# Patient Record
Sex: FEMALE | Race: WHITE
[De-identification: names, ages, dates, MRNs, and addresses within clinical notes are randomized per-mention and may not be internally consistent; named-entity substitution may affect disease eponyms.]

---

## 2017-01-06 ENCOUNTER — RX RENEWAL (OUTPATIENT)
Age: 62
End: 2017-01-06

## 2017-01-12 ENCOUNTER — APPOINTMENT (OUTPATIENT)
Dept: HEMATOLOGY ONCOLOGY | Facility: CLINIC | Age: 62
End: 2017-01-12

## 2017-01-12 VITALS
SYSTOLIC BLOOD PRESSURE: 154 MMHG | WEIGHT: 166 LBS | BODY MASS INDEX: 24.59 KG/M2 | TEMPERATURE: 97.9 F | HEART RATE: 96 BPM | DIASTOLIC BLOOD PRESSURE: 80 MMHG | HEIGHT: 69 IN

## 2017-01-12 DIAGNOSIS — R11.2 NAUSEA WITH VOMITING, UNSPECIFIED: ICD-10-CM

## 2017-01-12 RX ORDER — NALOXONE HYDROCHLORIDE 1 MG/ML
2 INJECTION PARENTERAL
Qty: 4 | Refills: 0 | Status: ACTIVE | COMMUNITY
Start: 2017-01-05

## 2017-01-12 RX ORDER — MORPHINE SULFATE 200 MG/1
200 TABLET, FILM COATED, EXTENDED RELEASE ORAL
Qty: 60 | Refills: 0 | Status: ACTIVE | COMMUNITY
Start: 2016-08-16

## 2017-02-03 ENCOUNTER — RX RENEWAL (OUTPATIENT)
Age: 62
End: 2017-02-03

## 2017-03-02 ENCOUNTER — RX RENEWAL (OUTPATIENT)
Age: 62
End: 2017-03-02

## 2017-04-17 ENCOUNTER — APPOINTMENT (OUTPATIENT)
Dept: HEMATOLOGY ONCOLOGY | Facility: CLINIC | Age: 62
End: 2017-04-17

## 2017-06-01 ENCOUNTER — INPATIENT (INPATIENT)
Facility: HOSPITAL | Age: 62
LOS: 5 days | Discharge: HOME | End: 2017-06-07
Attending: STUDENT IN AN ORGANIZED HEALTH CARE EDUCATION/TRAINING PROGRAM | Admitting: INTERNAL MEDICINE
Payer: MEDICARE

## 2017-06-01 VITALS
DIASTOLIC BLOOD PRESSURE: 65 MMHG | HEART RATE: 111 BPM | HEIGHT: 65 IN | WEIGHT: 164.91 LBS | RESPIRATION RATE: 16 BRPM | OXYGEN SATURATION: 99 % | TEMPERATURE: 101 F | SYSTOLIC BLOOD PRESSURE: 110 MMHG

## 2017-06-01 DIAGNOSIS — R10.9 UNSPECIFIED ABDOMINAL PAIN: ICD-10-CM

## 2017-06-01 DIAGNOSIS — E80.20 UNSPECIFIED PORPHYRIA: ICD-10-CM

## 2017-06-01 LAB
ADD ON TEST-SPECIMEN IN LAB: SIGNIFICANT CHANGE UP
ALBUMIN SERPL ELPH-MCNC: 4.2 G/DL — SIGNIFICANT CHANGE UP (ref 3.3–5)
ALP SERPL-CCNC: 80 U/L — SIGNIFICANT CHANGE UP (ref 40–120)
ALT FLD-CCNC: 35 U/L — SIGNIFICANT CHANGE UP (ref 12–78)
ANION GAP SERPL CALC-SCNC: 8 MMOL/L — SIGNIFICANT CHANGE UP (ref 5–17)
APTT BLD: 33.3 SEC — SIGNIFICANT CHANGE UP (ref 27.5–37.4)
AST SERPL-CCNC: 22 U/L — SIGNIFICANT CHANGE UP (ref 15–37)
BASOPHILS # BLD AUTO: 0.1 K/UL — SIGNIFICANT CHANGE UP (ref 0–0.2)
BASOPHILS NFR BLD AUTO: 0.7 % — SIGNIFICANT CHANGE UP (ref 0–2)
BILIRUB SERPL-MCNC: 0.5 MG/DL — SIGNIFICANT CHANGE UP (ref 0.2–1.2)
BUN SERPL-MCNC: 10 MG/DL — SIGNIFICANT CHANGE UP (ref 7–23)
CALCIUM SERPL-MCNC: 9.8 MG/DL — SIGNIFICANT CHANGE UP (ref 8.5–10.1)
CHLORIDE SERPL-SCNC: 104 MMOL/L — SIGNIFICANT CHANGE UP (ref 96–108)
CO2 SERPL-SCNC: 26 MMOL/L — SIGNIFICANT CHANGE UP (ref 22–31)
CREAT SERPL-MCNC: 0.89 MG/DL — SIGNIFICANT CHANGE UP (ref 0.5–1.3)
EOSINOPHIL # BLD AUTO: 0 K/UL — SIGNIFICANT CHANGE UP (ref 0–0.5)
EOSINOPHIL NFR BLD AUTO: 0.1 % — SIGNIFICANT CHANGE UP (ref 0–6)
GLUCOSE SERPL-MCNC: 139 MG/DL — HIGH (ref 70–99)
HCT VFR BLD CALC: 43.8 % — SIGNIFICANT CHANGE UP (ref 34.5–45)
HGB BLD-MCNC: 15.2 G/DL — SIGNIFICANT CHANGE UP (ref 11.5–15.5)
INR BLD: 1.02 RATIO — SIGNIFICANT CHANGE UP (ref 0.88–1.16)
LACTATE SERPL-SCNC: 2.1 MMOL/L — HIGH (ref 0.7–2)
LYMPHOCYTES # BLD AUTO: 1.8 K/UL — SIGNIFICANT CHANGE UP (ref 1–3.3)
LYMPHOCYTES # BLD AUTO: 15.1 % — SIGNIFICANT CHANGE UP (ref 13–44)
MCHC RBC-ENTMCNC: 28.7 PG — SIGNIFICANT CHANGE UP (ref 27–34)
MCHC RBC-ENTMCNC: 34.6 GM/DL — SIGNIFICANT CHANGE UP (ref 32–36)
MCV RBC AUTO: 82.9 FL — SIGNIFICANT CHANGE UP (ref 80–100)
MONOCYTES # BLD AUTO: 0.4 K/UL — SIGNIFICANT CHANGE UP (ref 0–0.9)
MONOCYTES NFR BLD AUTO: 3.1 % — SIGNIFICANT CHANGE UP (ref 2–14)
NEUTROPHILS # BLD AUTO: 9.7 K/UL — HIGH (ref 1.8–7.4)
NEUTROPHILS NFR BLD AUTO: 81 % — HIGH (ref 43–77)
PLATELET # BLD AUTO: 265 K/UL — SIGNIFICANT CHANGE UP (ref 150–400)
POTASSIUM SERPL-MCNC: 4.2 MMOL/L — SIGNIFICANT CHANGE UP (ref 3.5–5.3)
POTASSIUM SERPL-SCNC: 4.2 MMOL/L — SIGNIFICANT CHANGE UP (ref 3.5–5.3)
PROT SERPL-MCNC: 8.5 GM/DL — HIGH (ref 6–8.3)
PROTHROM AB SERPL-ACNC: 11 SEC — SIGNIFICANT CHANGE UP (ref 9.8–12.7)
RBC # BLD: 5.29 M/UL — HIGH (ref 3.8–5.2)
RBC # FLD: 11.3 % — SIGNIFICANT CHANGE UP (ref 10.3–14.5)
SODIUM SERPL-SCNC: 138 MMOL/L — SIGNIFICANT CHANGE UP (ref 135–145)
WBC # BLD: 12 K/UL — HIGH (ref 3.8–10.5)
WBC # FLD AUTO: 12 K/UL — HIGH (ref 3.8–10.5)

## 2017-06-01 PROCEDURE — 71010: CPT | Mod: 26

## 2017-06-01 PROCEDURE — 74177 CT ABD & PELVIS W/CONTRAST: CPT | Mod: 26

## 2017-06-01 PROCEDURE — 93010 ELECTROCARDIOGRAM REPORT: CPT

## 2017-06-01 PROCEDURE — 99285 EMERGENCY DEPT VISIT HI MDM: CPT

## 2017-06-01 RX ORDER — SODIUM CHLORIDE 9 MG/ML
1000 INJECTION INTRAMUSCULAR; INTRAVENOUS; SUBCUTANEOUS ONCE
Qty: 0 | Refills: 0 | Status: COMPLETED | OUTPATIENT
Start: 2017-06-01 | End: 2017-06-01

## 2017-06-01 RX ORDER — SODIUM CHLORIDE 9 MG/ML
300 INJECTION INTRAMUSCULAR; INTRAVENOUS; SUBCUTANEOUS ONCE
Qty: 0 | Refills: 0 | Status: COMPLETED | OUTPATIENT
Start: 2017-06-01 | End: 2017-06-01

## 2017-06-01 RX ORDER — ONDANSETRON 8 MG/1
0 TABLET, FILM COATED ORAL
Qty: 0 | Refills: 0 | COMMUNITY

## 2017-06-01 RX ORDER — HYDROMORPHONE HYDROCHLORIDE 2 MG/ML
2 INJECTION INTRAMUSCULAR; INTRAVENOUS; SUBCUTANEOUS ONCE
Qty: 0 | Refills: 0 | Status: DISCONTINUED | OUTPATIENT
Start: 2017-06-01 | End: 2017-06-01

## 2017-06-01 RX ORDER — MORPHINE SULFATE 50 MG/1
1 CAPSULE, EXTENDED RELEASE ORAL
Qty: 0 | Refills: 0 | COMMUNITY

## 2017-06-01 RX ORDER — ALBUTEROL 90 UG/1
0 AEROSOL, METERED ORAL
Qty: 0 | Refills: 0 | COMMUNITY

## 2017-06-01 RX ORDER — CEFTRIAXONE 500 MG/1
1 INJECTION, POWDER, FOR SOLUTION INTRAMUSCULAR; INTRAVENOUS ONCE
Qty: 0 | Refills: 0 | Status: COMPLETED | OUTPATIENT
Start: 2017-06-01 | End: 2017-06-01

## 2017-06-01 RX ORDER — FUROSEMIDE 40 MG
1 TABLET ORAL
Qty: 0 | Refills: 0 | COMMUNITY

## 2017-06-01 RX ORDER — HYDROMORPHONE HYDROCHLORIDE 2 MG/ML
0 INJECTION INTRAMUSCULAR; INTRAVENOUS; SUBCUTANEOUS
Qty: 0 | Refills: 0 | COMMUNITY

## 2017-06-01 RX ADMIN — HYDROMORPHONE HYDROCHLORIDE 2 MILLIGRAM(S): 2 INJECTION INTRAMUSCULAR; INTRAVENOUS; SUBCUTANEOUS at 16:05

## 2017-06-01 RX ADMIN — SODIUM CHLORIDE 3000 MILLILITER(S): 9 INJECTION INTRAMUSCULAR; INTRAVENOUS; SUBCUTANEOUS at 12:20

## 2017-06-01 RX ADMIN — HYDROMORPHONE HYDROCHLORIDE 2 MILLIGRAM(S): 2 INJECTION INTRAMUSCULAR; INTRAVENOUS; SUBCUTANEOUS at 20:54

## 2017-06-01 RX ADMIN — HYDROMORPHONE HYDROCHLORIDE 2 MILLIGRAM(S): 2 INJECTION INTRAMUSCULAR; INTRAVENOUS; SUBCUTANEOUS at 14:05

## 2017-06-01 RX ADMIN — HYDROMORPHONE HYDROCHLORIDE 2 MILLIGRAM(S): 2 INJECTION INTRAMUSCULAR; INTRAVENOUS; SUBCUTANEOUS at 15:35

## 2017-06-01 RX ADMIN — SODIUM CHLORIDE 3000 MILLILITER(S): 9 INJECTION INTRAMUSCULAR; INTRAVENOUS; SUBCUTANEOUS at 12:30

## 2017-06-01 RX ADMIN — CEFTRIAXONE 100 GRAM(S): 500 INJECTION, POWDER, FOR SOLUTION INTRAMUSCULAR; INTRAVENOUS at 13:10

## 2017-06-01 RX ADMIN — HYDROMORPHONE HYDROCHLORIDE 2 MILLIGRAM(S): 2 INJECTION INTRAMUSCULAR; INTRAVENOUS; SUBCUTANEOUS at 14:35

## 2017-06-01 RX ADMIN — SODIUM CHLORIDE 1800 MILLILITER(S): 9 INJECTION INTRAMUSCULAR; INTRAVENOUS; SUBCUTANEOUS at 13:15

## 2017-06-01 RX ADMIN — HYDROMORPHONE HYDROCHLORIDE 2 MILLIGRAM(S): 2 INJECTION INTRAMUSCULAR; INTRAVENOUS; SUBCUTANEOUS at 17:24

## 2017-06-01 RX ADMIN — HYDROMORPHONE HYDROCHLORIDE 2 MILLIGRAM(S): 2 INJECTION INTRAMUSCULAR; INTRAVENOUS; SUBCUTANEOUS at 17:54

## 2017-06-01 NOTE — H&P ADULT - ATTENDING COMMENTS
Patient seen and examined after initial evaluation by family medicine resident above. Case discussed and reviewed in detail. Please note my plan below.     60 yo F with PMH of porphyria cutanea tarda (diagnosed in 1991), h/o skin blisters s/p phlebotomy, chronic adrenal insufficiency, reflex sympathetic dystrophy of the arm, hearing defect B/L, depression, h/o R rib fractures, arthritis, renal insufficiency, osteopenia, p/w abdominal pain x 2 days. +Nausea / vomiting. Patient has a h/o numerous hospitalizations for nausea / vomiting / severe pain, which has been previously managed with narcotics.       *Abdominal pain / nausea / vomiting - possible porphyria attack  -Heme consult  -Hydration w/ D5 /  cc/hour, as glucose can inhibit heme synthesis  -High carb diet  -Porphryin levels  -Pain control  -Stool softners PRN  -Lactate 2.2 -> repeat lactate level  -NPO for now  -Zofran PRN    *DVT ppx  -SCDs

## 2017-06-01 NOTE — ED PROVIDER NOTE - NS ED MD SCRIBE ATTENDING SCRIBE SECTIONS
PROGRESS NOTE/PHYSICAL EXAM/HISTORY OF PRESENT ILLNESS/RESULTS/REVIEW OF SYSTEMS/DISPOSITION/PAST MEDICAL/SURGICAL/SOCIAL HISTORY

## 2017-06-01 NOTE — H&P ADULT - RS GEN PE MLT RESP DETAILS PC
breath sounds equal/no rhonchi/good air movement/normal/respirations non-labored/no wheezes/clear to auscultation bilaterally/no intercostal retractions/no rales/no chest wall tenderness

## 2017-06-01 NOTE — ED ADULT NURSE REASSESSMENT NOTE - SYMPTOMS
abdominal pain
abdominal pain/nausea
pain:/nausea/generalized abdominal pain

## 2017-06-01 NOTE — H&P ADULT - PMH
Chronic abdominal pain    Porphyria Adrenal insufficiency, primary    Chronic abdominal pain    Knee effusion    Osteoporosis    Porphyria

## 2017-06-01 NOTE — H&P ADULT - PROBLEM SELECTOR PLAN 2
continue IVF DS5NS @100ml/hr  Hematology consult   Morphine   Zofran 4mg IV q6hr continue IVF DS5NS @125ml/hr  Hematology consult- Dr Higuera  Dilaudid IV 1 mg q6h for severe pain   Morphine 2mg q6h for moderate pain  Zofran 4mg IV q6hr  Urine porphyrin  Serum porphyrin

## 2017-06-01 NOTE — ED PROVIDER NOTE - CONSTITUTIONAL, MLM
normal... Well appearing, well nourished, awake, alert, oriented to person, place, time/situation. In acute distress secondary to abd pain.

## 2017-06-01 NOTE — ED ADULT NURSE NOTE - ED STAT RN HANDOFF DETAILS
from NATTY RAGSDALE patient awaiting admitting orders. no complaints at this time. safety maintained. will continue to monitor.

## 2017-06-01 NOTE — H&P ADULT - NSHPPHYSICALEXAM_GEN_ALL_CORE
ICU Vital Signs Last 24 Hrs  T(C): 37.2, Max: 38.5 (06-01 @ 12:18)  T(F): 99, Max: 101.3 (06-01 @ 12:18)  HR: 79 (72 - 111)  BP: 142/70 (110/65 - 189/81)    SpO2: 100% (97% - 100%)

## 2017-06-01 NOTE — H&P ADULT - HISTORY OF PRESENT ILLNESS
61y F with a PMHx chronic intermittent porphyria  presents with sudden onset abdominal pain - 2 days  According to the patient she was in her normal state of health when she had a sudden onset stabbing  left lower abdominal radiating diffusely  to the abdomen. This accompanied by fever and nausea/ vomiting but no chills - has had several episodes of vomiting  the last couple of days . Exacerbated by lying down flat and alleviated by assuming a  fetal position. Denies diarrhea or relation to meals.  Denies eating anything unusual or any sick contacts or history of travel.   Has a history of similar episodes of abdominal pain since the age of 44y and has been admitted several times under the care of Dr Higuera. She has over the years tried to figure out what precipitates her attacks but has been unsuccessful. She denies alcohol intake or excessive exposure to sunlight. Denies seizures or peripheral neuropathy. 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 This accompanied by fever and nausea/ vomiting but no chills - has had several episodes of vomiting  the last couple of days . Exacerbated by lying down flat and alleviated by assuming a  fetal position. Denies diarrhea or relation to meals.  Denies eating anything unusual or any sick contacts or history of travel.   Has a history of similar episodes of abdominal pain since the age of 44y and has been admitted several times under the care of Dr Higuera. She has over the years tried to figure out what precipitates her attacks but has been unsuccessful. She denies alcohol intake or excessive exposure to sunlight. Denies seizures or peripheral neuropathy. 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 This accompanied by fever and nausea/ vomiting but no chills - has had several episodes of vomiting  the last couple of days . Exacerbated by lying down flat and alleviated by assuming a  fetal position. Denies diarrhea or relation to meals.  Denies eating anything unusual or any sick contacts or history of travel.   Has a history of similar episodes of abdominal pain since the age of 44y and has been admitted several times under the care of Dr Higuera. She has over the years tried to figure out what precipitates her attacks but has been unsuccessful. She denies alcohol intake or excessive exposure to sunlight. Denies seizures or peripheral neuropathy.   She used to take lasix for swelling of knees but no longer does.

## 2017-06-01 NOTE — ED ADULT NURSE REASSESSMENT NOTE - NS ED NURSE REASSESS COMMENT FT1
Pt VS meet sepsis criteria, blood cx #1 drawn at 1217, IVF NSB initiated at 1220, pt made as comfortable as possible; reports last pain med taken was last night (has port in place, and receives dilaudid 4mg IVP PRN); unable to take anti-nausea med 2/2 N/V. Awaiting orders.

## 2017-06-01 NOTE — ED ADULT NURSE REASSESSMENT NOTE - ANCILLARY STATUS
lab results pending
radiology results pending/lab results pending
lab results pending/radiology results pending
lab results pending/radiology results pending

## 2017-06-01 NOTE — ED PROVIDER NOTE - OBJECTIVE STATEMENT
62 y/o female with a h/o chronic abd pain, presenting to ED for vomiting since last night associated with abd pain. Pt states current pain feels similar to previous exacerbations of chronic abd pain. No fevers but pt reports occasionally being very "hot and cold". Pt follows up with pain management. Dr. Boxer (PCP), Dr. Higuera is pt's "disease doctor".

## 2017-06-01 NOTE — H&P ADULT - PROBLEM SELECTOR PLAN 1
admit to med surg   CT scan abdomen: No acute intra-abdominal or pelvic pathology.   keep NPO   WBC: 12; Lactate: 2.1  continue IVF : at 100 ml /hr  start Protonix 40 mg IV admit to med surg   CT scan abdomen: No acute intra-abdominal or pelvic pathology.   keep NPO except medications  WBC: 12; Lactate: 2.1  start Protonix 40 mg IV admit to [facility] surg   CT scan abdomen: No acute intra-abdominal or pelvic pathology.   keep NPO except medications  WBC: 12; Lactate: 2.1- Recheck lactate in AM  Dilaudid IV 1 mg q6h for severe pain  Morphine 2mg q6h for moderate pain  Zofran IV 4mg for nausea q6 h

## 2017-06-01 NOTE — H&P ADULT - GASTROINTESTINAL DETAILS
no masses palpable/no distention/bowel sounds normal/no rebound tenderness/no guarding/no organomegaly/soft/normal

## 2017-06-02 DIAGNOSIS — Z90.89 ACQUIRED ABSENCE OF OTHER ORGANS: Chronic | ICD-10-CM

## 2017-06-02 DIAGNOSIS — E83.39 OTHER DISORDERS OF PHOSPHORUS METABOLISM: ICD-10-CM

## 2017-06-02 DIAGNOSIS — Z90.49 ACQUIRED ABSENCE OF OTHER SPECIFIED PARTS OF DIGESTIVE TRACT: Chronic | ICD-10-CM

## 2017-06-02 DIAGNOSIS — Z95.828 PRESENCE OF OTHER VASCULAR IMPLANTS AND GRAFTS: ICD-10-CM

## 2017-06-02 DIAGNOSIS — Z98.1 ARTHRODESIS STATUS: Chronic | ICD-10-CM

## 2017-06-02 DIAGNOSIS — E80.21 ACUTE INTERMITTENT (HEPATIC) PORPHYRIA: ICD-10-CM

## 2017-06-02 DIAGNOSIS — F41.9 ANXIETY DISORDER, UNSPECIFIED: ICD-10-CM

## 2017-06-02 LAB
ALBUMIN SERPL ELPH-MCNC: 3.5 G/DL — SIGNIFICANT CHANGE UP (ref 3.3–5)
ALP SERPL-CCNC: 62 U/L — SIGNIFICANT CHANGE UP (ref 40–120)
ALT FLD-CCNC: 25 U/L — SIGNIFICANT CHANGE UP (ref 12–78)
ANION GAP SERPL CALC-SCNC: 7 MMOL/L — SIGNIFICANT CHANGE UP (ref 5–17)
AST SERPL-CCNC: 17 U/L — SIGNIFICANT CHANGE UP (ref 15–37)
BASOPHILS # BLD AUTO: 0.1 K/UL — SIGNIFICANT CHANGE UP (ref 0–0.2)
BASOPHILS NFR BLD AUTO: 0.7 % — SIGNIFICANT CHANGE UP (ref 0–2)
BILIRUB SERPL-MCNC: 0.5 MG/DL — SIGNIFICANT CHANGE UP (ref 0.2–1.2)
BUN SERPL-MCNC: 8 MG/DL — SIGNIFICANT CHANGE UP (ref 7–23)
CALCIUM SERPL-MCNC: 8.4 MG/DL — LOW (ref 8.5–10.1)
CHLORIDE SERPL-SCNC: 111 MMOL/L — HIGH (ref 96–108)
CO2 SERPL-SCNC: 25 MMOL/L — SIGNIFICANT CHANGE UP (ref 22–31)
CREAT SERPL-MCNC: 0.68 MG/DL — SIGNIFICANT CHANGE UP (ref 0.5–1.3)
EOSINOPHIL # BLD AUTO: 0 K/UL — SIGNIFICANT CHANGE UP (ref 0–0.5)
EOSINOPHIL NFR BLD AUTO: 0.1 % — SIGNIFICANT CHANGE UP (ref 0–6)
GLUCOSE SERPL-MCNC: 124 MG/DL — HIGH (ref 70–99)
HCT VFR BLD CALC: 38.4 % — SIGNIFICANT CHANGE UP (ref 34.5–45)
HGB BLD-MCNC: 13.1 G/DL — SIGNIFICANT CHANGE UP (ref 11.5–15.5)
LACTATE SERPL-SCNC: 0.9 MMOL/L — SIGNIFICANT CHANGE UP (ref 0.7–2)
LACTATE SERPL-SCNC: 2.2 MMOL/L — HIGH (ref 0.7–2)
LYMPHOCYTES # BLD AUTO: 2.6 K/UL — SIGNIFICANT CHANGE UP (ref 1–3.3)
LYMPHOCYTES # BLD AUTO: 20.7 % — SIGNIFICANT CHANGE UP (ref 13–44)
MAGNESIUM SERPL-MCNC: 2.3 MG/DL — SIGNIFICANT CHANGE UP (ref 1.6–2.6)
MCHC RBC-ENTMCNC: 28.1 PG — SIGNIFICANT CHANGE UP (ref 27–34)
MCHC RBC-ENTMCNC: 34.1 GM/DL — SIGNIFICANT CHANGE UP (ref 32–36)
MCV RBC AUTO: 82.3 FL — SIGNIFICANT CHANGE UP (ref 80–100)
MONOCYTES # BLD AUTO: 0.9 K/UL — SIGNIFICANT CHANGE UP (ref 0–0.9)
MONOCYTES NFR BLD AUTO: 7.5 % — SIGNIFICANT CHANGE UP (ref 2–14)
NEUTROPHILS # BLD AUTO: 8.9 K/UL — HIGH (ref 1.8–7.4)
NEUTROPHILS NFR BLD AUTO: 71 % — SIGNIFICANT CHANGE UP (ref 43–77)
PHOSPHATE SERPL-MCNC: 2.4 MG/DL — LOW (ref 2.5–4.5)
PLATELET # BLD AUTO: 235 K/UL — SIGNIFICANT CHANGE UP (ref 150–400)
POTASSIUM SERPL-MCNC: 3.4 MMOL/L — LOW (ref 3.5–5.3)
POTASSIUM SERPL-SCNC: 3.4 MMOL/L — LOW (ref 3.5–5.3)
PROT SERPL-MCNC: 7.2 GM/DL — SIGNIFICANT CHANGE UP (ref 6–8.3)
RBC # BLD: 4.67 M/UL — SIGNIFICANT CHANGE UP (ref 3.8–5.2)
RBC # FLD: 11.3 % — SIGNIFICANT CHANGE UP (ref 10.3–14.5)
SODIUM SERPL-SCNC: 143 MMOL/L — SIGNIFICANT CHANGE UP (ref 135–145)
WBC # BLD: 12.5 K/UL — HIGH (ref 3.8–10.5)
WBC # FLD AUTO: 12.5 K/UL — HIGH (ref 3.8–10.5)

## 2017-06-02 RX ORDER — SODIUM CHLORIDE 9 MG/ML
1000 INJECTION, SOLUTION INTRAVENOUS
Qty: 0 | Refills: 0 | Status: DISCONTINUED | OUTPATIENT
Start: 2017-06-02 | End: 2017-06-02

## 2017-06-02 RX ORDER — POTASSIUM PHOSPHATE, MONOBASIC POTASSIUM PHOSPHATE, DIBASIC 236; 224 MG/ML; MG/ML
15 INJECTION, SOLUTION INTRAVENOUS ONCE
Qty: 0 | Refills: 0 | Status: COMPLETED | OUTPATIENT
Start: 2017-06-02 | End: 2017-06-02

## 2017-06-02 RX ORDER — HYDROMORPHONE HYDROCHLORIDE 2 MG/ML
4 INJECTION INTRAMUSCULAR; INTRAVENOUS; SUBCUTANEOUS EVERY 4 HOURS
Qty: 0 | Refills: 0 | Status: DISCONTINUED | OUTPATIENT
Start: 2017-06-02 | End: 2017-06-02

## 2017-06-02 RX ORDER — HYDROMORPHONE HYDROCHLORIDE 2 MG/ML
2 INJECTION INTRAMUSCULAR; INTRAVENOUS; SUBCUTANEOUS
Qty: 0 | Refills: 0 | Status: DISCONTINUED | OUTPATIENT
Start: 2017-06-02 | End: 2017-06-02

## 2017-06-02 RX ORDER — POLYETHYLENE GLYCOL 3350 17 G/17G
17 POWDER, FOR SOLUTION ORAL DAILY
Qty: 0 | Refills: 0 | Status: DISCONTINUED | OUTPATIENT
Start: 2017-06-02 | End: 2017-06-07

## 2017-06-02 RX ORDER — DOCUSATE SODIUM 100 MG
100 CAPSULE ORAL THREE TIMES A DAY
Qty: 0 | Refills: 0 | Status: DISCONTINUED | OUTPATIENT
Start: 2017-06-02 | End: 2017-06-02

## 2017-06-02 RX ORDER — HYDROMORPHONE HYDROCHLORIDE 2 MG/ML
30 INJECTION INTRAMUSCULAR; INTRAVENOUS; SUBCUTANEOUS
Qty: 0 | Refills: 0 | Status: DISCONTINUED | OUTPATIENT
Start: 2017-06-02 | End: 2017-06-06

## 2017-06-02 RX ORDER — HYDROMORPHONE HYDROCHLORIDE 2 MG/ML
1 INJECTION INTRAMUSCULAR; INTRAVENOUS; SUBCUTANEOUS ONCE
Qty: 0 | Refills: 0 | Status: DISCONTINUED | OUTPATIENT
Start: 2017-06-02 | End: 2017-06-02

## 2017-06-02 RX ORDER — HYDROMORPHONE HYDROCHLORIDE 2 MG/ML
0.5 INJECTION INTRAMUSCULAR; INTRAVENOUS; SUBCUTANEOUS
Qty: 0 | Refills: 0 | Status: DISCONTINUED | OUTPATIENT
Start: 2017-06-03 | End: 2017-06-06

## 2017-06-02 RX ORDER — NALOXONE HYDROCHLORIDE 4 MG/.1ML
0.1 SPRAY NASAL
Qty: 0 | Refills: 0 | Status: DISCONTINUED | OUTPATIENT
Start: 2017-06-03 | End: 2017-06-06

## 2017-06-02 RX ORDER — MORPHINE SULFATE 50 MG/1
2 CAPSULE, EXTENDED RELEASE ORAL EVERY 6 HOURS
Qty: 0 | Refills: 0 | Status: DISCONTINUED | OUTPATIENT
Start: 2017-06-02 | End: 2017-06-02

## 2017-06-02 RX ORDER — ONDANSETRON 8 MG/1
4 TABLET, FILM COATED ORAL EVERY 6 HOURS
Qty: 0 | Refills: 0 | Status: DISCONTINUED | OUTPATIENT
Start: 2017-06-02 | End: 2017-06-07

## 2017-06-02 RX ORDER — SENNA PLUS 8.6 MG/1
2 TABLET ORAL AT BEDTIME
Qty: 0 | Refills: 0 | Status: DISCONTINUED | OUTPATIENT
Start: 2017-06-02 | End: 2017-06-07

## 2017-06-02 RX ORDER — ONDANSETRON 8 MG/1
4 TABLET, FILM COATED ORAL EVERY 6 HOURS
Qty: 0 | Refills: 0 | Status: DISCONTINUED | OUTPATIENT
Start: 2017-06-03 | End: 2017-06-06

## 2017-06-02 RX ORDER — PANTOPRAZOLE SODIUM 20 MG/1
40 TABLET, DELAYED RELEASE ORAL DAILY
Qty: 0 | Refills: 0 | Status: DISCONTINUED | OUTPATIENT
Start: 2017-06-02 | End: 2017-06-07

## 2017-06-02 RX ORDER — HYDROMORPHONE HYDROCHLORIDE 2 MG/ML
4 INJECTION INTRAMUSCULAR; INTRAVENOUS; SUBCUTANEOUS
Qty: 0 | Refills: 0 | Status: DISCONTINUED | OUTPATIENT
Start: 2017-06-02 | End: 2017-06-02

## 2017-06-02 RX ORDER — SODIUM CHLORIDE 9 MG/ML
1000 INJECTION, SOLUTION INTRAVENOUS
Qty: 0 | Refills: 0 | Status: DISCONTINUED | OUTPATIENT
Start: 2017-06-02 | End: 2017-06-03

## 2017-06-02 RX ORDER — PANTOPRAZOLE SODIUM 20 MG/1
40 TABLET, DELAYED RELEASE ORAL
Qty: 0 | Refills: 0 | Status: DISCONTINUED | OUTPATIENT
Start: 2017-06-02 | End: 2017-06-02

## 2017-06-02 RX ORDER — DOCUSATE SODIUM 100 MG
100 CAPSULE ORAL THREE TIMES A DAY
Qty: 0 | Refills: 0 | Status: DISCONTINUED | OUTPATIENT
Start: 2017-06-02 | End: 2017-06-07

## 2017-06-02 RX ORDER — HYDROMORPHONE HYDROCHLORIDE 2 MG/ML
1 INJECTION INTRAMUSCULAR; INTRAVENOUS; SUBCUTANEOUS EVERY 6 HOURS
Qty: 0 | Refills: 0 | Status: DISCONTINUED | OUTPATIENT
Start: 2017-06-02 | End: 2017-06-02

## 2017-06-02 RX ORDER — ACETAMINOPHEN 500 MG
650 TABLET ORAL EVERY 6 HOURS
Qty: 0 | Refills: 0 | Status: DISCONTINUED | OUTPATIENT
Start: 2017-06-02 | End: 2017-06-07

## 2017-06-02 RX ADMIN — HYDROMORPHONE HYDROCHLORIDE 1 MILLIGRAM(S): 2 INJECTION INTRAMUSCULAR; INTRAVENOUS; SUBCUTANEOUS at 03:47

## 2017-06-02 RX ADMIN — HYDROMORPHONE HYDROCHLORIDE 2 MILLIGRAM(S): 2 INJECTION INTRAMUSCULAR; INTRAVENOUS; SUBCUTANEOUS at 09:24

## 2017-06-02 RX ADMIN — ONDANSETRON 4 MILLIGRAM(S): 8 TABLET, FILM COATED ORAL at 09:29

## 2017-06-02 RX ADMIN — ONDANSETRON 4 MILLIGRAM(S): 8 TABLET, FILM COATED ORAL at 21:40

## 2017-06-02 RX ADMIN — SODIUM CHLORIDE 100 MILLILITER(S): 9 INJECTION, SOLUTION INTRAVENOUS at 02:19

## 2017-06-02 RX ADMIN — Medication 1 MILLIGRAM(S): at 21:28

## 2017-06-02 RX ADMIN — HYDROMORPHONE HYDROCHLORIDE 30 MILLILITER(S): 2 INJECTION INTRAMUSCULAR; INTRAVENOUS; SUBCUTANEOUS at 18:23

## 2017-06-02 RX ADMIN — Medication 650 MILLIGRAM(S): at 16:21

## 2017-06-02 RX ADMIN — HYDROMORPHONE HYDROCHLORIDE 2 MILLIGRAM(S): 2 INJECTION INTRAMUSCULAR; INTRAVENOUS; SUBCUTANEOUS at 09:45

## 2017-06-02 RX ADMIN — HYDROMORPHONE HYDROCHLORIDE 1 MILLIGRAM(S): 2 INJECTION INTRAMUSCULAR; INTRAVENOUS; SUBCUTANEOUS at 02:50

## 2017-06-02 RX ADMIN — POTASSIUM PHOSPHATE, MONOBASIC POTASSIUM PHOSPHATE, DIBASIC 62.5 MILLIMOLE(S): 236; 224 INJECTION, SOLUTION INTRAVENOUS at 11:22

## 2017-06-02 RX ADMIN — HYDROMORPHONE HYDROCHLORIDE 2 MILLIGRAM(S): 2 INJECTION INTRAMUSCULAR; INTRAVENOUS; SUBCUTANEOUS at 15:47

## 2017-06-02 RX ADMIN — HYDROMORPHONE HYDROCHLORIDE 4 MILLIGRAM(S): 2 INJECTION INTRAMUSCULAR; INTRAVENOUS; SUBCUTANEOUS at 11:35

## 2017-06-02 RX ADMIN — Medication 650 MILLIGRAM(S): at 15:51

## 2017-06-02 RX ADMIN — HYDROMORPHONE HYDROCHLORIDE 1 MILLIGRAM(S): 2 INJECTION INTRAMUSCULAR; INTRAVENOUS; SUBCUTANEOUS at 04:02

## 2017-06-02 RX ADMIN — PANTOPRAZOLE SODIUM 40 MILLIGRAM(S): 20 TABLET, DELAYED RELEASE ORAL at 21:28

## 2017-06-02 RX ADMIN — HYDROMORPHONE HYDROCHLORIDE 2 MILLIGRAM(S): 2 INJECTION INTRAMUSCULAR; INTRAVENOUS; SUBCUTANEOUS at 13:20

## 2017-06-02 RX ADMIN — ONDANSETRON 4 MILLIGRAM(S): 8 TABLET, FILM COATED ORAL at 01:42

## 2017-06-02 RX ADMIN — HYDROMORPHONE HYDROCHLORIDE 4 MILLIGRAM(S): 2 INJECTION INTRAMUSCULAR; INTRAVENOUS; SUBCUTANEOUS at 11:21

## 2017-06-02 RX ADMIN — HYDROMORPHONE HYDROCHLORIDE 1 MILLIGRAM(S): 2 INJECTION INTRAMUSCULAR; INTRAVENOUS; SUBCUTANEOUS at 02:34

## 2017-06-02 RX ADMIN — SODIUM CHLORIDE 125 MILLILITER(S): 9 INJECTION, SOLUTION INTRAVENOUS at 18:20

## 2017-06-02 NOTE — ED ADULT NURSE REASSESSMENT NOTE - COMFORT CARE
repositioned/side rails up
warm blanket provided/wait time explained
plan of care explained/warm blanket provided
plan of care explained/wait time explained
plan of care explained/warm blanket provided

## 2017-06-02 NOTE — DIETITIAN INITIAL EVALUATION ADULT. - OTHER INFO
consult for N/V > 3 days.  Pt has porphyria, MD recommend high carb diet. consult for n/v > 3 days.  Pt in pain, nauseated, NPO.  Doctor recommends high carb diet for porphyria, pt on high carbs at home. NKFA, no issues chew/swallow.

## 2017-06-02 NOTE — PATIENT PROFILE ADULT. - ABILITY TO HEAR (WITH HEARING AID OR HEARING APPLIANCE IF NORMALLY USED):
Mildly to Moderately Impaired: difficulty hearing in some environments or speaker may need to increase volume or speak distinctly/b/l hearing aids

## 2017-06-02 NOTE — PROGRESS NOTE ADULT - SUBJECTIVE AND OBJECTIVE BOX
60 y/o woman with porphyria admitted with uncontrolled pain/ acute porphyria attack.  Dr Seay is her hematologist,  She is on daily IVF at home and multiple pain meds (  under [facility] pain  management).    Vital Signs Last 24 Hrs  T(C): 36.4, Max: 37.2 (06-02 @ 01:31)  T(F): 97.5, Max: 99 (06-02 @ 01:31)  HR: 66 (65 - 79)  BP: 167/77 (142/70 - 167/77)  BP(mean): --  RR: 18 (16 - 19)  SpO2: 98% (97% - 100%)    Awake, alert c/o generalized pain. Chest clear. Heart regular. [facility] right upper chest.                          13.1   12.5  )-----------( 235      ( 02 Jun 2017 07:05 )             38.4   06-02    143  |  111<H>  |  8   ----------------------------<  124<H>  3.4<L>   |  25  |  0.68    Ca    8.4<L>      02 Jun 2017 07:05  Phos  2.4     06-02  Mg     2.3     06-02    TPro  7.2  /  Alb  3.5  /  TBili  0.5  /  DBili  x   /  AST  17  /  ALT  25  /  AlkPhos  62  06-02

## 2017-06-02 NOTE — PROGRESS NOTE ADULT - ASSESSMENT
60 y/o woman with chronic porphyria admitted with acute exacerbation of pain.  IVF with dextrose.  Aggressive pain control. On chronic  opioids at home.  She sis well with PCA with dilaudid during her prior admissions.   Dr Seay will follow up tomorrow.

## 2017-06-02 NOTE — PROGRESS NOTE ADULT - PROBLEM SELECTOR PLAN 1
admit to [facility] surg   CT scan abdomen: No acute intra-abdominal or pelvic pathology.   keep NPO except medications  WBC: 12; Lactate: 2.1- Recheck lactate in AM  Dilaudid IV 1 mg q6h for severe pain  Morphine 2mg q6h for moderate pain  Zofran IV 4mg for nausea q6 h Likely due to acute intermittent porphyria   CT scan abdomen: No acute intra-abdominal or pelvic pathology.   keep NPO except medications  WBC: 12; Lactate: 2.1- Recheck lactate WNL after Hydration  Pain not controlled even with change of pain meds, Anesthesia for PCA pump called  C/w Zofran PRN  PPI  Aggressive Hydration D5NS  Monitor electrolytes   D/w DR Seay

## 2017-06-02 NOTE — PROGRESS NOTE ADULT - PROBLEM SELECTOR PLAN 2
continue IVF DS5NS @125ml/hr  Hematology consult- Dr Higuera  Dilaudid IV 1 mg q6h for severe pain   Morphine 2mg q6h for moderate pain  Zofran 4mg IV q6hr  Urine porphyrin  Serum porphyrin Lorazepam 1mg q 8hrs

## 2017-06-02 NOTE — PROGRESS NOTE ADULT - ASSESSMENT
61y f with history of acute intermittent porphyria presents with abdominal pain . 61y f with history of acute intermittent porphyria presents with  acute abdominal pain .

## 2017-06-02 NOTE — PROGRESS NOTE ADULT - SUBJECTIVE AND OBJECTIVE BOX
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 This accompanied by fever and nausea/ vomiting but no chills - has had several episodes of vomiting  the last couple of days . Exacerbated by lying down flat and alleviated by assuming a  fetal position. Denies diarrhea or relation to meals.  Denies eating anything unusual or any sick contacts or history of travel.   Has a history of similar episodes of abdominal pain since the age of 44y and has been admitted several times under the care of Dr Higuera. She has over the years tried to figure out what precipitates her attacks but has been unsuccessful. She denies alcohol intake or excessive exposure to sunlight. Denies seizures or peripheral neuropathy.   She used to take lasix for swelling of knees but no longer does. (2017 23:10)    INTERVAL HPI/OVERNIGHT EVENTS:    Vital Signs Last 24 Hrs  T(C): 36.6, Max: 38.5 ( @ 12:18)  T(F): 97.8, Max: 101.3 ( @ 12:18)  HR: 65 (65 - 111)  BP: 154/59 (110/65 - 189/81)  BP(mean): --  RR: 18 (16 - 23)  SpO2: 100% (97% - 100%)  I&O's Detail    I & Os for current day (as of 2017 12:07)  =============================================  IN:    dextrose 5% + sodium chloride 0.9%.: 306 ml    Total IN: 306 ml  ---------------------------------------------  OUT:    Total OUT: 0 ml  ---------------------------------------------  Total NET: 306 ml                                13.1   12.5  )-----------( 235      ( 2017 07:05 )             38.4     2017 07:05    143    |  111    |  8      ----------------------------<  124    3.4     |  25     |  0.68     Ca    8.4        2017 07:05  Phos  2.4       2017 07:05  Mg     2.3       2017 07:05    TPro  7.2    /  Alb  3.5    /  TBili  0.5    /  DBili  x      /  AST  17     /  ALT  25     /  AlkPhos  62     2017 07:05    PT/INR - ( 2017 12:19 )   PT: 11.0 sec;   INR: 1.02 ratio         PTT - ( 2017 12:19 )  PTT:33.3 sec  CAPILLARY BLOOD GLUCOSE  124 (2017 12:18)    LIVER FUNCTIONS - ( 2017 07:05 )  Alb: 3.5 g/dL / Pro: 7.2 gm/dL / ALK PHOS: 62 U/L / ALT: 25 U/L / AST: 17 U/L / GGT: x           Urinalysis Basic - ( 2017 14:54 )    Color: Yellow / Appearance: Clear / S.005 / pH: x  Gluc: x / Ketone: Trace  / Bili: Negative / Urobili: Negative mg/dL   Blood: x / Protein: Negative mg/dL / Nitrite: Negative   Leuk Esterase: Trace / RBC: 0-2 /HPF / WBC 3-5   Sq Epi: x / Non Sq Epi: Few / Bacteria: Few        MEDICATIONS  (STANDING):  dextrose 5% + sodium chloride 0.9%. 1000milliLiter(s) IV Continuous <Continuous>  LORazepam     Tablet 1milliGRAM(s) Oral at bedtime  pantoprazole    Tablet 40milliGRAM(s) Oral before breakfast  polyethylene glycol 3350 17Gram(s) Oral daily    MEDICATIONS  (PRN):  acetaminophen   Tablet. 650milliGRAM(s) Oral every 6 hours PRN Moderate Pain (4 - 6)  ondansetron Injectable 4milliGRAM(s) IV Push every 6 hours PRN Nausea  docusate sodium 100milliGRAM(s) Oral three times a day PRN Constipation  senna 2Tablet(s) Oral at bedtime PRN Constipation  HYDROmorphone  Injectable 2milliGRAM(s) IV Push every 2 hours PRN Moderate Pain (4 - 6)  HYDROmorphone  Injectable 4milliGRAM(s) IV Push every 2 hours PRN Severe Pain (7 - 10)      RADIOLOGY & ADDITIONAL TESTS: CC: Abdominal pain - 2 days (2017 23:10)    HPI:  61y F with a PMHx acute  intermittent porphyria( diagnosed ; last exacerbation   ) ,  presents with sudden onset abdominal pain - 2 days  According to the patient she was in her normal state of health when she had a sudden onset stabbing  left lower abdominal radiating diffusely  to the abdomen. This accompanied by fever and nausea/ vomiting but no chills - has had several episodes of vomiting  the last couple of days . Exacerbated by lying down flat and alleviated by assuming a  fetal position. Denies diarrhea or relation to meals.  Denies eating anything unusual or any sick contacts or history of travel.   Has a history of similar episodes of abdominal pain since the age of 44y and has been admitted several times under the care of Dr Higuera. She has over the years tried to figure out what precipitates her attacks but has been unsuccessful. She denies alcohol intake or excessive exposure to sunlight. Denies seizures or peripheral neuropathy.   She used to take lasix for swelling of knees but no longer does. (2017 23:10)    INTERVAL HPI/ OVERNIGHT EVENTS: Pt was seen and examined this am, confirmed history above,  C/o severe abd pain, not better  with PO or IV dilated feels nauseous. Pt  states that was admitted before and was on PCA.  Family at bedside POC discussed     Vital Signs Last 24 Hrs  T(C): 36.4, Max: 37.2 (- @ 01:31)  T(F): 97.5, Max: 99 (06- @ 01:31)  HR: 66 (65 - 79)  BP: 167/77 (142/70 - 167/77)  BP(mean): --  RR: 18 (16 - 19)  SpO2: 98% (97% - 100%)    I&O's Detail    I & Os for current day (as of 2017 12:07)  =============================================  IN:    dextrose 5% + sodium chloride 0.9%.: 306 ml    Total IN: 306 ml  ---------------------------------------------  OUT:    Total OUT: 0 ml  ---------------------------------------------  Total NET: 306 ml                                13.1   12.5  )-----------( 235      ( 2017 07:05 )             38.4     2017 07:05    143    |  111    |  8      ----------------------------<  124    3.4     |  25     |  0.68     Ca    8.4        2017 07:05  Phos  2.4       2017 07:05  Mg     2.3       2017 07:05    TPro  7.2    /  Alb  3.5    /  TBili  0.5    /  DBili  x      /  AST  17     /  ALT  25     /  AlkPhos  62     2017 07:05    PT/INR - ( 2017 12:19 )   PT: 11.0 sec;   INR: 1.02 ratio         PTT - ( 2017 12:19 )  PTT:33.3 sec  CAPILLARY BLOOD GLUCOSE  124 (2017 12:18)    LIVER FUNCTIONS - ( 2017 07:05 )  Alb: 3.5 g/dL / Pro: 7.2 gm/dL / ALK PHOS: 62 U/L / ALT: 25 U/L / AST: 17 U/L / GGT: x           Urinalysis Basic - ( 2017 14:54 )    Color: Yellow / Appearance: Clear / S.005 / pH: x  Gluc: x / Ketone: Trace  / Bili: Negative / Urobili: Negative mg/dL   Blood: x / Protein: Negative mg/dL / Nitrite: Negative   Leuk Esterase: Trace / RBC: 0-2 /HPF / WBC 3-5   Sq Epi: x / Non Sq Epi: Few / Bacteria: Few        MEDICATIONS  (STANDING):  dextrose 5% + sodium chloride 0.9%. 1000milliLiter(s) IV Continuous <Continuous>  LORazepam     Tablet 1milliGRAM(s) Oral at bedtime  pantoprazole    Tablet 40milliGRAM(s) Oral before breakfast  polyethylene glycol 3350 17Gram(s) Oral daily    MEDICATIONS  (PRN):  acetaminophen   Tablet. 650milliGRAM(s) Oral every 6 hours PRN Moderate Pain (4 - 6)  ondansetron Injectable 4milliGRAM(s) IV Push every 6 hours PRN Nausea  docusate sodium 100milliGRAM(s) Oral three times a day PRN Constipation  senna 2Tablet(s) Oral at bedtime PRN Constipation  HYDROmorphone  Injectable 2milliGRAM(s) IV Push every 2 hours PRN Moderate Pain (4 - 6)  HYDROmorphone  Injectable 4milliGRAM(s) IV Push every 2 hours PRN Severe Pain (7 - 10)      RADIOLOGY & ADDITIONAL TESTS:  EXAM:  CT ABDOMEN AND PELVIS IC                            PROCEDURE DATE:  2017        INTERPRETATION:  CT ABDOMEN AND PELVIS IC    HISTORY:  Generalized abdominal pain and nausea    Technique: CT of the abdomen and pelvis is performed with oral and   intravenous contrast. Axial images are supplemented with coronal and   sagittal reformations. This study was performed using automatic exposure   control (radiation dose reduction software) to obtain a diagnostic image   quality scan with patient dose as low as reasonably achievable.    Contrast: 90 cc Omnipaque 350    Comparison: CT abdomen and pelvis 2016    Findings:  LIVER: Diffuse steatosis.  SPLEEN: Normal.  PANCREAS: Diffuse atrophy.  GALLBLADDER/BILIARY TREE: Stable post cholecystectomy dilatation of the   extrahepatic bile duct.  ADRENALS: Normal.  KIDNEYS: No calcification, hydronephrosis, or soft tissue attenuating   renal mass.  LYMPHADENOPATHY/RETROPERITONEUM: No adenopathy.  VASCULATURE: Normal caliber aorta.  BOWEL: No bowel-related abnormality. Specifically, no evidence of acute   diverticulitis. Normal appendix and ileocecal region. Moderate fecal   retention in the sigmoid colon and rectum.  PELVIC VISCERA: Calcified fibroids.  PELVIC LYMPH NODES: No pelvic adenopathy.  PERITONEUM/ABDOMINAL WALL: No free air or ascites.  SKELETAL: No acute bony abnormality. Stable T12 compression deformity.  LUNG BASES: Clear.    IMPRESSION:     No acute intra-abdominal or pelvic pathology. Moderate fecal retention in  the colon and rectum.

## 2017-06-03 LAB
ANION GAP SERPL CALC-SCNC: 6 MMOL/L — SIGNIFICANT CHANGE UP (ref 5–17)
BUN SERPL-MCNC: 8 MG/DL — SIGNIFICANT CHANGE UP (ref 7–23)
CALCIUM SERPL-MCNC: 8.2 MG/DL — LOW (ref 8.5–10.1)
CHLORIDE SERPL-SCNC: 113 MMOL/L — HIGH (ref 96–108)
CO2 SERPL-SCNC: 25 MMOL/L — SIGNIFICANT CHANGE UP (ref 22–31)
CREAT SERPL-MCNC: 0.62 MG/DL — SIGNIFICANT CHANGE UP (ref 0.5–1.3)
GLUCOSE SERPL-MCNC: 125 MG/DL — HIGH (ref 70–99)
HCT VFR BLD CALC: 37.3 % — SIGNIFICANT CHANGE UP (ref 34.5–45)
HGB BLD-MCNC: 12.8 G/DL — SIGNIFICANT CHANGE UP (ref 11.5–15.5)
MAGNESIUM SERPL-MCNC: 2.3 MG/DL — SIGNIFICANT CHANGE UP (ref 1.6–2.6)
MCHC RBC-ENTMCNC: 29.2 PG — SIGNIFICANT CHANGE UP (ref 27–34)
MCHC RBC-ENTMCNC: 34.4 GM/DL — SIGNIFICANT CHANGE UP (ref 32–36)
MCV RBC AUTO: 84.8 FL — SIGNIFICANT CHANGE UP (ref 80–100)
PHOSPHATE SERPL-MCNC: 1.4 MG/DL — LOW (ref 2.5–4.5)
PLATELET # BLD AUTO: 216 K/UL — SIGNIFICANT CHANGE UP (ref 150–400)
POTASSIUM SERPL-MCNC: 3.6 MMOL/L — SIGNIFICANT CHANGE UP (ref 3.5–5.3)
POTASSIUM SERPL-SCNC: 3.6 MMOL/L — SIGNIFICANT CHANGE UP (ref 3.5–5.3)
RBC # BLD: 4.4 M/UL — SIGNIFICANT CHANGE UP (ref 3.8–5.2)
RBC # FLD: 11.7 % — SIGNIFICANT CHANGE UP (ref 10.3–14.5)
SODIUM SERPL-SCNC: 144 MMOL/L — SIGNIFICANT CHANGE UP (ref 135–145)
WBC # BLD: 10.6 K/UL — HIGH (ref 3.8–10.5)
WBC # FLD AUTO: 10.6 K/UL — HIGH (ref 3.8–10.5)

## 2017-06-03 RX ORDER — SODIUM CHLORIDE 9 MG/ML
1000 INJECTION, SOLUTION INTRAVENOUS
Qty: 0 | Refills: 0 | Status: DISCONTINUED | OUTPATIENT
Start: 2017-06-03 | End: 2017-06-06

## 2017-06-03 RX ORDER — HYDROMORPHONE HYDROCHLORIDE 2 MG/ML
0.5 INJECTION INTRAMUSCULAR; INTRAVENOUS; SUBCUTANEOUS
Qty: 0 | Refills: 0 | Status: DISCONTINUED | OUTPATIENT
Start: 2017-06-03 | End: 2017-06-06

## 2017-06-03 RX ORDER — SODIUM,POTASSIUM PHOSPHATES 278-250MG
1 POWDER IN PACKET (EA) ORAL
Qty: 0 | Refills: 0 | Status: COMPLETED | OUTPATIENT
Start: 2017-06-03 | End: 2017-06-03

## 2017-06-03 RX ORDER — POTASSIUM PHOSPHATE, MONOBASIC POTASSIUM PHOSPHATE, DIBASIC 236; 224 MG/ML; MG/ML
15 INJECTION, SOLUTION INTRAVENOUS EVERY 6 HOURS
Qty: 0 | Refills: 0 | Status: COMPLETED | OUTPATIENT
Start: 2017-06-03 | End: 2017-06-03

## 2017-06-03 RX ADMIN — POTASSIUM PHOSPHATE, MONOBASIC POTASSIUM PHOSPHATE, DIBASIC 62.5 MILLIMOLE(S): 236; 224 INJECTION, SOLUTION INTRAVENOUS at 17:50

## 2017-06-03 RX ADMIN — SODIUM CHLORIDE 125 MILLILITER(S): 9 INJECTION, SOLUTION INTRAVENOUS at 01:54

## 2017-06-03 RX ADMIN — ONDANSETRON 4 MILLIGRAM(S): 8 TABLET, FILM COATED ORAL at 12:24

## 2017-06-03 RX ADMIN — SODIUM CHLORIDE 125 MILLILITER(S): 9 INJECTION, SOLUTION INTRAVENOUS at 21:32

## 2017-06-03 RX ADMIN — SODIUM CHLORIDE 125 MILLILITER(S): 9 INJECTION, SOLUTION INTRAVENOUS at 13:53

## 2017-06-03 RX ADMIN — Medication 1 MILLIGRAM(S): at 22:31

## 2017-06-03 RX ADMIN — SODIUM CHLORIDE 125 MILLILITER(S): 9 INJECTION, SOLUTION INTRAVENOUS at 10:12

## 2017-06-03 RX ADMIN — PANTOPRAZOLE SODIUM 40 MILLIGRAM(S): 20 TABLET, DELAYED RELEASE ORAL at 11:39

## 2017-06-03 RX ADMIN — Medication 1 MILLIGRAM(S): at 10:39

## 2017-06-03 RX ADMIN — HYDROMORPHONE HYDROCHLORIDE 30 MILLILITER(S): 2 INJECTION INTRAMUSCULAR; INTRAVENOUS; SUBCUTANEOUS at 14:53

## 2017-06-03 RX ADMIN — POTASSIUM PHOSPHATE, MONOBASIC POTASSIUM PHOSPHATE, DIBASIC 62.5 MILLIMOLE(S): 236; 224 INJECTION, SOLUTION INTRAVENOUS at 11:39

## 2017-06-03 RX ADMIN — ONDANSETRON 4 MILLIGRAM(S): 8 TABLET, FILM COATED ORAL at 06:04

## 2017-06-03 NOTE — PROGRESS NOTE ADULT - PROBLEM SELECTOR PLAN 1
Likely due to acute intermittent porphyria   CT scan abdomen: No acute intra-abdominal or pelvic pathology.   keep NPO except medications  WBC: 12; Lactate: 2.1- Recheck lactate WNL after Hydration  Pain not controlled even with change of pain meds, Anesthesia for PCA pump called  C/w Zofran PRN  PPI  Aggressive Hydration D5NS  Monitor electrolytes   D/w DR Seay Likely due to acute intermittent porphyria   CT scan abdomen: No acute intra-abdominal or pelvic pathology.    NPO except medications, may  advance diet if Pt feels  nausea better   WBC: 12; Lactate: 2.1- Recheck lactate WNL after Hydration  C/w PCA pump for pain control, anesthesia f/u for dose adjustment if needed   C/w Zofran PRN  PPI  Will add Ativan for restlessness/anxiety and nausea   Aggressive Hydration D5NS  Monitor electrolytes and replace

## 2017-06-03 NOTE — PROGRESS NOTE ADULT - SUBJECTIVE AND OBJECTIVE BOX
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 This accompanied by fever and nausea/ vomiting but no chills - has had several episodes of vomiting  the last couple of days . Exacerbated by lying down flat and alleviated by assuming a  fetal position. Denies diarrhea or relation to meals.  Denies eating anything unusual or any sick contacts or history of travel.   Has a history of similar episodes of abdominal pain since the age of 44y and has been admitted several times under the care of Dr Higuera. She has over the years tried to figure out what precipitates her attacks but has been unsuccessful. She denies alcohol intake or excessive exposure to sunlight. Denies seizures or peripheral neuropathy.   She used to take lasix for swelling of knees but no longer does. (2017 23:10)    INTERVAL HPI/ OVERNIGHT EVENTS: Pt was seen and examined this am, confirmed history above,  C/o severe abd pain, not better  with PO or IV dilated feels nauseous. Pt  states that was admitted before and was on PCA.  Family at bedside POC discussed     Vital Signs Last 24 Hrs  T(C): 36.4, Max: 37.2 (- @ 01:31)  T(F): 97.5, Max: 99 (06- @ 01:31)  HR: 66 (65 - 79)  BP: 167/77 (142/70 - 167/77)  BP(mean): --  RR: 18 (16 - 19)  SpO2: 98% (97% - 100%)    I&O's Detail    I & Os for current day (as of 2017 12:07)  =============================================  IN:    dextrose 5% + sodium chloride 0.9%.: 306 ml    Total IN: 306 ml  ---------------------------------------------  OUT:    Total OUT: 0 ml  ---------------------------------------------  Total NET: 306 ml                                13.1   12.5  )-----------( 235      ( 2017 07:05 )             38.4     2017 07:05    143    |  111    |  8      ----------------------------<  124    3.4     |  25     |  0.68     Ca    8.4        2017 07:05  Phos  2.4       2017 07:05  Mg     2.3       2017 07:05    TPro  7.2    /  Alb  3.5    /  TBili  0.5    /  DBili  x      /  AST  17     /  ALT  25     /  AlkPhos  62     2017 07:05    PT/INR - ( 2017 12:19 )   PT: 11.0 sec;   INR: 1.02 ratio         PTT - ( 2017 12:19 )  PTT:33.3 sec  CAPILLARY BLOOD GLUCOSE  124 (2017 12:18)    LIVER FUNCTIONS - ( 2017 07:05 )  Alb: 3.5 g/dL / Pro: 7.2 gm/dL / ALK PHOS: 62 U/L / ALT: 25 U/L / AST: 17 U/L / GGT: x           Urinalysis Basic - ( 2017 14:54 )    Color: Yellow / Appearance: Clear / S.005 / pH: x  Gluc: x / Ketone: Trace  / Bili: Negative / Urobili: Negative mg/dL   Blood: x / Protein: Negative mg/dL / Nitrite: Negative   Leuk Esterase: Trace / RBC: 0-2 /HPF / WBC 3-5   Sq Epi: x / Non Sq Epi: Few / Bacteria: Few        MEDICATIONS  (STANDING):  dextrose 5% + sodium chloride 0.9%. 1000milliLiter(s) IV Continuous <Continuous>  LORazepam     Tablet 1milliGRAM(s) Oral at bedtime  pantoprazole    Tablet 40milliGRAM(s) Oral before breakfast  polyethylene glycol 3350 17Gram(s) Oral daily    MEDICATIONS  (PRN):  acetaminophen   Tablet. 650milliGRAM(s) Oral every 6 hours PRN Moderate Pain (4 - 6)  ondansetron Injectable 4milliGRAM(s) IV Push every 6 hours PRN Nausea  docusate sodium 100milliGRAM(s) Oral three times a day PRN Constipation  senna 2Tablet(s) Oral at bedtime PRN Constipation  HYDROmorphone  Injectable 2milliGRAM(s) IV Push every 2 hours PRN Moderate Pain (4 - 6)  HYDROmorphone  Injectable 4milliGRAM(s) IV Push every 2 hours PRN Severe Pain (7 - 10)      RADIOLOGY & ADDITIONAL TESTS:  EXAM:  CT ABDOMEN AND PELVIS IC                            PROCEDURE DATE:  2017        INTERPRETATION:  CT ABDOMEN AND PELVIS IC    HISTORY:  Generalized abdominal pain and nausea    Technique: CT of the abdomen and pelvis is performed with oral and   intravenous contrast. Axial images are supplemented with coronal and   sagittal reformations. This study was performed using automatic exposure   control (radiation dose reduction software) to obtain a diagnostic image   quality scan with patient dose as low as reasonably achievable.    Contrast: 90 cc Omnipaque 350    Comparison: CT abdomen and pelvis 2016    Findings:  LIVER: Diffuse steatosis.  SPLEEN: Normal.  PANCREAS: Diffuse atrophy.  GALLBLADDER/BILIARY TREE: Stable post cholecystectomy dilatation of the   extrahepatic bile duct.  ADRENALS: Normal.  KIDNEYS: No calcification, hydronephrosis, or soft tissue attenuating   renal mass.  LYMPHADENOPATHY/RETROPERITONEUM: No adenopathy.  VASCULATURE: Normal caliber aorta.  BOWEL: No bowel-related abnormality. Specifically, no evidence of acute   diverticulitis. Normal appendix and ileocecal region. Moderate fecal   retention in the sigmoid colon and rectum.  PELVIC VISCERA: Calcified fibroids.  PELVIC LYMPH NODES: No pelvic adenopathy.  PERITONEUM/ABDOMINAL WALL: No free air or ascites.  SKELETAL: No acute bony abnormality. Stable T12 compression deformity.  LUNG BASES: Clear.    IMPRESSION:     No acute intra-abdominal or pelvic pathology. Moderate fecal retention in  the colon and rectum. 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 This accompanied by fever and nausea/ vomiting but no chills - has had several episodes of vomiting  the last couple of days . Exacerbated by lying down flat and alleviated by assuming a  fetal position. Denies diarrhea or relation to meals.  Denies eating anything unusual or any sick contacts or history of travel.   Has a history of similar episodes of abdominal pain since the age of 44y and has been admitted several times under the care of Dr Higuera. She has over the years tried to figure out what precipitates her attacks but has been unsuccessful. She denies alcohol intake or excessive exposure to sunlight. Denies seizures or peripheral neuropathy.   She used to take lasix for swelling of knees but no longer does. (2017 23:10)    INTERVAL HPI/ OVERNIGHT EVENTS: Pt was seen and examined, on PCA, but still in significant pain, states 7/10, + Nausea, no appetite. Very restless. No fevers.     Vital Signs Last 24 Hrs  T(C): 36.7, Max: 37.2 (- @ 20:26)  T(F): 98.1, Max: 99 ( @ 20:26)  HR: 69 (65 - 87)  BP: 147/81 (138/69 - 167/77)  BP(mean): --  RR: 18 (18 - 18)  SpO2: 100% (98% - 100%)                            12.8   10.6  )-----------( 216      ( 2017 05:25 )             37.3     06-03    144  |  113<H>  |  8   ----------------------------<  125<H>  3.6   |  25  |  0.62    Ca    8.2<L>      2017 05:25  Phos  1.4     06-03  Mg     2.3     06-03    TPro  7.2  /  Alb  3.5  /  TBili  0.5  /  DBili  x   /  AST  17  /  ALT  25  /  AlkPhos  62  06-02    LIVER FUNCTIONS - ( 2017 07:05 )  Alb: 3.5 g/dL / Pro: 7.2 gm/dL / ALK PHOS: 62 U/L / ALT: 25 U/L / AST: 17 U/L / GGT: x         PT/INR - ( 2017 12:19 )   PT: 11.0 sec;   INR: 1.02 ratio      PTT - ( 2017 12:19 )  PTT:33.3 sec    Urinalysis Basic - ( 2017 14:54 )    Color: Yellow / Appearance: Clear / S.005 / pH: x  Gluc: x / Ketone: Trace  / Bili: Negative / Urobili: Negative mg/dL   Blood: x / Protein: Negative mg/dL / Nitrite: Negative   Leuk Esterase: Trace / RBC: 0-2 /HPF / WBC 3-5   Sq Epi: x / Non Sq Epi: Few / Bacteria: Few          MEDICATIONS  (STANDING):  dextrose 5% + sodium chloride 0.9%. 1000milliLiter(s) IV Continuous <Continuous>  polyethylene glycol 3350 17Gram(s) Oral daily  HYDROmorphone PCA (1 mG/mL) 30milliLiter(s) PCA Continuous PCA Continuous  pantoprazole  Injectable 40milliGRAM(s) IV Push daily  potassium phosphate IVPB 15milliMole(s) IV Intermittent every 6 hours  potassium acid phosphate/sodium acid phosphate tablet (K-PHOS No. 2) 1Tablet(s) Oral two times a day with meals    MEDICATIONS  (PRN):  acetaminophen   Tablet. 650milliGRAM(s) Oral every 6 hours PRN Moderate Pain (4 - 6)  ondansetron Injectable 4milliGRAM(s) IV Push every 6 hours PRN Nausea  docusate sodium 100milliGRAM(s) Oral three times a day PRN Constipation  senna 2Tablet(s) Oral at bedtime PRN Constipation  LORazepam   Injectable 1milliGRAM(s) IntraMuscular every 8 hours PRN Anxiety/restlessness      RADIOLOGY & ADDITIONAL TESTS:  EXAM:  CT ABDOMEN AND PELVIS IC                            PROCEDURE DATE:  2017        INTERPRETATION:  CT ABDOMEN AND PELVIS IC    HISTORY:  Generalized abdominal pain and nausea    Technique: CT of the abdomen and pelvis is performed with oral and   intravenous contrast. Axial images are supplemented with coronal and   sagittal reformations. This study was performed using automatic exposure   control (radiation dose reduction software) to obtain a diagnostic image   quality scan with patient dose as low as reasonably achievable.    Contrast: 90 cc Omnipaque 350    Comparison: CT abdomen and pelvis 2016    Findings:  LIVER: Diffuse steatosis.  SPLEEN: Normal.  PANCREAS: Diffuse atrophy.  GALLBLADDER/BILIARY TREE: Stable post cholecystectomy dilatation of the   extrahepatic bile duct.  ADRENALS: Normal.  KIDNEYS: No calcification, hydronephrosis, or soft tissue attenuating   renal mass.  LYMPHADENOPATHY/RETROPERITONEUM: No adenopathy.  VASCULATURE: Normal caliber aorta.  BOWEL: No bowel-related abnormality. Specifically, no evidence of acute   diverticulitis. Normal appendix and ileocecal region. Moderate fecal   retention in the sigmoid colon and rectum.  PELVIC VISCERA: Calcified fibroids.  PELVIC LYMPH NODES: No pelvic adenopathy.  PERITONEUM/ABDOMINAL WALL: No free air or ascites.  SKELETAL: No acute bony abnormality. Stable T12 compression deformity.  LUNG BASES: Clear.    IMPRESSION:     No acute intra-abdominal or pelvic pathology. Moderate fecal retention in  the colon and rectum.

## 2017-06-04 DIAGNOSIS — E87.6 HYPOKALEMIA: ICD-10-CM

## 2017-06-04 LAB
ANION GAP SERPL CALC-SCNC: 6 MMOL/L — SIGNIFICANT CHANGE UP (ref 5–17)
BUN SERPL-MCNC: 4 MG/DL — LOW (ref 7–23)
CALCIUM SERPL-MCNC: 7.9 MG/DL — LOW (ref 8.5–10.1)
CHLORIDE SERPL-SCNC: 112 MMOL/L — HIGH (ref 96–108)
CO2 SERPL-SCNC: 25 MMOL/L — SIGNIFICANT CHANGE UP (ref 22–31)
CREAT SERPL-MCNC: 0.58 MG/DL — SIGNIFICANT CHANGE UP (ref 0.5–1.3)
GLUCOSE SERPL-MCNC: 118 MG/DL — HIGH (ref 70–99)
HCT VFR BLD CALC: 36 % — SIGNIFICANT CHANGE UP (ref 34.5–45)
HGB BLD-MCNC: 12.4 G/DL — SIGNIFICANT CHANGE UP (ref 11.5–15.5)
MAGNESIUM SERPL-MCNC: 2.1 MG/DL — SIGNIFICANT CHANGE UP (ref 1.6–2.6)
MCHC RBC-ENTMCNC: 28.6 PG — SIGNIFICANT CHANGE UP (ref 27–34)
MCHC RBC-ENTMCNC: 34.4 GM/DL — SIGNIFICANT CHANGE UP (ref 32–36)
MCV RBC AUTO: 83.1 FL — SIGNIFICANT CHANGE UP (ref 80–100)
PHOSPHATE SERPL-MCNC: 2.2 MG/DL — LOW (ref 2.5–4.5)
PLATELET # BLD AUTO: 196 K/UL — SIGNIFICANT CHANGE UP (ref 150–400)
POTASSIUM SERPL-MCNC: 3.3 MMOL/L — LOW (ref 3.5–5.3)
POTASSIUM SERPL-SCNC: 3.3 MMOL/L — LOW (ref 3.5–5.3)
RBC # BLD: 4.34 M/UL — SIGNIFICANT CHANGE UP (ref 3.8–5.2)
RBC # FLD: 11 % — SIGNIFICANT CHANGE UP (ref 10.3–14.5)
SODIUM SERPL-SCNC: 143 MMOL/L — SIGNIFICANT CHANGE UP (ref 135–145)
WBC # BLD: 9.5 K/UL — SIGNIFICANT CHANGE UP (ref 3.8–10.5)
WBC # FLD AUTO: 9.5 K/UL — SIGNIFICANT CHANGE UP (ref 3.8–10.5)

## 2017-06-04 PROCEDURE — 93010 ELECTROCARDIOGRAM REPORT: CPT

## 2017-06-04 RX ORDER — POTASSIUM PHOSPHATE, MONOBASIC POTASSIUM PHOSPHATE, DIBASIC 236; 224 MG/ML; MG/ML
15 INJECTION, SOLUTION INTRAVENOUS EVERY 6 HOURS
Qty: 0 | Refills: 0 | Status: DISCONTINUED | OUTPATIENT
Start: 2017-06-04 | End: 2017-06-04

## 2017-06-04 RX ORDER — POTASSIUM PHOSPHATE, MONOBASIC POTASSIUM PHOSPHATE, DIBASIC 236; 224 MG/ML; MG/ML
15 INJECTION, SOLUTION INTRAVENOUS EVERY 6 HOURS
Qty: 0 | Refills: 0 | Status: COMPLETED | OUTPATIENT
Start: 2017-06-04 | End: 2017-06-04

## 2017-06-04 RX ORDER — PANTOPRAZOLE SODIUM 20 MG/1
40 TABLET, DELAYED RELEASE ORAL ONCE
Qty: 0 | Refills: 0 | Status: COMPLETED | OUTPATIENT
Start: 2017-06-04 | End: 2017-06-04

## 2017-06-04 RX ADMIN — SODIUM CHLORIDE 125 MILLILITER(S): 9 INJECTION, SOLUTION INTRAVENOUS at 23:19

## 2017-06-04 RX ADMIN — Medication 1 MILLIGRAM(S): at 08:42

## 2017-06-04 RX ADMIN — POTASSIUM PHOSPHATE, MONOBASIC POTASSIUM PHOSPHATE, DIBASIC 62.5 MILLIMOLE(S): 236; 224 INJECTION, SOLUTION INTRAVENOUS at 11:39

## 2017-06-04 RX ADMIN — ONDANSETRON 4 MILLIGRAM(S): 8 TABLET, FILM COATED ORAL at 00:01

## 2017-06-04 RX ADMIN — PANTOPRAZOLE SODIUM 40 MILLIGRAM(S): 20 TABLET, DELAYED RELEASE ORAL at 11:37

## 2017-06-04 RX ADMIN — HYDROMORPHONE HYDROCHLORIDE 30 MILLILITER(S): 2 INJECTION INTRAMUSCULAR; INTRAVENOUS; SUBCUTANEOUS at 08:43

## 2017-06-04 RX ADMIN — Medication 1 MILLIGRAM(S): at 21:55

## 2017-06-04 RX ADMIN — ONDANSETRON 4 MILLIGRAM(S): 8 TABLET, FILM COATED ORAL at 06:46

## 2017-06-04 RX ADMIN — SENNA PLUS 2 TABLET(S): 8.6 TABLET ORAL at 21:56

## 2017-06-04 RX ADMIN — SODIUM CHLORIDE 125 MILLILITER(S): 9 INJECTION, SOLUTION INTRAVENOUS at 14:04

## 2017-06-04 RX ADMIN — SODIUM CHLORIDE 125 MILLILITER(S): 9 INJECTION, SOLUTION INTRAVENOUS at 05:31

## 2017-06-04 RX ADMIN — PANTOPRAZOLE SODIUM 40 MILLIGRAM(S): 20 TABLET, DELAYED RELEASE ORAL at 01:43

## 2017-06-04 RX ADMIN — Medication 100 MILLIGRAM(S): at 05:33

## 2017-06-04 RX ADMIN — POTASSIUM PHOSPHATE, MONOBASIC POTASSIUM PHOSPHATE, DIBASIC 62.5 MILLIMOLE(S): 236; 224 INJECTION, SOLUTION INTRAVENOUS at 17:31

## 2017-06-04 NOTE — PROGRESS NOTE ADULT - PROBLEM SELECTOR PLAN 1
Likely due to acute intermittent porphyria   CT scan abdomen: No acute intra-abdominal or pelvic pathology.    NPO except medications, may  advance diet if Pt feels  nausea better   WBC: 12; Lactate: 2.1- Recheck lactate WNL after Hydration  C/w PCA pump for pain control, anesthesia f/u for dose adjustment if needed   C/w Zofran PRN  PPI  Will add Ativan for restlessness/anxiety and nausea   Aggressive Hydration D5NS  Monitor electrolytes and replace Likely due to acute intermittent porphyria   CT scan abdomen: No acute intra-abdominal or pelvic pathology.    NPO except medications, may  advance diet if Pt feels  nausea better   WBC: 12; Lactate: 2.1- Recheck lactate WNL after Hydration  C/w PCA pump for pain control, anesthesia f/u for dose adjustment if needed   C/w Zofran PRN  PPI   Ativan for restlessness/anxiety and nausea   Aggressive Hydration D5NS  Monitor electrolytes and replace  Trial clear liquid diet

## 2017-06-04 NOTE — PROGRESS NOTE ADULT - RS GEN PE MLT RESP DETAILS PC
breath sounds equal/clear to auscultation bilaterally
breath sounds equal/clear to auscultation bilaterally
clear to auscultation bilaterally/breath sounds equal

## 2017-06-04 NOTE — PROGRESS NOTE ADULT - SUBJECTIVE AND OBJECTIVE BOX
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 This accompanied by fever and nausea/ vomiting but no chills - has had several episodes of vomiting  the last couple of days . Exacerbated by lying down flat and alleviated by assuming a  fetal position. Denies diarrhea or relation to meals.  Denies eating anything unusual or any sick contacts or history of travel.   Has a history of similar episodes of abdominal pain since the age of 44y and has been admitted several times under the care of Dr Higuera. She has over the years tried to figure out what precipitates her attacks but has been unsuccessful. She denies alcohol intake or excessive exposure to sunlight. Denies seizures or peripheral neuropathy.   She used to take lasix for swelling of knees but no longer does. (2017 23:10)    INTERVAL HPI/ OVERNIGHT EVENTS: Pt was seen and examined, on PCA, but still in significant pain, states 7/10, + Nausea, no appetite. Very restless. No fevers.     Vital Signs Last 24 Hrs  T(C): 36.7, Max: 37.2 (- @ 20:26)  T(F): 98.1, Max: 99 ( @ 20:26)  HR: 69 (65 - 87)  BP: 147/81 (138/69 - 167/77)  BP(mean): --  RR: 18 (18 - 18)  SpO2: 100% (98% - 100%)                            12.8   10.6  )-----------( 216      ( 2017 05:25 )             37.3     06-03    144  |  113<H>  |  8   ----------------------------<  125<H>  3.6   |  25  |  0.62    Ca    8.2<L>      2017 05:25  Phos  1.4     06-03  Mg     2.3     06-03    TPro  7.2  /  Alb  3.5  /  TBili  0.5  /  DBili  x   /  AST  17  /  ALT  25  /  AlkPhos  62  06-02    LIVER FUNCTIONS - ( 2017 07:05 )  Alb: 3.5 g/dL / Pro: 7.2 gm/dL / ALK PHOS: 62 U/L / ALT: 25 U/L / AST: 17 U/L / GGT: x         PT/INR - ( 2017 12:19 )   PT: 11.0 sec;   INR: 1.02 ratio      PTT - ( 2017 12:19 )  PTT:33.3 sec    Urinalysis Basic - ( 2017 14:54 )    Color: Yellow / Appearance: Clear / S.005 / pH: x  Gluc: x / Ketone: Trace  / Bili: Negative / Urobili: Negative mg/dL   Blood: x / Protein: Negative mg/dL / Nitrite: Negative   Leuk Esterase: Trace / RBC: 0-2 /HPF / WBC 3-5   Sq Epi: x / Non Sq Epi: Few / Bacteria: Few          MEDICATIONS  (STANDING):  dextrose 5% + sodium chloride 0.9%. 1000milliLiter(s) IV Continuous <Continuous>  polyethylene glycol 3350 17Gram(s) Oral daily  HYDROmorphone PCA (1 mG/mL) 30milliLiter(s) PCA Continuous PCA Continuous  pantoprazole  Injectable 40milliGRAM(s) IV Push daily  potassium phosphate IVPB 15milliMole(s) IV Intermittent every 6 hours  potassium acid phosphate/sodium acid phosphate tablet (K-PHOS No. 2) 1Tablet(s) Oral two times a day with meals    MEDICATIONS  (PRN):  acetaminophen   Tablet. 650milliGRAM(s) Oral every 6 hours PRN Moderate Pain (4 - 6)  ondansetron Injectable 4milliGRAM(s) IV Push every 6 hours PRN Nausea  docusate sodium 100milliGRAM(s) Oral three times a day PRN Constipation  senna 2Tablet(s) Oral at bedtime PRN Constipation  LORazepam   Injectable 1milliGRAM(s) IntraMuscular every 8 hours PRN Anxiety/restlessness      RADIOLOGY & ADDITIONAL TESTS:  EXAM:  CT ABDOMEN AND PELVIS IC                            PROCEDURE DATE:  2017        INTERPRETATION:  CT ABDOMEN AND PELVIS IC    HISTORY:  Generalized abdominal pain and nausea    Technique: CT of the abdomen and pelvis is performed with oral and   intravenous contrast. Axial images are supplemented with coronal and   sagittal reformations. This study was performed using automatic exposure   control (radiation dose reduction software) to obtain a diagnostic image   quality scan with patient dose as low as reasonably achievable.    Contrast: 90 cc Omnipaque 350    Comparison: CT abdomen and pelvis 2016    Findings:  LIVER: Diffuse steatosis.  SPLEEN: Normal.  PANCREAS: Diffuse atrophy.  GALLBLADDER/BILIARY TREE: Stable post cholecystectomy dilatation of the   extrahepatic bile duct.  ADRENALS: Normal.  KIDNEYS: No calcification, hydronephrosis, or soft tissue attenuating   renal mass.  LYMPHADENOPATHY/RETROPERITONEUM: No adenopathy.  VASCULATURE: Normal caliber aorta.  BOWEL: No bowel-related abnormality. Specifically, no evidence of acute   diverticulitis. Normal appendix and ileocecal region. Moderate fecal   retention in the sigmoid colon and rectum.  PELVIC VISCERA: Calcified fibroids.  PELVIC LYMPH NODES: No pelvic adenopathy.  PERITONEUM/ABDOMINAL WALL: No free air or ascites.  SKELETAL: No acute bony abnormality. Stable T12 compression deformity.  LUNG BASES: Clear.    IMPRESSION:     No acute intra-abdominal or pelvic pathology. Moderate fecal retention in  the colon and rectum. 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 This accompanied by fever and nausea/ vomiting but no chills - has had several episodes of vomiting  the last couple of days . Exacerbated by lying down flat and alleviated by assuming a  fetal position. Denies diarrhea or relation to meals.  Denies eating anything unusual or any sick contacts or history of travel.   Has a history of similar episodes of abdominal pain since the age of 44y and has been admitted several times under the care of Dr Higuera. She has over the years tried to figure out what precipitates her attacks but has been unsuccessful. She denies alcohol intake or excessive exposure to sunlight. Denies seizures or peripheral neuropathy.   She used to take lasix for swelling of knees but no longer does. (2017 23:10)    INTERVAL HPI/ OVERNIGHT EVENTS: Pt was seen and examined,  states feels better today, less nausea, still had episode of vomiting early am. + Flatus, BM yesterday, Urinates. Offered trial of clears       Vital Signs Last 24 Hrs  T(C): 37.1, Max: 37.1 (- @ 14:05)  T(F): 98.7, Max: 98.7 (- @ 14:05)  HR: 63 (62 - 75)  BP: 143/61 (136/56 - 163/81)  BP(mean): --  RR: 16 (16 - 18)  SpO2: 100% (99% - 100%)                                     12.4   9.5   )-----------( 196      ( 2017 05:21 )             36.0     06-04    143  |  112<H>  |  4<L>  ----------------------------<  118<H>  3.3<L>   |  25  |  0.58    Ca    7.9<L>      2017 05:21  Phos  2.2     06-04  Mg     2.1     06-04    Urinalysis Basic - ( 2017 14:54 )    Color: Yellow / Appearance: Clear / S.005 / pH: x  Gluc: x / Ketone: Trace  / Bili: Negative / Urobili: Negative mg/dL   Blood: x / Protein: Negative mg/dL / Nitrite: Negative   Leuk Esterase: Trace / RBC: 0-2 /HPF / WBC 3-5   Sq Epi: x / Non Sq Epi: Few / Bacteria: Few    MEDICATIONS  (STANDING):  polyethylene glycol 3350 17Gram(s) Oral daily  HYDROmorphone PCA (1 mG/mL) 30milliLiter(s) PCA Continuous PCA Continuous  pantoprazole  Injectable 40milliGRAM(s) IV Push daily  dextrose 10% + sodium chloride 0.9%. 1000milliLiter(s) IV Continuous <Continuous>  potassium phosphate IVPB 15milliMole(s) IV Intermittent every 6 hours    MEDICATIONS  (PRN):  acetaminophen   Tablet. 650milliGRAM(s) Oral every 6 hours PRN Moderate Pain (4 - 6)  ondansetron Injectable 4milliGRAM(s) IV Push every 6 hours PRN Nausea  docusate sodium 100milliGRAM(s) Oral three times a day PRN Constipation  senna 2Tablet(s) Oral at bedtime PRN Constipation  HYDROmorphone PCA (1 mG/mL) Rescue Clinician Bolus 0.5milliGRAM(s) IV Push every 15 minutes PRN for Pain Scale GREATER THAN 6  naloxone Injectable 0.1milliGRAM(s) IV Push every 3 minutes PRN For ANY of the following changes in patient status:  A. RR LESS THAN 10 breaths per minute, B. Oxygen saturation LESS THAN 90%, C. Sedation score of 6  ondansetron Injectable 4milliGRAM(s) IV Push every 6 hours PRN Nausea  LORazepam   Injectable 1milliGRAM(s) IV Push every 8 hours PRN Anxiety/restlessness  HYDROmorphone  Injectable 0.5milliGRAM(s) IV Push every 1 hour PRN Severe Pain (7 - 10)      RADIOLOGY & ADDITIONAL TESTS:  EXAM:  CT ABDOMEN AND PELVIS IC                            PROCEDURE DATE:  2017        INTERPRETATION:  CT ABDOMEN AND PELVIS IC    HISTORY:  Generalized abdominal pain and nausea    Technique: CT of the abdomen and pelvis is performed with oral and   intravenous contrast. Axial images are supplemented with coronal and   sagittal reformations. This study was performed using automatic exposure   control (radiation dose reduction software) to obtain a diagnostic image   quality scan with patient dose as low as reasonably achievable.    Contrast: 90 cc Omnipaque 350    Comparison: CT abdomen and pelvis 2016    Findings:  LIVER: Diffuse steatosis.  SPLEEN: Normal.  PANCREAS: Diffuse atrophy.  GALLBLADDER/BILIARY TREE: Stable post cholecystectomy dilatation of the   extrahepatic bile duct.  ADRENALS: Normal.  KIDNEYS: No calcification, hydronephrosis, or soft tissue attenuating   renal mass.  LYMPHADENOPATHY/RETROPERITONEUM: No adenopathy.  VASCULATURE: Normal caliber aorta.  BOWEL: No bowel-related abnormality. Specifically, no evidence of acute   diverticulitis. Normal appendix and ileocecal region. Moderate fecal   retention in the sigmoid colon and rectum.  PELVIC VISCERA: Calcified fibroids.  PELVIC LYMPH NODES: No pelvic adenopathy.  PERITONEUM/ABDOMINAL WALL: No free air or ascites.  SKELETAL: No acute bony abnormality. Stable T12 compression deformity.  LUNG BASES: Clear.    IMPRESSION:     No acute intra-abdominal or pelvic pathology. Moderate fecal retention in  the colon and rectum.

## 2017-06-05 LAB
ANION GAP SERPL CALC-SCNC: 6 MMOL/L — SIGNIFICANT CHANGE UP (ref 5–17)
BUN SERPL-MCNC: 4 MG/DL — LOW (ref 7–23)
CALCIUM SERPL-MCNC: 8.2 MG/DL — LOW (ref 8.5–10.1)
CHLORIDE SERPL-SCNC: 110 MMOL/L — HIGH (ref 96–108)
CLINICAL BIOCHEMIST REVIEW: SIGNIFICANT CHANGE UP
CO2 SERPL-SCNC: 26 MMOL/L — SIGNIFICANT CHANGE UP (ref 22–31)
CREAT SERPL-MCNC: 0.53 MG/DL — SIGNIFICANT CHANGE UP (ref 0.5–1.3)
GLUCOSE SERPL-MCNC: 117 MG/DL — HIGH (ref 70–99)
HCT VFR BLD CALC: 37.9 % — SIGNIFICANT CHANGE UP (ref 34.5–45)
HGB BLD-MCNC: 12.8 G/DL — SIGNIFICANT CHANGE UP (ref 11.5–15.5)
MAGNESIUM SERPL-MCNC: 2.1 MG/DL — SIGNIFICANT CHANGE UP (ref 1.6–2.6)
MCHC RBC-ENTMCNC: 28.5 PG — SIGNIFICANT CHANGE UP (ref 27–34)
MCHC RBC-ENTMCNC: 33.9 GM/DL — SIGNIFICANT CHANGE UP (ref 32–36)
MCV RBC AUTO: 84.1 FL — SIGNIFICANT CHANGE UP (ref 80–100)
PHOSPHATE SERPL-MCNC: 3.3 MG/DL — SIGNIFICANT CHANGE UP (ref 2.5–4.5)
PLATELET # BLD AUTO: 204 K/UL — SIGNIFICANT CHANGE UP (ref 150–400)
PORPHYRINS SERPL-MCNC: <1 MCG/DL — SIGNIFICANT CHANGE UP
POTASSIUM SERPL-MCNC: 3.6 MMOL/L — SIGNIFICANT CHANGE UP (ref 3.5–5.3)
POTASSIUM SERPL-SCNC: 3.6 MMOL/L — SIGNIFICANT CHANGE UP (ref 3.5–5.3)
PTP REVIEWED BY: SIGNIFICANT CHANGE UP
RBC # BLD: 4.5 M/UL — SIGNIFICANT CHANGE UP (ref 3.8–5.2)
RBC # FLD: 11.4 % — SIGNIFICANT CHANGE UP (ref 10.3–14.5)
SODIUM SERPL-SCNC: 142 MMOL/L — SIGNIFICANT CHANGE UP (ref 135–145)
WBC # BLD: 8.2 K/UL — SIGNIFICANT CHANGE UP (ref 3.8–10.5)
WBC # FLD AUTO: 8.2 K/UL — SIGNIFICANT CHANGE UP (ref 3.8–10.5)

## 2017-06-05 PROCEDURE — 99233 SBSQ HOSP IP/OBS HIGH 50: CPT

## 2017-06-05 RX ADMIN — POLYETHYLENE GLYCOL 3350 17 GRAM(S): 17 POWDER, FOR SOLUTION ORAL at 13:01

## 2017-06-05 RX ADMIN — SODIUM CHLORIDE 125 MILLILITER(S): 9 INJECTION, SOLUTION INTRAVENOUS at 06:42

## 2017-06-05 RX ADMIN — HYDROMORPHONE HYDROCHLORIDE 30 MILLILITER(S): 2 INJECTION INTRAMUSCULAR; INTRAVENOUS; SUBCUTANEOUS at 07:59

## 2017-06-05 RX ADMIN — SODIUM CHLORIDE 125 MILLILITER(S): 9 INJECTION, SOLUTION INTRAVENOUS at 22:52

## 2017-06-05 RX ADMIN — Medication 1 MILLIGRAM(S): at 22:57

## 2017-06-05 RX ADMIN — SODIUM CHLORIDE 125 MILLILITER(S): 9 INJECTION, SOLUTION INTRAVENOUS at 14:50

## 2017-06-05 RX ADMIN — ONDANSETRON 4 MILLIGRAM(S): 8 TABLET, FILM COATED ORAL at 04:34

## 2017-06-05 RX ADMIN — PANTOPRAZOLE SODIUM 40 MILLIGRAM(S): 20 TABLET, DELAYED RELEASE ORAL at 12:56

## 2017-06-05 NOTE — PROGRESS NOTE ADULT - PROBLEM SELECTOR PLAN 1
Likely due to acute intermittent porphyria   CT scan abdomen: No acute intra-abdominal or pelvic pathology.   Tolerates clears, advance as tolerated   WBC: 12; Lactate: 2.1- Recheck lactate WNL after Hydration  C/w PCA pump for pain control, anesthesia f/u for dose adjustment if needed   C/w Zofran PRN  PPI   Ativan for restlessness/anxiety and nausea   Aggressive Hydration D5NS  Monitor electrolytes and replace Likely due to acute intermittent porphyria   CT scan abdomen: No acute intra-abdominal or pelvic pathology.   Tolerates some regular diet but min amount   Pain is better   D/c PCA pump, will start home regiment with dilaudid SQ  C/w Zofran PRN  PPI   Ativan for restlessness/anxiety and nausea   C/w IV Hydration D5NS  Monitor electrolytes and replace  D/w Anesthesia team

## 2017-06-05 NOTE — PROGRESS NOTE ADULT - SUBJECTIVE AND OBJECTIVE BOX
patient seen earlier today  Still with abdominal pain but clearly improved  Was tolerating clear liquid    Oropharynx dry  Neck supple  Lungs clear  Heart S1-S2  Abdomen soft, diffusely tender, bowel sounds present, no rebound or guarding    Impression porphyria with visceral crisis  Responding well to significant/pain medication PCA/IV fluids with glucose  Continue present care  Advanced diet

## 2017-06-05 NOTE — PROGRESS NOTE ADULT - RESPIRATORY
detailed exam
detailed exam
Breath Sounds equal & clear to percussion & auscultation, no accessory muscle use
detailed exam

## 2017-06-05 NOTE — PROGRESS NOTE ADULT - SUBJECTIVE AND OBJECTIVE BOX
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 This accompanied by fever and nausea/ vomiting but no chills - has had several episodes of vomiting  the last couple of days . Exacerbated by lying down flat and alleviated by assuming a  fetal position. Denies diarrhea or relation to meals.  Denies eating anything unusual or any sick contacts or history of travel.   Has a history of similar episodes of abdominal pain since the age of 44y and has been admitted several times under the care of Dr Higuera. She has over the years tried to figure out what precipitates her attacks but has been unsuccessful. She denies alcohol intake or excessive exposure to sunlight. Denies seizures or peripheral neuropathy.   She used to take lasix for swelling of knees but no longer does. (2017 23:10)    INTERVAL HPI/ OVERNIGHT EVENTS: Pt was seen and examined,  improving slowly, tolerates clears with some nausea, no vomiting       Vital Signs Last 24 Hrs  T(C): 36.6, Max: 37.1 ( @ 04:17)  T(F): 97.8, Max: 98.8 ( @ 04:17)  HR: 59 (59 - 72)  BP: 158/69 (152/71 - 158/69)  BP(mean): --  RR: 18 (17 - 18)  SpO2: 99% (98% - 100%)                                            12.8   8.2   )-----------( 204      ( 2017 05:23 )             37.9     06-05    142  |  110<H>  |  4<L>  ----------------------------<  117<H>  3.6   |  26  |  0.53    Ca    8.2<L>      2017 05:23  Phos  3.3     06-05  Mg     2.1     06-05    Urinalysis Basic - ( 2017 14:54 )    Color: Yellow / Appearance: Clear / S.005 / pH: x  Gluc: x / Ketone: Trace  / Bili: Negative / Urobili: Negative mg/dL   Blood: x / Protein: Negative mg/dL / Nitrite: Negative   Leuk Esterase: Trace / RBC: 0-2 /HPF / WBC 3-5   Sq Epi: x / Non Sq Epi: Few / Bacteria: Few    MEDICATIONS  (STANDING):  polyethylene glycol 3350 17Gram(s) Oral daily  HYDROmorphone PCA (1 mG/mL) 30milliLiter(s) PCA Continuous PCA Continuous  pantoprazole  Injectable 40milliGRAM(s) IV Push daily  dextrose 10% + sodium chloride 0.9%. 1000milliLiter(s) IV Continuous <Continuous>  potassium phosphate IVPB 15milliMole(s) IV Intermittent every 6 hours    MEDICATIONS  (PRN):  acetaminophen   Tablet. 650milliGRAM(s) Oral every 6 hours PRN Moderate Pain (4 - 6)  ondansetron Injectable 4milliGRAM(s) IV Push every 6 hours PRN Nausea  docusate sodium 100milliGRAM(s) Oral three times a day PRN Constipation  senna 2Tablet(s) Oral at bedtime PRN Constipation  HYDROmorphone PCA (1 mG/mL) Rescue Clinician Bolus 0.5milliGRAM(s) IV Push every 15 minutes PRN for Pain Scale GREATER THAN 6  naloxone Injectable 0.1milliGRAM(s) IV Push every 3 minutes PRN For ANY of the following changes in patient status:  A. RR LESS THAN 10 breaths per minute, B. Oxygen saturation LESS THAN 90%, C. Sedation score of 6  ondansetron Injectable 4milliGRAM(s) IV Push every 6 hours PRN Nausea  LORazepam   Injectable 1milliGRAM(s) IV Push every 8 hours PRN Anxiety/restlessness  HYDROmorphone  Injectable 0.5milliGRAM(s) IV Push every 1 hour PRN Severe Pain (7 - 10)      RADIOLOGY & ADDITIONAL TESTS:  EXAM:  CT ABDOMEN AND PELVIS IC                            PROCEDURE DATE:  2017        INTERPRETATION:  CT ABDOMEN AND PELVIS IC    HISTORY:  Generalized abdominal pain and nausea    Technique: CT of the abdomen and pelvis is performed with oral and   intravenous contrast. Axial images are supplemented with coronal and   sagittal reformations. This study was performed using automatic exposure   control (radiation dose reduction software) to obtain a diagnostic image   quality scan with patient dose as low as reasonably achievable.    Contrast: 90 cc Omnipaque 350    Comparison: CT abdomen and pelvis 2016    Findings:  LIVER: Diffuse steatosis.  SPLEEN: Normal.  PANCREAS: Diffuse atrophy.  GALLBLADDER/BILIARY TREE: Stable post cholecystectomy dilatation of the   extrahepatic bile duct.  ADRENALS: Normal.  KIDNEYS: No calcification, hydronephrosis, or soft tissue attenuating   renal mass.  LYMPHADENOPATHY/RETROPERITONEUM: No adenopathy.  VASCULATURE: Normal caliber aorta.  BOWEL: No bowel-related abnormality. Specifically, no evidence of acute   diverticulitis. Normal appendix and ileocecal region. Moderate fecal   retention in the sigmoid colon and rectum.  PELVIC VISCERA: Calcified fibroids.  PELVIC LYMPH NODES: No pelvic adenopathy.  PERITONEUM/ABDOMINAL WALL: No free air or ascites.  SKELETAL: No acute bony abnormality. Stable T12 compression deformity.  LUNG BASES: Clear.    IMPRESSION:     No acute intra-abdominal or pelvic pathology. Moderate fecal retention in  the colon and rectum. 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 This accompanied by fever and nausea/ vomiting but no chills - has had several episodes of vomiting  the last couple of days . Exacerbated by lying down flat and alleviated by assuming a  fetal position. Denies diarrhea or relation to meals.  Denies eating anything unusual or any sick contacts or history of travel.   Has a history of similar episodes of abdominal pain since the age of 44y and has been admitted several times under the care of Dr Higuera. She has over the years tried to figure out what precipitates her attacks but has been unsuccessful. She denies alcohol intake or excessive exposure to sunlight. Denies seizures or peripheral neuropathy.   She used to take lasix for swelling of knees but no longer does. (2017 23:10)    INTERVAL HPI/ OVERNIGHT EVENTS:  feels better with pain, had BM, but with cramps. Oral intake still min. Will d/c pump and try oral pain meds       Vital Signs Last 24 Hrs  T(C): 36.6, Max: 36.6 ( @ 20:49)  T(F): 97.8, Max: 97.8 ( @ 20:49)  HR: 69 (59 - 75)  BP: 149/64 (149/64 - 164/87)  BP(mean): --  RR: 16 (16 - 18)  SpO2: 99% (71% - 99%)                                   12.3   7.7   )-----------( 195      ( 2017 05:33 )             35.0     06-06    145  |  112<H>  |  3<L>  ----------------------------<  121<H>  3.5   |  27  |  0.56    Ca    8.2<L>      2017 05:33  Phos  3.0     06-06  Mg     2.2     06-06        Urinalysis Basic - ( 2017 14:54 )    Color: Yellow / Appearance: Clear / S.005 / pH: x  Gluc: x / Ketone: Trace  / Bili: Negative / Urobili: Negative mg/dL   Blood: x / Protein: Negative mg/dL / Nitrite: Negative   Leuk Esterase: Trace / RBC: 0-2 /HPF / WBC 3-5   Sq Epi: x / Non Sq Epi: Few / Bacteria: Few    MEDICATIONS  (STANDING):  polyethylene glycol 3350 17Gram(s) Oral daily  pantoprazole  Injectable 40milliGRAM(s) IV Push daily  dextrose 10% + sodium chloride 0.9%. 1000milliLiter(s) IV Continuous <Continuous>  morphine ER Tablet 200milliGRAM(s) Oral every 12 hours    MEDICATIONS  (PRN):  acetaminophen   Tablet. 650milliGRAM(s) Oral every 6 hours PRN Moderate Pain (4 - 6)  ondansetron Injectable 4milliGRAM(s) IV Push every 6 hours PRN Nausea  docusate sodium 100milliGRAM(s) Oral three times a day PRN Constipation  senna 2Tablet(s) Oral at bedtime PRN Constipation  LORazepam   Injectable 1milliGRAM(s) IV Push every 8 hours PRN Anxiety/restlessness  HYDROmorphone  Injectable 2milliGRAM(s) SubCutaneous every 3 hours PRN Severe Pain (7 - 10)        RADIOLOGY & ADDITIONAL TESTS:  EXAM:  CT ABDOMEN AND PELVIS IC                            PROCEDURE DATE:  2017        INTERPRETATION:  CT ABDOMEN AND PELVIS IC    HISTORY:  Generalized abdominal pain and nausea    Technique: CT of the abdomen and pelvis is performed with oral and   intravenous contrast. Axial images are supplemented with coronal and   sagittal reformations. This study was performed using automatic exposure   control (radiation dose reduction software) to obtain a diagnostic image   quality scan with patient dose as low as reasonably achievable.    Contrast: 90 cc Omnipaque 350    Comparison: CT abdomen and pelvis 2016    Findings:  LIVER: Diffuse steatosis.  SPLEEN: Normal.  PANCREAS: Diffuse atrophy.  GALLBLADDER/BILIARY TREE: Stable post cholecystectomy dilatation of the   extrahepatic bile duct.  ADRENALS: Normal.  KIDNEYS: No calcification, hydronephrosis, or soft tissue attenuating   renal mass.  LYMPHADENOPATHY/RETROPERITONEUM: No adenopathy.  VASCULATURE: Normal caliber aorta.  BOWEL: No bowel-related abnormality. Specifically, no evidence of acute   diverticulitis. Normal appendix and ileocecal region. Moderate fecal   retention in the sigmoid colon and rectum.  PELVIC VISCERA: Calcified fibroids.  PELVIC LYMPH NODES: No pelvic adenopathy.  PERITONEUM/ABDOMINAL WALL: No free air or ascites.  SKELETAL: No acute bony abnormality. Stable T12 compression deformity.  LUNG BASES: Clear.    IMPRESSION:     No acute intra-abdominal or pelvic pathology. Moderate fecal retention in  the colon and rectum.

## 2017-06-06 LAB
ANION GAP SERPL CALC-SCNC: 6 MMOL/L — SIGNIFICANT CHANGE UP (ref 5–17)
BUN SERPL-MCNC: 3 MG/DL — LOW (ref 7–23)
CALCIUM SERPL-MCNC: 8.2 MG/DL — LOW (ref 8.5–10.1)
CHLORIDE SERPL-SCNC: 112 MMOL/L — HIGH (ref 96–108)
CO2 SERPL-SCNC: 27 MMOL/L — SIGNIFICANT CHANGE UP (ref 22–31)
CREAT SERPL-MCNC: 0.56 MG/DL — SIGNIFICANT CHANGE UP (ref 0.5–1.3)
CULTURE RESULTS: SIGNIFICANT CHANGE UP
CULTURE RESULTS: SIGNIFICANT CHANGE UP
GLUCOSE SERPL-MCNC: 121 MG/DL — HIGH (ref 70–99)
HCT VFR BLD CALC: 35 % — SIGNIFICANT CHANGE UP (ref 34.5–45)
HGB BLD-MCNC: 12.3 G/DL — SIGNIFICANT CHANGE UP (ref 11.5–15.5)
MAGNESIUM SERPL-MCNC: 2.2 MG/DL — SIGNIFICANT CHANGE UP (ref 1.6–2.6)
MCHC RBC-ENTMCNC: 28.9 PG — SIGNIFICANT CHANGE UP (ref 27–34)
MCHC RBC-ENTMCNC: 35.1 GM/DL — SIGNIFICANT CHANGE UP (ref 32–36)
MCV RBC AUTO: 82.5 FL — SIGNIFICANT CHANGE UP (ref 80–100)
PHOSPHATE SERPL-MCNC: 3 MG/DL — SIGNIFICANT CHANGE UP (ref 2.5–4.5)
PLATELET # BLD AUTO: 195 K/UL — SIGNIFICANT CHANGE UP (ref 150–400)
POTASSIUM SERPL-MCNC: 3.5 MMOL/L — SIGNIFICANT CHANGE UP (ref 3.5–5.3)
POTASSIUM SERPL-SCNC: 3.5 MMOL/L — SIGNIFICANT CHANGE UP (ref 3.5–5.3)
RBC # BLD: 4.24 M/UL — SIGNIFICANT CHANGE UP (ref 3.8–5.2)
RBC # FLD: 11.1 % — SIGNIFICANT CHANGE UP (ref 10.3–14.5)
SODIUM SERPL-SCNC: 145 MMOL/L — SIGNIFICANT CHANGE UP (ref 135–145)
SPECIMEN SOURCE: SIGNIFICANT CHANGE UP
SPECIMEN SOURCE: SIGNIFICANT CHANGE UP
WBC # BLD: 7.7 K/UL — SIGNIFICANT CHANGE UP (ref 3.8–10.5)
WBC # FLD AUTO: 7.7 K/UL — SIGNIFICANT CHANGE UP (ref 3.8–10.5)

## 2017-06-06 RX ORDER — SODIUM CHLORIDE 9 MG/ML
1000 INJECTION, SOLUTION INTRAVENOUS
Qty: 0 | Refills: 0 | Status: DISCONTINUED | OUTPATIENT
Start: 2017-06-06 | End: 2017-06-07

## 2017-06-06 RX ORDER — MORPHINE SULFATE 50 MG/1
200 CAPSULE, EXTENDED RELEASE ORAL EVERY 12 HOURS
Qty: 0 | Refills: 0 | Status: DISCONTINUED | OUTPATIENT
Start: 2017-06-06 | End: 2017-06-07

## 2017-06-06 RX ORDER — HYDROMORPHONE HYDROCHLORIDE 2 MG/ML
2 INJECTION INTRAMUSCULAR; INTRAVENOUS; SUBCUTANEOUS
Qty: 0 | Refills: 0 | Status: DISCONTINUED | OUTPATIENT
Start: 2017-06-06 | End: 2017-06-07

## 2017-06-06 RX ADMIN — SODIUM CHLORIDE 125 MILLILITER(S): 9 INJECTION, SOLUTION INTRAVENOUS at 08:28

## 2017-06-06 RX ADMIN — MORPHINE SULFATE 200 MILLIGRAM(S): 50 CAPSULE, EXTENDED RELEASE ORAL at 14:09

## 2017-06-06 RX ADMIN — MORPHINE SULFATE 200 MILLIGRAM(S): 50 CAPSULE, EXTENDED RELEASE ORAL at 14:41

## 2017-06-06 RX ADMIN — SODIUM CHLORIDE 75 MILLILITER(S): 9 INJECTION, SOLUTION INTRAVENOUS at 19:04

## 2017-06-06 RX ADMIN — PANTOPRAZOLE SODIUM 40 MILLIGRAM(S): 20 TABLET, DELAYED RELEASE ORAL at 10:53

## 2017-06-06 NOTE — PROGRESS NOTE ADULT - PROBLEM SELECTOR PLAN 1
Likely due to acute intermittent porphyria   CT scan abdomen: No acute intra-abdominal or pelvic pathology.   Tolerates some regular diet but min amount   Pain is better   D/c PCA pump, will start home regiment with dilaudid SQ  C/w Zofran PRN  PPI   Ativan for restlessness/anxiety and nausea   C/w IV Hydration D5NS  Monitor electrolytes and replace  D/w Anesthesia team

## 2017-06-06 NOTE — PROGRESS NOTE ADULT - SUBJECTIVE AND OBJECTIVE BOX
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 This accompanied by fever and nausea/ vomiting but no chills - has had several episodes of vomiting  the last couple of days . Exacerbated by lying down flat and alleviated by assuming a  fetal position. Denies diarrhea or relation to meals.  Denies eating anything unusual or any sick contacts or history of travel.   Has a history of similar episodes of abdominal pain since the age of 44y and has been admitted several times under the care of Dr Higuera. She has over the years tried to figure out what precipitates her attacks but has been unsuccessful. She denies alcohol intake or excessive exposure to sunlight. Denies seizures or peripheral neuropathy.   She used to take lasix for swelling of knees but no longer does. (2017 23:10)    INTERVAL HPI/ OVERNIGHT EVENTS:  feels better with pain, had BM, but with cramps. Oral intake still min. Will d/c pump and try oral pain meds       Vital Signs Last 24 Hrs  T(C): 36.6, Max: 36.6 ( @ 20:49)  T(F): 97.8, Max: 97.8 ( @ 20:49)  HR: 69 (59 - 75)  BP: 149/64 (149/64 - 164/87)  BP(mean): --  RR: 16 (16 - 18)  SpO2: 99% (71% - 99%)                                   12.3   7.7   )-----------( 195      ( 2017 05:33 )             35.0     06-06    145  |  112<H>  |  3<L>  ----------------------------<  121<H>  3.5   |  27  |  0.56    Ca    8.2<L>      2017 05:33  Phos  3.0     06-06  Mg     2.2     06-06        Urinalysis Basic - ( 2017 14:54 )    Color: Yellow / Appearance: Clear / S.005 / pH: x  Gluc: x / Ketone: Trace  / Bili: Negative / Urobili: Negative mg/dL   Blood: x / Protein: Negative mg/dL / Nitrite: Negative   Leuk Esterase: Trace / RBC: 0-2 /HPF / WBC 3-5   Sq Epi: x / Non Sq Epi: Few / Bacteria: Few    MEDICATIONS  (STANDING):  polyethylene glycol 3350 17Gram(s) Oral daily  pantoprazole  Injectable 40milliGRAM(s) IV Push daily  dextrose 10% + sodium chloride 0.9%. 1000milliLiter(s) IV Continuous <Continuous>  morphine ER Tablet 200milliGRAM(s) Oral every 12 hours    MEDICATIONS  (PRN):  acetaminophen   Tablet. 650milliGRAM(s) Oral every 6 hours PRN Moderate Pain (4 - 6)  ondansetron Injectable 4milliGRAM(s) IV Push every 6 hours PRN Nausea  docusate sodium 100milliGRAM(s) Oral three times a day PRN Constipation  senna 2Tablet(s) Oral at bedtime PRN Constipation  LORazepam   Injectable 1milliGRAM(s) IV Push every 8 hours PRN Anxiety/restlessness  HYDROmorphone  Injectable 2milliGRAM(s) SubCutaneous every 3 hours PRN Severe Pain (7 - 10)        RADIOLOGY & ADDITIONAL TESTS:  EXAM:  CT ABDOMEN AND PELVIS IC                            PROCEDURE DATE:  2017        INTERPRETATION:  CT ABDOMEN AND PELVIS IC    HISTORY:  Generalized abdominal pain and nausea    Technique: CT of the abdomen and pelvis is performed with oral and   intravenous contrast. Axial images are supplemented with coronal and   sagittal reformations. This study was performed using automatic exposure   control (radiation dose reduction software) to obtain a diagnostic image   quality scan with patient dose as low as reasonably achievable.    Contrast: 90 cc Omnipaque 350    Comparison: CT abdomen and pelvis 2016    Findings:  LIVER: Diffuse steatosis.  SPLEEN: Normal.  PANCREAS: Diffuse atrophy.  GALLBLADDER/BILIARY TREE: Stable post cholecystectomy dilatation of the   extrahepatic bile duct.  ADRENALS: Normal.  KIDNEYS: No calcification, hydronephrosis, or soft tissue attenuating   renal mass.  LYMPHADENOPATHY/RETROPERITONEUM: No adenopathy.  VASCULATURE: Normal caliber aorta.  BOWEL: No bowel-related abnormality. Specifically, no evidence of acute   diverticulitis. Normal appendix and ileocecal region. Moderate fecal   retention in the sigmoid colon and rectum.  PELVIC VISCERA: Calcified fibroids.  PELVIC LYMPH NODES: No pelvic adenopathy.  PERITONEUM/ABDOMINAL WALL: No free air or ascites.  SKELETAL: No acute bony abnormality. Stable T12 compression deformity.  LUNG BASES: Clear.    IMPRESSION:     No acute intra-abdominal or pelvic pathology. Moderate fecal retention in  the colon and rectum.

## 2017-06-07 VITALS
OXYGEN SATURATION: 100 % | TEMPERATURE: 98 F | RESPIRATION RATE: 18 BRPM | HEART RATE: 84 BPM | SYSTOLIC BLOOD PRESSURE: 124 MMHG | DIASTOLIC BLOOD PRESSURE: 76 MMHG

## 2017-06-07 LAB
ANION GAP SERPL CALC-SCNC: 5 MMOL/L — SIGNIFICANT CHANGE UP (ref 5–17)
BUN SERPL-MCNC: 5 MG/DL — LOW (ref 7–23)
CALCIUM SERPL-MCNC: 8.6 MG/DL — SIGNIFICANT CHANGE UP (ref 8.5–10.1)
CHLORIDE SERPL-SCNC: 111 MMOL/L — HIGH (ref 96–108)
CO2 SERPL-SCNC: 28 MMOL/L — SIGNIFICANT CHANGE UP (ref 22–31)
CREAT SERPL-MCNC: 0.6 MG/DL — SIGNIFICANT CHANGE UP (ref 0.5–1.3)
GLUCOSE SERPL-MCNC: 99 MG/DL — SIGNIFICANT CHANGE UP (ref 70–99)
MAGNESIUM SERPL-MCNC: 2.3 MG/DL — SIGNIFICANT CHANGE UP (ref 1.6–2.6)
PHOSPHATE SERPL-MCNC: 3.2 MG/DL — SIGNIFICANT CHANGE UP (ref 2.5–4.5)
POTASSIUM SERPL-MCNC: 3.9 MMOL/L — SIGNIFICANT CHANGE UP (ref 3.5–5.3)
POTASSIUM SERPL-SCNC: 3.9 MMOL/L — SIGNIFICANT CHANGE UP (ref 3.5–5.3)
SODIUM SERPL-SCNC: 144 MMOL/L — SIGNIFICANT CHANGE UP (ref 135–145)

## 2017-06-07 RX ADMIN — MORPHINE SULFATE 200 MILLIGRAM(S): 50 CAPSULE, EXTENDED RELEASE ORAL at 01:46

## 2017-06-07 RX ADMIN — MORPHINE SULFATE 200 MILLIGRAM(S): 50 CAPSULE, EXTENDED RELEASE ORAL at 13:09

## 2017-06-07 RX ADMIN — MORPHINE SULFATE 200 MILLIGRAM(S): 50 CAPSULE, EXTENDED RELEASE ORAL at 02:16

## 2017-06-07 RX ADMIN — PANTOPRAZOLE SODIUM 40 MILLIGRAM(S): 20 TABLET, DELAYED RELEASE ORAL at 13:09

## 2017-06-07 RX ADMIN — ONDANSETRON 4 MILLIGRAM(S): 8 TABLET, FILM COATED ORAL at 05:42

## 2017-06-07 RX ADMIN — SODIUM CHLORIDE 75 MILLILITER(S): 9 INJECTION, SOLUTION INTRAVENOUS at 05:47

## 2017-06-07 NOTE — DISCHARGE NOTE ADULT - MEDICATION SUMMARY - MEDICATIONS TO TAKE
I will START or STAY ON the medications listed below when I get home from the hospital:    HYDROmorphone 2 mg/mL injectable solution  -- 4 milligram(s) subcutaneous every 2 hours, As Needed  -- 2-4 hours prn  -- Indication: For Pain     morphine 12 hour extended release  -- 200 milligram(s) by mouth 2 times a day, As Needed  -- Indication: For PAin     morphine 12 hour extended release  -- 30 milligram(s) by mouth 2 times a day, As Needed  -- in addition to 200 mg if needed  -- Indication: For Pain     magnesium carbonate-calcium carbonate 300 mg-250 mg oral tablet  -- 1 tab(s) by mouth 2 times a day  -- Indication: For Supplement     Ativan 1 mg oral tablet  -- 1 milligram(s) by mouth every 4 hours, As Needed  -- Indication: For Anxiety    Zofran 8 mg oral tablet  -- 8 milligram(s) by mouth every 8 hours, As Needed  -- Indication: For NAusea    albuterol  --  inhaled every 4 hours, As Needed  -- Indication: For Wheezing    Lasix 20 mg oral tablet  -- 2 tab(s) by mouth once a day, As Needed  -- Indication: For Leg edema    NexIUM 40 mg oral delayed release capsule  -- 1 cap(s) by mouth once a day  -- Indication: For GERD    multivitamin  --     -- Indication: For Supplement     cholecalciferol 1000 intl units oral tablet  -- 1 tab(s) by mouth once a day  -- Indication: For Supplement

## 2017-06-07 NOTE — DISCHARGE NOTE ADULT - OTHER SIGNIFICANT FINDINGS
12.3   7.7   )-----------( 195      ( 06 Jun 2017 05:33 )             35.0     06-07    144  |  111<H>  |  5<L>  ----------------------------<  99  3.9   |  28  |  0.60    Ca    8.6      07 Jun 2017 04:57  Phos  3.2     06-07  Mg     2.3     06-07

## 2017-06-07 NOTE — DISCHARGE NOTE ADULT - HOSPITAL COURSE
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 This accompanied by fever and nausea/ vomiting but no chills - has had several episodes of vomiting  the last couple of days . Exacerbated by lying down flat and alleviated by assuming a  fetal position. Denies diarrhea or relation to meals.  Denies eating anything unusual or any sick contacts or history of travel.   Has a history of similar episodes of abdominal pain since the age of 44y and has been admitted several times under the care of Dr Higuera. She has over the years tried to figure out what precipitates her attacks but has been unsuccessful. She denies alcohol intake or excessive exposure to sunlight. Denies seizures or peripheral neuropathy.   She used to take lasix for swelling of knees but no longer does. (01 Jun 2017 23:10)    PHYSICAL EXAM:  General: Well developed;  in no acute distress  Eyes: PERRLA, EOMI  Head: Normocephalic; atraumatic  ENMT: No nasal discharge  Neck: Supple  Respiratory: Good air entry  No wheezes, rales or rhonchi  Cardiovascular: Regular rate and rhythm. S1 and S2 Normal; No murmurs  Gastrointestinal: Soft non-tender, mildly sensitive to palpation; Normal bowel sounds  Genitourinary: No costovertebral angle tenderness  Extremities: Normal range of motion, No  edema  Vascular: Peripheral pulses palpable 2+ bilaterally  Neurological: Alert and oriented x4, non focal   Musculoskeletal: Normal gait, tone  Psychiatric: Cooperative and appropriate  Problem/Plan - 1:  ·  Problem: Abdominal pain.   Likely due to acute intermittent porphyria   CT scan abdomen: No acute intra-abdominal or pelvic pathology.   Tolerates some regular diet , PO intake improved   Pain is better   Off PCA pump, on home regiment, continue   C/w Zofran PRN  PPI  electrolytes WNL  D/w DR Seay will f/u outPt     Problem/Plan - 2:  ·  Problem: Anxiety.  Plan: C/w Ativan PRN.     Problem/Plan - 3:  ·  Port catheter in place.   on right shoulder.     Problem/Plan - 4:   Hypophosphatemia.  replaced.     Problem/Plan - 5:  ·   Hypokalemia.  Plan: Replaced.     Problem/Plan - 5:  anxiety, c/w Ativan PO PRN       Dispo: stable for d/c home today will f/u with PCP and Hem/onc

## 2017-06-07 NOTE — DISCHARGE NOTE ADULT - CARE PROVIDER_API CALL
Suman Seay), Medical Oncology  [address] [place] [address]  Phone: [phone]  Fax: [phone]    Boxer, Jonathan A (MD), Internal Medicine  [address] [place] [address]  Phone: [phone]  Fax: [phone]

## 2017-06-07 NOTE — DISCHARGE NOTE ADULT - CARE PROVIDERS DIRECT ADDRESSES
[email or web address]

## 2017-06-07 NOTE — DISCHARGE NOTE ADULT - PATIENT PORTAL LINK FT
“You can access the Follow[facility] Patient Portal, offered by [facility], by registering with the following website: [email or web address]”

## 2017-06-10 DIAGNOSIS — Z79.891 LONG TERM (CURRENT) USE OF OPIATE ANALGESIC: ICD-10-CM

## 2017-06-10 DIAGNOSIS — R10.9 UNSPECIFIED ABDOMINAL PAIN: ICD-10-CM

## 2017-06-10 DIAGNOSIS — E27.40 UNSPECIFIED ADRENOCORTICAL INSUFFICIENCY: ICD-10-CM

## 2017-06-10 DIAGNOSIS — E87.6 HYPOKALEMIA: ICD-10-CM

## 2017-06-10 DIAGNOSIS — M81.0 AGE-RELATED OSTEOPOROSIS WITHOUT CURRENT PATHOLOGICAL FRACTURE: ICD-10-CM

## 2017-06-10 DIAGNOSIS — F41.9 ANXIETY DISORDER, UNSPECIFIED: ICD-10-CM

## 2017-06-10 DIAGNOSIS — Z95.828 PRESENCE OF OTHER VASCULAR IMPLANTS AND GRAFTS: ICD-10-CM

## 2017-06-10 DIAGNOSIS — E80.21 ACUTE INTERMITTENT (HEPATIC) PORPHYRIA: ICD-10-CM

## 2017-06-10 DIAGNOSIS — Z98.1 ARTHRODESIS STATUS: ICD-10-CM

## 2017-06-10 DIAGNOSIS — Z90.49 ACQUIRED ABSENCE OF OTHER SPECIFIED PARTS OF DIGESTIVE TRACT: ICD-10-CM

## 2017-06-10 DIAGNOSIS — E83.39 OTHER DISORDERS OF PHOSPHORUS METABOLISM: ICD-10-CM

## 2017-06-10 LAB
COPRO1 24H UR-MRATE: SIGNIFICANT CHANGE UP MCG/L (ref 0.4–4.8)
HEPTA-CP 24H UR-MRATE: SIGNIFICANT CHANGE UP MCG/L
HEPTA-CP SERPL-MCNC: SIGNIFICANT CHANGE UP MCG/L
HEXA-CP 24H UR-MRATE: SIGNIFICANT CHANGE UP MCG/L
PENTA-CP 24H UR-MRATE: 0.7 MCG/L — SIGNIFICANT CHANGE UP
PORPHYRIN FRACT PNL SERPL-MCNC: 0.7 MCG/L — LOW (ref 1–5.6)
PORPHYRINS PLASMA INTERPRETATION: SIGNIFICANT CHANGE UP
UROPOR 24H UR-MRATE: SIGNIFICANT CHANGE UP MCG/L

## 2017-06-15 ENCOUNTER — APPOINTMENT (OUTPATIENT)
Dept: HEMATOLOGY ONCOLOGY | Facility: CLINIC | Age: 62
End: 2017-06-15

## 2017-06-15 ENCOUNTER — LABORATORY RESULT (OUTPATIENT)
Age: 62
End: 2017-06-15

## 2017-06-15 VITALS
SYSTOLIC BLOOD PRESSURE: 128 MMHG | WEIGHT: 167 LBS | HEART RATE: 96 BPM | BODY MASS INDEX: 24.73 KG/M2 | DIASTOLIC BLOOD PRESSURE: 83 MMHG | TEMPERATURE: 98.4 F | HEIGHT: 69 IN

## 2017-06-15 DIAGNOSIS — Z92.89 PERSONAL HISTORY OF OTHER MEDICAL TREATMENT: ICD-10-CM

## 2017-06-15 DIAGNOSIS — G89.29 OTHER CHRONIC PAIN: ICD-10-CM

## 2017-06-15 DIAGNOSIS — E80.21 ACUTE INTERMITTENT (HEPATIC) PORPHYRIA: ICD-10-CM

## 2017-06-15 LAB
HCT VFR BLD CALC: 38.4 %
HGB BLD-MCNC: 12.6 G/DL
MCHC RBC-ENTMCNC: 28.6 PG
MCHC RBC-ENTMCNC: 32.8 GM/DL
MCV RBC AUTO: 87.2 FL
PLATELET # BLD AUTO: 197 K/UL
RBC # BLD: 4.4 M/UL
RBC # FLD: 12.1 %
WBC # FLD AUTO: 7.5 K/UL

## 2017-06-15 RX ORDER — ALBUTEROL SULFATE 90 UG/1
108 (90 BASE) AEROSOL, METERED RESPIRATORY (INHALATION)
Qty: 8 | Refills: 0 | Status: ACTIVE | COMMUNITY
Start: 2017-01-17

## 2017-06-15 RX ORDER — MORPHINE SULFATE 30 MG/1
30 CAPSULE, EXTENDED RELEASE ORAL
Qty: 60 | Refills: 0 | Status: DISCONTINUED | COMMUNITY
Start: 2016-09-13 | End: 2017-06-15

## 2017-06-15 RX ORDER — LOSARTAN POTASSIUM 50 MG/1
50 TABLET, FILM COATED ORAL
Qty: 30 | Refills: 0 | Status: DISCONTINUED | COMMUNITY
Start: 2017-01-06 | End: 2017-06-15

## 2017-06-15 RX ORDER — MORPHINE SULFATE 30 MG/1
30 TABLET, FILM COATED, EXTENDED RELEASE ORAL
Qty: 60 | Refills: 0 | Status: ACTIVE | COMMUNITY
Start: 2017-05-31

## 2017-06-16 LAB
ALBUMIN SERPL ELPH-MCNC: 3.8 G/DL
ALP BLD-CCNC: 67 U/L
ALT SERPL-CCNC: 49 U/L
ANION GAP SERPL CALC-SCNC: 17 MMOL/L
AST SERPL-CCNC: 29 U/L
BILIRUB SERPL-MCNC: 0.2 MG/DL
BUN SERPL-MCNC: 8 MG/DL
CALCIUM SERPL-MCNC: 9.3 MG/DL
CHLORIDE SERPL-SCNC: 100 MMOL/L
CO2 SERPL-SCNC: 26 MMOL/L
CREAT SERPL-MCNC: 0.66 MG/DL
GLUCOSE SERPL-MCNC: 124 MG/DL
POTASSIUM SERPL-SCNC: 4.3 MMOL/L
PROT SERPL-MCNC: 6.6 G/DL
SODIUM SERPL-SCNC: 143 MMOL/L

## 2017-06-25 ENCOUNTER — RX RENEWAL (OUTPATIENT)
Age: 62
End: 2017-06-25

## 2017-08-10 ENCOUNTER — INPATIENT (INPATIENT)
Facility: HOSPITAL | Age: 62
LOS: 8 days | Discharge: HOME | End: 2017-08-19
Attending: INTERNAL MEDICINE | Admitting: INTERNAL MEDICINE
Payer: MEDICARE

## 2017-08-10 VITALS
DIASTOLIC BLOOD PRESSURE: 71 MMHG | OXYGEN SATURATION: 93 % | HEART RATE: 97 BPM | TEMPERATURE: 98 F | WEIGHT: 154.98 LBS | SYSTOLIC BLOOD PRESSURE: 123 MMHG | RESPIRATION RATE: 17 BRPM | HEIGHT: 69 IN

## 2017-08-10 DIAGNOSIS — Z98.1 ARTHRODESIS STATUS: Chronic | ICD-10-CM

## 2017-08-10 DIAGNOSIS — Z90.49 ACQUIRED ABSENCE OF OTHER SPECIFIED PARTS OF DIGESTIVE TRACT: Chronic | ICD-10-CM

## 2017-08-10 DIAGNOSIS — Z90.89 ACQUIRED ABSENCE OF OTHER ORGANS: Chronic | ICD-10-CM

## 2017-08-10 LAB
ALBUMIN SERPL ELPH-MCNC: 3.1 G/DL — LOW (ref 3.3–5)
ALP SERPL-CCNC: 101 U/L — SIGNIFICANT CHANGE UP (ref 40–120)
ALT FLD-CCNC: 55 U/L — SIGNIFICANT CHANGE UP (ref 12–78)
ANION GAP SERPL CALC-SCNC: 8 MMOL/L — SIGNIFICANT CHANGE UP (ref 5–17)
APPEARANCE UR: CLEAR — SIGNIFICANT CHANGE UP
AST SERPL-CCNC: 31 U/L — SIGNIFICANT CHANGE UP (ref 15–37)
BASOPHILS # BLD AUTO: 0.1 K/UL — SIGNIFICANT CHANGE UP (ref 0–0.2)
BASOPHILS NFR BLD AUTO: 0.9 % — SIGNIFICANT CHANGE UP (ref 0–2)
BILIRUB SERPL-MCNC: 0.8 MG/DL — SIGNIFICANT CHANGE UP (ref 0.2–1.2)
BILIRUB UR-MCNC: NEGATIVE — SIGNIFICANT CHANGE UP
BUN SERPL-MCNC: 7 MG/DL — SIGNIFICANT CHANGE UP (ref 7–23)
CALCIUM SERPL-MCNC: 9 MG/DL — SIGNIFICANT CHANGE UP (ref 8.5–10.1)
CHLORIDE SERPL-SCNC: 99 MMOL/L — SIGNIFICANT CHANGE UP (ref 96–108)
CO2 SERPL-SCNC: 28 MMOL/L — SIGNIFICANT CHANGE UP (ref 22–31)
COLOR SPEC: YELLOW — SIGNIFICANT CHANGE UP
CREAT SERPL-MCNC: 0.66 MG/DL — SIGNIFICANT CHANGE UP (ref 0.5–1.3)
DIFF PNL FLD: NEGATIVE — SIGNIFICANT CHANGE UP
EOSINOPHIL # BLD AUTO: 0 K/UL — SIGNIFICANT CHANGE UP (ref 0–0.5)
EOSINOPHIL NFR BLD AUTO: 0.1 % — SIGNIFICANT CHANGE UP (ref 0–6)
GLUCOSE SERPL-MCNC: 119 MG/DL — HIGH (ref 70–99)
GLUCOSE UR QL: NEGATIVE MG/DL — SIGNIFICANT CHANGE UP
HCT VFR BLD CALC: 34.4 % — LOW (ref 34.5–45)
HGB BLD-MCNC: 11.6 G/DL — SIGNIFICANT CHANGE UP (ref 11.5–15.5)
KETONES UR-MCNC: (no result)
LACTATE SERPL-SCNC: 1.1 MMOL/L — SIGNIFICANT CHANGE UP (ref 0.7–2)
LEUKOCYTE ESTERASE UR-ACNC: NEGATIVE — SIGNIFICANT CHANGE UP
LIDOCAIN IGE QN: 84 U/L — SIGNIFICANT CHANGE UP (ref 73–393)
LYMPHOCYTES # BLD AUTO: 1.5 K/UL — SIGNIFICANT CHANGE UP (ref 1–3.3)
LYMPHOCYTES # BLD AUTO: 12.6 % — LOW (ref 13–44)
MCHC RBC-ENTMCNC: 27.7 PG — SIGNIFICANT CHANGE UP (ref 27–34)
MCHC RBC-ENTMCNC: 33.8 GM/DL — SIGNIFICANT CHANGE UP (ref 32–36)
MCV RBC AUTO: 82 FL — SIGNIFICANT CHANGE UP (ref 80–100)
MONOCYTES # BLD AUTO: 1.1 K/UL — HIGH (ref 0–0.9)
MONOCYTES NFR BLD AUTO: 9.4 % — SIGNIFICANT CHANGE UP (ref 2–14)
NEUTROPHILS # BLD AUTO: 9.3 K/UL — HIGH (ref 1.8–7.4)
NEUTROPHILS NFR BLD AUTO: 77 % — SIGNIFICANT CHANGE UP (ref 43–77)
NITRITE UR-MCNC: NEGATIVE — SIGNIFICANT CHANGE UP
PH UR: 7 — SIGNIFICANT CHANGE UP (ref 5–8)
PLATELET # BLD AUTO: 149 K/UL — LOW (ref 150–400)
POTASSIUM SERPL-MCNC: 3.6 MMOL/L — SIGNIFICANT CHANGE UP (ref 3.5–5.3)
POTASSIUM SERPL-SCNC: 3.6 MMOL/L — SIGNIFICANT CHANGE UP (ref 3.5–5.3)
PROT SERPL-MCNC: 6.8 GM/DL — SIGNIFICANT CHANGE UP (ref 6–8.3)
PROT UR-MCNC: NEGATIVE MG/DL — SIGNIFICANT CHANGE UP
RBC # BLD: 4.2 M/UL — SIGNIFICANT CHANGE UP (ref 3.8–5.2)
RBC # FLD: 11.6 % — SIGNIFICANT CHANGE UP (ref 10.3–14.5)
SODIUM SERPL-SCNC: 135 MMOL/L — SIGNIFICANT CHANGE UP (ref 135–145)
SP GR SPEC: 1 — LOW (ref 1.01–1.02)
UROBILINOGEN FLD QL: NEGATIVE MG/DL — SIGNIFICANT CHANGE UP
WBC # BLD: 12.1 K/UL — HIGH (ref 3.8–10.5)
WBC # FLD AUTO: 12.1 K/UL — HIGH (ref 3.8–10.5)

## 2017-08-10 PROCEDURE — 99285 EMERGENCY DEPT VISIT HI MDM: CPT

## 2017-08-10 PROCEDURE — 74177 CT ABD & PELVIS W/CONTRAST: CPT | Mod: 26

## 2017-08-10 PROCEDURE — 71010: CPT | Mod: 26

## 2017-08-10 RX ORDER — VANCOMYCIN HCL 1 G
1000 VIAL (EA) INTRAVENOUS ONCE
Qty: 0 | Refills: 0 | Status: COMPLETED | OUTPATIENT
Start: 2017-08-10 | End: 2017-08-10

## 2017-08-10 RX ORDER — MORPHINE SULFATE 50 MG/1
4 CAPSULE, EXTENDED RELEASE ORAL ONCE
Qty: 0 | Refills: 0 | Status: DISCONTINUED | OUTPATIENT
Start: 2017-08-10 | End: 2017-08-10

## 2017-08-10 RX ORDER — MINERAL OIL
133 OIL (ML) MISCELLANEOUS DAILY
Qty: 0 | Refills: 0 | Status: COMPLETED | OUTPATIENT
Start: 2017-08-10 | End: 2017-08-10

## 2017-08-10 RX ORDER — SODIUM CHLORIDE 9 MG/ML
1000 INJECTION INTRAMUSCULAR; INTRAVENOUS; SUBCUTANEOUS ONCE
Qty: 0 | Refills: 0 | Status: COMPLETED | OUTPATIENT
Start: 2017-08-10 | End: 2017-08-10

## 2017-08-10 RX ORDER — ONDANSETRON 8 MG/1
4 TABLET, FILM COATED ORAL ONCE
Qty: 0 | Refills: 0 | Status: COMPLETED | OUTPATIENT
Start: 2017-08-10 | End: 2017-08-10

## 2017-08-10 RX ORDER — ACETAMINOPHEN 500 MG
1000 TABLET ORAL ONCE
Qty: 0 | Refills: 0 | Status: COMPLETED | OUTPATIENT
Start: 2017-08-10 | End: 2017-08-10

## 2017-08-10 RX ORDER — HYDROMORPHONE HYDROCHLORIDE 2 MG/ML
1 INJECTION INTRAMUSCULAR; INTRAVENOUS; SUBCUTANEOUS EVERY 4 HOURS
Qty: 0 | Refills: 0 | Status: DISCONTINUED | OUTPATIENT
Start: 2017-08-10 | End: 2017-08-11

## 2017-08-10 RX ORDER — SENNA PLUS 8.6 MG/1
2 TABLET ORAL AT BEDTIME
Qty: 0 | Refills: 0 | Status: DISCONTINUED | OUTPATIENT
Start: 2017-08-10 | End: 2017-08-15

## 2017-08-10 RX ORDER — KETOROLAC TROMETHAMINE 30 MG/ML
15 SYRINGE (ML) INJECTION ONCE
Qty: 0 | Refills: 0 | Status: DISCONTINUED | OUTPATIENT
Start: 2017-08-10 | End: 2017-08-10

## 2017-08-10 RX ORDER — ACETAMINOPHEN 500 MG
650 TABLET ORAL EVERY 6 HOURS
Qty: 0 | Refills: 0 | Status: DISCONTINUED | OUTPATIENT
Start: 2017-08-10 | End: 2017-08-15

## 2017-08-10 RX ORDER — ONDANSETRON 8 MG/1
4 TABLET, FILM COATED ORAL EVERY 6 HOURS
Qty: 0 | Refills: 0 | Status: DISCONTINUED | OUTPATIENT
Start: 2017-08-10 | End: 2017-08-15

## 2017-08-10 RX ORDER — PANTOPRAZOLE SODIUM 20 MG/1
40 TABLET, DELAYED RELEASE ORAL
Qty: 0 | Refills: 0 | Status: DISCONTINUED | OUTPATIENT
Start: 2017-08-10 | End: 2017-08-15

## 2017-08-10 RX ORDER — LACTOBACILLUS ACIDOPHILUS 100MM CELL
2 CAPSULE ORAL
Qty: 0 | Refills: 0 | Status: DISCONTINUED | OUTPATIENT
Start: 2017-08-10 | End: 2017-08-15

## 2017-08-10 RX ORDER — POLYETHYLENE GLYCOL 3350 17 G/17G
17 POWDER, FOR SOLUTION ORAL DAILY
Qty: 0 | Refills: 0 | Status: DISCONTINUED | OUTPATIENT
Start: 2017-08-10 | End: 2017-08-12

## 2017-08-10 RX ORDER — DOCUSATE SODIUM 100 MG
200 CAPSULE ORAL DAILY
Qty: 0 | Refills: 0 | Status: DISCONTINUED | OUTPATIENT
Start: 2017-08-10 | End: 2017-08-12

## 2017-08-10 RX ORDER — CHOLECALCIFEROL (VITAMIN D3) 125 MCG
1000 CAPSULE ORAL DAILY
Qty: 0 | Refills: 0 | Status: DISCONTINUED | OUTPATIENT
Start: 2017-08-10 | End: 2017-08-15

## 2017-08-10 RX ORDER — HYDROMORPHONE HYDROCHLORIDE 2 MG/ML
2 INJECTION INTRAMUSCULAR; INTRAVENOUS; SUBCUTANEOUS EVERY 4 HOURS
Qty: 0 | Refills: 0 | Status: DISCONTINUED | OUTPATIENT
Start: 2017-08-10 | End: 2017-08-11

## 2017-08-10 RX ORDER — PIPERACILLIN AND TAZOBACTAM 4; .5 G/20ML; G/20ML
3.38 INJECTION, POWDER, LYOPHILIZED, FOR SOLUTION INTRAVENOUS EVERY 8 HOURS
Qty: 0 | Refills: 0 | Status: DISCONTINUED | OUTPATIENT
Start: 2017-08-10 | End: 2017-08-11

## 2017-08-10 RX ORDER — MORPHINE SULFATE 50 MG/1
200 CAPSULE, EXTENDED RELEASE ORAL EVERY 12 HOURS
Qty: 0 | Refills: 0 | Status: DISCONTINUED | OUTPATIENT
Start: 2017-08-10 | End: 2017-08-15

## 2017-08-10 RX ORDER — SODIUM CHLORIDE 9 MG/ML
3 INJECTION INTRAMUSCULAR; INTRAVENOUS; SUBCUTANEOUS ONCE
Qty: 0 | Refills: 0 | Status: COMPLETED | OUTPATIENT
Start: 2017-08-10 | End: 2017-08-10

## 2017-08-10 RX ORDER — MORPHINE SULFATE 50 MG/1
30 CAPSULE, EXTENDED RELEASE ORAL
Qty: 0 | Refills: 0 | Status: DISCONTINUED | OUTPATIENT
Start: 2017-08-10 | End: 2017-08-10

## 2017-08-10 RX ORDER — MORPHINE SULFATE 50 MG/1
30 CAPSULE, EXTENDED RELEASE ORAL EVERY 12 HOURS
Qty: 0 | Refills: 0 | Status: DISCONTINUED | OUTPATIENT
Start: 2017-08-10 | End: 2017-08-15

## 2017-08-10 RX ORDER — SODIUM CHLORIDE 9 MG/ML
1000 INJECTION, SOLUTION INTRAVENOUS
Qty: 0 | Refills: 0 | Status: DISCONTINUED | OUTPATIENT
Start: 2017-08-10 | End: 2017-08-13

## 2017-08-10 RX ORDER — HYDROMORPHONE HYDROCHLORIDE 2 MG/ML
1 INJECTION INTRAMUSCULAR; INTRAVENOUS; SUBCUTANEOUS ONCE
Qty: 0 | Refills: 0 | Status: DISCONTINUED | OUTPATIENT
Start: 2017-08-10 | End: 2017-08-10

## 2017-08-10 RX ORDER — PIPERACILLIN AND TAZOBACTAM 4; .5 G/20ML; G/20ML
3.38 INJECTION, POWDER, LYOPHILIZED, FOR SOLUTION INTRAVENOUS ONCE
Qty: 0 | Refills: 0 | Status: COMPLETED | OUTPATIENT
Start: 2017-08-10 | End: 2017-08-10

## 2017-08-10 RX ORDER — GLYCERIN ADULT
1 SUPPOSITORY, RECTAL RECTAL DAILY
Qty: 0 | Refills: 0 | Status: DISCONTINUED | OUTPATIENT
Start: 2017-08-10 | End: 2017-08-12

## 2017-08-10 RX ORDER — ENOXAPARIN SODIUM 100 MG/ML
40 INJECTION SUBCUTANEOUS DAILY
Qty: 0 | Refills: 0 | Status: DISCONTINUED | OUTPATIENT
Start: 2017-08-10 | End: 2017-08-11

## 2017-08-10 RX ADMIN — MORPHINE SULFATE 30 MILLIGRAM(S): 50 CAPSULE, EXTENDED RELEASE ORAL at 21:39

## 2017-08-10 RX ADMIN — SODIUM CHLORIDE 1000 MILLILITER(S): 9 INJECTION INTRAMUSCULAR; INTRAVENOUS; SUBCUTANEOUS at 13:21

## 2017-08-10 RX ADMIN — Medication 133 MILLILITER(S): at 21:57

## 2017-08-10 RX ADMIN — Medication 1 SUPPOSITORY(S): at 20:31

## 2017-08-10 RX ADMIN — Medication 15 MILLIGRAM(S): at 19:05

## 2017-08-10 RX ADMIN — ONDANSETRON 4 MILLIGRAM(S): 8 TABLET, FILM COATED ORAL at 13:25

## 2017-08-10 RX ADMIN — HYDROMORPHONE HYDROCHLORIDE 1 MILLIGRAM(S): 2 INJECTION INTRAMUSCULAR; INTRAVENOUS; SUBCUTANEOUS at 19:00

## 2017-08-10 RX ADMIN — PIPERACILLIN AND TAZOBACTAM 200 GRAM(S): 4; .5 INJECTION, POWDER, LYOPHILIZED, FOR SOLUTION INTRAVENOUS at 16:10

## 2017-08-10 RX ADMIN — SODIUM CHLORIDE 3 MILLILITER(S): 9 INJECTION INTRAMUSCULAR; INTRAVENOUS; SUBCUTANEOUS at 13:21

## 2017-08-10 RX ADMIN — MORPHINE SULFATE 4 MILLIGRAM(S): 50 CAPSULE, EXTENDED RELEASE ORAL at 13:25

## 2017-08-10 RX ADMIN — HYDROMORPHONE HYDROCHLORIDE 2 MILLIGRAM(S): 2 INJECTION INTRAMUSCULAR; INTRAVENOUS; SUBCUTANEOUS at 22:29

## 2017-08-10 RX ADMIN — MORPHINE SULFATE 200 MILLIGRAM(S): 50 CAPSULE, EXTENDED RELEASE ORAL at 23:00

## 2017-08-10 RX ADMIN — HYDROMORPHONE HYDROCHLORIDE 2 MILLIGRAM(S): 2 INJECTION INTRAMUSCULAR; INTRAVENOUS; SUBCUTANEOUS at 22:04

## 2017-08-10 RX ADMIN — ENOXAPARIN SODIUM 40 MILLIGRAM(S): 100 INJECTION SUBCUTANEOUS at 21:38

## 2017-08-10 RX ADMIN — Medication 200 MILLIGRAM(S): at 21:38

## 2017-08-10 RX ADMIN — POLYETHYLENE GLYCOL 3350 17 GRAM(S): 17 POWDER, FOR SOLUTION ORAL at 21:51

## 2017-08-10 RX ADMIN — Medication 250 MILLIGRAM(S): at 17:04

## 2017-08-10 RX ADMIN — MORPHINE SULFATE 200 MILLIGRAM(S): 50 CAPSULE, EXTENDED RELEASE ORAL at 21:38

## 2017-08-10 RX ADMIN — MORPHINE SULFATE 30 MILLIGRAM(S): 50 CAPSULE, EXTENDED RELEASE ORAL at 23:00

## 2017-08-10 NOTE — ED PROVIDER NOTE - PMH
Adrenal insufficiency, primary    Chronic abdominal pain    Knee effusion    Osteoporosis    Porphyria

## 2017-08-10 NOTE — H&P ADULT - HISTORY OF PRESENT ILLNESS
62F with PMHx AIP, chronic pain, anxiety with recent acute flare of AIP who now p/w severe RLQ pain radiating to her right flank, starting ~ 3 days PTA. Nothing mitigates nor exacerbates. a/w intermittent fever.   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   Baseline anorexia & constipation- last BM ~ 1 week ago.     When initially examined, Pt in severe distress d/t localized RLQ pain. CT(A/P) reviewed with Rads- no evidence of appendicitis but profound stool retention. Pain significantly reduced with IV Dilaudid/Toradol.

## 2017-08-10 NOTE — ED ADULT NURSE REASSESSMENT NOTE - NS ED NURSE REASSESS COMMENT FT1
assumed care of pt from duane ORTIZ RN, straight cath performed,urine sent to lab.  VS as charted, tolerating po contrast, c/o ruq pain and intermittent fevers x3 days, worse with movement.  Awaiting 1500 Ct scan, family @ bedside

## 2017-08-10 NOTE — ED PROVIDER NOTE - PROGRESS NOTE DETAILS
Yassine Hilario MD PGY4: Labs, imaging reviewed. Received broad spectrum abx. Source of fever remains unidentified. Case d/w Dr. Graf, accepted admission.

## 2017-08-10 NOTE — H&P ADULT - NSHPLABSRESULTS_GEN_ALL_CORE
T(C): 36.5 (08-10-17 @ 20:21), Max: 38.6 (08-10-17 @ 12:40)  HR: 95 (08-10-17 @ 14:45) (95 - 97)  BP: 126/73 (08-10-17 @ 20:21) (123/71 - 134/72)  RR: 14 (08-10-17 @ 20:21) (14 - 19)  SpO2: 96% (08-10-17 @ 20:21) (93% - 96%)  Wt(kg): --                              11.6   12.1  )-----------( 149      ( 10 Aug 2017 12:44 )             34.4     10 Aug 2017 12:44    135    |  99     |  7      ----------------------------<  119    3.6     |  28     |  0.66     Ca    9.0        10 Aug 2017 12:44    TPro  6.8    /  Alb  3.1    /  TBili  0.8    /  DBili  x      /  AST  31     /  ALT  55     /  AlkPhos  101    10 Aug 2017 12:44      CAPILLARY BLOOD GLUCOSE        LIVER FUNCTIONS - ( 10 Aug 2017 12:44 )  Alb: 3.1 g/dL / Pro: 6.8 gm/dL / ALK PHOS: 101 U/L / ALT: 55 U/L / AST: 31 U/L / GGT: x           Urinalysis Basic - ( 10 Aug 2017 12:44 )    Color: Yellow / Appearance: Clear / S.005 / pH: x  Gluc: x / Ketone: Moderate  / Bili: Negative / Urobili: Negative mg/dL   Blood: x / Protein: Negative mg/dL / Nitrite: Negative   Leuk Esterase: Negative / RBC: x / WBC x   Sq Epi: x / Non Sq Epi: x / Bacteria: x

## 2017-08-10 NOTE — H&P ADULT - ASSESSMENT
62F with aforementioned PMHx, now a/w    * Severe acute RLQ pain, unclear etiology, suspect fecal retention as primary cause, appendix visualized & wnl, lactate wnl- doubt bowel ischemia; ? ureteral calculus obscured by IV contrast load; atypical AIP indeed possible  - clears  - IVF with D5NS  - urine PBG  - KUB in AM once contrast cleared   - PRN analgesics/antiemetics  - aggressive catharsis from above/below  - Hem/GI evals    * Chronic pain, anxiety- status quo; i-STOP c/w Pt report  - c/w MS Contin 230mg Q12  - PRN Dilaudid   - Ativan Q4 PRN    * GERD, stable  - c/w PPI    * DVT PPx- Lovenox    * full code

## 2017-08-10 NOTE — ED ADULT NURSE NOTE - PSYCHOSOCIAL WDL
Lethargic but answering questions appropriately, normal mood and affect, no apparent risk to self or others.

## 2017-08-10 NOTE — ED PROVIDER NOTE - ATTENDING CONTRIBUTION TO CARE
62F with pmh intermittent porphyria presents with lower abdominal pain radiating to back, subjective fever, decreased PO intake. Without CP or SOB. Without urinary symptoms. On arrival VS WNL. NAD, NCAT, MMM, Trachea midline, Normal conjunctiva, CTAB, Non-tachycardic, Normal perfusion, Abdomen Soft, diffuse abdominal ttp,  No rebound/guarding, No LE edema, No deformity of extremities, No rashes. No focal motor or sensory deficits     62F with intermittent porphyria presents with severe abdominal pain. Has had multiple admissions in past for pain control 2/2 porphyria. DDx includes sbo, pancreatitis, diverticulitis, pain 2/2 prophyria. Labs including lipase, CTAP, analgesia as needed, and re-assess. - Raymundo Joya MD

## 2017-08-10 NOTE — ED PROVIDER NOTE - OBJECTIVE STATEMENT
61 yo woman w/ h/o intermittent porphyria p/w abd pain. Describes pain as lower, rad to back, severe, constant x2 days, w/o aggravating/alleviating factors. States she has never had this pain before and it is different than pain associated w/ prophyria. Has felt nauseous during this time w/ decreased appetite. Has also felt feverish. Denies vomiting, diarrhea, urinary symptoms.

## 2017-08-11 LAB
-  CANDIDA ALBICANS: SIGNIFICANT CHANGE UP
-  CANDIDA GLABRATA: SIGNIFICANT CHANGE UP
-  CANDIDA KRUSEI: SIGNIFICANT CHANGE UP
-  CANDIDA PARAPSILOSIS: SIGNIFICANT CHANGE UP
-  CANDIDA TROPICALIS: SIGNIFICANT CHANGE UP
-  COAGULASE NEGATIVE STAPHYLOCOCCUS: SIGNIFICANT CHANGE UP
-  K. PNEUMONIAE GROUP: SIGNIFICANT CHANGE UP
-  KPC RESISTANCE GENE: SIGNIFICANT CHANGE UP
-  STREPTOCOCCUS SP. (NOT GRP A, B OR S PNEUMONIAE): SIGNIFICANT CHANGE UP
A BAUMANNII DNA SPEC QL NAA+PROBE: SIGNIFICANT CHANGE UP
ANION GAP SERPL CALC-SCNC: 7 MMOL/L — SIGNIFICANT CHANGE UP (ref 5–17)
BASOPHILS # BLD AUTO: 0.1 K/UL — SIGNIFICANT CHANGE UP (ref 0–0.2)
BASOPHILS NFR BLD AUTO: 0.6 % — SIGNIFICANT CHANGE UP (ref 0–2)
BUN SERPL-MCNC: 5 MG/DL — LOW (ref 7–23)
CALCIUM SERPL-MCNC: 8.1 MG/DL — LOW (ref 8.5–10.1)
CHLORIDE SERPL-SCNC: 102 MMOL/L — SIGNIFICANT CHANGE UP (ref 96–108)
CO2 SERPL-SCNC: 27 MMOL/L — SIGNIFICANT CHANGE UP (ref 22–31)
CREAT SERPL-MCNC: 0.52 MG/DL — SIGNIFICANT CHANGE UP (ref 0.5–1.3)
CULTURE RESULTS: NO GROWTH — SIGNIFICANT CHANGE UP
E CLOAC COMP DNA BLD POS QL NAA+PROBE: SIGNIFICANT CHANGE UP
E COLI DNA BLD POS QL NAA+NON-PROBE: SIGNIFICANT CHANGE UP
ENTEROCOC DNA BLD POS QL NAA+NON-PROBE: SIGNIFICANT CHANGE UP
ENTEROCOC DNA BLD POS QL NAA+NON-PROBE: SIGNIFICANT CHANGE UP
EOSINOPHIL # BLD AUTO: 0.1 K/UL — SIGNIFICANT CHANGE UP (ref 0–0.5)
EOSINOPHIL NFR BLD AUTO: 0.8 % — SIGNIFICANT CHANGE UP (ref 0–6)
GLUCOSE SERPL-MCNC: 128 MG/DL — HIGH (ref 70–99)
GP B STREP DNA BLD POS QL NAA+NON-PROBE: SIGNIFICANT CHANGE UP
GRAM STN FLD: SIGNIFICANT CHANGE UP
HAEM INFLU DNA BLD POS QL NAA+NON-PROBE: SIGNIFICANT CHANGE UP
HBA1C BLD-MCNC: 5.9 % — HIGH (ref 4–5.6)
HCT VFR BLD CALC: 32.1 % — LOW (ref 34.5–45)
HGB BLD-MCNC: 11.1 G/DL — LOW (ref 11.5–15.5)
K OXYTOCA DNA BLD POS QL NAA+NON-PROBE: SIGNIFICANT CHANGE UP
L MONOCYTOG DNA BLD POS QL NAA+NON-PROBE: SIGNIFICANT CHANGE UP
LYMPHOCYTES # BLD AUTO: 1.4 K/UL — SIGNIFICANT CHANGE UP (ref 1–3.3)
LYMPHOCYTES # BLD AUTO: 14.4 % — SIGNIFICANT CHANGE UP (ref 13–44)
MAGNESIUM SERPL-MCNC: 2.1 MG/DL — SIGNIFICANT CHANGE UP (ref 1.6–2.6)
MCHC RBC-ENTMCNC: 28.4 PG — SIGNIFICANT CHANGE UP (ref 27–34)
MCHC RBC-ENTMCNC: 34.4 GM/DL — SIGNIFICANT CHANGE UP (ref 32–36)
MCV RBC AUTO: 82.4 FL — SIGNIFICANT CHANGE UP (ref 80–100)
METHOD TYPE: SIGNIFICANT CHANGE UP
MONOCYTES # BLD AUTO: 0.8 K/UL — SIGNIFICANT CHANGE UP (ref 0–0.9)
MONOCYTES NFR BLD AUTO: 8.4 % — SIGNIFICANT CHANGE UP (ref 2–14)
MRSA SPEC QL CULT: SIGNIFICANT CHANGE UP
MSSA DNA SPEC QL NAA+PROBE: SIGNIFICANT CHANGE UP
N MEN ISLT CULT: SIGNIFICANT CHANGE UP
NEUTROPHILS # BLD AUTO: 7.6 K/UL — HIGH (ref 1.8–7.4)
NEUTROPHILS NFR BLD AUTO: 75.8 % — SIGNIFICANT CHANGE UP (ref 43–77)
P AERUGINOSA DNA BLD POS NAA+NON-PROBE: SIGNIFICANT CHANGE UP
PHOSPHATE SERPL-MCNC: 2.4 MG/DL — LOW (ref 2.5–4.5)
PLATELET # BLD AUTO: 128 K/UL — LOW (ref 150–400)
POTASSIUM SERPL-MCNC: 3.6 MMOL/L — SIGNIFICANT CHANGE UP (ref 3.5–5.3)
POTASSIUM SERPL-SCNC: 3.6 MMOL/L — SIGNIFICANT CHANGE UP (ref 3.5–5.3)
PROTEUS SP DNA BLD POS QL NAA+NON-PROBE: SIGNIFICANT CHANGE UP
RBC # BLD: 3.9 M/UL — SIGNIFICANT CHANGE UP (ref 3.8–5.2)
RBC # FLD: 11.3 % — SIGNIFICANT CHANGE UP (ref 10.3–14.5)
S MARCESCENS DNA BLD POS NAA+NON-PROBE: SIGNIFICANT CHANGE UP
S PNEUM DNA BLD POS QL NAA+NON-PROBE: SIGNIFICANT CHANGE UP
S PYO DNA BLD POS QL NAA+NON-PROBE: SIGNIFICANT CHANGE UP
SODIUM SERPL-SCNC: 136 MMOL/L — SIGNIFICANT CHANGE UP (ref 135–145)
SPECIMEN SOURCE: SIGNIFICANT CHANGE UP
WBC # BLD: 10 K/UL — SIGNIFICANT CHANGE UP (ref 3.8–10.5)
WBC # FLD AUTO: 10 K/UL — SIGNIFICANT CHANGE UP (ref 3.8–10.5)

## 2017-08-11 PROCEDURE — 93306 TTE W/DOPPLER COMPLETE: CPT | Mod: 26

## 2017-08-11 PROCEDURE — 74000: CPT | Mod: 26

## 2017-08-11 PROCEDURE — 99233 SBSQ HOSP IP/OBS HIGH 50: CPT

## 2017-08-11 RX ORDER — SODIUM,POTASSIUM PHOSPHATES 278-250MG
1 POWDER IN PACKET (EA) ORAL
Qty: 0 | Refills: 0 | Status: COMPLETED | OUTPATIENT
Start: 2017-08-11 | End: 2017-08-12

## 2017-08-11 RX ORDER — MULTIVIT WITH MIN/MFOLATE/K2 340-15/3 G
256 POWDER (GRAM) ORAL ONCE
Qty: 0 | Refills: 0 | Status: COMPLETED | OUTPATIENT
Start: 2017-08-11 | End: 2017-08-11

## 2017-08-11 RX ORDER — HYDROMORPHONE HYDROCHLORIDE 2 MG/ML
30 INJECTION INTRAMUSCULAR; INTRAVENOUS; SUBCUTANEOUS
Qty: 0 | Refills: 0 | Status: DISCONTINUED | OUTPATIENT
Start: 2017-08-11 | End: 2017-08-15

## 2017-08-11 RX ORDER — CEFTRIAXONE 500 MG/1
1 INJECTION, POWDER, FOR SOLUTION INTRAMUSCULAR; INTRAVENOUS EVERY 24 HOURS
Qty: 0 | Refills: 0 | Status: DISCONTINUED | OUTPATIENT
Start: 2017-08-12 | End: 2017-08-14

## 2017-08-11 RX ORDER — CEFTRIAXONE 500 MG/1
INJECTION, POWDER, FOR SOLUTION INTRAMUSCULAR; INTRAVENOUS
Qty: 0 | Refills: 0 | Status: DISCONTINUED | OUTPATIENT
Start: 2017-08-11 | End: 2017-08-14

## 2017-08-11 RX ORDER — VANCOMYCIN HCL 1 G
1000 VIAL (EA) INTRAVENOUS ONCE
Qty: 0 | Refills: 0 | Status: COMPLETED | OUTPATIENT
Start: 2017-08-11 | End: 2017-08-11

## 2017-08-11 RX ORDER — CEFTRIAXONE 500 MG/1
1 INJECTION, POWDER, FOR SOLUTION INTRAMUSCULAR; INTRAVENOUS ONCE
Qty: 0 | Refills: 0 | Status: COMPLETED | OUTPATIENT
Start: 2017-08-11 | End: 2017-08-11

## 2017-08-11 RX ORDER — VANCOMYCIN HCL 1 G
VIAL (EA) INTRAVENOUS
Qty: 0 | Refills: 0 | Status: DISCONTINUED | OUTPATIENT
Start: 2017-08-11 | End: 2017-08-14

## 2017-08-11 RX ORDER — NALOXONE HYDROCHLORIDE 4 MG/.1ML
0.1 SPRAY NASAL
Qty: 0 | Refills: 0 | Status: DISCONTINUED | OUTPATIENT
Start: 2017-08-11 | End: 2017-08-15

## 2017-08-11 RX ORDER — VANCOMYCIN HCL 1 G
1000 VIAL (EA) INTRAVENOUS EVERY 12 HOURS
Qty: 0 | Refills: 0 | Status: DISCONTINUED | OUTPATIENT
Start: 2017-08-11 | End: 2017-08-14

## 2017-08-11 RX ADMIN — PANTOPRAZOLE SODIUM 40 MILLIGRAM(S): 20 TABLET, DELAYED RELEASE ORAL at 08:24

## 2017-08-11 RX ADMIN — MORPHINE SULFATE 30 MILLIGRAM(S): 50 CAPSULE, EXTENDED RELEASE ORAL at 06:18

## 2017-08-11 RX ADMIN — Medication 250 MILLIGRAM(S): at 18:45

## 2017-08-11 RX ADMIN — HYDROMORPHONE HYDROCHLORIDE 2 MILLIGRAM(S): 2 INJECTION INTRAMUSCULAR; INTRAVENOUS; SUBCUTANEOUS at 04:15

## 2017-08-11 RX ADMIN — ENOXAPARIN SODIUM 40 MILLIGRAM(S): 100 INJECTION SUBCUTANEOUS at 11:39

## 2017-08-11 RX ADMIN — Medication 1 TABLET(S): at 11:39

## 2017-08-11 RX ADMIN — SODIUM CHLORIDE 100 MILLILITER(S): 9 INJECTION, SOLUTION INTRAVENOUS at 17:52

## 2017-08-11 RX ADMIN — SODIUM CHLORIDE 100 MILLILITER(S): 9 INJECTION, SOLUTION INTRAVENOUS at 07:37

## 2017-08-11 RX ADMIN — PIPERACILLIN AND TAZOBACTAM 25 GRAM(S): 4; .5 INJECTION, POWDER, LYOPHILIZED, FOR SOLUTION INTRAVENOUS at 00:06

## 2017-08-11 RX ADMIN — Medication 1 SUPPOSITORY(S): at 17:51

## 2017-08-11 RX ADMIN — Medication 2 TABLET(S): at 16:51

## 2017-08-11 RX ADMIN — MORPHINE SULFATE 200 MILLIGRAM(S): 50 CAPSULE, EXTENDED RELEASE ORAL at 06:37

## 2017-08-11 RX ADMIN — Medication 200 MILLIGRAM(S): at 11:38

## 2017-08-11 RX ADMIN — Medication 1 MILLIGRAM(S): at 04:52

## 2017-08-11 RX ADMIN — Medication 1 TABLET(S): at 16:51

## 2017-08-11 RX ADMIN — HYDROMORPHONE HYDROCHLORIDE 30 MILLILITER(S): 2 INJECTION INTRAMUSCULAR; INTRAVENOUS; SUBCUTANEOUS at 10:40

## 2017-08-11 RX ADMIN — MORPHINE SULFATE 30 MILLIGRAM(S): 50 CAPSULE, EXTENDED RELEASE ORAL at 17:10

## 2017-08-11 RX ADMIN — POLYETHYLENE GLYCOL 3350 17 GRAM(S): 17 POWDER, FOR SOLUTION ORAL at 14:02

## 2017-08-11 RX ADMIN — HYDROMORPHONE HYDROCHLORIDE 1 MILLIGRAM(S): 2 INJECTION INTRAMUSCULAR; INTRAVENOUS; SUBCUTANEOUS at 01:21

## 2017-08-11 RX ADMIN — MORPHINE SULFATE 30 MILLIGRAM(S): 50 CAPSULE, EXTENDED RELEASE ORAL at 17:53

## 2017-08-11 RX ADMIN — Medication 1 MILLIGRAM(S): at 01:35

## 2017-08-11 RX ADMIN — Medication 1000 UNIT(S): at 11:39

## 2017-08-11 RX ADMIN — HYDROMORPHONE HYDROCHLORIDE 1 MILLIGRAM(S): 2 INJECTION INTRAMUSCULAR; INTRAVENOUS; SUBCUTANEOUS at 07:36

## 2017-08-11 RX ADMIN — MORPHINE SULFATE 200 MILLIGRAM(S): 50 CAPSULE, EXTENDED RELEASE ORAL at 06:18

## 2017-08-11 RX ADMIN — HYDROMORPHONE HYDROCHLORIDE 2 MILLIGRAM(S): 2 INJECTION INTRAMUSCULAR; INTRAVENOUS; SUBCUTANEOUS at 04:30

## 2017-08-11 RX ADMIN — MORPHINE SULFATE 30 MILLIGRAM(S): 50 CAPSULE, EXTENDED RELEASE ORAL at 06:37

## 2017-08-11 RX ADMIN — HYDROMORPHONE HYDROCHLORIDE 1 MILLIGRAM(S): 2 INJECTION INTRAMUSCULAR; INTRAVENOUS; SUBCUTANEOUS at 01:37

## 2017-08-11 RX ADMIN — Medication 250 MILLIGRAM(S): at 10:35

## 2017-08-11 RX ADMIN — CEFTRIAXONE 100 GRAM(S): 500 INJECTION, POWDER, FOR SOLUTION INTRAMUSCULAR; INTRAVENOUS at 09:56

## 2017-08-11 RX ADMIN — MORPHINE SULFATE 200 MILLIGRAM(S): 50 CAPSULE, EXTENDED RELEASE ORAL at 17:10

## 2017-08-11 RX ADMIN — Medication 1 MILLIGRAM(S): at 08:24

## 2017-08-11 RX ADMIN — SODIUM CHLORIDE 100 MILLILITER(S): 9 INJECTION, SOLUTION INTRAVENOUS at 00:49

## 2017-08-11 RX ADMIN — Medication 2 TABLET(S): at 08:25

## 2017-08-11 RX ADMIN — PIPERACILLIN AND TAZOBACTAM 25 GRAM(S): 4; .5 INJECTION, POWDER, LYOPHILIZED, FOR SOLUTION INTRAVENOUS at 06:17

## 2017-08-11 RX ADMIN — MORPHINE SULFATE 200 MILLIGRAM(S): 50 CAPSULE, EXTENDED RELEASE ORAL at 17:53

## 2017-08-11 RX ADMIN — Medication 256 MILLILITER(S): at 11:39

## 2017-08-11 NOTE — CONSULT NOTE ADULT - ASSESSMENT
61 yo woman admitted with abdominal pain and fever  Etiology of pain is not clear   No acute abnormalities on CT  Last colonoscopy 2004.     Recommend bowel regimen: mag citrate then daily miralax  Diet as tolerated  Abx per ID for possible port infection

## 2017-08-11 NOTE — CONSULT NOTE ADULT - ASSESSMENT
Fever and possible mediport infection. No evidence of infection over site itself. Patient has difficult IV access due to multiple prior procedures.Would continue abx and repeat cultures. Preference is to salvage port, however if this is not possible, needs port removed. Will follow. Thank you.

## 2017-08-11 NOTE — CONSULT NOTE ADULT - SUBJECTIVE AND OBJECTIVE BOX
8o-year-old female with a history of acute intermittent porphyria diagnosed 1991. Patient has a long history of intermittent severe abdominal pain related to porphyria. He is treated with aggressive hydration with glucose, occasionally D. 10, chronic narcotics to control pain. She has a port in his IV fluids with glucose every night for many years.  earlier this week patient had fevers and sweats, and yesterday presented with new abdominal pain in the right lower quadrant. Patient states this pain is different from her typical diffuse posterior attacks.  CT scan of the abdomen and pelvis did not reveal any acute pathology however there was a suggestion of significant stool in the sigmoid colon.  Discussion with the hospitalist this morning states blood cultures are positive.  Review of systems earlier this week fevers slight chill no Arreguin increased sweating. Patient has an IPS-like history in that she does not have a bowel movement every day although sometimes there is loose feces.  Past medical history social history as per previous charts  Physical examination well-developed fatigued looking 8-year-old female with hearing deficit  Oropharynx dry neck supple lungs decreased breath sounds heart S1-S2 abdomen mild tenderness right midabdomen, lower quadrant  extremities without pitting edema,  lymph nodes none palpable,  Skin pale moist    impression 62-year-old female with AIP on chronic narcotics to control pain and IV hydration nightly via a port for many years  No new onset abdominal pain, CAT scan which suggests obstipation, fever sweats and questionable positive blood culture.    Plan. Continue aggressive IV hydration w  glucose and pain control using narcotics.  Agree with plan for fleets enema, magnesium citrate and MiraLax as patient is probably obstipated from her chronic narcotic use and decreased fluids.  GI followup to see if this relieves her abdominal pain.  Infectious disease consultation in reference to positive blood culture. Suggest obtaining culture from the port as well. Management of this as per infectious disease.  Patient will then need to continue daily IV hydration q.h.s. and venous access will always be important.If port  must be removed I would  suggest placement of a PICC line until a new port can be placed.If  Port can be salvaged this would be the ideal situation.  I reviewed with the hospitalist Dr. Hudson.

## 2017-08-11 NOTE — PROGRESS NOTE ADULT - SUBJECTIVE AND OBJECTIVE BOX
62F with PMHx AIP, chronic pain, anxiety with recent acute flare of AIP who now p/w severe RLQ pain radiating to her right flank, starting ~ 3 days PTA. Nothing mitigates nor exacerbates. a/w intermittent fever.   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   Baseline anorexia & constipation- last BM ~ 1 week ago.     When initially examined, Pt in severe distress d/t localized RLQ pain. CT(A/P) reviewed with Rads- no evidence of appendicitis but profound stool retention. Pain significantly reduced with IV Dilaudid/Toradol.      : No O/N events. Significantly more comfortable on PCA. Still no BM. Afebrile. Notified of STAU in  blood Cx's    Physical Exam:  · Constitutional	detailed exam	  · Constitutional Details	well-developed; distress due to pain	  · Neck	No bruits; no thyromegaly or nodules	  · Back	No deformity or limitation of movement	  · Respiratory	Breath Sounds equal & clear to percussion & auscultation, no accessory muscle use	  · Cardiovascular	Regular rate & rhythm, normal S1, S2; no murmurs, gallops or rubs; no S3, S4	  · Gastrointestinal	detailed exam	  · GI Normal	soft	  · GI Abnormal	tender  bowel sounds hypoactive  guarding	  · Tenderness	RLQ	  · Genitourinary	Normal genitalia; no lesions; no discharge	  · Extremities	No cyanosis, clubbing or edema	  · Vascular	Equal and normal pulses (carotid, femoral, dorsalis pedis)	  · Neurological	Alert & oriented; no sensory, motor or coordination deficits, normal reflexes	  · Lymph Nodes	No lymphadedenopathy	  · Musculoskeletal	No joint pain, swelling or deformity; no limitation of movement	      Labs and Results:    T(C): 37.2 (17 @ 14:00), Max: 37.2 (17 @ 04:30)  HR: 100 (17 @ 14:00) (93 - 105)  BP: 140/65 (17 @ 14:00) (125/56 - 145/66)  RR: 18 (17 @ 14:00) (14 - 18)  SpO2: 94% (17 @ 14:00) (94% - 96%)  Wt(kg): --                          11.1   10.0  )-----------( 128      ( 11 Aug 2017 05:40 )             32.1     11 Aug 2017 05:40    136    |  102    |  5      ----------------------------<  128    3.6     |  27     |  0.52     Ca    8.1        11 Aug 2017 05:40  Phos  2.4       11 Aug 2017 05:40  Mg     2.1       11 Aug 2017 05:40    TPro  6.8    /  Alb  3.1    /  TBili  0.8    /  DBili  x      /  AST  31     /  ALT  55     /  AlkPhos  101    10 Aug 2017 12:44      CAPILLARY BLOOD GLUCOSE        LIVER FUNCTIONS - ( 10 Aug 2017 12:44 )  Alb: 3.1 g/dL / Pro: 6.8 gm/dL / ALK PHOS: 101 U/L / ALT: 55 U/L / AST: 31 U/L / GGT: x           Urinalysis Basic - ( 10 Aug 2017 12:44 )    Color: Yellow / Appearance: Clear / S.005 / pH: x  Gluc: x / Ketone: Moderate  / Bili: Negative / Urobili: Negative mg/dL   Blood: x / Protein: Negative mg/dL / Nitrite: Negative   Leuk Esterase: Negative / RBC: x / WBC x   Sq Epi: x / Non Sq Epi: x / Bacteria: x      Hemoglobin A1C, Whole Blood: 5.9 % ( @ 05:40)      Assessment and Plan:   Assessment:  · Assessment		  62F with aforementioned PMHx, now a/w    * Severe acute RLQ pain, unclear etiology, suspect fecal retention as primary cause, appendix visualized & wnl, lactate wnl- doubt bowel ischemia; KUB unrevealing but obscured by remaining contrast  - advance diet  - c/w IVF  - urine PBG  - PRN analgesics/antiemetics  - c/w aggressive catharsis from above/below  - Hem/GI evals appreciated    * Sepsis/bacteremia, STAU, 2/2 Mediport  - c/w current abx   - will need port removal, short-term midline/PICC, then new port as Pt is dependent on it for nocturnal IVF    * Chronic pain, anxiety- status quo; i-STOP c/w Pt report  - c/w MS Contin 230mg Q12  - Dilaudid PCA  - Ativan Q4 PRN    * GERD, stable  - c/w PPI    * DVT PPx- Lovenox    * full code

## 2017-08-11 NOTE — CONSULT NOTE ADULT - ASSESSMENT
63 y/o Female with h/o intermittent porphyria, chronic pain, anxiety disorder, OP was admitted on 8/10 for severe RLQ pain radiating to her right flank, starting ~ 3 days PTA. Nothing mitigates nor exacerbates. She also reports intermittent fever. In ER, she was found with fever to 101.4F; she received zosyn IV. Reported with bacteremia.    1. Febrile syndrome. Sepsis with GPC in clusters. Probable mediport infection.  -obtain BC x 2  -start vancomycin 1 gm IV q12h  -reason for abx use and side effects reviewed with patient; monitor BMP and vancomycin trough levels   -mediport may need to be removed  -monitor temps  -f/u CBC  -supportive care  2. Other issues:   -care per medicine 61 y/o Female with h/o intermittent porphyria, chronic pain, anxiety disorder, OP was admitted on 8/10 for severe RLQ pain radiating to her right flank, starting ~ 3 days PTA. Nothing mitigates nor exacerbates. She also reports intermittent fever. In ER, she was found with fever to 101.4F; she received zosyn IV. Reported with bacteremia.    1. Febrile syndrome. Sepsis with STAU. Probable mediport infection. ?endocarditis.  -doubt colitis  -obtain BC x 2  -start vancomycin 1 gm IV q12h  -reason for abx use and side effects reviewed with patient; monitor BMP and vancomycin trough levels   -mediport will likely need to be removed  -obtain 2D-Echo  -monitor temps  -f/u CBC  -supportive care  2. Other issues:   -care per medicine 61 y/o Female with h/o intermittent porphyria, chronic pain, anxiety disorder, OP was admitted on 8/10 for severe RLQ pain radiating to her right flank, starting ~ 3 days PTA. Nothing mitigates nor exacerbates. She also reports intermittent fever. In ER, she was found with fever to 101.4F; she received zosyn IV. Reported with bacteremia.    1. Febrile syndrome. Sepsis with STAU. Probable mediport infection. ?endocarditis.  -doubt colitis  -obtain BC x 2  -start vancomycin 1 gm IV q12h and ceftriaxone 1 gm IV qd  -reason for abx use and side effects reviewed with patient; monitor BMP and vancomycin trough levels   -mediport will likely need to be removed  -obtain 2D-Echo  -monitor temps  -f/u CBC  -supportive care  2. Other issues:   -care per medicine

## 2017-08-11 NOTE — CONSULT NOTE ADULT - SUBJECTIVE AND OBJECTIVE BOX
HPI:  63 yo woman admitted with RLQ pain and fever. Has history of AIP with chronic abdominal pain, but pt reports symptoms are different from typical pain. No association with food, was tolerating diet at home. Chronic constipation.  Has almost daily bowel movements, but small volume. +blood cultures with concern for port infection. CT is unremarkable for etiology of pain except for stool throughout the colon.    PAST MEDICAL & SURGICAL HISTORY:  Chronic abdominal pain  Osteoporosis  Adrenal insufficiency, primary  Knee effusion  Porphyria  S/P tonsillectomy  S/P cholecystectomy  H/O spinal fusion      MEDICATIONS  (STANDING):  glycerin Suppository - Adult 1 Suppository(s) Rectal daily  polyethylene glycol 3350 17 Gram(s) Oral daily  pantoprazole    Tablet 40 milliGRAM(s) Oral before breakfast  cholecalciferol 1000 Unit(s) Oral daily  multivitamin 1 Tablet(s) Oral daily  morphine ER Tablet 200 milliGRAM(s) Oral every 12 hours  morphine ER Tablet 30 milliGRAM(s) Oral every 12 hours  dextrose 5% + sodium chloride 0.9%. 1000 milliLiter(s) (100 mL/Hr) IV Continuous <Continuous>  docusate sodium 200 milliGRAM(s) Oral daily  lactobacillus acidophilus 2 Tablet(s) Oral two times a day with meals  enoxaparin Injectable 40 milliGRAM(s) SubCutaneous daily  vancomycin  IVPB 1000 milliGRAM(s) IV Intermittent once  vancomycin  IVPB 1000 milliGRAM(s) IV Intermittent every 12 hours  cefTRIAXone   IVPB 1 Gram(s) IV Intermittent once  vancomycin  IVPB   IV Intermittent   potassium acid phosphate/sodium acid phosphate tablet (K-PHOS No. 2) 1 Tablet(s) Oral two times a day with meals  HYDROmorphone PCA (1 mG/mL) 30 milliLiter(s) PCA Continuous PCA Continuous  cefTRIAXone   IVPB   IV Intermittent     MEDICATIONS  (PRN):  LORazepam     Tablet 1 milliGRAM(s) Oral every 4 hours PRN Anxiety  acetaminophen   Tablet 650 milliGRAM(s) Oral every 6 hours PRN For Temp greater than 38 C (100.4 F)  acetaminophen   Tablet. 650 milliGRAM(s) Oral every 6 hours PRN Mild Pain (1 - 3)  ondansetron Injectable 4 milliGRAM(s) IV Push every 6 hours PRN Nausea  senna 2 Tablet(s) Oral at bedtime PRN Constipation  naloxone Injectable 0.1 milliGRAM(s) IV Push every 3 minutes PRN For ANY of the following changes in patient status:  A. RR LESS THAN 10 breaths per minute, B. Oxygen saturation LESS THAN 90%, C. Sedation score of 6      Allergies    chlorhexidine containing compounds (Unknown)    Intolerances        SOCIAL HISTORY:  No smoking, social alcohol use, no recreational drug use    FAMILY HISTORY:  Family history of porphyria (Grandparent)      REVIEW OF SYSTEMS    General: fever as above    HEENT: no icterus    Respiratory and Thorax: no SOB  	  Cardiovascular: no CP    Gastrointestinal: as above    : no dysuria    Musculoskeletal: no myalgias    Skin: no jaundice    Neuro: no headaches or dizziness    Vital Signs Last 24 Hrs  T(C): 37.2 (11 Aug 2017 04:30), Max: 38.6 (10 Aug 2017 12:40)  T(F): 99 (11 Aug 2017 04:30), Max: 101.4 (10 Aug 2017 12:40)  HR: 105 (11 Aug 2017 04:30) (93 - 105)  BP: 145/66 (11 Aug 2017 04:30) (123/71 - 145/66)  BP(mean): --  RR: 16 (11 Aug 2017 04:30) (14 - 19)  SpO2: 95% (11 Aug 2017 04:30) (93% - 96%)    PHYSICAL EXAM:    Constitutional: NAD    Head: NCAT    HEENT: anicteric    Neck: no abnormal lymphadenopathy    Respiratory: CTA BL    Cardiovascular:  RRR    Gastrointestinal: soft ND +BS exquisite tenderness with superficial palpation    Extremities: no LE edema    Neuro: no focal deficits    Skin: no jaundice      LABS:                        11.1   10.0  )-----------( 128      ( 11 Aug 2017 05:40 )             32.1     08-11    136  |  102  |  5<L>  ----------------------------<  128<H>  3.6   |  27  |  0.52    Ca    8.1<L>      11 Aug 2017 05:40  Phos  2.4     08-11  Mg     2.1     08-11    TPro  6.8  /  Alb  3.1<L>  /  TBili  0.8  /  DBili  x   /  AST  31  /  ALT  55  /  AlkPhos  101  08-10      LIVER FUNCTIONS - ( 10 Aug 2017 12:44 )  Alb: 3.1 g/dL / Pro: 6.8 gm/dL / ALK PHOS: 101 U/L / ALT: 55 U/L / AST: 31 U/L / GGT: x             RADIOLOGY & ADDITIONAL STUDIES:

## 2017-08-11 NOTE — CONSULT NOTE ADULT - SUBJECTIVE AND OBJECTIVE BOX
Patient is a 62y old  Female who presents with a chief complaint of abdominal pain (10 Aug 2017 23:24)    HPI:  61 y/o Female with h/o intermittent porphyria, chronic pain, anxiety disorder, OP was admitted on 8/10 for severe RLQ pain radiating to her right flank, starting ~ 3 days PTA. Nothing mitigates nor exacerbates. She also reports intermittent fever. In ER, she was found with fever to 101.4F; she received zosyn IV. Reported with bacteremia    Past Medical History:  Adrenal insufficiency, primary    Chronic abdominal pain    Knee effusion    Osteoporosis    Porphyria.    Past Surgical History:  spinal fusion    cholecystectomy    tonsillectomy  Mediport placement    Meds: per reconciliation sheet, noted below  MEDICATIONS  (STANDING):  glycerin Suppository - Adult 1 Suppository(s) Rectal daily  polyethylene glycol 3350 17 Gram(s) Oral daily  pantoprazole    Tablet 40 milliGRAM(s) Oral before breakfast  cholecalciferol 1000 Unit(s) Oral daily  multivitamin 1 Tablet(s) Oral daily  morphine ER Tablet 200 milliGRAM(s) Oral every 12 hours  morphine ER Tablet 30 milliGRAM(s) Oral every 12 hours  dextrose 5% + sodium chloride 0.9%. 1000 milliLiter(s) (100 mL/Hr) IV Continuous <Continuous>  docusate sodium 200 milliGRAM(s) Oral daily  piperacillin/tazobactam IVPB. 3.375 Gram(s) IV Intermittent every 8 hours  lactobacillus acidophilus 2 Tablet(s) Oral two times a day with meals  enoxaparin Injectable 40 milliGRAM(s) SubCutaneous daily    MEDICATIONS  (PRN):  HYDROmorphone  Injectable 1 milliGRAM(s) IV Push every 4 hours PRN Moderate Pain (4 - 6)  LORazepam     Tablet 1 milliGRAM(s) Oral every 4 hours PRN Anxiety  HYDROmorphone  Injectable 2 milliGRAM(s) IV Push every 4 hours PRN Severe Pain (7 - 10)  acetaminophen   Tablet 650 milliGRAM(s) Oral every 6 hours PRN For Temp greater than 38 C (100.4 F)  acetaminophen   Tablet. 650 milliGRAM(s) Oral every 6 hours PRN Mild Pain (1 - 3)  ondansetron Injectable 4 milliGRAM(s) IV Push every 6 hours PRN Nausea  senna 2 Tablet(s) Oral at bedtime PRN Constipation    Allergies    chlorhexidine containing compounds (Unknown)    Intolerances      Social: no smoking, no alcohol, no illegal drugs; no recent travel, no exposure to TB  FAMILY HISTORY:  Family history of porphyria (Grandparent)    ROS: the patient denies HA, no dizziness, no sore throat, no blurry vision, no CP, no palpitations, no SOB, no cough, has abdominal pain, no diarrhea, no N/V, no dysuria, no leg pain, no claudication, no rash, no joint aches, no rectal pain or bleeding, no night sweats    Vital Signs Last 24 Hrs  T(C): 37.2 (11 Aug 2017 04:30), Max: 38.6 (10 Aug 2017 12:40)  T(F): 99 (11 Aug 2017 04:30), Max: 101.4 (10 Aug 2017 12:40)  HR: 105 (11 Aug 2017 04:30) (93 - 105)  BP: 145/66 (11 Aug 2017 04:30) (123/71 - 145/66)  BP(mean): --  RR: 16 (11 Aug 2017 04:30) (14 - 19)  SpO2: 95% (11 Aug 2017 04:30) (93% - 96%)  Daily Height in cm: 175.26 (10 Aug 2017 12:06)    Daily     PE:    Constitutional: frail looking  HEENT: NC/AT, EOMI, PERRLA  Neck: supple  Back: no tenderness  Respiratory: clear; mediport in place  Cardiovascular: S1S2 regular, no murmurs  Abdomen: soft, not tender, not distended, positive BS  Genitourinary: deferred  Rectal: deferred  Musculoskeletal: no muscle tenderness, no joint swelling or tenderness  Extremities: no pedal edema  Neurological: AxOx3, moving all extremities, no focal deficits  Skin: no rashes    Labs:                        11.1   10.0  )-----------( 128      ( 11 Aug 2017 05:40 )             32.1     08-11    136  |  102  |  5<L>  ----------------------------<  128<H>  3.6   |  27  |  0.52    Ca    8.1<L>      11 Aug 2017 05:40  Phos  2.4     -11  Mg     2.1     08-11    TPro  6.8  /  Alb  3.1<L>  /  TBili  0.8  /  DBili  x   /  AST  31  /  ALT  55  /  AlkPhos  101  08-10     LIVER FUNCTIONS - ( 10 Aug 2017 12:44 )  Alb: 3.1 g/dL / Pro: 6.8 gm/dL / ALK PHOS: 101 U/L / ALT: 55 U/L / AST: 31 U/L / GGT: x           Urinalysis Basic - ( 10 Aug 2017 12:44 )    Color: Yellow / Appearance: Clear / S.005 / pH: x  Gluc: x / Ketone: Moderate  / Bili: Negative / Urobili: Negative mg/dL   Blood: x / Protein: Negative mg/dL / Nitrite: Negative   Leuk Esterase: Negative / RBC: x / WBC x   Sq Epi: x / Non Sq Epi: x / Bacteria: x    Culture - Blood (08.10.17 @ 12:57)    -  Candida albicans: Nondet    -  Candida parapsilosis: Nondet    -  Escherichia coli: Nondet    -  Haemophilus influenzae: Nondet    -  Methicillin resistant Staphylococcus aureus (MRSA): Nondet    -  Proteus species: Nondet    -  Serratia marcescens: Nondet    -  Streptococcus pneumoniae: Nondet    -  Vancomycin resistant Enterococcus sp.: Nondet    -  Candida krusei: Nondet    -  Klebsiella pneumoniae: Nondet    -  Multidrug (KPC pos) resistant organism: Nondet    -  Staphylococcus aureus: Detec Any isolate of Staphylococcus aureus from a blood culture is NOT considered a contaminant.    -  Streptococcus pyogenes (Group A): Nondet    -  Streptococcus sp. (Not Grp A, B or S pneumoniae): Nondet    Gram Stain:   Growth in aerobic and anaerobic bottles: Gram Positive Cocci in Clusters    -  Candida glabrata: Nondet    -  Candida tropicalis: Nondet    -  Klebsiella oxytoca: Nondet    -  Listeria monocytogenes: Nondet    -  Acinetobacter baumanii: Nondet    -  Coagulase negative Staphylococcus: Nondet    -  Enterobacter cloacae complex: Nondet    -  Enterococcus species: Nondet    -  Neisseria meningitidis: Nondet    -  Pseudomonas aeruginosa: Nondet    -  Streptococcus agalactiae (Group B): Nondet    Specimen Source: .Blood None    Organism: Blood Culture PCR    Culture Results:   Growth in aerobic and anaerobic bottles: Gram Positive Cocci in Clusters  ***Blood Panel PCR results on this specimen are available  approximately 3 hours after the Gram stain result.***  Gram stain, PCR, and/or culture results may not always  correspond due to difference in methodologies.    Organism Identification: Blood Culture PCR    Method Type: PCR          Radiology:    < from: CT Abdomen and Pelvis w/ Oral Cont and w/ IV Cont (08.10.17 @ 15:09) >  No acute abdominal pathology.   Distended urinary bladder causing mild fullness of the renal collecting   systems.    < end of copied text >      Advanced directives addressed: full resuscitation Patient is a 62y old  Female who presents with a chief complaint of abdominal pain (10 Aug 2017 23:24)    HPI:  61 y/o Female with h/o intermittent porphyria, chronic pain, anxiety disorder, OP was admitted on 8/10 for severe RLQ pain radiating to her right flank, starting ~ 3 days PTA. Nothing mitigates nor exacerbates. She also reports intermittent fever. In ER, she was found with fever to 101.4F; she received zosyn IV. Reported with bacteremia.    Past Medical History:  Adrenal insufficiency, primary    Chronic abdominal pain    Knee effusion    Osteoporosis    Porphyria.    Past Surgical History:  spinal fusion    cholecystectomy    tonsillectomy  Mediport placement x 2-3 years ago    Meds: per reconciliation sheet, noted below  MEDICATIONS  (STANDING):  glycerin Suppository - Adult 1 Suppository(s) Rectal daily  polyethylene glycol 3350 17 Gram(s) Oral daily  pantoprazole    Tablet 40 milliGRAM(s) Oral before breakfast  cholecalciferol 1000 Unit(s) Oral daily  multivitamin 1 Tablet(s) Oral daily  morphine ER Tablet 200 milliGRAM(s) Oral every 12 hours  morphine ER Tablet 30 milliGRAM(s) Oral every 12 hours  dextrose 5% + sodium chloride 0.9%. 1000 milliLiter(s) (100 mL/Hr) IV Continuous <Continuous>  docusate sodium 200 milliGRAM(s) Oral daily  piperacillin/tazobactam IVPB. 3.375 Gram(s) IV Intermittent every 8 hours  lactobacillus acidophilus 2 Tablet(s) Oral two times a day with meals  enoxaparin Injectable 40 milliGRAM(s) SubCutaneous daily    MEDICATIONS  (PRN):  HYDROmorphone  Injectable 1 milliGRAM(s) IV Push every 4 hours PRN Moderate Pain (4 - 6)  LORazepam     Tablet 1 milliGRAM(s) Oral every 4 hours PRN Anxiety  HYDROmorphone  Injectable 2 milliGRAM(s) IV Push every 4 hours PRN Severe Pain (7 - 10)  acetaminophen   Tablet 650 milliGRAM(s) Oral every 6 hours PRN For Temp greater than 38 C (100.4 F)  acetaminophen   Tablet. 650 milliGRAM(s) Oral every 6 hours PRN Mild Pain (1 - 3)  ondansetron Injectable 4 milliGRAM(s) IV Push every 6 hours PRN Nausea  senna 2 Tablet(s) Oral at bedtime PRN Constipation    Allergies    chlorhexidine containing compounds (Unknown)    Intolerances      Social: no smoking, no alcohol, no illegal drugs; no recent travel, no exposure to TB  FAMILY HISTORY:  Family history of porphyria (Grandparent)    ROS: the patient denies HA, no dizziness, no sore throat, no blurry vision, no CP, no palpitations, no SOB, no cough, has abdominal pain, no diarrhea, no N/V, no dysuria, no leg pain, no claudication, no rash, no joint aches, no rectal pain or bleeding, no night sweats    Vital Signs Last 24 Hrs  T(C): 37.2 (11 Aug 2017 04:30), Max: 38.6 (10 Aug 2017 12:40)  T(F): 99 (11 Aug 2017 04:30), Max: 101.4 (10 Aug 2017 12:40)  HR: 105 (11 Aug 2017 04:30) (93 - 105)  BP: 145/66 (11 Aug 2017 04:30) (123/71 - 145/66)  BP(mean): --  RR: 16 (11 Aug 2017 04:30) (14 - 19)  SpO2: 95% (11 Aug 2017 04:30) (93% - 96%)  Daily Height in cm: 175.26 (10 Aug 2017 12:06)    Daily     PE:    Constitutional: frail looking  HEENT: NC/AT, EOMI, PERRLA  Neck: supple  Back: no tenderness  Respiratory: clear; mediport in place  Cardiovascular: S1S2 regular, no murmurs  Abdomen: soft, not tender, not distended, positive BS  Genitourinary: deferred  Rectal: deferred  Musculoskeletal: no muscle tenderness, no joint swelling or tenderness  Extremities: no pedal edema  Neurological: AxOx3, moving all extremities, no focal deficits  Skin: no rashes    Labs:                        11.1   10.0  )-----------( 128      ( 11 Aug 2017 05:40 )             32.1     08-11    136  |  102  |  5<L>  ----------------------------<  128<H>  3.6   |  27  |  0.52    Ca    8.1<L>      11 Aug 2017 05:40  Phos  2.4     11  Mg     2.1     08-11    TPro  6.8  /  Alb  3.1<L>  /  TBili  0.8  /  DBili  x   /  AST  31  /  ALT  55  /  AlkPhos  101  08-10     LIVER FUNCTIONS - ( 10 Aug 2017 12:44 )  Alb: 3.1 g/dL / Pro: 6.8 gm/dL / ALK PHOS: 101 U/L / ALT: 55 U/L / AST: 31 U/L / GGT: x           Urinalysis Basic - ( 10 Aug 2017 12:44 )    Color: Yellow / Appearance: Clear / S.005 / pH: x  Gluc: x / Ketone: Moderate  / Bili: Negative / Urobili: Negative mg/dL   Blood: x / Protein: Negative mg/dL / Nitrite: Negative   Leuk Esterase: Negative / RBC: x / WBC x   Sq Epi: x / Non Sq Epi: x / Bacteria: x    Culture - Blood (08.10.17 @ 12:57)    -  Candida albicans: Nondet    -  Candida parapsilosis: Nondet    -  Escherichia coli: Nondet    -  Haemophilus influenzae: Nondet    -  Methicillin resistant Staphylococcus aureus (MRSA): Nondet    -  Proteus species: Nondet    -  Serratia marcescens: Nondet    -  Streptococcus pneumoniae: Nondet    -  Vancomycin resistant Enterococcus sp.: Nondet    -  Candida krusei: Nondet    -  Klebsiella pneumoniae: Nondet    -  Multidrug (KPC pos) resistant organism: Nondet    -  Staphylococcus aureus: Detec Any isolate of Staphylococcus aureus from a blood culture is NOT considered a contaminant.    -  Streptococcus pyogenes (Group A): Nondet    -  Streptococcus sp. (Not Grp A, B or S pneumoniae): Nondet    Gram Stain:   Growth in aerobic and anaerobic bottles: Gram Positive Cocci in Clusters    -  Candida glabrata: Nondet    -  Candida tropicalis: Nondet    -  Klebsiella oxytoca: Nondet    -  Listeria monocytogenes: Nondet    -  Acinetobacter baumanii: Nondet    -  Coagulase negative Staphylococcus: Nondet    -  Enterobacter cloacae complex: Nondet    -  Enterococcus species: Nondet    -  Neisseria meningitidis: Nondet    -  Pseudomonas aeruginosa: Nondet    -  Streptococcus agalactiae (Group B): Nondet    Specimen Source: .Blood None    Organism: Blood Culture PCR    Culture Results:   Growth in aerobic and anaerobic bottles: Gram Positive Cocci in Clusters  ***Blood Panel PCR results on this specimen are available  approximately 3 hours after the Gram stain result.***  Gram stain, PCR, and/or culture results may not always  correspond due to difference in methodologies.    Organism Identification: Blood Culture PCR    Method Type: PCR          Radiology:    < from: CT Abdomen and Pelvis w/ Oral Cont and w/ IV Cont (08.10.17 @ 15:09) >  No acute abdominal pathology.   Distended urinary bladder causing mild fullness of the renal collecting   systems.    < end of copied text >      Advanced directives addressed: full resuscitation

## 2017-08-11 NOTE — CONSULT NOTE ADULT - SUBJECTIVE AND OBJECTIVE BOX
8o-year-old female with a history of acute intermittent porphyria diagnosed 1991. Patient has a long history of intermittent severe abdominal pain related to porphyria. He is treated with aggressive hydration with glucose, occasionally D. 10, chronic narcotics to control pain. She has a port in his IV fluids with glucose every night for many years.  earlier this week patient had fevers and sweats, and yesterday presented with new abdominal pain in the right lower quadrant. Patient states this pain is different from her typical diffuse posterior attacks.  CT scan of the abdomen and pelvis did not reveal any acute pathology however there was a suggestion of significant stool in the sigmoid colon.  She has had a set of blood cultures which were positive for gram pos cocci. I have been asked to evaluate her regarding possible removal of port.  PAST MEDICAL & SURGICAL HISTORY:  Chronic abdominal pain  Osteoporosis  Adrenal insufficiency, primary  Knee effusion  Porphyria  S/P tonsillectomy  S/P cholecystectomy  H/O spinal fusion  multiple venous access port insertions      MEDICATIONS  (STANDING):  glycerin Suppository - Adult 1 Suppository(s) Rectal daily  polyethylene glycol 3350 17 Gram(s) Oral daily  pantoprazole    Tablet 40 milliGRAM(s) Oral before breakfast  cholecalciferol 1000 Unit(s) Oral daily  multivitamin 1 Tablet(s) Oral daily  morphine ER Tablet 200 milliGRAM(s) Oral every 12 hours  morphine ER Tablet 30 milliGRAM(s) Oral every 12 hours  dextrose 5% + sodium chloride 0.9%. 1000 milliLiter(s) (100 mL/Hr) IV Continuous <Continuous>  docusate sodium 200 milliGRAM(s) Oral dailylactobacillus acidophilus 2 Tablet(s) Oral two times a day with meals  enoxaparin Injectable 40 milliGRAM(s) SubCutaneous daily  vancomycin  IVPB 1000 milliGRAM(s) IV Intermittent once  vancomycin  IVPB 1000 milliGRAM(s) IV Intermittent every 12 hours  cefTRIAXone   IVPB 1 Gram(s) IV Intermittent once  vancomycin  IVPB   IV Intermittent   potassium acid phosphate/sodium acid phosphate tablet (K-PHOS No. 2) 1 Tablet(s) Oral two times a day with meals  HYDROmorphone PCA (1 mG/mL) 30 milliLiter(s) PCA Continuous PCA Continuous  Allergies    chlorhexidine containing compounds (Unknown)            SOCIAL HISTORY:  No smoking, social alcohol use, no recreational drug use    FAMILY HISTORY:  Family history of porphyria (Grandparent)Vital Signs Last 24 Hrs  T(C): 37.2 (11 Aug 2017 04:30), Max: 38.6 (10 Aug 2017 12:40)  T(F): 99 (11 Aug 2017 04:30), Max: 101.4 (10 Aug 2017 12:40)  HR: 105 (11 Aug 2017 04:30) (93 - 105)  BP: 145/66 (11 Aug 2017 04:30) (123/71 - 145/66)  BP(mean): --  RR: 16 (11 Aug 2017 04:30) (14 - 19)  SpO2: 95% (11 Aug 2017 04:30) (93% - 96%)PHYSICAL EXAM:    Constitutional: NAD    Head: NCAT    HEENT: anicteric    Neck: no abnormal lymphadenopathy  Lungs: clear bilat  Chest wall: no erythema or tenderness over right chest port  Cardiovascular:  RRR    Gastrointestinal: soft ND +BS exquisite tenderness with superficial palpation    Extremities: no LE edema    Neuro: no focal deficits    Skin: no jaundiceLABS:                        11.1   10.0  )-----------( 128      ( 11 Aug 2017 05:40 )             32.1     08-11    136  |  102  |  5<L>  ----------------------------<  128<H>  3.6   |  27  |  0.52    Ca    8.1<L>      11 Aug 2017 05:40  Phos  2.4     08-11  Mg     2.1     08-11    TPro  6.8  /  Alb  3.1<L>  /  TBili  0.8  /  DBili  x   /  AST  31  /  ALT  55  /  AlkPhos  101  08-10      LIVER FUNCTIONS - ( 10 Aug 2017 12:44 )  Alb: 3.1 g/dL / Pro: 6.8 gm/dL / ALK PHOS: 101 U/L / ALT: 55 U/L / AST: 31 U/L / GGT: x         RIAXone   IVPB   IV Intermittent

## 2017-08-11 NOTE — DIETITIAN INITIAL EVALUATION ADULT. - OTHER INFO
Pt consult for N/V > 3days.  Pt has pain in RLQ, possible fecal retention.  Pt at 100% IBW.  Pt on CL diet. Pt consult for N/V > 3days.  Pt has pain in RLQ, possible fecal retention.  Pt at 100% IBW.  Pt on CL diet.  Pt was in intense pain on interview, said she could not talk.  Will return for further information. Pt appears well nourished.

## 2017-08-11 NOTE — DIETITIAN INITIAL EVALUATION ADULT. - ENERGY NEEDS
Ht.   69   "        Wt.  70.3      kg               BMI   22.9               IBW  70     kg               Pt is at   100 %  IBW

## 2017-08-12 LAB
ANION GAP SERPL CALC-SCNC: 8 MMOL/L — SIGNIFICANT CHANGE UP (ref 5–17)
BUN SERPL-MCNC: 2 MG/DL — LOW (ref 7–23)
CALCIUM SERPL-MCNC: 8.4 MG/DL — LOW (ref 8.5–10.1)
CHLORIDE SERPL-SCNC: 107 MMOL/L — SIGNIFICANT CHANGE UP (ref 96–108)
CO2 SERPL-SCNC: 25 MMOL/L — SIGNIFICANT CHANGE UP (ref 22–31)
CREAT SERPL-MCNC: 0.41 MG/DL — LOW (ref 0.5–1.3)
GLUCOSE SERPL-MCNC: 119 MG/DL — HIGH (ref 70–99)
HCT VFR BLD CALC: 31.6 % — LOW (ref 34.5–45)
HGB BLD-MCNC: 10.7 G/DL — LOW (ref 11.5–15.5)
MAGNESIUM SERPL-MCNC: 2.6 MG/DL — SIGNIFICANT CHANGE UP (ref 1.6–2.6)
MCHC RBC-ENTMCNC: 27.8 PG — SIGNIFICANT CHANGE UP (ref 27–34)
MCHC RBC-ENTMCNC: 33.9 GM/DL — SIGNIFICANT CHANGE UP (ref 32–36)
MCV RBC AUTO: 81.9 FL — SIGNIFICANT CHANGE UP (ref 80–100)
PHOSPHATE SERPL-MCNC: 2.9 MG/DL — SIGNIFICANT CHANGE UP (ref 2.5–4.5)
PLATELET # BLD AUTO: 181 K/UL — SIGNIFICANT CHANGE UP (ref 150–400)
POTASSIUM SERPL-MCNC: 3.7 MMOL/L — SIGNIFICANT CHANGE UP (ref 3.5–5.3)
POTASSIUM SERPL-SCNC: 3.7 MMOL/L — SIGNIFICANT CHANGE UP (ref 3.5–5.3)
RBC # BLD: 3.85 M/UL — SIGNIFICANT CHANGE UP (ref 3.8–5.2)
RBC # FLD: 11.9 % — SIGNIFICANT CHANGE UP (ref 10.3–14.5)
SODIUM SERPL-SCNC: 140 MMOL/L — SIGNIFICANT CHANGE UP (ref 135–145)
WBC # BLD: 9.9 K/UL — SIGNIFICANT CHANGE UP (ref 3.8–10.5)
WBC # FLD AUTO: 9.9 K/UL — SIGNIFICANT CHANGE UP (ref 3.8–10.5)

## 2017-08-12 RX ORDER — PSYLLIUM SEED (WITH DEXTROSE)
1 POWDER (GRAM) ORAL DAILY
Qty: 0 | Refills: 0 | Status: DISCONTINUED | OUTPATIENT
Start: 2017-08-12 | End: 2017-08-14

## 2017-08-12 RX ADMIN — CEFTRIAXONE 100 GRAM(S): 500 INJECTION, POWDER, FOR SOLUTION INTRAMUSCULAR; INTRAVENOUS at 10:06

## 2017-08-12 RX ADMIN — MORPHINE SULFATE 200 MILLIGRAM(S): 50 CAPSULE, EXTENDED RELEASE ORAL at 05:55

## 2017-08-12 RX ADMIN — PANTOPRAZOLE SODIUM 40 MILLIGRAM(S): 20 TABLET, DELAYED RELEASE ORAL at 10:07

## 2017-08-12 RX ADMIN — Medication 2 TABLET(S): at 10:06

## 2017-08-12 RX ADMIN — MORPHINE SULFATE 30 MILLIGRAM(S): 50 CAPSULE, EXTENDED RELEASE ORAL at 18:31

## 2017-08-12 RX ADMIN — Medication 250 MILLIGRAM(S): at 17:49

## 2017-08-12 RX ADMIN — SODIUM CHLORIDE 100 MILLILITER(S): 9 INJECTION, SOLUTION INTRAVENOUS at 05:56

## 2017-08-12 RX ADMIN — Medication 1 PACKET(S): at 18:02

## 2017-08-12 RX ADMIN — Medication 250 MILLIGRAM(S): at 05:57

## 2017-08-12 RX ADMIN — Medication 1 TABLET(S): at 13:03

## 2017-08-12 RX ADMIN — MORPHINE SULFATE 200 MILLIGRAM(S): 50 CAPSULE, EXTENDED RELEASE ORAL at 17:51

## 2017-08-12 RX ADMIN — SODIUM CHLORIDE 100 MILLILITER(S): 9 INJECTION, SOLUTION INTRAVENOUS at 17:49

## 2017-08-12 RX ADMIN — Medication 1000 UNIT(S): at 13:03

## 2017-08-12 RX ADMIN — MORPHINE SULFATE 200 MILLIGRAM(S): 50 CAPSULE, EXTENDED RELEASE ORAL at 18:31

## 2017-08-12 RX ADMIN — MORPHINE SULFATE 30 MILLIGRAM(S): 50 CAPSULE, EXTENDED RELEASE ORAL at 17:50

## 2017-08-12 RX ADMIN — MORPHINE SULFATE 30 MILLIGRAM(S): 50 CAPSULE, EXTENDED RELEASE ORAL at 05:53

## 2017-08-12 RX ADMIN — Medication 1 TABLET(S): at 10:08

## 2017-08-12 RX ADMIN — Medication 2 TABLET(S): at 17:50

## 2017-08-12 NOTE — PROGRESS NOTE ADULT - ASSESSMENT
61 y/o Female with h/o intermittent porphyria, chronic pain, anxiety disorder, OP was admitted on 8/10 for severe RLQ pain radiating to her right flank, starting ~ 3 days PTA. Nothing mitigates nor exacerbates. She also reports intermittent fever. In ER, she was found with fever to 101.4F; she received zosyn IV. Reported with bacteremia.    1. Febrile syndrome. Sepsis with STAU. Probable mediport infection. ?endocarditis.  -doubt colitis  -f/u BC x 2  -on vancomycin 1 gm IV q12h and ceftriaxone 1 gm IV qd # 2  -tolerating abx well so far; no side effects noted  -Dr. Batista and Dr Durbin consultations appreciated: clearing this infection without removing the mediport will be difficult, but the patient has no other IV access and is a hard stick; will repeat BC x 2 today  -continue IV abx coverage  -f/u 2D-Echo  -monitor temps  -f/u CBC  -supportive care  2. Other issues:   -care per medicine

## 2017-08-12 NOTE — PROGRESS NOTE ADULT - SUBJECTIVE AND OBJECTIVE BOX
Patient is a 62y old  Female who presents with a chief complaint of abdominal pain (10 Aug 2017 23:24)    HPI:  61 y/o Female with h/o intermittent porphyria, chronic pain, anxiety disorder, OP was admitted on 8/10 for severe RLQ pain radiating to her right flank, starting ~ 3 days PTA. Nothing mitigates nor exacerbates. She also reports intermittent fever. In ER, she was found with fever to 101.4F; she received zosyn IV. Reported with bacteremia.    Feels better  Abdominal pain is improved  Weak looking    MEDICATIONS  (STANDING):  glycerin Suppository - Adult 1 Suppository(s) Rectal daily  polyethylene glycol 3350 17 Gram(s) Oral daily  pantoprazole    Tablet 40 milliGRAM(s) Oral before breakfast  cholecalciferol 1000 Unit(s) Oral daily  multivitamin 1 Tablet(s) Oral daily  morphine ER Tablet 200 milliGRAM(s) Oral every 12 hours  morphine ER Tablet 30 milliGRAM(s) Oral every 12 hours  dextrose 5% + sodium chloride 0.9%. 1000 milliLiter(s) (100 mL/Hr) IV Continuous <Continuous>  docusate sodium 200 milliGRAM(s) Oral daily  lactobacillus acidophilus 2 Tablet(s) Oral two times a day with meals  vancomycin  IVPB 1000 milliGRAM(s) IV Intermittent every 12 hours  cefTRIAXone   IVPB 1 Gram(s) IV Intermittent every 24 hours  vancomycin  IVPB   IV Intermittent   potassium acid phosphate/sodium acid phosphate tablet (K-PHOS No. 2) 1 Tablet(s) Oral two times a day with meals  HYDROmorphone PCA (1 mG/mL) 30 milliLiter(s) PCA Continuous PCA Continuous  cefTRIAXone   IVPB   IV Intermittent     MEDICATIONS  (PRN):  LORazepam     Tablet 1 milliGRAM(s) Oral every 4 hours PRN Anxiety  acetaminophen   Tablet 650 milliGRAM(s) Oral every 6 hours PRN For Temp greater than 38 C (100.4 F)  acetaminophen   Tablet. 650 milliGRAM(s) Oral every 6 hours PRN Mild Pain (1 - 3)  ondansetron Injectable 4 milliGRAM(s) IV Push every 6 hours PRN Nausea  senna 2 Tablet(s) Oral at bedtime PRN Constipation  naloxone Injectable 0.1 milliGRAM(s) IV Push every 3 minutes PRN For ANY of the following changes in patient status:  A. RR LESS THAN 10 breaths per minute, B. Oxygen saturation LESS THAN 90%, C. Sedation score of 6      Vital Signs Last 24 Hrs  T(C): 36.8 (12 Aug 2017 05:10), Max: 37.2 (11 Aug 2017 14:00)  T(F): 98.3 (12 Aug 2017 05:10), Max: 99 (11 Aug 2017 14:00)  HR: 91 (12 Aug 2017 05:10) (91 - 102)  BP: 146/69 (12 Aug 2017 05:10) (133/70 - 146/69)  BP(mean): --  RR: 16 (12 Aug 2017 05:10) (16 - 18)  SpO2: 94% (12 Aug 2017 05:10) (94% - 98%)    Physical Exam:        Constitutional: frail looking  HEENT: NC/AT, EOMI, PERRLA  Neck: supple  Back: no tenderness  Respiratory: clear; mediport in place  Cardiovascular: S1S2 regular, no murmurs  Abdomen: soft, not tender, not distended, positive BS  Genitourinary: deferred  Rectal: deferred  Musculoskeletal: no muscle tenderness, no joint swelling or tenderness  Extremities: no pedal edema  Neurological: AxOx3, moving all extremities, no focal deficits  Skin: no rashes    Labs:                        11.1   10.0  )-----------( 128      ( 11 Aug 2017 05:40 )             32.1     08-11    136  |  102  |  5<L>  ----------------------------<  128<H>  3.6   |  27  |  0.52    Ca    8.1<L>      11 Aug 2017 05:40  Phos  2.4     -11  Mg     2.1     08-11    TPro  6.8  /  Alb  3.1<L>  /  TBili  0.8  /  DBili  x   /  AST  31  /  ALT  55  /  AlkPhos  101  08-10     LIVER FUNCTIONS - ( 10 Aug 2017 12:44 )  Alb: 3.1 g/dL / Pro: 6.8 gm/dL / ALK PHOS: 101 U/L / ALT: 55 U/L / AST: 31 U/L / GGT: x           Urinalysis Basic - ( 10 Aug 2017 12:44 )    Color: Yellow / Appearance: Clear / S.005 / pH: x  Gluc: x / Ketone: Moderate  / Bili: Negative / Urobili: Negative mg/dL   Blood: x / Protein: Negative mg/dL / Nitrite: Negative   Leuk Esterase: Negative / RBC: x / WBC x   Sq Epi: x / Non Sq Epi: x / Bacteria: x    Culture - Blood (08.10.17 @ 12:57)    -  Candida albicans: Nondet    -  Candida parapsilosis: Nondet    -  Escherichia coli: Nondet    -  Haemophilus influenzae: Nondet    -  Methicillin resistant Staphylococcus aureus (MRSA): Nondet    -  Proteus species: Nondet    -  Serratia marcescens: Nondet    -  Streptococcus pneumoniae: Nondet    -  Vancomycin resistant Enterococcus sp.: Nondet    -  Candida krusei: Nondet    -  Klebsiella pneumoniae: Nondet    -  Multidrug (KPC pos) resistant organism: Nondet    -  Staphylococcus aureus: Detec Any isolate of Staphylococcus aureus from a blood culture is NOT considered a contaminant.    -  Streptococcus pyogenes (Group A): Nondet    -  Streptococcus sp. (Not Grp A, B or S pneumoniae): Nondet    Gram Stain:   Growth in aerobic and anaerobic bottles: Gram Positive Cocci in Clusters    -  Candida glabrata: Nondet    -  Candida tropicalis: Nondet    -  Klebsiella oxytoca: Nondet    -  Listeria monocytogenes: Nondet    -  Acinetobacter baumanii: Nondet    -  Coagulase negative Staphylococcus: Nondet    -  Enterobacter cloacae complex: Nondet    -  Enterococcus species: Nondet    -  Neisseria meningitidis: Nondet    -  Pseudomonas aeruginosa: Nondet    -  Streptococcus agalactiae (Group B): Nondet    Specimen Source: .Blood None    Organism: Blood Culture PCR    Culture Results:   Growth in aerobic and anaerobic bottles: Gram Positive Cocci in Clusters  ***Blood Panel PCR results on this specimen are available  approximately 3 hours after the Gram stain result.***  Gram stain, PCR, and/or culture results may not always  correspond due to difference in methodologies.    Organism Identification: Blood Culture PCR    Method Type: PCR          Radiology:    < from: CT Abdomen and Pelvis w/ Oral Cont and w/ IV Cont (08.10.17 @ 15:09) >  No acute abdominal pathology.   Distended urinary bladder causing mild fullness of the renal collecting   systems.    < end of copied text >      Advanced directives addressed: full resuscitation

## 2017-08-12 NOTE — PROGRESS NOTE ADULT - ASSESSMENT
63 yo woman admitted with abdominal pain and fever  No acute abnormalities on CT  Last colonoscopy 2004.     Improved pain after bowel movement  Daily miralax  Diet as tolerated  Abx per ID for possible port infection  Should have outpt colonoscopy for surveillance upon discharge  Will sign off, please recall with any ongoing questions or concerns.

## 2017-08-12 NOTE — PROGRESS NOTE ADULT - SUBJECTIVE AND OBJECTIVE BOX
HPI:  61 yo woman admitted with RLQ pain and fever. Has history of AIP with chronic abdominal pain, but pt reports symptoms are different from typical pain. No association with food, was tolerating diet at home. Chronic constipation.  Has almost daily bowel movements, but small volume. +blood cultures with concern for port infection. CT is unremarkable for etiology of pain except for stool throughout the colon.    Patient had multiple bowel movements after bowel regimen. Abdominal pain is back to baseline.    MEDICATIONS  (STANDING):  glycerin Suppository - Adult 1 Suppository(s) Rectal daily  polyethylene glycol 3350 17 Gram(s) Oral daily  pantoprazole    Tablet 40 milliGRAM(s) Oral before breakfast  cholecalciferol 1000 Unit(s) Oral daily  multivitamin 1 Tablet(s) Oral daily  morphine ER Tablet 200 milliGRAM(s) Oral every 12 hours  morphine ER Tablet 30 milliGRAM(s) Oral every 12 hours  dextrose 5% + sodium chloride 0.9%. 1000 milliLiter(s) (100 mL/Hr) IV Continuous <Continuous>  docusate sodium 200 milliGRAM(s) Oral daily  lactobacillus acidophilus 2 Tablet(s) Oral two times a day with meals  vancomycin  IVPB 1000 milliGRAM(s) IV Intermittent every 12 hours  cefTRIAXone   IVPB 1 Gram(s) IV Intermittent every 24 hours  vancomycin  IVPB   IV Intermittent   HYDROmorphone PCA (1 mG/mL) 30 milliLiter(s) PCA Continuous PCA Continuous  cefTRIAXone   IVPB   IV Intermittent     MEDICATIONS  (PRN):  LORazepam     Tablet 1 milliGRAM(s) Oral every 4 hours PRN Anxiety  acetaminophen   Tablet 650 milliGRAM(s) Oral every 6 hours PRN For Temp greater than 38 C (100.4 F)  acetaminophen   Tablet. 650 milliGRAM(s) Oral every 6 hours PRN Mild Pain (1 - 3)  ondansetron Injectable 4 milliGRAM(s) IV Push every 6 hours PRN Nausea  senna 2 Tablet(s) Oral at bedtime PRN Constipation  naloxone Injectable 0.1 milliGRAM(s) IV Push every 3 minutes PRN For ANY of the following changes in patient status:  A. RR LESS THAN 10 breaths per minute, B. Oxygen saturation LESS THAN 90%, C. Sedation score of 6      Allergies    chlorhexidine containing compounds (Unknown)    Intolerances        REVIEW OF SYSTEMS    General: no fever    HEENT: no icterus    Respiratory and Thorax: no SOB  	  Cardiovascular: no CP    Gastrointestinal: as above    Skin: no jaundice      Vital Signs Last 24 Hrs  T(C): 36.8 (12 Aug 2017 05:10), Max: 37.2 (11 Aug 2017 14:00)  T(F): 98.3 (12 Aug 2017 05:10), Max: 99 (11 Aug 2017 14:00)  HR: 91 (12 Aug 2017 05:10) (91 - 102)  BP: 146/69 (12 Aug 2017 05:10) (133/70 - 146/69)  BP(mean): --  RR: 16 (12 Aug 2017 05:10) (16 - 18)  SpO2: 94% (12 Aug 2017 05:10) (94% - 98%)    PHYSICAL EXAM:    Constitutional: NAD    HEENT: anicteric    Respiratory: CTA BL    Cardiovascular:  RRR    Gastrointestinal: soft ND +BS generalized tenderness    Extremities: no LE edema    Neuro: no focal deficits    Skin: no jaundice      LABS:                        10.7   9.9   )-----------( 181      ( 12 Aug 2017 06:06 )             31.6     08-12    140  |  107  |  2<L>  ----------------------------<  119<H>  3.7   |  25  |  0.41<L>    Ca    8.4<L>      12 Aug 2017 06:06  Phos  2.9     08-12  Mg     2.6     08-12    TPro  6.8  /  Alb  3.1<L>  /  TBili  0.8  /  DBili  x   /  AST  31  /  ALT  55  /  AlkPhos  101  08-10      LIVER FUNCTIONS - ( 10 Aug 2017 12:44 )  Alb: 3.1 g/dL / Pro: 6.8 gm/dL / ALK PHOS: 101 U/L / ALT: 55 U/L / AST: 31 U/L / GGT: x             RADIOLOGY & ADDITIONAL STUDIES:

## 2017-08-13 LAB
-  AMPICILLIN/SULBACTAM: SIGNIFICANT CHANGE UP
-  CEFAZOLIN: SIGNIFICANT CHANGE UP
-  CIPROFLOXACIN: SIGNIFICANT CHANGE UP
-  CLINDAMYCIN: SIGNIFICANT CHANGE UP
-  ERYTHROMYCIN: SIGNIFICANT CHANGE UP
-  GENTAMICIN: SIGNIFICANT CHANGE UP
-  LEVOFLOXACIN: SIGNIFICANT CHANGE UP
-  MOXIFLOXACIN(AEROBIC): SIGNIFICANT CHANGE UP
-  OXACILLIN: SIGNIFICANT CHANGE UP
-  PENICILLIN: SIGNIFICANT CHANGE UP
-  RIFAMPIN: SIGNIFICANT CHANGE UP
-  TETRACYCLINE: SIGNIFICANT CHANGE UP
-  TRIMETHOPRIM/SULFAMETHOXAZOLE: SIGNIFICANT CHANGE UP
-  VANCOMYCIN: SIGNIFICANT CHANGE UP
ANION GAP SERPL CALC-SCNC: 7 MMOL/L — SIGNIFICANT CHANGE UP (ref 5–17)
BUN SERPL-MCNC: 2 MG/DL — LOW (ref 7–23)
CALCIUM SERPL-MCNC: 8.6 MG/DL — SIGNIFICANT CHANGE UP (ref 8.5–10.1)
CHLORIDE SERPL-SCNC: 109 MMOL/L — HIGH (ref 96–108)
CO2 SERPL-SCNC: 26 MMOL/L — SIGNIFICANT CHANGE UP (ref 22–31)
CREAT SERPL-MCNC: 0.38 MG/DL — LOW (ref 0.5–1.3)
CULTURE RESULTS: SIGNIFICANT CHANGE UP
CULTURE RESULTS: SIGNIFICANT CHANGE UP
GLUCOSE SERPL-MCNC: 146 MG/DL — HIGH (ref 70–99)
GRAM STN FLD: SIGNIFICANT CHANGE UP
GRAM STN FLD: SIGNIFICANT CHANGE UP
HCT VFR BLD CALC: 30.4 % — LOW (ref 34.5–45)
HGB BLD-MCNC: 10.8 G/DL — LOW (ref 11.5–15.5)
MAGNESIUM SERPL-MCNC: 2.4 MG/DL — SIGNIFICANT CHANGE UP (ref 1.6–2.6)
MCHC RBC-ENTMCNC: 29 PG — SIGNIFICANT CHANGE UP (ref 27–34)
MCHC RBC-ENTMCNC: 35.5 GM/DL — SIGNIFICANT CHANGE UP (ref 32–36)
MCV RBC AUTO: 81.6 FL — SIGNIFICANT CHANGE UP (ref 80–100)
METHOD TYPE: SIGNIFICANT CHANGE UP
ORGANISM # SPEC MICROSCOPIC CNT: SIGNIFICANT CHANGE UP
PHOSPHATE SERPL-MCNC: 3 MG/DL — SIGNIFICANT CHANGE UP (ref 2.5–4.5)
PLATELET # BLD AUTO: 204 K/UL — SIGNIFICANT CHANGE UP (ref 150–400)
POTASSIUM SERPL-MCNC: 3.3 MMOL/L — LOW (ref 3.5–5.3)
POTASSIUM SERPL-SCNC: 3.3 MMOL/L — LOW (ref 3.5–5.3)
RBC # BLD: 3.73 M/UL — LOW (ref 3.8–5.2)
RBC # FLD: 11.8 % — SIGNIFICANT CHANGE UP (ref 10.3–14.5)
SODIUM SERPL-SCNC: 142 MMOL/L — SIGNIFICANT CHANGE UP (ref 135–145)
SPECIMEN SOURCE: SIGNIFICANT CHANGE UP
VANCOMYCIN TROUGH SERPL-MCNC: 17.4 UG/ML — SIGNIFICANT CHANGE UP (ref 10–20)
WBC # BLD: 6.4 K/UL — SIGNIFICANT CHANGE UP (ref 3.8–10.5)
WBC # FLD AUTO: 6.4 K/UL — SIGNIFICANT CHANGE UP (ref 3.8–10.5)

## 2017-08-13 RX ORDER — POTASSIUM CHLORIDE 20 MEQ
40 PACKET (EA) ORAL ONCE
Qty: 0 | Refills: 0 | Status: COMPLETED | OUTPATIENT
Start: 2017-08-13 | End: 2017-08-13

## 2017-08-13 RX ADMIN — MORPHINE SULFATE 30 MILLIGRAM(S): 50 CAPSULE, EXTENDED RELEASE ORAL at 19:00

## 2017-08-13 RX ADMIN — Medication 1000 UNIT(S): at 13:14

## 2017-08-13 RX ADMIN — SODIUM CHLORIDE 100 MILLILITER(S): 9 INJECTION, SOLUTION INTRAVENOUS at 03:51

## 2017-08-13 RX ADMIN — Medication 250 MILLIGRAM(S): at 05:07

## 2017-08-13 RX ADMIN — PANTOPRAZOLE SODIUM 40 MILLIGRAM(S): 20 TABLET, DELAYED RELEASE ORAL at 07:49

## 2017-08-13 RX ADMIN — CEFTRIAXONE 100 GRAM(S): 500 INJECTION, POWDER, FOR SOLUTION INTRAMUSCULAR; INTRAVENOUS at 11:19

## 2017-08-13 RX ADMIN — MORPHINE SULFATE 200 MILLIGRAM(S): 50 CAPSULE, EXTENDED RELEASE ORAL at 17:37

## 2017-08-13 RX ADMIN — MORPHINE SULFATE 200 MILLIGRAM(S): 50 CAPSULE, EXTENDED RELEASE ORAL at 19:00

## 2017-08-13 RX ADMIN — Medication 40 MILLIEQUIVALENT(S): at 13:14

## 2017-08-13 RX ADMIN — Medication 2 TABLET(S): at 07:49

## 2017-08-13 RX ADMIN — MORPHINE SULFATE 30 MILLIGRAM(S): 50 CAPSULE, EXTENDED RELEASE ORAL at 05:06

## 2017-08-13 RX ADMIN — MORPHINE SULFATE 30 MILLIGRAM(S): 50 CAPSULE, EXTENDED RELEASE ORAL at 17:37

## 2017-08-13 RX ADMIN — Medication 250 MILLIGRAM(S): at 17:37

## 2017-08-13 RX ADMIN — MORPHINE SULFATE 200 MILLIGRAM(S): 50 CAPSULE, EXTENDED RELEASE ORAL at 05:06

## 2017-08-13 RX ADMIN — Medication 2 TABLET(S): at 17:37

## 2017-08-13 NOTE — PROGRESS NOTE ADULT - SUBJECTIVE AND OBJECTIVE BOX
Patient is a 62y old  Female who presents with a chief complaint of abdominal pain (10 Aug 2017 23:24)    HPI:  63 y/o Female with h/o intermittent porphyria, chronic pain, anxiety disorder, OP was admitted on 8/10 for severe RLQ pain radiating to her right flank, starting ~ 3 days PTA. Nothing mitigates nor exacerbates. She also reports intermittent fever. In ER, she was found with fever to 101.4F; she received zosyn IV. Reported with bacteremia.    Feels better  No fever or chills  Abdominal pain is improved  Weak looking    MEDICATIONS  (STANDING):  pantoprazole    Tablet 40 milliGRAM(s) Oral before breakfast  cholecalciferol 1000 Unit(s) Oral daily  multivitamin 1 Tablet(s) Oral daily  morphine ER Tablet 200 milliGRAM(s) Oral every 12 hours  morphine ER Tablet 30 milliGRAM(s) Oral every 12 hours  dextrose 5% + sodium chloride 0.9%. 1000 milliLiter(s) (100 mL/Hr) IV Continuous <Continuous>  lactobacillus acidophilus 2 Tablet(s) Oral two times a day with meals  vancomycin  IVPB 1000 milliGRAM(s) IV Intermittent every 12 hours  cefTRIAXone   IVPB 1 Gram(s) IV Intermittent every 24 hours  vancomycin  IVPB   IV Intermittent   HYDROmorphone PCA (1 mG/mL) 30 milliLiter(s) PCA Continuous PCA Continuous  cefTRIAXone   IVPB   IV Intermittent   psyllium Powder 1 Packet(s) Oral daily    MEDICATIONS  (PRN):  LORazepam     Tablet 1 milliGRAM(s) Oral every 4 hours PRN Anxiety  acetaminophen   Tablet 650 milliGRAM(s) Oral every 6 hours PRN For Temp greater than 38 C (100.4 F)  acetaminophen   Tablet. 650 milliGRAM(s) Oral every 6 hours PRN Mild Pain (1 - 3)  ondansetron Injectable 4 milliGRAM(s) IV Push every 6 hours PRN Nausea  senna 2 Tablet(s) Oral at bedtime PRN Constipation  naloxone Injectable 0.1 milliGRAM(s) IV Push every 3 minutes PRN For ANY of the following changes in patient status:  A. RR LESS THAN 10 breaths per minute, B. Oxygen saturation LESS THAN 90%, C. Sedation score of 6      Vital Signs Last 24 Hrs  T(C): 36.4 (13 Aug 2017 03:48), Max: 36.5 (12 Aug 2017 13:53)  T(F): 97.6 (13 Aug 2017 03:48), Max: 97.7 (12 Aug 2017 13:53)  HR: 90 (13 Aug 2017 03:48) (83 - 95)  BP: 147/77 (13 Aug 2017 03:48) (135/61 - 151/78)  BP(mean): --  RR: 17 (13 Aug 2017 03:48) (17 - 18)  SpO2: 95% (13 Aug 2017 03:48) (95% - 100%)    Physical Exam:      Constitutional: frail looking  HEENT: NC/AT, EOMI, PERRLA  Neck: supple  Back: no tenderness  Respiratory: clear; mediport in place  Cardiovascular: S1S2 regular, no murmurs  Abdomen: soft, not tender, not distended, positive BS  Genitourinary: deferred  Rectal: deferred  Musculoskeletal: no muscle tenderness, no joint swelling or tenderness  Extremities: no pedal edema  Neurological: AxOx3, moving all extremities, no focal deficits  Skin: no rashes    Labs:                        10.8   6.4   )-----------( 204      ( 13 Aug 2017 05:27 )             30.4     13    142  |  109<H>  |  2<L>  ----------------------------<  146<H>  3.3<L>   |  26  |  0.38<L>    Ca    8.6      13 Aug 2017 05:27  Phos  3.0     08-13  Mg     2.4     13         Vancomycin Level, Trough: 17.4 ug/mL ( @ 05:27)                            11.1   10.0  )-----------( 128      ( 11 Aug 2017 05:40 )             32.1     08-11    136  |  102  |  5<L>  ----------------------------<  128<H>  3.6   |  27  |  0.52    Ca    8.1<L>      11 Aug 2017 05:40  Phos  2.4     08-11  Mg     2.1     08-11    TPro  6.8  /  Alb  3.1<L>  /  TBili  0.8  /  DBili  x   /  AST  31  /  ALT  55  /  AlkPhos  101  08-10     LIVER FUNCTIONS - ( 10 Aug 2017 12:44 )  Alb: 3.1 g/dL / Pro: 6.8 gm/dL / ALK PHOS: 101 U/L / ALT: 55 U/L / AST: 31 U/L / GGT: x           Urinalysis Basic - ( 10 Aug 2017 12:44 )    Color: Yellow / Appearance: Clear / S.005 / pH: x  Gluc: x / Ketone: Moderate  / Bili: Negative / Urobili: Negative mg/dL   Blood: x / Protein: Negative mg/dL / Nitrite: Negative   Leuk Esterase: Negative / RBC: x / WBC x   Sq Epi: x / Non Sq Epi: x / Bacteria: x    Culture - Blood (08.10.17 @ 12:57)    -  Candida albicans: Nondet    -  Candida parapsilosis: Nondet    -  Escherichia coli: Nondet    -  Haemophilus influenzae: Nondet    -  Methicillin resistant Staphylococcus aureus (MRSA): Nondet    -  Proteus species: Nondet    -  Serratia marcescens: Nondet    -  Streptococcus pneumoniae: Nondet    -  Vancomycin resistant Enterococcus sp.: Nondet    -  Candida krusei: Nondet    -  Klebsiella pneumoniae: Nondet    -  Multidrug (KPC pos) resistant organism: Nondet    -  Staphylococcus aureus: Detec Any isolate of Staphylococcus aureus from a blood culture is NOT considered a contaminant.    -  Streptococcus pyogenes (Group A): Nondet    -  Streptococcus sp. (Not Grp A, B or S pneumoniae): Nondet    Gram Stain:   Growth in aerobic and anaerobic bottles: Gram Positive Cocci in Clusters    -  Candida glabrata: Nondet    -  Candida tropicalis: Nondet    -  Klebsiella oxytoca: Nondet    -  Listeria monocytogenes: Nondet    -  Acinetobacter baumanii: Nondet    -  Coagulase negative Staphylococcus: Nondet    -  Enterobacter cloacae complex: Nondet    -  Enterococcus species: Nondet    -  Neisseria meningitidis: Nondet    -  Pseudomonas aeruginosa: Nondet    -  Streptococcus agalactiae (Group B): Nondet    Specimen Source: .Blood None    Organism: Blood Culture PCR    Culture Results:   Growth in aerobic and anaerobic bottles: Gram Positive Cocci in Clusters  ***Blood Panel PCR results on this specimen are available  approximately 3 hours after the Gram stain result.***  Gram stain, PCR, and/or culture results may not always  correspond due to difference in methodologies.    Organism Identification: Blood Culture PCR    Method Type: PCR          Radiology:    < from: CT Abdomen and Pelvis w/ Oral Cont and w/ IV Cont (08.10.17 @ 15:09) >  No acute abdominal pathology.   Distended urinary bladder causing mild fullness of the renal collecting   systems.    < end of copied text >      Advanced directives addressed: full resuscitation

## 2017-08-13 NOTE — PROGRESS NOTE ADULT - SUBJECTIVE AND OBJECTIVE BOX
62F with PMHx AIP, chronic pain, anxiety with recent acute flare of AIP who now p/w severe RLQ pain radiating to her right flank, starting ~ 3 days PTA. Nothing mitigates nor exacerbates. a/w intermittent fever.   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   Baseline anorexia & constipation- last BM ~ 1 week ago.     When initially examined, Pt in severe distress d/t localized RLQ pain. CT(A/P) reviewed with Rads- no evidence of appendicitis but profound stool retention. Pain significantly reduced with IV Dilaudid/Toradol.      8/11: No O/N events. Significantly more comfortable on PCA. Still no BM. Afebrile. Notified of STAU in 2/2 blood Cx's  8/12: No O/N events. Upset d/t fecal incontinence, now refusing Miralax. Abdominal pain significantly improved after multiple BM's  8/13: No O/N events. No new complaints. Finally getting some rest. Pain controlled.     Physical Exam:  · Constitutional	detailed exam	  · Constitutional Details	well-developed; distress due to pain	  · Neck	No bruits; no thyromegaly or nodules	  · Back	No deformity or limitation of movement	  · Respiratory	Breath Sounds equal & clear to percussion & auscultation, no accessory muscle use	  · Cardiovascular	Regular rate & rhythm, normal S1, S2; no murmurs, gallops or rubs; no S3, S4	  · Gastrointestinal	detailed exam	  · GI Normal	soft	  · GI Abnormal	tender  bowel sounds hypoactive  guarding	  · Tenderness	RLQ	  · Genitourinary	Normal genitalia; no lesions; no discharge	  · Extremities	No cyanosis, clubbing or edema	  · Vascular	Equal and normal pulses (carotid, femoral, dorsalis pedis)	  · Neurological	Alert & oriented; no sensory, motor or coordination deficits, normal reflexes	  · Lymph Nodes	No lymphadedenopathy	  · Musculoskeletal	No joint pain, swelling or deformity; no limitation of movement	      Labs and Results:  T(C): 36.4 (08-13-17 @ 03:48), Max: 36.5 (08-12-17 @ 13:53)  HR: 90 (08-13-17 @ 03:48) (83 - 95)  BP: 147/77 (08-13-17 @ 03:48) (135/61 - 151/78)  RR: 17 (08-13-17 @ 03:48) (17 - 18)  SpO2: 95% (08-13-17 @ 03:48) (95% - 100%)  Wt(kg): --                              10.8   6.4   )-----------( 204      ( 13 Aug 2017 05:27 )             30.4     13 Aug 2017 05:27    142    |  109    |  2      ----------------------------<  146    3.3     |  26     |  0.38     Ca    8.6        13 Aug 2017 05:27  Phos  3.0       13 Aug 2017 05:27  Mg     2.4       13 Aug 2017 05:27      Assessment and Plan:   Assessment:  · Assessment		  62F with aforementioned PMHx, now a/w    * Severe acute RLQ pain, unclear etiology, suspect fecal retention as primary cause, appendix visualized & wnl, lactate wnl- doubt bowel ischemia; KUB unrevealing but obscured by remaining contrast  - PRN analgesics/antiemetics  - Pt agrees to senna/psyllium daily  - Hem/GI evals appreciated    * Sepsis/bacteremia, STAU, 2/2 Mediport  - c/w current abx   - surveillance blood Cx's pending from 8/12  - Pt is dependent on it for nocturnal IVF, and despite understanding difficulty of clearing bacteremia would like attempt prior to removal     * Chronic pain, anxiety- status quo; i-STOP c/w Pt report  - c/w MS Contin 230mg Q12  - Dilaudid PCA  - Ativan Q4 PRN    * GERD, stable  - c/w PPI    * hypokalemia  - replete PRN    * DVT PPx- Lovenox    * full code

## 2017-08-13 NOTE — PROGRESS NOTE ADULT - ASSESSMENT
61 y/o Female with h/o intermittent porphyria, chronic pain, anxiety disorder, OP was admitted on 8/10 for severe RLQ pain radiating to her right flank, starting ~ 3 days PTA. Nothing mitigates nor exacerbates. She also reports intermittent fever. In ER, she was found with fever to 101.4F; she received zosyn IV. Reported with bacteremia.    1. Febrile syndrome improving. Sepsis with STAU. Probable mediport infection. ?endocarditis.  -f/u BC x 2  -on vancomycin 1 gm IV q12h and ceftriaxone 1 gm IV qd # 3  -tolerating abx well so far; no side effects noted  -clearing this infection without removing the mediport will be difficult, but the patient has no other IV access and is a hard stick  -f/u repeat BC x 2   -continue IV abx coverage  -monitor temps  -f/u CBC  -supportive care  2. Other issues:   -care per medicine

## 2017-08-14 LAB
ANION GAP SERPL CALC-SCNC: 8 MMOL/L — SIGNIFICANT CHANGE UP (ref 5–17)
BUN SERPL-MCNC: 2 MG/DL — LOW (ref 7–23)
CALCIUM SERPL-MCNC: 8.9 MG/DL — SIGNIFICANT CHANGE UP (ref 8.5–10.1)
CHLORIDE SERPL-SCNC: 108 MMOL/L — SIGNIFICANT CHANGE UP (ref 96–108)
CO2 SERPL-SCNC: 24 MMOL/L — SIGNIFICANT CHANGE UP (ref 22–31)
CREAT SERPL-MCNC: 0.4 MG/DL — LOW (ref 0.5–1.3)
CULTURE RESULTS: SIGNIFICANT CHANGE UP
GLUCOSE SERPL-MCNC: 110 MG/DL — HIGH (ref 70–99)
HCT VFR BLD CALC: 32.5 % — LOW (ref 34.5–45)
HGB BLD-MCNC: 11.1 G/DL — LOW (ref 11.5–15.5)
MAGNESIUM SERPL-MCNC: 2.5 MG/DL — SIGNIFICANT CHANGE UP (ref 1.6–2.6)
MCHC RBC-ENTMCNC: 28.1 PG — SIGNIFICANT CHANGE UP (ref 27–34)
MCHC RBC-ENTMCNC: 34.2 GM/DL — SIGNIFICANT CHANGE UP (ref 32–36)
MCV RBC AUTO: 82.2 FL — SIGNIFICANT CHANGE UP (ref 80–100)
PHOSPHATE SERPL-MCNC: 3.8 MG/DL — SIGNIFICANT CHANGE UP (ref 2.5–4.5)
PLATELET # BLD AUTO: 284 K/UL — SIGNIFICANT CHANGE UP (ref 150–400)
POTASSIUM SERPL-MCNC: 3.9 MMOL/L — SIGNIFICANT CHANGE UP (ref 3.5–5.3)
POTASSIUM SERPL-SCNC: 3.9 MMOL/L — SIGNIFICANT CHANGE UP (ref 3.5–5.3)
RBC # BLD: 3.96 M/UL — SIGNIFICANT CHANGE UP (ref 3.8–5.2)
RBC # FLD: 12.2 % — SIGNIFICANT CHANGE UP (ref 10.3–14.5)
SODIUM SERPL-SCNC: 140 MMOL/L — SIGNIFICANT CHANGE UP (ref 135–145)
SPECIMEN SOURCE: SIGNIFICANT CHANGE UP
WBC # BLD: 7.3 K/UL — SIGNIFICANT CHANGE UP (ref 3.8–10.5)
WBC # FLD AUTO: 7.3 K/UL — SIGNIFICANT CHANGE UP (ref 3.8–10.5)

## 2017-08-14 RX ORDER — PSYLLIUM SEED (WITH DEXTROSE)
1 POWDER (GRAM) ORAL EVERY OTHER DAY
Qty: 0 | Refills: 0 | Status: DISCONTINUED | OUTPATIENT
Start: 2017-08-14 | End: 2017-08-15

## 2017-08-14 RX ORDER — SODIUM CHLORIDE 9 MG/ML
1000 INJECTION INTRAMUSCULAR; INTRAVENOUS; SUBCUTANEOUS
Qty: 0 | Refills: 0 | Status: DISCONTINUED | OUTPATIENT
Start: 2017-08-14 | End: 2017-08-15

## 2017-08-14 RX ORDER — CEFAZOLIN SODIUM 1 G
2000 VIAL (EA) INJECTION ONCE
Qty: 0 | Refills: 0 | Status: COMPLETED | OUTPATIENT
Start: 2017-08-14 | End: 2017-08-14

## 2017-08-14 RX ORDER — ENOXAPARIN SODIUM 100 MG/ML
40 INJECTION SUBCUTANEOUS DAILY
Qty: 0 | Refills: 0 | Status: DISCONTINUED | OUTPATIENT
Start: 2017-08-14 | End: 2017-08-15

## 2017-08-14 RX ORDER — CEFAZOLIN SODIUM 1 G
2000 VIAL (EA) INJECTION EVERY 8 HOURS
Qty: 0 | Refills: 0 | Status: DISCONTINUED | OUTPATIENT
Start: 2017-08-14 | End: 2017-08-15

## 2017-08-14 RX ORDER — CEFAZOLIN SODIUM 1 G
VIAL (EA) INJECTION
Qty: 0 | Refills: 0 | Status: DISCONTINUED | OUTPATIENT
Start: 2017-08-14 | End: 2017-08-15

## 2017-08-14 RX ADMIN — CEFTRIAXONE 100 GRAM(S): 500 INJECTION, POWDER, FOR SOLUTION INTRAMUSCULAR; INTRAVENOUS at 11:25

## 2017-08-14 RX ADMIN — MORPHINE SULFATE 30 MILLIGRAM(S): 50 CAPSULE, EXTENDED RELEASE ORAL at 05:40

## 2017-08-14 RX ADMIN — Medication 250 MILLIGRAM(S): at 05:11

## 2017-08-14 RX ADMIN — Medication 1000 UNIT(S): at 11:26

## 2017-08-14 RX ADMIN — MORPHINE SULFATE 30 MILLIGRAM(S): 50 CAPSULE, EXTENDED RELEASE ORAL at 05:11

## 2017-08-14 RX ADMIN — MORPHINE SULFATE 200 MILLIGRAM(S): 50 CAPSULE, EXTENDED RELEASE ORAL at 18:07

## 2017-08-14 RX ADMIN — Medication 1 TABLET(S): at 11:25

## 2017-08-14 RX ADMIN — Medication 100 MILLIGRAM(S): at 22:10

## 2017-08-14 RX ADMIN — Medication 100 MILLIGRAM(S): at 15:32

## 2017-08-14 RX ADMIN — MORPHINE SULFATE 200 MILLIGRAM(S): 50 CAPSULE, EXTENDED RELEASE ORAL at 05:10

## 2017-08-14 RX ADMIN — Medication 1 PACKET(S): at 18:19

## 2017-08-14 RX ADMIN — Medication 2 TABLET(S): at 18:06

## 2017-08-14 RX ADMIN — HYDROMORPHONE HYDROCHLORIDE 30 MILLILITER(S): 2 INJECTION INTRAMUSCULAR; INTRAVENOUS; SUBCUTANEOUS at 04:52

## 2017-08-14 RX ADMIN — MORPHINE SULFATE 200 MILLIGRAM(S): 50 CAPSULE, EXTENDED RELEASE ORAL at 05:40

## 2017-08-14 RX ADMIN — ENOXAPARIN SODIUM 40 MILLIGRAM(S): 100 INJECTION SUBCUTANEOUS at 18:07

## 2017-08-14 RX ADMIN — MORPHINE SULFATE 30 MILLIGRAM(S): 50 CAPSULE, EXTENDED RELEASE ORAL at 18:06

## 2017-08-14 RX ADMIN — Medication 2 TABLET(S): at 08:50

## 2017-08-14 RX ADMIN — PANTOPRAZOLE SODIUM 40 MILLIGRAM(S): 20 TABLET, DELAYED RELEASE ORAL at 05:10

## 2017-08-14 NOTE — PROGRESS NOTE ADULT - SUBJECTIVE AND OBJECTIVE BOX
Patient is a 62y old  Female who presents with a chief complaint of abdominal pain (10 Aug 2017 23:24)    HPI:  63 y/o Female with h/o intermittent porphyria, chronic pain, anxiety disorder, OP was admitted on 8/10 for severe RLQ pain radiating to her right flank, starting ~ 3 days PTA. Nothing mitigates nor exacerbates. She also reports intermittent fever. In ER, she was found with fever to 101.4F; she received zosyn IV. Reported with bacteremia.    Weak looking  No fever or chills  Abdominal pain is improved  Weak looking    MEDICATIONS  (STANDING):  pantoprazole    Tablet 40 milliGRAM(s) Oral before breakfast  cholecalciferol 1000 Unit(s) Oral daily  multivitamin 1 Tablet(s) Oral daily  morphine ER Tablet 200 milliGRAM(s) Oral every 12 hours  morphine ER Tablet 30 milliGRAM(s) Oral every 12 hours  lactobacillus acidophilus 2 Tablet(s) Oral two times a day with meals  vancomycin  IVPB 1000 milliGRAM(s) IV Intermittent every 12 hours  cefTRIAXone   IVPB 1 Gram(s) IV Intermittent every 24 hours  vancomycin  IVPB   IV Intermittent   HYDROmorphone PCA (1 mG/mL) 30 milliLiter(s) PCA Continuous PCA Continuous  cefTRIAXone   IVPB   IV Intermittent   psyllium Powder 1 Packet(s) Oral daily    MEDICATIONS  (PRN):  LORazepam     Tablet 1 milliGRAM(s) Oral every 4 hours PRN Anxiety  acetaminophen   Tablet 650 milliGRAM(s) Oral every 6 hours PRN For Temp greater than 38 C (100.4 F)  acetaminophen   Tablet. 650 milliGRAM(s) Oral every 6 hours PRN Mild Pain (1 - 3)  ondansetron Injectable 4 milliGRAM(s) IV Push every 6 hours PRN Nausea  senna 2 Tablet(s) Oral at bedtime PRN Constipation  naloxone Injectable 0.1 milliGRAM(s) IV Push every 3 minutes PRN For ANY of the following changes in patient status:  A. RR LESS THAN 10 breaths per minute, B. Oxygen saturation LESS THAN 90%, C. Sedation score of 6      Vital Signs Last 24 Hrs  T(C): 36.6 (14 Aug 2017 05:14), Max: 37.1 (13 Aug 2017 23:56)  T(F): 97.8 (14 Aug 2017 05:14), Max: 98.7 (13 Aug 2017 23:56)  HR: 92 (14 Aug 2017 05:14) (86 - 92)  BP: 154/76 (14 Aug 2017 05:14) (140/68 - 161/79)  BP(mean): --  RR: 16 (14 Aug 2017 05:14) (16 - 17)  SpO2: 98% (14 Aug 2017 05:14) (97% - 100%)    Physical Exam:        Constitutional: frail looking  HEENT: NC/AT, EOMI, PERRLA  Neck: supple  Back: no tenderness  Respiratory: clear; mediport in place  Cardiovascular: S1S2 regular, no murmurs  Abdomen: soft, not tender, not distended, positive BS  Genitourinary: deferred  Rectal: deferred  Musculoskeletal: no muscle tenderness, no joint swelling or tenderness  Extremities: no pedal edema  Neurological: AxOx3, moving all extremities, no focal deficits  Skin: no rashes    Labs:                        11.1   7.3   )-----------( 284      ( 14 Aug 2017 05:33 )             32.5     08-14    140  |  108  |  2<L>  ----------------------------<  110<H>  3.9   |  24  |  0.40<L>    Ca    8.9      14 Aug 2017 05:33  Phos  3.8     08-14  Mg     2.5     08-14         Vancomycin Level, Trough: 17.4 ug/mL ( @ 05:27)                            10.8   6.4   )-----------( 204      ( 13 Aug 2017 05:27 )             30.4     08-13    142  |  109<H>  |  2<L>  ----------------------------<  146<H>  3.3<L>   |  26  |  0.38<L>    Ca    8.6      13 Aug 2017 05:27  Phos  3.0     08-13  Mg     2.4     08-13         Vancomycin Level, Trough: 17.4 ug/mL ( @ 05:27)                            11.1   10.0  )-----------( 128      ( 11 Aug 2017 05:40 )             32.1     08-11    136  |  102  |  5<L>  ----------------------------<  128<H>  3.6   |  27  |  0.52    Ca    8.1<L>      11 Aug 2017 05:40  Phos  2.4       Mg     2.1     -    TPro  6.8  /  Alb  3.1<L>  /  TBili  0.8  /  DBili  x   /  AST  31  /  ALT  55  /  AlkPhos  101  08-10     LIVER FUNCTIONS - ( 10 Aug 2017 12:44 )  Alb: 3.1 g/dL / Pro: 6.8 gm/dL / ALK PHOS: 101 U/L / ALT: 55 U/L / AST: 31 U/L / GGT: x           Urinalysis Basic - ( 10 Aug 2017 12:44 )    Color: Yellow / Appearance: Clear / S.005 / pH: x  Gluc: x / Ketone: Moderate  / Bili: Negative / Urobili: Negative mg/dL   Blood: x / Protein: Negative mg/dL / Nitrite: Negative   Leuk Esterase: Negative / RBC: x / WBC x   Sq Epi: x / Non Sq Epi: x / Bacteria: x    Culture - Blood (08.10.17 @ 12:57)    -  Candida albicans: Nondet    -  Candida parapsilosis: Nondet    -  Escherichia coli: Nondet    -  Haemophilus influenzae: Nondet    -  Methicillin resistant Staphylococcus aureus (MRSA): Nondet    -  Proteus species: Nondet    -  Serratia marcescens: Nondet    -  Streptococcus pneumoniae: Nondet    -  Vancomycin resistant Enterococcus sp.: Nondet    -  Candida krusei: Nondet    -  Klebsiella pneumoniae: Nondet    -  Multidrug (KPC pos) resistant organism: Nondet    -  Staphylococcus aureus: Detec Any isolate of Staphylococcus aureus from a blood culture is NOT considered a contaminant.    -  Streptococcus pyogenes (Group A): Nondet    -  Streptococcus sp. (Not Grp A, B or S pneumoniae): Nondet    Gram Stain:   Growth in aerobic and anaerobic bottles: Gram Positive Cocci in Clusters    -  Candida glabrata: Nondet    -  Candida tropicalis: Nondet    -  Klebsiella oxytoca: Nondet    -  Listeria monocytogenes: Nondet    -  Acinetobacter baumanii: Nondet    -  Coagulase negative Staphylococcus: Nondet    -  Enterobacter cloacae complex: Nondet    -  Enterococcus species: Nondet    -  Neisseria meningitidis: Nondet    -  Pseudomonas aeruginosa: Nondet    -  Streptococcus agalactiae (Group B): Nondet    Specimen Source: .Blood None    Organism: Blood Culture PCR    Culture Results:   Growth in aerobic and anaerobic bottles: Gram Positive Cocci in Clusters  ***Blood Panel PCR results on this specimen are available  approximately 3 hours after the Gram stain result.***  Gram stain, PCR, and/or culture results may not always  correspond due to difference in methodologies.    Organism Identification: Blood Culture PCR    Method Type: PCR    Culture - Blood (17 @ 10:06)    Gram Stain:   Growth in anaerobic bottle: Gram Positive Cocci in Clusters  Growth in aerobic bottle: Gram Positive Cocci in Clusters    Specimen Source: .Blood None    Culture Results:   Growth in aerobic and anaerobic bottles: Staphylococcus aureus  ***********Note************  This isolate demonstrates inducible  clindamycin resistance.  Clindamycin may still be effective in some patients.  See previous culture [id number]          Radiology:    < from: CT Abdomen and Pelvis w/ Oral Cont and w/ IV Cont (08.10.17 @ 15:09) >  No acute abdominal pathology.   Distended urinary bladder causing mild fullness of the renal collecting   systems.    < end of copied text >    < from: Transthoracic Echocardiogram (17 @ 09:54) >   Mitral Valve   Fibrocalcific changes noted to the mitralvalve leaflets with preserved   excursion. Trace mitral regurgitation noted.     Aortic Valve   The aortic valve leaflets are well seen with normal valve morphology;   preserved leaflet excursion noted. No aortic valve insufficiency noted.     Tricuspid Valve   The tricuspid valve leaflets are well seen and appear thin and pliable   with preserved leaflets excursion. Trace tricuspid regurgitation noted.     Pulmonic Valve   Pulmonic valve not well seen.    < end of copied text >      Advanced directives addressed: full resuscitation

## 2017-08-14 NOTE — PROGRESS NOTE ADULT - SUBJECTIVE AND OBJECTIVE BOX
62F with PMHx AIP, chronic pain, anxiety with recent acute flare of AIP who now p/w severe RLQ pain radiating to her right flank, starting ~ 3 days PTA. Nothing mitigates nor exacerbates. a/w intermittent fever.   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   Baseline anorexia & constipation- last BM ~ 1 week ago.     When initially examined, Pt in severe distress d/t localized RLQ pain. CT(A/P) reviewed with Rads- no evidence of appendicitis but profound stool retention. Pain significantly reduced with IV Dilaudid/Toradol.      8/11: No O/N events. Significantly more comfortable on PCA. Still no BM. Afebrile. Notified of STAU in 2/2 blood Cx's  8/12: No O/N events. Upset d/t fecal incontinence, now refusing Miralax. Abdominal pain significantly improved after multiple BM's  8/13: No O/N events. No new complaints. Finally getting some rest. Pain controlled.   8/14: No O/N events. No new complaints. Frequent BM's, still with mild crampy abdominal pain. Discussed implications of persistent Staph bacteremia @ length.    Physical Exam:  · Constitutional	detailed exam	  · Constitutional Details	well-developed; distress due to pain	  · Neck	No bruits; no thyromegaly or nodules	  · Back	No deformity or limitation of movement	  · Respiratory	Breath Sounds equal & clear to percussion & auscultation, no accessory muscle use	  · Cardiovascular	Regular rate & rhythm, normal S1, S2; no murmurs, gallops or rubs; no S3, S4	  · Gastrointestinal	detailed exam	  · GI Normal	soft	  · GI Abnormal	tender  bowel sounds hypoactive  guarding	  · Tenderness	RLQ	  · Genitourinary	Normal genitalia; no lesions; no discharge	  · Extremities	No cyanosis, clubbing or edema	  · Vascular	Equal and normal pulses (carotid, femoral, dorsalis pedis)	  · Neurological	Alert & oriented; no sensory, motor or coordination deficits, normal reflexes	  · Lymph Nodes	No lymphadedenopathy	  · Musculoskeletal	No joint pain, swelling or deformity; no limitation of movement	      Labs and Results:    T(C): 36.5 (08-14-17 @ 14:28), Max: 37.1 (08-13-17 @ 23:56)  HR: 107 (08-14-17 @ 14:28) (86 - 107)  BP: 154/74 (08-14-17 @ 14:28) (140/68 - 161/79)  RR: 18 (08-14-17 @ 14:28) (16 - 18)  SpO2: 100% (08-14-17 @ 14:28) (97% - 100%)  Wt(kg): --                              11.1   7.3   )-----------( 284      ( 14 Aug 2017 05:33 )             32.5     14 Aug 2017 05:33    140    |  108    |  2      ----------------------------<  110    3.9     |  24     |  0.40     Ca    8.9        14 Aug 2017 05:33  Phos  3.8       14 Aug 2017 05:33  Mg     2.5       14 Aug 2017 05:33        Assessment and Plan:   Assessment:  · Assessment		  62F with aforementioned PMHx, now a/w    * Severe acute RLQ pain, unclear etiology, suspect fecal retention as primary cause, appendix visualized & wnl, lactate wnl- doubt bowel ischemia; KUB unrevealing but obscured by remaining contrast  - PRN analgesics/antiemetics  - c/w senna daily, psyllium QOD  - Hem/GI evals appreciated    * Sepsis 2/2 MSSA bacteremia d/t Mediport with persistence on surveillance blood Cx's  - c/w current abx, less Vanco  - Cardio eval for GENARO  - NPO after midnight for Mediport removal- IVF support while NPO    * Chronic pain, anxiety- status quo; i-STOP c/w Pt report  - c/w MS Contin 230mg Q12  - Dilaudid PCA  - Ativan Q4 PRN    * GERD, stable  - c/w PPI    * hypokalemia  - replete PRN    * DVT PPx- Lovenox    * full code

## 2017-08-14 NOTE — PROVIDER CONTACT NOTE (OTHER) - REASON
persistent Staph aureus bacteremia due to Mediport, concern for IE despite benign TTE, will need GENARO

## 2017-08-14 NOTE — PROGRESS NOTE ADULT - ASSESSMENT
63 y/o Female with h/o intermittent porphyria, chronic pain, anxiety disorder, OP was admitted on 8/10 for severe RLQ pain radiating to her right flank, starting ~ 3 days PTA. Nothing mitigates nor exacerbates. She also reports intermittent fever. In ER, she was found with fever to 101.4F; she received zosyn IV. Reported with bacteremia.    1. Febrile syndrome improving. Sepsis with MSSA. Probable mediport infection. ?endocarditis.  -the patient has persistent bacteremia  -f/u BC x 2  -on vancomycin 1 gm IV q12h and ceftriaxone 1 gm IV qd # 4  -tolerating abx well so far; no side effects noted  -change abx to cefazolin 2 gm IV q8h  -mediport will have to be removed to help clear this infection; spoken with Dr. Amin who is in agreement  -will ask Dr. Durbin to remove mediport  -she will need temporary access  -consider cardiology evaluation   -continue IV abx coverage  -monitor temps  -f/u CBC  -supportive care  2. Other issues:   -care per medicine

## 2017-08-14 NOTE — PROGRESS NOTE ADULT - ASSESSMENT
Plan removal of port on tomorrow. NPO after midnight as patient prefers sedation, anesthesia for procedure.

## 2017-08-15 LAB
ANION GAP SERPL CALC-SCNC: 11 MMOL/L — SIGNIFICANT CHANGE UP (ref 5–17)
BUN SERPL-MCNC: 5 MG/DL — LOW (ref 7–23)
CALCIUM SERPL-MCNC: 9 MG/DL — SIGNIFICANT CHANGE UP (ref 8.5–10.1)
CHLORIDE SERPL-SCNC: 107 MMOL/L — SIGNIFICANT CHANGE UP (ref 96–108)
CO2 SERPL-SCNC: 22 MMOL/L — SIGNIFICANT CHANGE UP (ref 22–31)
CREAT SERPL-MCNC: 0.42 MG/DL — LOW (ref 0.5–1.3)
GLUCOSE SERPL-MCNC: 91 MG/DL — SIGNIFICANT CHANGE UP (ref 70–99)
HCT VFR BLD CALC: 32.9 % — LOW (ref 34.5–45)
HGB BLD-MCNC: 11.3 G/DL — LOW (ref 11.5–15.5)
MAGNESIUM SERPL-MCNC: 2.3 MG/DL — SIGNIFICANT CHANGE UP (ref 1.6–2.6)
MCHC RBC-ENTMCNC: 28.2 PG — SIGNIFICANT CHANGE UP (ref 27–34)
MCHC RBC-ENTMCNC: 34.3 GM/DL — SIGNIFICANT CHANGE UP (ref 32–36)
MCV RBC AUTO: 82.1 FL — SIGNIFICANT CHANGE UP (ref 80–100)
PHOSPHATE SERPL-MCNC: 4.1 MG/DL — SIGNIFICANT CHANGE UP (ref 2.5–4.5)
PLATELET # BLD AUTO: 324 K/UL — SIGNIFICANT CHANGE UP (ref 150–400)
POTASSIUM SERPL-MCNC: 3.5 MMOL/L — SIGNIFICANT CHANGE UP (ref 3.5–5.3)
POTASSIUM SERPL-SCNC: 3.5 MMOL/L — SIGNIFICANT CHANGE UP (ref 3.5–5.3)
RBC # BLD: 4.01 M/UL — SIGNIFICANT CHANGE UP (ref 3.8–5.2)
RBC # FLD: 12.3 % — SIGNIFICANT CHANGE UP (ref 10.3–14.5)
SODIUM SERPL-SCNC: 140 MMOL/L — SIGNIFICANT CHANGE UP (ref 135–145)
WBC # BLD: 8.1 K/UL — SIGNIFICANT CHANGE UP (ref 3.8–10.5)
WBC # FLD AUTO: 8.1 K/UL — SIGNIFICANT CHANGE UP (ref 3.8–10.5)

## 2017-08-15 PROCEDURE — 99233 SBSQ HOSP IP/OBS HIGH 50: CPT

## 2017-08-15 RX ORDER — SENNA PLUS 8.6 MG/1
2 TABLET ORAL AT BEDTIME
Qty: 0 | Refills: 0 | Status: DISCONTINUED | OUTPATIENT
Start: 2017-08-15 | End: 2017-08-19

## 2017-08-15 RX ORDER — LACTOBACILLUS ACIDOPHILUS 100MM CELL
2 CAPSULE ORAL
Qty: 0 | Refills: 0 | Status: DISCONTINUED | OUTPATIENT
Start: 2017-08-15 | End: 2017-08-19

## 2017-08-15 RX ORDER — CEFAZOLIN SODIUM 1 G
2000 VIAL (EA) INJECTION EVERY 8 HOURS
Qty: 0 | Refills: 0 | Status: DISCONTINUED | OUTPATIENT
Start: 2017-08-15 | End: 2017-08-19

## 2017-08-15 RX ORDER — MORPHINE SULFATE 50 MG/1
30 CAPSULE, EXTENDED RELEASE ORAL EVERY 12 HOURS
Qty: 0 | Refills: 0 | Status: DISCONTINUED | OUTPATIENT
Start: 2017-08-15 | End: 2017-08-15

## 2017-08-15 RX ORDER — ACETAMINOPHEN 500 MG
650 TABLET ORAL EVERY 6 HOURS
Qty: 0 | Refills: 0 | Status: DISCONTINUED | OUTPATIENT
Start: 2017-08-15 | End: 2017-08-19

## 2017-08-15 RX ORDER — SODIUM CHLORIDE 9 MG/ML
1000 INJECTION INTRAMUSCULAR; INTRAVENOUS; SUBCUTANEOUS
Qty: 0 | Refills: 0 | Status: DISCONTINUED | OUTPATIENT
Start: 2017-08-15 | End: 2017-08-15

## 2017-08-15 RX ORDER — SODIUM CHLORIDE 9 MG/ML
1000 INJECTION INTRAMUSCULAR; INTRAVENOUS; SUBCUTANEOUS
Qty: 0 | Refills: 0 | Status: DISCONTINUED | OUTPATIENT
Start: 2017-08-15 | End: 2017-08-16

## 2017-08-15 RX ORDER — ONDANSETRON 8 MG/1
4 TABLET, FILM COATED ORAL ONCE
Qty: 0 | Refills: 0 | Status: DISCONTINUED | OUTPATIENT
Start: 2017-08-15 | End: 2017-08-15

## 2017-08-15 RX ORDER — ENOXAPARIN SODIUM 100 MG/ML
40 INJECTION SUBCUTANEOUS EVERY 24 HOURS
Qty: 0 | Refills: 0 | Status: DISCONTINUED | OUTPATIENT
Start: 2017-08-15 | End: 2017-08-19

## 2017-08-15 RX ORDER — ACETAMINOPHEN WITH CODEINE 300MG-30MG
1 TABLET ORAL EVERY 4 HOURS
Qty: 0 | Refills: 0 | Status: DISCONTINUED | OUTPATIENT
Start: 2017-08-15 | End: 2017-08-15

## 2017-08-15 RX ORDER — MORPHINE SULFATE 50 MG/1
30 CAPSULE, EXTENDED RELEASE ORAL EVERY 12 HOURS
Qty: 0 | Refills: 0 | Status: DISCONTINUED | OUTPATIENT
Start: 2017-08-15 | End: 2017-08-19

## 2017-08-15 RX ORDER — MORPHINE SULFATE 50 MG/1
200 CAPSULE, EXTENDED RELEASE ORAL EVERY 12 HOURS
Qty: 0 | Refills: 0 | Status: DISCONTINUED | OUTPATIENT
Start: 2017-08-15 | End: 2017-08-19

## 2017-08-15 RX ORDER — HYDROMORPHONE HYDROCHLORIDE 2 MG/ML
1 INJECTION INTRAMUSCULAR; INTRAVENOUS; SUBCUTANEOUS EVERY 4 HOURS
Qty: 0 | Refills: 0 | Status: DISCONTINUED | OUTPATIENT
Start: 2017-08-15 | End: 2017-08-15

## 2017-08-15 RX ORDER — SODIUM CHLORIDE 9 MG/ML
1000 INJECTION, SOLUTION INTRAVENOUS
Qty: 0 | Refills: 0 | Status: DISCONTINUED | OUTPATIENT
Start: 2017-08-15 | End: 2017-08-15

## 2017-08-15 RX ORDER — FENTANYL CITRATE 50 UG/ML
50 INJECTION INTRAVENOUS
Qty: 0 | Refills: 0 | Status: DISCONTINUED | OUTPATIENT
Start: 2017-08-15 | End: 2017-08-15

## 2017-08-15 RX ORDER — MORPHINE SULFATE 50 MG/1
30 CAPSULE, EXTENDED RELEASE ORAL
Qty: 0 | Refills: 0 | Status: DISCONTINUED | OUTPATIENT
Start: 2017-08-15 | End: 2017-08-15

## 2017-08-15 RX ORDER — PSYLLIUM SEED (WITH DEXTROSE)
1 POWDER (GRAM) ORAL EVERY OTHER DAY
Qty: 0 | Refills: 0 | Status: DISCONTINUED | OUTPATIENT
Start: 2017-08-15 | End: 2017-08-18

## 2017-08-15 RX ORDER — NALOXONE HYDROCHLORIDE 4 MG/.1ML
0.1 SPRAY NASAL
Qty: 0 | Refills: 0 | Status: DISCONTINUED | OUTPATIENT
Start: 2017-08-15 | End: 2017-08-19

## 2017-08-15 RX ORDER — PANTOPRAZOLE SODIUM 20 MG/1
40 TABLET, DELAYED RELEASE ORAL
Qty: 0 | Refills: 0 | Status: DISCONTINUED | OUTPATIENT
Start: 2017-08-15 | End: 2017-08-19

## 2017-08-15 RX ORDER — HYDROMORPHONE HYDROCHLORIDE 2 MG/ML
30 INJECTION INTRAMUSCULAR; INTRAVENOUS; SUBCUTANEOUS
Qty: 0 | Refills: 0 | Status: DISCONTINUED | OUTPATIENT
Start: 2017-08-15 | End: 2017-08-19

## 2017-08-15 RX ORDER — CHOLECALCIFEROL (VITAMIN D3) 125 MCG
1000 CAPSULE ORAL DAILY
Qty: 0 | Refills: 0 | Status: DISCONTINUED | OUTPATIENT
Start: 2017-08-15 | End: 2017-08-19

## 2017-08-15 RX ADMIN — Medication 100 MILLIGRAM(S): at 05:10

## 2017-08-15 RX ADMIN — HYDROMORPHONE HYDROCHLORIDE 30 MILLILITER(S): 2 INJECTION INTRAMUSCULAR; INTRAVENOUS; SUBCUTANEOUS at 15:01

## 2017-08-15 RX ADMIN — Medication 1000 UNIT(S): at 17:39

## 2017-08-15 RX ADMIN — MORPHINE SULFATE 30 MILLIGRAM(S): 50 CAPSULE, EXTENDED RELEASE ORAL at 18:33

## 2017-08-15 RX ADMIN — SODIUM CHLORIDE 63 MILLILITER(S): 9 INJECTION INTRAMUSCULAR; INTRAVENOUS; SUBCUTANEOUS at 15:01

## 2017-08-15 RX ADMIN — Medication 1 TABLET(S): at 17:44

## 2017-08-15 RX ADMIN — MORPHINE SULFATE 200 MILLIGRAM(S): 50 CAPSULE, EXTENDED RELEASE ORAL at 05:10

## 2017-08-15 RX ADMIN — Medication 2 TABLET(S): at 17:41

## 2017-08-15 RX ADMIN — Medication 1 MILLIGRAM(S): at 02:01

## 2017-08-15 RX ADMIN — MORPHINE SULFATE 200 MILLIGRAM(S): 50 CAPSULE, EXTENDED RELEASE ORAL at 17:39

## 2017-08-15 RX ADMIN — ENOXAPARIN SODIUM 40 MILLIGRAM(S): 100 INJECTION SUBCUTANEOUS at 17:39

## 2017-08-15 RX ADMIN — MORPHINE SULFATE 200 MILLIGRAM(S): 50 CAPSULE, EXTENDED RELEASE ORAL at 06:10

## 2017-08-15 RX ADMIN — Medication 100 MILLIGRAM(S): at 21:23

## 2017-08-15 RX ADMIN — MORPHINE SULFATE 30 MILLIGRAM(S): 50 CAPSULE, EXTENDED RELEASE ORAL at 05:09

## 2017-08-15 RX ADMIN — MORPHINE SULFATE 30 MILLIGRAM(S): 50 CAPSULE, EXTENDED RELEASE ORAL at 06:09

## 2017-08-15 NOTE — CONSULT NOTE ADULT - ASSESSMENT
62F with above hx p/w abdominal pain/fever.     1. Severe acute RLQ pain/abdominal pain- unclear etiology, CT A/P negative for acute findings. W/u ongoing as per primary team.     2. Sepsis with 2/2 MSSA bacteremia-  pt currently has no fever or leukocytosis. No significant murmur on exam. 2Decho negative for vegetations. Pt awaiting Mediport extraction today- if blood cx clear after surgery, can hold on GENARO- if blood cx remain positive- will then proceed with GENARO later in week. Cont abx. ID following.     3. Hypokalemia- replete lytes daily.     4. DVT proph.

## 2017-08-15 NOTE — CONSULT NOTE ADULT - SUBJECTIVE AND OBJECTIVE BOX
Cardiology Consultation    HPI: 62F with PMHx AIP, chronic pain, anxiety with recent acute flare of AIP who now p/w severe RLQ pain radiating to her right flank, starting ~ 3 days PTA. Nothing mitigates nor exacerbates. a/w intermittent fever. 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 Baseline anorexia & constipation- last BM ~ 1 week ago. When initially examined, Pt in severe distress d/t localized RLQ pain. CT(A/P) reviewed with Rads- no evidence of appendicitis but profound stool retention. Pain significantly reduced with IV Dilaudid/Toradol.     8/15- Pt now with G+sepsis- awaiting Mediport extraction today. Currently afebrile, no CP/SOB. Lying flat in bed. Not on tele.     PAST MEDICAL & SURGICAL HISTORY:  Chronic abdominal pain  Osteoporosis  Adrenal insufficiency, primary  Knee effusion  Porphyria  S/P tonsillectomy  S/P cholecystectomy  H/O spinal fusion    Allergies  chlorhexidine containing compounds (Unknown)    SOCIAL HISTORY: Denies tobacco, etoh abuse or illicit drug use    FAMILY HISTORY: Family history of porphyria (Grandparent)    MEDICATIONS  (STANDING):  pantoprazole    Tablet 40 milliGRAM(s) Oral before breakfast  cholecalciferol 1000 Unit(s) Oral daily  multivitamin 1 Tablet(s) Oral daily  morphine ER Tablet 200 milliGRAM(s) Oral every 12 hours  morphine ER Tablet 30 milliGRAM(s) Oral every 12 hours  lactobacillus acidophilus 2 Tablet(s) Oral two times a day with meals  HYDROmorphone PCA (1 mG/mL) 30 milliLiter(s) PCA Continuous PCA Continuous  ceFAZolin   IVPB 2000 milliGRAM(s) IV Intermittent every 8 hours  ceFAZolin   IVPB   IV Intermittent   enoxaparin Injectable 40 milliGRAM(s) SubCutaneous daily  sodium chloride 0.9%. 1000 milliLiter(s) (63 mL/Hr) IV Continuous <Continuous>  psyllium Powder 1 Packet(s) Oral every other day    MEDICATIONS  (PRN):  LORazepam     Tablet 1 milliGRAM(s) Oral every 4 hours PRN Anxiety  acetaminophen   Tablet 650 milliGRAM(s) Oral every 6 hours PRN For Temp greater than 38 C (100.4 F)  acetaminophen   Tablet. 650 milliGRAM(s) Oral every 6 hours PRN Mild Pain (1 - 3)  ondansetron Injectable 4 milliGRAM(s) IV Push every 6 hours PRN Nausea  senna 2 Tablet(s) Oral at bedtime PRN Constipation  naloxone Injectable 0.1 milliGRAM(s) IV Push every 3 minutes PRN For ANY of the following changes in patient status:  A. RR LESS THAN 10 breaths per minute, B. Oxygen saturation LESS THAN 90%, C. Sedation score of 6    Vital Signs Last 24 Hrs  T(C): 36.4 (15 Aug 2017 04:32), Max: 36.9 (15 Aug 2017 02:06)  T(F): 97.5 (15 Aug 2017 04:32), Max: 98.4 (15 Aug 2017 02:06)  HR: 104 (15 Aug 2017 04:32) (95 - 107)  BP: 148/85 (15 Aug 2017 04:32) (148/85 - 168/81)  BP(mean): --  RR: 18 (15 Aug 2017 04:32) (18 - 18)  SpO2: 97% (15 Aug 2017 04:32) (97% - 100%)    REVIEW OF SYSTEMS:    CONSTITUTIONAL:  As per HPI.  HEENT:  Eyes:  No diplopia or blurred vision. ENT:  No earache, sore throat or runny nose.  CARDIOVASCULAR:  No pressure, squeezing, strangling, tightness, heaviness or aching about the chest, neck, axilla or epigastrium.  RESPIRATORY:  No cough, shortness of breath, PND or orthopnea.  GASTROINTESTINAL:  No nausea, vomiting or diarrhea.  GENITOURINARY:  No dysuria, frequency or urgency.  MUSCULOSKELETAL:  As per HPI.    PHYSICAL EXAMINATION:    GENERAL APPEARANCE:  Pt. is not currently dyspneic, in no distress. Pt. is alert, oriented, and pleasant.  HEENT:  Pupils are normal and react normally. No icterus. Mucous membranes well colored.  NECK:  Supple. No lymphadenopathy. Jugular venous pressure not elevated. Carotids equal.   HEART:   The cardiac impulse has a normal quality. There are no murmurs, rubs or gallops noted  CHEST:  Chest is clear to auscultation. Normal respiratory effort.  ABDOMEN:  Soft and nontender.   EXTREMITIES:  There is no edema.     I&O's Summary    14 Aug 2017 07:01  -  15 Aug 2017 07:00  --------------------------------------------------------  IN: 360 mL / OUT: 0 mL / NET: 360 mL    LABS:                        11.3   8.1   )-----------( 324      ( 15 Aug 2017 05:05 )             32.9     08-15    140  |  107  |  5<L>  ----------------------------<  91  3.5   |  22  |  0.42<L>    Ca    9.0      15 Aug 2017 05:05  Phos  4.1     08-15  Mg     2.3     08-15    EKG: Normal sinus rhythm  Septal infarct (cited on or before 31-DEC-2012)  T wave abnormality, consider inferior ischemia    TELEMETRY: Not on tele.     CARDIAC TESTS: 2Decho- Left ventricle endocardium was well visualized with preserved systolic   function. Left ventriclesize and structure are within normal   limitations.  Estimated ejection fraction is 66% via bi-plane method.   Fibrocalcific changes noted to the mitral valve leaflets with preserved   excursion. Trace mitral regurgitation noted.   The tricuspid valve leaflets are well seen and appear thin and pliable   with preserved leaflets excursion. Trace tricuspid regurgitation noted.    RADIOLOGY & ADDITIONAL STUDIES: CT A/P- negative    ASSESSMENT & PLAN:

## 2017-08-15 NOTE — PROGRESS NOTE ADULT - SUBJECTIVE AND OBJECTIVE BOX
62F with PMHx AIP, chronic pain, anxiety with recent acute flare of AIP who now p/w severe RLQ pain radiating to her right flank, starting ~ 3 days PTA. Nothing mitigates nor exacerbates. a/w intermittent fever.   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   Baseline anorexia & constipation- last BM ~ 1 week ago.     When initially examined, Pt in severe distress d/t localized RLQ pain. CT(A/P) reviewed with Rads- no evidence of appendicitis but profound stool retention. Pain significantly reduced with IV Dilaudid/Toradol.      8/11: No O/N events. Significantly more comfortable on PCA. Still no BM. Afebrile. Notified of STAU in 2/2 blood Cx's  8/12: No O/N events. Upset d/t fecal incontinence, now refusing Miralax. Abdominal pain significantly improved after multiple BM's  8/13: No O/N events. No new complaints. Finally getting some rest. Pain controlled.   8/14: No O/N events. No new complaints. Frequent BM's, still with mild crampy abdominal pain. Discussed implications of persistent Staph bacteremia @ length.  8/15: No O/N events. No new complaints. Tolerated Mediport removal w/o issue. Remains afebrile. BM's daily, appropriately loose.    Physical Exam:  · Constitutional	detailed exam	  · Constitutional Details	well-developed; distress due to pain	  · Neck	No bruits; no thyromegaly or nodules	  · Back	No deformity or limitation of movement	  · Respiratory	Breath Sounds equal & clear to percussion & auscultation, no accessory muscle use	  · Cardiovascular	Regular rate & rhythm, normal S1, S2; no murmurs, gallops or rubs; no S3, S4	  · Gastrointestinal	detailed exam	  · GI Normal	soft	  · GI Abnormal	tender  bowel sounds hypoactive  guarding	  · Tenderness	RLQ	  · Genitourinary	Normal genitalia; no lesions; no discharge	  · Extremities	No cyanosis, clubbing or edema	  · Vascular	Equal and normal pulses (carotid, femoral, dorsalis pedis)	  · Neurological	Alert & oriented; no sensory, motor or coordination deficits, normal reflexes	  · Lymph Nodes	No lymphadedenopathy	  · Musculoskeletal	No joint pain, swelling or deformity; no limitation of movement	      Labs and Results:    T(C): 36.3 (08-15-17 @ 14:52), Max: 36.9 (08-15-17 @ 02:06)  HR: 90 (08-15-17 @ 14:52) (82 - 104)  BP: 144/79 (08-15-17 @ 14:52) (132/72 - 168/81)  RR: 16 (08-15-17 @ 14:52) (14 - 18)  SpO2: 98% (08-15-17 @ 14:52) (97% - 100%)  Wt(kg): --                              11.3   8.1   )-----------( 324      ( 15 Aug 2017 05:05 )             32.9     15 Aug 2017 05:05    140    |  107    |  5      ----------------------------<  91     3.5     |  22     |  0.42     Ca    9.0        15 Aug 2017 05:05  Phos  4.1       15 Aug 2017 05:05  Mg     2.3       15 Aug 2017 05:05        CAPILLARY BLOOD GLUCOSE      Hemoglobin A1C, Whole Blood: 5.9 % (08-11 @ 05:40    Assessment and Plan:   Assessment:  · Assessment		  62F with aforementioned PMHx, now a/w    * Severe acute RLQ pain, unclear etiology, suspect fecal retention as primary cause, appendix visualized & wnl, lactate wnl- doubt bowel ischemia; KUB unrevealing but obscured by remaining contrast  - PRN analgesics/antiemetics  - c/w senna daily, psyllium QOD  - Hem/GI evals appreciated    * Sepsis 2/2 MSSA bacteremia d/t Mediport with persistence on surveillance blood Cx's  - c/w Ancef Q8  - surveillance blood Cx's 8/16  - Cardio eval for GENARO appreciated- plan to proceed if blood Cx's remain (+)    * Chronic pain, anxiety- status quo; i-STOP c/w Pt report  - c/w MS Contin 230mg Q12  - Dilaudid PCA  - Ativan Q4 PRN    * GERD, stable  - c/w PPI    * hypokalemia  - replete PRN    * DVT PPx- Lovenox    * DNR/DNI

## 2017-08-15 NOTE — PROGRESS NOTE ADULT - SUBJECTIVE AND OBJECTIVE BOX
patient seen, chart reviewed  Overall improved over last several days, abdominal discomfort significantly reduced although still present  Denies fevers right gross vomiting,  Lungs clear  Heart S1-S2  Abdomen soft, right lower quadrant tenderness, no rebound or guarding    , WBC 8.1, Hgb 11.3,, hematocrit 32.9 platelets 324  Impression acute intermittent porphyria with long history of intermittent Porphyria/ intermittent abdominal crises  maintained with daily IV hydration/glucose/chronic narcotics  Now with abdominal pain, different from typical crisis, by history and CT scan suggestive of diffuse obstipation  Blood cultures positive,  Case reviewed with ID Dr. Jens Rico  Patient will need port removed, will need temporary ongoing IV access to be determined by surgery Dr. Durbin  Continue laxatives  Has had numerous discussions with the patient in reference to advanced directives  She is not suicidal,   She is however very unhappy with her routine existence of daily IV hydration, and intermittent hospitalizations 4 significant pain  Should she have an unexpected event which would require resuscitation or intubation she is clear that she would not want this  She has thought about it extensively and discussed this in detail with her sister as well  Despite this she will rescind the DNR/DNI status for her upcoming surgical procedures.

## 2017-08-16 LAB
ANION GAP SERPL CALC-SCNC: 10 MMOL/L — SIGNIFICANT CHANGE UP (ref 5–17)
BASOPHILS # BLD AUTO: 0.1 K/UL — SIGNIFICANT CHANGE UP (ref 0–0.2)
BASOPHILS NFR BLD AUTO: 1.2 % — SIGNIFICANT CHANGE UP (ref 0–2)
BUN SERPL-MCNC: 7 MG/DL — SIGNIFICANT CHANGE UP (ref 7–23)
CALCIUM SERPL-MCNC: 9 MG/DL — SIGNIFICANT CHANGE UP (ref 8.5–10.1)
CHLORIDE SERPL-SCNC: 108 MMOL/L — SIGNIFICANT CHANGE UP (ref 96–108)
CO2 SERPL-SCNC: 23 MMOL/L — SIGNIFICANT CHANGE UP (ref 22–31)
CREAT SERPL-MCNC: 0.46 MG/DL — LOW (ref 0.5–1.3)
EOSINOPHIL # BLD AUTO: 0.2 K/UL — SIGNIFICANT CHANGE UP (ref 0–0.5)
EOSINOPHIL NFR BLD AUTO: 2.8 % — SIGNIFICANT CHANGE UP (ref 0–6)
GLUCOSE SERPL-MCNC: 82 MG/DL — SIGNIFICANT CHANGE UP (ref 70–99)
HCT VFR BLD CALC: 33.6 % — LOW (ref 34.5–45)
HGB BLD-MCNC: 11.2 G/DL — LOW (ref 11.5–15.5)
LYMPHOCYTES # BLD AUTO: 2.4 K/UL — SIGNIFICANT CHANGE UP (ref 1–3.3)
LYMPHOCYTES # BLD AUTO: 31.4 % — SIGNIFICANT CHANGE UP (ref 13–44)
MAGNESIUM SERPL-MCNC: 2.3 MG/DL — SIGNIFICANT CHANGE UP (ref 1.6–2.6)
MCHC RBC-ENTMCNC: 27.4 PG — SIGNIFICANT CHANGE UP (ref 27–34)
MCHC RBC-ENTMCNC: 33.3 GM/DL — SIGNIFICANT CHANGE UP (ref 32–36)
MCV RBC AUTO: 82.2 FL — SIGNIFICANT CHANGE UP (ref 80–100)
MONOCYTES # BLD AUTO: 0.7 K/UL — SIGNIFICANT CHANGE UP (ref 0–0.9)
MONOCYTES NFR BLD AUTO: 8.4 % — SIGNIFICANT CHANGE UP (ref 2–14)
NEUTROPHILS # BLD AUTO: 4.4 K/UL — SIGNIFICANT CHANGE UP (ref 1.8–7.4)
NEUTROPHILS NFR BLD AUTO: 56.2 % — SIGNIFICANT CHANGE UP (ref 43–77)
NIGHT BLUE STAIN TISS: SIGNIFICANT CHANGE UP
PHOSPHATE SERPL-MCNC: 3.5 MG/DL — SIGNIFICANT CHANGE UP (ref 2.5–4.5)
PLATELET # BLD AUTO: 336 K/UL — SIGNIFICANT CHANGE UP (ref 150–400)
POTASSIUM SERPL-MCNC: 3.5 MMOL/L — SIGNIFICANT CHANGE UP (ref 3.5–5.3)
POTASSIUM SERPL-SCNC: 3.5 MMOL/L — SIGNIFICANT CHANGE UP (ref 3.5–5.3)
RBC # BLD: 4.08 M/UL — SIGNIFICANT CHANGE UP (ref 3.8–5.2)
RBC # FLD: 12.2 % — SIGNIFICANT CHANGE UP (ref 10.3–14.5)
SODIUM SERPL-SCNC: 141 MMOL/L — SIGNIFICANT CHANGE UP (ref 135–145)
SPECIMEN SOURCE: SIGNIFICANT CHANGE UP
WBC # BLD: 7.8 K/UL — SIGNIFICANT CHANGE UP (ref 3.8–10.5)
WBC # FLD AUTO: 7.8 K/UL — SIGNIFICANT CHANGE UP (ref 3.8–10.5)

## 2017-08-16 RX ADMIN — Medication 100 MILLIGRAM(S): at 15:16

## 2017-08-16 RX ADMIN — ENOXAPARIN SODIUM 40 MILLIGRAM(S): 100 INJECTION SUBCUTANEOUS at 17:57

## 2017-08-16 RX ADMIN — Medication 1 MILLIGRAM(S): at 01:18

## 2017-08-16 RX ADMIN — MORPHINE SULFATE 30 MILLIGRAM(S): 50 CAPSULE, EXTENDED RELEASE ORAL at 05:20

## 2017-08-16 RX ADMIN — Medication 2 TABLET(S): at 10:51

## 2017-08-16 RX ADMIN — Medication 1000 UNIT(S): at 12:28

## 2017-08-16 RX ADMIN — Medication 1 TABLET(S): at 12:28

## 2017-08-16 RX ADMIN — SODIUM CHLORIDE 63 MILLILITER(S): 9 INJECTION INTRAMUSCULAR; INTRAVENOUS; SUBCUTANEOUS at 05:20

## 2017-08-16 RX ADMIN — Medication 100 MILLIGRAM(S): at 21:27

## 2017-08-16 RX ADMIN — Medication 1 PACKET(S): at 12:28

## 2017-08-16 RX ADMIN — HYDROMORPHONE HYDROCHLORIDE 30 MILLILITER(S): 2 INJECTION INTRAMUSCULAR; INTRAVENOUS; SUBCUTANEOUS at 15:14

## 2017-08-16 RX ADMIN — MORPHINE SULFATE 30 MILLIGRAM(S): 50 CAPSULE, EXTENDED RELEASE ORAL at 17:57

## 2017-08-16 RX ADMIN — MORPHINE SULFATE 200 MILLIGRAM(S): 50 CAPSULE, EXTENDED RELEASE ORAL at 05:20

## 2017-08-16 RX ADMIN — Medication 100 MILLIGRAM(S): at 05:20

## 2017-08-16 RX ADMIN — PANTOPRAZOLE SODIUM 40 MILLIGRAM(S): 20 TABLET, DELAYED RELEASE ORAL at 10:51

## 2017-08-16 RX ADMIN — MORPHINE SULFATE 200 MILLIGRAM(S): 50 CAPSULE, EXTENDED RELEASE ORAL at 17:57

## 2017-08-16 RX ADMIN — Medication 2 TABLET(S): at 17:57

## 2017-08-16 NOTE — PROGRESS NOTE ADULT - SUBJECTIVE AND OBJECTIVE BOX
Patient is a 62y old  Female who presents with a chief complaint of abdominal pain (10 Aug 2017 23:24)    HPI:  61 y/o Female with h/o intermittent porphyria, chronic pain, anxiety disorder, OP was admitted on 8/10 for severe RLQ pain radiating to her right flank, starting ~ 3 days PTA. Nothing mitigates nor exacerbates. She also reports intermittent fever. In ER, she was found with fever to 101.4F; she received zosyn IV. Reported with bacteremia.    Weak looking  No fever or chills  Denies abdominal pain  Weak looking    MEDICATIONS  (STANDING):  enoxaparin Injectable 40 milliGRAM(s) SubCutaneous every 24 hours  ceFAZolin   IVPB 2000 milliGRAM(s) IV Intermittent every 8 hours  lactobacillus acidophilus 2 Tablet(s) Oral two times a day with meals  pantoprazole    Tablet 40 milliGRAM(s) Oral before breakfast  cholecalciferol 1000 Unit(s) Oral daily  multivitamin 1 Tablet(s) Oral daily  morphine ER Tablet 200 milliGRAM(s) Oral every 12 hours  HYDROmorphone PCA (1 mG/mL) 30 milliLiter(s) PCA Continuous PCA Continuous  psyllium Powder 1 Packet(s) Oral every other day  sodium chloride 0.9%. 1000 milliLiter(s) (63 mL/Hr) IV Continuous <Continuous>  morphine ER Tablet 30 milliGRAM(s) Oral every 12 hours    MEDICATIONS  (PRN):  LORazepam     Tablet 1 milliGRAM(s) Oral every 4 hours PRN Anxiety  acetaminophen   Tablet. 650 milliGRAM(s) Oral every 6 hours PRN Mild Pain (1 - 3)  senna 2 Tablet(s) Oral at bedtime PRN Constipation  naloxone Injectable 0.1 milliGRAM(s) IV Push every 3 minutes PRN For ANY of the following changes in patient status:  A. RR LESS THAN 10 breaths per minute, B. Oxygen saturation LESS THAN 90%, C. Sedation score of 6      Vital Signs Last 24 Hrs  T(C): 36.8 (16 Aug 2017 04:10), Max: 36.9 (16 Aug 2017 00:08)  T(F): 98.2 (16 Aug 2017 04:10), Max: 98.4 (16 Aug 2017 00:08)  HR: 101 (16 Aug 2017 04:10) (82 - 101)  BP: 173/86 (16 Aug 2017 04:10) (132/72 - 173/86)  BP(mean): --  RR: 16 (16 Aug 2017 04:10) (14 - 16)  SpO2: 98% (16 Aug 2017 04:10) (98% - 100%)    Physical Exam:        Constitutional: frail looking  HEENT: NC/AT, EOMI, PERRLA  Neck: supple  Back: no tenderness  Respiratory: clear; mediport in place  Cardiovascular: S1S2 regular, no murmurs  Abdomen: soft, not tender, not distended, positive BS  Genitourinary: deferred  Rectal: deferred  Musculoskeletal: no muscle tenderness, no joint swelling or tenderness  Extremities: no pedal edema  Neurological: AxOx3, moving all extremities, no focal deficits  Skin: no rashes    Labs:                        11.1   7.3   )-----------( 284      ( 14 Aug 2017 05:33 )             32.5     08-14    140  |  108  |  2<L>  ----------------------------<  110<H>  3.9   |  24  |  0.40<L>    Ca    8.9      14 Aug 2017 05:33  Phos  3.8     08-14  Mg     2.5     08-14         Vancomycin Level, Trough: 17.4 ug/mL ( @ 05:27)                            10.8   6.4   )-----------( 204      ( 13 Aug 2017 05:27 )             30.4     08-13    142  |  109<H>  |  2<L>  ----------------------------<  146<H>  3.3<L>   |  26  |  0.38<L>    Ca    8.6      13 Aug 2017 05:27  Phos  3.0     08-13  Mg     2.4     08-13         Vancomycin Level, Trough: 17.4 ug/mL ( @ 05:27)                            11.1   10.0  )-----------( 128      ( 11 Aug 2017 05:40 )             32.1     08-11    136  |  102  |  5<L>  ----------------------------<  128<H>  3.6   |  27  |  0.52    Ca    8.1<L>      11 Aug 2017 05:40  Phos  2.4     -  Mg     2.1     08-    TPro  6.8  /  Alb  3.1<L>  /  TBili  0.8  /  DBili  x   /  AST  31  /  ALT  55  /  AlkPhos  101  08-10     LIVER FUNCTIONS - ( 10 Aug 2017 12:44 )  Alb: 3.1 g/dL / Pro: 6.8 gm/dL / ALK PHOS: 101 U/L / ALT: 55 U/L / AST: 31 U/L / GGT: x           Urinalysis Basic - ( 10 Aug 2017 12:44 )    Color: Yellow / Appearance: Clear / S.005 / pH: x  Gluc: x / Ketone: Moderate  / Bili: Negative / Urobili: Negative mg/dL   Blood: x / Protein: Negative mg/dL / Nitrite: Negative   Leuk Esterase: Negative / RBC: x / WBC x   Sq Epi: x / Non Sq Epi: x / Bacteria: x    Culture - Blood (08.10.17 @ 12:57)    -  Candida albicans: Nondet    -  Candida parapsilosis: Nondet    -  Escherichia coli: Nondet    -  Haemophilus influenzae: Nondet    -  Methicillin resistant Staphylococcus aureus (MRSA): Nondet    -  Proteus species: Nondet    -  Serratia marcescens: Nondet    -  Streptococcus pneumoniae: Nondet    -  Vancomycin resistant Enterococcus sp.: Nondet    -  Candida krusei: Nondet    -  Klebsiella pneumoniae: Nondet    -  Multidrug (KPC pos) resistant organism: Nondet    -  Staphylococcus aureus: Detec Any isolate of Staphylococcus aureus from a blood culture is NOT considered a contaminant.    -  Streptococcus pyogenes (Group A): Nondet    -  Streptococcus sp. (Not Grp A, B or S pneumoniae): Nondet    Gram Stain:   Growth in aerobic and anaerobic bottles: Gram Positive Cocci in Clusters    -  Candida glabrata: Nondet    -  Candida tropicalis: Nondet    -  Klebsiella oxytoca: Nondet    -  Listeria monocytogenes: Nondet    -  Acinetobacter baumanii: Nondet    -  Coagulase negative Staphylococcus: Nondet    -  Enterobacter cloacae complex: Nondet    -  Enterococcus species: Nondet    -  Neisseria meningitidis: Nondet    -  Pseudomonas aeruginosa: Nondet    -  Streptococcus agalactiae (Group B): Nondet    Specimen Source: .Blood None    Organism: Blood Culture PCR    Culture Results:   Growth in aerobic and anaerobic bottles: Gram Positive Cocci in Clusters  ***Blood Panel PCR results on this specimen are available  approximately 3 hours after the Gram stain result.***  Gram stain, PCR, and/or culture results may not always  correspond due to difference in methodologies.    Organism Identification: Blood Culture PCR    Method Type: PCR    Culture - Blood (17 @ 10:06)    Gram Stain:   Growth in anaerobic bottle: Gram Positive Cocci in Clusters  Growth in aerobic bottle: Gram Positive Cocci in Clusters    Specimen Source: .Blood None    Culture Results:   Growth in aerobic and anaerobic bottles: Staphylococcus aureus  ***********Note************  This isolate demonstrates inducible  clindamycin resistance.  Clindamycin may still be effective in some patients.  See previous culture [id number]          Radiology:    < from: CT Abdomen and Pelvis w/ Oral Cont and w/ IV Cont (08.10.17 @ 15:09) >  No acute abdominal pathology.   Distended urinary bladder causing mild fullness of the renal collecting   systems.    < end of copied text >    < from: Transthoracic Echocardiogram (17 @ 09:54) >   Mitral Valve   Fibrocalcific changes noted to the mitralvalve leaflets with preserved   excursion. Trace mitral regurgitation noted.     Aortic Valve   The aortic valve leaflets are well seen with normal valve morphology;   preserved leaflet excursion noted. No aortic valve insufficiency noted.     Tricuspid Valve   The tricuspid valve leaflets are well seen and appear thin and pliable   with preserved leaflets excursion. Trace tricuspid regurgitation noted.     Pulmonic Valve   Pulmonic valve not well seen.    < end of copied text >      Advanced directives addressed: full resuscitation

## 2017-08-16 NOTE — PROGRESS NOTE ADULT - ASSESSMENT
61 y/o Female with h/o intermittent porphyria, chronic pain, anxiety disorder, OP was admitted on 8/10 for severe RLQ pain radiating to her right flank, starting ~ 3 days PTA. Nothing mitigates nor exacerbates. She also reports intermittent fever. In ER, she was found with fever to 101.4F; she received zosyn IV. Reported with bacteremia.    1. Febrile syndrome resolving. Sepsis with MSSA. Probable mediport infection s/p removal. ?endocarditis.  -the patient has persistent bacteremia  -mediport was removed  -s/p vancomycin 1 gm IV q12h and ceftriaxone 1 gm IV qd # 4  -tolerating abx well so far; no side effects noted  -on cefazolin 2 gm IV q8h # 3  -tolerating abx well so far; no side effects noted  -repeat BC x 2  -continue IV abx coverage  -monitor temps  -f/u CBC  -supportive care  2. Other issues:   -care per medicine

## 2017-08-16 NOTE — PROGRESS NOTE ADULT - ASSESSMENT
62F with above hx p/w abdominal pain/fever.     1. Acute RLQ pain/abdominal pain- unclear etiology, CT A/P negative for acute findings. W/u ongoing as per primary team.     2. Sepsis with 2/2 MSSA bacteremia-  pt currently has no fever or leukocytosis. No significant murmur on exam. 2Decho negative for vegetations. Pt s/p Mediport extraction- blood cx to be repeated today- if blood cx clear after surgery, can hold on GENARO- if blood cx remain positive- will then proceed with GENARO. Cont abx. ID following.     3. Hypokalemia- replete lytes daily.     4. DVT proph.

## 2017-08-16 NOTE — PROGRESS NOTE ADULT - SUBJECTIVE AND OBJECTIVE BOX
62F with PMHx AIP, chronic pain, anxiety with recent acute flare of AIP who now p/w severe RLQ pain radiating to her right flank, starting ~ 3 days PTA. Nothing mitigates nor exacerbates. a/w intermittent fever.   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   Baseline anorexia & constipation- last BM ~ 1 week ago.     When initially examined, Pt in severe distress d/t localized RLQ pain. CT(A/P) reviewed with Rads- no evidence of appendicitis but profound stool retention. Pain significantly reduced with IV Dilaudid/Toradol.      8/11: No O/N events. Significantly more comfortable on PCA. Still no BM. Afebrile. Notified of STAU in 2/2 blood Cx's  8/12: No O/N events. Upset d/t fecal incontinence, now refusing Miralax. Abdominal pain significantly improved after multiple BM's  8/13: No O/N events. No new complaints. Finally getting some rest. Pain controlled.   8/14: No O/N events. No new complaints. Frequent BM's, still with mild crampy abdominal pain. Discussed implications of persistent Staph bacteremia @ length.  8/15: No O/N events. No new complaints. Tolerated Mediport removal w/o issue. Remains afebrile. BM's daily, appropriately loose.  8/16: No O/N events. No new complaints. Essentially status quo. Remains. afebrile.    Physical Exam:  · Constitutional	detailed exam	  · Constitutional Details	well-developed; distress due to pain	  · Neck	No bruits; no thyromegaly or nodules	  · Back	No deformity or limitation of movement	  · Respiratory	Breath Sounds equal & clear to percussion & auscultation, no accessory muscle use	  · Cardiovascular	Regular rate & rhythm, normal S1, S2; no murmurs, gallops or rubs; no S3, S4	  · Gastrointestinal	detailed exam	  · GI Normal	soft	  · GI Abnormal	tender  bowel sounds hypoactive  guarding	  · Tenderness	RLQ	  · Genitourinary	Normal genitalia; no lesions; no discharge	  · Extremities	No cyanosis, clubbing or edema	  · Vascular	Equal and normal pulses (carotid, femoral, dorsalis pedis)	  · Neurological	Alert & oriented; no sensory, motor or coordination deficits, normal reflexes	  · Lymph Nodes	No lymphadedenopathy	  · Musculoskeletal	No joint pain, swelling or deformity; no limitation of movement	      Labs and Results:  T(C): 36.9 (08-16-17 @ 14:37), Max: 36.9 (08-16-17 @ 00:08)  HR: 96 (08-16-17 @ 14:37) (91 - 101)  BP: 154/77 (08-16-17 @ 14:37) (148/71 - 173/86)  RR: 18 (08-16-17 @ 14:37) (16 - 18)  SpO2: 100% (08-16-17 @ 14:37) (98% - 100%)  Wt(kg): --                              11.2   7.8   )-----------( 336      ( 16 Aug 2017 05:32 )             33.6     16 Aug 2017 05:32    141    |  108    |  7      ----------------------------<  82     3.5     |  23     |  0.46     Ca    9.0        16 Aug 2017 05:32  Phos  3.5       16 Aug 2017 05:32  Mg     2.3       16 Aug 2017 05:32      Hemoglobin A1C, Whole Blood: 5.9 % (08-11 @ 05:40)    Assessment and Plan:   Assessment:  · Assessment		  62F with aforementioned PMHx, now a/w    * Severe acute RLQ pain, unclear etiology, suspect fecal retention as primary cause, appendix visualized & wnl, lactate wnl- doubt bowel ischemia; KUB unrevealing but obscured by remaining contrast  - PRN analgesics/antiemetics  - c/w senna daily, psyllium QOD  - Hem/GI evals appreciated    * Sepsis 2/2 MSSA bacteremia d/t Mediport with persistence on surveillance blood Cx's  - c/w Ancef Q8  - surveillance blood Cx's 8/16 pending result; catheter Cx NGTD  - Cardio eval for GENARO appreciated- plan to proceed if blood Cx's remain (+)    * Chronic pain, anxiety- status quo; i-STOP c/w Pt report  - c/w MS Contin 230mg Q12  - Dilaudid PCA  - Ativan Q4 PRN    * GERD, stable  - c/w PPI    * hypokalemia  - replete PRN    * DVT PPx- Lovenox    * DNR/DNI

## 2017-08-16 NOTE — PROGRESS NOTE ADULT - SUBJECTIVE AND OBJECTIVE BOX
Cardiology Progress Note:    HPI: 62F with PMHx AIP, chronic pain, anxiety with recent acute flare of AIP who now p/w severe RLQ pain radiating to her right flank, starting ~ 3 days PTA. Nothing mitigates nor exacerbates. a/w intermittent fever. 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 Baseline anorexia & constipation- last BM ~ 1 week ago. When initially examined, Pt in severe distress d/t localized RLQ pain. CT(A/P) reviewed with Rads- no evidence of appendicitis but profound stool retention. Pain significantly reduced with IV Dilaudid/Toradol.     8/15- Pt now with G+sepsis- awaiting Mediport extraction today. Currently afebrile, no CP/SOB. Lying flat in bed. Not on tele.     8/16- No CP/SOB. S/p Mediport extraction. No fevers. Lying flat in bed. Not on tele. Case d/w ID and pt at bedside.    PAST MEDICAL & SURGICAL HISTORY:  Chronic abdominal pain  Osteoporosis  Adrenal insufficiency, primary  Knee effusion  Porphyria  S/P tonsillectomy  S/P cholecystectomy  H/O spinal fusion    Allergies  chlorhexidine containing compounds (Unknown)    SOCIAL HISTORY: Denies tobacco, etoh abuse or illicit drug use    FAMILY HISTORY: Family history of porphyria (Grandparent)    MEDICATIONS  (STANDING):  pantoprazole    Tablet 40 milliGRAM(s) Oral before breakfast  cholecalciferol 1000 Unit(s) Oral daily  multivitamin 1 Tablet(s) Oral daily  morphine ER Tablet 200 milliGRAM(s) Oral every 12 hours  morphine ER Tablet 30 milliGRAM(s) Oral every 12 hours  lactobacillus acidophilus 2 Tablet(s) Oral two times a day with meals  HYDROmorphone PCA (1 mG/mL) 30 milliLiter(s) PCA Continuous PCA Continuous  ceFAZolin   IVPB 2000 milliGRAM(s) IV Intermittent every 8 hours  ceFAZolin   IVPB   IV Intermittent   enoxaparin Injectable 40 milliGRAM(s) SubCutaneous daily  sodium chloride 0.9%. 1000 milliLiter(s) (63 mL/Hr) IV Continuous <Continuous>  psyllium Powder 1 Packet(s) Oral every other day    MEDICATIONS  (PRN):  LORazepam     Tablet 1 milliGRAM(s) Oral every 4 hours PRN Anxiety  acetaminophen   Tablet 650 milliGRAM(s) Oral every 6 hours PRN For Temp greater than 38 C (100.4 F)  acetaminophen   Tablet. 650 milliGRAM(s) Oral every 6 hours PRN Mild Pain (1 - 3)  ondansetron Injectable 4 milliGRAM(s) IV Push every 6 hours PRN Nausea  senna 2 Tablet(s) Oral at bedtime PRN Constipation  naloxone Injectable 0.1 milliGRAM(s) IV Push every 3 minutes PRN For ANY of the following changes in patient status:  A. RR LESS THAN 10 breaths per minute, B. Oxygen saturation LESS THAN 90%, C. Sedation score of 6    Vital Signs Last 24 Hrs  T(C): 36.4 (15 Aug 2017 04:32), Max: 36.9 (15 Aug 2017 02:06)  T(F): 97.5 (15 Aug 2017 04:32), Max: 98.4 (15 Aug 2017 02:06)  HR: 104 (15 Aug 2017 04:32) (95 - 107)  BP: 148/85 (15 Aug 2017 04:32) (148/85 - 168/81)  BP(mean): --  RR: 18 (15 Aug 2017 04:32) (18 - 18)  SpO2: 97% (15 Aug 2017 04:32) (97% - 100%)    REVIEW OF SYSTEMS:    CONSTITUTIONAL:  As per HPI.  HEENT:  Eyes:  No diplopia or blurred vision. ENT:  No earache, sore throat or runny nose.  CARDIOVASCULAR:  No pressure, squeezing, strangling, tightness, heaviness or aching about the chest, neck, axilla or epigastrium.  RESPIRATORY:  No cough, shortness of breath, PND or orthopnea.  GASTROINTESTINAL:  No nausea, vomiting or diarrhea.  GENITOURINARY:  No dysuria, frequency or urgency.  MUSCULOSKELETAL:  As per HPI.    PHYSICAL EXAMINATION:    GENERAL APPEARANCE:  Pt. is not currently dyspneic, in no distress. Pt. is alert, oriented, and pleasant.  HEENT:  Pupils are normal and react normally. No icterus. Mucous membranes well colored.  NECK:  Supple. No lymphadenopathy. Jugular venous pressure not elevated. Carotids equal.   HEART:   The cardiac impulse has a normal quality. There are no murmurs, rubs or gallops noted  CHEST:  Chest is clear to auscultation. Normal respiratory effort.  ABDOMEN:  Soft and nontender.   EXTREMITIES:  There is no edema.     I&O's Summary    14 Aug 2017 07:01  -  15 Aug 2017 07:00  --------------------------------------------------------  IN: 360 mL / OUT: 0 mL / NET: 360 mL    LABS:                        11.3   8.1   )-----------( 324      ( 15 Aug 2017 05:05 )             32.9     08-15    140  |  107  |  5<L>  ----------------------------<  91  3.5   |  22  |  0.42<L>    Ca    9.0      15 Aug 2017 05:05  Phos  4.1     08-15  Mg     2.3     08-15    EKG: Normal sinus rhythm  Septal infarct (cited on or before 31-DEC-2012)  T wave abnormality, consider inferior ischemia    TELEMETRY: Not on tele.     CARDIAC TESTS: 2Decho- Left ventricle endocardium was well visualized with preserved systolic   function. Left ventriclesize and structure are within normal   limitations.  Estimated ejection fraction is 66% via bi-plane method.   Fibrocalcific changes noted to the mitral valve leaflets with preserved   excursion. Trace mitral regurgitation noted.   The tricuspid valve leaflets are well seen and appear thin and pliable   with preserved leaflets excursion. Trace tricuspid regurgitation noted.    RADIOLOGY & ADDITIONAL STUDIES: CT A/P- negative    ASSESSMENT & PLAN:

## 2017-08-17 LAB
ANION GAP SERPL CALC-SCNC: 9 MMOL/L — SIGNIFICANT CHANGE UP (ref 5–17)
BUN SERPL-MCNC: 5 MG/DL — LOW (ref 7–23)
CALCIUM SERPL-MCNC: 9.2 MG/DL — SIGNIFICANT CHANGE UP (ref 8.5–10.1)
CHLORIDE SERPL-SCNC: 107 MMOL/L — SIGNIFICANT CHANGE UP (ref 96–108)
CO2 SERPL-SCNC: 25 MMOL/L — SIGNIFICANT CHANGE UP (ref 22–31)
CREAT SERPL-MCNC: 0.58 MG/DL — SIGNIFICANT CHANGE UP (ref 0.5–1.3)
GLUCOSE SERPL-MCNC: 87 MG/DL — SIGNIFICANT CHANGE UP (ref 70–99)
HCT VFR BLD CALC: 34.4 % — LOW (ref 34.5–45)
HGB BLD-MCNC: 11.6 G/DL — SIGNIFICANT CHANGE UP (ref 11.5–15.5)
MAGNESIUM SERPL-MCNC: 2.3 MG/DL — SIGNIFICANT CHANGE UP (ref 1.6–2.6)
MCHC RBC-ENTMCNC: 27.9 PG — SIGNIFICANT CHANGE UP (ref 27–34)
MCHC RBC-ENTMCNC: 33.9 GM/DL — SIGNIFICANT CHANGE UP (ref 32–36)
MCV RBC AUTO: 82.4 FL — SIGNIFICANT CHANGE UP (ref 80–100)
PHOSPHATE SERPL-MCNC: 3.6 MG/DL — SIGNIFICANT CHANGE UP (ref 2.5–4.5)
PLATELET # BLD AUTO: 362 K/UL — SIGNIFICANT CHANGE UP (ref 150–400)
POTASSIUM SERPL-MCNC: 3.8 MMOL/L — SIGNIFICANT CHANGE UP (ref 3.5–5.3)
POTASSIUM SERPL-SCNC: 3.8 MMOL/L — SIGNIFICANT CHANGE UP (ref 3.5–5.3)
RBC # BLD: 4.17 M/UL — SIGNIFICANT CHANGE UP (ref 3.8–5.2)
RBC # FLD: 12.2 % — SIGNIFICANT CHANGE UP (ref 10.3–14.5)
SODIUM SERPL-SCNC: 141 MMOL/L — SIGNIFICANT CHANGE UP (ref 135–145)
WBC # BLD: 8.5 K/UL — SIGNIFICANT CHANGE UP (ref 3.8–10.5)
WBC # FLD AUTO: 8.5 K/UL — SIGNIFICANT CHANGE UP (ref 3.8–10.5)

## 2017-08-17 PROCEDURE — 99233 SBSQ HOSP IP/OBS HIGH 50: CPT

## 2017-08-17 RX ORDER — PSYLLIUM SEED (WITH DEXTROSE)
1 POWDER (GRAM) ORAL ONCE
Qty: 0 | Refills: 0 | Status: COMPLETED | OUTPATIENT
Start: 2017-08-17 | End: 2017-08-17

## 2017-08-17 RX ORDER — SODIUM CHLORIDE 9 MG/ML
1000 INJECTION, SOLUTION INTRAVENOUS
Qty: 0 | Refills: 0 | Status: DISCONTINUED | OUTPATIENT
Start: 2017-08-17 | End: 2017-08-19

## 2017-08-17 RX ADMIN — Medication 1 TABLET(S): at 11:13

## 2017-08-17 RX ADMIN — MORPHINE SULFATE 200 MILLIGRAM(S): 50 CAPSULE, EXTENDED RELEASE ORAL at 18:19

## 2017-08-17 RX ADMIN — SODIUM CHLORIDE 83 MILLILITER(S): 9 INJECTION, SOLUTION INTRAVENOUS at 20:28

## 2017-08-17 RX ADMIN — Medication 1 MILLIGRAM(S): at 04:27

## 2017-08-17 RX ADMIN — PANTOPRAZOLE SODIUM 40 MILLIGRAM(S): 20 TABLET, DELAYED RELEASE ORAL at 08:15

## 2017-08-17 RX ADMIN — Medication 100 MILLIGRAM(S): at 05:16

## 2017-08-17 RX ADMIN — MORPHINE SULFATE 200 MILLIGRAM(S): 50 CAPSULE, EXTENDED RELEASE ORAL at 06:04

## 2017-08-17 RX ADMIN — MORPHINE SULFATE 200 MILLIGRAM(S): 50 CAPSULE, EXTENDED RELEASE ORAL at 05:16

## 2017-08-17 RX ADMIN — Medication 2 TABLET(S): at 17:06

## 2017-08-17 RX ADMIN — MORPHINE SULFATE 30 MILLIGRAM(S): 50 CAPSULE, EXTENDED RELEASE ORAL at 05:17

## 2017-08-17 RX ADMIN — Medication 2 TABLET(S): at 08:15

## 2017-08-17 RX ADMIN — MORPHINE SULFATE 30 MILLIGRAM(S): 50 CAPSULE, EXTENDED RELEASE ORAL at 06:04

## 2017-08-17 RX ADMIN — Medication 1000 UNIT(S): at 11:13

## 2017-08-17 RX ADMIN — Medication 100 MILLIGRAM(S): at 22:15

## 2017-08-17 RX ADMIN — MORPHINE SULFATE 200 MILLIGRAM(S): 50 CAPSULE, EXTENDED RELEASE ORAL at 17:06

## 2017-08-17 RX ADMIN — Medication 100 MILLIGRAM(S): at 13:54

## 2017-08-17 RX ADMIN — MORPHINE SULFATE 30 MILLIGRAM(S): 50 CAPSULE, EXTENDED RELEASE ORAL at 18:20

## 2017-08-17 RX ADMIN — Medication 1 PACKET(S): at 22:15

## 2017-08-17 RX ADMIN — MORPHINE SULFATE 30 MILLIGRAM(S): 50 CAPSULE, EXTENDED RELEASE ORAL at 17:07

## 2017-08-17 RX ADMIN — ENOXAPARIN SODIUM 40 MILLIGRAM(S): 100 INJECTION SUBCUTANEOUS at 17:06

## 2017-08-17 NOTE — PROGRESS NOTE ADULT - ASSESSMENT
63 y/o Female with h/o intermittent porphyria, chronic pain, anxiety disorder, OP was admitted on 8/10 for severe RLQ pain radiating to her right flank, starting ~ 3 days PTA. Nothing mitigates nor exacerbates. She also reports intermittent fever. In ER, she was found with fever to 101.4F; she received zosyn IV. Reported with bacteremia.    1. Febrile syndrome resolvwd. Sepsis with MSSA. Probable mediport infection s/p removal. ?endocarditis.  -the patient had persistent bacteremia  -mediport was removed  -s/p vancomycin 1 gm IV q12h and ceftriaxone 1 gm IV qd # 4  -tolerating abx well so far; no side effects noted  -on cefazolin 2 gm IV q8h # 4  -tolerating abx well so far; no side effects noted  -f/u repeat BC x 2  -continue IV abx coverage  -monitor temps  -f/u CBC  -supportive care  2. Other issues:   -care per medicine

## 2017-08-17 NOTE — PROGRESS NOTE ADULT - SUBJECTIVE AND OBJECTIVE BOX
patient appears doing better since admission  Abdominal pain is resolved  Moving bowels  Afebrile   Abdomen soft nontender bowel sounds present    Impression acute intermittent porphyria, stable, continue routine hydration with fluids and glucose, narcotics for pain control  /bacteremia MRSSA Port removed, on IV antibiotics as per infectious disease  Awaiting repeat blood cultures and okay from ID for placement of another port  May need to consider temporary device such as PICC line until ID feels infection is cleared  Patient aware of all this

## 2017-08-17 NOTE — PROGRESS NOTE ADULT - SUBJECTIVE AND OBJECTIVE BOX
Patient is a 62y old  Female who presents with a chief complaint of abdominal pain (10 Aug 2017 23:24)    HPI:  63 y/o Female with h/o intermittent porphyria, chronic pain, anxiety disorder, OP was admitted on 8/10 for severe RLQ pain radiating to her right flank, starting ~ 3 days PTA. Nothing mitigates nor exacerbates. She also reports intermittent fever. In ER, she was found with fever to 101.4F; she received zosyn IV. Reported with bacteremia.    Weak looking  No fever or chills  Denies abdominal pain  Weak looking  No new copmlaints    MEDICATIONS  (STANDING):  enoxaparin Injectable 40 milliGRAM(s) SubCutaneous every 24 hours  ceFAZolin   IVPB 2000 milliGRAM(s) IV Intermittent every 8 hours  lactobacillus acidophilus 2 Tablet(s) Oral two times a day with meals  pantoprazole    Tablet 40 milliGRAM(s) Oral before breakfast  cholecalciferol 1000 Unit(s) Oral daily  multivitamin 1 Tablet(s) Oral daily  morphine ER Tablet 200 milliGRAM(s) Oral every 12 hours  HYDROmorphone PCA (1 mG/mL) 30 milliLiter(s) PCA Continuous PCA Continuous  psyllium Powder 1 Packet(s) Oral every other day  morphine ER Tablet 30 milliGRAM(s) Oral every 12 hours    MEDICATIONS  (PRN):  LORazepam     Tablet 1 milliGRAM(s) Oral every 4 hours PRN Anxiety  acetaminophen   Tablet. 650 milliGRAM(s) Oral every 6 hours PRN Mild Pain (1 - 3)  senna 2 Tablet(s) Oral at bedtime PRN Constipation  naloxone Injectable 0.1 milliGRAM(s) IV Push every 3 minutes PRN For ANY of the following changes in patient status:  A. RR LESS THAN 10 breaths per minute, B. Oxygen saturation LESS THAN 90%, C. Sedation score of 6      Vital Signs Last 24 Hrs  T(C): 36.8 (17 Aug 2017 04:25), Max: 36.9 (16 Aug 2017 14:37)  T(F): 98.3 (17 Aug 2017 04:25), Max: 98.5 (16 Aug 2017 14:37)  HR: 83 (17 Aug 2017 04:25) (83 - 101)  BP: 181/73 (17 Aug 2017 04:25) (154/65 - 181/73)  BP(mean): --  RR: 18 (17 Aug 2017 04:25) (18 - 18)  SpO2: 99% (17 Aug 2017 04:25) (99% - 100%)    Physical Exam:        Constitutional: frail looking  HEENT: NC/AT, EOMI, PERRLA  Neck: supple  Back: no tenderness  Respiratory: clear; mediport in place  Cardiovascular: S1S2 regular, no murmurs  Abdomen: soft, not tender, not distended, positive BS  Genitourinary: deferred  Rectal: deferred  Musculoskeletal: no muscle tenderness, no joint swelling or tenderness  Extremities: no pedal edema  Neurological: AxOx3, moving all extremities, no focal deficits  Skin: no rashes    Labs:                        11.1   7.3   )-----------( 284      ( 14 Aug 2017 05:33 )             32.5     08-14    140  |  108  |  2<L>  ----------------------------<  110<H>  3.9   |  24  |  0.40<L>    Ca    8.9      14 Aug 2017 05:33  Phos  3.8     08-14  Mg     2.5     08-14         Vancomycin Level, Trough: 17.4 ug/mL ( @ 05:27)                            10.8   6.4   )-----------( 204      ( 13 Aug 2017 05:27 )             30.4     08-13    142  |  109<H>  |  2<L>  ----------------------------<  146<H>  3.3<L>   |  26  |  0.38<L>    Ca    8.6      13 Aug 2017 05:27  Phos  3.0     08-13  Mg     2.4     08-13         Vancomycin Level, Trough: 17.4 ug/mL ( @ 05:27)                            11.1   10.0  )-----------( 128      ( 11 Aug 2017 05:40 )             32.1     08-11    136  |  102  |  5<L>  ----------------------------<  128<H>  3.6   |  27  |  0.52    Ca    8.1<L>      11 Aug 2017 05:40  Phos  2.4       Mg     2.1         TPro  6.8  /  Alb  3.1<L>  /  TBili  0.8  /  DBili  x   /  AST  31  /  ALT  55  /  AlkPhos  101  08-10     LIVER FUNCTIONS - ( 10 Aug 2017 12:44 )  Alb: 3.1 g/dL / Pro: 6.8 gm/dL / ALK PHOS: 101 U/L / ALT: 55 U/L / AST: 31 U/L / GGT: x           Urinalysis Basic - ( 10 Aug 2017 12:44 )    Color: Yellow / Appearance: Clear / S.005 / pH: x  Gluc: x / Ketone: Moderate  / Bili: Negative / Urobili: Negative mg/dL   Blood: x / Protein: Negative mg/dL / Nitrite: Negative   Leuk Esterase: Negative / RBC: x / WBC x   Sq Epi: x / Non Sq Epi: x / Bacteria: x    Culture - Blood (08.10.17 @ 12:57)    -  Candida albicans: Nondet    -  Candida parapsilosis: Nondet    -  Escherichia coli: Nondet    -  Haemophilus influenzae: Nondet    -  Methicillin resistant Staphylococcus aureus (MRSA): Nondet    -  Proteus species: Nondet    -  Serratia marcescens: Nondet    -  Streptococcus pneumoniae: Nondet    -  Vancomycin resistant Enterococcus sp.: Nondet    -  Candida krusei: Nondet    -  Klebsiella pneumoniae: Nondet    -  Multidrug (KPC pos) resistant organism: Nondet    -  Staphylococcus aureus: Detec Any isolate of Staphylococcus aureus from a blood culture is NOT considered a contaminant.    -  Streptococcus pyogenes (Group A): Nondet    -  Streptococcus sp. (Not Grp A, B or S pneumoniae): Nondet    Gram Stain:   Growth in aerobic and anaerobic bottles: Gram Positive Cocci in Clusters    -  Candida glabrata: Nondet    -  Candida tropicalis: Nondet    -  Klebsiella oxytoca: Nondet    -  Listeria monocytogenes: Nondet    -  Acinetobacter baumanii: Nondet    -  Coagulase negative Staphylococcus: Nondet    -  Enterobacter cloacae complex: Nondet    -  Enterococcus species: Nondet    -  Neisseria meningitidis: Nondet    -  Pseudomonas aeruginosa: Nondet    -  Streptococcus agalactiae (Group B): Nondet    Specimen Source: .Blood None    Organism: Blood Culture PCR    Culture Results:   Growth in aerobic and anaerobic bottles: Gram Positive Cocci in Clusters  ***Blood Panel PCR results on this specimen are available  approximately 3 hours after the Gram stain result.***  Gram stain, PCR, and/or culture results may not always  correspond due to difference in methodologies.    Organism Identification: Blood Culture PCR    Method Type: PCR    Culture - Blood (17 @ 10:06)    Gram Stain:   Growth in anaerobic bottle: Gram Positive Cocci in Clusters  Growth in aerobic bottle: Gram Positive Cocci in Clusters    Specimen Source: .Blood None    Culture Results:   Growth in aerobic and anaerobic bottles: Staphylococcus aureus  ***********Note************  This isolate demonstrates inducible  clindamycin resistance.  Clindamycin may still be effective in some patients.  See previous culture [id number]          Radiology:    < from: CT Abdomen and Pelvis w/ Oral Cont and w/ IV Cont (08.10.17 @ 15:09) >  No acute abdominal pathology.   Distended urinary bladder causing mild fullness of the renal collecting   systems.    < end of copied text >    < from: Transthoracic Echocardiogram (17 @ 09:54) >   Mitral Valve   Fibrocalcific changes noted to the mitralvalve leaflets with preserved   excursion. Trace mitral regurgitation noted.     Aortic Valve   The aortic valve leaflets are well seen with normal valve morphology;   preserved leaflet excursion noted. No aortic valve insufficiency noted.     Tricuspid Valve   The tricuspid valve leaflets are well seen and appear thin and pliable   with preserved leaflets excursion. Trace tricuspid regurgitation noted.     Pulmonic Valve   Pulmonic valve not well seen.    < end of copied text >      Advanced directives addressed: full resuscitation

## 2017-08-18 ENCOUNTER — TRANSCRIPTION ENCOUNTER (OUTPATIENT)
Age: 62
End: 2017-08-18

## 2017-08-18 LAB
ANION GAP SERPL CALC-SCNC: 7 MMOL/L — SIGNIFICANT CHANGE UP (ref 5–17)
BUN SERPL-MCNC: 6 MG/DL — LOW (ref 7–23)
CALCIUM SERPL-MCNC: 9.3 MG/DL — SIGNIFICANT CHANGE UP (ref 8.5–10.1)
CHLORIDE SERPL-SCNC: 105 MMOL/L — SIGNIFICANT CHANGE UP (ref 96–108)
CO2 SERPL-SCNC: 27 MMOL/L — SIGNIFICANT CHANGE UP (ref 22–31)
CREAT SERPL-MCNC: 0.69 MG/DL — SIGNIFICANT CHANGE UP (ref 0.5–1.3)
GLUCOSE SERPL-MCNC: 105 MG/DL — HIGH (ref 70–99)
HCT VFR BLD CALC: 35 % — SIGNIFICANT CHANGE UP (ref 34.5–45)
HGB BLD-MCNC: 11.9 G/DL — SIGNIFICANT CHANGE UP (ref 11.5–15.5)
MAGNESIUM SERPL-MCNC: 2.2 MG/DL — SIGNIFICANT CHANGE UP (ref 1.6–2.6)
MCHC RBC-ENTMCNC: 28.4 PG — SIGNIFICANT CHANGE UP (ref 27–34)
MCHC RBC-ENTMCNC: 34.1 GM/DL — SIGNIFICANT CHANGE UP (ref 32–36)
MCV RBC AUTO: 83.1 FL — SIGNIFICANT CHANGE UP (ref 80–100)
PHOSPHATE SERPL-MCNC: 3.6 MG/DL — SIGNIFICANT CHANGE UP (ref 2.5–4.5)
PLATELET # BLD AUTO: 364 K/UL — SIGNIFICANT CHANGE UP (ref 150–400)
POTASSIUM SERPL-MCNC: 3.3 MMOL/L — LOW (ref 3.5–5.3)
POTASSIUM SERPL-SCNC: 3.3 MMOL/L — LOW (ref 3.5–5.3)
RBC # BLD: 4.21 M/UL — SIGNIFICANT CHANGE UP (ref 3.8–5.2)
RBC # FLD: 12 % — SIGNIFICANT CHANGE UP (ref 10.3–14.5)
SODIUM SERPL-SCNC: 139 MMOL/L — SIGNIFICANT CHANGE UP (ref 135–145)
WBC # BLD: 7.1 K/UL — SIGNIFICANT CHANGE UP (ref 3.8–10.5)
WBC # FLD AUTO: 7.1 K/UL — SIGNIFICANT CHANGE UP (ref 3.8–10.5)

## 2017-08-18 RX ORDER — SODIUM CHLORIDE 9 MG/ML
1000 INJECTION, SOLUTION INTRAVENOUS
Qty: 0 | Refills: 0 | Status: DISCONTINUED | OUTPATIENT
Start: 2017-08-18 | End: 2017-08-19

## 2017-08-18 RX ORDER — AMLODIPINE BESYLATE 2.5 MG/1
5 TABLET ORAL DAILY
Qty: 0 | Refills: 0 | Status: DISCONTINUED | OUTPATIENT
Start: 2017-08-18 | End: 2017-08-19

## 2017-08-18 RX ORDER — LACTOBACILLUS ACIDOPHILUS 100MM CELL
2 CAPSULE ORAL
Qty: 0 | Refills: 0 | COMMUNITY
Start: 2017-08-18

## 2017-08-18 RX ORDER — POTASSIUM CHLORIDE 20 MEQ
40 PACKET (EA) ORAL ONCE
Qty: 0 | Refills: 0 | Status: COMPLETED | OUTPATIENT
Start: 2017-08-18 | End: 2017-08-18

## 2017-08-18 RX ORDER — PSYLLIUM SEED (WITH DEXTROSE)
3.4 POWDER (GRAM) ORAL
Qty: 0 | Refills: 0 | COMMUNITY
Start: 2017-08-18

## 2017-08-18 RX ORDER — SENNA PLUS 8.6 MG/1
2 TABLET ORAL
Qty: 0 | Refills: 0 | COMMUNITY
Start: 2017-08-18

## 2017-08-18 RX ORDER — CEFAZOLIN SODIUM 1 G
2 VIAL (EA) INJECTION
Qty: 0 | Refills: 0 | COMMUNITY
Start: 2017-08-18 | End: 2017-09-01

## 2017-08-18 RX ORDER — PSYLLIUM SEED (WITH DEXTROSE)
1 POWDER (GRAM) ORAL DAILY
Qty: 0 | Refills: 0 | Status: DISCONTINUED | OUTPATIENT
Start: 2017-08-18 | End: 2017-08-19

## 2017-08-18 RX ORDER — AMLODIPINE BESYLATE 2.5 MG/1
1 TABLET ORAL
Qty: 30 | Refills: 0
Start: 2017-08-18 | End: 2017-09-17

## 2017-08-18 RX ADMIN — Medication 1 TABLET(S): at 11:52

## 2017-08-18 RX ADMIN — Medication 2 TABLET(S): at 09:02

## 2017-08-18 RX ADMIN — MORPHINE SULFATE 200 MILLIGRAM(S): 50 CAPSULE, EXTENDED RELEASE ORAL at 06:38

## 2017-08-18 RX ADMIN — MORPHINE SULFATE 30 MILLIGRAM(S): 50 CAPSULE, EXTENDED RELEASE ORAL at 06:39

## 2017-08-18 RX ADMIN — Medication 40 MILLIEQUIVALENT(S): at 17:42

## 2017-08-18 RX ADMIN — Medication 100 MILLIGRAM(S): at 05:17

## 2017-08-18 RX ADMIN — MORPHINE SULFATE 30 MILLIGRAM(S): 50 CAPSULE, EXTENDED RELEASE ORAL at 05:17

## 2017-08-18 RX ADMIN — Medication 1 PACKET(S): at 11:52

## 2017-08-18 RX ADMIN — Medication 2 TABLET(S): at 17:47

## 2017-08-18 RX ADMIN — MORPHINE SULFATE 30 MILLIGRAM(S): 50 CAPSULE, EXTENDED RELEASE ORAL at 17:43

## 2017-08-18 RX ADMIN — Medication 1000 UNIT(S): at 11:52

## 2017-08-18 RX ADMIN — MORPHINE SULFATE 200 MILLIGRAM(S): 50 CAPSULE, EXTENDED RELEASE ORAL at 05:17

## 2017-08-18 RX ADMIN — Medication 100 MILLIGRAM(S): at 21:39

## 2017-08-18 RX ADMIN — Medication 100 MILLIGRAM(S): at 13:19

## 2017-08-18 RX ADMIN — AMLODIPINE BESYLATE 5 MILLIGRAM(S): 2.5 TABLET ORAL at 17:42

## 2017-08-18 RX ADMIN — Medication 1 MILLIGRAM(S): at 01:06

## 2017-08-18 RX ADMIN — PANTOPRAZOLE SODIUM 40 MILLIGRAM(S): 20 TABLET, DELAYED RELEASE ORAL at 09:02

## 2017-08-18 RX ADMIN — MORPHINE SULFATE 200 MILLIGRAM(S): 50 CAPSULE, EXTENDED RELEASE ORAL at 17:43

## 2017-08-18 RX ADMIN — SODIUM CHLORIDE 83 MILLILITER(S): 9 INJECTION, SOLUTION INTRAVENOUS at 20:03

## 2017-08-18 RX ADMIN — ENOXAPARIN SODIUM 40 MILLIGRAM(S): 100 INJECTION SUBCUTANEOUS at 17:47

## 2017-08-18 NOTE — PROGRESS NOTE ADULT - ASSESSMENT
63 y/o Female with h/o intermittent porphyria, chronic pain, anxiety disorder, OP was admitted on 8/10 for severe RLQ pain radiating to her right flank, starting ~ 3 days PTA. Nothing mitigates nor exacerbates. She also reports intermittent fever. In ER, she was found with fever to 101.4F; she received zosyn IV. Reported with bacteremia.    1. S/p sepsis with MSSA. Probable mediport infection s/p removal. Doubt endocarditis.  -repeat BC x 2 is negative to date  -mediport was removed  -s/p vancomycin 1 gm IV q12h and ceftriaxone 1 gm IV qd # 4  -tolerating abx well so far; no side effects noted  -on cefazolin 2 gm IV q8h # 5  -tolerating abx well so far; no side effects noted  -f/u repeat BC x 2  -continue IV abx coverage  -plan for midline and home IV abx for 2 more weeks  -monitor temps  -f/u CBC  -supportive care  2. Other issues:   -care per medicine

## 2017-08-18 NOTE — DISCHARGE NOTE ADULT - CARE PROVIDER_API CALL
Suman Seay (MD), Medical Oncology  [address]  [place] [address]  Phone: [phone]  Fax: [phone] Suman Seay), Medical Oncology  [address]  [facility] [place] [address]  Phone: [phone]  Fax: [phone]    Boxer, Jonathan A (MD), Internal Medicine  [address] [place] [address]  Phone: [phone]  Fax: [phone]    Katia Fay (), Gastroenterology; Internal Medicine  [address] [place] [address]  Phone: [phone]  Fax: [phone]

## 2017-08-18 NOTE — DISCHARGE NOTE ADULT - HOSPITAL COURSE
62F with PMHx AIP, chronic pain, anxiety with recent acute flare of AIP who now p/w severe RLQ pain radiating to her right flank, starting ~ 3 days PTA. Nothing mitigates nor exacerbates. a/w intermittent fever.   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   Baseline anorexia & constipation- last BM ~ 1 week ago.     When initially examined, Pt in severe distress d/t localized RLQ pain. CT(A/P) reviewed with Rads- no evidence of appendicitis but profound stool retention. Pain significantly reduced with IV Dilaudid/Toradol.      8/11: No O/N events. Significantly more comfortable on PCA. Still no BM. Afebrile. Notified of STAU in 2/2 blood Cx's  8/12: No O/N events. Upset d/t fecal incontinence, now refusing Miralax. Abdominal pain significantly improved after multiple BM's  8/13: No O/N events. No new complaints. Finally getting some rest. Pain controlled.   8/14: No O/N events. No new complaints. Frequent BM's, still with mild crampy abdominal pain. Discussed implications of persistent Staph bacteremia @ length.  8/15: No O/N events. No new complaints. Tolerated Mediport removal w/o issue. Remains afebrile. BM's daily, appropriately loose.  8/16: No O/N events. No new complaints. Essentially status quo. Remains. afebrile.  8/17: No O/N events. No new complaints. Afebrile. Feels well. Using PCA less often, but still required.   8/18: No O/N events. No new complaints. Discussed D/C plan @ length. Afebrile    Physical Exam:  · Constitutional	detailed exam	  · Constitutional Details	well-developed; distress due to pain	  · Neck	No bruits; no thyromegaly or nodules	  · Back	No deformity or limitation of movement	  · Respiratory	Breath Sounds equal & clear to percussion & auscultation, no accessory muscle use	  · Cardiovascular	Regular rate & rhythm, normal S1, S2; no murmurs, gallops or rubs; no S3, S4	  · Gastrointestinal	detailed exam	  · GI Normal	soft	  · GI Abnormal	tender  bowel sounds hypoactive  guarding	  · Tenderness	RLQ	  · Genitourinary	Normal genitalia; no lesions; no discharge	  · Extremities	No cyanosis, clubbing or edema	  · Vascular	Equal and normal pulses (carotid, femoral, dorsalis pedis)	  · Neurological	Alert & oriented; no sensory, motor or coordination deficits, normal reflexes	  · Lymph Nodes	No lymphadedenopathy	  · Musculoskeletal	No joint pain, swelling or deformity; no limitation of movement	      Labs and Results:  T(C): 36.8 (08-18-17 @ 14:49), Max: 36.8 (08-18-17 @ 14:49)  HR: 98 (08-18-17 @ 14:49) (86 - 98)  BP: 144/88 (08-18-17 @ 14:49) (135/73 - 177/85)  RR: 16 (08-18-17 @ 14:49) (16 - 17)  SpO2: 99% (08-18-17 @ 14:49) (98% - 99%)  Wt(kg): --             11.9   7.1   )-----------( 364      ( 18 Aug 2017 05:45 )             35.0     18 Aug 2017 05:45    139    |  105    |  6      ----------------------------<  105    3.3     |  27     |  0.69     Ca    9.3        18 Aug 2017 05:45  Phos  3.6       18 Aug 2017 05:45  Mg     2.2       18 Aug 2017 05:45    Assessment and Plan:   Assessment:  · Assessment		  62F with aforementioned PMHx, now a/w    * Severe acute RLQ pain, unclear etiology, suspect fecal retention as primary cause, appendix visualized & wnl, lactate wnl- doubt bowel ischemia; KUB unrevealing but obscured by remaining contrast  - PRN analgesics/antiemetics  - c/w senna , psyllium  - Hem/GI evals appreciated    * Sepsis 2/2 MSSA bacteremia d/t Mediport with persistence on surveillance blood Cx's; repeat NGTD  - c/w Ancef Q8 x 2 more weeks once midline in place  - Cardio eval for GENARO appreciated- defer given (-) Cx's    * Chronic pain, anxiety- status quo; i-STOP c/w Pt report  - c/w MS Contin 230mg Q12  - Dilaudid PCA  - Ativan Q4 PRN    * GERD, stable  - c/w PPI    * hypokalemia  - replete PRN    Dispo: D/C once midline in place 62F with PMHx AIP, chronic pain, anxiety with recent acute flare of AIP who now p/w severe RLQ pain radiating to her right flank, starting ~ 3 days PTA. Nothing mitigates nor exacerbates. a/w intermittent fever.   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   Baseline anorexia & constipation- last BM ~ 1 week ago.     When initially examined, Pt in severe distress d/t localized RLQ pain. CT(A/P) reviewed with Rads- no evidence of appendicitis but profound stool retention. Pain significantly reduced with IV Dilaudid/Toradol.      8/11: No O/N events. Significantly more comfortable on PCA. Still no BM. Afebrile. Notified of STAU in 2/2 blood Cx's  8/12: No O/N events. Upset d/t fecal incontinence, now refusing Miralax. Abdominal pain significantly improved after multiple BM's  8/13: No O/N events. No new complaints. Finally getting some rest. Pain controlled.   8/14: No O/N events. No new complaints. Frequent BM's, still with mild crampy abdominal pain. Discussed implications of persistent Staph bacteremia @ length.  8/15: No O/N events. No new complaints. Tolerated Mediport removal w/o issue. Remains afebrile. BM's daily, appropriately loose.  8/16: No O/N events. No new complaints. Essentially status quo. Remains. afebrile.  8/17: No O/N events. No new complaints. Afebrile. Feels well. Using PCA less often, but still required.   8/18: No O/N events. No new complaints. Discussed D/C plan @ length. Afebrile  8/19 feels better, tolerates PO intake, + BM, eager to go home    T(C): 36.6 (08-19-17 @ 12:21), Max: 36.8 (08-18-17 @ 14:49)  T(F): 97.8 (08-19-17 @ 12:21), Max: 98.2 (08-18-17 @ 14:49)  HR: 100 (08-19-17 @ 12:21) (88 - 101)  BP: 146/77 (08-19-17 @ 12:21) (144/88 - 163/75)  RR: 18 (08-19-17 @ 12:21) (16 - 18)  SpO2: 100% (08-19-17 @ 12:21) (98% - 100%)  Wt(kg): --    Physical Exam:  · Constitutional	detailed exam	  · Constitutional Details	well-developed; distress due to pain	  · Neck	No bruits; no thyromegaly or nodules	  · Back	No deformity or limitation of movement	  · Respiratory	Breath Sounds equal & clear to percussion & auscultation, no accessory muscle use	  · Cardiovascular	Regular rate & rhythm, normal S1, S2; no murmurs, gallops or rubs; no S3, S4	  · Gastrointestinal	detailed exam	  · GI Normal	soft	  · GI Abnormal	tender  bowel sounds hypoactive  guarding	  · Tenderness	RLQ	  · Genitourinary	Normal genitalia; no lesions; no discharge	  · Extremities	No cyanosis, clubbing or edema	  · Vascular	Equal and normal pulses (carotid, femoral, dorsalis pedis)	  · Neurological	Alert & oriented; no sensory, motor or coordination deficits, normal reflexes	  · Lymph Nodes	No lymphadedenopathy	  · Musculoskeletal	No joint pain, swelling or deformity; no limitation of movement	                 11.9   7.1   )-----------( 364      ( 18 Aug 2017 05:45 )             35.0     18 Aug 2017 05:45    139    |  105    |  6      ----------------------------<  105    3.3     |  27     |  0.69     Ca    9.3        18 Aug 2017 05:45  Phos  3.6       18 Aug 2017 05:45  Mg     2.2       18 Aug 2017 05:45    Assessment and Plan:   Assessment:  · Assessment		  62F with aforementioned PMHx, now a/w    * Severe acute RLQ pain, unclear etiology, suspect fecal retention as primary cause, appendix visualized & wnl, lactate wnl- doubt bowel ischemia; KUB unrevealing but obscured by remaining contrast  - PRN analgesics/antiemetics  - c/w senna , psyllium  - Hem/GI evals appreciated    * Sepsis 2/2 MSSA bacteremia d/t Mediport with persistence on surveillance blood Cx's; repeat NGTD  - c/w Ancef Q8 x 2 more weeks once midline in place  - Cardio eval for GENARO appreciated- defer given (-) Cx's    * Chronic pain, anxiety- status quo; i-STOP c/w Pt report  - c/w MS Contin 230mg Q12  - Dilaudid PCA  - Ativan Q4 PRN    * GERD, stable  - c/w PPI    * Abdominal pain, constipation  - GI evaluation noted   - o/p colonoscopy    * hypokalemia  - replete PRN    Disposition - medically optimized to be discharged home with close follow up with PCP, oncologist, GI within 1 week  complete antibiotics  return to ED if fever, abdominal pain, nausea, vomiting, chest pain, dyspnea  Discharge plan discussed with patient, RN  Patient advised to follow up with PCP within 3-7 days  time spend 40 min  discharge note faxed to PCP Dr. Boxer

## 2017-08-18 NOTE — DISCHARGE NOTE ADULT - MEDICATION SUMMARY - MEDICATIONS TO TAKE
I will START or STAY ON the medications listed below when I get home from the hospital:    HYDROmorphone 2 mg/mL injectable solution  -- 4 milligram(s) subcutaneous every 2 hours, As Needed  -- 2-4 hours prn  -- Indication: For pain    morphine 12 hour extended release  -- 200 milligram(s) by mouth 2 times a day, As Needed  -- Indication: For pain    morphine 12 hour extended release  -- 30 milligram(s) by mouth 2 times a day, As Needed  -- in addition to 200 mg if needed  -- Indication: For pain    magnesium carbonate-calcium carbonate 300 mg-250 mg oral tablet  -- 1 tab(s) by mouth 2 times a day  -- Indication: For supplement    Ativan 1 mg oral tablet  -- 1 milligram(s) by mouth every 4 hours, As Needed  -- Indication: For Anxiety    Zofran 8 mg oral tablet  -- 8 milligram(s) by mouth every 8 hours, As Needed  -- Indication: For nausea    albuterol  --  inhaled every 4 hours, As Needed  -- Indication: For Asthma    amLODIPine 5 mg oral tablet  -- 1 tab(s) by mouth once a day  -- Indication: For HTN    ceFAZolin  -- 2 gram(s) intravenous every 8 hours for 14 days  -- Indication: For MSSA bacteremia    Lasix 20 mg oral tablet  -- 2 tab(s) by mouth once a day, As Needed  -- Indication: For HTN    psyllium 3.4 g/7 g oral powder for reconstitution  -- 3.4 gram(s) by mouth once a day  -- Indication: For constipation    senna oral tablet  -- 2 tab(s) by mouth once a day (at bedtime), As needed, Constipation  -- Indication: For constipation    lactobacillus acidophilus oral capsule  -- 2 cap(s) by mouth 2 times a day  -- Indication: For gut health    NexIUM 40 mg oral delayed release capsule  -- 1 cap(s) by mouth once a day  -- Indication: For GERD    multivitamin  --     -- Indication: For general health    cholecalciferol 1000 intl units oral tablet  -- 1 tab(s) by mouth once a day  -- Indication: For general health I will START or STAY ON the medications listed below when I get home from the hospital:    HYDROmorphone 2 mg/mL injectable solution  -- 4 milligram(s) subcutaneous every 2 hours, As Needed  -- 2-4 hours prn  -- Indication: For pain    morphine 12 hour extended release  -- 200 milligram(s) by mouth 2 times a day, As Needed  -- Indication: For pain    morphine 12 hour extended release  -- 30 milligram(s) by mouth 2 times a day, As Needed  -- in addition to 200 mg if needed  -- Indication: For pain    magnesium carbonate-calcium carbonate 300 mg-250 mg oral tablet  -- 1 tab(s) by mouth 2 times a day  -- Indication: For supplement    Ativan 1 mg oral tablet  -- 1 milligram(s) by mouth every 4 hours, As Needed  -- Indication: For Anxiety    Zofran 8 mg oral tablet  -- 8 milligram(s) by mouth every 8 hours, As Needed  -- Indication: For nausea    albuterol  --  inhaled every 4 hours, As Needed  -- Indication: For Asthma    amLODIPine 5 mg oral tablet  -- 1 tab(s) by mouth once a day  -- Indication: For HTN    ceFAZolin  -- 2 gram(s) intravenous every 8 hours for 14 days  -- Indication: For MSSA bacteremia    Lasix 20 mg oral tablet  -- 2 tab(s) by mouth once a day, As Needed  -- Indication: For HTN    psyllium 3.4 g/7 g oral powder for reconstitution  -- 3.4 gram(s) by mouth once a day  -- Indication: For constipation    senna oral tablet  -- 2 tab(s) by mouth once a day (at bedtime), As needed, Constipation  -- Indication: For constipation    potassium chloride 10 mEq oral capsule, extended release  -- 1 cap(s) by mouth once a day  -- It is very important that you take or use this exactly as directed.  Do not skip doses or discontinue unless directed by your doctor.  Medication should be taken with plenty of water.  Swallow whole.  Do not crush.  Take with food or milk.    -- Indication: For Potassium    lactobacillus acidophilus oral capsule  -- 2 cap(s) by mouth 2 times a day  -- Indication: For gut health    NexIUM 40 mg oral delayed release capsule  -- 1 cap(s) by mouth once a day  -- Indication: For GERD    multivitamin  --     -- Indication: For general health    cholecalciferol 1000 intl units oral tablet  -- 1 tab(s) by mouth once a day  -- Indication: For general health

## 2017-08-18 NOTE — DISCHARGE NOTE ADULT - ABILITY TO HEAR (WITH HEARING AID OR HEARING APPLIANCE IF NORMALLY USED):
b/l hearing aids/Mildly to Moderately Impaired: difficulty hearing in some environments or speaker may need to increase volume or speak distinctly

## 2017-08-18 NOTE — PROGRESS NOTE ADULT - SUBJECTIVE AND OBJECTIVE BOX
Patient is a 62y old  Female who presents with a chief complaint of abdominal pain (10 Aug 2017 23:24)    HPI:  63 y/o Female with h/o intermittent porphyria, chronic pain, anxiety disorder, OP was admitted on 8/10 for severe RLQ pain radiating to her right flank, starting ~ 3 days PTA. Nothing mitigates nor exacerbates. She also reports intermittent fever. In ER, she was found with fever to 101.4F; she received zosyn IV. Reported with bacteremia.      No fever or chills  Denies pain  Weak looking  No new copmlaints    MEDICATIONS  (STANDING):  enoxaparin Injectable 40 milliGRAM(s) SubCutaneous every 24 hours  ceFAZolin   IVPB 2000 milliGRAM(s) IV Intermittent every 8 hours  lactobacillus acidophilus 2 Tablet(s) Oral two times a day with meals  pantoprazole    Tablet 40 milliGRAM(s) Oral before breakfast  cholecalciferol 1000 Unit(s) Oral daily  multivitamin 1 Tablet(s) Oral daily  morphine ER Tablet 200 milliGRAM(s) Oral every 12 hours  HYDROmorphone PCA (1 mG/mL) 30 milliLiter(s) PCA Continuous PCA Continuous  psyllium Powder 1 Packet(s) Oral every other day  morphine ER Tablet 30 milliGRAM(s) Oral every 12 hours  dextrose 5%. 1000 milliLiter(s) (83 mL/Hr) IV Continuous <Continuous>    MEDICATIONS  (PRN):  LORazepam     Tablet 1 milliGRAM(s) Oral every 4 hours PRN Anxiety  acetaminophen   Tablet. 650 milliGRAM(s) Oral every 6 hours PRN Mild Pain (1 - 3)  senna 2 Tablet(s) Oral at bedtime PRN Constipation  naloxone Injectable 0.1 milliGRAM(s) IV Push every 3 minutes PRN For ANY of the following changes in patient status:  A. RR LESS THAN 10 breaths per minute, B. Oxygen saturation LESS THAN 90%, C. Sedation score of 6      Vital Signs Last 24 Hrs  T(C): 36.4 (18 Aug 2017 00:34), Max: 36.6 (17 Aug 2017 21:10)  T(F): 97.5 (18 Aug 2017 00:34), Max: 97.8 (17 Aug 2017 21:10)  HR: 93 (18 Aug 2017 00:34) (86 - 93)  BP: 177/85 (18 Aug 2017 00:34) (135/73 - 177/85)  BP(mean): --  RR: 17 (18 Aug 2017 00:34) (16 - 17)  SpO2: 99% (18 Aug 2017 00:34) (97% - 99%)    Physical Exam:        Constitutional: frail looking  HEENT: NC/AT, EOMI, PERRLA  Neck: supple  Back: no tenderness  Respiratory: clear; mediport in place  Cardiovascular: S1S2 regular, no murmurs  Abdomen: soft, not tender, not distended, positive BS  Genitourinary: deferred  Rectal: deferred  Musculoskeletal: no muscle tenderness, no joint swelling or tenderness  Extremities: no pedal edema  Neurological: AxOx3, moving all extremities, no focal deficits  Skin: no rashes    Labs:                        11.1   7.3   )-----------( 284      ( 14 Aug 2017 05:33 )             32.5     08-14    140  |  108  |  2<L>  ----------------------------<  110<H>  3.9   |  24  |  0.40<L>    Ca    8.9      14 Aug 2017 05:33  Phos  3.8     08-14  Mg     2.5     08-14         Vancomycin Level, Trough: 17.4 ug/mL ( @ 05:27)                            10.8   6.4   )-----------( 204      ( 13 Aug 2017 05:27 )             30.4     08-13    142  |  109<H>  |  2<L>  ----------------------------<  146<H>  3.3<L>   |  26  |  0.38<L>    Ca    8.6      13 Aug 2017 05:27  Phos  3.0     08-13  Mg     2.4     08-13         Vancomycin Level, Trough: 17.4 ug/mL ( @ 05:27)                            11.1   10.0  )-----------( 128      ( 11 Aug 2017 05:40 )             32.1     08-11    136  |  102  |  5<L>  ----------------------------<  128<H>  3.6   |  27  |  0.52    Ca    8.1<L>      11 Aug 2017 05:40  Phos  2.4       Mg     2.1     -    TPro  6.8  /  Alb  3.1<L>  /  TBili  0.8  /  DBili  x   /  AST  31  /  ALT  55  /  AlkPhos  101  08-10     LIVER FUNCTIONS - ( 10 Aug 2017 12:44 )  Alb: 3.1 g/dL / Pro: 6.8 gm/dL / ALK PHOS: 101 U/L / ALT: 55 U/L / AST: 31 U/L / GGT: x           Urinalysis Basic - ( 10 Aug 2017 12:44 )    Color: Yellow / Appearance: Clear / S.005 / pH: x  Gluc: x / Ketone: Moderate  / Bili: Negative / Urobili: Negative mg/dL   Blood: x / Protein: Negative mg/dL / Nitrite: Negative   Leuk Esterase: Negative / RBC: x / WBC x   Sq Epi: x / Non Sq Epi: x / Bacteria: x    Culture - Blood (08.10.17 @ 12:57)    -  Candida albicans: Nondet    -  Candida parapsilosis: Nondet    -  Escherichia coli: Nondet    -  Haemophilus influenzae: Nondet    -  Methicillin resistant Staphylococcus aureus (MRSA): Nondet    -  Proteus species: Nondet    -  Serratia marcescens: Nondet    -  Streptococcus pneumoniae: Nondet    -  Vancomycin resistant Enterococcus sp.: Nondet    -  Candida krusei: Nondet    -  Klebsiella pneumoniae: Nondet    -  Multidrug (KPC pos) resistant organism: Nondet    -  Staphylococcus aureus: Detec Any isolate of Staphylococcus aureus from a blood culture is NOT considered a contaminant.    -  Streptococcus pyogenes (Group A): Nondet    -  Streptococcus sp. (Not Grp A, B or S pneumoniae): Nondet    Gram Stain:   Growth in aerobic and anaerobic bottles: Gram Positive Cocci in Clusters    -  Candida glabrata: Nondet    -  Candida tropicalis: Nondet    -  Klebsiella oxytoca: Nondet    -  Listeria monocytogenes: Nondet    -  Acinetobacter baumanii: Nondet    -  Coagulase negative Staphylococcus: Nondet    -  Enterobacter cloacae complex: Nondet    -  Enterococcus species: Nondet    -  Neisseria meningitidis: Nondet    -  Pseudomonas aeruginosa: Nondet    -  Streptococcus agalactiae (Group B): Nondet    Specimen Source: .Blood None    Organism: Blood Culture PCR    Culture Results:   Growth in aerobic and anaerobic bottles: Gram Positive Cocci in Clusters  ***Blood Panel PCR results on this specimen are available  approximately 3 hours after the Gram stain result.***  Gram stain, PCR, and/or culture results may not always  correspond due to difference in methodologies.    Organism Identification: Blood Culture PCR    Method Type: PCR    Culture - Blood (17 @ 10:06)    Gram Stain:   Growth in anaerobic bottle: Gram Positive Cocci in Clusters  Growth in aerobic bottle: Gram Positive Cocci in Clusters    Specimen Source: .Blood None    Culture Results:   Growth in aerobic and anaerobic bottles: Staphylococcus aureus  ***********Note************  This isolate demonstrates inducible  clindamycin resistance.  Clindamycin may still be effective in some patients.  See previous culture [id number]    Culture - Blood (17 @ 11:14)    Specimen Source: .Blood None    Culture Results:   No growth to date.          Radiology:    < from: CT Abdomen and Pelvis w/ Oral Cont and w/ IV Cont (08.10.17 @ 15:09) >  No acute abdominal pathology.   Distended urinary bladder causing mild fullness of the renal collecting   systems.    < end of copied text >    < from: Transthoracic Echocardiogram (17 @ 09:54) >   Mitral Valve   Fibrocalcific changes noted to the mitralvalve leaflets with preserved   excursion. Trace mitral regurgitation noted.     Aortic Valve   The aortic valve leaflets are well seen with normal valve morphology;   preserved leaflet excursion noted. No aortic valve insufficiency noted.     Tricuspid Valve   The tricuspid valve leaflets are well seen and appear thin and pliable   with preserved leaflets excursion. Trace tricuspid regurgitation noted.     Pulmonic Valve   Pulmonic valve not well seen.    < end of copied text >      Advanced directives addressed: full resuscitation

## 2017-08-18 NOTE — PROGRESS NOTE ADULT - ASSESSMENT
62F with above hx p/w abdominal pain/fever.     1. Acute RLQ pain/abdominal pain- unclear etiology, CT A/P negative for acute findings. W/u ongoing as per primary team.     2. Sepsis with 2/2 MSSA bacteremia-  pt currently has no fever or leukocytosis. No significant murmur on exam. 2Decho negative for vegetations. Pt s/p Mediport extraction- blood cx have been repeated and have been negative for 48hrs. With no significant evidence of IE and no growth on repeat bld cx, will hold on GENARO. Abx and further mgmt as per ID.     3. Hypokalemia- replete today and follow lytes daily.     4. DVT proph.

## 2017-08-18 NOTE — PROVIDER CONTACT NOTE (OTHER) - DATE AND TIME:
18-Aug-2017 17:08
10-Aug-2017 20:52
10-Aug-2017 20:54
11-Aug-2017 06:56
11-Aug-2017 12:01
14-Aug-2017 14:18
14-Aug-2017 15:17

## 2017-08-18 NOTE — DISCHARGE NOTE ADULT - CARE PLAN
Principal Discharge DX:	Bacteremia associated with intravascular line  Goal:	treat completely  Instructions for follow-up, activity and diet:	- Ancef will be continued every 8 hours via midline for 14 more days  - consider using probiotic twice daily for at least one month  - do your best to keep the midline clean and covered when showering- don't submerge in water  Secondary Diagnosis:	Porphyria  Goal:	prevent recurrence  Instructions for follow-up, activity and diet:	- avoid provocative conditions to minimize flare risk  - you are able to infuse IV fluids through the midline just as you had done with the Mediport  Secondary Diagnosis:	Hypertension  Goal:	optimize  Instructions for follow-up, activity and diet:	- take Norvasc daily  - monitor blood pressure daily- I suspect you will not need Norvasc in the long-term, as most of the blood pressure elevation stems from acute illness and pain Principal Discharge DX:	Bacteremia associated with intravascular line  Goal:	treat completely  Instructions for follow-up, activity and diet:	- Ancef will be continued every 8 hours via midline for 14 more days  - consider using probiotic twice daily for at least one month  - do your best to keep the midline clean and covered when showering- don't submerge in water  Secondary Diagnosis:	Porphyria  Goal:	prevent recurrence  Instructions for follow-up, activity and diet:	- avoid provocative conditions to minimize flare risk  - you are able to infuse IV fluids through the midline just as you had done with the Mediport  Secondary Diagnosis:	Hypertension  Goal:	optimize  Instructions for follow-up, activity and diet:	- take Norvasc daily  - monitor blood pressure daily- I suspect you will not need Norvasc in the long-term, as most of the blood pressure elevation stems from acute illness and pain  Secondary Diagnosis:	Hypokalemia  Instructions for follow-up, activity and diet:	take potassium supplements while taking Lasix  recheck potassium level in 1 week  Secondary Diagnosis:	Chronic abdominal pain  Instructions for follow-up, activity and diet:	outpatient screening colonoscopy advised as per GI Dr. Fay

## 2017-08-18 NOTE — DISCHARGE NOTE ADULT - PLAN OF CARE
treat completely - Ancef will be continued every 8 hours via midline for 14 more days  - consider using probiotic twice daily for at least one month  - do your best to keep the midline clean and covered when showering- don't submerge in water prevent recurrence - avoid provocative conditions to minimize flare risk  - you are able to infuse IV fluids through the midline just as you had done with the Mediport optimize - take Norvasc daily  - monitor blood pressure daily- I suspect you will not need Norvasc in the long-term, as most of the blood pressure elevation stems from acute illness and pain take potassium supplements while taking Lasix  recheck potassium level in 1 week outpatient screening colonoscopy advised as per GI Dr. Fay

## 2017-08-18 NOTE — DISCHARGE NOTE ADULT - PATIENT PORTAL LINK FT
“You can access the [facility] Patient Portal, offered by [facility], by registering with the following website: [email or web address]”

## 2017-08-18 NOTE — DISCHARGE NOTE ADULT - OTHER SIGNIFICANT FINDINGS
Complete Blood Count in AM (08.18.17 @ 05:45)    WBC Count: 7.1 K/uL    RBC Count: 4.21 M/uL    Hemoglobin: 11.9 g/dL    Hematocrit: 35.0 %    Mean Cell Volume: 83.1 fl    Mean Cell Hemoglobin: 28.4 pg    Mean Cell Hemoglobin Conc: 34.1 gm/dL    Red Cell Distrib Width: 12.0 %    Platelet Count - Automated: 364 K/uL    Basic Metabolic Panel in AM (08.18.17 @ 05:45)    Sodium, Serum: 139 mmol/L    Potassium, Serum: 3.3 mmol/L    Chloride, Serum: 105 mmol/L    Carbon Dioxide, Serum: 27 mmol/L    Anion Gap, Serum: 7 mmol/L    Blood Urea Nitrogen, Serum: 6 mg/dL    Creatinine, Serum: 0.69 mg/dL    Glucose, Serum: 105 mg/dL    Calcium, Total Serum: 9.3 mg/dL   Magnesium, Serum in AM (08.18.17 @ 05:45)    Magnesium, Serum: 2.2 mg/dL    < from: CT Abdomen and Pelvis w/ Oral Cont and w/ IV Cont (08.10.17 @ 15:09) >  LOWER CHEST: Within normal limits.    LIVER: Within normal limits.  SPLEEN: Within normal limits.  PANCREAS: Within normal limits.  GALLBLADDER: Cholecystectomy.  BILE DUCTS: Normal caliber.  ADRENALS: Within normal limits.  KIDNEYS/URETERS: Subcentimeter bilateral hypodense renal lesions which   are too small to characterize although unchanged. Mild fullness of both   renal collecting systems and ureters, most likely due to the patient's   distended bladder.    RETROPERITONEUM: No lymphadenopathy.    VESSELS:  Within normal limits.    BOWEL: No bowel obstruction. Appendix is normal. Large amount of stool   throughout the colon.  PERITONEUM: No ascites or pneumoperitoneum.    REPRODUCTIVE ORGANS: Calcified fibroid. Unremarkable adnexa..  BLADDER: Distended urinary bladder.    ABDOMINAL WALL: Withinnormal limits.  BONES: Stable T12 compression deformity.    IMPRESSION:     No acute abdominal pathology.   Distended urinary bladder causing mild fullness of the renal collecting   systems.      < end of copied text >  < from: Xray Kidney Ureter Bladder (08.11.17 @ 11:27) >  1.  No radiographic evidence of bowel obstruction.  2.  The presence of renal or ureteral calculi is difficult to assess due   to contrast within the colon.    < end of copied text >

## 2017-08-18 NOTE — PROGRESS NOTE ADULT - SUBJECTIVE AND OBJECTIVE BOX
Cardiology Progress Note:    HPI: 62F with PMHx AIP, chronic pain, anxiety with recent acute flare of AIP who now p/w severe RLQ pain radiating to her right flank, starting ~ 3 days PTA. Nothing mitigates nor exacerbates. a/w intermittent fever. 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 Baseline anorexia & constipation- last BM ~ 1 week ago. When initially examined, Pt in severe distress d/t localized RLQ pain. CT(A/P) reviewed with Rads- no evidence of appendicitis but profound stool retention. Pain significantly reduced with IV Dilaudid/Toradol.     8/15- Pt now with G+sepsis- awaiting Mediport extraction today. Currently afebrile, no CP/SOB. Lying flat in bed. Not on tele.     8/16- No CP/SOB. S/p Mediport extraction. No fevers. Lying flat in bed. Not on tele. Case d/w ID and pt at bedside.    8/18- No events last pm. Feels well. No fevers. No CP/SOB. Not on tele.     PAST MEDICAL & SURGICAL HISTORY:  Chronic abdominal pain  Osteoporosis  Adrenal insufficiency, primary  Knee effusion  Porphyria  S/P tonsillectomy  S/P cholecystectomy  H/O spinal fusion    Allergies  chlorhexidine containing compounds (Unknown)    SOCIAL HISTORY: Denies tobacco, etoh abuse or illicit drug use    FAMILY HISTORY: Family history of porphyria (Grandparent)    MEDICATIONS  (STANDING):  pantoprazole    Tablet 40 milliGRAM(s) Oral before breakfast  cholecalciferol 1000 Unit(s) Oral daily  multivitamin 1 Tablet(s) Oral daily  morphine ER Tablet 200 milliGRAM(s) Oral every 12 hours  morphine ER Tablet 30 milliGRAM(s) Oral every 12 hours  lactobacillus acidophilus 2 Tablet(s) Oral two times a day with meals  HYDROmorphone PCA (1 mG/mL) 30 milliLiter(s) PCA Continuous PCA Continuous  ceFAZolin   IVPB 2000 milliGRAM(s) IV Intermittent every 8 hours  ceFAZolin   IVPB   IV Intermittent   enoxaparin Injectable 40 milliGRAM(s) SubCutaneous daily  sodium chloride 0.9%. 1000 milliLiter(s) (63 mL/Hr) IV Continuous <Continuous>  psyllium Powder 1 Packet(s) Oral every other day    MEDICATIONS  (PRN):  LORazepam     Tablet 1 milliGRAM(s) Oral every 4 hours PRN Anxiety  acetaminophen   Tablet 650 milliGRAM(s) Oral every 6 hours PRN For Temp greater than 38 C (100.4 F)  acetaminophen   Tablet. 650 milliGRAM(s) Oral every 6 hours PRN Mild Pain (1 - 3)  ondansetron Injectable 4 milliGRAM(s) IV Push every 6 hours PRN Nausea  senna 2 Tablet(s) Oral at bedtime PRN Constipation  naloxone Injectable 0.1 milliGRAM(s) IV Push every 3 minutes PRN For ANY of the following changes in patient status:  A. RR LESS THAN 10 breaths per minute, B. Oxygen saturation LESS THAN 90%, C. Sedation score of 6    Vital Signs Last 24 Hrs  T(C): 36.4 (15 Aug 2017 04:32), Max: 36.9 (15 Aug 2017 02:06)  T(F): 97.5 (15 Aug 2017 04:32), Max: 98.4 (15 Aug 2017 02:06)  HR: 104 (15 Aug 2017 04:32) (95 - 107)  BP: 148/85 (15 Aug 2017 04:32) (148/85 - 168/81)  BP(mean): --  RR: 18 (15 Aug 2017 04:32) (18 - 18)  SpO2: 97% (15 Aug 2017 04:32) (97% - 100%)    REVIEW OF SYSTEMS:    CONSTITUTIONAL:  As per HPI.  HEENT:  Eyes:  No diplopia or blurred vision. ENT:  No earache, sore throat or runny nose.  CARDIOVASCULAR:  No pressure, squeezing, strangling, tightness, heaviness or aching about the chest, neck, axilla or epigastrium.  RESPIRATORY:  No cough, shortness of breath, PND or orthopnea.  GASTROINTESTINAL:  No nausea, vomiting or diarrhea.  GENITOURINARY:  No dysuria, frequency or urgency.  MUSCULOSKELETAL:  As per HPI.    PHYSICAL EXAMINATION:    GENERAL APPEARANCE:  Pt. is not currently dyspneic, in no distress. Pt. is alert, oriented, and pleasant.  HEENT:  Pupils are normal and react normally. No icterus. Mucous membranes well colored.  NECK:  Supple. No lymphadenopathy. Jugular venous pressure not elevated. Carotids equal.   HEART:   The cardiac impulse has a normal quality. There are no murmurs, rubs or gallops noted  CHEST:  Chest is clear to auscultation. Normal respiratory effort.  ABDOMEN:  Soft and nontender.   EXTREMITIES:  There is no edema.     I&O's Summary    14 Aug 2017 07:01  -  15 Aug 2017 07:00  --------------------------------------------------------  IN: 360 mL / OUT: 0 mL / NET: 360 mL    LABS:                        11.3   8.1   )-----------( 324      ( 15 Aug 2017 05:05 )             32.9     08-15    140  |  107  |  5<L>  ----------------------------<  91  3.5   |  22  |  0.42<L>    Ca    9.0      15 Aug 2017 05:05  Phos  4.1     08-15  Mg     2.3     08-15    EKG: Normal sinus rhythm  Septal infarct (cited on or before 31-DEC-2012)  T wave abnormality, consider inferior ischemia    TELEMETRY: Not on tele.     CARDIAC TESTS: 2Decho- Left ventricle endocardium was well visualized with preserved systolic   function. Left ventriclesize and structure are within normal   limitations.  Estimated ejection fraction is 66% via bi-plane method.   Fibrocalcific changes noted to the mitral valve leaflets with preserved   excursion. Trace mitral regurgitation noted.   The tricuspid valve leaflets are well seen and appear thin and pliable   with preserved leaflets excursion. Trace tricuspid regurgitation noted.    RADIOLOGY & ADDITIONAL STUDIES: CT A/P- negative    ASSESSMENT & PLAN:

## 2017-08-18 NOTE — DISCHARGE NOTE ADULT - CARE PROVIDERS DIRECT ADDRESSES
,[email or web address]

## 2017-08-19 VITALS
OXYGEN SATURATION: 100 % | TEMPERATURE: 98 F | RESPIRATION RATE: 18 BRPM | DIASTOLIC BLOOD PRESSURE: 77 MMHG | HEART RATE: 100 BPM | SYSTOLIC BLOOD PRESSURE: 146 MMHG

## 2017-08-19 RX ORDER — POTASSIUM CHLORIDE 20 MEQ
40 PACKET (EA) ORAL ONCE
Qty: 0 | Refills: 0 | Status: COMPLETED | OUTPATIENT
Start: 2017-08-19 | End: 2017-08-19

## 2017-08-19 RX ORDER — POTASSIUM CHLORIDE 20 MEQ
1 PACKET (EA) ORAL
Qty: 30 | Refills: 0 | OUTPATIENT
Start: 2017-08-19 | End: 2017-09-18

## 2017-08-19 RX ADMIN — MORPHINE SULFATE 200 MILLIGRAM(S): 50 CAPSULE, EXTENDED RELEASE ORAL at 05:33

## 2017-08-19 RX ADMIN — Medication 100 MILLIGRAM(S): at 13:13

## 2017-08-19 RX ADMIN — Medication 1000 UNIT(S): at 13:13

## 2017-08-19 RX ADMIN — HYDROMORPHONE HYDROCHLORIDE 30 MILLILITER(S): 2 INJECTION INTRAMUSCULAR; INTRAVENOUS; SUBCUTANEOUS at 07:36

## 2017-08-19 RX ADMIN — AMLODIPINE BESYLATE 5 MILLIGRAM(S): 2.5 TABLET ORAL at 05:32

## 2017-08-19 RX ADMIN — MORPHINE SULFATE 200 MILLIGRAM(S): 50 CAPSULE, EXTENDED RELEASE ORAL at 06:02

## 2017-08-19 RX ADMIN — Medication 2 TABLET(S): at 07:52

## 2017-08-19 RX ADMIN — Medication 1 TABLET(S): at 13:13

## 2017-08-19 RX ADMIN — Medication 40 MILLIEQUIVALENT(S): at 16:11

## 2017-08-19 RX ADMIN — PANTOPRAZOLE SODIUM 40 MILLIGRAM(S): 20 TABLET, DELAYED RELEASE ORAL at 07:52

## 2017-08-19 RX ADMIN — MORPHINE SULFATE 30 MILLIGRAM(S): 50 CAPSULE, EXTENDED RELEASE ORAL at 06:02

## 2017-08-19 RX ADMIN — Medication 100 MILLIGRAM(S): at 05:33

## 2017-08-19 RX ADMIN — Medication 1 PACKET(S): at 13:13

## 2017-08-19 RX ADMIN — MORPHINE SULFATE 30 MILLIGRAM(S): 50 CAPSULE, EXTENDED RELEASE ORAL at 05:32

## 2017-08-19 RX ADMIN — Medication 1 MILLIGRAM(S): at 00:04

## 2017-08-19 NOTE — PROGRESS NOTE ADULT - SUBJECTIVE AND OBJECTIVE BOX
62F with PMHx AIP, chronic pain, anxiety with recent acute flare of AIP who now p/w severe RLQ pain radiating to her right flank, starting ~ 3 days PTA. Nothing mitigates nor exacerbates. a/w intermittent fever.   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   Baseline anorexia & constipation- last BM ~ 1 week ago.     When initially examined, Pt in severe distress d/t localized RLQ pain. CT(A/P) reviewed with Rads- no evidence of appendicitis but profound stool retention. Pain significantly reduced with IV Dilaudid/Toradol.      8/11: No O/N events. Significantly more comfortable on PCA. Still no BM. Afebrile. Notified of STAU in 2/2 blood Cx's  8/12: No O/N events. Upset d/t fecal incontinence, now refusing Miralax. Abdominal pain significantly improved after multiple BM's  8/13: No O/N events. No new complaints. Finally getting some rest. Pain controlled.   8/14: No O/N events. No new complaints. Frequent BM's, still with mild crampy abdominal pain. Discussed implications of persistent Staph bacteremia @ length.  8/15: No O/N events. No new complaints. Tolerated Mediport removal w/o issue. Remains afebrile. BM's daily, appropriately loose.  8/16: No O/N events. No new complaints. Essentially status quo. Remains. afebrile.  8/17: No O/N events. No new complaints. Afebrile. Feels well. Using PCA less often, but still required.   8/18 No O/N events. No new complaints. Discussed D/C plan @ length. Afebrile  8/19  fells better, afebrile, s/p new picc line, + BM, eager to go home    T(C): 36.6 (08-19-17 @ 12:21), Max: 36.8 (08-18-17 @ 14:49)  T(F): 97.8 (08-19-17 @ 12:21), Max: 98.2 (08-18-17 @ 14:49)  HR: 100 (08-19-17 @ 12:21) (88 - 101)  BP: 146/77 (08-19-17 @ 12:21) (144/88 - 163/75)  RR: 18 (08-19-17 @ 12:21) (16 - 18)  SpO2: 100% (08-19-17 @ 12:21) (98% - 100%)  Wt(kg): --    Physical Exam:  · Constitutional	detailed exam	  · Constitutional Details	well-developed; distress due to pain	  · Neck	No bruits; no thyromegaly or nodules	  · Back	No deformity or limitation of movement	  · Respiratory	Breath Sounds equal & clear to percussion & auscultation, no accessory muscle use	  · Cardiovascular	Regular rate & rhythm, normal S1, S2; no murmurs, gallops or rubs; no S3, S4	  · Gastrointestinal	detailed exam	  · GI Normal	soft	  · GI Abnormal	tender  bowel sounds hypoactive  guarding	  · Tenderness	RLQ	  · Genitourinary	Normal genitalia; no lesions; no discharge	  · Extremities	No cyanosis, clubbing or edema	  · Vascular	Equal and normal pulses (carotid, femoral, dorsalis pedis)	  · Neurological	Alert & oriented; no sensory, motor or coordination deficits, normal reflexes	  · Lymph Nodes	No lymphadedenopathy	  · Musculoskeletal	No joint pain, swelling or deformity; no limitation of movement	      Labs and Results:  08-18    139  |  105  |  6<L>  ----------------------------<  105<H>  3.3<L>   |  27  |  0.69    Ca    9.3      18 Aug 2017 05:45  Phos  3.6     08-18  Mg     2.2     08-18                            11.9   7.1   )-----------( 364      ( 18 Aug 2017 05:45 )             35.0                            11.6   8.5   )-----------( 362      ( 17 Aug 2017 06:43 )             34.4     17 Aug 2017 06:43    141    |  107    |  5      ----------------------------<  87     3.8     |  25     |  0.58     Ca    9.2        17 Aug 2017 06:43  Phos  3.6       17 Aug 2017 06:43  Mg     2.3       17 Aug 2017 06:43      Assessment and Plan:   Assessment:  · Assessment		  62F with aforementioned PMHx, now a/w    * Severe acute RLQ pain, unclear etiology, suspect fecal retention as primary cause, appendix visualized & wnl, lactate wnl- doubt bowel ischemia; KUB unrevealing but obscured by remaining contrast  - PRN analgesics/antiemetics  - c/w senna , psyllium  - Hem/GI evals appreciated    * Sepsis 2/2 MSSA bacteremia d/t Mediport with persistence on surveillance blood Cx's  - c/w Ancef Q8 x 2 more weeks  - surveillance blood Cx's 8/16 NGTD, catheter Cx NGTD  - Cardio eval for GENARO appreciated- plan to proceed if blood Cx's remain (+)    * Chronic pain, anxiety- status quo; i-STOP c/w Pt report  - c/w MS Contin 230mg Q12  - Dilaudid PCA  - Ativan Q4 PRN    * GERD, stable  - c/w PPI    * hypokalemia, replaced   - advised K rich diet  - replete PRN    * DVT PPx- Lovenox    * DNR/DNI    Dispo - stable for discharge home after PICC placement and IV dose of Abx at 2 pm  see updated discharge note from 8/18

## 2017-08-19 NOTE — ADVANCED PRACTICE NURSE CONSULT - ASSESSMENT
Rec'd. order to place midline catheter for long-term IV abx. Reviewed chart, labwork, explained all risks and benefits and obtained verbal consent for Midline. Pt. A&Ox3 and verbalized understanding. Inserted Bard Powerglide Pro Midline catheter to left cephalic via ultrasound guidance 20g, 10 cm. length, brisk blood return, flushes well w/20 ml. NS. Endcap placed. Pt. tolerated well. No CXR needed for Midline. Report given to District Nurse. Safety maintained. Lot # [id number]

## 2017-08-21 LAB
CULTURE RESULTS: SIGNIFICANT CHANGE UP
CULTURE RESULTS: SIGNIFICANT CHANGE UP
SPECIMEN SOURCE: SIGNIFICANT CHANGE UP
SPECIMEN SOURCE: SIGNIFICANT CHANGE UP

## 2017-08-22 ENCOUNTER — INPATIENT (INPATIENT)
Facility: HOSPITAL | Age: 62
LOS: 0 days | Discharge: HOME HEALTH | End: 2017-08-23
Attending: FAMILY MEDICINE | Admitting: FAMILY MEDICINE
Payer: MEDICARE

## 2017-08-22 VITALS — WEIGHT: 138.01 LBS | HEIGHT: 68 IN

## 2017-08-22 DIAGNOSIS — Z90.89 ACQUIRED ABSENCE OF OTHER ORGANS: Chronic | ICD-10-CM

## 2017-08-22 DIAGNOSIS — Z90.49 ACQUIRED ABSENCE OF OTHER SPECIFIED PARTS OF DIGESTIVE TRACT: Chronic | ICD-10-CM

## 2017-08-22 DIAGNOSIS — Z98.1 ARTHRODESIS STATUS: Chronic | ICD-10-CM

## 2017-08-22 LAB
-  AMPICILLIN/SULBACTAM: SIGNIFICANT CHANGE UP
-  CEFAZOLIN: SIGNIFICANT CHANGE UP
-  CIPROFLOXACIN: SIGNIFICANT CHANGE UP
-  CLINDAMYCIN: SIGNIFICANT CHANGE UP
-  ERYTHROMYCIN: SIGNIFICANT CHANGE UP
-  GENTAMICIN: SIGNIFICANT CHANGE UP
-  LEVOFLOXACIN: SIGNIFICANT CHANGE UP
-  MOXIFLOXACIN(AEROBIC): SIGNIFICANT CHANGE UP
-  OXACILLIN: SIGNIFICANT CHANGE UP
-  PENICILLIN: SIGNIFICANT CHANGE UP
-  RIFAMPIN: SIGNIFICANT CHANGE UP
-  TETRACYCLINE: SIGNIFICANT CHANGE UP
-  TRIMETHOPRIM/SULFAMETHOXAZOLE: SIGNIFICANT CHANGE UP
-  VANCOMYCIN: SIGNIFICANT CHANGE UP
ALBUMIN SERPL ELPH-MCNC: 3.5 G/DL — SIGNIFICANT CHANGE UP (ref 3.3–5)
ALP SERPL-CCNC: 102 U/L — SIGNIFICANT CHANGE UP (ref 40–120)
ALT FLD-CCNC: 17 U/L — SIGNIFICANT CHANGE UP (ref 12–78)
ANION GAP SERPL CALC-SCNC: 6 MMOL/L — SIGNIFICANT CHANGE UP (ref 5–17)
AST SERPL-CCNC: 18 U/L — SIGNIFICANT CHANGE UP (ref 15–37)
BASOPHILS # BLD AUTO: 0.1 K/UL — SIGNIFICANT CHANGE UP (ref 0–0.2)
BASOPHILS NFR BLD AUTO: 1.2 % — SIGNIFICANT CHANGE UP (ref 0–2)
BILIRUB SERPL-MCNC: 0.3 MG/DL — SIGNIFICANT CHANGE UP (ref 0.2–1.2)
BUN SERPL-MCNC: 11 MG/DL — SIGNIFICANT CHANGE UP (ref 7–23)
CALCIUM SERPL-MCNC: 9.4 MG/DL — SIGNIFICANT CHANGE UP (ref 8.5–10.1)
CHLORIDE SERPL-SCNC: 105 MMOL/L — SIGNIFICANT CHANGE UP (ref 96–108)
CO2 SERPL-SCNC: 28 MMOL/L — SIGNIFICANT CHANGE UP (ref 22–31)
CREAT SERPL-MCNC: 0.64 MG/DL — SIGNIFICANT CHANGE UP (ref 0.5–1.3)
CULTURE RESULTS: SIGNIFICANT CHANGE UP
EOSINOPHIL # BLD AUTO: 0.1 K/UL — SIGNIFICANT CHANGE UP (ref 0–0.5)
EOSINOPHIL NFR BLD AUTO: 1.6 % — SIGNIFICANT CHANGE UP (ref 0–6)
GLUCOSE SERPL-MCNC: 82 MG/DL — SIGNIFICANT CHANGE UP (ref 70–99)
HCT VFR BLD CALC: 34.2 % — LOW (ref 34.5–45)
HGB BLD-MCNC: 11.1 G/DL — LOW (ref 11.5–15.5)
INR BLD: 1.1 RATIO — SIGNIFICANT CHANGE UP (ref 0.88–1.16)
LYMPHOCYTES # BLD AUTO: 2.4 K/UL — SIGNIFICANT CHANGE UP (ref 1–3.3)
LYMPHOCYTES # BLD AUTO: 27.6 % — SIGNIFICANT CHANGE UP (ref 13–44)
MCHC RBC-ENTMCNC: 27.3 PG — SIGNIFICANT CHANGE UP (ref 27–34)
MCHC RBC-ENTMCNC: 32.4 GM/DL — SIGNIFICANT CHANGE UP (ref 32–36)
MCV RBC AUTO: 84.3 FL — SIGNIFICANT CHANGE UP (ref 80–100)
METHOD TYPE: SIGNIFICANT CHANGE UP
MONOCYTES # BLD AUTO: 0.7 K/UL — SIGNIFICANT CHANGE UP (ref 0–0.9)
MONOCYTES NFR BLD AUTO: 8.2 % — SIGNIFICANT CHANGE UP (ref 2–14)
NEUTROPHILS # BLD AUTO: 5.3 K/UL — SIGNIFICANT CHANGE UP (ref 1.8–7.4)
NEUTROPHILS NFR BLD AUTO: 61.4 % — SIGNIFICANT CHANGE UP (ref 43–77)
ORGANISM # SPEC MICROSCOPIC CNT: SIGNIFICANT CHANGE UP
ORGANISM # SPEC MICROSCOPIC CNT: SIGNIFICANT CHANGE UP
PLATELET # BLD AUTO: 313 K/UL — SIGNIFICANT CHANGE UP (ref 150–400)
POTASSIUM SERPL-MCNC: 3.5 MMOL/L — SIGNIFICANT CHANGE UP (ref 3.5–5.3)
POTASSIUM SERPL-SCNC: 3.5 MMOL/L — SIGNIFICANT CHANGE UP (ref 3.5–5.3)
PROT SERPL-MCNC: 7.3 GM/DL — SIGNIFICANT CHANGE UP (ref 6–8.3)
PROTHROM AB SERPL-ACNC: 11.9 SEC — SIGNIFICANT CHANGE UP (ref 9.8–12.7)
RBC # BLD: 4.06 M/UL — SIGNIFICANT CHANGE UP (ref 3.8–5.2)
RBC # FLD: 13.3 % — SIGNIFICANT CHANGE UP (ref 10.3–14.5)
SODIUM SERPL-SCNC: 139 MMOL/L — SIGNIFICANT CHANGE UP (ref 135–145)
SPECIMEN SOURCE: SIGNIFICANT CHANGE UP
WBC # BLD: 8.6 K/UL — SIGNIFICANT CHANGE UP (ref 3.8–10.5)
WBC # FLD AUTO: 8.6 K/UL — SIGNIFICANT CHANGE UP (ref 3.8–10.5)

## 2017-08-22 PROCEDURE — 93010 ELECTROCARDIOGRAM REPORT: CPT

## 2017-08-22 PROCEDURE — 99285 EMERGENCY DEPT VISIT HI MDM: CPT

## 2017-08-22 RX ORDER — CEFAZOLIN SODIUM 1 G
2000 VIAL (EA) INJECTION ONCE
Qty: 0 | Refills: 0 | Status: COMPLETED | OUTPATIENT
Start: 2017-08-22 | End: 2017-08-22

## 2017-08-22 RX ORDER — SODIUM CHLORIDE 9 MG/ML
1000 INJECTION INTRAMUSCULAR; INTRAVENOUS; SUBCUTANEOUS ONCE
Qty: 0 | Refills: 0 | Status: COMPLETED | OUTPATIENT
Start: 2017-08-22 | End: 2017-08-22

## 2017-08-22 RX ADMIN — Medication 100 MILLIGRAM(S): at 19:40

## 2017-08-22 RX ADMIN — SODIUM CHLORIDE 1000 MILLILITER(S): 9 INJECTION INTRAMUSCULAR; INTRAVENOUS; SUBCUTANEOUS at 19:31

## 2017-08-22 NOTE — ED STATDOCS - CARE PLAN
Principal Discharge DX:	Bacteremia associated with intravascular line  Instructions for follow-up, activity and diet:	IV access for abx

## 2017-08-22 NOTE — ED STATDOCS - PROGRESS NOTE DETAILS
61 y/o F with a PMHx of adrenal insufficiency, knee effusion, osteoporosis, hearing impaired w/ recent admission for baceremia receiving ancef q 8 via midline p/w bleeding s/p midline falling out at night. pt had midline in left arm, states she  woke up with midline out and blood on bed. denies fever, chills, chest pain, dizziness. cbc, cmp, ancef, ivf, admission AJM: Approached by hospitalist as pt is waiting for a bed but only needs mid line placed. IV team at bedside placing midline. SW confirmed nursing care for home anx. no need for admission anymore. pt has been given mornign abx dose. stable for dc

## 2017-08-22 NOTE — ED STATDOCS - OBJECTIVE STATEMENT
63 y/o F with a PMHx of adrenal insufficiency, knee effusion, osteoporosis, hearing impaired presents to the ED requesting PICC line replacement to her LUE as it came out and caused heavy hemorrhaging. Pt was recently here for bacteremia and received PICC line. Pt currently calm, alert, awake, able to provide somewhat adequate hx and denies any other acute c/o at this time. Pt states that she has a port to her R chest wall.

## 2017-08-22 NOTE — ED STATDOCS - ATTENDING CONTRIBUTION TO CARE
I performed the initial face to face bedside interview with this patient regarding history of present illness, review of symptoms and past medical, social and family history.  I completed an independent physical examination.  I was the initial provider who evaluated this patient.  The history, review of symptoms and examination was documented by the resident in my presence and I attest to the accuracy of the documentation.  I have signed out the follow up of any pending tests (i.e. labs, radiological studies) to the resident.  I have discussed the patient’s plan of care and disposition with the resident.

## 2017-08-22 NOTE — ED ADULT NURSE NOTE - OBJECTIVE STATEMENT
Patient comes to ED for left arm  midline being removed. pt states she had it placed yesterday and while she was sleeping she accidentally must of removed it. pt had her port also removed due to an infection

## 2017-08-23 VITALS
TEMPERATURE: 98 F | OXYGEN SATURATION: 99 % | RESPIRATION RATE: 16 BRPM | DIASTOLIC BLOOD PRESSURE: 78 MMHG | SYSTOLIC BLOOD PRESSURE: 136 MMHG | HEART RATE: 82 BPM

## 2017-08-23 DIAGNOSIS — E80.20 UNSPECIFIED PORPHYRIA: ICD-10-CM

## 2017-08-23 DIAGNOSIS — T80.212A LOCAL INFECTION DUE TO CENTRAL VENOUS CATHETER, INITIAL ENCOUNTER: ICD-10-CM

## 2017-08-23 DIAGNOSIS — Z79.899 OTHER LONG TERM (CURRENT) DRUG THERAPY: ICD-10-CM

## 2017-08-23 DIAGNOSIS — Z66 DO NOT RESUSCITATE: ICD-10-CM

## 2017-08-23 DIAGNOSIS — R10.31 RIGHT LOWER QUADRANT PAIN: ICD-10-CM

## 2017-08-23 DIAGNOSIS — F41.9 ANXIETY DISORDER, UNSPECIFIED: ICD-10-CM

## 2017-08-23 DIAGNOSIS — K59.00 CONSTIPATION, UNSPECIFIED: ICD-10-CM
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
## 2017-08-23 DIAGNOSIS — G89.29 OTHER CHRONIC PAIN: ICD-10-CM

## 2017-08-23 DIAGNOSIS — E87.6 HYPOKALEMIA: ICD-10-CM

## 2017-08-23 DIAGNOSIS — A41.01 SEPSIS DUE TO METHICILLIN SUSCEPTIBLE STAPHYLOCOCCUS AUREUS: ICD-10-CM

## 2017-08-23 RX ORDER — CEFAZOLIN SODIUM 1 G
2000 VIAL (EA) INJECTION ONCE
Qty: 0 | Refills: 0 | Status: COMPLETED | OUTPATIENT
Start: 2017-08-23 | End: 2017-08-23

## 2017-08-23 RX ADMIN — Medication 100 MILLIGRAM(S): at 10:55

## 2017-08-23 NOTE — ADVANCED PRACTICE NURSE CONSULT - ASSESSMENT
Called to ED to replace midline catheter that patient states she accidentally pulled out while sleeping. Reviewed chart, labwork, explained all risks and benefits and obtained verbal consent for midline. Inserted Bard Powerglide midline 20g. 10 cm. length to left cephalic via ultrasound guidance. Brisk blood return, flushes well w/20 ml. NS. Endcap placed. Pt. tolerated well. Report given to District Nurse, Lot #[id number].

## 2017-08-23 NOTE — ED ADULT NURSE REASSESSMENT NOTE - NS ED NURSE REASSESS COMMENT FT1
Care transferred from Alice RN to myself, bedside report given. Pt transferred from [facility] to main ER. Pt resting comfortable in bed

## 2017-08-23 NOTE — ED ADULT NURSE REASSESSMENT NOTE - NS ED NURSE REASSESS COMMENT FT1
Pt sleeping in bed, normal breathing pattern with no difficulty. Pt close to nursing station. Admin orders pending, no active bleeding at old PICC line site

## 2017-08-23 NOTE — ED ADULT NURSE REASSESSMENT NOTE - NS ED NURSE REASSESS COMMENT FT1
Pt sleeping at this time.  Awaiting hospitalist to see pt for admission orders.  Will continue to monitor.

## 2017-08-23 NOTE — CONSULT NOTE ADULT - SUBJECTIVE AND OBJECTIVE BOX
PCP - Johnathan Boxer  Heme/Onc - Link  ID - Jacky  Cardio - [facility] Surgery - Toño Durbin    CHIEF COMPLAINT:   left mid-line came out    HISTORY OF THE PRESENT ILLNESS:  62 F with hx of chroinc intermittent porphyria, anxiety, chronic abdominal pain was recently admitted to  from 8/10-8/19 with acute abdominal pain.  She was found to have acute MSSA sepsis due to an infected right port-a-cath.  The catheter was removed, blood cultures came back negative and a left-midline was placed and she was discharged home on IV ABXs with Ancef per the direction of ID.  She was discharged home on 8/19.  She presented to the ED yesterday after she woke up in the morning and found that the left mid-line was missing and there was blood all over the sheets.  She has otherwise been feeling well and denies any shortness of breath, fevers, chills, shakes, nausea, vomiting.  She has abdominal pain which is chronic.  She tells me that she does have enough IV Ancef that was delivered to her at home and also gets VNS services.  She has remained in the ED overnight and initial plan was for medical admission to have the mid-line replaced.  I have seen and examined the patient in the ED and spoke with the IV team.  They will plan to replace the line in the ED and we will plan to discharge the patient home today.  Case has also been discussed with the ED charge nurse and ED MD.    PAST MEDICAL HISTORY:  Adrenal Insufficiency   Chronic abdominal pain    Chronic Intermittent Porphryia  Anxiety.    PAST SURGICAL HISTORY:  s/p spinal fusion    s/p cholecystectomy    s/p tonsillectomy.    FAMILY HISTORY:   non-contributory to the patient's current presentation    SOCIAL HISTORY:  no smoking, no alcohol, no drugs    REVIEW OF SYSTEMS:   All 10 systems reviewed in detailed and found to be negative with the exception of what has already been described above    VITALS SIGNS:  T(F): 97.6 (08-23-17 @ 07:34), Max: 98.8 (08-23-17 @ 04:26)  HR: 85 (08-23-17 @ 07:34) (78 - 112)  BP: 145/73 (08-23-17 @ 07:34) (124/55 - 147/82)  RR: 16 (08-23-17 @ 07:34) (16 - 17)  SpO2: 97% (08-23-17 @ 07:34) (95% - 98%)  Wt(kg): --      PHYSICAL EXAM:    HEENT:  pupils equal and reactive, EOMI, no oropharyngeal lesions, erythema, exudates, oral thrush    NECK:   supple, no carotid bruits, no palpable lymph nodes, no thyromegaly    CV:  +S1, +S2, regular, no murmurs or rubs    RESP:   lungs clear to auscultation bilaterally, no wheezing, rales, rhonchi, good air entry bilaterally    BREAST:  not examined    GI:  abdomen soft, non-tender, non-distended, normal BS, no bruits, no abdominal masses, no palpable masses    RECTAL:  not examined    :  not examined    MSK:   normal muscle tone, no atrophy, no rigidity, no contractions    EXT:   no clubbing, no cyanosis, no edema, no calf pain, swelling or erythema    VASCULAR:  pulses equal and symmetric in the upper and lower extremities    NEURO:  AAOX3, no focal neurological deficits, follows all commands, able to move extremities spontaneously    SKIN:  no ulcers, lesions or rashes, there is a wound over the right upper chest with steristrips    LABS:                            11.1   8.6   )-----------( 313      ( 22 Aug 2017 19:26 )             34.2     08-22    139  |  105  |  11  ----------------------------<  82  3.5   |  28  |  0.64    Ca    9.4      22 Aug 2017 19:26    TPro  7.3  /  Alb  3.5  /  TBili  0.3  /  DBili  x   /  AST  18  /  ALT  17  /  AlkPhos  102  08-22        LIVER FUNCTIONS - ( 22 Aug 2017 19:26 )  Alb: 3.5 g/dL / Pro: 7.3 gm/dL / ALK PHOS: 102 U/L / ALT: 17 U/L / AST: 18 U/L / GGT: x           PT/INR - ( 22 Aug 2017 19:26 )   PT: 11.9 sec;   INR: 1.10 ratio                                           EKG:     RADIOLOGY STUDIES:      ASSESSMENT:        PLAN:        d/w patient, ED RN and ED physician    Time Spent: PCP - Johnathan Boxer  Heme/Onc - Link  ID - Jacky  Cardio - [facility] Surgery - Toño Durbin    CHIEF COMPLAINT:   left mid-line came out    HISTORY OF THE PRESENT ILLNESS:  62 F with hx of chroinc intermittent porphyria, anxiety, chronic abdominal pain was recently admitted to  from 8/10-8/19 with acute abdominal pain.  She was found to have acute MSSA sepsis due to an infected right port-a-cath.  The catheter was removed, blood cultures came back negative and a left-midline was placed and she was discharged home on IV ABXs with Ancef per the direction of ID.  She was discharged home on 8/19.  She presented to the ED yesterday after she woke up in the morning and found that the left mid-line was missing and there was blood all over the sheets.  She has otherwise been feeling well and denies any shortness of breath, fevers, chills, shakes, nausea, vomiting.  She has abdominal pain which is chronic.  She tells me that she does have enough IV Ancef that was delivered to her at home and also gets VNS services.  She has remained in the ED overnight and initial plan was for medical admission to have the mid-line replaced.  I have seen and examined the patient in the ED and spoke with the IV team.  They will plan to replace the line in the ED and we will plan to discharge the patient home today.  Case has also been discussed with the ED charge nurse and ED MD.    PAST MEDICAL HISTORY:  Adrenal Insufficiency   Chronic abdominal pain    Chronic Intermittent Porphryia  Anxiety    PAST SURGICAL HISTORY:  s/p spinal fusion    s/p cholecystectomy    s/p tonsillectomy.    FAMILY HISTORY:   non-contributory to the patient's current presentation    SOCIAL HISTORY:  no smoking, no alcohol, no drugs    REVIEW OF SYSTEMS:   All 10 systems reviewed in detailed and found to be negative with the exception of what has already been described above    VITALS SIGNS:  T(F): 97.6 (08-23-17 @ 07:34), Max: 98.8 (08-23-17 @ 04:26)  HR: 85 (08-23-17 @ 07:34) (78 - 112)  BP: 145/73 (08-23-17 @ 07:34) (124/55 - 147/82)  RR: 16 (08-23-17 @ 07:34) (16 - 17)  SpO2: 97% (08-23-17 @ 07:34) (95% - 98%)  Wt(kg): --      PHYSICAL EXAM:    HEENT:  pupils equal and reactive, EOMI, no oropharyngeal lesions, erythema, exudates, oral thrush    NECK:   supple, no carotid bruits, no palpable lymph nodes, no thyromegaly    CV:  +S1, +S2, regular, no murmurs or rubs    RESP:   lungs clear to auscultation bilaterally, no wheezing, rales, rhonchi, good air entry bilaterally    BREAST:  not examined    GI:  abdomen soft, non-tender, non-distended, normal BS, no bruits, no abdominal masses, no palpable masses    RECTAL:  not examined    :  not examined    MSK:   normal muscle tone, no atrophy, no rigidity, no contractions    EXT:   no clubbing, no cyanosis, no edema, no calf pain, swelling or erythema    VASCULAR:  pulses equal and symmetric in the upper and lower extremities    NEURO:  AAOX3, no focal neurological deficits, follows all commands, able to move extremities spontaneously    SKIN:  no ulcers, lesions or rashes, there is a wound over the right upper chest with steristrips, no drainage or dehiscience      LABS:                            11.1   8.6   )-----------( 313      ( 22 Aug 2017 19:26 )             34.2     08-22    139  |  105  |  11  ----------------------------<  82  3.5   |  28  |  0.64    Ca    9.4      22 Aug 2017 19:26    TPro  7.3  /  Alb  3.5  /  TBili  0.3  /  DBili  x   /  AST  18  /  ALT  17  /  AlkPhos  102  08-22    LIVER FUNCTIONS - ( 22 Aug 2017 19:26 )  Alb: 3.5 g/dL / Pro: 7.3 gm/dL / ALK PHOS: 102 U/L / ALT: 17 U/L / AST: 18 U/L / GGT: x           PT/INR - ( 22 Aug 2017 19:26 )   PT: 11.9 sec;   INR: 1.10 ratio        IMPRESSION:    RECENT MSSA SEPSIS ON IV ANCEF AT HOME VIA LEFT UE MID-LINE, NOW THE MID-LINE HAS COME OUT AND REQUIRES REPLACEMENT.  PATIENT IS ASYMPTOMATIC AND LABS ARE ALL NORMAL      RECOMMENDATIONS:  -  IV team for placement of mid-line (I spoke to them already)  -  medically stable for d/c home after placement of mid-line  -  s/p dose of Ancef in the ED last night and will get another dose today prior to discharge  -  continue same outpatient medications and complete course of IV ABXs as initially ordered at the time of last admission  -  SW to assist with home VNS and ensure that patient does have the home IV ABXs    d/w patient, IV team, and ED MD

## 2017-08-29 DIAGNOSIS — T82.524A DISPLACEMENT OF INFUSION CATHETER, INITIAL ENCOUNTER: ICD-10-CM

## 2017-08-29 DIAGNOSIS — Z46.89 ENCOUNTER FOR FITTING AND ADJUSTMENT OF OTHER SPECIFIED DEVICES: ICD-10-CM
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
## 2017-08-29 DIAGNOSIS — G89.29 OTHER CHRONIC PAIN: ICD-10-CM

## 2017-08-29 DIAGNOSIS — M81.0 AGE-RELATED OSTEOPOROSIS WITHOUT CURRENT PATHOLOGICAL FRACTURE: ICD-10-CM

## 2017-08-29 DIAGNOSIS — E27.40 UNSPECIFIED ADRENOCORTICAL INSUFFICIENCY: ICD-10-CM

## 2017-08-29 DIAGNOSIS — H91.90 UNSPECIFIED HEARING LOSS, UNSPECIFIED EAR: ICD-10-CM

## 2017-08-29 DIAGNOSIS — Z90.49 ACQUIRED ABSENCE OF OTHER SPECIFIED PARTS OF DIGESTIVE TRACT: ICD-10-CM

## 2017-08-31 DIAGNOSIS — A41.01 SEPSIS DUE TO METHICILLIN SUSCEPTIBLE STAPHYLOCOCCUS AUREUS: ICD-10-CM

## 2017-09-13 LAB
CULTURE RESULTS: SIGNIFICANT CHANGE UP
SPECIMEN SOURCE: SIGNIFICANT CHANGE UP

## 2017-09-19 ENCOUNTER — OUTPATIENT (OUTPATIENT)
Dept: OUTPATIENT SERVICES | Facility: HOSPITAL | Age: 62
LOS: 1 days | Discharge: HOME | End: 2017-09-19
Payer: MEDICARE

## 2017-09-19 VITALS
OXYGEN SATURATION: 96 % | RESPIRATION RATE: 16 BRPM | DIASTOLIC BLOOD PRESSURE: 71 MMHG | TEMPERATURE: 98 F | WEIGHT: 148.37 LBS | SYSTOLIC BLOOD PRESSURE: 125 MMHG | HEART RATE: 83 BPM

## 2017-09-19 VITALS
TEMPERATURE: 98 F | DIASTOLIC BLOOD PRESSURE: 76 MMHG | SYSTOLIC BLOOD PRESSURE: 141 MMHG | OXYGEN SATURATION: 98 % | RESPIRATION RATE: 16 BRPM | HEART RATE: 72 BPM

## 2017-09-19 DIAGNOSIS — Z90.49 ACQUIRED ABSENCE OF OTHER SPECIFIED PARTS OF DIGESTIVE TRACT: Chronic | ICD-10-CM

## 2017-09-19 DIAGNOSIS — Z98.1 ARTHRODESIS STATUS: Chronic | ICD-10-CM

## 2017-09-19 DIAGNOSIS — Z90.89 ACQUIRED ABSENCE OF OTHER ORGANS: Chronic | ICD-10-CM

## 2017-09-19 PROCEDURE — 76937 US GUIDE VASCULAR ACCESS: CPT | Mod: 26

## 2017-09-19 PROCEDURE — 36561 INSERT TUNNELED CV CATH: CPT | Mod: RT

## 2017-09-19 PROCEDURE — 77001 FLUOROGUIDE FOR VEIN DEVICE: CPT | Mod: 26

## 2017-09-19 RX ORDER — FUROSEMIDE 40 MG
2 TABLET ORAL
Qty: 0 | Refills: 0 | COMMUNITY

## 2017-09-19 RX ORDER — MUPIROCIN 20 MG/G
1 OINTMENT TOPICAL
Qty: 0 | Refills: 0 | COMMUNITY
Start: 2017-09-19 | End: 2017-09-23

## 2017-09-19 NOTE — ASU DISCHARGE PLAN (ADULT/PEDIATRIC). - NURSING INSTRUCTIONS
Begin with liquids and light food ( tea, toast, Jello, soups). Advance to what you normally eat. Liquids should taken in adequate amounts today.     CALL the DOCTOR:    -Fever greater than  101F  - Signs  of infection such as : increase pain,swelling,redness,or a bad  smell coming from the wound.  -Excessive amount of bleeding.  - Any pain that appears to be getting worse.  - Vomiting  -  If you have  not urinated 8 hours after surgery or have any difficulty urinating.     A responsible adult should be with you for the rest of the day and night for your safety and to help you if you needed.    Review attached FACT SHEET if For any problems or concerns,contact your doctor. [facility] patients should call the [facility]. If you cannot reach the doctor or clinic, call [facility] Emergency Department at [phone] or go to your local Emergency Department.  A responsible adult should be with you for the rest of the day and night for your safety and to help you if you needed. Resume your medications as listed on the attached Medication Record. applicable.

## 2017-09-19 NOTE — ASU PATIENT PROFILE, ADULT - PMH
Adrenal insufficiency, primary    Chronic abdominal pain    Knee effusion    Osteoporosis    Porphyria    Staph infection  8/2017

## 2017-09-19 NOTE — ASU PATIENT PROFILE, ADULT - ABILITY TO HEAR (WITH HEARING AID OR HEARING APPLIANCE IF NORMALLY USED):
Mildly to Moderately Impaired: difficulty hearing in some environments or speaker may need to increase volume or speak distinctly/pt retained hearing aides

## 2017-09-19 NOTE — BRIEF OPERATIVE NOTE - PROCEDURE
<<-----Click on this checkbox to enter Procedure Insertion of infusion port into chest  09/19/2017    Active  DAXELROD

## 2017-09-19 NOTE — BRIEF OPERATIVE NOTE - OPERATION/FINDINGS
sono and fluoro. rij acces. 6fr single lumen. tip in svc. good blood return. pt keshia proc well. no complication

## 2017-09-22 ENCOUNTER — APPOINTMENT (OUTPATIENT)
Dept: HEMATOLOGY ONCOLOGY | Facility: CLINIC | Age: 62
End: 2017-09-22

## 2017-09-30 DIAGNOSIS — E80.20 UNSPECIFIED PORPHYRIA: ICD-10-CM

## 2017-09-30 DIAGNOSIS — E27.40 UNSPECIFIED ADRENOCORTICAL INSUFFICIENCY: ICD-10-CM

## 2017-10-04 LAB
CULTURE RESULTS: SIGNIFICANT CHANGE UP
SPECIMEN SOURCE: SIGNIFICANT CHANGE UP

## 2017-10-25 ENCOUNTER — INPATIENT (INPATIENT)
Facility: HOSPITAL | Age: 62
LOS: 4 days | Discharge: HOME HEALTH | End: 2017-10-30
Attending: FAMILY MEDICINE | Admitting: INTERNAL MEDICINE
Payer: MEDICARE

## 2017-10-25 VITALS
HEIGHT: 66 IN | SYSTOLIC BLOOD PRESSURE: 183 MMHG | HEART RATE: 96 BPM | DIASTOLIC BLOOD PRESSURE: 94 MMHG | TEMPERATURE: 99 F | OXYGEN SATURATION: 100 % | WEIGHT: 160.06 LBS | RESPIRATION RATE: 20 BRPM

## 2017-10-25 DIAGNOSIS — Z98.1 ARTHRODESIS STATUS: Chronic | ICD-10-CM

## 2017-10-25 DIAGNOSIS — Z90.49 ACQUIRED ABSENCE OF OTHER SPECIFIED PARTS OF DIGESTIVE TRACT: Chronic | ICD-10-CM

## 2017-10-25 DIAGNOSIS — Z90.89 ACQUIRED ABSENCE OF OTHER ORGANS: Chronic | ICD-10-CM

## 2017-10-25 LAB
ALBUMIN SERPL ELPH-MCNC: 3.6 G/DL — SIGNIFICANT CHANGE UP (ref 3.3–5)
ALP SERPL-CCNC: 79 U/L — SIGNIFICANT CHANGE UP (ref 40–120)
ALT FLD-CCNC: 21 U/L — SIGNIFICANT CHANGE UP (ref 12–78)
ANION GAP SERPL CALC-SCNC: 11 MMOL/L — SIGNIFICANT CHANGE UP (ref 5–17)
AST SERPL-CCNC: 13 U/L — LOW (ref 15–37)
BASOPHILS # BLD AUTO: 0.1 K/UL — SIGNIFICANT CHANGE UP (ref 0–0.2)
BASOPHILS NFR BLD AUTO: 0.6 % — SIGNIFICANT CHANGE UP (ref 0–2)
BILIRUB SERPL-MCNC: 0.3 MG/DL — SIGNIFICANT CHANGE UP (ref 0.2–1.2)
BUN SERPL-MCNC: 12 MG/DL — SIGNIFICANT CHANGE UP (ref 7–23)
CALCIUM SERPL-MCNC: 9.2 MG/DL — SIGNIFICANT CHANGE UP (ref 8.5–10.1)
CHLORIDE SERPL-SCNC: 104 MMOL/L — SIGNIFICANT CHANGE UP (ref 96–108)
CO2 SERPL-SCNC: 23 MMOL/L — SIGNIFICANT CHANGE UP (ref 22–31)
CREAT SERPL-MCNC: 0.84 MG/DL — SIGNIFICANT CHANGE UP (ref 0.5–1.3)
EOSINOPHIL # BLD AUTO: 0 K/UL — SIGNIFICANT CHANGE UP (ref 0–0.5)
EOSINOPHIL NFR BLD AUTO: 0.1 % — SIGNIFICANT CHANGE UP (ref 0–6)
GLUCOSE SERPL-MCNC: 129 MG/DL — HIGH (ref 70–99)
HCT VFR BLD CALC: 38.7 % — SIGNIFICANT CHANGE UP (ref 34.5–45)
HGB BLD-MCNC: 13.3 G/DL — SIGNIFICANT CHANGE UP (ref 11.5–15.5)
INR BLD: 1.04 RATIO — SIGNIFICANT CHANGE UP (ref 0.88–1.16)
LACTATE SERPL-SCNC: 1.1 MMOL/L — SIGNIFICANT CHANGE UP (ref 0.7–2)
LACTATE SERPL-SCNC: 3.6 MMOL/L — HIGH (ref 0.7–2)
LIDOCAIN IGE QN: 76 U/L — SIGNIFICANT CHANGE UP (ref 73–393)
LYMPHOCYTES # BLD AUTO: 1.7 K/UL — SIGNIFICANT CHANGE UP (ref 1–3.3)
LYMPHOCYTES # BLD AUTO: 11 % — LOW (ref 13–44)
MCHC RBC-ENTMCNC: 28.3 PG — SIGNIFICANT CHANGE UP (ref 27–34)
MCHC RBC-ENTMCNC: 34.3 GM/DL — SIGNIFICANT CHANGE UP (ref 32–36)
MCV RBC AUTO: 82.5 FL — SIGNIFICANT CHANGE UP (ref 80–100)
MONOCYTES # BLD AUTO: 0.6 K/UL — SIGNIFICANT CHANGE UP (ref 0–0.9)
MONOCYTES NFR BLD AUTO: 3.7 % — SIGNIFICANT CHANGE UP (ref 2–14)
NEUTROPHILS # BLD AUTO: 13.1 K/UL — HIGH (ref 1.8–7.4)
NEUTROPHILS NFR BLD AUTO: 84.5 % — HIGH (ref 43–77)
PLATELET # BLD AUTO: 224 K/UL — SIGNIFICANT CHANGE UP (ref 150–400)
POTASSIUM SERPL-MCNC: 4 MMOL/L — SIGNIFICANT CHANGE UP (ref 3.5–5.3)
POTASSIUM SERPL-SCNC: 4 MMOL/L — SIGNIFICANT CHANGE UP (ref 3.5–5.3)
PROT SERPL-MCNC: 8.1 GM/DL — SIGNIFICANT CHANGE UP (ref 6–8.3)
PROTHROM AB SERPL-ACNC: 11.2 SEC — SIGNIFICANT CHANGE UP (ref 9.8–12.7)
RBC # BLD: 4.69 M/UL — SIGNIFICANT CHANGE UP (ref 3.8–5.2)
RBC # FLD: 12 % — SIGNIFICANT CHANGE UP (ref 10.3–14.5)
SODIUM SERPL-SCNC: 138 MMOL/L — SIGNIFICANT CHANGE UP (ref 135–145)
WBC # BLD: 15.5 K/UL — HIGH (ref 3.8–10.5)
WBC # FLD AUTO: 15.5 K/UL — HIGH (ref 3.8–10.5)

## 2017-10-25 PROCEDURE — 99285 EMERGENCY DEPT VISIT HI MDM: CPT

## 2017-10-25 PROCEDURE — 74177 CT ABD & PELVIS W/CONTRAST: CPT | Mod: 26

## 2017-10-25 RX ORDER — VITAMIN/MINERAL SUPPLEMENT WITH MAGNESIUM CARBONATE, CALCIUM CARBONATE AND FOLIC ACID 115; 80; 1 MG/1; MG/1; MG/1
1 TABLET, FILM COATED ORAL
Qty: 0 | Refills: 0 | COMMUNITY

## 2017-10-25 RX ORDER — MORPHINE SULFATE 50 MG/1
200 CAPSULE, EXTENDED RELEASE ORAL
Qty: 0 | Refills: 0 | COMMUNITY

## 2017-10-25 RX ORDER — METRONIDAZOLE 500 MG
500 TABLET ORAL ONCE
Qty: 0 | Refills: 0 | Status: COMPLETED | OUTPATIENT
Start: 2017-10-25 | End: 2017-10-25

## 2017-10-25 RX ORDER — SODIUM CHLORIDE 9 MG/ML
500 INJECTION, SOLUTION INTRAVENOUS
Qty: 0 | Refills: 0 | Status: COMPLETED | OUTPATIENT
Start: 2017-10-25 | End: 2017-10-25

## 2017-10-25 RX ORDER — CHOLECALCIFEROL (VITAMIN D3) 125 MCG
1 CAPSULE ORAL
Qty: 0 | Refills: 0 | COMMUNITY

## 2017-10-25 RX ORDER — ACETAMINOPHEN 500 MG
650 TABLET ORAL ONCE
Qty: 0 | Refills: 0 | Status: COMPLETED | OUTPATIENT
Start: 2017-10-25 | End: 2017-10-25

## 2017-10-25 RX ORDER — CIPROFLOXACIN LACTATE 400MG/40ML
400 VIAL (ML) INTRAVENOUS ONCE
Qty: 0 | Refills: 0 | Status: COMPLETED | OUTPATIENT
Start: 2017-10-25 | End: 2017-10-25

## 2017-10-25 RX ORDER — ENOXAPARIN SODIUM 100 MG/ML
40 INJECTION SUBCUTANEOUS EVERY 24 HOURS
Qty: 0 | Refills: 0 | Status: DISCONTINUED | OUTPATIENT
Start: 2017-10-25 | End: 2017-10-30

## 2017-10-25 RX ORDER — SODIUM CHLORIDE 9 MG/ML
1000 INJECTION, SOLUTION INTRAVENOUS
Qty: 0 | Refills: 0 | Status: DISCONTINUED | OUTPATIENT
Start: 2017-10-25 | End: 2017-10-26

## 2017-10-25 RX ORDER — SODIUM CHLORIDE 9 MG/ML
1500 INJECTION INTRAMUSCULAR; INTRAVENOUS; SUBCUTANEOUS ONCE
Qty: 0 | Refills: 0 | Status: COMPLETED | OUTPATIENT
Start: 2017-10-25 | End: 2017-10-25

## 2017-10-25 RX ORDER — SODIUM CHLORIDE 9 MG/ML
1000 INJECTION, SOLUTION INTRAVENOUS
Qty: 0 | Refills: 0 | Status: DISCONTINUED | OUTPATIENT
Start: 2017-10-25 | End: 2017-10-25

## 2017-10-25 RX ORDER — MORPHINE SULFATE 50 MG/1
30 CAPSULE, EXTENDED RELEASE ORAL
Qty: 0 | Refills: 0 | COMMUNITY

## 2017-10-25 RX ORDER — HYDROMORPHONE HYDROCHLORIDE 2 MG/ML
1 INJECTION INTRAMUSCULAR; INTRAVENOUS; SUBCUTANEOUS ONCE
Qty: 0 | Refills: 0 | Status: DISCONTINUED | OUTPATIENT
Start: 2017-10-25 | End: 2017-10-25

## 2017-10-25 RX ORDER — METRONIDAZOLE 500 MG
500 TABLET ORAL EVERY 8 HOURS
Qty: 0 | Refills: 0 | Status: DISCONTINUED | OUTPATIENT
Start: 2017-10-25 | End: 2017-10-30

## 2017-10-25 RX ORDER — ONDANSETRON 8 MG/1
4 TABLET, FILM COATED ORAL ONCE
Qty: 0 | Refills: 0 | Status: COMPLETED | OUTPATIENT
Start: 2017-10-25 | End: 2017-10-25

## 2017-10-25 RX ORDER — HYDROMORPHONE HYDROCHLORIDE 2 MG/ML
2 INJECTION INTRAMUSCULAR; INTRAVENOUS; SUBCUTANEOUS
Qty: 0 | Refills: 0 | Status: DISCONTINUED | OUTPATIENT
Start: 2017-10-25 | End: 2017-10-26

## 2017-10-25 RX ORDER — CIPROFLOXACIN LACTATE 400MG/40ML
400 VIAL (ML) INTRAVENOUS EVERY 12 HOURS
Qty: 0 | Refills: 0 | Status: COMPLETED | OUTPATIENT
Start: 2017-10-26 | End: 2017-10-26

## 2017-10-25 RX ORDER — ALBUTEROL 90 UG/1
0 AEROSOL, METERED ORAL
Qty: 0 | Refills: 0 | COMMUNITY

## 2017-10-25 RX ORDER — AMLODIPINE BESYLATE 2.5 MG/1
5 TABLET ORAL DAILY
Qty: 0 | Refills: 0 | Status: DISCONTINUED | OUTPATIENT
Start: 2017-10-25 | End: 2017-10-30

## 2017-10-25 RX ADMIN — SODIUM CHLORIDE 750 MILLILITER(S): 9 INJECTION INTRAMUSCULAR; INTRAVENOUS; SUBCUTANEOUS at 15:20

## 2017-10-25 RX ADMIN — Medication 200 MILLIGRAM(S): at 21:28

## 2017-10-25 RX ADMIN — Medication 100 MILLIGRAM(S): at 15:44

## 2017-10-25 RX ADMIN — ONDANSETRON 4 MILLIGRAM(S): 8 TABLET, FILM COATED ORAL at 22:23

## 2017-10-25 RX ADMIN — HYDROMORPHONE HYDROCHLORIDE 1 MILLIGRAM(S): 2 INJECTION INTRAMUSCULAR; INTRAVENOUS; SUBCUTANEOUS at 22:23

## 2017-10-25 NOTE — H&P ADULT - NSHPPHYSICALEXAM_GEN_ALL_CORE
GENERAL APPEARANCE: Very uncomfortable writhing in pain  VITAL SIGNS: Temperature max 100.6 heart rate 96 /94 respirations 2000% on room air  SKIN: Inspection of the skin reveals no rashes, ulcerations or petechiae.  HEENT: Dry mucous membranes  NECK: Supple and symmetric.   CHEST: Normal AP diameter and normal contour without any kyphoscoliosis.  LUNGS: Auscultation of the lungs revealed normal breath sounds without any other adventitious sounds or rubs.  CARDIOVASCULAR: There was a regular rate and rhythm without any murmurs, gallops, rubs. The carotid pulses were normal and 2+ bilaterally without bruits. Peripheral pulses were 2+ and symmetric.  ABDOMEN: Diffusely tender to palpation decreased bowel sounds  LYMPH NODES: No lymphadenopathy was appreciated in the neck, axillae or groin.  MUSCULOSKELETAL: There was no tenderness or effusions noted. Muscle strength and tone were normal.  EXTREMITIES: No cyanosis, clubbing or edema.  NEUROLOGIC: Alert and oriented x 3. Normal affect. Gait was normal. Normal deep tendon reflexes with no pathological reflexes. Sensation to touch was normal.

## 2017-10-25 NOTE — ED PROVIDER NOTE - MEDICAL DECISION MAKING DETAILS
labs, CT scan Pt with colisi of sigmoid colon and rectum, no abscess, no perforation.  Pain not controlled in ED.  Admit for IV abx, pain control.

## 2017-10-25 NOTE — H&P ADULT - HISTORY OF PRESENT ILLNESS
This is a 62-year-old female with a past medical history significant for chronic intermittent porphyria anxiety recently discharged from [facility] in August of this year after gram-positive bacteremia secondary to an infected Mediport in which the organism was MSSA and the patient [facility] and discharged on August 19 presents today after 2 day history of worsening abdominal pain. Patient has a history of intermittent periphery and has had multiple hospitalizations for chronic abdominal pain. Patient has been unable to tolerate p.o. In the emergency department she received 1500 cc of IV fluids on admission the patient's lactate was 3.6 after fluid resuscitation improved to 1.0.  During this encounter the patient was in acute distress complaining of severe abdominal pain as well as nausea.    CT imaging results were consistent with mild wall thickening involving the sigmoid and colon and rectum possibly reflective of mild colitis. Moderate stool retention several indeterminate bilateral renal lesions measuring 1 cm with no suspicious renal mass is found This is a 62-year-old female with a past medical history significant for chronic intermittent porphyria anxiety recently discharged from [facility] in August of this year after gram-positive bacteremia secondary to an infected Mediport in which the organism was MSSA and the patient [facility] and discharged on August 19 presents today after 2 day history of worsening abdominal pain. Patient has a history of intermittent porphryia and has had multiple hospitalizations for chronic abdominal pain. Patient has been unable to tolerate p.o. In the emergency department she received 1500 cc of IV fluids on admission the patient's lactate was 3.6 after fluid resuscitation improved to 1.0.  During this encounter the patient was in acute distress complaining of severe abdominal pain as well as nausea.    Of note patient had Mediport replaced in september and has been maintained on home IV D5 solution.   CT imaging results were consistent with mild wall thickening involving the sigmoid and colon and rectum possibly reflective of mild colitis. Moderate stool retention several indeterminate bilateral renal lesions measuring 1 cm with no suspicious renal mass found

## 2017-10-25 NOTE — H&P ADULT - NSHPREVIEWOFSYSTEMS_GEN_ALL_CORE
REVIEW OF SYSTEMS:    CONSTITUTIONAL: No weakness, fevers or chills  EYES/ENT: No visual changes;  No vertigo or throat pain   NECK: No pain or stiffness  RESPIRATORY: No cough, wheezing, hemoptysis; No shortness of breath  CARDIOVASCULAR: No chest pain or palpitations  GASTROINTESTINAL: refer to HPI  GENITOURINARY: No dysuria, frequency or hematuria  NEUROLOGICAL: No numbness or weakness  SKIN: No itching, burning, rashes, or lesions   All other review of systems is negative unless indicated above.

## 2017-10-25 NOTE — ED ADULT NURSE NOTE - OBJECTIVE STATEMENT
PT C/O LLQ PAIN STARTED YESTERDAY WITH NAUSEA .PT HAS RIGHT SIDE INFUSION PORT FOR HYDRATION SITE LOOKS INTACT NO REDNESS NOTICED./ PT IS NOT ABLE TO DRINK DIDN'T TAKE HER MEDS

## 2017-10-25 NOTE — H&P ADULT - NSHPSOCIALHISTORY_GEN_ALL_CORE
Past medical history: Chronic intermittent porphyria, anxiety, MSSA infected Mediport  Adrenal insufficiency  Past surgical history: Spinal fusion, cholecystectomy, tonsillectomy  Family history not significant for any medical conditions  Social history no history of tobacco or alcohol use  Nystop was accessed    During this encounter reference #[id number]  Home analgesic regimen patient is on morphine sulfate extended release 200 mg tablets every 12 hours received her last prescription on September 27 by Dr. Roman for as well as Ativan 1 mg tablets 210 tabs for 30 day supply by the same providers and hydromorphone 2 mg by received 900 mL's  Morphine sulfate extended release 30 mg 60 tabs

## 2017-10-25 NOTE — ED PROVIDER NOTE - OBJECTIVE STATEMENT
61 y/o female with PMHx of porphyria presents to the ED abd pain x few days. Pain is mostly left sided. +N/V. +mild diarrhea. Sx feel similar to past episodes of acute porphyria. Denies fever, chills or any other sx.

## 2017-10-25 NOTE — H&P ADULT - NSHPLABSRESULTS_GEN_ALL_CORE
.  LABS:                         13.3   15.5  )-----------( 224      ( 25 Oct 2017 14:51 )             38.7     10-25    138  |  104  |  12  ----------------------------<  129<H>  4.0   |  23  |  0.84    Ca    9.2      25 Oct 2017 14:51    TPro  8.1  /  Alb  3.6  /  TBili  0.3  /  DBili  x   /  AST  13<L>  /  ALT  21  /  AlkPhos  79  10-25    PT/INR - ( 25 Oct 2017 14:51 )   PT: 11.2 sec;   INR: 1.04 ratio               Lactate, Blood: 1.1 mmol/L (10-25 @ 20:46)  Lactate, Blood: 3.6 mmol/L (10-25 @ 14:51)      RADIOLOGY, EKG & ADDITIONAL TESTS: Reviewed.

## 2017-10-25 NOTE — H&P ADULT - ASSESSMENT
Impression: This is a 62-year-old female admitted for severe abdominal pain and inability to tolerate p.o. secondary to acute on chronic intermittent porphyria exacerbation  Hematology: Acute on chronic intermittent porphyria exacerbation  Will initiate carbohydrate load have given one-time bolus of 500 cc of D5 half NS ×1 dose  We will change IV fluids to D5 half NS at 125  Most recent 2D echo performed in August 2017 revealed an estimated ejection fraction of 66%    GI: Abdominal pain likely more associated with acute intermittent porphyria exacerbation opposed to colitis as radiographic evidence is only suggestive of mild inflammation and pain is out of proportion to abdominal pain  Given the patient's history of elevated lactate we will continue to monitor to ensure the patient started having episodes of intermittent bowel ischemia  We will repeat a lactate in the morning  Continue with Nexium  Intractable nausea  Will start patient on Zofran 8 mg ×1 for nausea  Will require EKG monitoring in the morning given patient's high doses of narcotics in combination with antiemetics has been known to cause QT prolongation and will require monitoring  When patient is able to tolerate p.o. we will start patient on Compazine  Patient received Flagyl and Ciprofloxacin; will plan to continue for add'l 24 hours yet my suspicion is that the radiographic findings are not the source of her present abdominal symptoms.     Anxiety:  Patient is on Ativan 1 mg every 4 hours    Pain:  I will continue patient on current home meds when able to tolerate p.o. MS Contin 200 mg every 12 hours as well as 30 mg every 12 hours  Patient was also dispensed 900 mL's of 2 mg/mL vials of hydromorphone for 30 day supply  Continue with bowel regimen    DVT prophylaxis: Lovenox Impression: This is a 62-year-old female admitted for severe abdominal pain and inability to tolerate p.o. secondary to acute on chronic intermittent porphyria exacerbation  Hematology: Acute on chronic intermittent porphyria exacerbation  Will initiate carbohydrate load have given one-time bolus of 500 cc of D5 half NS ×1 dose  We will change IV fluids to D5 half NS at 125  Most recent 2D echo performed in August 2017 revealed an estimated ejection fraction of 66%  Heme consult- Dr. Maza    GI: Abdominal pain likely more associated with acute intermittent porphyria exacerbation opposed to colitis as radiographic evidence is only suggestive of mild inflammation and pain is out of proportion to abdominal pain  Given the patient's history of elevated lactate we will continue to monitor to ensure the patient started having episodes of intermittent bowel ischemia  We will repeat a lactate in the morning  Continue with Nexium  Intractable nausea  Will start patient on Zofran 8 mg ×1 for nausea  Will require EKG monitoring in the morning given patient's high doses of narcotics in combination with antiemetics has been known to cause QT prolongation and will require monitoring  When patient is able to tolerate p.o. we will start patient on Compazine  Patient received Flagyl and Ciprofloxacin; will plan to continue for add'l 24 hours yet my suspicion is that the radiographic findings are not the source of her present abdominal symptoms.     Anxiety:  Patient is on Ativan 1 mg every 4 hours    Pain:  I will continue patient on current home meds when able to tolerate p.o. MS Contin 200 mg every 12 hours as well as 30 mg every 12 hours  Patient was also dispensed 900 mL's of 2 mg/mL vials of hydromorphone for 30 day supply  Continue with bowel regimen    DVT prophylaxis: Lovenox

## 2017-10-26 DIAGNOSIS — R10.9 UNSPECIFIED ABDOMINAL PAIN: ICD-10-CM

## 2017-10-26 DIAGNOSIS — E80.20 UNSPECIFIED PORPHYRIA: ICD-10-CM

## 2017-10-26 DIAGNOSIS — F41.9 ANXIETY DISORDER, UNSPECIFIED: ICD-10-CM

## 2017-10-26 DIAGNOSIS — G89.29 OTHER CHRONIC PAIN: ICD-10-CM

## 2017-10-26 DIAGNOSIS — K52.9 NONINFECTIVE GASTROENTERITIS AND COLITIS, UNSPECIFIED: ICD-10-CM

## 2017-10-26 LAB
ANION GAP SERPL CALC-SCNC: 10 MMOL/L — SIGNIFICANT CHANGE UP (ref 5–17)
ANION GAP SERPL CALC-SCNC: 10 MMOL/L — SIGNIFICANT CHANGE UP (ref 5–17)
BASOPHILS # BLD AUTO: 0.1 K/UL — SIGNIFICANT CHANGE UP (ref 0–0.2)
BASOPHILS NFR BLD AUTO: 0.4 % — SIGNIFICANT CHANGE UP (ref 0–2)
BUN SERPL-MCNC: 6 MG/DL — LOW (ref 7–23)
BUN SERPL-MCNC: 6 MG/DL — LOW (ref 7–23)
CALCIUM SERPL-MCNC: 8.6 MG/DL — SIGNIFICANT CHANGE UP (ref 8.5–10.1)
CALCIUM SERPL-MCNC: 9.3 MG/DL — SIGNIFICANT CHANGE UP (ref 8.5–10.1)
CHLORIDE SERPL-SCNC: 104 MMOL/L — SIGNIFICANT CHANGE UP (ref 96–108)
CHLORIDE SERPL-SCNC: 105 MMOL/L — SIGNIFICANT CHANGE UP (ref 96–108)
CO2 SERPL-SCNC: 24 MMOL/L — SIGNIFICANT CHANGE UP (ref 22–31)
CO2 SERPL-SCNC: 25 MMOL/L — SIGNIFICANT CHANGE UP (ref 22–31)
CREAT SERPL-MCNC: 0.63 MG/DL — SIGNIFICANT CHANGE UP (ref 0.5–1.3)
CREAT SERPL-MCNC: 0.72 MG/DL — SIGNIFICANT CHANGE UP (ref 0.5–1.3)
EOSINOPHIL # BLD AUTO: 0 K/UL — SIGNIFICANT CHANGE UP (ref 0–0.5)
EOSINOPHIL NFR BLD AUTO: 0 % — SIGNIFICANT CHANGE UP (ref 0–6)
GLUCOSE SERPL-MCNC: 135 MG/DL — HIGH (ref 70–99)
GLUCOSE SERPL-MCNC: 144 MG/DL — HIGH (ref 70–99)
HCT VFR BLD CALC: 40.1 % — SIGNIFICANT CHANGE UP (ref 34.5–45)
HGB BLD-MCNC: 13.8 G/DL — SIGNIFICANT CHANGE UP (ref 11.5–15.5)
LACTATE SERPL-SCNC: 1.8 MMOL/L — SIGNIFICANT CHANGE UP (ref 0.7–2)
LYMPHOCYTES # BLD AUTO: 1.9 K/UL — SIGNIFICANT CHANGE UP (ref 1–3.3)
LYMPHOCYTES # BLD AUTO: 13.1 % — SIGNIFICANT CHANGE UP (ref 13–44)
MCHC RBC-ENTMCNC: 28.1 PG — SIGNIFICANT CHANGE UP (ref 27–34)
MCHC RBC-ENTMCNC: 34.3 GM/DL — SIGNIFICANT CHANGE UP (ref 32–36)
MCV RBC AUTO: 81.9 FL — SIGNIFICANT CHANGE UP (ref 80–100)
MONOCYTES # BLD AUTO: 0.8 K/UL — SIGNIFICANT CHANGE UP (ref 0–0.9)
MONOCYTES NFR BLD AUTO: 5.8 % — SIGNIFICANT CHANGE UP (ref 2–14)
NEUTROPHILS # BLD AUTO: 11.7 K/UL — HIGH (ref 1.8–7.4)
NEUTROPHILS NFR BLD AUTO: 80.6 % — HIGH (ref 43–77)
PLATELET # BLD AUTO: 238 K/UL — SIGNIFICANT CHANGE UP (ref 150–400)
POTASSIUM SERPL-MCNC: 3.2 MMOL/L — LOW (ref 3.5–5.3)
POTASSIUM SERPL-MCNC: 3.3 MMOL/L — LOW (ref 3.5–5.3)
POTASSIUM SERPL-SCNC: 3.2 MMOL/L — LOW (ref 3.5–5.3)
POTASSIUM SERPL-SCNC: 3.3 MMOL/L — LOW (ref 3.5–5.3)
RBC # BLD: 4.89 M/UL — SIGNIFICANT CHANGE UP (ref 3.8–5.2)
RBC # FLD: 12 % — SIGNIFICANT CHANGE UP (ref 10.3–14.5)
SODIUM SERPL-SCNC: 139 MMOL/L — SIGNIFICANT CHANGE UP (ref 135–145)
SODIUM SERPL-SCNC: 139 MMOL/L — SIGNIFICANT CHANGE UP (ref 135–145)
WBC # BLD: 14.5 K/UL — HIGH (ref 3.8–10.5)
WBC # FLD AUTO: 14.5 K/UL — HIGH (ref 3.8–10.5)

## 2017-10-26 PROCEDURE — 93010 ELECTROCARDIOGRAM REPORT: CPT

## 2017-10-26 RX ORDER — HYDROMORPHONE HYDROCHLORIDE 2 MG/ML
4 INJECTION INTRAMUSCULAR; INTRAVENOUS; SUBCUTANEOUS
Qty: 0 | Refills: 0 | Status: DISCONTINUED | OUTPATIENT
Start: 2017-10-26 | End: 2017-10-26

## 2017-10-26 RX ORDER — NALOXONE HYDROCHLORIDE 4 MG/.1ML
0.1 SPRAY NASAL
Qty: 0 | Refills: 0 | Status: DISCONTINUED | OUTPATIENT
Start: 2017-10-26 | End: 2017-10-30

## 2017-10-26 RX ORDER — ONDANSETRON 8 MG/1
4 TABLET, FILM COATED ORAL EVERY 6 HOURS
Qty: 0 | Refills: 0 | Status: DISCONTINUED | OUTPATIENT
Start: 2017-10-26 | End: 2017-10-30

## 2017-10-26 RX ORDER — ONDANSETRON 8 MG/1
4 TABLET, FILM COATED ORAL ONCE
Qty: 0 | Refills: 0 | Status: DISCONTINUED | OUTPATIENT
Start: 2017-10-26 | End: 2017-10-26

## 2017-10-26 RX ORDER — ONDANSETRON 8 MG/1
4 TABLET, FILM COATED ORAL ONCE
Qty: 0 | Refills: 0 | Status: COMPLETED | OUTPATIENT
Start: 2017-10-26 | End: 2017-10-26

## 2017-10-26 RX ORDER — SODIUM CHLORIDE 9 MG/ML
500 INJECTION, SOLUTION INTRAVENOUS
Qty: 0 | Refills: 0 | Status: DISCONTINUED | OUTPATIENT
Start: 2017-10-26 | End: 2017-10-26

## 2017-10-26 RX ORDER — POTASSIUM CHLORIDE 20 MEQ
10 PACKET (EA) ORAL
Qty: 0 | Refills: 0 | Status: COMPLETED | OUTPATIENT
Start: 2017-10-26 | End: 2017-10-26

## 2017-10-26 RX ORDER — HYDROMORPHONE HYDROCHLORIDE 2 MG/ML
0.5 INJECTION INTRAMUSCULAR; INTRAVENOUS; SUBCUTANEOUS
Qty: 0 | Refills: 0 | Status: DISCONTINUED | OUTPATIENT
Start: 2017-10-26 | End: 2017-10-30

## 2017-10-26 RX ORDER — ACETAMINOPHEN 500 MG
650 TABLET ORAL EVERY 6 HOURS
Qty: 0 | Refills: 0 | Status: DISCONTINUED | OUTPATIENT
Start: 2017-10-26 | End: 2017-10-30

## 2017-10-26 RX ORDER — SODIUM CHLORIDE 9 MG/ML
1000 INJECTION, SOLUTION INTRAVENOUS
Qty: 0 | Refills: 0 | Status: DISCONTINUED | OUTPATIENT
Start: 2017-10-26 | End: 2017-10-26

## 2017-10-26 RX ORDER — HYDROMORPHONE HYDROCHLORIDE 2 MG/ML
30 INJECTION INTRAMUSCULAR; INTRAVENOUS; SUBCUTANEOUS
Qty: 0 | Refills: 0 | Status: DISCONTINUED | OUTPATIENT
Start: 2017-10-26 | End: 2017-10-30

## 2017-10-26 RX ORDER — INFLUENZA VIRUS VACCINE 15; 15; 15; 15 UG/.5ML; UG/.5ML; UG/.5ML; UG/.5ML
0.5 SUSPENSION INTRAMUSCULAR ONCE
Qty: 0 | Refills: 0 | Status: COMPLETED | OUTPATIENT
Start: 2017-10-26 | End: 2017-10-30

## 2017-10-26 RX ORDER — SODIUM CHLORIDE 9 MG/ML
1000 INJECTION, SOLUTION INTRAVENOUS
Qty: 0 | Refills: 0 | Status: DISCONTINUED | OUTPATIENT
Start: 2017-10-26 | End: 2017-10-30

## 2017-10-26 RX ADMIN — SODIUM CHLORIDE 250 MILLILITER(S): 9 INJECTION, SOLUTION INTRAVENOUS at 01:34

## 2017-10-26 RX ADMIN — HYDROMORPHONE HYDROCHLORIDE 30 MILLILITER(S): 2 INJECTION INTRAMUSCULAR; INTRAVENOUS; SUBCUTANEOUS at 21:35

## 2017-10-26 RX ADMIN — ENOXAPARIN SODIUM 40 MILLIGRAM(S): 100 INJECTION SUBCUTANEOUS at 05:04

## 2017-10-26 RX ADMIN — HYDROMORPHONE HYDROCHLORIDE 30 MILLILITER(S): 2 INJECTION INTRAMUSCULAR; INTRAVENOUS; SUBCUTANEOUS at 03:56

## 2017-10-26 RX ADMIN — Medication 100 MILLIGRAM(S): at 06:11

## 2017-10-26 RX ADMIN — Medication 100 MILLIEQUIVALENT(S): at 21:37

## 2017-10-26 RX ADMIN — Medication 200 MILLIGRAM(S): at 05:05

## 2017-10-26 RX ADMIN — Medication 200 MILLIGRAM(S): at 17:27

## 2017-10-26 RX ADMIN — SODIUM CHLORIDE 150 MILLILITER(S): 9 INJECTION, SOLUTION INTRAVENOUS at 03:19

## 2017-10-26 RX ADMIN — AMLODIPINE BESYLATE 5 MILLIGRAM(S): 2.5 TABLET ORAL at 05:05

## 2017-10-26 RX ADMIN — Medication 100 MILLIGRAM(S): at 22:50

## 2017-10-26 RX ADMIN — HYDROMORPHONE HYDROCHLORIDE 4 MILLIGRAM(S): 2 INJECTION INTRAMUSCULAR; INTRAVENOUS; SUBCUTANEOUS at 01:24

## 2017-10-26 RX ADMIN — HYDROMORPHONE HYDROCHLORIDE 4 MILLIGRAM(S): 2 INJECTION INTRAMUSCULAR; INTRAVENOUS; SUBCUTANEOUS at 01:54

## 2017-10-26 RX ADMIN — ONDANSETRON 4 MILLIGRAM(S): 8 TABLET, FILM COATED ORAL at 12:14

## 2017-10-26 RX ADMIN — HYDROMORPHONE HYDROCHLORIDE 4 MILLIGRAM(S): 2 INJECTION INTRAMUSCULAR; INTRAVENOUS; SUBCUTANEOUS at 03:19

## 2017-10-26 RX ADMIN — Medication 100 MILLIEQUIVALENT(S): at 17:27

## 2017-10-26 RX ADMIN — ONDANSETRON 4 MILLIGRAM(S): 8 TABLET, FILM COATED ORAL at 01:59

## 2017-10-26 RX ADMIN — Medication 100 MILLIGRAM(S): at 16:19

## 2017-10-26 RX ADMIN — Medication 100 MILLIEQUIVALENT(S): at 19:17

## 2017-10-26 NOTE — PROGRESS NOTE ADULT - PROBLEM SELECTOR PLAN 1
- Most likely acute intermittent porphyria exacerbation  - - Most likely acute intermittent porphyria exacerbation  - PCA hydromorphone 0.5mg

## 2017-10-26 NOTE — DIETITIAN INITIAL EVALUATION ADULT. - ETIOLOGY
N/V due to intermittent porphyria exacerbation N/V due to intermittent porphyria exacerbation, colitis

## 2017-10-26 NOTE — DIETITIAN INITIAL EVALUATION ADULT. - OTHER INFO
Pt consult for N/V > 3days.  Pt has pain in RLQ.  Pt at 123% IBW. Pt on CL diet.  Pt was in intense pain on interview.   Will return for further information. Pt appears well nourished. Pt Dx of  intermittent porphyria exacerbation. Pt consult for N/V > 3days.  Pt has pain in abdomen, dx of colitis and porphyria exacerbation.  Pt at 123% IBW, wt up since last diet.  Pt on CL diet.  Pt appeared to be sleeping on interview, did not want to answer questions.  Will return for further information. Pt appears well nourished.  Pt reported to be refusing clear diet this morning.

## 2017-10-26 NOTE — PROVIDER CONTACT NOTE (OTHER) - REASON
left message with answering service and call back number left message with answering service and call back number for routine AM consult

## 2017-10-26 NOTE — PROGRESS NOTE ADULT - SUBJECTIVE AND OBJECTIVE BOX
HPI:  This is a 62-year-old female with a past medical history significant for chronic intermittent porphyria anxiety recently discharged from [facility] in August of this year after gram-positive bacteremia secondary to an infected Mediport in which the organism was MSSA and the patient was discharged on parenteral antibiotics Via Ancef with a midline catheter placed and discharged on August 19 presents today after 2 day history of worsening abdominal pain. Patient has a history of intermittent porphryia and has had multiple hospitalizations for chronic abdominal pain. Patient has been unable to tolerate p.o. In the emergency department she received 1500 cc of IV fluids on admission the patient's lactate was 3.6 after fluid resuscitation improved to 1.0.  During this encounter the patient was in acute distress complaining of severe abdominal pain as well as nausea.    Of note patient had Mediport replaced in september and has been maintained on home IV D5 solution.   CT imaging results were consistent with mild wall thickening involving the sigmoid and colon and rectum possibly reflective of mild colitis. Moderate stool retention several indeterminate bilateral renal lesions measuring 1 cm with no suspicious renal mass found (25 Oct 2017 23:06)    SUBJECTIVE:  Pt seen and examined at bedside. Reports diffuse abdominal pain that is non radiating 8/10, was 10/10 before PCA pump use. N/V on Tuesday and Wednesday Diarrhea on Tuesday.  Cannot tolerate PO 2/2 dec appetite and not N/V. Currently denies any fevers/chills, N/V/D/C, CP, SOB, palpitations, numbness/tingling in extremities. Plan for today discussed understanding assessed via teach back method. All questions answered.    REVIEW OF SYSTEMS:  CONSTITUTIONAL: No weakness, fevers or chills  EYES/ENT: No visual changes;  No vertigo or throat pain   NECK: No pain or stiffness  RESPIRATORY: No cough, wheezing, hemoptysis; No shortness of breath  CARDIOVASCULAR: No chest pain or palpitations  GASTROINTESTINAL: +abdominal. No nausea, vomiting, or hematemesis; No diarrhea or constipation. No melena or hematochezia.  GENITOURINARY: No dysuria, frequency or hematuria  SKIN: scattered discoloration  All other review of systems is negative unless indicated above    Vital Signs Last 24 Hrs  T(C): 36.8 (26 Oct 2017 04:42), Max: 38.1 (25 Oct 2017 14:45)  T(F): 98.2 (26 Oct 2017 04:42), Max: 100.6 (25 Oct 2017 14:45)  HR: 79 (26 Oct 2017 04:42) (68 - 96)  BP: 168/76 (26 Oct 2017 04:42) (132/61 - 183/94)  RR: 18 (26 Oct 2017 04:42) (16 - 20)  SpO2: 96% (26 Oct 2017 04:42) (96% - 100%)    PHYSICAL EXAM:  Constitutional: NAD, well-developed, sleeping but easily arousable  HEENT: EOMI, wears hearing aid in R ear, MMM  Neck: Soft and supple  Respiratory: Breath sounds are clear bilaterally, No wheezing, rales or rhonchi  Cardiovascular: S1 and S2, regular rate and rhythm  Gastrointestinal: Bowel Sounds present, soft, extremely tender, nondistended  Extremities: No peripheral edema  Vascular: 2+ peripheral pulses  Neurological: A/O x 3, no focal deficits  Skin: scattered discolorations/macules    MEDICATIONS  (STANDING):  amLODIPine   Tablet 5 milliGRAM(s) Oral daily  ciprofloxacin   IVPB 400 milliGRAM(s) IV Intermittent every 12 hours  dextrose 5% + sodium chloride 0.45%. 1000 milliLiter(s) (150 mL/Hr) IV Continuous <Continuous>  enoxaparin Injectable 40 milliGRAM(s) SubCutaneous every 24 hours  HYDROmorphone PCA (1 mG/mL) 30 milliLiter(s) PCA Continuous PCA Continuous  influenza   Vaccine 0.5 milliLiter(s) IntraMuscular once  LORazepam   Injectable 1 milliGRAM(s) IntraMuscular once  metroNIDAZOLE  IVPB 500 milliGRAM(s) IV Intermittent every 8 hours      LABS: All Labs Reviewed:                        13.8   14.5  )-----------( 238      ( 26 Oct 2017 06:06 )             40.1     10-26    139  |  105  |  6<L>  ----------------------------<  144<H>  3.2<L>   |  24  |  0.72    Ca    8.6      26 Oct 2017 06:06    TPro  8.1  /  Alb  3.6  /  TBili  0.3  /  DBili  x   /  AST  13<L>  /  ALT  21  /  AlkPhos  79  10-25    PT/INR - ( 25 Oct 2017 14:51 )   PT: 11.2 sec;   INR: 1.04 ratio      RADIOLOGY/EKG:  CT Abdomen and Pelvis w/ IV Cont (10.25.17 @ 17:50)  IMPRESSION: Mild wall thickening involving the sigmoid colon and rectum   which may reflect a mild colitis. Moderate stool retention is noted.       Status post cholecystectomy.   several indeterminate BILATERAL renal   lesions measuring 1 cm and less in both kidneys unchanged, too small to   characterize.      DVT PPX: Lovenox

## 2017-10-26 NOTE — CONSULT NOTE ADULT - SUBJECTIVE AND OBJECTIVE BOX
62-year-old female with history  1989 diagnosed with acute intermittent porphyria.  Please numerous notes from previous admissions.  Patient has long history of intermittent severe painful abdominal crises;managed with IV glucose and narcotics; she has had numerous hospitalizations with this over the last 26 years.  She receives IV hydration with glucose every 9 and is chronically on significant doses of narcotics for chronic pain . followed by the pain service in [place]./Dr Molly Mccauley.  Patient now presents with several days of progressive malaise, diarrhea and then increased abdominal  pain and vomiting.  several months ago she had sepsis related to MSSA via her MediPort; it has subsequently been removed and replaced.    physical examination: chronically ill-looking 62-year-old female, significant hearing deficit, looks debilitated, alert and oriented 3  HEENT NC/AI  oropharynx dry,  neck supple,   lungs poor inspiratory effort no rhonchi or wheezes noted   heart S1-S2  Abdomen, soft, diffuse tenderness,,  bowel sounds present,     extremities without pitting edema,   skin pale dry, no rashes or ecchymosis purpura   lymph nodes none palpable    WBC 15.5, Hgb 13.3, HCT 38.7, platelet 224  BUN 12/ creatinine 0.84    LFTs unremarkable    CT scan reportedly with mild wall thickening of the sigmoid colon and rectum possible mild colitis, moderate stool retention    Impression: 25 year plus history of recurrent painful abdominal crises related to intermittent porphyria.; differential diagnosis always can include an acute GI process   however much of this is similar to previous recurrent painful porphyria crisis   Patient has been cultured fully and started on empiric antibiotics.  Continue with vigorous hydration with at least D5 1/2 NS.  continue narcotics, may need to consider  parenteral PCA if pain persists.  Anti-emetics initially, this will be less important as pain is under control and hydration is ongoing.  above reviewed with both patient and house staff.  We will follow along with you as needed.

## 2017-10-27 LAB
ANION GAP SERPL CALC-SCNC: 6 MMOL/L — SIGNIFICANT CHANGE UP (ref 5–17)
APPEARANCE UR: CLEAR — SIGNIFICANT CHANGE UP
BACTERIA # UR AUTO: (no result)
BILIRUB UR-MCNC: NEGATIVE — SIGNIFICANT CHANGE UP
BUN SERPL-MCNC: 8 MG/DL — SIGNIFICANT CHANGE UP (ref 7–23)
CALCIUM SERPL-MCNC: 9.3 MG/DL — SIGNIFICANT CHANGE UP (ref 8.5–10.1)
CHLORIDE SERPL-SCNC: 106 MMOL/L — SIGNIFICANT CHANGE UP (ref 96–108)
CO2 SERPL-SCNC: 27 MMOL/L — SIGNIFICANT CHANGE UP (ref 22–31)
COLOR SPEC: YELLOW — SIGNIFICANT CHANGE UP
COMMENT - URINE: SIGNIFICANT CHANGE UP
CREAT SERPL-MCNC: 0.64 MG/DL — SIGNIFICANT CHANGE UP (ref 0.5–1.3)
DIFF PNL FLD: (no result)
EPI CELLS # UR: NEGATIVE — SIGNIFICANT CHANGE UP
GLUCOSE SERPL-MCNC: 124 MG/DL — HIGH (ref 70–99)
GLUCOSE UR QL: NEGATIVE MG/DL — SIGNIFICANT CHANGE UP
HCT VFR BLD CALC: 41.6 % — SIGNIFICANT CHANGE UP (ref 34.5–45)
HGB BLD-MCNC: 14 G/DL — SIGNIFICANT CHANGE UP (ref 11.5–15.5)
KETONES UR-MCNC: NEGATIVE — SIGNIFICANT CHANGE UP
LEUKOCYTE ESTERASE UR-ACNC: (no result)
MCHC RBC-ENTMCNC: 27.6 PG — SIGNIFICANT CHANGE UP (ref 27–34)
MCHC RBC-ENTMCNC: 33.7 GM/DL — SIGNIFICANT CHANGE UP (ref 32–36)
MCV RBC AUTO: 81.9 FL — SIGNIFICANT CHANGE UP (ref 80–100)
NITRITE UR-MCNC: NEGATIVE — SIGNIFICANT CHANGE UP
PH UR: 7 — SIGNIFICANT CHANGE UP (ref 5–8)
PLATELET # BLD AUTO: 247 K/UL — SIGNIFICANT CHANGE UP (ref 150–400)
POTASSIUM SERPL-MCNC: 3.5 MMOL/L — SIGNIFICANT CHANGE UP (ref 3.5–5.3)
POTASSIUM SERPL-SCNC: 3.5 MMOL/L — SIGNIFICANT CHANGE UP (ref 3.5–5.3)
PROT UR-MCNC: NEGATIVE MG/DL — SIGNIFICANT CHANGE UP
RBC # BLD: 5.08 M/UL — SIGNIFICANT CHANGE UP (ref 3.8–5.2)
RBC # FLD: 11.8 % — SIGNIFICANT CHANGE UP (ref 10.3–14.5)
RBC CASTS # UR COMP ASSIST: SIGNIFICANT CHANGE UP /HPF (ref 0–4)
SODIUM SERPL-SCNC: 139 MMOL/L — SIGNIFICANT CHANGE UP (ref 135–145)
SP GR SPEC: 1.01 — SIGNIFICANT CHANGE UP (ref 1.01–1.02)
UROBILINOGEN FLD QL: NEGATIVE MG/DL — SIGNIFICANT CHANGE UP
WBC # BLD: 11.8 K/UL — HIGH (ref 3.8–10.5)
WBC # FLD AUTO: 11.8 K/UL — HIGH (ref 3.8–10.5)
WBC UR QL: SIGNIFICANT CHANGE UP

## 2017-10-27 RX ORDER — FAMOTIDINE 10 MG/ML
40 INJECTION INTRAVENOUS EVERY 24 HOURS
Qty: 0 | Refills: 0 | Status: DISCONTINUED | OUTPATIENT
Start: 2017-10-27 | End: 2017-10-28

## 2017-10-27 RX ADMIN — Medication 100 MILLIGRAM(S): at 05:13

## 2017-10-27 RX ADMIN — SODIUM CHLORIDE 100 MILLILITER(S): 9 INJECTION, SOLUTION INTRAVENOUS at 04:47

## 2017-10-27 RX ADMIN — ENOXAPARIN SODIUM 40 MILLIGRAM(S): 100 INJECTION SUBCUTANEOUS at 05:14

## 2017-10-27 RX ADMIN — AMLODIPINE BESYLATE 5 MILLIGRAM(S): 2.5 TABLET ORAL at 05:13

## 2017-10-27 RX ADMIN — ONDANSETRON 4 MILLIGRAM(S): 8 TABLET, FILM COATED ORAL at 20:28

## 2017-10-27 RX ADMIN — Medication 0.5 MILLIGRAM(S): at 22:54

## 2017-10-27 RX ADMIN — ONDANSETRON 4 MILLIGRAM(S): 8 TABLET, FILM COATED ORAL at 05:24

## 2017-10-27 RX ADMIN — SODIUM CHLORIDE 100 MILLILITER(S): 9 INJECTION, SOLUTION INTRAVENOUS at 17:27

## 2017-10-27 RX ADMIN — Medication 100 MILLIGRAM(S): at 14:16

## 2017-10-27 RX ADMIN — Medication 100 MILLIGRAM(S): at 21:52

## 2017-10-27 RX ADMIN — HYDROMORPHONE HYDROCHLORIDE 30 MILLILITER(S): 2 INJECTION INTRAMUSCULAR; INTRAVENOUS; SUBCUTANEOUS at 19:03

## 2017-10-27 RX ADMIN — FAMOTIDINE 40 MILLIGRAM(S): 10 INJECTION INTRAVENOUS at 17:27

## 2017-10-27 NOTE — PROGRESS NOTE ADULT - SUBJECTIVE AND OBJECTIVE BOX
HPI:  This is a 62-year-old female with a past medical history significant for chronic intermittent porphyria anxiety recently discharged from [facility] in August of this year after gram-positive bacteremia secondary to an infected Mediport in which the organism was MSSA and the patient was discharged on parenteral antibiotics Via Ancef with a midline catheter placed and discharged on August 19 presents today after 2 day history of worsening abdominal pain. Patient has a history of intermittent porphryia and has had multiple hospitalizations for chronic abdominal pain. Patient has been unable to tolerate p.o. In the emergency department she received 1500 cc of IV fluids on admission the patient's lactate was 3.6 after fluid resuscitation improved to 1.0.  During this encounter the patient was in acute distress complaining of severe abdominal pain as well as nausea.    Of note patient had Mediport replaced in september and has been maintained on home IV D5 solution.   CT imaging results were consistent with mild wall thickening involving the sigmoid and colon and rectum possibly reflective of mild colitis. Moderate stool retention several indeterminate bilateral renal lesions measuring 1 cm with no suspicious renal mass found (25 Oct 2017 23:06)    SUBJECTIVE: Pt seen and examined at bedside. Reports diffuse abdominal pain that is non radiating 5/10 today,  compared 8/10 yesterday. Says pain is well controlled with PCA. +Nausea no vomiting.  She's not having anything PO 2/2 dec appetite and not N/V. Currently denies any fevers/chills, N/V/D/C, CP, SOB, palpitations, numbness/tingling in extremities. Plan for today discussed understanding assessed via teach back method. All questions answered.      REVIEW OF SYSTEMS:  CONSTITUTIONAL: No weakness, fevers or chills  EYES/ENT: No visual changes;  No vertigo or throat pain   NECK: No pain or stiffness  RESPIRATORY: No cough, wheezing, hemoptysis; No shortness of breath  CARDIOVASCULAR: No chest pain or palpitations  GASTROINTESTINAL: +abdominal. +nausea, no vomiting, or hematemesis; No diarrhea or constipation. No melena or hematochezia.  GENITOURINARY: No dysuria, frequency or hematuria  SKIN: scattered discoloration  All other review of systems is negative unless indicated above      Vital Signs Last 24 Hrs  T(C): 36.5 (27 Oct 2017 04:52), Max: 36.8 (26 Oct 2017 20:44)  T(F): 97.7 (27 Oct 2017 04:52), Max: 98.2 (26 Oct 2017 20:44)  HR: 71 (27 Oct 2017 04:52) (71 - 81)  BP: 171/76 (27 Oct 2017 04:52) (129/86 - 171/76)  BP(mean): --  RR: 18 (27 Oct 2017 04:52) (18 - 18)  SpO2: 100% (27 Oct 2017 04:52) (100% - 100%)    I&O's Summary    26 Oct 2017 07:01  -  27 Oct 2017 07:00  --------------------------------------------------------  IN: 1300 mL / OUT: 300 mL / NET: 1000 mL      PHYSICAL EXAM:  Constitutional: NAD, well-developed, sleeping but easily arousable  HEENT: EOMI, wears hearing aid in R ear, MMM  Neck: Soft and supple  Respiratory: Breath sounds are clear bilaterally, No wheezing, rales or rhonchi  Cardiovascular: S1 and S2, regular rate and rhythm  Gastrointestinal: Bowel Sounds present, soft, extremely tender, nondistended  Extremities: No peripheral edema  Vascular: 2+ peripheral pulses  Neurological: A/O x 3, no focal deficits  Skin: scattered discolorations/macules      MEDICATIONS:  MEDICATIONS  (STANDING):  amLODIPine   Tablet 5 milliGRAM(s) Oral daily  dextrose 5% + sodium chloride 0.3%. 1000 milliLiter(s) (100 mL/Hr) IV Continuous <Continuous>  enoxaparin Injectable 40 milliGRAM(s) SubCutaneous every 24 hours  HYDROmorphone PCA (1 mG/mL) 30 milliLiter(s) PCA Continuous PCA Continuous  influenza   Vaccine 0.5 milliLiter(s) IntraMuscular once  LORazepam   Injectable 1 milliGRAM(s) IntraMuscular once  metroNIDAZOLE  IVPB 500 milliGRAM(s) IV Intermittent every 8 hours      LABS: All Labs Reviewed:                        14.0   11.8  )-----------( 247      ( 27 Oct 2017 05:36 )             41.6     10-27    139  |  106  |  8   ----------------------------<  124<H>  3.5   |  27  |  0.64    Ca    9.3      27 Oct 2017 05:36    TPro  8.1  /  Alb  3.6  /  TBili  0.3  /  DBili  x   /  AST  13<L>  /  ALT  21  /  AlkPhos  79  10-25    PT/INR - ( 25 Oct 2017 14:51 )   PT: 11.2 sec;   INR: 1.04 ratio       Blood Culture: 10-25 @ 15:43  Organism --  Gram Stain Blood -- Gram Stain --  Specimen Source .Blood None  Culture-Blood --    10-25 @ 14:51  Organism --  Gram Stain Blood -- Gram Stain --  Specimen Source .Blood Blood-Peripheral  Culture-Blood --    RADIOLOGY/EKG:  CT Abdomen and Pelvis w/ IV Cont (10.25.17 @ 17:50)  IMPRESSION: Mild wall thickening involving the sigmoid colon and rectum   which may reflect a mild colitis. Moderate stool retention is noted.       Status post cholecystectomy.   several indeterminate BILATERAL renal   lesions measuring 1 cm and less in both kidneys unchanged, too small to   characterize.      DVT PPX: Lovenox

## 2017-10-27 NOTE — CHART NOTE - NSCHARTNOTEFT_GEN_A_CORE
Source: Patient [ ]    Family [ ]     other [ ]    Diet :       Patient reports [ ] nausea  [ ] vomiting [ ] diarrhea [ ] constipation  [ ]chewing problems [ ] swallowing issues  [ ] other:      PO intake:  < 50% [ ] 50-75% [ ]   % [ ]  other :     Source for PO intake [ ] Patient [ ] family [ ] chart [ ] staff [ ] other     Enteral /Parenteral Nutrition:       Current Weight: Weight (kg): 72.6 (10-25 @ 14:31)  % Weight Change    Pertinent Medications: MEDICATIONS  (STANDING):  amLODIPine   Tablet 5 milliGRAM(s) Oral daily  dextrose 5% + sodium chloride 0.3%. 1000 milliLiter(s) (100 mL/Hr) IV Continuous <Continuous>  enoxaparin Injectable 40 milliGRAM(s) SubCutaneous every 24 hours  famotidine Injectable 40 milliGRAM(s) IV Push every 24 hours  HYDROmorphone PCA (1 mG/mL) 30 milliLiter(s) PCA Continuous PCA Continuous  influenza   Vaccine 0.5 milliLiter(s) IntraMuscular once  LORazepam   Injectable 1 milliGRAM(s) IntraMuscular once  metroNIDAZOLE  IVPB 500 milliGRAM(s) IV Intermittent every 8 hours    MEDICATIONS  (PRN):  acetaminophen   Tablet 650 milliGRAM(s) Oral every 6 hours PRN For Temp greater than 38 C (100.4 F)  HYDROmorphone PCA (1 mG/mL) Rescue Clinician Bolus 0.5 milliGRAM(s) IV Push every 15 minutes PRN for Pain Scale GREATER THAN 6  naloxone Injectable 0.1 milliGRAM(s) IV Push every 3 minutes PRN For ANY of the following changes in patient status:  A. RR LESS THAN 10 breaths per minute, B. Oxygen saturation LESS THAN 90%, C. Sedation score of 6  ondansetron Injectable 4 milliGRAM(s) IV Push every 6 hours PRN Nausea    Pertinent Labs:  10-27 Na139 mmol/L Glu 124 mg/dL<H> K+ 3.5 mmol/L Cr  0.64 mg/dL BUN 8 mg/dL Phos n/a   Alb n/a   PAB n/a         Skin:     Estimated Needs:   [ ] no change since previous assessment  [ ] recalculated:       Previous Nutrition Diagnosis:     [ ] Inadequate Energy Intake [ ]Inadequate Oral Intake [ ] Excessive Energy Intake     [ ] Underweight [ ] Increased Nutrient Needs [ ] Overweight/Obesity     [ ] Altered GI Function [ ] Unintended Weight Loss [ ] Food & Nutrition Related Knowledge Deficit [ ] Malnutrition          Nutrition Diagnosis is [ x] ongoing  [ ] resolved [ ] not applicable          New Nutrition Diagnosis: [x] not applicable    [ ] Inadequate Protein Energy Intake [ ]Inadequate Oral Intake [ ] Excessive Energy Intake     [ ] Underweight [ ] Increased Nutrient Needs [ ] Overweight/Obesity     [ ] Altered GI Function [ ] Unintended Weight Loss [ ] Food & Nutrition Related Knowledge Deficit[ ] Limited Adherence to nutrition related recommendations [ ] Malnutrition  [ ] other: Free text       Related to:      As evidenced by:      Interventions:     Recommend    [ ] Change Diet To:    [ ] Nutrition Supplement    [ ] Nutrition Support    [ ] Other:        Monitoring and Evaluation:     [x ] PO intake [x ] Tolerance to diet prescription x ] weights [ ] follow up per protocol    [ ] other:    Pt tolerating clears, is concerned about advancing in diet.  Pt reassured that if she tries a little bit of food and she doesn't like or tolerate it,  she can order something else.  Abdominal pain has decreased, pt seems a bit agitated and uncomfortable.  Will continue to monitor.

## 2017-10-27 NOTE — PROGRESS NOTE ADULT - PROBLEM SELECTOR PLAN 1
- Most likely acute intermittent porphyria exacerbation  - PCA hydromorphone 0.4mg  - Zofran for nausea

## 2017-10-28 LAB
ADD ON TEST-SPECIMEN IN LAB: SIGNIFICANT CHANGE UP
ANION GAP SERPL CALC-SCNC: 11 MMOL/L — SIGNIFICANT CHANGE UP (ref 5–17)
BUN SERPL-MCNC: 9 MG/DL — SIGNIFICANT CHANGE UP (ref 7–23)
CALCIUM SERPL-MCNC: 9.3 MG/DL — SIGNIFICANT CHANGE UP (ref 8.5–10.1)
CHLORIDE SERPL-SCNC: 102 MMOL/L — SIGNIFICANT CHANGE UP (ref 96–108)
CO2 SERPL-SCNC: 23 MMOL/L — SIGNIFICANT CHANGE UP (ref 22–31)
CREAT SERPL-MCNC: 0.66 MG/DL — SIGNIFICANT CHANGE UP (ref 0.5–1.3)
GLUCOSE SERPL-MCNC: 142 MG/DL — HIGH (ref 70–99)
HCT VFR BLD CALC: 43.9 % — SIGNIFICANT CHANGE UP (ref 34.5–45)
HGB BLD-MCNC: 14.9 G/DL — SIGNIFICANT CHANGE UP (ref 11.5–15.5)
MAGNESIUM SERPL-MCNC: 2.1 MG/DL — SIGNIFICANT CHANGE UP (ref 1.6–2.6)
MCHC RBC-ENTMCNC: 27.5 PG — SIGNIFICANT CHANGE UP (ref 27–34)
MCHC RBC-ENTMCNC: 33.9 GM/DL — SIGNIFICANT CHANGE UP (ref 32–36)
MCV RBC AUTO: 81.2 FL — SIGNIFICANT CHANGE UP (ref 80–100)
PLATELET # BLD AUTO: 288 K/UL — SIGNIFICANT CHANGE UP (ref 150–400)
POTASSIUM SERPL-MCNC: 3.1 MMOL/L — LOW (ref 3.5–5.3)
POTASSIUM SERPL-SCNC: 3.1 MMOL/L — LOW (ref 3.5–5.3)
RBC # BLD: 5.4 M/UL — HIGH (ref 3.8–5.2)
RBC # FLD: 11.7 % — SIGNIFICANT CHANGE UP (ref 10.3–14.5)
SODIUM SERPL-SCNC: 136 MMOL/L — SIGNIFICANT CHANGE UP (ref 135–145)
WBC # BLD: 16.1 K/UL — HIGH (ref 3.8–10.5)
WBC # FLD AUTO: 16.1 K/UL — HIGH (ref 3.8–10.5)

## 2017-10-28 RX ORDER — PANTOPRAZOLE SODIUM 20 MG/1
40 TABLET, DELAYED RELEASE ORAL
Qty: 0 | Refills: 0 | Status: DISCONTINUED | OUTPATIENT
Start: 2017-10-28 | End: 2017-10-30

## 2017-10-28 RX ORDER — LACTOBACILLUS ACIDOPHILUS 100MM CELL
1 CAPSULE ORAL DAILY
Qty: 0 | Refills: 0 | Status: DISCONTINUED | OUTPATIENT
Start: 2017-10-28 | End: 2017-10-30

## 2017-10-28 RX ORDER — POTASSIUM CHLORIDE 20 MEQ
10 PACKET (EA) ORAL
Qty: 0 | Refills: 0 | Status: COMPLETED | OUTPATIENT
Start: 2017-10-28 | End: 2017-10-28

## 2017-10-28 RX ADMIN — Medication 100 MILLIGRAM(S): at 18:27

## 2017-10-28 RX ADMIN — Medication 0.5 MILLIGRAM(S): at 19:41

## 2017-10-28 RX ADMIN — SODIUM CHLORIDE 100 MILLILITER(S): 9 INJECTION, SOLUTION INTRAVENOUS at 23:23

## 2017-10-28 RX ADMIN — SODIUM CHLORIDE 100 MILLILITER(S): 9 INJECTION, SOLUTION INTRAVENOUS at 01:29

## 2017-10-28 RX ADMIN — Medication 0.5 MILLIGRAM(S): at 13:38

## 2017-10-28 RX ADMIN — AMLODIPINE BESYLATE 5 MILLIGRAM(S): 2.5 TABLET ORAL at 05:28

## 2017-10-28 RX ADMIN — Medication 100 MILLIEQUIVALENT(S): at 13:10

## 2017-10-28 RX ADMIN — Medication 100 MILLIEQUIVALENT(S): at 13:09

## 2017-10-28 RX ADMIN — ENOXAPARIN SODIUM 40 MILLIGRAM(S): 100 INJECTION SUBCUTANEOUS at 05:16

## 2017-10-28 RX ADMIN — Medication 100 MILLIGRAM(S): at 05:16

## 2017-10-28 RX ADMIN — PANTOPRAZOLE SODIUM 40 MILLIGRAM(S): 20 TABLET, DELAYED RELEASE ORAL at 18:39

## 2017-10-28 RX ADMIN — Medication 1 TABLET(S): at 18:39

## 2017-10-28 RX ADMIN — Medication 30 MILLILITER(S): at 13:02

## 2017-10-28 RX ADMIN — Medication 100 MILLIGRAM(S): at 21:37

## 2017-10-28 RX ADMIN — ONDANSETRON 4 MILLIGRAM(S): 8 TABLET, FILM COATED ORAL at 05:19

## 2017-10-28 NOTE — PROGRESS NOTE ADULT - SUBJECTIVE AND OBJECTIVE BOX
HPI: This is a 62-year-old female with a past medical history significant for chronic intermittent porphyria anxiety recently discharged from [facility] in August of this year after gram-positive bacteremia secondary to an infected Mediport in which the organism was MSSA and the patient was discharged on parenteral antibiotics Via Ancef with a midline catheter placed and discharged on August 19 presents today after 2 day history of worsening abdominal pain. Patient has a history of intermittent porphyria and has had multiple hospitalizations for chronic abdominal pain. Patient has been unable to tolerate p.o. In the emergency department she received 1500 cc of IV fluids on admission the patient's lactate was 3.6 after fluid resuscitation improved to 1.0. During this encounter the patient was in acute distress complaining of severe abdominal pain as well as nausea.    Of note patient had Mediport replaced in september and has been maintained on home IV D5 solution.   CT imaging results were consistent with mild wall thickening involving the sigmoid and colon and rectum possibly reflective of mild colitis. Moderate stool retention several indeterminate bilateral renal lesions measuring 1 cm with no suspicious renal mass found (25 Oct 2017 23:06)    SUBJECTIVE:   Overnight, pt's hospital roommate had visitor's with potent perfumes/colognes which triggered heaving and vomiting in pt, she was given zofran. Pt was immediately moved to another room upon which her symptoms resolved. In her new room, pt had another episode of vomiting. She was given ativan for her nausea/vomiting and also for her anxiety.   Pt seen and examined at bedside. Reports diffuse abdominal pain that is non radiating 4/10 today,  compared 8/10 upon admission.  Says pain is well controlled with PCA. +Nausea and vomiting.  Reports decreased appetite, however, family member will bring her food from home today. Currently denies any fevers/chills, N/V/D/C, CP, SOB, palpitations, numbness/tingling in extremities. Plan for today discussed understanding assessed via teach back method. All questions answered.    REVIEW OF SYSTEMS:  CONSTITUTIONAL: No weakness, fevers or chills  EYES/ENT: No visual changes;  No vertigo or throat pain   NECK: No pain or stiffness  RESPIRATORY: No cough, wheezing, hemoptysis; No shortness of breath  CARDIOVASCULAR: No chest pain or palpitations  GASTROINTESTINAL: +abdominal. +nausea, no vomiting, or hematemesis; No diarrhea or constipation. No melena or hematochezia.  GENITOURINARY: No dysuria, frequency or hematuria  SKIN: scattered discoloration  All other review of systems is negative unless indicated above    Vital Signs Last 24 Hrs  T(C): 37.4 (28 Oct 2017 05:14), Max: 37.4 (28 Oct 2017 05:14)  T(F): 99.3 (28 Oct 2017 05:14), Max: 99.3 (28 Oct 2017 05:14)  HR: 85 (28 Oct 2017 05:14) (77 - 96)  BP: 169/77 (28 Oct 2017 05:14) (150/92 - 169/77)  BP(mean): --  RR: 18 (28 Oct 2017 05:14) (16 - 18)  SpO2: 100% (28 Oct 2017 05:14) (99% - 100%)    I&O's Summary  27 Oct 2017 07:01  -  28 Oct 2017 07:00  --------------------------------------------------------  IN: 1200 mL / OUT: 0 mL / NET: 1200 mL    PHYSICAL EXAM:  Constitutional: NAD, well-developed, sleeping but easily arousable  HEENT: EOMI, wears hearing aid in R ear, MMM  Neck: Soft and supple  Respiratory: Breath sounds are clear bilaterally, No wheezing, rales or rhonchi  Cardiovascular: S1 and S2, regular rate and rhythm  Gastrointestinal: Bowel Sounds present, soft, extremely tender, nondistended  Extremities: No peripheral edema  Vascular: 2+ peripheral pulses  Neurological: A/O x 3, no focal deficits  Skin: scattered discolorations/macules    MEDICATIONS:  MEDICATIONS  (STANDING):  amLODIPine   Tablet 5 milliGRAM(s) Oral daily  dextrose 5% + sodium chloride 0.3%. 1000 milliLiter(s) (100 mL/Hr) IV Continuous <Continuous>  enoxaparin Injectable 40 milliGRAM(s) SubCutaneous every 24 hours  famotidine Injectable 40 milliGRAM(s) IV Push every 24 hours  HYDROmorphone PCA (1 mG/mL) 30 milliLiter(s) PCA Continuous PCA Continuous  influenza   Vaccine 0.5 milliLiter(s) IntraMuscular once  metroNIDAZOLE  IVPB 500 milliGRAM(s) IV Intermittent every 8 hours    LABS: All Labs Reviewed:                      14.9   16.1  )-----------( 288      ( 28 Oct 2017 05:52 )             43.9     10-28  136  |  102  |  9   ----------------------------<  142<H>  3.1<L>   |  23  |  0.66    Ca    9.3      28 Oct 2017 05:52    Blood Culture: 10-26 @ 12:55  Specimen Source .Blood Port Device  Culture-Blood --NGTD    10-25 @ 15:43  Specimen Source .Blood   Culture-Blood -- NGTD    10-25 @ 14:51  Specimen Source .Blood Blood-Peripheral  Culture-Blood NGTD    RADIOLOGY/EKG:  CT Abdomen and Pelvis w/ IV Cont (10.25.17 @ 17:50)  IMPRESSION: Mild wall thickening involving the sigmoid colon and rectum   which may reflect a mild colitis. Moderate stool retention is noted.       Status post cholecystectomy.  Several indeterminate BILATERAL renal   lesions measuring 1 cm and less in both kidneys unchanged, too small to   characterize.      DVT PPX: Lovenox

## 2017-10-28 NOTE — PROGRESS NOTE ADULT - SUBJECTIVE AND OBJECTIVE BOX
had bad day yesterday with exposure to neighbors perfume and subsequent vomiting with subsequent abdominal pain  we'll most changed in today patient is feeling better  lungs clear/decreased inspiratory effort  Heart S1-S2  Abdomen soft  generalized tenderness bowel sounds present    WBC 16, Hgb 14.9 HCT 43.9 platelet 288  all cultures negative    Impression  acute intermittent porphyria; painful crisis  Continue IV fluids/glucose  I've reviewed with patient importance of changing analgesia from parenteral to  oral  In addition to carbohydrate suggest increased protein intake  Encourage out of bed and ambulate.  Above reviewed with  patient, nurses, house staff.

## 2017-10-28 NOTE — CHART NOTE - NSCHARTNOTEFT_GEN_A_CORE
Source: Patient [x]    Family [ ]     other [ ]    Diet :       Patient reports [x] nausea  [ ] vomiting [ ] diarrhea [ ] constipation  [ ]chewing problems [ ] swallowing issues  [ ] other:      PO intake:  < 50% [x] 50-75% [ ]   % [ ]  other :     Source for PO intake [x] Patient [ ] family [ ] chart [ ] staff [ ] other    Current Weight: Weight (kg): 72.6 (10-25 @ 14:31)      Pertinent Medications: MEDICATIONS  (STANDING):  amLODIPine   Tablet 5 milliGRAM(s) Oral daily  dextrose 5% + sodium chloride 0.3%. 1000 milliLiter(s) (100 mL/Hr) IV Continuous <Continuous>  enoxaparin Injectable 40 milliGRAM(s) SubCutaneous every 24 hours  HYDROmorphone PCA (1 mG/mL) 30 milliLiter(s) PCA Continuous PCA Continuous  influenza   Vaccine 0.5 milliLiter(s) IntraMuscular once  lactobacillus acidophilus 1 Tablet(s) Oral daily  metroNIDAZOLE  IVPB 500 milliGRAM(s) IV Intermittent every 8 hours  pantoprazole    Tablet 40 milliGRAM(s) Oral two times a day before meals    MEDICATIONS  (PRN):  acetaminophen   Tablet 650 milliGRAM(s) Oral every 6 hours PRN For Temp greater than 38 C (100.4 F)  HYDROmorphone PCA (1 mG/mL) Rescue Clinician Bolus 0.5 milliGRAM(s) IV Push every 15 minutes PRN for Pain Scale GREATER THAN 6  LORazepam   Injectable 0.5 milliGRAM(s) IV Push every 6 hours PRN Nausea and/or Vomiting  naloxone Injectable 0.1 milliGRAM(s) IV Push every 3 minutes PRN For ANY of the following changes in patient status:  A. RR LESS THAN 10 breaths per minute, B. Oxygen saturation LESS THAN 90%, C. Sedation score of 6  ondansetron Injectable 4 milliGRAM(s) IV Push every 6 hours PRN Nausea    Pertinent Labs:  10-28 Na136 mmol/L Glu 142 mg/dL<H> K+ 3.1 mmol/L<L> Cr  0.66 mg/dL BUN 9 mg/dL Phos n/a   Alb n/a   PAB n/a         Skin:     Estimated Needs:   [x] no change since previous assessment  [ ] recalculated:       Previous Nutrition Diagnosis:     [ ] Inadequate Energy Intake [x]Inadequate Oral Intake [ ] Excessive Energy Intake     [ ] Underweight [ ] Increased Nutrient Needs [ ] Overweight/Obesity     [ ] Altered GI Function [ ] Unintended Weight Loss [ ] Food & Nutrition Related Knowledge Deficit [ ] Malnutrition          Nutrition Diagnosis is [x] ongoing  [ ] resolved [ ] not applicable         [x] Change Diet To: Low Fiber    [x] Nutrition Supplement: Ensure Clear         Monitoring and Evaluation:     [x] PO intake [ ] Tolerance to diet prescription [x] weights [ ] follow up per protocol    [ ] other:    Recommendations:  Pt seen per MD consult for assessment and education. Pt currently on clear liquid diet with poor intake. Pt not tolerating clear liquids very well. Pt reports poor intake is due to artificial taste of foods provided on diet. Pt feels she could tolerate PO intake with more food options. Discussed food options that are bland and more tolerable with persistent Nausea. Discussed advancing diet and trying foods (baked chicken, pretzels, rice or pasta). Pt's sister is bringing in a shake for pt to try. Pt also agreeable to trying ensure clears (pt reports she dose not like normal ensure). Will continue to monitor pt's intake.Pt provided with nausea and vomiting diet education. Pending diet order sent, approve or change diet to low fiber.

## 2017-10-28 NOTE — CHART NOTE - NSCHARTNOTEFT_GEN_A_CORE
patient seen and examined with Dr Estrella    given increase in white count and malar rash that is beginning will send off dsdna ab and antivardiolipin ab     patient outpatient pain management Dr is Dr Sergio Mccauley in [place]  when patient tolerates will attempt to wean off pca     patient also will need dietary consult which was placed       d/w Dr Atwood patient seen and examined with Dr Estrella    given increase in white count and malar rash that is beginning will send off dsdna ab and antivardiolipin ab     patient outpatient pain management Dr is Dr Molly Mccauley in [place]  when patient tolerates will attempt to wean off pca     patient also will need dietary consult which was placed       d/w Dr Atwood patient seen and examined with Dr Estrella    given increase in white count and malar rash that is beginning will send off dsdna ab and antivardiolipin ab     may attempt to wean patient off pca starting 10/29    patient also will need dietary consult which was placed -> spoke to Abel in Dietary    patient outpatient pain management Dr is Dr Molly Mccauley in [place]    d/w Dr Atwood

## 2017-10-29 LAB
ANION GAP SERPL CALC-SCNC: 10 MMOL/L — SIGNIFICANT CHANGE UP (ref 5–17)
BUN SERPL-MCNC: 9 MG/DL — SIGNIFICANT CHANGE UP (ref 7–23)
CALCIUM SERPL-MCNC: 9 MG/DL — SIGNIFICANT CHANGE UP (ref 8.5–10.1)
CHLORIDE SERPL-SCNC: 107 MMOL/L — SIGNIFICANT CHANGE UP (ref 96–108)
CO2 SERPL-SCNC: 23 MMOL/L — SIGNIFICANT CHANGE UP (ref 22–31)
CREAT SERPL-MCNC: 0.58 MG/DL — SIGNIFICANT CHANGE UP (ref 0.5–1.3)
GLUCOSE SERPL-MCNC: 115 MG/DL — HIGH (ref 70–99)
HCT VFR BLD CALC: 39.8 % — SIGNIFICANT CHANGE UP (ref 34.5–45)
HGB BLD-MCNC: 13.5 G/DL — SIGNIFICANT CHANGE UP (ref 11.5–15.5)
MCHC RBC-ENTMCNC: 27.7 PG — SIGNIFICANT CHANGE UP (ref 27–34)
MCHC RBC-ENTMCNC: 33.8 GM/DL — SIGNIFICANT CHANGE UP (ref 32–36)
MCV RBC AUTO: 81.9 FL — SIGNIFICANT CHANGE UP (ref 80–100)
PLATELET # BLD AUTO: 259 K/UL — SIGNIFICANT CHANGE UP (ref 150–400)
POTASSIUM SERPL-MCNC: 3 MMOL/L — LOW (ref 3.5–5.3)
POTASSIUM SERPL-SCNC: 3 MMOL/L — LOW (ref 3.5–5.3)
RBC # BLD: 4.86 M/UL — SIGNIFICANT CHANGE UP (ref 3.8–5.2)
RBC # FLD: 12 % — SIGNIFICANT CHANGE UP (ref 10.3–14.5)
SODIUM SERPL-SCNC: 140 MMOL/L — SIGNIFICANT CHANGE UP (ref 135–145)
WBC # BLD: 13.3 K/UL — HIGH (ref 3.8–10.5)
WBC # FLD AUTO: 13.3 K/UL — HIGH (ref 3.8–10.5)

## 2017-10-29 RX ORDER — POTASSIUM CHLORIDE 20 MEQ
40 PACKET (EA) ORAL EVERY 4 HOURS
Qty: 0 | Refills: 0 | Status: COMPLETED | OUTPATIENT
Start: 2017-10-29 | End: 2017-10-29

## 2017-10-29 RX ORDER — POTASSIUM CHLORIDE 20 MEQ
40 PACKET (EA) ORAL ONCE
Qty: 0 | Refills: 0 | Status: COMPLETED | OUTPATIENT
Start: 2017-10-29 | End: 2017-10-29

## 2017-10-29 RX ADMIN — Medication 1 TABLET(S): at 11:51

## 2017-10-29 RX ADMIN — SODIUM CHLORIDE 100 MILLILITER(S): 9 INJECTION, SOLUTION INTRAVENOUS at 21:44

## 2017-10-29 RX ADMIN — Medication 0.5 MILLIGRAM(S): at 17:08

## 2017-10-29 RX ADMIN — Medication 40 MILLIEQUIVALENT(S): at 11:51

## 2017-10-29 RX ADMIN — Medication 100 MILLIGRAM(S): at 21:44

## 2017-10-29 RX ADMIN — Medication 0.5 MILLIGRAM(S): at 02:43

## 2017-10-29 RX ADMIN — Medication 0.5 MILLIGRAM(S): at 22:40

## 2017-10-29 RX ADMIN — Medication 40 MILLIEQUIVALENT(S): at 18:30

## 2017-10-29 RX ADMIN — AMLODIPINE BESYLATE 5 MILLIGRAM(S): 2.5 TABLET ORAL at 06:33

## 2017-10-29 RX ADMIN — PANTOPRAZOLE SODIUM 40 MILLIGRAM(S): 20 TABLET, DELAYED RELEASE ORAL at 18:30

## 2017-10-29 RX ADMIN — Medication 100 MILLIGRAM(S): at 13:15

## 2017-10-29 RX ADMIN — PANTOPRAZOLE SODIUM 40 MILLIGRAM(S): 20 TABLET, DELAYED RELEASE ORAL at 07:56

## 2017-10-29 RX ADMIN — Medication 100 MILLIGRAM(S): at 06:33

## 2017-10-29 RX ADMIN — Medication 0.5 MILLIGRAM(S): at 08:29

## 2017-10-29 RX ADMIN — ENOXAPARIN SODIUM 40 MILLIGRAM(S): 100 INJECTION SUBCUTANEOUS at 06:32

## 2017-10-29 RX ADMIN — HYDROMORPHONE HYDROCHLORIDE 30 MILLILITER(S): 2 INJECTION INTRAMUSCULAR; INTRAVENOUS; SUBCUTANEOUS at 00:00

## 2017-10-29 NOTE — PROGRESS NOTE ADULT - SUBJECTIVE AND OBJECTIVE BOX
HPI: This is a 62-year-old female with a past medical history significant for chronic intermittent porphyria anxiety recently discharged from [facility] in August of this year after gram-positive bacteremia secondary to an infected Mediport in which the organism was MSSA and the patient was discharged on parenteral antibiotics Via Ancef with a midline catheter placed and discharged on August 19 presents today after 2 day history of worsening abdominal pain. Patient has a history of intermittent porphyria and has had multiple hospitalizations for chronic abdominal pain. Patient has been unable to tolerate p.o. In the emergency department she received 1500 cc of IV fluids on admission the patient's lactate was 3.6 after fluid resuscitation improved to 1.0. During this encounter the patient was in acute distress complaining of severe abdominal pain as well as nausea.    Of note patient had Mediport replaced in september and has been maintained on home IV D5 solution.   CT imaging results were consistent with mild wall thickening involving the sigmoid and colon and rectum possibly reflective of mild colitis. Moderate stool retention several indeterminate bilateral renal lesions measuring 1 cm with no suspicious renal mass found (25 Oct 2017 23:06)    10/29: Pt seen and examined at bedside. Nausea overnight with episodes of vomiting. Pt had a milkshake that increased nausea. Pt requesting crackers this morning. Pain better. Pt interested in coming off IV pain med.     REVIEW OF SYSTEMS:  CONSTITUTIONAL: No weakness, fevers or chills  EYES/ENT: No visual changes;  No vertigo or throat pain   NECK: No pain or stiffness  RESPIRATORY: No cough, wheezing, hemoptysis; No shortness of breath  CARDIOVASCULAR: No chest pain or palpitations  GASTROINTESTINAL: +abdominal. +nausea, no vomiting, or hematemesis; No diarrhea or constipation. No melena or hematochezia.  GENITOURINARY: No dysuria, frequency or hematuria  SKIN: scattered discoloration  All other review of systems is negative unless indicated above    Vital Signs Last 24 Hrs  T(C): 36.8 (29 Oct 2017 04:10), Max: 37.3 (28 Oct 2017 20:19)  T(F): 98.3 (29 Oct 2017 04:10), Max: 99.1 (28 Oct 2017 20:19)  HR: 92 (29 Oct 2017 04:10) (91 - 96)  BP: 149/76 (29 Oct 2017 04:10) (137/84 - 167/88)  BP(mean): --  RR: 17 (29 Oct 2017 04:10) (17 - 18)  SpO2: 100% (29 Oct 2017 04:10) (97% - 100%)    PHYSICAL EXAM:  Constitutional: NAD, well-developed, sleeping but easily arousable  HEENT: EOMI, wears hearing aid in R ear, MMM  Neck: Soft and supple  Respiratory: Breath sounds are clear bilaterally, No wheezing, rales or rhonchi  Cardiovascular: S1 and S2, regular rate and rhythm  Gastrointestinal: Bowel Sounds present, soft, extremely tender, nondistended  Extremities: No peripheral edema  Vascular: 2+ peripheral pulses  Neurological: A/O x 3, no focal deficits  Skin: scattered discolorations/macules    MEDICATIONS  (STANDING):  amLODIPine   Tablet 5 milliGRAM(s) Oral daily  dextrose 5% + sodium chloride 0.3%. 1000 milliLiter(s) (100 mL/Hr) IV Continuous <Continuous>  enoxaparin Injectable 40 milliGRAM(s) SubCutaneous every 24 hours  HYDROmorphone PCA (1 mG/mL) 30 milliLiter(s) PCA Continuous PCA Continuous  influenza   Vaccine 0.5 milliLiter(s) IntraMuscular once  lactobacillus acidophilus 1 Tablet(s) Oral daily  metroNIDAZOLE  IVPB 500 milliGRAM(s) IV Intermittent every 8 hours  pantoprazole    Tablet 40 milliGRAM(s) Oral two times a day before meals  potassium chloride    Tablet ER 40 milliEquivalent(s) Oral every 4 hours    MEDICATIONS  (PRN):  acetaminophen   Tablet 650 milliGRAM(s) Oral every 6 hours PRN For Temp greater than 38 C (100.4 F)  HYDROmorphone PCA (1 mG/mL) Rescue Clinician Bolus 0.5 milliGRAM(s) IV Push every 15 minutes PRN for Pain Scale GREATER THAN 6  naloxone Injectable 0.1 milliGRAM(s) IV Push every 3 minutes PRN For ANY of the following changes in patient status:  A. RR LESS THAN 10 breaths per minute, B. Oxygen saturation LESS THAN 90%, C. Sedation score of 6  ondansetron Injectable 4 milliGRAM(s) IV Push every 6 hours PRN Nausea      CBC Full  -  ( 29 Oct 2017 06:51 )  WBC Count : 13.3 K/uL  Hemoglobin : 13.5 g/dL  Hematocrit : 39.8 %  Platelet Count - Automated : 259 K/uL  Mean Cell Volume : 81.9 fl  Mean Cell Hemoglobin : 27.7 pg  Mean Cell Hemoglobin Concentration : 33.8 gm/dL  Auto Neutrophil # : x  Auto Lymphocyte # : x  Auto Monocyte # : x  Auto Eosinophil # : x  Auto Basophil # : x  Auto Neutrophil % : x  Auto Lymphocyte % : x  Auto Monocyte % : x  Auto Eosinophil % : x  Auto Basophil % : x        10-29    140  |  107  |  9   ----------------------------<  115<H>  3.0<L>   |  23  |  0.58    Ca    9.0      29 Oct 2017 06:51  Mg     2.1     10-28

## 2017-10-29 NOTE — PROGRESS NOTE ADULT - SUBJECTIVE AND OBJECTIVE BOX
patient had brief episode of nausea and vomiting last p.m.; she attributes this to noxious oder from the room across the grady.  Despite this she is feeling better today, was able to eat oatmeal earlier, small amount of a milkshake.  Abdomen: distended; soft; less tender, bowel sounds present.    Impression acute porphyria with abdominal / acute visceral crisis    now  improved /with IV fluids/ glucose / parenteral narcotics  Plan : is stable DC home in a.m.  Patient will continue overnight glucose and fluid hydration as an outpatient.  She is followed closely by pain consultant Dr. Molly Mccauley who she sees at least once a month; she will follow up with her after discharge.    All of above reviewed with patient, nurse, hospitalist and myself all at bedside together.

## 2017-10-29 NOTE — PROGRESS NOTE ADULT - PROBLEM SELECTOR PLAN 1
- Most likely acute intermittent porphyria exacerbation  - currently PCA hydromorphone 0.4mg--> will changed to PO medication  - Zofran (Q-T =450) for nausea - use ativan instead  - advance diet as tolerated

## 2017-10-30 ENCOUNTER — TRANSCRIPTION ENCOUNTER (OUTPATIENT)
Age: 62
End: 2017-10-30

## 2017-10-30 VITALS
TEMPERATURE: 98 F | DIASTOLIC BLOOD PRESSURE: 83 MMHG | SYSTOLIC BLOOD PRESSURE: 126 MMHG | OXYGEN SATURATION: 100 % | RESPIRATION RATE: 18 BRPM | HEART RATE: 93 BPM

## 2017-10-30 DIAGNOSIS — E87.6 HYPOKALEMIA: ICD-10-CM

## 2017-10-30 LAB
ANION GAP SERPL CALC-SCNC: 7 MMOL/L — SIGNIFICANT CHANGE UP (ref 5–17)
BUN SERPL-MCNC: 8 MG/DL — SIGNIFICANT CHANGE UP (ref 7–23)
CALCIUM SERPL-MCNC: 8.5 MG/DL — SIGNIFICANT CHANGE UP (ref 8.5–10.1)
CARDIOLIPIN AB SER-ACNC: NEGATIVE — SIGNIFICANT CHANGE UP
CHLORIDE SERPL-SCNC: 109 MMOL/L — HIGH (ref 96–108)
CO2 SERPL-SCNC: 24 MMOL/L — SIGNIFICANT CHANGE UP (ref 22–31)
CREAT SERPL-MCNC: 0.52 MG/DL — SIGNIFICANT CHANGE UP (ref 0.5–1.3)
CULTURE RESULTS: SIGNIFICANT CHANGE UP
CULTURE RESULTS: SIGNIFICANT CHANGE UP
DSDNA AB SER-ACNC: <12 IU/ML — SIGNIFICANT CHANGE UP
GLUCOSE SERPL-MCNC: 104 MG/DL — HIGH (ref 70–99)
HCT VFR BLD CALC: 35.9 % — SIGNIFICANT CHANGE UP (ref 34.5–45)
HGB BLD-MCNC: 11.9 G/DL — SIGNIFICANT CHANGE UP (ref 11.5–15.5)
MCHC RBC-ENTMCNC: 27.5 PG — SIGNIFICANT CHANGE UP (ref 27–34)
MCHC RBC-ENTMCNC: 33.2 GM/DL — SIGNIFICANT CHANGE UP (ref 32–36)
MCV RBC AUTO: 82.7 FL — SIGNIFICANT CHANGE UP (ref 80–100)
PLATELET # BLD AUTO: 217 K/UL — SIGNIFICANT CHANGE UP (ref 150–400)
POTASSIUM SERPL-MCNC: 3.1 MMOL/L — LOW (ref 3.5–5.3)
POTASSIUM SERPL-SCNC: 3.1 MMOL/L — LOW (ref 3.5–5.3)
RBC # BLD: 4.34 M/UL — SIGNIFICANT CHANGE UP (ref 3.8–5.2)
RBC # FLD: 12.1 % — SIGNIFICANT CHANGE UP (ref 10.3–14.5)
SODIUM SERPL-SCNC: 140 MMOL/L — SIGNIFICANT CHANGE UP (ref 135–145)
SPECIMEN SOURCE: SIGNIFICANT CHANGE UP
SPECIMEN SOURCE: SIGNIFICANT CHANGE UP
WBC # BLD: 10.8 K/UL — HIGH (ref 3.8–10.5)
WBC # FLD AUTO: 10.8 K/UL — HIGH (ref 3.8–10.5)

## 2017-10-30 RX ORDER — MORPHINE SULFATE 50 MG/1
230 CAPSULE, EXTENDED RELEASE ORAL EVERY 12 HOURS
Qty: 0 | Refills: 0 | Status: DISCONTINUED | OUTPATIENT
Start: 2017-10-30 | End: 2017-10-30

## 2017-10-30 RX ORDER — POTASSIUM CHLORIDE 20 MEQ
40 PACKET (EA) ORAL EVERY 4 HOURS
Qty: 0 | Refills: 0 | Status: COMPLETED | OUTPATIENT
Start: 2017-10-30 | End: 2017-10-30

## 2017-10-30 RX ADMIN — Medication 1 TABLET(S): at 11:16

## 2017-10-30 RX ADMIN — Medication 100 MILLIGRAM(S): at 05:33

## 2017-10-30 RX ADMIN — ENOXAPARIN SODIUM 40 MILLIGRAM(S): 100 INJECTION SUBCUTANEOUS at 05:33

## 2017-10-30 RX ADMIN — Medication 40 MILLIEQUIVALENT(S): at 14:44

## 2017-10-30 RX ADMIN — Medication 0.5 MILLIGRAM(S): at 05:33

## 2017-10-30 RX ADMIN — AMLODIPINE BESYLATE 5 MILLIGRAM(S): 2.5 TABLET ORAL at 05:33

## 2017-10-30 RX ADMIN — Medication 40 MILLIEQUIVALENT(S): at 11:16

## 2017-10-30 RX ADMIN — SODIUM CHLORIDE 100 MILLILITER(S): 9 INJECTION, SOLUTION INTRAVENOUS at 10:48

## 2017-10-30 RX ADMIN — HYDROMORPHONE HYDROCHLORIDE 30 MILLILITER(S): 2 INJECTION INTRAMUSCULAR; INTRAVENOUS; SUBCUTANEOUS at 04:20

## 2017-10-30 RX ADMIN — Medication 100 MILLIGRAM(S): at 14:44

## 2017-10-30 RX ADMIN — PANTOPRAZOLE SODIUM 40 MILLIGRAM(S): 20 TABLET, DELAYED RELEASE ORAL at 08:12

## 2017-10-30 RX ADMIN — INFLUENZA VIRUS VACCINE 0.5 MILLILITER(S): 15; 15; 15; 15 SUSPENSION INTRAMUSCULAR at 11:30

## 2017-10-30 NOTE — PROGRESS NOTE ADULT - ASSESSMENT
62-year-old female w/extensive past medical history is admitted for severe abdominal pain and inability to tolerate PO 2/2 acute on chronic intermittent porphyria exacerbation.

## 2017-10-30 NOTE — PROGRESS NOTE ADULT - PROBLEM SELECTOR PLAN 3
- Continue carbohydrate: s/p one-time bolus of 500 cc of D5 half NS ×1 dose  - IV fluids to D5 half NS at 125  - Most recent 2D echo performed in August 2017 revealed an estimated ejection fraction of 66%  - Heme consult- Dr. Maza appreciate recs: continue aggressive hydration, zofran for nausea
- Continue carbohydrate: s/p one-time bolus of 500 cc of D5 half NS ×1 dose  - IV fluids to D5 half NS at 125  - Most recent 2D echo performed in August 2017 revealed an estimated ejection fraction of 66%  - Heme consult- Dr. Maza
- Continue carbohydrate: s/p one-time bolus of 500 cc of D5 half NS ×1 dose  - IV fluids to D5 half NS at 125  - Most recent 2D echo performed in August 2017 revealed an estimated ejection fraction of 66%  - Heme consult- Dr. Maza appreciate recs: continue aggressive hydration, zofran for nausea- will use ativan in the setting of LD=188
- Continue carbohydrate: s/p one-time bolus of 500 cc of D5 half NS ×1 dose  - IV fluids to D5 half NS at 125  - Most recent 2D echo performed in August 2017 revealed an estimated ejection fraction of 66%  - Heme consult- Dr. Maza appreciate recs: continue aggressive hydration, zofran for nausea- will use ativan in the setting of AZ=686
- Continue carbohydrate: s/p one-time bolus of 500 cc of D5 half NS ×1 dose  - IV fluids to D5 half NS at 125  - Most recent 2D echo performed in August 2017 revealed an estimated ejection fraction of 66%  - Heme consult- Dr. Maza appreciate recs: continue aggressive hydration, zofran for nausea- will use ativan in the setting of WH=[id number]

## 2017-10-30 NOTE — PROGRESS NOTE ADULT - PROBLEM SELECTOR PLAN 2
- Seen on imaging as above  - Given elevated lactate in ED, will continue to monitor to ensure the patient is not having episodes of intermittent bowel ischemia  - Flagyl and Ciprofloxacin; continue for additional 24 hours
- Seen on imaging as above  - Given elevated lactate in ED, will continue to monitor to ensure the patient is not having episodes of intermittent bowel ischemia  - continue Flagyl
- Seen on imaging as above  - Given elevated lactate in ED, will continue to monitor to ensure the patient is not having episodes of intermittent bowel ischemia  - continue Flagyl while in pt, will d/c upon discharge
- Seen on imaging as above  - Given elevated lactate in ED, will continue to monitor to ensure the patient is not having episodes of intermittent bowel ischemia  - continue Flagyl   - d/c cipro
- Seen on imaging as above  - Given elevated lactate in ED, will continue to monitor to ensure the patient is not having episodes of intermittent bowel ischemia  - continue Flagyl

## 2017-10-30 NOTE — PROGRESS NOTE ADULT - ATTENDING COMMENTS
Patient seen and examined with Dr. Dakota Wilson and Mague Perez on the Family Medicine Teaching Service.  Agree with history, physical, labs and plan which were reviewed in detail.
Patient seen and examined with Dr. Ruben Ahuja, Dakota Wilson, Mague Perez and Kai Hummel on the Family Medicine Teaching Service.  Agree with history, physical, labs and plan which were reviewed in detail.
Patient seen and examined with Dr. Ruben Ahuja, Dakota Wilson, Mague Perez and Kai Hummel on the Family Medicine Teaching Service.  Agree with history, physical, labs and plan which were reviewed in detail.
Patient seen and examined with Dr. Ruben Ahuja, Mague Perez and Kai Hummel on the Family Medicine Teaching Service.  Agree with history, physical, labs and plan which were reviewed in detail.
Patient seen and examined with Dr. Ruben Ahuja and Kai Hummel on the Family Medicine Teaching Service.  Agree with history, physical, labs and plan which were reviewed in detail.

## 2017-10-30 NOTE — PROGRESS NOTE ADULT - PROBLEM SELECTOR PLAN 1
- Most likely acute intermittent porphyria exacerbation  - PCA hydromorphone 0.4mg change to home analgesics: 4mg hydromorphone SubQ q2hrs PRN and PO morphine 230mg q12hr  - Zofran (Q-T =450) for nausea - use ativan instead  - If pt tolerates home pain meds and pain is well controlled, will d/c today

## 2017-10-30 NOTE — DISCHARGE NOTE ADULT - CARE PROVIDER_API CALL
Suman Seay), Medical Oncology  [address] [place] [address]  Phone: [phone]  Fax: [phone]    Molly Mccauley), Neurology; Pain Medicine  [address]  [place] [address]  Phone: [phone]  Fax: [phone]    Boxer, Jonathan A (MD), Internal Medicine  [address] [place] [address]  Phone: [phone]  Fax: [phone]

## 2017-10-30 NOTE — PROGRESS NOTE ADULT - PROBLEM SELECTOR PLAN 6
- Low K throughout hospital stay requiring repletion; K =3.1 today  - Increase intake of potassium rich foods (barring food allergies) like Bananas, green leafy vegetables like spinach, brussel sprouts, avocado, broccoli, zucchini, or dates.  - Take your medications as prescribed.  - Follow up with your primary care doctor, Dr. Boxer.

## 2017-10-30 NOTE — DISCHARGE NOTE ADULT - PATIENT PORTAL LINK FT
“You can access the Follow[facility] Patient Portal, offered by [facility], by registering with the following website: [email or web address]”

## 2017-10-30 NOTE — DISCHARGE NOTE ADULT - CARE PLAN
Principal Discharge DX:	Colitis  Goal:	Resolution of colitis and prevention of future episodes  Instructions for follow-up, activity and diet:	- If you experience blood/mucous in your stool, loose stools with fever, nausea, vomiting, call your doctor or go the the emergency room immediately.  - Take your medications as prescribed.  - Follow up with your primary care doctor, Dr. Boxer  Secondary Diagnosis:	Porphyria  Goal:	Manage disease and associated chronic pain  Instructions for follow-up, activity and diet:	- If you experience extreme pain, decreased appetite, inability to keep food and medication down due to vomiting or nausea, fevers (Temp >100.4F), call your doctor or come to the emergency room   immediately.  - Take your medications as prescribed.  - Follow up with your primary care doctor, Dr. Boxer, your heme/onc doctor, Dr Seay, your pain management doctor, Dr. Mccauley  Secondary Diagnosis:	Anxiety  Goal:	To better manage triggers  Instructions for follow-up, activity and diet:	- If you experience chest pain tightness, shortness of breath, or sweating call your doctor immediately or go to the emergency.  - Take your medications as prescribed.  - Follow up with your primary care doctor  Secondary Diagnosis:	Chronic pain  Goal:	To make pain tolerable  Instructions for follow-up, activity and diet:	- If your pain is unbearable, and affecting your daily activities as well as quality of life, please contact your doctor immediately or go to the emergency room.  - Take your medications as prescribed.  - Follow up with your doctors  Secondary Diagnosis:	Hypokalemia  Goal:	To maintain normal potassium levels  Instructions for follow-up, activity and diet:	- Oral hydration  - Increase intake of potassium rich foods (barring food allergies) like Bananas, green leafy vegetables like spinach, brussel sprouts, avocado, broccoli, zucchini, or date.  - Take your medications as prescribed.  - Follow up with your primary care doctor, Dr. Boxer Principal Discharge DX:	Porphyria, unspecified porphyria type  Goal:	Continued pain management  Instructions for follow-up, activity and diet:	- If you experience extreme pain, decreased appetite, inability to keep food and medication down due to vomiting or nausea, fevers (Temp >100.4F), call your doctor or come to the emergency room   immediately.  - Take your medications as prescribed.  - Follow up with your primary care doctor, Dr. Boxer, your heme/onc doctor, Dr Seay, your pain management doctor, Dr. Mccauley  Secondary Diagnosis:	Anxiety  Goal:	To better manage triggers  Instructions for follow-up, activity and diet:	- If you experience chest pain tightness, shortness of breath, or sweating call your doctor immediately or go to the emergency.  - Take your medications as prescribed.  - Follow up with your primary care doctor  Secondary Diagnosis:	Chronic pain  Goal:	To make pain tolerable  Instructions for follow-up, activity and diet:	- If your pain is unbearable, and affecting your daily activities as well as quality of life, please contact your doctor immediately or go to the emergency room.  - Take your medications as prescribed.  - Follow up with your doctors  Secondary Diagnosis:	Hypokalemia  Goal:	To maintain normal potassium levels  Instructions for follow-up, activity and diet:	- Oral hydration  - Increase intake of potassium rich foods (barring food allergies) like Bananas, green leafy vegetables like spinach, brussel sprouts, avocado, broccoli, zucchini, or date.  - Take your medications as prescribed.  - Follow up with your primary care doctor, Dr. Boxer  Secondary Diagnosis:	Colitis  Goal:	Resolution of colitis and prevention of future episodes  Instructions for follow-up, activity and diet:	-flagyl course completed.  - follow up with your PCP for continued monitoring.

## 2017-10-30 NOTE — DISCHARGE NOTE ADULT - INSTRUCTIONS
- Oral hydration  - Increase intake of potassium rich foods (barring food allergies) like Bananas, green leafy vegetables like spinach, brussel sprouts, avocado, broccoli, zucchini, or date.

## 2017-10-30 NOTE — DISCHARGE NOTE ADULT - MEDICATION SUMMARY - MEDICATIONS TO TAKE
I will START or STAY ON the medications listed below when I get home from the hospital:    HYDROmorphone 2 mg/mL injectable solution  -- 4 milligram(s) subcutaneous every 2 hours, As Needed  -- 2-4 hours prn  -- Indication: For Pain    morphine 12 hour extended release  -- 230 milligram(s) by mouth 2 times a day  -- Indication: For Pain    Ativan 1 mg oral tablet  -- 2 tab(s) by mouth once a day (at bedtime)  -- Indication: For Anxiety    Ativan 1 mg oral tablet  -- 1 tab(s) by mouth 4 times a day, As Needed anxiety  -- Indication: For Anxiety    Zofran 8 mg oral tablet  -- 8 milligram(s) by mouth every 8 hours, As Needed nausea  -- Indication: For Nausea    ProAir HFA 90 mcg/inh inhalation aerosol  -- 2 puff(s) inhaled 4 times a day, As Needed bronchospasm  -- Indication: For Shortness of breath    amLODIPine 5 mg oral tablet  -- 1 tab(s) by mouth once a day  -- Indication: For Hypertension    calcium (as calcium citrate) 250 mg oral tablet  -- 1 tab(s) by mouth 2 times a day  -- Indication: For Vitamin supplement    magnesium oxide 500 mg oral tablet  -- 1 tab(s) by mouth 2 times a day  -- Indication: For Vitamin supplement    NexIUM 40 mg oral delayed release capsule  -- 1 cap(s) by mouth once a day  -- Indication: For GERD    multivitamin  -- 1 tab(s) by mouth once a day  -- Indication: For Health Maintenance    cholecalciferol 1000 intl units oral tablet  -- 1 tab(s) by mouth 2 times a day  -- Indication: For Vitamin

## 2017-10-30 NOTE — DISCHARGE NOTE ADULT - PLAN OF CARE
Resolution of colitis and prevention of future episodes - If you experience blood/mucous in your stool, loose stools with fever, nausea, vomiting, call your doctor or go the the emergency room immediately.  - Take your medications as prescribed.  - Follow up with your primary care doctor, Dr. Boxer Manage disease and associated chronic pain - If you experience extreme pain, decreased appetite, inability to keep food and medication down due to vomiting or nausea, fevers (Temp >100.4F), call your doctor or come to the emergency room   immediately.  - Take your medications as prescribed.  - Follow up with your primary care doctor, Dr. Boxer, your heme/onc doctor, Dr Seay, your pain management doctor, Dr. Mccauley To better manage triggers - If you experience chest pain tightness, shortness of breath, or sweating call your doctor immediately or go to the emergency.  - Take your medications as prescribed.  - Follow up with your primary care doctor To make pain tolerable - If your pain is unbearable, and affecting your daily activities as well as quality of life, please contact your doctor immediately or go to the emergency room.  - Take your medications as prescribed.  - Follow up with your doctors To maintain normal potassium levels - Oral hydration  - Increase intake of potassium rich foods (barring food allergies) like Bananas, green leafy vegetables like spinach, brussel sprouts, avocado, broccoli, zucchini, or date.  - Take your medications as prescribed.  - Follow up with your primary care doctor, Dr. Boxer Continued pain management -flagyl course completed.  - follow up with your PCP for continued monitoring.

## 2017-10-30 NOTE — CHART NOTE - NSCHARTNOTEFT_GEN_A_CORE
RN approached me in person about leaving needle in pt's port for discharge.  Per RN, pt will need to self administer fluids tonight and pt's home nurse will be in pt's home tomorrow (10/31) to remove current needle and insert a new one. per RN, needle can be left in port maximum of 7days and since pt has been at [id number], leaving needle until tomorrow will keep needle in 7day window.  RN and MD confirm with pt at bedside. Nursing student, Olivia at bedside as well, and pt is ok w/nursing student being present for this discussion. Pt confirms that home RN will be at her home 10/31 and without needle in port upon d/c pt cannot self administer fluids tonight.  Discussed risks of infection and other risks associated with leaving the needle in with the pt. Understanding assessed via teach back method. All questions answered.    Discussed w/ Dr. Ahuja.

## 2017-10-30 NOTE — DISCHARGE NOTE ADULT - HOSPITAL COURSE
HPI:  This is a 62-year-old female with a past medical history significant for chronic intermittent porphyria anxiety recently discharged from [facility] in August of this year after gram-positive bacteremia secondary to an infected Mediport in which the organism was MSSA and the patient was discharged on parenteral antibiotics Via Ancef with a midline catheter placed and discharged on August 19 presents today after 2 day history of worsening abdominal pain. Patient has a history of intermittent porphryia and has had multiple hospitalizations for chronic abdominal pain. Patient has been unable to tolerate p.o. In the emergency department she received 1500 cc of IV fluids on admission the patient's lactate was 3.6 after fluid resuscitation improved to 1.0.  During this encounter the patient was in acute distress complaining of severe abdominal pain as well as nausea.  Of note patient had Mediport replaced in september and has been maintained on home IV D5 solution.   CT imaging results were consistent with mild wall thickening involving the sigmoid and colon and rectum possibly reflective of mild colitis. Moderate stool retention several indeterminate bilateral renal lesions measuring 1 cm with no suspicious renal mass found (25 Oct 2017 23:06)    Pt was started on ciprofloxacin and flagyl; cipro was discontinued on day 2; and she is currently day #6 on flagyl. She was also put on a PCA 30cc vial of hydromorphone that dispensed 0.4mg with each push and had a an 8minute waiting interval. Her abdominal has also improved significantly, upon admission, abdominal palpation elicited 10/10 tenderness but today she tolerated with wincing. For the first 4 days of her hospitalization, she could not tolerate PO and hence was on D5NS; She has since been able tolerate PO w/o N/V.  Pt is excited to go home as she is able to tolerate PO pain medication and food. Her K+ needed repletion during her hospital stay. She's been informed and is aware that her PCP must check her electrolytes on her f/u visit. She's also been counseled to consume K+-rich foods as highlighted above.

## 2017-10-30 NOTE — DISCHARGE NOTE ADULT - CARE PROVIDERS DIRECT ADDRESSES
[email or web address]

## 2017-10-30 NOTE — PROGRESS NOTE ADULT - PROBLEM SELECTOR PLAN 5
- Start home meds of MS Contin 200 mg every 12 hours as well as 30 mg every 12 hours when able to tolerate PO  - of note, Patient was also dispensed 900 mL's of 2 mg/mL vials of hydromorphone for 30 day supply  - Continue with bowel regimen
- Start home meds of: 4mg hydromorphone SubQ q2hrs PRN and PO morphine 230mg q12hrs  - of note, Patient was also dispensed 900 mL's of 2 mg/mL vials of hydromorphone for 30 day supply  - Continue with bowel regimen  - D/c home today if tolerates PO home medication
- Start home meds of MS Contin 200 mg every 12 hours as well as 30 mg every 12 hours when able to tolerate PO  - of note, Patient was also dispensed 900 mL's of 2 mg/mL vials of hydromorphone for 30 day supply  - Continue with bowel regimen

## 2017-10-30 NOTE — PROGRESS NOTE ADULT - SUBJECTIVE AND OBJECTIVE BOX
HPI:  This is a 62-year-old female with a past medical history significant for chronic intermittent porphyria anxiety recently discharged from [facility] in August of this year after gram-positive bacteremia secondary to an infected Mediport in which the organism was MSSA and the patient was discharged on parenteral antibiotics Via Ancef with a midline catheter placed and discharged on August 19 presents today after 2 day history of worsening abdominal pain. Patient has a history of intermittent porphryia and has had multiple hospitalizations for chronic abdominal pain. Patient has been unable to tolerate p.o. In the emergency department she received 1500 cc of IV fluids on admission the patient's lactate was 3.6 after fluid resuscitation improved to 1.0.  During this encounter the patient was in acute distress complaining of severe abdominal pain as well as nausea.  Of note patient had Mediport replaced in september and has been maintained on home IV D5 solution.   CT imaging results were consistent with mild wall thickening involving the sigmoid and colon and rectum possibly reflective of mild colitis. Moderate stool retention several indeterminate bilateral renal lesions measuring 1 cm with no suspicious renal mass found (25 Oct 2017 23:06)    SUBJECTIVE: Pt seen and examined at bedside. Reports diffuse abdominal pain that is non radiating 4/10 today,  compared 8/10 upon admission.  Says pain is well controlled with PCA. +Nausea and vomiting.  Reports decreased appetite, however, family member will bring her food from home today. Currently denies any fevers/chills, N/V/D/C, CP, SOB, palpitations, numbness/tingling in extremities. Plan for today discussed understanding assessed via teach back method. All questions answered.    REVIEW OF SYSTEMS:  CONSTITUTIONAL: No weakness, fevers or chills  EYES/ENT: No visual changes;  No vertigo or throat pain   NECK: No pain or stiffness  RESPIRATORY: No cough, wheezing, hemoptysis; No shortness of breath  CARDIOVASCULAR: No chest pain or palpitations  GASTROINTESTINAL: +abdominal. +nausea, no vomiting, or hematemesis; No diarrhea or constipation. No melena or hematochezia.  GENITOURINARY: No dysuria, frequency or hematuria  SKIN: scattered discoloration  All other review of systems is negative unless indicated above    Vital Signs Last 24 Hrs  T(C): 36.6 (30 Oct 2017 12:53), Max: 37 (29 Oct 2017 20:38)  T(F): 97.9 (30 Oct 2017 12:53), Max: 98.6 (29 Oct 2017 20:38)  HR: 93 (30 Oct 2017 12:53) (86 - 99)  BP: 126/83 (30 Oct 2017 12:53) (126/83 - 145/72)  BP(mean): --  RR: 18 (30 Oct 2017 12:53) (16 - 18)  SpO2: 100% (30 Oct 2017 12:53) (98% - 100%)    I&O's Summary    29 Oct 2017 07:01  -  30 Oct 2017 07:00  --------------------------------------------------------  IN: 999 mL / OUT: 0 mL / NET: 999 mL    30 Oct 2017 07:01  -  30 Oct 2017 14:55  --------------------------------------------------------  IN: 240 mL / OUT: 0 mL / NET: 240 mL    PHYSICAL EXAM:  Constitutional: NAD, well-developed, sleeping but easily arousable  HEENT: EOMI, wears hearing aid in R ear, MMM  Neck: Soft and supple  Respiratory: Breath sounds are clear bilaterally, No wheezing, rales or rhonchi  Cardiovascular: S1 and S2, regular rate and rhythm  Gastrointestinal: Bowel Sounds present, soft, extremely tender, nondistended  Extremities: No peripheral edema  Vascular: 2+ peripheral pulses  Neurological: A/O x 3, no focal deficits  Skin: scattered discolorations/macules      MEDICATIONS  (STANDING):  amLODIPine   Tablet 5 milliGRAM(s) Oral daily  dextrose 5% + sodium chloride 0.3%. 1000 milliLiter(s) (100 mL/Hr) IV Continuous <Continuous>  enoxaparin Injectable 40 milliGRAM(s) SubCutaneous every 24 hours  lactobacillus acidophilus 1 Tablet(s) Oral daily  metroNIDAZOLE  IVPB 500 milliGRAM(s) IV Intermittent every 8 hours  morphine ER Tablet 230 milliGRAM(s) Oral every 12 hours  pantoprazole    Tablet 40 milliGRAM(s) Oral two times a day before meals      LABS: All Labs Reviewed:                        11.9   10.8  )-----------( 217      ( 30 Oct 2017 06:53 )             35.9     10-30    140  |  109<H>  |  8   ----------------------------<  104<H>  3.1<L>   |  24  |  0.52    Ca    8.5      30 Oct 2017 06:53      Blood Culture: 10-26 @ 12:55  Specimen Source .Blood Port Device  Culture-Blood --NGTD    10-25 @ 15:43  Specimen Source .Blood   Culture-Blood -- NGTD    10-25 @ 14:51  Specimen Source .Blood Blood-Peripheral  Culture-Blood NGTD    RADIOLOGY/EKG:  CT Abdomen and Pelvis w/ IV Cont (10.25.17 @ 17:50)  IMPRESSION: Mild wall thickening involving the sigmoid colon and rectum   which may reflect a mild colitis. Moderate stool retention is noted.       Status post cholecystectomy.  Several indeterminate BILATERAL renal   lesions measuring 1 cm and less in both kidneys unchanged, too small to   characterize.      DVT PPX: Lovenox HPI: This is a 62-year-old female with a past medical history significant for chronic intermittent porphyria anxiety recently discharged from [facility] in August of this year after gram-positive bacteremia secondary to an infected Mediport in which the organism was MSSA and the patient was discharged on parenteral antibiotics Via Ancef with a midline catheter placed and discharged on August 19 presents today after 2 day history of worsening abdominal pain. Patient has a history of intermittent porphryia and has had multiple hospitalizations for chronic abdominal pain. Patient has been unable to tolerate p.o. In the emergency department she received 1500 cc of IV fluids on admission the patient's lactate was 3.6 after fluid resuscitation improved to 1.0.  During this encounter the patient was in acute distress complaining of severe abdominal pain as well as nausea.  Of note patient had Mediport replaced in september and has been maintained on home IV D5 solution.   CT imaging results were consistent with mild wall thickening involving the sigmoid and colon and rectum possibly reflective of mild colitis. Moderate stool retention several indeterminate bilateral renal lesions measuring 1 cm with no suspicious renal mass found (25 Oct 2017 23:06)    SUBJECTIVE: Pt seen and examined at bedside. Reports significant improvement of diffuse abdominal pain that is non radiating to be 4/10 in intensity today;  compared 8/10 upon admission.  Says pain is well controlled with PCA and wants to go home if she is able to tolerate PO analgesics. Improvement of Nausea and vomiting because she is able to tolerate PO so far. Currently denies any fevers/chills, N/V/D/C, CP, SOB, palpitations, numbness/tingling in extremities. Plan for today discussed understanding assessed via teach back method. All questions answered.    REVIEW OF SYSTEMS:  CONSTITUTIONAL: No weakness, fevers or chills  EYES/ENT: No visual changes;  No vertigo or throat pain   NECK: No pain or stiffness  RESPIRATORY: No cough, wheezing, hemoptysis; No shortness of breath  CARDIOVASCULAR: No chest pain or palpitations  GASTROINTESTINAL: +abdominal. +nausea, no vomiting, or hematemesis; No diarrhea or constipation. No melena or hematochezia.  GENITOURINARY: No dysuria, frequency or hematuria  SKIN: scattered discoloration  All other review of systems is negative unless indicated above    Vital Signs Last 24 Hrs  T(C): 36.6 (30 Oct 2017 12:53), Max: 37 (29 Oct 2017 20:38)  T(F): 97.9 (30 Oct 2017 12:53), Max: 98.6 (29 Oct 2017 20:38)  HR: 93 (30 Oct 2017 12:53) (86 - 99)  BP: 126/83 (30 Oct 2017 12:53) (126/83 - 145/72)  BP(mean): --  RR: 18 (30 Oct 2017 12:53) (16 - 18)  SpO2: 100% (30 Oct 2017 12:53) (98% - 100%)    I&O's Summary    29 Oct 2017 07:01  -  30 Oct 2017 07:00  --------------------------------------------------------  IN: 999 mL / OUT: 0 mL / NET: 999 mL    30 Oct 2017 07:01  -  30 Oct 2017 14:55  --------------------------------------------------------  IN: 240 mL / OUT: 0 mL / NET: 240 mL    PHYSICAL EXAM:  Constitutional: NAD, well-developed, sleeping but easily arousable  HEENT: EOMI, wears hearing aid in R ear, MMM  Neck: Soft and supple  Respiratory: Breath sounds are clear bilaterally, No wheezing, rales or rhonchi  Cardiovascular: S1 and S2, regular rate and rhythm  Gastrointestinal: Bowel Sounds present, soft, extremely tender, nondistended  Extremities: No peripheral edema  Vascular: 2+ peripheral pulses  Neurological: A/O x 3, no focal deficits  Skin: scattered discolorations/macules    MEDICATIONS  (STANDING):  amLODIPine   Tablet 5 milliGRAM(s) Oral daily  dextrose 5% + sodium chloride 0.3%. 1000 milliLiter(s) (100 mL/Hr) IV Continuous <Continuous>  enoxaparin Injectable 40 milliGRAM(s) SubCutaneous every 24 hours  lactobacillus acidophilus 1 Tablet(s) Oral daily  metroNIDAZOLE  IVPB 500 milliGRAM(s) IV Intermittent every 8 hours  morphine ER Tablet 230 milliGRAM(s) Oral every 12 hours  pantoprazole    Tablet 40 milliGRAM(s) Oral two times a day before meals    LABS: All Labs Reviewed:                        11.9   10.8  )-----------( 217      ( 30 Oct 2017 06:53 )             35.9     10-30    140  |  109<H>  |  8   ----------------------------<  104<H>  3.1<L>   |  24  |  0.52    Ca    8.5      30 Oct 2017 06:53      Blood Culture: 10-26 @ 12:55  Specimen Source .Blood Port Device  Culture-Blood --NGTD    10-25 @ 15:43  Specimen Source .Blood   Culture-Blood -- NGTD    10-25 @ 14:51  Specimen Source .Blood Blood-Peripheral  Culture-Blood NGTD    RADIOLOGY/EKG:  CT Abdomen and Pelvis w/ IV Cont (10.25.17 @ 17:50)  IMPRESSION: Mild wall thickening involving the sigmoid colon and rectum   which may reflect a mild colitis. Moderate stool retention is noted.       Status post cholecystectomy.  Several indeterminate BILATERAL renal   lesions measuring 1 cm and less in both kidneys unchanged, too small to   characterize.      DVT PPX: Lovenox

## 2017-10-30 NOTE — PROGRESS NOTE ADULT - PROBLEM SELECTOR PLAN 4
- Continue Ativan 1 mg every 4 hours

## 2017-10-30 NOTE — PROGRESS NOTE ADULT - PROBLEM SELECTOR PROBLEM 1
Chronic abdominal pain

## 2017-10-31 LAB
CULTURE RESULTS: SIGNIFICANT CHANGE UP
SPECIMEN SOURCE: SIGNIFICANT CHANGE UP

## 2017-11-03 DIAGNOSIS — E80.21 ACUTE INTERMITTENT (HEPATIC) PORPHYRIA: ICD-10-CM

## 2017-11-03 DIAGNOSIS — Z88.8 ALLERGY STATUS TO OTHER DRUGS, MEDICAMENTS AND BIOLOGICAL SUBSTANCES STATUS: ICD-10-CM

## 2017-11-03 DIAGNOSIS — R10.9 UNSPECIFIED ABDOMINAL PAIN: ICD-10-CM

## 2017-11-03 DIAGNOSIS — K52.9 NONINFECTIVE GASTROENTERITIS AND COLITIS, UNSPECIFIED: ICD-10-CM

## 2017-11-03 DIAGNOSIS — Z79.899 OTHER LONG TERM (CURRENT) DRUG THERAPY: ICD-10-CM

## 2017-11-03 DIAGNOSIS — F41.9 ANXIETY DISORDER, UNSPECIFIED: ICD-10-CM

## 2018-03-07 NOTE — PATIENT PROFILE ADULT. - MEDICATIONS BROUGHT TO HOSPITAL, PROFILE
After Visit Summary   3/7/2018    Angela Nj    MRN: [id number]           Patient Information     Date Of Birth          2015        Visit Information        Provider Department      3/7/2018 5:10 PM Emily Carranza PA-C [facility]        Today's Diagnoses     Candidal intertrigo    -  1    Infantile eczema          Care Instructions      Candida Skin Infection (Child)  Candida is type of yeast. It grows naturally on the skin and in the mouth. If it grows out of control, it can cause an infection. Candida can cause infections in the genital area, mouth, and skin folds. Any child can get this infection. It s more common in a child who has a weakened immune system or who has been on antibiotic therapy. It s also more common in a child who is overweight.  Candida causes the skin to become bright red and inflamed. The skin may have small bumps. The border of the infected part of the skin is often raised. The infection causes pain and itching. Sometimes the skin peels and bleeds.  A Candida rash is most often treated with an antifungal cream or ointment. The rash will clear a few days after starting the medicine. Infections that don t go away may need a prescription medicine. In rare cases, a bacterial infection can also occur.  Home care  Your child s healthcare provider will recommend an antifungal cream or ointment for the rash. He or she may also prescribe a medicine for the itch. Follow all instructions for giving these medicines to your child.  General care  For children who wear diapers:    Change your child s diaper as soon as it is soiled. Always change the diaper at least once at night. Put the diaper on loosely.    Gently pat the area clean with a warm, wet, soft cloth. Dried stool can be loosened by squeezing warm water on the area or adding a few drops of mineral oil. If you use soap, it should be gentle and scent-free.    Allow your child to go without  a diaper for periods of time. Exposing the skin to air will help it to heal. Don t use a hair dryer or heat lamp on your child s skin. These can cause skin burns.    Use a breathable cover for cloth diapers instead of rubber pants. Slit the elastic legs or cover of a disposable diaper in a few places. This will allow air to reach your child s skin.    Don t use powders such as talc or cornstarch. Talc is harmful to a child s lungs. Cornstarch can cause the Candida infection to get worse.    Wash your hands well with soap and warm water before and after changing your child s diaper.  For children who don t wear diapers:    Make sure your child wears clean, loose cotton underwear and pants every day.    Make sure your child changes out of a wet bathing suit right away.    Help your child keep his or her genital area clean and dry after using the toilet. Try to prevent your child from scratching the area.    Have your child wash his or her hands well with warm water and soap after using the toilet and before eating.    Wash your hands well with warm water and soap after caring for your child. This helps prevent the spread of infection.  Follow-up care  Follow up with your child s healthcare provider, or as advised. The time it takes the skin to heal varies with the severity of the infection. Candida infections in young children that come back or don t go away may be a sign of another medical problem.  When to seek medical advice  Call your child's healthcare provider right away if any of these occur:    Fever of 100.4 F (38 C) or higher, or as directed by your child's healthcare provider    Redness and swelling that gets worse    Foul-smelling fluid coming from the skin    Pain that gets worse    Rash doesn't get better after treatment  Date Last Reviewed: 2017 2000-2017 [employer]. [address] [place] [address], [place] [address]. All rights reserved. This information is not intended as a substitute for  professional medical care. Always follow your healthcare professional's instructions.        Managing Atopic Dermatitis (Eczema)     After bathing, gently pat your skin dry (don t rub). Apply moisturizer while your skin is still damp.   To manage your symptoms and help reduce the severity and frequency, try these self-care tips:  Caring for your skin    Use a gentle, fragrance-free cleanser (or nonsoap cleanser) for bathing. Rinse well. Pat skin dry.    Take warm, not hot, baths or showers. Try to limit them to no more that 10 to 15 minutes.     Use moisturizer liberally right after you bathe, while your skin is still damp.    Avoid scratching because it will cause more damage to your skin.     Topical, over-the-counter hydrocortisone cream may help control mild symptoms.   Controlling your environment    Avoid extreme heat or cold.    Avoid very humid or very dry air.    If your home or office air is very dry, use a humidifier.    Avoid allergens, such as dust, that may be present in bedding, carpets, plush toys, or rugs.    Know that pet hair and dander can cause flare-ups.  Seeking medical treatment  Another way to keep symptoms under control is to seek medical treatment. Talk with your healthcare provider about the type of treatment that may work best for you. Your provider may prescribe treatments such as the following:    Topical treatments to put on the skin daily    Medicines taken by mouth (oral medicines), such as antihistamines, antibiotics, or corticosteroids    In severe cases shots (injections) may be needed to control the symptoms. You may even need antibiotics if skin infections occur.  Treatments don t work the same way for every person. So if your symptoms continue or get worse, ask your healthcare provider about other treatments.  Making lifestyle choices    Manage the stress in your life.    Wear loose-fitting cotton clothing that does not bind or rub your skin.    Avoid contact with wool or  other scratchy fabrics.    Use fragrance-free products.  Getting good results  Now that you know more about atopic dermatitis, the next step is up to you. Follow your healthcare provider s treatment plan and your self-care routine. This will help bring atopic dermatitis under control. If your symptoms persist, be sure to let your health care provider know.   Date Last Reviewed: 2017 2000-2017 [employer]. [address] [place] [address], [place] [address]. All rights reserved. This information is not intended as a substitute for professional medical care. Always follow your healthcare professional's instructions.                Follow-ups after your visit        Who to contact     If you have questions or need follow up information about today's clinic visit or your schedule please contact [facility] directly at [phone].  Normal or non-critical lab and imaging results will be communicated to you by [employer], letter or phone within 4 business days after the clinic has received the results. If you do not hear from us within 7 days, please contact the clinic through [employer] or phone. If you have a critical or abnormal lab result, we will notify you by phone as soon as possible.  Submit refill requests through [employer] or call your pharmacy and they will forward the refill request to us. Please allow 3 business days for your refill to be completed.          Additional Information About Your Visit        [employer] Digital Information     [employer] lets you send messages to your doctor, view your test results, renew your prescriptions, schedule appointments and more. To sign up, go to [email or web address], contact your [place] clinic or call [phone] during business hours.            Care EveryWhere ID     This is your Care EveryWhere ID. This could be used by other organizations to access your [place] medical records  [id number]        Your Vitals Were     Pulse Temperature Pulse  Oximetry             124 97.4  F (36.3  C) (Axillary) 100%          Blood Pressure from Last 3 Encounters:   No data found for BP    Weight from Last 3 Encounters:   01/29/18 11 lb 14 oz (5.386 kg) (58 %)*   12/29/17 10 lb 11.5 oz (4.862 kg) (80 %)*   12/13/17 9 lb 8 oz (4.309 kg) (80 %)*     * Growth percentiles are based on WHO (Girls, 0-2 years) data.              Today, you had the following     No orders found for display         Today's Medication Changes          These changes are accurate as of 3/7/18  5:36 PM.  If you have any questions, ask your nurse or doctor.               Start taking these medicines.        Dose/Directions    hydrocortisone 1 % cream   Commonly known as:  CORTAID   Used for:  Infantile eczema   Started by:  Emily Carranza PA-C        Apply sparingly to affected area three times daily for 14 days. Use on face and arms   Quantity:  20 g   Refills:  0       nystatin cream   Commonly known as:  MYCOSTATIN   Used for:  Candidal intertrigo   Started by:  Emily Carranza PA-C        Apply topically 3 times daily for 14 days Use for the rash on the neck   Quantity:  15 g   Refills:  1         Stop taking these medicines if you haven't already. Please contact your care team if you have questions.     acetaminophen 32 mg/mL solution   Commonly known as:  TYLENOL   Stopped by:  Emily Carranza PA-C           nystatin 488300 UNIT/ML suspension   Commonly known as:  MYCOSTATIN   Stopped by:  Emily Carranza PA-C                Where to get your medicines      These medications were sent to [facility] - [place], [place] - [address]  [address], [place] [address]     Phone:  [phone]     hydrocortisone 1 % cream    nystatin cream                Primary Care Provider Office Phone # Fax #    Dia Gutierrez [phone] [address] [facility] [address]        Equal Access to Services     FÁTIMA FALL: Momo  harshad Cabral, carlos seymour, qayazminta kalyn marniecee, waxmagen bakari peñalorenaantonio david delfino. So [facility] [phone].    ATENCIÓN: Si habla español, tiene a ary disposición servicios gratuitos de asistencia lingüística. Mario Albertoame al [phone].    We comply with applicable federal civil rights laws and [place] laws. We do not discriminate on the basis of race, color, national origin, age, disability, sex, sexual orientation, or gender identity.            Thank you!     Thank you for choosing [facility]  for your care. Our goal is always to provide you with excellent care. Hearing back from our patients is one way we can continue to improve our services. Please take a few minutes to complete the written survey that you may receive in the mail after your visit with us. Thank you!             Your Updated Medication List - Protect others around you: Learn how to safely use, store and throw away your medicines at www.disposemymeds.org.          This list is accurate as of 3/7/18  5:36 PM.  Always use your most recent med list.                   Brand Name Dispense Instructions for use Diagnosis    hydrocortisone 1 % cream    CORTAID    20 g    Apply sparingly to affected area three times daily for 14 days. Use on face and arms    Infantile eczema       nystatin cream    MYCOSTATIN    15 g    Apply topically 3 times daily for 14 days Use for the rash on the neck    Candidal intertrigo          no

## 2018-04-17 ENCOUNTER — OUTPATIENT (OUTPATIENT)
Dept: OUTPATIENT SERVICES | Facility: HOSPITAL | Age: 63
LOS: 1 days | Discharge: HOME | End: 2018-04-17
Payer: MEDICARE

## 2018-04-17 DIAGNOSIS — G90.50 COMPLEX REGIONAL PAIN SYNDROME I, UNSPECIFIED: ICD-10-CM

## 2018-04-17 DIAGNOSIS — Z90.89 ACQUIRED ABSENCE OF OTHER ORGANS: Chronic | ICD-10-CM

## 2018-04-17 DIAGNOSIS — Z90.49 ACQUIRED ABSENCE OF OTHER SPECIFIED PARTS OF DIGESTIVE TRACT: Chronic | ICD-10-CM

## 2018-04-17 DIAGNOSIS — Z98.1 ARTHRODESIS STATUS: Chronic | ICD-10-CM

## 2018-04-17 PROCEDURE — 36598 INJ W/FLUOR EVAL CV DEVICE: CPT

## 2018-04-17 NOTE — BRIEF OPERATIVE NOTE - PROCEDURE
<<-----Click on this checkbox to enter Procedure Assessment, catheter, central venous, tunneled, with port, by radiologist  04/17/2018  easily accessed, good blood return, normal dye study  Active  DAXELROD

## 2018-05-08 NOTE — DISCHARGE NOTE ADULT - HAS THE PATIENT RECEIVED THE INFLUENZA VACCINE DURING THIS VISIT
Test Reason : 

Blood Pressure : ***/*** mmHG

Vent. Rate : 076 BPM     Atrial Rate : 076 BPM

   P-R Int : 162 ms          QRS Dur : 088 ms

    QT Int : 374 ms       P-R-T Axes : 079 071 054 degrees

   QTc Int : 420 ms

 

NORMAL SINUS RHYTHM

NORMAL ECG

WHEN COMPARED WITH ECG OF 08-MAY-2017 20:14,

NO SIGNIFICANT CHANGE WAS FOUND

Confirmed by MD Nguyen Edward ([id number]) on 5/8/2018 9:51:31 AM

 

Referred By:             Confirmed By:Mohan Nguyen MD No

## 2020-04-17 NOTE — ED PROVIDER NOTE - GASTROINTESTINAL [-], MLM
Mode of arrival:  Medic 39      [address], RN  04/17/20 3701 no nausea

## 2020-08-27 NOTE — DISCHARGE NOTE ADULT - FUNCTIONAL SCREEN CURRENT LEVEL: AMBULATION, MLM
Quality 431: Preventive Care And Screening: Unhealthy Alcohol Use - Screening: Patient screened for unhealthy alcohol use using a single question and scores less than 2 times per year Quality 226: Preventive Care And Screening: Tobacco Use: Screening And Cessation Intervention: Patient screened for tobacco use and is an ex/non-smoker Quality 130: Documentation Of Current Medications In The Medical Record: Current Medications Documented Detail Level: Detailed (0) independent

## 2020-12-22 ENCOUNTER — INPATIENT (INPATIENT)
Facility: HOSPITAL | Age: 65
LOS: 14 days | Discharge: HOME HEALTH | DRG: 642 | End: 2021-01-06
Attending: FAMILY MEDICINE | Admitting: INTERNAL MEDICINE
Payer: MEDICARE

## 2020-12-22 VITALS
HEART RATE: 73 BPM | OXYGEN SATURATION: 98 % | TEMPERATURE: 98 F | DIASTOLIC BLOOD PRESSURE: 86 MMHG | HEIGHT: 66 IN | WEIGHT: 149.91 LBS | SYSTOLIC BLOOD PRESSURE: 163 MMHG | RESPIRATION RATE: 18 BRPM

## 2020-12-22 DIAGNOSIS — Z98.1 ARTHRODESIS STATUS: Chronic | ICD-10-CM

## 2020-12-22 DIAGNOSIS — Z90.89 ACQUIRED ABSENCE OF OTHER ORGANS: Chronic | ICD-10-CM

## 2020-12-22 DIAGNOSIS — Z90.49 ACQUIRED ABSENCE OF OTHER SPECIFIED PARTS OF DIGESTIVE TRACT: Chronic | ICD-10-CM

## 2020-12-22 LAB
ALBUMIN SERPL ELPH-MCNC: 3.5 G/DL — SIGNIFICANT CHANGE UP (ref 3.3–5)
ALP SERPL-CCNC: 73 U/L — SIGNIFICANT CHANGE UP (ref 40–120)
ALT FLD-CCNC: 21 U/L — SIGNIFICANT CHANGE UP (ref 12–78)
ANION GAP SERPL CALC-SCNC: 7 MMOL/L — SIGNIFICANT CHANGE UP (ref 5–17)
APTT BLD: 32.3 SEC — SIGNIFICANT CHANGE UP (ref 27.5–35.5)
AST SERPL-CCNC: 19 U/L — SIGNIFICANT CHANGE UP (ref 15–37)
BASOPHILS # BLD AUTO: 0.02 K/UL — SIGNIFICANT CHANGE UP (ref 0–0.2)
BASOPHILS NFR BLD AUTO: 0.5 % — SIGNIFICANT CHANGE UP (ref 0–2)
BILIRUB SERPL-MCNC: 0.5 MG/DL — SIGNIFICANT CHANGE UP (ref 0.2–1.2)
BUN SERPL-MCNC: 3 MG/DL — LOW (ref 7–23)
CALCIUM SERPL-MCNC: 9.1 MG/DL — SIGNIFICANT CHANGE UP (ref 8.5–10.1)
CHLORIDE SERPL-SCNC: 111 MMOL/L — HIGH (ref 96–108)
CO2 SERPL-SCNC: 25 MMOL/L — SIGNIFICANT CHANGE UP (ref 22–31)
CREAT SERPL-MCNC: 0.53 MG/DL — SIGNIFICANT CHANGE UP (ref 0.5–1.3)
EOSINOPHIL # BLD AUTO: 0 K/UL — SIGNIFICANT CHANGE UP (ref 0–0.5)
EOSINOPHIL NFR BLD AUTO: 0 % — SIGNIFICANT CHANGE UP (ref 0–6)
GLUCOSE SERPL-MCNC: 131 MG/DL — HIGH (ref 70–99)
HCT VFR BLD CALC: 31.6 % — LOW (ref 34.5–45)
HGB BLD-MCNC: 8.8 G/DL — LOW (ref 11.5–15.5)
IMM GRANULOCYTES NFR BLD AUTO: 0.5 % — SIGNIFICANT CHANGE UP (ref 0–1.5)
INR BLD: 1.1 RATIO — SIGNIFICANT CHANGE UP (ref 0.88–1.16)
LACTATE SERPL-SCNC: 1.3 MMOL/L — SIGNIFICANT CHANGE UP (ref 0.7–2)
LIDOCAIN IGE QN: 54 U/L — LOW (ref 73–393)
LYMPHOCYTES # BLD AUTO: 1.01 K/UL — SIGNIFICANT CHANGE UP (ref 1–3.3)
LYMPHOCYTES # BLD AUTO: 24.7 % — SIGNIFICANT CHANGE UP (ref 13–44)
MCHC RBC-ENTMCNC: 18.8 PG — LOW (ref 27–34)
MCHC RBC-ENTMCNC: 27.8 GM/DL — LOW (ref 32–36)
MCV RBC AUTO: 67.5 FL — LOW (ref 80–100)
MONOCYTES # BLD AUTO: 0.27 K/UL — SIGNIFICANT CHANGE UP (ref 0–0.9)
MONOCYTES NFR BLD AUTO: 6.6 % — SIGNIFICANT CHANGE UP (ref 2–14)
NEUTROPHILS # BLD AUTO: 2.77 K/UL — SIGNIFICANT CHANGE UP (ref 1.8–7.4)
NEUTROPHILS NFR BLD AUTO: 67.7 % — SIGNIFICANT CHANGE UP (ref 43–77)
PLATELET # BLD AUTO: 307 K/UL — SIGNIFICANT CHANGE UP (ref 150–400)
POTASSIUM SERPL-MCNC: 3.3 MMOL/L — LOW (ref 3.5–5.3)
POTASSIUM SERPL-SCNC: 3.3 MMOL/L — LOW (ref 3.5–5.3)
PROT SERPL-MCNC: 7.7 GM/DL — SIGNIFICANT CHANGE UP (ref 6–8.3)
PROTHROM AB SERPL-ACNC: 12.8 SEC — SIGNIFICANT CHANGE UP (ref 10.6–13.6)
RBC # BLD: 4.68 M/UL — SIGNIFICANT CHANGE UP (ref 3.8–5.2)
RBC # FLD: 19.5 % — HIGH (ref 10.3–14.5)
SODIUM SERPL-SCNC: 143 MMOL/L — SIGNIFICANT CHANGE UP (ref 135–145)
TROPONIN I SERPL-MCNC: <0.015 NG/ML — SIGNIFICANT CHANGE UP (ref 0.01–0.04)
WBC # BLD: 4.09 K/UL — SIGNIFICANT CHANGE UP (ref 3.8–10.5)
WBC # FLD AUTO: 4.09 K/UL — SIGNIFICANT CHANGE UP (ref 3.8–10.5)

## 2020-12-22 PROCEDURE — 70450 CT HEAD/BRAIN W/O DYE: CPT | Mod: 26

## 2020-12-22 PROCEDURE — 71045 X-RAY EXAM CHEST 1 VIEW: CPT | Mod: 26

## 2020-12-22 RX ORDER — HYDROMORPHONE HYDROCHLORIDE 2 MG/ML
1 INJECTION INTRAMUSCULAR; INTRAVENOUS; SUBCUTANEOUS ONCE
Refills: 0 | Status: DISCONTINUED | OUTPATIENT
Start: 2020-12-22 | End: 2020-12-22

## 2020-12-22 RX ORDER — ONDANSETRON 8 MG/1
4 TABLET, FILM COATED ORAL ONCE
Refills: 0 | Status: COMPLETED | OUTPATIENT
Start: 2020-12-22 | End: 2020-12-22

## 2020-12-22 RX ORDER — SODIUM CHLORIDE 9 MG/ML
1000 INJECTION INTRAMUSCULAR; INTRAVENOUS; SUBCUTANEOUS ONCE
Refills: 0 | Status: COMPLETED | OUTPATIENT
Start: 2020-12-22 | End: 2020-12-22

## 2020-12-22 RX ADMIN — HYDROMORPHONE HYDROCHLORIDE 1 MILLIGRAM(S): 2 INJECTION INTRAMUSCULAR; INTRAVENOUS; SUBCUTANEOUS at 23:20

## 2020-12-22 RX ADMIN — HYDROMORPHONE HYDROCHLORIDE 1 MILLIGRAM(S): 2 INJECTION INTRAMUSCULAR; INTRAVENOUS; SUBCUTANEOUS at 23:42

## 2020-12-22 RX ADMIN — SODIUM CHLORIDE 1000 MILLILITER(S): 9 INJECTION INTRAMUSCULAR; INTRAVENOUS; SUBCUTANEOUS at 22:39

## 2020-12-22 RX ADMIN — SODIUM CHLORIDE 1000 MILLILITER(S): 9 INJECTION INTRAMUSCULAR; INTRAVENOUS; SUBCUTANEOUS at 23:39

## 2020-12-22 RX ADMIN — ONDANSETRON 4 MILLIGRAM(S): 8 TABLET, FILM COATED ORAL at 23:12

## 2020-12-22 RX ADMIN — HYDROMORPHONE HYDROCHLORIDE 1 MILLIGRAM(S): 2 INJECTION INTRAMUSCULAR; INTRAVENOUS; SUBCUTANEOUS at 23:12

## 2020-12-22 RX ADMIN — HYDROMORPHONE HYDROCHLORIDE 1 MILLIGRAM(S): 2 INJECTION INTRAMUSCULAR; INTRAVENOUS; SUBCUTANEOUS at 23:50

## 2020-12-22 NOTE — ED PROVIDER NOTE - UNABLE TO OBTAIN
Severe Illness/Injury ROS unobtainable secondary to difficulty communicating Complete history limited secondary to difficulty communicating

## 2020-12-22 NOTE — ED ADULT TRIAGE NOTE - CHIEF COMPLAINT QUOTE
patient sent in by Dr. Seay for increase ABD pain, N/V and possible change of mental status, reported by sister.   pt has hx of Prophyria and [place].  Dr. Seay requesting labs, IVF, pain control, head CT and admission to [address] [place] [address].  patient states she is having worsening ABD pain. patient sent in by Dr. Seay for increase ABD pain, N/V and possible change of mental status over the past few day, reported by sister.   pt has hx of Prophyria and [place].  Dr. Seay requesting labs, IVF, pain control, head CT and admission to [address] [place] [address].  patient states she is having worsening ABD pain.

## 2020-12-22 NOTE — ED PROVIDER NOTE - OBJECTIVE STATEMENT
66 y/o female with a PMHx of staph infection, chronic abdominal pain, osteoporosis, adrenal insufficiency, porphyria, [place], s/p cholecystectomy, s/p spinal fusion, presents to the ED BIBEMS c/o worsening abdominal pain, nausea, and vomiting x2-3 days. As per EMS, pt's sister reports pt with ?change in mental status over the past few days. Pt sent to the ED by Dr. Seay for worsening abdominal pain, nausea, and vomiting and Dr. Seay requesting labs, IVF, pain control, head CT, and admission to [address] [place] [address]. No other complaints at this time. Allergies: Chlorhexidine containing compounds. PCP: Dr. Jonathan Boxer. Complete history limited secondary to difficulty communicating.

## 2020-12-22 NOTE — ED PROVIDER NOTE - PROGRESS NOTE DETAILS
Spoke to Dr. Higuera who recommended only pain meds, hydration and glucose. D10 has been ordered. No hemin at this time. Manuel CROWDER

## 2020-12-23 DIAGNOSIS — E80.21 ACUTE INTERMITTENT (HEPATIC) PORPHYRIA: ICD-10-CM

## 2020-12-23 PROBLEM — B95.8 UNSPECIFIED STAPHYLOCOCCUS AS THE CAUSE OF DISEASES CLASSIFIED ELSEWHERE: Chronic | Status: ACTIVE | Noted: 2017-09-19

## 2020-12-23 PROBLEM — M25.469 EFFUSION, UNSPECIFIED KNEE: Chronic | Status: ACTIVE | Noted: 2017-06-02

## 2020-12-23 PROBLEM — E80.20 UNSPECIFIED PORPHYRIA: Chronic | Status: ACTIVE | Noted: 2017-06-01

## 2020-12-23 PROBLEM — R10.9 UNSPECIFIED ABDOMINAL PAIN: Chronic | Status: ACTIVE | Noted: 2017-06-06

## 2020-12-23 PROBLEM — E27.1 PRIMARY ADRENOCORTICAL INSUFFICIENCY: Chronic | Status: ACTIVE | Noted: 2017-06-02

## 2020-12-23 PROBLEM — M81.0 AGE-RELATED OSTEOPOROSIS WITHOUT CURRENT PATHOLOGICAL FRACTURE: Chronic | Status: ACTIVE | Noted: 2017-06-02

## 2020-12-23 LAB
ADD ON TEST-SPECIMEN IN LAB: SIGNIFICANT CHANGE UP
APPEARANCE UR: CLEAR — SIGNIFICANT CHANGE UP
BILIRUB UR-MCNC: NEGATIVE — SIGNIFICANT CHANGE UP
COLOR SPEC: YELLOW — SIGNIFICANT CHANGE UP
DIFF PNL FLD: NEGATIVE — SIGNIFICANT CHANGE UP
GLUCOSE UR QL: NEGATIVE MG/DL — SIGNIFICANT CHANGE UP
KETONES UR-MCNC: ABNORMAL
LEUKOCYTE ESTERASE UR-ACNC: NEGATIVE — SIGNIFICANT CHANGE UP
NITRITE UR-MCNC: NEGATIVE — SIGNIFICANT CHANGE UP
PH UR: 8 — SIGNIFICANT CHANGE UP (ref 5–8)
PROT UR-MCNC: NEGATIVE MG/DL — SIGNIFICANT CHANGE UP
SARS-COV-2 RNA SPEC QL NAA+PROBE: SIGNIFICANT CHANGE UP
SP GR SPEC: 1.01 — SIGNIFICANT CHANGE UP (ref 1.01–1.02)
UROBILINOGEN FLD QL: NEGATIVE MG/DL — SIGNIFICANT CHANGE UP

## 2020-12-23 PROCEDURE — 84110 ASSAY OF PORPHOBILINOGEN: CPT

## 2020-12-23 PROCEDURE — 85018 HEMOGLOBIN: CPT

## 2020-12-23 PROCEDURE — 83690 ASSAY OF LIPASE: CPT

## 2020-12-23 PROCEDURE — 86900 BLOOD TYPING SEROLOGIC ABO: CPT

## 2020-12-23 PROCEDURE — 99223 1ST HOSP IP/OBS HIGH 75: CPT

## 2020-12-23 PROCEDURE — 36415 COLL VENOUS BLD VENIPUNCTURE: CPT

## 2020-12-23 PROCEDURE — 84120 ASSAY OF URINE PORPHYRINS: CPT

## 2020-12-23 PROCEDURE — 97162 PT EVAL MOD COMPLEX 30 MIN: CPT | Mod: GP

## 2020-12-23 PROCEDURE — P9016: CPT

## 2020-12-23 PROCEDURE — 97116 GAIT TRAINING THERAPY: CPT | Mod: GP

## 2020-12-23 PROCEDURE — C9113: CPT

## 2020-12-23 PROCEDURE — 93010 ELECTROCARDIOGRAM REPORT: CPT

## 2020-12-23 PROCEDURE — 83550 IRON BINDING TEST: CPT

## 2020-12-23 PROCEDURE — 86901 BLOOD TYPING SEROLOGIC RH(D): CPT

## 2020-12-23 PROCEDURE — 82746 ASSAY OF FOLIC ACID SERUM: CPT

## 2020-12-23 PROCEDURE — 97530 THERAPEUTIC ACTIVITIES: CPT | Mod: GP

## 2020-12-23 PROCEDURE — 82607 VITAMIN B-12: CPT

## 2020-12-23 PROCEDURE — 84145 PROCALCITONIN (PCT): CPT

## 2020-12-23 PROCEDURE — 86850 RBC ANTIBODY SCREEN: CPT

## 2020-12-23 PROCEDURE — 85027 COMPLETE CBC AUTOMATED: CPT

## 2020-12-23 PROCEDURE — 88312 SPECIAL STAINS GROUP 1: CPT

## 2020-12-23 PROCEDURE — 80053 COMPREHEN METABOLIC PANEL: CPT

## 2020-12-23 PROCEDURE — 85014 HEMATOCRIT: CPT

## 2020-12-23 PROCEDURE — 82533 TOTAL CORTISOL: CPT

## 2020-12-23 PROCEDURE — 80048 BASIC METABOLIC PNL TOTAL CA: CPT

## 2020-12-23 PROCEDURE — 87507 IADNA-DNA/RNA PROBE TQ 12-25: CPT

## 2020-12-23 PROCEDURE — 86769 SARS-COV-2 COVID-19 ANTIBODY: CPT

## 2020-12-23 PROCEDURE — 82135 ASSAY AMINOLEVULINIC ACID: CPT

## 2020-12-23 PROCEDURE — 84100 ASSAY OF PHOSPHORUS: CPT

## 2020-12-23 PROCEDURE — 83540 ASSAY OF IRON: CPT

## 2020-12-23 PROCEDURE — 86803 HEPATITIS C AB TEST: CPT

## 2020-12-23 PROCEDURE — 82140 ASSAY OF AMMONIA: CPT

## 2020-12-23 PROCEDURE — 36430 TRANSFUSION BLD/BLD COMPNT: CPT

## 2020-12-23 PROCEDURE — 83735 ASSAY OF MAGNESIUM: CPT

## 2020-12-23 PROCEDURE — 88305 TISSUE EXAM BY PATHOLOGIST: CPT

## 2020-12-23 PROCEDURE — 85025 COMPLETE CBC W/AUTO DIFF WBC: CPT

## 2020-12-23 PROCEDURE — 74177 CT ABD & PELVIS W/CONTRAST: CPT | Mod: 26

## 2020-12-23 PROCEDURE — 87493 C DIFF AMPLIFIED PROBE: CPT

## 2020-12-23 PROCEDURE — 82272 OCCULT BLD FECES 1-3 TESTS: CPT

## 2020-12-23 PROCEDURE — 86923 COMPATIBILITY TEST ELECTRIC: CPT

## 2020-12-23 PROCEDURE — 87040 BLOOD CULTURE FOR BACTERIA: CPT

## 2020-12-23 PROCEDURE — 82728 ASSAY OF FERRITIN: CPT

## 2020-12-23 RX ORDER — NALOXONE HYDROCHLORIDE 4 MG/.1ML
0.1 SPRAY NASAL
Refills: 0 | Status: DISCONTINUED | OUTPATIENT
Start: 2020-12-23 | End: 2021-01-06

## 2020-12-23 RX ORDER — HYDROMORPHONE HYDROCHLORIDE 2 MG/ML
2 INJECTION INTRAMUSCULAR; INTRAVENOUS; SUBCUTANEOUS EVERY 6 HOURS
Refills: 0 | Status: DISCONTINUED | OUTPATIENT
Start: 2020-12-23 | End: 2020-12-23

## 2020-12-23 RX ORDER — CHOLECALCIFEROL (VITAMIN D3) 125 MCG
1000 CAPSULE ORAL
Refills: 0 | Status: DISCONTINUED | OUTPATIENT
Start: 2020-12-23 | End: 2021-01-06

## 2020-12-23 RX ORDER — PANTOPRAZOLE SODIUM 20 MG/1
40 TABLET, DELAYED RELEASE ORAL DAILY
Refills: 0 | Status: DISCONTINUED | OUTPATIENT
Start: 2020-12-23 | End: 2021-01-02

## 2020-12-23 RX ORDER — HYDROMORPHONE HYDROCHLORIDE 2 MG/ML
1 INJECTION INTRAMUSCULAR; INTRAVENOUS; SUBCUTANEOUS ONCE
Refills: 0 | Status: DISCONTINUED | OUTPATIENT
Start: 2020-12-23 | End: 2020-12-23

## 2020-12-23 RX ORDER — MAGNESIUM OXIDE 400 MG ORAL TABLET 241.3 MG
400 TABLET ORAL
Refills: 0 | Status: DISCONTINUED | OUTPATIENT
Start: 2020-12-23 | End: 2021-01-06

## 2020-12-23 RX ORDER — POTASSIUM CHLORIDE 20 MEQ
10 PACKET (EA) ORAL ONCE
Refills: 0 | Status: COMPLETED | OUTPATIENT
Start: 2020-12-23 | End: 2020-12-23

## 2020-12-23 RX ORDER — ONDANSETRON 8 MG/1
4 TABLET, FILM COATED ORAL EVERY 6 HOURS
Refills: 0 | Status: DISCONTINUED | OUTPATIENT
Start: 2020-12-23 | End: 2020-12-25

## 2020-12-23 RX ORDER — SENNA PLUS 8.6 MG/1
2 TABLET ORAL AT BEDTIME
Refills: 0 | Status: DISCONTINUED | OUTPATIENT
Start: 2020-12-23 | End: 2021-01-06

## 2020-12-23 RX ORDER — ONDANSETRON 8 MG/1
4 TABLET, FILM COATED ORAL ONCE
Refills: 0 | Status: COMPLETED | OUTPATIENT
Start: 2020-12-23 | End: 2020-12-23

## 2020-12-23 RX ORDER — SODIUM CHLORIDE 9 MG/ML
1000 INJECTION, SOLUTION INTRAVENOUS
Refills: 0 | Status: DISCONTINUED | OUTPATIENT
Start: 2020-12-23 | End: 2020-12-24

## 2020-12-23 RX ORDER — HYDROMORPHONE HYDROCHLORIDE 2 MG/ML
1 INJECTION INTRAMUSCULAR; INTRAVENOUS; SUBCUTANEOUS EVERY 4 HOURS
Refills: 0 | Status: DISCONTINUED | OUTPATIENT
Start: 2020-12-23 | End: 2020-12-23

## 2020-12-23 RX ORDER — HYDROMORPHONE HYDROCHLORIDE 2 MG/ML
30 INJECTION INTRAMUSCULAR; INTRAVENOUS; SUBCUTANEOUS
Refills: 0 | Status: DISCONTINUED | OUTPATIENT
Start: 2020-12-23 | End: 2020-12-27

## 2020-12-23 RX ORDER — SODIUM CHLORIDE 9 MG/ML
1000 INJECTION INTRAMUSCULAR; INTRAVENOUS; SUBCUTANEOUS ONCE
Refills: 0 | Status: COMPLETED | OUTPATIENT
Start: 2020-12-23 | End: 2020-12-23

## 2020-12-23 RX ORDER — SODIUM CHLORIDE 9 MG/ML
1000 INJECTION, SOLUTION INTRAVENOUS
Refills: 0 | Status: DISCONTINUED | OUTPATIENT
Start: 2020-12-23 | End: 2020-12-23

## 2020-12-23 RX ORDER — ENOXAPARIN SODIUM 100 MG/ML
40 INJECTION SUBCUTANEOUS DAILY
Refills: 0 | Status: DISCONTINUED | OUTPATIENT
Start: 2020-12-23 | End: 2021-01-06

## 2020-12-23 RX ORDER — HYDROMORPHONE HYDROCHLORIDE 2 MG/ML
4 INJECTION INTRAMUSCULAR; INTRAVENOUS; SUBCUTANEOUS
Qty: 0 | Refills: 0 | DISCHARGE

## 2020-12-23 RX ORDER — ALBUTEROL 90 UG/1
2 AEROSOL, METERED ORAL EVERY 6 HOURS
Refills: 0 | Status: DISCONTINUED | OUTPATIENT
Start: 2020-12-23 | End: 2021-01-06

## 2020-12-23 RX ADMIN — Medication 0.5 MILLIGRAM(S): at 02:10

## 2020-12-23 RX ADMIN — Medication 1 TABLET(S): at 13:45

## 2020-12-23 RX ADMIN — HYDROMORPHONE HYDROCHLORIDE 2 MILLIGRAM(S): 2 INJECTION INTRAMUSCULAR; INTRAVENOUS; SUBCUTANEOUS at 09:50

## 2020-12-23 RX ADMIN — SODIUM CHLORIDE 125 MILLILITER(S): 9 INJECTION, SOLUTION INTRAVENOUS at 09:50

## 2020-12-23 RX ADMIN — SODIUM CHLORIDE 1000 MILLILITER(S): 9 INJECTION INTRAMUSCULAR; INTRAVENOUS; SUBCUTANEOUS at 03:09

## 2020-12-23 RX ADMIN — SODIUM CHLORIDE 250 MILLILITER(S): 9 INJECTION, SOLUTION INTRAVENOUS at 02:58

## 2020-12-23 RX ADMIN — SODIUM CHLORIDE 1000 MILLILITER(S): 9 INJECTION INTRAMUSCULAR; INTRAVENOUS; SUBCUTANEOUS at 02:09

## 2020-12-23 RX ADMIN — Medication 10 MILLIEQUIVALENT(S): at 03:09

## 2020-12-23 RX ADMIN — HYDROMORPHONE HYDROCHLORIDE 1 MILLIGRAM(S): 2 INJECTION INTRAMUSCULAR; INTRAVENOUS; SUBCUTANEOUS at 06:00

## 2020-12-23 RX ADMIN — ONDANSETRON 4 MILLIGRAM(S): 8 TABLET, FILM COATED ORAL at 02:10

## 2020-12-23 RX ADMIN — HYDROMORPHONE HYDROCHLORIDE 1 MILLIGRAM(S): 2 INJECTION INTRAMUSCULAR; INTRAVENOUS; SUBCUTANEOUS at 00:46

## 2020-12-23 RX ADMIN — HYDROMORPHONE HYDROCHLORIDE 1 MILLIGRAM(S): 2 INJECTION INTRAMUSCULAR; INTRAVENOUS; SUBCUTANEOUS at 13:39

## 2020-12-23 RX ADMIN — PANTOPRAZOLE SODIUM 40 MILLIGRAM(S): 20 TABLET, DELAYED RELEASE ORAL at 13:45

## 2020-12-23 RX ADMIN — HYDROMORPHONE HYDROCHLORIDE 1 MILLIGRAM(S): 2 INJECTION INTRAMUSCULAR; INTRAVENOUS; SUBCUTANEOUS at 01:16

## 2020-12-23 RX ADMIN — HYDROMORPHONE HYDROCHLORIDE 1 MILLIGRAM(S): 2 INJECTION INTRAMUSCULAR; INTRAVENOUS; SUBCUTANEOUS at 03:28

## 2020-12-23 RX ADMIN — HYDROMORPHONE HYDROCHLORIDE 1 MILLIGRAM(S): 2 INJECTION INTRAMUSCULAR; INTRAVENOUS; SUBCUTANEOUS at 02:58

## 2020-12-23 RX ADMIN — ENOXAPARIN SODIUM 40 MILLIGRAM(S): 100 INJECTION SUBCUTANEOUS at 09:50

## 2020-12-23 RX ADMIN — Medication 100 MILLIEQUIVALENT(S): at 02:09

## 2020-12-23 RX ADMIN — HYDROMORPHONE HYDROCHLORIDE 30 MILLILITER(S): 2 INJECTION INTRAMUSCULAR; INTRAVENOUS; SUBCUTANEOUS at 15:46

## 2020-12-23 RX ADMIN — SODIUM CHLORIDE 75 MILLILITER(S): 9 INJECTION, SOLUTION INTRAVENOUS at 16:05

## 2020-12-23 NOTE — H&P ADULT - NSHPPHYSICALEXAM_GEN_ALL_CORE
Vital Signs Last 24 Hrs  T(C): 37.1 (23 Dec 2020 15:07), Max: 37.1 (23 Dec 2020 15:07)  T(F): 98.7 (23 Dec 2020 15:07), Max: 98.7 (23 Dec 2020 15:07)  HR: 86 (23 Dec 2020 15:07) (61 - 86)  BP: 144/62 (23 Dec 2020 15:07) (144/62 - 181/66)  BP(mean): 81 (23 Dec 2020 08:38) (81 - 98)  RR: 18 (23 Dec 2020 08:58) (14 - 18)  SpO2: 100% (23 Dec 2020 15:07) (98% - 100%)      PHYSICAL EXAM:    Constitutional: NAD, awake and alert, well-developed  HEENT: PERR, EOMI, Normal Hearing, MMM  Neck: Soft and supple  Respiratory: Breath sounds are clear bilaterally, No wheezing, rales or rhonchi  Cardiovascular: S1 and S2, regular rate and rhythm, no Murmurs, gallops or rubs  Gastrointestinal: Bowel Sounds present, soft, nontender, nondistended, no guarding, no rebound  Extremities: No peripheral edema  Neurological: A/O x 3, no focal deficits in my limited exam

## 2020-12-23 NOTE — ED ADULT NURSE NOTE - NSIMPLEMENTINTERV_GEN_ALL_ED
Implemented All Fall Risk Interventions:  [place] to call system. Call bell, personal items and telephone within reach. Instruct patient to call for assistance. Room bathroom lighting operational. Non-slip footwear when patient is off stretcher. Physically safe environment: no spills, clutter or unnecessary equipment. Stretcher in lowest position, wheels locked, appropriate side rails in place. Provide visual cue, wrist band, yellow gown, etc. Monitor gait and stability. Monitor for mental status changes and reorient to person, place, and time. Review medications for side effects contributing to fall risk. Reinforce activity limits and safety measures with patient and family.

## 2020-12-23 NOTE — H&P ADULT - ASSESSMENT
65 year-old female admitted for severe abdominal pain and inability to tolerate p.o. secondary to acute on chronic intermittent porphyria exacerbation:    Abd pain / metabolic encephalopathy: due to Acute on chronic intermittent porphyria exacerbation:  -anesthesia consult for PCA pump  -IV fluids  -IV protonix  -supportive care  -heme evaluation  -bowel regimen     Anxiety:  -Ativan PRN    DVT prophylaxis: Lovenox

## 2020-12-23 NOTE — PHARMACOTHERAPY INTERVENTION NOTE - COMMENTS
med history complete, reviewed medications with doctor first and [facility], called patients sister who is unsure of patients morphine dose, called her pain management doctor MD Quintanilla, [phone], but not in the office, left a message, dose entered based on [place] [facility]  and [facility] records

## 2020-12-23 NOTE — PHYSICAL THERAPY INITIAL EVALUATION ADULT - MODALITIES TREATMENT COMMENTS
Pt went to the bathroom w/ CG, then back to bed very fatigued and in pain. Pt rushing back to bed and left walker, PT encouraged pt that this is a fall risk and pt made aware of safety. pt supine in bed, +IV, in pain unable to quantify. C/o also of L knee pain due to stating the EMS workers banged it. Not TTP  in

## 2020-12-23 NOTE — PHYSICAL THERAPY INITIAL EVALUATION ADULT - PERTINENT HX OF CURRENT PROBLEM, REHAB EVAL
The patient is a 65y Female complaining of abdominal pain. The patient is a 65y Female complaining of abdominal pain, N/V for days.

## 2020-12-23 NOTE — ED ADULT NURSE REASSESSMENT NOTE - NS ED NURSE REASSESS COMMENT FT1
Patient complaining of pain and nausea at this time. Spoke to MD Hendricks will place orders and reassess. Patient to be sent to [address] [place] [address].

## 2020-12-23 NOTE — PHYSICAL THERAPY INITIAL EVALUATION ADULT - GENERAL OBSERVATIONS, REHAB EVAL
Pt seen on 1N, supine in bed, +IV, c/o abdominal pain but unable to quantify on the 1-10 scale, Pt very [place]

## 2020-12-23 NOTE — H&P ADULT - NSICDXFAMILYHX_GEN_ALL_CORE_FT
FAMILY HISTORY:  Grandparent  Still living? No  Family history of porphyria, Age at diagnosis: Age Unknown

## 2020-12-23 NOTE — ED ADULT NURSE NOTE - CHIEF COMPLAINT QUOTE
patient sent in by Dr. Seay for increase ABD pain, N/V and possible change of mental status over the past few day, reported by sister.   pt has hx of Prophyria and [place].  Dr. Seay requesting labs, IVF, pain control, head CT and admission to [address] [place] [address].  patient states she is having worsening ABD pain.

## 2020-12-23 NOTE — ED ADULT NURSE NOTE - OBJECTIVE STATEMENT
Patient c/o worsening abdominal pain, N/V, change in mental status.  Per EMS, patients sister noticed change in mental status over the past few days.  Patient sent to ED for admission by oncologist, Dr. Seay for worsening pain, N/V.  Patient is known to [address] [place] [address].  Patient initially vomiting, unable to communicate.  After pain medication, patient is awake and able to speak.  PMH porphyria, [place], chronic abdominal pain, osteoporosis.

## 2020-12-23 NOTE — H&P ADULT - HISTORY OF PRESENT ILLNESS
65 year old woman with PMHx  staph infection, chronic abdominal pain, osteoporosis, adrenal insufficiency, [place], s/p cholecystectomy, s/p spinal fusion, chronic intermittent porphyria, anxiety, with multiple previous admissions for acute on chronic abdominal pain presents to [facility] for abdominal pain.  Pt over last several days with worsening 10/10 abd pain with associated nausea and vomiting.  ALso with history of altered mental status.  Pt notified Dr. Maza who told her to go to ED for further evaluation and management.    In ED: Pt given IV dilaudid, supportive care and ruled out for other acute processes with imaging and blood work.  She was admitted to  for management.

## 2020-12-24 LAB
ADD ON TEST-SPECIMEN IN LAB: SIGNIFICANT CHANGE UP
AMMONIA BLD-MCNC: 34 UMOL/L — HIGH (ref 11–32)
ANION GAP SERPL CALC-SCNC: 5 MMOL/L — SIGNIFICANT CHANGE UP (ref 5–17)
BASOPHILS # BLD AUTO: 0.05 K/UL — SIGNIFICANT CHANGE UP (ref 0–0.2)
BASOPHILS NFR BLD AUTO: 0.7 % — SIGNIFICANT CHANGE UP (ref 0–2)
BUN SERPL-MCNC: 3 MG/DL — LOW (ref 7–23)
CALCIUM SERPL-MCNC: 8.7 MG/DL — SIGNIFICANT CHANGE UP (ref 8.5–10.1)
CHLORIDE SERPL-SCNC: 111 MMOL/L — HIGH (ref 96–108)
CO2 SERPL-SCNC: 24 MMOL/L — SIGNIFICANT CHANGE UP (ref 22–31)
CREAT SERPL-MCNC: 0.54 MG/DL — SIGNIFICANT CHANGE UP (ref 0.5–1.3)
EOSINOPHIL # BLD AUTO: 0.24 K/UL — SIGNIFICANT CHANGE UP (ref 0–0.5)
EOSINOPHIL NFR BLD AUTO: 3.4 % — SIGNIFICANT CHANGE UP (ref 0–6)
FERRITIN SERPL-MCNC: 7 NG/ML — LOW (ref 15–150)
GLUCOSE SERPL-MCNC: 116 MG/DL — HIGH (ref 70–99)
HCT VFR BLD CALC: 30.2 % — LOW (ref 34.5–45)
HCV AB S/CO SERPL IA: 0.1 S/CO — SIGNIFICANT CHANGE UP (ref 0–0.99)
HCV AB SERPL-IMP: SIGNIFICANT CHANGE UP
HGB BLD-MCNC: 8.4 G/DL — LOW (ref 11.5–15.5)
IMM GRANULOCYTES NFR BLD AUTO: 0.1 % — SIGNIFICANT CHANGE UP (ref 0–1.5)
IRON SATN MFR SERPL: 16 UG/DL — LOW (ref 30–160)
IRON SATN MFR SERPL: 5 % — LOW (ref 14–50)
LYMPHOCYTES # BLD AUTO: 2.77 K/UL — SIGNIFICANT CHANGE UP (ref 1–3.3)
LYMPHOCYTES # BLD AUTO: 39.7 % — SIGNIFICANT CHANGE UP (ref 13–44)
MCHC RBC-ENTMCNC: 19.1 PG — LOW (ref 27–34)
MCHC RBC-ENTMCNC: 27.8 GM/DL — LOW (ref 32–36)
MCV RBC AUTO: 68.6 FL — LOW (ref 80–100)
MONOCYTES # BLD AUTO: 0.59 K/UL — SIGNIFICANT CHANGE UP (ref 0–0.9)
MONOCYTES NFR BLD AUTO: 8.5 % — SIGNIFICANT CHANGE UP (ref 2–14)
NEUTROPHILS # BLD AUTO: 3.32 K/UL — SIGNIFICANT CHANGE UP (ref 1.8–7.4)
NEUTROPHILS NFR BLD AUTO: 47.6 % — SIGNIFICANT CHANGE UP (ref 43–77)
PLATELET # BLD AUTO: 281 K/UL — SIGNIFICANT CHANGE UP (ref 150–400)
POTASSIUM SERPL-MCNC: 2.8 MMOL/L — CRITICAL LOW (ref 3.5–5.3)
POTASSIUM SERPL-SCNC: 2.8 MMOL/L — CRITICAL LOW (ref 3.5–5.3)
RBC # BLD: 4.4 M/UL — SIGNIFICANT CHANGE UP (ref 3.8–5.2)
RBC # FLD: 19.2 % — HIGH (ref 10.3–14.5)
SARS-COV-2 IGG SERPL QL IA: NEGATIVE — SIGNIFICANT CHANGE UP
SARS-COV-2 IGM SERPL IA-ACNC: 0.07 INDEX — SIGNIFICANT CHANGE UP
SODIUM SERPL-SCNC: 140 MMOL/L — SIGNIFICANT CHANGE UP (ref 135–145)
TIBC SERPL-MCNC: 311 UG/DL — SIGNIFICANT CHANGE UP (ref 220–430)
UIBC SERPL-MCNC: 295 UG/DL — SIGNIFICANT CHANGE UP (ref 110–370)
WBC # BLD: 6.98 K/UL — SIGNIFICANT CHANGE UP (ref 3.8–10.5)
WBC # FLD AUTO: 6.98 K/UL — SIGNIFICANT CHANGE UP (ref 3.8–10.5)

## 2020-12-24 PROCEDURE — 99232 SBSQ HOSP IP/OBS MODERATE 35: CPT

## 2020-12-24 RX ORDER — DEXTROSE MONOHYDRATE, SODIUM CHLORIDE, AND POTASSIUM CHLORIDE 50; .745; 4.5 G/1000ML; G/1000ML; G/1000ML
1000 INJECTION, SOLUTION INTRAVENOUS
Refills: 0 | Status: DISCONTINUED | OUTPATIENT
Start: 2020-12-24 | End: 2020-12-27

## 2020-12-24 RX ORDER — POLYETHYLENE GLYCOL 3350 17 G/17G
17 POWDER, FOR SOLUTION ORAL DAILY
Refills: 0 | Status: DISCONTINUED | OUTPATIENT
Start: 2020-12-24 | End: 2020-12-25

## 2020-12-24 RX ORDER — SACCHAROMYCES BOULARDII 250 MG
250 POWDER IN PACKET (EA) ORAL
Refills: 0 | Status: DISCONTINUED | OUTPATIENT
Start: 2020-12-24 | End: 2021-01-02

## 2020-12-24 RX ORDER — IRON SUCROSE 20 MG/ML
200 INJECTION, SOLUTION INTRAVENOUS ONCE
Refills: 0 | Status: COMPLETED | OUTPATIENT
Start: 2020-12-24 | End: 2020-12-25

## 2020-12-24 RX ORDER — IRON SUCROSE 20 MG/ML
200 INJECTION, SOLUTION INTRAVENOUS ONCE
Refills: 0 | Status: DISCONTINUED | OUTPATIENT
Start: 2020-12-24 | End: 2020-12-24

## 2020-12-24 RX ORDER — FOLIC ACID 0.8 MG
1 TABLET ORAL DAILY
Refills: 0 | Status: DISCONTINUED | OUTPATIENT
Start: 2020-12-24 | End: 2020-12-26

## 2020-12-24 RX ORDER — POTASSIUM CHLORIDE 20 MEQ
10 PACKET (EA) ORAL
Refills: 0 | Status: COMPLETED | OUTPATIENT
Start: 2020-12-24 | End: 2020-12-24

## 2020-12-24 RX ADMIN — Medication 1 TABLET(S): at 09:20

## 2020-12-24 RX ADMIN — MAGNESIUM OXIDE 400 MG ORAL TABLET 400 MILLIGRAM(S): 241.3 TABLET ORAL at 16:34

## 2020-12-24 RX ADMIN — PANTOPRAZOLE SODIUM 40 MILLIGRAM(S): 20 TABLET, DELAYED RELEASE ORAL at 09:19

## 2020-12-24 RX ADMIN — Medication 100 MILLIEQUIVALENT(S): at 15:17

## 2020-12-24 RX ADMIN — HYDROMORPHONE HYDROCHLORIDE 30 MILLILITER(S): 2 INJECTION INTRAMUSCULAR; INTRAVENOUS; SUBCUTANEOUS at 05:37

## 2020-12-24 RX ADMIN — MAGNESIUM OXIDE 400 MG ORAL TABLET 400 MILLIGRAM(S): 241.3 TABLET ORAL at 09:20

## 2020-12-24 RX ADMIN — SODIUM CHLORIDE 75 MILLILITER(S): 9 INJECTION, SOLUTION INTRAVENOUS at 08:06

## 2020-12-24 RX ADMIN — Medication 1000 UNIT(S): at 21:26

## 2020-12-24 RX ADMIN — ENOXAPARIN SODIUM 40 MILLIGRAM(S): 100 INJECTION SUBCUTANEOUS at 09:20

## 2020-12-24 RX ADMIN — ONDANSETRON 4 MILLIGRAM(S): 8 TABLET, FILM COATED ORAL at 00:25

## 2020-12-24 RX ADMIN — Medication 100 MILLIEQUIVALENT(S): at 13:16

## 2020-12-24 RX ADMIN — Medication 5 MILLIGRAM(S): at 21:26

## 2020-12-24 RX ADMIN — Medication 250 MILLIGRAM(S): at 21:26

## 2020-12-24 RX ADMIN — SODIUM CHLORIDE 75 MILLILITER(S): 9 INJECTION, SOLUTION INTRAVENOUS at 15:34

## 2020-12-24 RX ADMIN — HYDROMORPHONE HYDROCHLORIDE 30 MILLILITER(S): 2 INJECTION INTRAMUSCULAR; INTRAVENOUS; SUBCUTANEOUS at 20:47

## 2020-12-24 RX ADMIN — Medication 100 MILLIEQUIVALENT(S): at 16:32

## 2020-12-24 RX ADMIN — Medication 1000 UNIT(S): at 15:47

## 2020-12-24 RX ADMIN — SODIUM CHLORIDE 75 MILLILITER(S): 9 INJECTION, SOLUTION INTRAVENOUS at 00:25

## 2020-12-24 NOTE — PROGRESS NOTE ADULT - ASSESSMENT
Plan   Continue IV hydration  Pain control  Nutritional supports  Parenteral iron    GI evaluation In light of iron deficiency    Laboratory testing has been ordered to reassess and confirm diagnosis of acute intermittent porphyria  Ordered has been total porphyrins, P BG(Porphobilogen) in urine  Urine ala( Aminolevulinic Acid ), plasma and fecal porphyrins

## 2020-12-24 NOTE — PROGRESS NOTE ADULT - ASSESSMENT
65 year old woman with PMHx  staph infection, chronic abdominal pain, osteoporosis, adrenal insufficiency, [place], s/p cholecystectomy, s/p spinal fusion, chronic intermittent porphyria, anxiety, with multiple previous admissions for acute on chronic abdominal pain presents to [facility] on [facility]  for abdominal pain.  Pt over last several days with worsening 10/10 abd pain with associated nausea and vomiting.  admitted for     Severe abdominal pain with inability to tolerate p.o. secondary to   Acute on chronic intermittent porphyria exacerbation  Metabolic encephalopathy   - c/w PCA pump  - IV fluids  -IV protonix, antiemetics  -supportive care  -heme evaluation -  spot urine for PBG, Total porphyrins and creatinine.   -bowel regimen  - dulcolax, miralax as needed  Microcytic anemia  - check iron, TIBC, FOBT, B12, folate  - will consider GI evaluation  Anxiety    -Ativan PRN    DVT prophylaxis: Lovenox   65 year old woman with PMHx  staph infection, chronic abdominal pain, osteoporosis, adrenal insufficiency, [place], s/p cholecystectomy, s/p spinal fusion, chronic intermittent porphyria, anxiety, with multiple previous admissions for acute on chronic abdominal pain presents to [facility] on 12/23/20  for abdominal pain.  Pt over last several days with worsening 10/10 abd pain with associated nausea and vomiting.  admitted for     Severe abdominal pain with inability to tolerate p.o. secondary to   Acute on chronic intermittent porphyria exacerbation  Metabolic encephalopathy   - c/w PCA pump  - IV fluids  -IV protonix, antiemetics  -supportive care  -heme evaluation -  spot urine for PBG, Total porphyrins and creatinine.   -bowel regimen  - dulcolax, miralax as needed  Microcytic anemia  - check iron, TIBC, FOBT, B12, folate  - will consider GI evaluation  Severe Hypokalemia - replace IV, check Mg, adjust IV fluids  Anxiety  -Ativan PRN    DVT prophylaxis: Lovenox

## 2020-12-25 LAB
-  AMIKACIN: SIGNIFICANT CHANGE UP
-  AMOXICILLIN/CLAVULANIC ACID: SIGNIFICANT CHANGE UP
-  AMPICILLIN/SULBACTAM: SIGNIFICANT CHANGE UP
-  AMPICILLIN: SIGNIFICANT CHANGE UP
-  AZTREONAM: SIGNIFICANT CHANGE UP
-  CEFAZOLIN: SIGNIFICANT CHANGE UP
-  CEFEPIME: SIGNIFICANT CHANGE UP
-  CEFOXITIN: SIGNIFICANT CHANGE UP
-  CEFTRIAXONE: SIGNIFICANT CHANGE UP
-  CIPROFLOXACIN: SIGNIFICANT CHANGE UP
-  ERTAPENEM: SIGNIFICANT CHANGE UP
-  GENTAMICIN: SIGNIFICANT CHANGE UP
-  IMIPENEM: SIGNIFICANT CHANGE UP
-  LEVOFLOXACIN: SIGNIFICANT CHANGE UP
-  MEROPENEM: SIGNIFICANT CHANGE UP
-  NITROFURANTOIN: SIGNIFICANT CHANGE UP
-  PIPERACILLIN/TAZOBACTAM: SIGNIFICANT CHANGE UP
-  TIGECYCLINE: SIGNIFICANT CHANGE UP
-  TOBRAMYCIN: SIGNIFICANT CHANGE UP
-  TRIMETHOPRIM/SULFAMETHOXAZOLE: SIGNIFICANT CHANGE UP
ADD ON TEST-SPECIMEN IN LAB: SIGNIFICANT CHANGE UP
ALBUMIN SERPL ELPH-MCNC: 2.8 G/DL — LOW (ref 3.3–5)
ALP SERPL-CCNC: 69 U/L — SIGNIFICANT CHANGE UP (ref 40–120)
ALT FLD-CCNC: 18 U/L — SIGNIFICANT CHANGE UP (ref 12–78)
ANION GAP SERPL CALC-SCNC: 4 MMOL/L — LOW (ref 5–17)
AST SERPL-CCNC: 21 U/L — SIGNIFICANT CHANGE UP (ref 15–37)
BASOPHILS # BLD AUTO: 0 K/UL — SIGNIFICANT CHANGE UP (ref 0–0.2)
BASOPHILS NFR BLD AUTO: 0 % — SIGNIFICANT CHANGE UP (ref 0–2)
BILIRUB SERPL-MCNC: 0.3 MG/DL — SIGNIFICANT CHANGE UP (ref 0.2–1.2)
BUN SERPL-MCNC: 3 MG/DL — LOW (ref 7–23)
CALCIUM SERPL-MCNC: 8.7 MG/DL — SIGNIFICANT CHANGE UP (ref 8.5–10.1)
CHLORIDE SERPL-SCNC: 109 MMOL/L — HIGH (ref 96–108)
CO2 SERPL-SCNC: 27 MMOL/L — SIGNIFICANT CHANGE UP (ref 22–31)
CREAT SERPL-MCNC: 0.53 MG/DL — SIGNIFICANT CHANGE UP (ref 0.5–1.3)
CULTURE RESULTS: SIGNIFICANT CHANGE UP
EOSINOPHIL # BLD AUTO: 0.25 K/UL — SIGNIFICANT CHANGE UP (ref 0–0.5)
EOSINOPHIL NFR BLD AUTO: 4 % — SIGNIFICANT CHANGE UP (ref 0–6)
GLUCOSE SERPL-MCNC: 92 MG/DL — SIGNIFICANT CHANGE UP (ref 70–99)
HCT VFR BLD CALC: 29.3 % — LOW (ref 34.5–45)
HGB BLD-MCNC: 8.3 G/DL — LOW (ref 11.5–15.5)
LYMPHOCYTES # BLD AUTO: 2.3 K/UL — SIGNIFICANT CHANGE UP (ref 1–3.3)
LYMPHOCYTES # BLD AUTO: 37 % — SIGNIFICANT CHANGE UP (ref 13–44)
MCHC RBC-ENTMCNC: 19.3 PG — LOW (ref 27–34)
MCHC RBC-ENTMCNC: 28.3 GM/DL — LOW (ref 32–36)
MCV RBC AUTO: 68.1 FL — LOW (ref 80–100)
METHOD TYPE: SIGNIFICANT CHANGE UP
MONOCYTES # BLD AUTO: 0.5 K/UL — SIGNIFICANT CHANGE UP (ref 0–0.9)
MONOCYTES NFR BLD AUTO: 8 % — SIGNIFICANT CHANGE UP (ref 2–14)
NEUTROPHILS # BLD AUTO: 3.17 K/UL — SIGNIFICANT CHANGE UP (ref 1.8–7.4)
NEUTROPHILS NFR BLD AUTO: 51 % — SIGNIFICANT CHANGE UP (ref 43–77)
NRBC # BLD: SIGNIFICANT CHANGE UP /100 WBCS (ref 0–0)
ORGANISM # SPEC MICROSCOPIC CNT: SIGNIFICANT CHANGE UP
ORGANISM # SPEC MICROSCOPIC CNT: SIGNIFICANT CHANGE UP
PLATELET # BLD AUTO: 262 K/UL — SIGNIFICANT CHANGE UP (ref 150–400)
POTASSIUM SERPL-MCNC: 3.2 MMOL/L — LOW (ref 3.5–5.3)
POTASSIUM SERPL-SCNC: 3.2 MMOL/L — LOW (ref 3.5–5.3)
PROT SERPL-MCNC: 6.7 GM/DL — SIGNIFICANT CHANGE UP (ref 6–8.3)
RBC # BLD: 4.3 M/UL — SIGNIFICANT CHANGE UP (ref 3.8–5.2)
RBC # FLD: 18.9 % — HIGH (ref 10.3–14.5)
SODIUM SERPL-SCNC: 140 MMOL/L — SIGNIFICANT CHANGE UP (ref 135–145)
SPECIMEN SOURCE: SIGNIFICANT CHANGE UP
WBC # BLD: 6.22 K/UL — SIGNIFICANT CHANGE UP (ref 3.8–10.5)
WBC # FLD AUTO: 6.22 K/UL — SIGNIFICANT CHANGE UP (ref 3.8–10.5)

## 2020-12-25 PROCEDURE — 99233 SBSQ HOSP IP/OBS HIGH 50: CPT

## 2020-12-25 RX ORDER — IRON SUCROSE 20 MG/ML
200 INJECTION, SOLUTION INTRAVENOUS ONCE
Refills: 0 | Status: DISCONTINUED | OUTPATIENT
Start: 2020-12-25 | End: 2020-12-25

## 2020-12-25 RX ORDER — FOLIC ACID 0.8 MG
1 TABLET ORAL DAILY
Refills: 0 | Status: DISCONTINUED | OUTPATIENT
Start: 2020-12-25 | End: 2021-01-06

## 2020-12-25 RX ORDER — POTASSIUM CHLORIDE 20 MEQ
10 PACKET (EA) ORAL
Refills: 0 | Status: COMPLETED | OUTPATIENT
Start: 2020-12-25 | End: 2020-12-25

## 2020-12-25 RX ORDER — IRON SUCROSE 20 MG/ML
200 INJECTION, SOLUTION INTRAVENOUS ONCE
Refills: 0 | Status: COMPLETED | OUTPATIENT
Start: 2020-12-25 | End: 2020-12-25

## 2020-12-25 RX ORDER — ONDANSETRON 8 MG/1
4 TABLET, FILM COATED ORAL
Refills: 0 | Status: DISCONTINUED | OUTPATIENT
Start: 2020-12-25 | End: 2021-01-06

## 2020-12-25 RX ORDER — POLYETHYLENE GLYCOL 3350 17 G/17G
17 POWDER, FOR SOLUTION ORAL DAILY
Refills: 0 | Status: DISCONTINUED | OUTPATIENT
Start: 2020-12-25 | End: 2020-12-27

## 2020-12-25 RX ADMIN — ENOXAPARIN SODIUM 40 MILLIGRAM(S): 100 INJECTION SUBCUTANEOUS at 09:30

## 2020-12-25 RX ADMIN — Medication 100 MILLIEQUIVALENT(S): at 15:57

## 2020-12-25 RX ADMIN — Medication 1 MILLIGRAM(S): at 09:29

## 2020-12-25 RX ADMIN — IRON SUCROSE 200 MILLIGRAM(S): 20 INJECTION, SOLUTION INTRAVENOUS at 21:16

## 2020-12-25 RX ADMIN — SENNA PLUS 2 TABLET(S): 8.6 TABLET ORAL at 21:09

## 2020-12-25 RX ADMIN — POLYETHYLENE GLYCOL 3350 17 GRAM(S): 17 POWDER, FOR SOLUTION ORAL at 13:56

## 2020-12-25 RX ADMIN — Medication 1 MILLIGRAM(S): at 17:45

## 2020-12-25 RX ADMIN — Medication 1 TABLET(S): at 09:29

## 2020-12-25 RX ADMIN — Medication 100 MILLIEQUIVALENT(S): at 11:57

## 2020-12-25 RX ADMIN — MAGNESIUM OXIDE 400 MG ORAL TABLET 400 MILLIGRAM(S): 241.3 TABLET ORAL at 09:30

## 2020-12-25 RX ADMIN — Medication 5 MILLIGRAM(S): at 21:07

## 2020-12-25 RX ADMIN — Medication 1000 UNIT(S): at 21:07

## 2020-12-25 RX ADMIN — Medication 250 MILLIGRAM(S): at 21:09

## 2020-12-25 RX ADMIN — PANTOPRAZOLE SODIUM 40 MILLIGRAM(S): 20 TABLET, DELAYED RELEASE ORAL at 09:30

## 2020-12-25 RX ADMIN — DEXTROSE MONOHYDRATE, SODIUM CHLORIDE, AND POTASSIUM CHLORIDE 75 MILLILITER(S): 50; .745; 4.5 INJECTION, SOLUTION INTRAVENOUS at 21:06

## 2020-12-25 RX ADMIN — Medication 100 MILLIEQUIVALENT(S): at 13:51

## 2020-12-25 RX ADMIN — DEXTROSE MONOHYDRATE, SODIUM CHLORIDE, AND POTASSIUM CHLORIDE 75 MILLILITER(S): 50; .745; 4.5 INJECTION, SOLUTION INTRAVENOUS at 07:09

## 2020-12-25 RX ADMIN — ONDANSETRON 4 MILLIGRAM(S): 8 TABLET, FILM COATED ORAL at 13:22

## 2020-12-25 RX ADMIN — ONDANSETRON 4 MILLIGRAM(S): 8 TABLET, FILM COATED ORAL at 17:45

## 2020-12-25 RX ADMIN — Medication 1000 UNIT(S): at 09:29

## 2020-12-25 RX ADMIN — Medication 250 MILLIGRAM(S): at 09:29

## 2020-12-25 RX ADMIN — IRON SUCROSE 200 MILLIGRAM(S): 20 INJECTION, SOLUTION INTRAVENOUS at 00:34

## 2020-12-25 RX ADMIN — HYDROMORPHONE HYDROCHLORIDE 30 MILLILITER(S): 2 INJECTION INTRAMUSCULAR; INTRAVENOUS; SUBCUTANEOUS at 16:39

## 2020-12-25 RX ADMIN — MAGNESIUM OXIDE 400 MG ORAL TABLET 400 MILLIGRAM(S): 241.3 TABLET ORAL at 17:45

## 2020-12-25 NOTE — PROGRESS NOTE ADULT - ASSESSMENT
65 year old woman with PMHx  staph infection, chronic abdominal pain, osteoporosis, adrenal insufficiency, [place], s/p cholecystectomy, s/p spinal fusion, chronic intermittent porphyria, anxiety, with multiple previous admissions for acute on chronic abdominal pain presents to [facility] on 12/23/20  for abdominal pain.  Pt over last several days with worsening 10/10 abd pain with associated nausea and vomiting.  admitted for     Severe abdominal pain with inability to tolerate p.o. secondary to  Acute on chronic intermittent porphyria exacerbation  Metabolic encephalopathy   - c/w PCA pump  - IV fluids  -IV protonix, antiemetics - zosyn prior meals  -supportive care  -heme evaluation -  spot urine for PBG, Total porphyrins and creatinine.   -bowel regimen  - dulcolax, miralax as needed  Pyuria with E coli, asymptomatic - will not treat with Abx  Microcytic anemia  - check iron, TIBC, FOBT, B12, folate  - will consider GI evaluation  Severe Hypokalemia - replace IV, check Mg, adjust IV fluids  Anxiety  -Ativan PRN    DVT prophylaxis: Lovenox   65 year old woman with PMHx  staph infection, chronic abdominal pain, osteoporosis, adrenal insufficiency, [place], s/p cholecystectomy, s/p spinal fusion, chronic intermittent porphyria, anxiety, with multiple previous admissions for acute on chronic abdominal pain presents to [facility] on 12/23/20  for abdominal pain.  Pt over last several days with worsening 10/10 abd pain with associated nausea and vomiting.  admitted for     Severe abdominal pain with inability to tolerate p.o. secondary to  Acute on chronic intermittent porphyria exacerbation  Metabolic encephalopathy   - c/w PCA pump  - IV fluids  -IV protonix, antiemetics - zosyn prior meals  -supportive care  -heme evaluation -  spot urine for PBG, Total porphyrins and creatinine.   -bowel regimen  - dulcolax, miralax as needed  - GI and oncology consult  Pyuria with E coli, asymptomatic - will not treat with Abx  Microcytic anemia  - check iron, TIBC, FOBT, B12, folate  - will consider GI evaluation  Severe Hypokalemia - replace IV, check Mg, adjust IV fluids  Anxiety  -Ativan PRN    DVT prophylaxis: Lovenox

## 2020-12-25 NOTE — PROGRESS NOTE ADULT - ASSESSMENT
Acute intermittent porphyria with acute abdominal visceral crisis  Plan  Continue aggressive hydration including glucose  Transition pain medication from PCA to orals once stable  Zofran and lorazepam for nausea  Parenteral iron/Folic acid  Encourage nutritional supports/Supplements

## 2020-12-26 LAB
ANION GAP SERPL CALC-SCNC: 8 MMOL/L — SIGNIFICANT CHANGE UP (ref 5–17)
BUN SERPL-MCNC: 3 MG/DL — LOW (ref 7–23)
CALCIUM SERPL-MCNC: 9.2 MG/DL — SIGNIFICANT CHANGE UP (ref 8.5–10.1)
CHLORIDE SERPL-SCNC: 110 MMOL/L — HIGH (ref 96–108)
CO2 SERPL-SCNC: 24 MMOL/L — SIGNIFICANT CHANGE UP (ref 22–31)
CREAT SERPL-MCNC: 0.63 MG/DL — SIGNIFICANT CHANGE UP (ref 0.5–1.3)
GLUCOSE SERPL-MCNC: 128 MG/DL — HIGH (ref 70–99)
HCT VFR BLD CALC: 33.5 % — LOW (ref 34.5–45)
HGB BLD-MCNC: 9.3 G/DL — LOW (ref 11.5–15.5)
MAGNESIUM SERPL-MCNC: 2.1 MG/DL — SIGNIFICANT CHANGE UP (ref 1.6–2.6)
MCHC RBC-ENTMCNC: 19.1 PG — LOW (ref 27–34)
MCHC RBC-ENTMCNC: 27.8 GM/DL — LOW (ref 32–36)
MCV RBC AUTO: 68.8 FL — LOW (ref 80–100)
OB PNL STL: NEGATIVE — SIGNIFICANT CHANGE UP
PHOSPHATE SERPL-MCNC: 2.9 MG/DL — SIGNIFICANT CHANGE UP (ref 2.5–4.5)
PLATELET # BLD AUTO: 315 K/UL — SIGNIFICANT CHANGE UP (ref 150–400)
POTASSIUM SERPL-MCNC: 3.4 MMOL/L — LOW (ref 3.5–5.3)
POTASSIUM SERPL-SCNC: 3.4 MMOL/L — LOW (ref 3.5–5.3)
RBC # BLD: 4.87 M/UL — SIGNIFICANT CHANGE UP (ref 3.8–5.2)
RBC # FLD: 19.3 % — HIGH (ref 10.3–14.5)
SODIUM SERPL-SCNC: 142 MMOL/L — SIGNIFICANT CHANGE UP (ref 135–145)
WBC # BLD: 6.62 K/UL — SIGNIFICANT CHANGE UP (ref 3.8–10.5)
WBC # FLD AUTO: 6.62 K/UL — SIGNIFICANT CHANGE UP (ref 3.8–10.5)

## 2020-12-26 PROCEDURE — 99232 SBSQ HOSP IP/OBS MODERATE 35: CPT

## 2020-12-26 RX ORDER — FERROUS SULFATE 325(65) MG
325 TABLET ORAL DAILY
Refills: 0 | Status: DISCONTINUED | OUTPATIENT
Start: 2020-12-26 | End: 2021-01-06

## 2020-12-26 RX ORDER — POTASSIUM CHLORIDE 20 MEQ
10 PACKET (EA) ORAL
Refills: 0 | Status: COMPLETED | OUTPATIENT
Start: 2020-12-26 | End: 2020-12-26

## 2020-12-26 RX ADMIN — Medication 1 TABLET(S): at 10:01

## 2020-12-26 RX ADMIN — ONDANSETRON 4 MILLIGRAM(S): 8 TABLET, FILM COATED ORAL at 11:21

## 2020-12-26 RX ADMIN — Medication 1000 UNIT(S): at 21:40

## 2020-12-26 RX ADMIN — Medication 325 MILLIGRAM(S): at 17:14

## 2020-12-26 RX ADMIN — POLYETHYLENE GLYCOL 3350 17 GRAM(S): 17 POWDER, FOR SOLUTION ORAL at 10:01

## 2020-12-26 RX ADMIN — Medication 100 MILLIEQUIVALENT(S): at 10:23

## 2020-12-26 RX ADMIN — Medication 1000 UNIT(S): at 10:01

## 2020-12-26 RX ADMIN — Medication 1 MILLIGRAM(S): at 10:01

## 2020-12-26 RX ADMIN — ONDANSETRON 4 MILLIGRAM(S): 8 TABLET, FILM COATED ORAL at 17:14

## 2020-12-26 RX ADMIN — PANTOPRAZOLE SODIUM 40 MILLIGRAM(S): 20 TABLET, DELAYED RELEASE ORAL at 10:01

## 2020-12-26 RX ADMIN — Medication 250 MILLIGRAM(S): at 21:38

## 2020-12-26 RX ADMIN — Medication 100 MILLIEQUIVALENT(S): at 14:34

## 2020-12-26 RX ADMIN — Medication 250 MILLIGRAM(S): at 10:02

## 2020-12-26 RX ADMIN — MAGNESIUM OXIDE 400 MG ORAL TABLET 400 MILLIGRAM(S): 241.3 TABLET ORAL at 10:01

## 2020-12-26 RX ADMIN — MAGNESIUM OXIDE 400 MG ORAL TABLET 400 MILLIGRAM(S): 241.3 TABLET ORAL at 17:14

## 2020-12-26 RX ADMIN — HYDROMORPHONE HYDROCHLORIDE 30 MILLILITER(S): 2 INJECTION INTRAMUSCULAR; INTRAVENOUS; SUBCUTANEOUS at 10:15

## 2020-12-26 RX ADMIN — DEXTROSE MONOHYDRATE, SODIUM CHLORIDE, AND POTASSIUM CHLORIDE 75 MILLILITER(S): 50; .745; 4.5 INJECTION, SOLUTION INTRAVENOUS at 11:21

## 2020-12-26 RX ADMIN — ONDANSETRON 4 MILLIGRAM(S): 8 TABLET, FILM COATED ORAL at 06:06

## 2020-12-26 RX ADMIN — ENOXAPARIN SODIUM 40 MILLIGRAM(S): 100 INJECTION SUBCUTANEOUS at 10:01

## 2020-12-26 RX ADMIN — Medication 100 MILLIEQUIVALENT(S): at 12:20

## 2020-12-26 NOTE — PROGRESS NOTE ADULT - ASSESSMENT
65 year old woman with PMHx  staph infection, chronic abdominal pain, osteoporosis, adrenal insufficiency, [place], s/p cholecystectomy, s/p spinal fusion, chronic intermittent porphyria, anxiety, with multiple previous admissions for acute on chronic abdominal pain presents to [facility] on 12/23/20  for abdominal pain.  Pt over last several days with worsening 10/10 abd pain with associated nausea and vomiting.  admitted for     Severe abdominal pain with inability to tolerate p.o. secondary to  Acute on chronic intermittent porphyria exacerbation  Metabolic encephalopathy   - c/w PCA pump  - IV fluids  -IV protonix, antiemetics - zosyn prior meals  -supportive care  -heme evaluation -  spot urine for PBG, Total porphyrins and creatinine.   -bowel regimen  - dulcolax, miralax as needed  - GI and oncology consult  - anesthesiology consult for PCA pump   Pyuria with E coli, asymptomatic - will not treat with Abx  Microcytic anemia due to iron deficiency   - check iron, TIBC, FOBT, B12, folate - noted, FOBT - neg   Severe Hypokalemia, improving  - replace IV, check Mg, adjust IV fluids  Constipation- miralax, dulcolax  Anxiety -Ativan PRN  Chronic adrenal insufficiency - patient refuses to take steroids, BP wnl, monitor    DVT prophylaxis: Lovenox

## 2020-12-26 NOTE — CONSULT NOTE ADULT - SUBJECTIVE AND OBJECTIVE BOX
HPI:  65 year old woman with PMHx  staph infection, chronic abdominal pain, osteoporosis, adrenal insufficiency, [place], s/p cholecystectomy, s/p spinal fusion, chronic intermittent porphyria, anxiety, with multiple previous admissions for acute on chronic abdominal pain presents to [facility] for abdominal pain.  Pt over last several days with worsening 10/10 abd pain with associated nausea and vomiting.  ALso with history of altered mental status.  Pt notified Dr. Maza who told her to go to ED for further evaluation and management.    As per  prior notes she was first diagnosed with porphyria cutanea tarda in .  At that time she had presented with blisters over her hand.  She underwent phlebotomy x6 with resolution of the rash. In 2002 she began experiencing abdominal discomfort and pain.  She underwent a cholecystectomy with no relief.  Around that time she underwent urine testing demonstrating an elevated urine coproporphyrin; She was felt to have acute intermittent porphyria and was treated with IV fluids, pain management and glucose.    Since her diagnosis is she has had numerous episodes of abdominal pain treated with pain management and supportive care.    More recently her Sister called the office and spoke to the nurse practitioner.  Patient had apparently Displayed erratic behavior/ She was advised to see her primary care, psychologist, and for any worsening present to the hospital for management    Recent laboratory data demonstrated a microcytic anemia.    In ED: Pt given IV dilaudid, supportive care and ruled out for other acute processes with imaging and blood work.  She was admitted to  for management. (23 Dec 2020 15:52)      PAST MEDICAL & SURGICAL HISTORY:  Staph infection  2017    Chronic abdominal pain    Osteoporosis    Adrenal insufficiency, primary    Knee effusion    Porphyria    S/P tonsillectomy    S/P cholecystectomy    H/O spinal fusion        MEDICATIONS  (STANDING):  cholecalciferol 1000 Unit(s) Oral two times a day  dextrose 5% + sodium chloride 0.45%. 1000 milliLiter(s) (75 mL/Hr) IV Continuous <Continuous>  enoxaparin Injectable 40 milliGRAM(s) SubCutaneous daily  HYDROmorphone PCA (1 mG/mL) 30 milliLiter(s) PCA Continuous PCA Continuous  magnesium oxide 400 milliGRAM(s) Oral two times a day with meals  multivitamin 1 Tablet(s) Oral daily  pantoprazole  Injectable 40 milliGRAM(s) IV Push daily    MEDICATIONS  (PRN):  ALBUTerol    90 MICROgram(s) HFA Inhaler 2 Puff(s) Inhalation every 6 hours PRN Shortness of Breath and/or Wheezing  LORazepam   Injectable 1 milliGRAM(s) IV Push every 4 hours PRN Nausea and/or Vomiting  naloxone Injectable 0.1 milliGRAM(s) IV Push every 3 minutes PRN For ANY of the following changes in patient status:  A. RR LESS THAN 10 breaths per minute, B. Oxygen saturation LESS THAN 90%, C. Sedation score of 6  ondansetron Injectable 4 milliGRAM(s) IV Push every 6 hours PRN Nausea  ondansetron Injectable 4 milliGRAM(s) IV Push every 6 hours PRN Nausea  senna 2 Tablet(s) Oral at bedtime PRN Constipation      Allergies    chlorhexidine containing compounds (Rash)    Intolerances        SOCIAL HISTORY:    FAMILY HISTORY:  Family history of porphyria (Grandparent)        Vital Signs Last 24 Hrs  T(C): 37.1 (23 Dec 2020 15:07), Max: 37.1 (23 Dec 2020 15:07)  T(F): 98.7 (23 Dec 2020 15:07), Max: 98.7 (23 Dec 2020 15:07)  HR: 86 (23 Dec 2020 15:07) (61 - 86)  BP: 144/62 (23 Dec 2020 15:07) (144/62 - 181/66)  BP(mean): 81 (23 Dec 2020 08:38) (81 - 98)  RR: 18 (23 Dec 2020 08:58) (14 - 18)  SpO2: 100% (23 Dec 2020 15:07) (98% - 100%)    Patient somewhat somnolent.  Complains of abdominal pain  No acute distress  Neck is supple  Lungs are clear  Cardiac is normal  Abdomen no guarding      LABS:                        8.8    4.09  )-----------( 307      ( 22 Dec 2020 22:34 )             31.6     12-    143  |  111<H>  |  3<L>  ----------------------------<  131<H>  3.3<L>   |  25  |  0.53    Ca    9.1      22 Dec 2020 22:34  Mg     2.4     12-    TPro  7.7  /  Alb  3.5  /  TBili  0.5  /  DBili  x   /  AST  19  /  ALT  21  /  AlkPhos  73  12-22    PT/INR - ( 22 Dec 2020 22:34 )   PT: 12.8 sec;   INR: 1.10 ratio         PTT - ( 22 Dec 2020 22:34 )  PTT:32.3 sec  Urinalysis Basic - ( 23 Dec 2020 01:17 )    Color: Yellow / Appearance: Clear / S.010 / pH: x  Gluc: x / Ketone: Moderate  / Bili: Negative / Urobili: Negative mg/dL   Blood: x / Protein: Negative mg/dL / Nitrite: Negative   Leuk Esterase: Negative / RBC: x / WBC x   Sq Epi: x / Non Sq Epi: x / Bacteria: x        RADIOLOGY & ADDITIONAL STUDIES:    < from: CT Abdomen and Pelvis w/ IV Cont (20 @ 00:32) >  EXAM:  CT ABDOMEN AND PELVIS IC                            PROCEDURE DATE:  2020          INTERPRETATION:  CLINICAL INFORMATION: Abdominal pain. History of porphyria.    COMPARISON: CT abdomen/pelvis of 10/25/2017.    PROCEDURE:  CT of the Abdomen and Pelvis was performed with intravenous contrast.  Intravenous contrast: 90 ml Omnipaque 350. 10 ml discarded.  Oral contrast: None.  Sagittal and coronal reformats were performed.    FINDINGS:  LOWER CHEST: Coronary artery calcification.    LIVER: Within normal limits.  BILE DUCTS: Normal caliber.  GALLBLADDER: Cholecystectomy.  SPLEEN: Within normal limits.  PANCREAS: Within normal limits.  ADRENALS: Within normal limits.  KIDNEYS/URETERS: Bilateral low-attenuation lesions too small to accurately characterize. No hydronephrosis. Symmetric parenchymal enhancement.    BLADDER: Within normal limits.  REPRODUCTIVE ORGANS: Calcified uterine fibroids. Unremarkable bilateral adnexa.    BOWEL: No bowel obstruction. Appendix is normal.  PERITONEUM: No ascites.  VESSELS: Atherosclerosis of the abdominal aorta, which is normal in caliber.  RETROPERITONEUM/LYMPH NODES: No lymphadenopathy.  ABDOMINAL WALL: Within normal limits.  BONES: Stable moderate anterior wedging of the T12 vertebral body with focal kyphosis at the thoracolumbar junction.    IMPRESSION:  No acute intra-abdominal pathology on CT.              ANT VALLES MD; Attending Radiologist  This document has been electronically signed. Dec 23 2020  1:12AM    < end of copied text >    kyler
HPI:  65 year old woman with PMHx  staph infection, chronic abdominal pain, osteoporosis, adrenal insufficiency, [place], s/p cholecystectomy, s/p spinal fusion, chronic intermittent porphyria, anxiety, with multiple previous admissions for acute on chronic abdominal pain presents to [facility] for abdominal pain.  Pt over last several days with worsening 10/10 abd pain with associated nausea and vomiting.  ALso with history of altered mental status.  Pt notified Dr. Maza who told her to go to ED for further evaluation and management.    In ED: Pt given IV dilaudid, supportive care and ruled out for other acute processes with imaging and blood work.  She was admitted to  for management. (23 Dec 2020 15:52)  ----------------------------------  She apparently has acute intermittent porphyria but doesnt believe the diagnosis has been confirmed with genetic testing. She's feeling better now and hoping to leave tomorrow.  Of note, she also happens to be iron deficient here although doesn't report blood per rectum.  Last colonoscopy > 10 years, ago and she doesn't follow up with GI.    PAST MEDICAL & SURGICAL HISTORY:  Staph infection  8/2017    Chronic abdominal pain    Osteoporosis    Adrenal insufficiency, primary    Knee effusion    Porphyria    S/P tonsillectomy    S/P cholecystectomy    H/O spinal fusion        Home Medications:  Ativan 1 mg oral tablet: 2 tab(s) orally once a day (at bedtime) (23 Dec 2020 09:57)  Ativan 1 mg oral tablet: 1 tab(s) orally 4 times a day, As Needed anxiety (23 Dec 2020 09:57)  calcium (as calcium citrate) 250 mg oral tablet: 1 tab(s) orally 2 times a day (23 Dec 2020 09:57)  cholecalciferol 1000 intl units oral tablet: 1 tab(s) orally 2 times a day (23 Dec 2020 09:57)  HYDROmorphone 8 mg oral tablet: 1 tab(s) orally every 4 hours, As Needed (23 Dec 2020 09:57)  magnesium oxide 500 mg oral tablet: 1 tab(s) orally 2 times a day (23 Dec 2020 09:57)  morphine 12 hour extended release: 230 milligram(s) orally 2 times a day  Pain mangement MD Quintanilla [phone] (23 Dec 2020 09:57)  multivitamin: 1 tab(s) orally once a day (23 Dec 2020 09:57)  NexIUM 40 mg oral delayed release capsule: 1 cap(s) orally once a day (23 Dec 2020 09:57)  ProAir HFA 90 mcg/inh inhalation aerosol: 2 puff(s) inhaled 4 times a day, As Needed bronchospasm (23 Dec 2020 09:57)  Zofran 8 mg oral tablet: 8 milligram(s) orally every 8 hours, As Needed nausea (23 Dec 2020 09:57)      MEDICATIONS  (STANDING):  bisacodyl 5 milliGRAM(s) Oral at bedtime  cholecalciferol 1000 Unit(s) Oral two times a day  dextrose 5% + sodium chloride 0.9% with potassium chloride 20 mEq/L 1000 milliLiter(s) (75 mL/Hr) IV Continuous <Continuous>  enoxaparin Injectable 40 milliGRAM(s) SubCutaneous daily  ferrous    sulfate 325 milliGRAM(s) Oral daily  folic acid 1 milliGRAM(s) Oral daily  HYDROmorphone PCA (1 mG/mL) 30 milliLiter(s) PCA Continuous PCA Continuous  magnesium oxide 400 milliGRAM(s) Oral two times a day with meals  multivitamin 1 Tablet(s) Oral daily  ondansetron Injectable 4 milliGRAM(s) IV Push <User Schedule>  pantoprazole  Injectable 40 milliGRAM(s) IV Push daily  polyethylene glycol 3350 17 Gram(s) Oral daily  potassium chloride  10 mEq/100 mL IVPB 10 milliEquivalent(s) IV Intermittent every 1 hour  saccharomyces boulardii 250 milliGRAM(s) Oral two times a day    MEDICATIONS  (PRN):  ALBUTerol    90 MICROgram(s) HFA Inhaler 2 Puff(s) Inhalation every 6 hours PRN Shortness of Breath and/or Wheezing  LORazepam   Injectable 1 milliGRAM(s) IV Push every 4 hours PRN Nausea and/or Vomiting  naloxone Injectable 0.1 milliGRAM(s) IV Push every 3 minutes PRN For ANY of the following changes in patient status:  A. RR LESS THAN 10 breaths per minute, B. Oxygen saturation LESS THAN 90%, C. Sedation score of 6  senna 2 Tablet(s) Oral at bedtime PRN Constipation      Allergies    chlorhexidine containing compounds (Rash)    Intolerances        SOCIAL HISTORY:    FAMILY HISTORY:  Family history of porphyria (Grandparent)        ROS  As above  Otherwise unremarkable    Vital Signs Last 24 Hrs  T(C): 36.6 (26 Dec 2020 10:27), Max: 37.2 (25 Dec 2020 15:17)  T(F): 97.9 (26 Dec 2020 10:27), Max: 99 (25 Dec 2020 15:17)  HR: 80 (26 Dec 2020 10:27) (80 - 108)  BP: 163/60 (26 Dec 2020 10:27) (135/95 - 163/60)  BP(mean): --  RR: 18 (26 Dec 2020 10:27) (18 - 19)  SpO2: 99% (26 Dec 2020 10:27) (98% - 100%)    Constitutional: NAD, well-developed  Respiratory: CTAB  Cardiovascular: S1 and S2, RRR  Gastrointestinal: BS+, soft, NT/ND  Extremities: No peripheral edema  Psychiatric: Normal mood, normal affect  Skin: No rashes    LABS:                        9.3    6.62  )-----------( 315      ( 26 Dec 2020 08:39 )             33.5     12-26    142  |  110<H>  |  3<L>  ----------------------------<  128<H>  3.4<L>   |  24  |  0.63    Ca    9.2      26 Dec 2020 08:39  Phos  2.9     12-26  Mg     2.1     12-26    TPro  6.7  /  Alb  2.8<L>  /  TBili  0.3  /  DBili  x   /  AST  21  /  ALT  18  /  AlkPhos  69  12-25      LIVER FUNCTIONS - ( 25 Dec 2020 08:10 )  Alb: 2.8 g/dL / Pro: 6.7 gm/dL / ALK PHOS: 69 U/L / ALT: 18 U/L / AST: 21 U/L / GGT: x             RADIOLOGY & ADDITIONAL STUDIES:

## 2020-12-26 NOTE — PROGRESS NOTE ADULT - ASSESSMENT
Porphyria  Continue  IV hydration   Pain control : transition to oral pain meds when painful crisis resolves  confirmatory labs pending  Outpatient genetics    Iron def anemia/ nausea / confirmation :  GI Consult

## 2020-12-26 NOTE — CONSULT NOTE ADULT - ASSESSMENT
Imp:  1) Reported h/o AIP (I don't have the records)  2) iron deficiency anemia    Rec:  Start PO iron  Stool guaiac  Should have genetic testing outpatient to confirm AIP diagnosis  Strongly encouraged follow up for EGD and colonoscopy. I emphasized improtance of this, possibility of underlying cancer. She did agree to follow up.
Insemination then patient has a history of porphyria cutanea tarda in 1992 for which she underwent phlebotomy with resolution of a rash of her hands.  Interestingly more than 10 years later she began experiencing abdominal pains and a diagnosis of acute intermittent porphyria was considered based on urine testing.  Management has included fluids/ Pain management/glucose  As per the records she has recently began experiencing erratic behavior.  Laboratory tests revealed microcytic anemia.  She is now admitted to the hospital with abdominal pains and has been started on a pain management  CT imaging fails to reveal Abdominal pathology    Plan    In terms of the work-up and confirmation of diagnosis of AIP would recommend spot urine for PBG, Total porphyrins and creatinine. If these levels are elevated this would be consistent with the diagnosis of acute porphyria .If the latter tests are normal, particularly while she is experiencing abdominal pain and acute porphyria can be excluded as a cause of her symptoms  If urine PBG is normal, and porphyrins are elevated, and clinical suspicion of AIP remain then spot urine ALA, plasma and fecal porphyrins would be recommended For further assessment    Patient has microcytic anemia and iron studies should be evaluated. If indeed she has iron deficiency would recommend GI evaluation

## 2020-12-27 LAB
ALBUMIN SERPL ELPH-MCNC: 3.6 G/DL — SIGNIFICANT CHANGE UP (ref 3.3–5)
ALP SERPL-CCNC: 111 U/L — SIGNIFICANT CHANGE UP (ref 40–120)
ALT FLD-CCNC: 31 U/L — SIGNIFICANT CHANGE UP (ref 12–78)
ANION GAP SERPL CALC-SCNC: 7 MMOL/L — SIGNIFICANT CHANGE UP (ref 5–17)
AST SERPL-CCNC: 24 U/L — SIGNIFICANT CHANGE UP (ref 15–37)
BASOPHILS # BLD AUTO: 0 K/UL — SIGNIFICANT CHANGE UP (ref 0–0.2)
BASOPHILS NFR BLD AUTO: 0 % — SIGNIFICANT CHANGE UP (ref 0–2)
BILIRUB SERPL-MCNC: 0.5 MG/DL — SIGNIFICANT CHANGE UP (ref 0.2–1.2)
BUN SERPL-MCNC: 3 MG/DL — LOW (ref 7–23)
CALCIUM SERPL-MCNC: 9.3 MG/DL — SIGNIFICANT CHANGE UP (ref 8.5–10.1)
CHLORIDE SERPL-SCNC: 108 MMOL/L — SIGNIFICANT CHANGE UP (ref 96–108)
CO2 SERPL-SCNC: 24 MMOL/L — SIGNIFICANT CHANGE UP (ref 22–31)
CREAT SERPL-MCNC: 0.52 MG/DL — SIGNIFICANT CHANGE UP (ref 0.5–1.3)
EOSINOPHIL # BLD AUTO: 0 K/UL — SIGNIFICANT CHANGE UP (ref 0–0.5)
EOSINOPHIL NFR BLD AUTO: 0 % — SIGNIFICANT CHANGE UP (ref 0–6)
GLUCOSE SERPL-MCNC: 164 MG/DL — HIGH (ref 70–99)
HCT VFR BLD CALC: 37.1 % — SIGNIFICANT CHANGE UP (ref 34.5–45)
HGB BLD-MCNC: 10.4 G/DL — LOW (ref 11.5–15.5)
LIDOCAIN IGE QN: 71 U/L — LOW (ref 73–393)
LYMPHOCYTES # BLD AUTO: 1.12 K/UL — SIGNIFICANT CHANGE UP (ref 1–3.3)
LYMPHOCYTES # BLD AUTO: 12 % — LOW (ref 13–44)
MCHC RBC-ENTMCNC: 18.9 PG — LOW (ref 27–34)
MCHC RBC-ENTMCNC: 28 GM/DL — LOW (ref 32–36)
MCV RBC AUTO: 67.5 FL — LOW (ref 80–100)
MONOCYTES # BLD AUTO: 0.66 K/UL — SIGNIFICANT CHANGE UP (ref 0–0.9)
MONOCYTES NFR BLD AUTO: 7 % — SIGNIFICANT CHANGE UP (ref 2–14)
NEUTROPHILS # BLD AUTO: 7.59 K/UL — HIGH (ref 1.8–7.4)
NEUTROPHILS NFR BLD AUTO: 81 % — HIGH (ref 43–77)
NRBC # BLD: SIGNIFICANT CHANGE UP /100 WBCS (ref 0–0)
PLATELET # BLD AUTO: 363 K/UL — SIGNIFICANT CHANGE UP (ref 150–400)
POTASSIUM SERPL-MCNC: 3.6 MMOL/L — SIGNIFICANT CHANGE UP (ref 3.5–5.3)
POTASSIUM SERPL-SCNC: 3.6 MMOL/L — SIGNIFICANT CHANGE UP (ref 3.5–5.3)
PROT SERPL-MCNC: 8.2 GM/DL — SIGNIFICANT CHANGE UP (ref 6–8.3)
RBC # BLD: 5.5 M/UL — HIGH (ref 3.8–5.2)
RBC # FLD: 20.6 % — HIGH (ref 10.3–14.5)
SODIUM SERPL-SCNC: 139 MMOL/L — SIGNIFICANT CHANGE UP (ref 135–145)
WBC # BLD: 9.37 K/UL — SIGNIFICANT CHANGE UP (ref 3.8–10.5)
WBC # FLD AUTO: 9.37 K/UL — SIGNIFICANT CHANGE UP (ref 3.8–10.5)

## 2020-12-27 PROCEDURE — 99232 SBSQ HOSP IP/OBS MODERATE 35: CPT

## 2020-12-27 RX ORDER — CEFTRIAXONE 500 MG/1
1000 INJECTION, POWDER, FOR SOLUTION INTRAMUSCULAR; INTRAVENOUS ONCE
Refills: 0 | Status: COMPLETED | OUTPATIENT
Start: 2020-12-27 | End: 2020-12-27

## 2020-12-27 RX ORDER — SODIUM CHLORIDE 9 MG/ML
1000 INJECTION INTRAMUSCULAR; INTRAVENOUS; SUBCUTANEOUS
Refills: 0 | Status: DISCONTINUED | OUTPATIENT
Start: 2020-12-27 | End: 2020-12-29

## 2020-12-27 RX ORDER — MORPHINE SULFATE 50 MG/1
230 CAPSULE, EXTENDED RELEASE ORAL
Qty: 0 | Refills: 0 | DISCHARGE

## 2020-12-27 RX ORDER — HYDROMORPHONE HYDROCHLORIDE 2 MG/ML
1 INJECTION INTRAMUSCULAR; INTRAVENOUS; SUBCUTANEOUS EVERY 4 HOURS
Refills: 0 | Status: DISCONTINUED | OUTPATIENT
Start: 2020-12-27 | End: 2020-12-28

## 2020-12-27 RX ORDER — HYDROMORPHONE HYDROCHLORIDE 2 MG/ML
0.5 INJECTION INTRAMUSCULAR; INTRAVENOUS; SUBCUTANEOUS EVERY 4 HOURS
Refills: 0 | Status: DISCONTINUED | OUTPATIENT
Start: 2020-12-27 | End: 2021-01-02

## 2020-12-27 RX ORDER — DEXTROSE MONOHYDRATE, SODIUM CHLORIDE, AND POTASSIUM CHLORIDE 50; .745; 4.5 G/1000ML; G/1000ML; G/1000ML
1000 INJECTION, SOLUTION INTRAVENOUS
Refills: 0 | Status: DISCONTINUED | OUTPATIENT
Start: 2020-12-27 | End: 2020-12-27

## 2020-12-27 RX ORDER — HYDRALAZINE HCL 50 MG
5 TABLET ORAL EVERY 6 HOURS
Refills: 0 | Status: DISCONTINUED | OUTPATIENT
Start: 2020-12-27 | End: 2020-12-28

## 2020-12-27 RX ORDER — POLYETHYLENE GLYCOL 3350 17 G/17G
17 POWDER, FOR SOLUTION ORAL DAILY
Refills: 0 | Status: DISCONTINUED | OUTPATIENT
Start: 2020-12-27 | End: 2021-01-02

## 2020-12-27 RX ORDER — CEFTRIAXONE 500 MG/1
INJECTION, POWDER, FOR SOLUTION INTRAMUSCULAR; INTRAVENOUS
Refills: 0 | Status: COMPLETED | OUTPATIENT
Start: 2020-12-27 | End: 2020-12-31

## 2020-12-27 RX ORDER — CEFTRIAXONE 500 MG/1
1000 INJECTION, POWDER, FOR SOLUTION INTRAMUSCULAR; INTRAVENOUS EVERY 24 HOURS
Refills: 0 | Status: COMPLETED | OUTPATIENT
Start: 2020-12-28 | End: 2020-12-31

## 2020-12-27 RX ORDER — AMLODIPINE BESYLATE 2.5 MG/1
2.5 TABLET ORAL DAILY
Refills: 0 | Status: DISCONTINUED | OUTPATIENT
Start: 2020-12-27 | End: 2021-01-06

## 2020-12-27 RX ADMIN — ONDANSETRON 4 MILLIGRAM(S): 8 TABLET, FILM COATED ORAL at 08:32

## 2020-12-27 RX ADMIN — Medication 250 MILLIGRAM(S): at 21:50

## 2020-12-27 RX ADMIN — Medication 1000 UNIT(S): at 21:50

## 2020-12-27 RX ADMIN — HYDROMORPHONE HYDROCHLORIDE 1 MILLIGRAM(S): 2 INJECTION INTRAMUSCULAR; INTRAVENOUS; SUBCUTANEOUS at 21:51

## 2020-12-27 RX ADMIN — PANTOPRAZOLE SODIUM 40 MILLIGRAM(S): 20 TABLET, DELAYED RELEASE ORAL at 11:49

## 2020-12-27 RX ADMIN — ONDANSETRON 4 MILLIGRAM(S): 8 TABLET, FILM COATED ORAL at 11:49

## 2020-12-27 RX ADMIN — SODIUM CHLORIDE 75 MILLILITER(S): 9 INJECTION INTRAMUSCULAR; INTRAVENOUS; SUBCUTANEOUS at 15:41

## 2020-12-27 RX ADMIN — Medication 1 MILLIGRAM(S): at 04:01

## 2020-12-27 RX ADMIN — Medication 1.25 MILLIGRAM(S): at 03:48

## 2020-12-27 RX ADMIN — MAGNESIUM OXIDE 400 MG ORAL TABLET 400 MILLIGRAM(S): 241.3 TABLET ORAL at 18:01

## 2020-12-27 RX ADMIN — DEXTROSE MONOHYDRATE, SODIUM CHLORIDE, AND POTASSIUM CHLORIDE 75 MILLILITER(S): 50; .745; 4.5 INJECTION, SOLUTION INTRAVENOUS at 01:33

## 2020-12-27 RX ADMIN — CEFTRIAXONE 1000 MILLIGRAM(S): 500 INJECTION, POWDER, FOR SOLUTION INTRAMUSCULAR; INTRAVENOUS at 15:48

## 2020-12-27 RX ADMIN — HYDROMORPHONE HYDROCHLORIDE 1 MILLIGRAM(S): 2 INJECTION INTRAMUSCULAR; INTRAVENOUS; SUBCUTANEOUS at 22:07

## 2020-12-27 RX ADMIN — ENOXAPARIN SODIUM 40 MILLIGRAM(S): 100 INJECTION SUBCUTANEOUS at 11:48

## 2020-12-27 RX ADMIN — MAGNESIUM OXIDE 400 MG ORAL TABLET 400 MILLIGRAM(S): 241.3 TABLET ORAL at 08:33

## 2020-12-27 RX ADMIN — ONDANSETRON 4 MILLIGRAM(S): 8 TABLET, FILM COATED ORAL at 18:01

## 2020-12-27 RX ADMIN — AMLODIPINE BESYLATE 2.5 MILLIGRAM(S): 2.5 TABLET ORAL at 11:56

## 2020-12-27 NOTE — PROGRESS NOTE ADULT - ASSESSMENT
Continue management of AIP  Increase hydration inclued D10  Consider change of analgesia from PCA to on demand  New IV iron folate    Reviewed with hospitalist

## 2020-12-27 NOTE — PROGRESS NOTE ADULT - ATTENDING COMMENTS
Patient seen and examined  on rounds with NP Magda Lopez .  I was physically present for the key portion of the evaluation and management service provided, I  examined the patient myself and reviewed the plan of care with the patient and  NP Magda Lopez , which I have reviewed and edited .  Medical records reviewed. History, review of systems, physical findings and lab results as documented confirmed , except as modified by me.  Agree with the assessment and plan of as stated and discussed, except as modified below.

## 2020-12-27 NOTE — PROGRESS NOTE ADULT - ASSESSMENT
65 year old woman with PMHx  staph infection, chronic abdominal pain, osteoporosis, adrenal insufficiency, [place], s/p cholecystectomy, s/p spinal fusion, chronic intermittent porphyria, anxiety, with multiple previous admissions for acute on chronic abdominal pain presents to [facility] on 12/23/20  for abdominal pain.  Pt over last several days with worsening 10/10 abd pain with associated nausea and vomiting.  admitted for     Severe abdominal pain with inability to tolerate p.o. secondary to  Acute on chronic intermittent porphyria exacerbation  Metabolic encephalopathy   - Dilaudid PCA dc'd, start IV Dilaudid PRN   - IV fluids restarted 12/27  -IV protonix, antiemetics   -supportive care  -heme evaluation -  spot urine for PBG, Total porphyrins and creatinine.   -bowel regimen  - dulcolax, miralax changed to PRN  - GI and oncology consult  -Seen by GI--> recommending genetic testing OP and EGD and Colonoscopy OP  Pyuria with E coli   -12/27  Start Ceftriaxone 1G daily x 3 days   Hypertension  -12/27 start Hydralazine IV PRN for SBP > 160,as not taking PO.   Microcytic anemia due to iron deficiency   - check iron, TIBC, FOBT, B12, folate - noted, FOBT - neg   -on oral FeSO4  Severe Hypokalemia, improving  - replace IV, check Mg, adjust IV fluids  Constipation- miralax, dulcolax as needed  Anxiety -Ativan PRN  Chronic adrenal insufficiency - patient refuses to take steroids, BP wnl, monitor    DVT prophylaxis: Lovenox   65 year old woman with PMHx  staph infection, chronic abdominal pain, osteoporosis, adrenal insufficiency, [place], s/p cholecystectomy, s/p spinal fusion, chronic intermittent porphyria, anxiety, with multiple previous admissions for acute on chronic abdominal pain presents to [facility] on 12/23/20  for abdominal pain.  Pt over last several days with worsening 10/10 abd pain with associated nausea and vomiting.  admitted for     Severe abdominal pain with inability to tolerate p.o. secondary to  Acute on chronic intermittent porphyria exacerbation  Metabolic encephalopathy   - Dilaudid PCA dc'd, start IV Dilaudid PRN   - IV fluids restarted 12/27  -IV protonix, antiemetics   -heme evaluation -  spot urine for PBG, Total porphyrins and creatinine.   -bowel regimen  - dulcolax, miralax changed to PRN  - GI and oncology consult  -Seen by GI--> recommending genetic testing OP and EGD and Colonoscopy OP  Pyuria with E coli suspected UTI  -12/27  Start Ceftriaxone 1G daily x 3 days   Hypertension  -12/27 start Hydralazine IV PRN for SBP > 160,as not taking PO.   Microcytic anemia due to iron deficiency   - check iron, TIBC, FOBT, B12, folate - noted, FOBT - neg   -on oral FeSO4  - GI consult - will need endoscopy and colonoscopy upon d/c   Severe Hypokalemia, improving  - replace IV, check Mg, adjust IV fluids  Constipation- miralax, dulcolax as needed  Anxiety -Ativan PRN  Chronic adrenal insufficiency - patient refuses to take steroids, BP wnl, monitor    DVT prophylaxis: Lovenox    Dispo - IV abx, IV fluids, PT evaluation

## 2020-12-27 NOTE — CHART NOTE - NSCHARTNOTEFT_GEN_A_CORE
Contacted by RN.  Patient's BP was 188 Contacted by RN.  Patient's BP was 188/65 mmHg and repeated was 205/72 mmHg.    Patient admitted for Acute abdominal pain 2/2 chronic intermittent porphyria,  after consulting [facility] will administer Vasotec 1.25 mg IV.  (noted to be safe)

## 2020-12-28 LAB
ALBUMIN SERPL ELPH-MCNC: 3.3 G/DL — SIGNIFICANT CHANGE UP (ref 3.3–5)
ALP SERPL-CCNC: 89 U/L — SIGNIFICANT CHANGE UP (ref 40–120)
ALT FLD-CCNC: 24 U/L — SIGNIFICANT CHANGE UP (ref 12–78)
AMMONIA BLD-MCNC: <10 UMOL/L — LOW (ref 11–32)
ANION GAP SERPL CALC-SCNC: 7 MMOL/L — SIGNIFICANT CHANGE UP (ref 5–17)
AST SERPL-CCNC: 17 U/L — SIGNIFICANT CHANGE UP (ref 15–37)
BASOPHILS # BLD AUTO: 0.14 K/UL — SIGNIFICANT CHANGE UP (ref 0–0.2)
BASOPHILS NFR BLD AUTO: 1 % — SIGNIFICANT CHANGE UP (ref 0–2)
BILIRUB SERPL-MCNC: 0.4 MG/DL — SIGNIFICANT CHANGE UP (ref 0.2–1.2)
BUN SERPL-MCNC: 8 MG/DL — SIGNIFICANT CHANGE UP (ref 7–23)
CALCIUM SERPL-MCNC: 9.1 MG/DL — SIGNIFICANT CHANGE UP (ref 8.5–10.1)
CHLORIDE SERPL-SCNC: 111 MMOL/L — HIGH (ref 96–108)
CO2 SERPL-SCNC: 25 MMOL/L — SIGNIFICANT CHANGE UP (ref 22–31)
CREAT SERPL-MCNC: 0.59 MG/DL — SIGNIFICANT CHANGE UP (ref 0.5–1.3)
EOSINOPHIL # BLD AUTO: 0 K/UL — SIGNIFICANT CHANGE UP (ref 0–0.5)
EOSINOPHIL NFR BLD AUTO: 0 % — SIGNIFICANT CHANGE UP (ref 0–6)
GLUCOSE SERPL-MCNC: 128 MG/DL — HIGH (ref 70–99)
HCT VFR BLD CALC: 34.4 % — LOW (ref 34.5–45)
HGB BLD-MCNC: 9.7 G/DL — LOW (ref 11.5–15.5)
LYMPHOCYTES # BLD AUTO: 1.37 K/UL — SIGNIFICANT CHANGE UP (ref 1–3.3)
LYMPHOCYTES # BLD AUTO: 10 % — LOW (ref 13–44)
MCHC RBC-ENTMCNC: 19.4 PG — LOW (ref 27–34)
MCHC RBC-ENTMCNC: 28.2 GM/DL — LOW (ref 32–36)
MCV RBC AUTO: 68.9 FL — LOW (ref 80–100)
MONOCYTES # BLD AUTO: 1.37 K/UL — HIGH (ref 0–0.9)
MONOCYTES NFR BLD AUTO: 10 % — SIGNIFICANT CHANGE UP (ref 2–14)
NEUTROPHILS # BLD AUTO: 10.82 K/UL — HIGH (ref 1.8–7.4)
NEUTROPHILS NFR BLD AUTO: 79 % — HIGH (ref 43–77)
NRBC # BLD: SIGNIFICANT CHANGE UP /100 WBCS (ref 0–0)
PBG UR-MCNC: 0.1 MG/L — SIGNIFICANT CHANGE UP (ref 0–2)
PBG UR-MCNC: 0.3 MG/L — SIGNIFICANT CHANGE UP (ref 0–2)
PLATELET # BLD AUTO: 368 K/UL — SIGNIFICANT CHANGE UP (ref 150–400)
PORPHYRINS UR-MCNC: SIGNIFICANT CHANGE UP MG/24 HR (ref 0–1.5)
POTASSIUM SERPL-MCNC: 3.3 MMOL/L — LOW (ref 3.5–5.3)
POTASSIUM SERPL-SCNC: 3.3 MMOL/L — LOW (ref 3.5–5.3)
PROT SERPL-MCNC: 7.3 GM/DL — SIGNIFICANT CHANGE UP (ref 6–8.3)
RBC # BLD: 4.99 M/UL — SIGNIFICANT CHANGE UP (ref 3.8–5.2)
RBC # FLD: 21.2 % — HIGH (ref 10.3–14.5)
SODIUM SERPL-SCNC: 143 MMOL/L — SIGNIFICANT CHANGE UP (ref 135–145)
WBC # BLD: 13.69 K/UL — HIGH (ref 3.8–10.5)
WBC # FLD AUTO: 13.69 K/UL — HIGH (ref 3.8–10.5)

## 2020-12-28 PROCEDURE — 99233 SBSQ HOSP IP/OBS HIGH 50: CPT

## 2020-12-28 RX ORDER — POTASSIUM CHLORIDE 20 MEQ
10 PACKET (EA) ORAL
Refills: 0 | Status: COMPLETED | OUTPATIENT
Start: 2020-12-28 | End: 2020-12-28

## 2020-12-28 RX ORDER — HYDROMORPHONE HYDROCHLORIDE 2 MG/ML
1 INJECTION INTRAMUSCULAR; INTRAVENOUS; SUBCUTANEOUS EVERY 4 HOURS
Refills: 0 | Status: DISCONTINUED | OUTPATIENT
Start: 2020-12-28 | End: 2020-12-29

## 2020-12-28 RX ORDER — HYDRALAZINE HCL 50 MG
5 TABLET ORAL EVERY 6 HOURS
Refills: 0 | Status: DISCONTINUED | OUTPATIENT
Start: 2020-12-28 | End: 2020-12-31

## 2020-12-28 RX ADMIN — HYDROMORPHONE HYDROCHLORIDE 1 MILLIGRAM(S): 2 INJECTION INTRAMUSCULAR; INTRAVENOUS; SUBCUTANEOUS at 03:34

## 2020-12-28 RX ADMIN — Medication 1 MILLIGRAM(S): at 01:37

## 2020-12-28 RX ADMIN — Medication 5 MILLIGRAM(S): at 09:05

## 2020-12-28 RX ADMIN — HYDROMORPHONE HYDROCHLORIDE 1 MILLIGRAM(S): 2 INJECTION INTRAMUSCULAR; INTRAVENOUS; SUBCUTANEOUS at 07:57

## 2020-12-28 RX ADMIN — HYDROMORPHONE HYDROCHLORIDE 1.5 MILLIGRAM(S): 2 INJECTION INTRAMUSCULAR; INTRAVENOUS; SUBCUTANEOUS at 10:21

## 2020-12-28 RX ADMIN — HYDROMORPHONE HYDROCHLORIDE 0.5 MILLIGRAM(S): 2 INJECTION INTRAMUSCULAR; INTRAVENOUS; SUBCUTANEOUS at 00:25

## 2020-12-28 RX ADMIN — HYDROMORPHONE HYDROCHLORIDE 0.5 MILLIGRAM(S): 2 INJECTION INTRAMUSCULAR; INTRAVENOUS; SUBCUTANEOUS at 04:59

## 2020-12-28 RX ADMIN — Medication 5 MILLIGRAM(S): at 23:57

## 2020-12-28 RX ADMIN — Medication 5 MILLIGRAM(S): at 12:06

## 2020-12-28 RX ADMIN — PANTOPRAZOLE SODIUM 40 MILLIGRAM(S): 20 TABLET, DELAYED RELEASE ORAL at 09:05

## 2020-12-28 RX ADMIN — Medication 1 MILLIGRAM(S): at 17:48

## 2020-12-28 RX ADMIN — HYDROMORPHONE HYDROCHLORIDE 0.5 MILLIGRAM(S): 2 INJECTION INTRAMUSCULAR; INTRAVENOUS; SUBCUTANEOUS at 00:10

## 2020-12-28 RX ADMIN — HYDROMORPHONE HYDROCHLORIDE 1 MILLIGRAM(S): 2 INJECTION INTRAMUSCULAR; INTRAVENOUS; SUBCUTANEOUS at 03:49

## 2020-12-28 RX ADMIN — Medication 1 MILLIGRAM(S): at 21:52

## 2020-12-28 RX ADMIN — SODIUM CHLORIDE 75 MILLILITER(S): 9 INJECTION INTRAMUSCULAR; INTRAVENOUS; SUBCUTANEOUS at 04:59

## 2020-12-28 RX ADMIN — Medication 1 MILLIGRAM(S): at 12:39

## 2020-12-28 RX ADMIN — HYDROMORPHONE HYDROCHLORIDE 1.5 MILLIGRAM(S): 2 INJECTION INTRAMUSCULAR; INTRAVENOUS; SUBCUTANEOUS at 23:54

## 2020-12-28 RX ADMIN — Medication 100 MILLIEQUIVALENT(S): at 13:39

## 2020-12-28 RX ADMIN — Medication 100 MILLIEQUIVALENT(S): at 15:51

## 2020-12-28 RX ADMIN — HYDROMORPHONE HYDROCHLORIDE 1.5 MILLIGRAM(S): 2 INJECTION INTRAMUSCULAR; INTRAVENOUS; SUBCUTANEOUS at 13:16

## 2020-12-28 RX ADMIN — ENOXAPARIN SODIUM 40 MILLIGRAM(S): 100 INJECTION SUBCUTANEOUS at 09:06

## 2020-12-28 RX ADMIN — Medication 5 MILLIGRAM(S): at 17:50

## 2020-12-28 RX ADMIN — CEFTRIAXONE 1000 MILLIGRAM(S): 500 INJECTION, POWDER, FOR SOLUTION INTRAMUSCULAR; INTRAVENOUS at 15:51

## 2020-12-28 RX ADMIN — ONDANSETRON 4 MILLIGRAM(S): 8 TABLET, FILM COATED ORAL at 07:57

## 2020-12-28 RX ADMIN — Medication 100 MILLIEQUIVALENT(S): at 17:48

## 2020-12-28 RX ADMIN — HYDROMORPHONE HYDROCHLORIDE 1.5 MILLIGRAM(S): 2 INJECTION INTRAMUSCULAR; INTRAVENOUS; SUBCUTANEOUS at 16:17

## 2020-12-28 RX ADMIN — HYDROMORPHONE HYDROCHLORIDE 1.5 MILLIGRAM(S): 2 INJECTION INTRAMUSCULAR; INTRAVENOUS; SUBCUTANEOUS at 19:27

## 2020-12-28 NOTE — PROGRESS NOTE ADULT - ASSESSMENT
Imp:  Reported h/o AIP although I reviewed records from  for last 15 years and didn't find any positive tests for porphyria    Rec:  Await urine tests for porphyria  continues symptomatic treatment for now  Outpatient genetic testing for porphyria

## 2020-12-28 NOTE — PROGRESS NOTE ADULT - ASSESSMENT
65 year old woman with PMHx  staph infection, chronic abdominal pain, osteoporosis, adrenal insufficiency, [place], s/p cholecystectomy, s/p spinal fusion, chronic intermittent porphyria, anxiety, with multiple previous admissions for acute on chronic abdominal pain presents to [facility] on 12/23/20  for abdominal pain.  Pt over last several days with worsening 10/10 abd pain with associated nausea and vomiting.  admitted for     Severe abdominal pain with inability to tolerate p.o. secondary to  Acute on chronic intermittent porphyria exacerbation  Metabolic encephalopathy   - Dilaudid PCA dc'd, start IV Dilaudid PRN   - IV fluids restarted 12/27  -IV protonix, antiemetics   -heme evaluation -  spot urine for PBG, Total porphyrins and creatinine.   -bowel regimen  - dulcolax, miralax changed to PRN  - GI and oncology consult  -Seen by GI--> recommending genetic testing OP and EGD and Colonoscopy OP  Pyuria with E coli suspected UTI  -12/27  Start Ceftriaxone 1G daily x 3 days   Hypertension  -12/27 start Hydralazine IV PRN for SBP > 160,as not taking PO.   Microcytic anemia due to iron deficiency   - check iron, TIBC, FOBT, B12, folate - noted, FOBT - neg   -on oral FeSO4  - GI consult - will need endoscopy and colonoscopy upon d/c   Severe Hypokalemia, improving  - replace IV, check Mg, adjust IV fluids  Constipation- miralax, dulcolax as needed  Anxiety -Ativan PRN  Chronic adrenal insufficiency - patient refuses to take steroids, BP wnl, monitor    DVT prophylaxis: Lovenox    Dispo - IV abx, IV fluids, PT evaluation  adjusted IV dilaudid, cont to monitor   dc planning

## 2020-12-29 LAB
ANION GAP SERPL CALC-SCNC: 8 MMOL/L — SIGNIFICANT CHANGE UP (ref 5–17)
BUN SERPL-MCNC: 8 MG/DL — SIGNIFICANT CHANGE UP (ref 7–23)
CALCIUM SERPL-MCNC: 9 MG/DL — SIGNIFICANT CHANGE UP (ref 8.5–10.1)
CHLORIDE SERPL-SCNC: 109 MMOL/L — HIGH (ref 96–108)
CO2 SERPL-SCNC: 21 MMOL/L — LOW (ref 22–31)
CREAT SERPL-MCNC: 0.46 MG/DL — LOW (ref 0.5–1.3)
D-ALA UR-MCNC: 2.1 MG/24 H — SIGNIFICANT CHANGE UP
D-ALA UR-MCNC: 2100 ML/24 H — SIGNIFICANT CHANGE UP
GLUCOSE SERPL-MCNC: 93 MG/DL — SIGNIFICANT CHANGE UP (ref 70–99)
HCT VFR BLD CALC: 34 % — LOW (ref 34.5–45)
HGB BLD-MCNC: 9.5 G/DL — LOW (ref 11.5–15.5)
MAGNESIUM SERPL-MCNC: 2.5 MG/DL — SIGNIFICANT CHANGE UP (ref 1.6–2.6)
MCHC RBC-ENTMCNC: 19.6 PG — LOW (ref 27–34)
MCHC RBC-ENTMCNC: 27.9 GM/DL — LOW (ref 32–36)
MCV RBC AUTO: 70.1 FL — LOW (ref 80–100)
PLATELET # BLD AUTO: 323 K/UL — SIGNIFICANT CHANGE UP (ref 150–400)
POTASSIUM SERPL-MCNC: 3.1 MMOL/L — LOW (ref 3.5–5.3)
POTASSIUM SERPL-SCNC: 3.1 MMOL/L — LOW (ref 3.5–5.3)
RBC # BLD: 4.85 M/UL — SIGNIFICANT CHANGE UP (ref 3.8–5.2)
RBC # FLD: 22.5 % — HIGH (ref 10.3–14.5)
SODIUM SERPL-SCNC: 138 MMOL/L — SIGNIFICANT CHANGE UP (ref 135–145)
WBC # BLD: 12.52 K/UL — HIGH (ref 3.8–10.5)
WBC # FLD AUTO: 12.52 K/UL — HIGH (ref 3.8–10.5)

## 2020-12-29 PROCEDURE — 99233 SBSQ HOSP IP/OBS HIGH 50: CPT

## 2020-12-29 RX ORDER — DEXTROSE MONOHYDRATE, SODIUM CHLORIDE, AND POTASSIUM CHLORIDE 50; .745; 4.5 G/1000ML; G/1000ML; G/1000ML
1000 INJECTION, SOLUTION INTRAVENOUS
Refills: 0 | Status: DISCONTINUED | OUTPATIENT
Start: 2020-12-29 | End: 2020-12-31

## 2020-12-29 RX ORDER — HYDROMORPHONE HYDROCHLORIDE 2 MG/ML
1.5 INJECTION INTRAMUSCULAR; INTRAVENOUS; SUBCUTANEOUS
Refills: 0 | Status: DISCONTINUED | OUTPATIENT
Start: 2020-12-29 | End: 2021-01-01

## 2020-12-29 RX ORDER — POTASSIUM CHLORIDE 20 MEQ
40 PACKET (EA) ORAL EVERY 4 HOURS
Refills: 0 | Status: COMPLETED | OUTPATIENT
Start: 2020-12-29 | End: 2020-12-30

## 2020-12-29 RX ADMIN — HYDROMORPHONE HYDROCHLORIDE 1.5 MILLIGRAM(S): 2 INJECTION INTRAMUSCULAR; INTRAVENOUS; SUBCUTANEOUS at 04:23

## 2020-12-29 RX ADMIN — HYDROMORPHONE HYDROCHLORIDE 1.5 MILLIGRAM(S): 2 INJECTION INTRAMUSCULAR; INTRAVENOUS; SUBCUTANEOUS at 09:11

## 2020-12-29 RX ADMIN — ONDANSETRON 4 MILLIGRAM(S): 8 TABLET, FILM COATED ORAL at 06:16

## 2020-12-29 RX ADMIN — SODIUM CHLORIDE 75 MILLILITER(S): 9 INJECTION INTRAMUSCULAR; INTRAVENOUS; SUBCUTANEOUS at 20:46

## 2020-12-29 RX ADMIN — SODIUM CHLORIDE 75 MILLILITER(S): 9 INJECTION INTRAMUSCULAR; INTRAVENOUS; SUBCUTANEOUS at 06:22

## 2020-12-29 RX ADMIN — Medication 5 MILLIGRAM(S): at 23:16

## 2020-12-29 RX ADMIN — ONDANSETRON 4 MILLIGRAM(S): 8 TABLET, FILM COATED ORAL at 12:11

## 2020-12-29 RX ADMIN — HYDROMORPHONE HYDROCHLORIDE 1.5 MILLIGRAM(S): 2 INJECTION INTRAMUSCULAR; INTRAVENOUS; SUBCUTANEOUS at 22:48

## 2020-12-29 RX ADMIN — Medication 1 MILLIGRAM(S): at 16:05

## 2020-12-29 RX ADMIN — Medication 5 MILLIGRAM(S): at 04:23

## 2020-12-29 RX ADMIN — CEFTRIAXONE 1000 MILLIGRAM(S): 500 INJECTION, POWDER, FOR SOLUTION INTRAMUSCULAR; INTRAVENOUS at 09:10

## 2020-12-29 RX ADMIN — ONDANSETRON 4 MILLIGRAM(S): 8 TABLET, FILM COATED ORAL at 16:05

## 2020-12-29 RX ADMIN — HYDROMORPHONE HYDROCHLORIDE 1 MILLIGRAM(S): 2 INJECTION INTRAMUSCULAR; INTRAVENOUS; SUBCUTANEOUS at 17:06

## 2020-12-29 RX ADMIN — DEXTROSE MONOHYDRATE, SODIUM CHLORIDE, AND POTASSIUM CHLORIDE 80 MILLILITER(S): 50; .745; 4.5 INJECTION, SOLUTION INTRAVENOUS at 22:47

## 2020-12-29 RX ADMIN — Medication 1 MILLIGRAM(S): at 02:33

## 2020-12-29 RX ADMIN — HYDROMORPHONE HYDROCHLORIDE 1.5 MILLIGRAM(S): 2 INJECTION INTRAMUSCULAR; INTRAVENOUS; SUBCUTANEOUS at 19:23

## 2020-12-29 RX ADMIN — HYDROMORPHONE HYDROCHLORIDE 1 MILLIGRAM(S): 2 INJECTION INTRAMUSCULAR; INTRAVENOUS; SUBCUTANEOUS at 13:02

## 2020-12-29 RX ADMIN — PANTOPRAZOLE SODIUM 40 MILLIGRAM(S): 20 TABLET, DELAYED RELEASE ORAL at 09:10

## 2020-12-29 RX ADMIN — ENOXAPARIN SODIUM 40 MILLIGRAM(S): 100 INJECTION SUBCUTANEOUS at 09:10

## 2020-12-29 RX ADMIN — Medication 1 MILLIGRAM(S): at 06:59

## 2020-12-29 RX ADMIN — Medication 1 MILLIGRAM(S): at 12:11

## 2020-12-29 RX ADMIN — Medication 5 MILLIGRAM(S): at 18:14

## 2020-12-29 RX ADMIN — Medication 5 MILLIGRAM(S): at 12:18

## 2020-12-29 RX ADMIN — AMLODIPINE BESYLATE 2.5 MILLIGRAM(S): 2.5 TABLET ORAL at 09:11

## 2020-12-29 RX ADMIN — Medication 40 MILLIEQUIVALENT(S): at 22:42

## 2020-12-29 NOTE — PROGRESS NOTE ADULT - ASSESSMENT
Abdominal pain/etiology unclear  Negative urine test speaks against AIP  Iron deficiency anemia    Plan    Recommend GI work-up for iron deficiency  Consider IV iron if p.o. is poorly tolerated In terms of supplementation  We will need to readdress the issue of underlying diagnosis i.e. porphyria or not  Recommend a psychiatric evaluation for history of anxiety etc.

## 2020-12-29 NOTE — PROGRESS NOTE ADULT - ASSESSMENT
65 year old woman with PMHx  staph infection, chronic abdominal pain, osteoporosis, adrenal insufficiency, [place], s/p cholecystectomy, s/p spinal fusion, chronic intermittent porphyria, anxiety, with multiple previous admissions for acute on chronic abdominal pain presents to [facility] on 12/23/20  for abdominal pain.  Pt over last several days with worsening 10/10 abd pain with associated nausea and vomiting.  admitted for     Severe abdominal pain with inability to tolerate p.o. secondary to  Acute on chronic intermittent porphyria exacerbation  Metabolic encephalopathy   - Dilaudid PCA dc'd, start IV Dilaudid PRN - IV fluids restarted 12/27  -IV protonix, antiemetics   -heme evaluation -  spot urine for PBG, Total porphyrins and creatinine.   -bowel regimen  - dulcolax, miralax changed to PRN  - GI and oncology consult  -Seen by GI--> recommending genetic testing OP and EGD and Colonoscopy OP  12/29 increase dilaudid again; poor po intake, IVFs   Pyuria with E coli suspected UTI  -12/27  Start Ceftriaxone 1G daily x 3 days   Hypertension  -12/27 start Hydralazine IV PRN for SBP > 160,as not taking PO.   Microcytic anemia due to iron deficiency   - check iron, TIBC, FOBT, B12, folate - noted, FOBT - neg   -on oral FeSO4  - GI consult - will need endoscopy and colonoscopy upon d/c   Constipation- miralax, dulcolax as needed  Anxiety -Ativan PRN  Chronic adrenal insufficiency - patient refuses to take steroids, BP wnl, monitor    DVT prophylaxis: Lovenox  discussed with oncology Dr Maza and Dr Ramirez   will call Molly Mccauley pt's OP pain mx physician     Dispo - IV abx, IV fluids, PT evaluation  adjusted IV dilaudid, cont to monitor   dc planning

## 2020-12-30 LAB
ANION GAP SERPL CALC-SCNC: 10 MMOL/L — SIGNIFICANT CHANGE UP (ref 5–17)
BUN SERPL-MCNC: 6 MG/DL — LOW (ref 7–23)
CALCIUM SERPL-MCNC: 9 MG/DL — SIGNIFICANT CHANGE UP (ref 8.5–10.1)
CHLORIDE SERPL-SCNC: 111 MMOL/L — HIGH (ref 96–108)
CO2 SERPL-SCNC: 20 MMOL/L — LOW (ref 22–31)
CREAT SERPL-MCNC: 0.43 MG/DL — LOW (ref 0.5–1.3)
GLUCOSE SERPL-MCNC: 92 MG/DL — SIGNIFICANT CHANGE UP (ref 70–99)
HCT VFR BLD CALC: 31.7 % — LOW (ref 34.5–45)
HGB BLD-MCNC: 9.2 G/DL — LOW (ref 11.5–15.5)
MAGNESIUM SERPL-MCNC: 2.3 MG/DL — SIGNIFICANT CHANGE UP (ref 1.6–2.6)
MCHC RBC-ENTMCNC: 20 PG — LOW (ref 27–34)
MCHC RBC-ENTMCNC: 29 GM/DL — LOW (ref 32–36)
MCV RBC AUTO: 68.9 FL — LOW (ref 80–100)
PLATELET # BLD AUTO: 312 K/UL — SIGNIFICANT CHANGE UP (ref 150–400)
POTASSIUM SERPL-MCNC: 3.4 MMOL/L — LOW (ref 3.5–5.3)
POTASSIUM SERPL-SCNC: 3.4 MMOL/L — LOW (ref 3.5–5.3)
RBC # BLD: 4.6 M/UL — SIGNIFICANT CHANGE UP (ref 3.8–5.2)
RBC # FLD: 22.9 % — HIGH (ref 10.3–14.5)
SODIUM SERPL-SCNC: 141 MMOL/L — SIGNIFICANT CHANGE UP (ref 135–145)
WBC # BLD: 10.86 K/UL — HIGH (ref 3.8–10.5)
WBC # FLD AUTO: 10.86 K/UL — HIGH (ref 3.8–10.5)

## 2020-12-30 PROCEDURE — 99232 SBSQ HOSP IP/OBS MODERATE 35: CPT

## 2020-12-30 RX ORDER — POTASSIUM CHLORIDE 20 MEQ
10 PACKET (EA) ORAL
Refills: 0 | Status: COMPLETED | OUTPATIENT
Start: 2020-12-30 | End: 2020-12-31

## 2020-12-30 RX ORDER — IRON SUCROSE 20 MG/ML
200 INJECTION, SOLUTION INTRAVENOUS EVERY 24 HOURS
Refills: 0 | Status: DISCONTINUED | OUTPATIENT
Start: 2020-12-30 | End: 2020-12-31

## 2020-12-30 RX ORDER — DEXTROSE MONOHYDRATE, SODIUM CHLORIDE, AND POTASSIUM CHLORIDE 50; .745; 4.5 G/1000ML; G/1000ML; G/1000ML
1000 INJECTION, SOLUTION INTRAVENOUS
Refills: 0 | Status: DISCONTINUED | OUTPATIENT
Start: 2020-12-30 | End: 2020-12-31

## 2020-12-30 RX ORDER — POTASSIUM CHLORIDE 20 MEQ
10 PACKET (EA) ORAL
Refills: 0 | Status: DISCONTINUED | OUTPATIENT
Start: 2020-12-30 | End: 2020-12-30

## 2020-12-30 RX ADMIN — HYDROMORPHONE HYDROCHLORIDE 1.5 MILLIGRAM(S): 2 INJECTION INTRAMUSCULAR; INTRAVENOUS; SUBCUTANEOUS at 22:26

## 2020-12-30 RX ADMIN — HYDROMORPHONE HYDROCHLORIDE 1.5 MILLIGRAM(S): 2 INJECTION INTRAMUSCULAR; INTRAVENOUS; SUBCUTANEOUS at 22:42

## 2020-12-30 RX ADMIN — HYDROMORPHONE HYDROCHLORIDE 1.5 MILLIGRAM(S): 2 INJECTION INTRAMUSCULAR; INTRAVENOUS; SUBCUTANEOUS at 12:04

## 2020-12-30 RX ADMIN — HYDROMORPHONE HYDROCHLORIDE 1.5 MILLIGRAM(S): 2 INJECTION INTRAMUSCULAR; INTRAVENOUS; SUBCUTANEOUS at 01:40

## 2020-12-30 RX ADMIN — ONDANSETRON 4 MILLIGRAM(S): 8 TABLET, FILM COATED ORAL at 12:04

## 2020-12-30 RX ADMIN — Medication 5 MILLIGRAM(S): at 06:04

## 2020-12-30 RX ADMIN — PANTOPRAZOLE SODIUM 40 MILLIGRAM(S): 20 TABLET, DELAYED RELEASE ORAL at 09:04

## 2020-12-30 RX ADMIN — HYDROMORPHONE HYDROCHLORIDE 1.5 MILLIGRAM(S): 2 INJECTION INTRAMUSCULAR; INTRAVENOUS; SUBCUTANEOUS at 06:02

## 2020-12-30 RX ADMIN — Medication 40 MILLIEQUIVALENT(S): at 01:40

## 2020-12-30 RX ADMIN — Medication 5 MILLIGRAM(S): at 18:00

## 2020-12-30 RX ADMIN — IRON SUCROSE 110 MILLIGRAM(S): 20 INJECTION, SOLUTION INTRAVENOUS at 12:04

## 2020-12-30 RX ADMIN — DEXTROSE MONOHYDRATE, SODIUM CHLORIDE, AND POTASSIUM CHLORIDE 30 MILLILITER(S): 50; .745; 4.5 INJECTION, SOLUTION INTRAVENOUS at 22:20

## 2020-12-30 RX ADMIN — HYDROMORPHONE HYDROCHLORIDE 1.5 MILLIGRAM(S): 2 INJECTION INTRAMUSCULAR; INTRAVENOUS; SUBCUTANEOUS at 15:13

## 2020-12-30 RX ADMIN — HYDROMORPHONE HYDROCHLORIDE 1.5 MILLIGRAM(S): 2 INJECTION INTRAMUSCULAR; INTRAVENOUS; SUBCUTANEOUS at 09:03

## 2020-12-30 RX ADMIN — CEFTRIAXONE 1000 MILLIGRAM(S): 500 INJECTION, POWDER, FOR SOLUTION INTRAMUSCULAR; INTRAVENOUS at 09:04

## 2020-12-30 RX ADMIN — ONDANSETRON 4 MILLIGRAM(S): 8 TABLET, FILM COATED ORAL at 17:59

## 2020-12-30 RX ADMIN — HYDROMORPHONE HYDROCHLORIDE 1.5 MILLIGRAM(S): 2 INJECTION INTRAMUSCULAR; INTRAVENOUS; SUBCUTANEOUS at 18:32

## 2020-12-30 RX ADMIN — Medication 5 MILLIGRAM(S): at 14:18

## 2020-12-30 RX ADMIN — Medication 1 MILLIGRAM(S): at 02:58

## 2020-12-30 RX ADMIN — ENOXAPARIN SODIUM 40 MILLIGRAM(S): 100 INJECTION SUBCUTANEOUS at 09:04

## 2020-12-30 NOTE — PROGRESS NOTE ADULT - ASSESSMENT
Imp:  Intermittent abdominal pain  Porphyria unlikely with negative urinary PGB  Iron deficiency anemia    Rec:  Patient agreeable to EGD, which we'll arrange tomorrow. Risks and benefits reviewed.  She doesn't feel she can tolerate a prep so colonoscopy deferred to outpatient

## 2020-12-30 NOTE — PROGRESS NOTE ADULT - ASSESSMENT
65 year old woman with PMHx  staph infection, chronic abdominal pain, osteoporosis, adrenal insufficiency, [place], s/p cholecystectomy, s/p spinal fusion, chronic intermittent porphyria, anxiety, with multiple previous admissions for acute on chronic abdominal pain presents to [facility] on 12/23/20  for abdominal pain.  Pt over last several days with worsening 10/10 abd pain with associated nausea and vomiting.  admitted for     Severe abdominal pain with inability to tolerate p.o. secondary to  Acute on chronic intermittent porphyria exacerbation  Metabolic encephalopathy   - Dilaudid PCA dc'd, start IV Dilaudid PRN - IV fluids restarted 12/27  -IV protonix, antiemetics   -heme evaluation -  spot urine for PBG, Total porphyrins and creatinine.   -bowel regimen  - dulcolax, miralax changed to PRN  - GI and oncology consult  -Seen by GI--> recommending genetic testing OP and EGD and Colonoscopy OP  12/29 increase dilaudid again; poor po intake, IVFs   Pyuria with E coli suspected UTI  -12/27  Start Ceftriaxone 1G daily x 3 days   Hypertension  -12/27 start Hydralazine IV PRN for SBP > 160,as not taking PO.   Microcytic anemia due to iron deficiency   - check iron, TIBC, FOBT, B12, folate - noted, FOBT - neg   -on oral FeSO4  - GI consult - will need endoscopy and colonoscopy upon d/c   12/30- EGD tomorrow; NPO P MN   Constipation- miralax, dulcolax as needed  Anxiety -Ativan PRN  Chronic adrenal insufficiency - patient refuses to take steroids, BP wnl, monitor    DVT prophylaxis: Lovenox    Dispo - IV abx, IV fluids, PT evaluation  on IV dilaudid, cont to monitor ; f/u porphyria testing   will call Molly Mccauley pt's OP pain mx physician   discussed with Dr Ramirez

## 2020-12-31 ENCOUNTER — RESULT REVIEW (OUTPATIENT)
Age: 65
End: 2020-12-31

## 2020-12-31 LAB
ANION GAP SERPL CALC-SCNC: 9 MMOL/L — SIGNIFICANT CHANGE UP (ref 5–17)
BUN SERPL-MCNC: 4 MG/DL — LOW (ref 7–23)
CALCIUM SERPL-MCNC: 9.3 MG/DL — SIGNIFICANT CHANGE UP (ref 8.5–10.1)
CHLORIDE SERPL-SCNC: 110 MMOL/L — HIGH (ref 96–108)
CO2 SERPL-SCNC: 20 MMOL/L — LOW (ref 22–31)
COPROPORPHYRIN I - RANDOM URINE: 162.2 — HIGH (ref 6.5–33.2)
COPROPORPHYRIN III - RANDOM URINE: 141.7 — HIGH (ref 4.8–88.6)
CREAT SERPL-MCNC: 0.43 MG/DL — LOW (ref 0.5–1.3)
GLUCOSE SERPL-MCNC: 118 MG/DL — HIGH (ref 70–99)
HCT VFR BLD CALC: 33.4 % — LOW (ref 34.5–45)
HEPTA-CP UR-MCNC: 4.4 — HIGH
HEXA-CP UR-MCNC: SIGNIFICANT CHANGE UP
HGB BLD-MCNC: 9.8 G/DL — LOW (ref 11.5–15.5)
MAGNESIUM SERPL-MCNC: 2.2 MG/DL — SIGNIFICANT CHANGE UP (ref 1.6–2.6)
MCHC RBC-ENTMCNC: 20.2 PG — LOW (ref 27–34)
MCHC RBC-ENTMCNC: 29.3 GM/DL — LOW (ref 32–36)
MCV RBC AUTO: 68.9 FL — LOW (ref 80–100)
PENTACARBOXYPORPHRIN, RANDOM URINE: 2.8 — SIGNIFICANT CHANGE UP
PLATELET # BLD AUTO: 351 K/UL — SIGNIFICANT CHANGE UP (ref 150–400)
PORPHYRINS RANDOM URINE INTERPRETATION: SIGNIFICANT CHANGE UP
PORPHYRINS UR-MCNC: 371.8 — HIGH (ref 27–153.6)
POTASSIUM SERPL-MCNC: 3.4 MMOL/L — LOW (ref 3.5–5.3)
POTASSIUM SERPL-SCNC: 3.4 MMOL/L — LOW (ref 3.5–5.3)
RBC # BLD: 4.85 M/UL — SIGNIFICANT CHANGE UP (ref 3.8–5.2)
RBC # FLD: 24.1 % — HIGH (ref 10.3–14.5)
SODIUM SERPL-SCNC: 139 MMOL/L — SIGNIFICANT CHANGE UP (ref 135–145)
UROPORPHYRIN I - RANDOM URINE: 53.5 — HIGH (ref 3.6–21.1)
UROPORPHYRIN III - RANDOM URINE: 7.2 — HIGH
WBC # BLD: 11.29 K/UL — HIGH (ref 3.8–10.5)
WBC # FLD AUTO: 11.29 K/UL — HIGH (ref 3.8–10.5)

## 2020-12-31 PROCEDURE — 99233 SBSQ HOSP IP/OBS HIGH 50: CPT

## 2020-12-31 PROCEDURE — 88312 SPECIAL STAINS GROUP 1: CPT | Mod: 26

## 2020-12-31 PROCEDURE — 88305 TISSUE EXAM BY PATHOLOGIST: CPT | Mod: 26

## 2020-12-31 RX ORDER — DEXTROSE MONOHYDRATE, SODIUM CHLORIDE, AND POTASSIUM CHLORIDE 50; .745; 4.5 G/1000ML; G/1000ML; G/1000ML
1000 INJECTION, SOLUTION INTRAVENOUS
Refills: 0 | Status: DISCONTINUED | OUTPATIENT
Start: 2020-12-31 | End: 2021-01-02

## 2020-12-31 RX ORDER — HYDRALAZINE HCL 50 MG
10 TABLET ORAL EVERY 8 HOURS
Refills: 0 | Status: DISCONTINUED | OUTPATIENT
Start: 2020-12-31 | End: 2021-01-06

## 2020-12-31 RX ORDER — IRON SUCROSE 20 MG/ML
200 INJECTION, SOLUTION INTRAVENOUS EVERY 24 HOURS
Refills: 0 | Status: COMPLETED | OUTPATIENT
Start: 2020-12-31 | End: 2021-01-01

## 2020-12-31 RX ADMIN — ONDANSETRON 4 MILLIGRAM(S): 8 TABLET, FILM COATED ORAL at 08:22

## 2020-12-31 RX ADMIN — HYDROMORPHONE HYDROCHLORIDE 1.5 MILLIGRAM(S): 2 INJECTION INTRAMUSCULAR; INTRAVENOUS; SUBCUTANEOUS at 02:22

## 2020-12-31 RX ADMIN — Medication 5 MILLIGRAM(S): at 05:42

## 2020-12-31 RX ADMIN — Medication 100 MILLIEQUIVALENT(S): at 11:50

## 2020-12-31 RX ADMIN — ENOXAPARIN SODIUM 40 MILLIGRAM(S): 100 INJECTION SUBCUTANEOUS at 09:15

## 2020-12-31 RX ADMIN — CEFTRIAXONE 1000 MILLIGRAM(S): 500 INJECTION, POWDER, FOR SOLUTION INTRAMUSCULAR; INTRAVENOUS at 10:00

## 2020-12-31 RX ADMIN — Medication 250 MILLIGRAM(S): at 21:23

## 2020-12-31 RX ADMIN — HYDROMORPHONE HYDROCHLORIDE 1.5 MILLIGRAM(S): 2 INJECTION INTRAMUSCULAR; INTRAVENOUS; SUBCUTANEOUS at 05:53

## 2020-12-31 RX ADMIN — DEXTROSE MONOHYDRATE, SODIUM CHLORIDE, AND POTASSIUM CHLORIDE 70 MILLILITER(S): 50; .745; 4.5 INJECTION, SOLUTION INTRAVENOUS at 15:27

## 2020-12-31 RX ADMIN — Medication 10 MILLIGRAM(S): at 21:23

## 2020-12-31 RX ADMIN — Medication 100 MILLIEQUIVALENT(S): at 10:59

## 2020-12-31 RX ADMIN — HYDROMORPHONE HYDROCHLORIDE 1.5 MILLIGRAM(S): 2 INJECTION INTRAMUSCULAR; INTRAVENOUS; SUBCUTANEOUS at 05:40

## 2020-12-31 RX ADMIN — Medication 1 MILLIGRAM(S): at 00:39

## 2020-12-31 RX ADMIN — Medication 1 MILLIGRAM(S): at 14:45

## 2020-12-31 RX ADMIN — HYDROMORPHONE HYDROCHLORIDE 1.5 MILLIGRAM(S): 2 INJECTION INTRAMUSCULAR; INTRAVENOUS; SUBCUTANEOUS at 02:38

## 2020-12-31 RX ADMIN — Medication 5 MILLIGRAM(S): at 00:40

## 2020-12-31 RX ADMIN — Medication 10 MILLIGRAM(S): at 15:41

## 2020-12-31 RX ADMIN — Medication 100 MILLIEQUIVALENT(S): at 05:36

## 2020-12-31 RX ADMIN — Medication 5 MILLIGRAM(S): at 11:01

## 2020-12-31 RX ADMIN — Medication 1 MILLIGRAM(S): at 09:05

## 2020-12-31 RX ADMIN — Medication 1 MILLIGRAM(S): at 23:15

## 2020-12-31 RX ADMIN — PANTOPRAZOLE SODIUM 40 MILLIGRAM(S): 20 TABLET, DELAYED RELEASE ORAL at 09:14

## 2020-12-31 RX ADMIN — IRON SUCROSE 200 MILLIGRAM(S): 20 INJECTION, SOLUTION INTRAVENOUS at 11:00

## 2020-12-31 RX ADMIN — Medication 1000 UNIT(S): at 21:23

## 2020-12-31 RX ADMIN — ONDANSETRON 4 MILLIGRAM(S): 8 TABLET, FILM COATED ORAL at 15:49

## 2020-12-31 NOTE — PROGRESS NOTE ADULT - ASSESSMENT
65 year old woman with PMHx  staph infection, chronic abdominal pain, osteoporosis, adrenal insufficiency, [place], s/p cholecystectomy, s/p spinal fusion, chronic intermittent porphyria, anxiety, with multiple previous admissions for acute on chronic abdominal pain presents to [facility] on 12/23/20  for abdominal pain.  Pt over last several days with worsening 10/10 abd pain with associated nausea and vomiting.  admitted for     Severe abdominal pain with inability to tolerate p.o. secondary to  Acute on chronic intermittent porphyria exacerbation  Metabolic encephalopathy   - Dilaudid PCA dc'd, start IV Dilaudid PRN - IV fluids restarted 12/27  -IV protonix, antiemetics   -heme evaluation -  spot urine for PBG, Total porphyrins and creatinine.   -bowel regimen  - dulcolax, miralax changed to PRN  - GI and oncology consult  -Seen by GI--> recommending genetic testing OP and EGD and Colonoscopy OP  12/29 increase dilaudid again; poor po intake, IVFs     Pyuria with E coli suspected UTI  -12/27  Start Ceftriaxone 1G daily x 3 days - completed     Hypertension  -12/31 increase Hydralazine IV ATC    Microcytic anemia due to iron deficiency   - check iron, TIBC, FOBT, B12, folate - noted, FOBT - neg   -on oral FeSO4  - GI consult - will need endoscopy and colonoscopy upon d/c   12/31- s/p EGD unremarkable, small hiatal hernia   rec f/u GI for COL as OP     Constipation- miralax, dulcolax as needed  Anxiety -Ativan PRN  Chronic adrenal insufficiency - patient refuses to take steroids, BP wnl, monitor    DVT prophylaxis: Lovenox    Dispo - IV abx, IV fluids, PT evaluation  on IV dilaudid, cont to monitor ; f/u porphyria testing   discussed with Dr Molly Mccauley pt's OP pain mx physician and Dr Maza

## 2020-12-31 NOTE — PROVIDER CONTACT NOTE (OTHER) - SITUATION
Dr. Jarvis spoke with Dr. Seay about this consult.
Spoke with Estephania in the answering service regarding consult
Dr. Roy called during shift to state that the potassium chloride orders were to begin in AM. Spoke with Jr resident to confirm
Pt /56 and repeat 205/72

## 2020-12-31 NOTE — PROGRESS NOTE ADULT - ASSESSMENT
65-year-old female with recurrent episodes of significant abdominal pain  Thought to have acute intermittent porphyria  Treated with longstanding outpatient narcotics and IV hydration  Intermittent hospitalizations for acute abdominal pain which resolved with increased hydration    Patient has been seen by GI and additionally by my associates  Diagnosis of porphyria has been brought into question    Differential diagnosis remains forme fruste of AIP,Versus Munchhausen syndrome,Versus atypical intermittent gastrointestinal pain syndrome etiology unknown to me    Plan continue present care, In the past patient has improved with IV hydration with increased amounts of glucose,Occasionally D10  Adequate pain control  Continue parenteral iron    Will follow up with GI to make definitive diagnosis and assess explanation for new iron deficiency

## 2021-01-01 LAB
ANION GAP SERPL CALC-SCNC: 8 MMOL/L — SIGNIFICANT CHANGE UP (ref 5–17)
BUN SERPL-MCNC: 13 MG/DL — SIGNIFICANT CHANGE UP (ref 7–23)
CALCIUM SERPL-MCNC: 9.2 MG/DL — SIGNIFICANT CHANGE UP (ref 8.5–10.1)
CHLORIDE SERPL-SCNC: 113 MMOL/L — HIGH (ref 96–108)
CO2 SERPL-SCNC: 21 MMOL/L — LOW (ref 22–31)
CREAT SERPL-MCNC: 0.47 MG/DL — LOW (ref 0.5–1.3)
GLUCOSE SERPL-MCNC: 133 MG/DL — HIGH (ref 70–99)
HCT VFR BLD CALC: 33.6 % — LOW (ref 34.5–45)
HGB BLD-MCNC: 9.7 G/DL — LOW (ref 11.5–15.5)
MAGNESIUM SERPL-MCNC: 2.1 MG/DL — SIGNIFICANT CHANGE UP (ref 1.6–2.6)
MCHC RBC-ENTMCNC: 20.3 PG — LOW (ref 27–34)
MCHC RBC-ENTMCNC: 28.9 GM/DL — LOW (ref 32–36)
MCV RBC AUTO: 70.3 FL — LOW (ref 80–100)
PLATELET # BLD AUTO: 384 K/UL — SIGNIFICANT CHANGE UP (ref 150–400)
POTASSIUM SERPL-MCNC: 3.4 MMOL/L — LOW (ref 3.5–5.3)
POTASSIUM SERPL-SCNC: 3.4 MMOL/L — LOW (ref 3.5–5.3)
RBC # BLD: 4.78 M/UL — SIGNIFICANT CHANGE UP (ref 3.8–5.2)
RBC # FLD: 24.7 % — HIGH (ref 10.3–14.5)
SODIUM SERPL-SCNC: 142 MMOL/L — SIGNIFICANT CHANGE UP (ref 135–145)
WBC # BLD: 16.06 K/UL — HIGH (ref 3.8–10.5)
WBC # FLD AUTO: 16.06 K/UL — HIGH (ref 3.8–10.5)

## 2021-01-01 PROCEDURE — 99232 SBSQ HOSP IP/OBS MODERATE 35: CPT

## 2021-01-01 RX ORDER — HYDROMORPHONE HYDROCHLORIDE 2 MG/ML
1.5 INJECTION INTRAMUSCULAR; INTRAVENOUS; SUBCUTANEOUS EVERY 4 HOURS
Refills: 0 | Status: DISCONTINUED | OUTPATIENT
Start: 2021-01-01 | End: 2021-01-03

## 2021-01-01 RX ORDER — POTASSIUM CHLORIDE 20 MEQ
10 PACKET (EA) ORAL
Refills: 0 | Status: COMPLETED | OUTPATIENT
Start: 2021-01-01 | End: 2021-01-01

## 2021-01-01 RX ADMIN — HYDROMORPHONE HYDROCHLORIDE 1.5 MILLIGRAM(S): 2 INJECTION INTRAMUSCULAR; INTRAVENOUS; SUBCUTANEOUS at 06:25

## 2021-01-01 RX ADMIN — Medication 100 MILLIEQUIVALENT(S): at 13:18

## 2021-01-01 RX ADMIN — HYDROMORPHONE HYDROCHLORIDE 1.5 MILLIGRAM(S): 2 INJECTION INTRAMUSCULAR; INTRAVENOUS; SUBCUTANEOUS at 21:20

## 2021-01-01 RX ADMIN — Medication 10 MILLIGRAM(S): at 05:30

## 2021-01-01 RX ADMIN — AMLODIPINE BESYLATE 2.5 MILLIGRAM(S): 2.5 TABLET ORAL at 09:13

## 2021-01-01 RX ADMIN — HYDROMORPHONE HYDROCHLORIDE 1.5 MILLIGRAM(S): 2 INJECTION INTRAMUSCULAR; INTRAVENOUS; SUBCUTANEOUS at 12:39

## 2021-01-01 RX ADMIN — HYDROMORPHONE HYDROCHLORIDE 1.5 MILLIGRAM(S): 2 INJECTION INTRAMUSCULAR; INTRAVENOUS; SUBCUTANEOUS at 09:11

## 2021-01-01 RX ADMIN — HYDROMORPHONE HYDROCHLORIDE 1.5 MILLIGRAM(S): 2 INJECTION INTRAMUSCULAR; INTRAVENOUS; SUBCUTANEOUS at 16:38

## 2021-01-01 RX ADMIN — HYDROMORPHONE HYDROCHLORIDE 1.5 MILLIGRAM(S): 2 INJECTION INTRAMUSCULAR; INTRAVENOUS; SUBCUTANEOUS at 09:30

## 2021-01-01 RX ADMIN — DEXTROSE MONOHYDRATE, SODIUM CHLORIDE, AND POTASSIUM CHLORIDE 70 MILLILITER(S): 50; .745; 4.5 INJECTION, SOLUTION INTRAVENOUS at 05:40

## 2021-01-01 RX ADMIN — HYDROMORPHONE HYDROCHLORIDE 1.5 MILLIGRAM(S): 2 INJECTION INTRAMUSCULAR; INTRAVENOUS; SUBCUTANEOUS at 21:07

## 2021-01-01 RX ADMIN — Medication 1000 UNIT(S): at 21:08

## 2021-01-01 RX ADMIN — Medication 100 MILLIEQUIVALENT(S): at 15:07

## 2021-01-01 RX ADMIN — HYDROMORPHONE HYDROCHLORIDE 1.5 MILLIGRAM(S): 2 INJECTION INTRAMUSCULAR; INTRAVENOUS; SUBCUTANEOUS at 12:44

## 2021-01-01 RX ADMIN — IRON SUCROSE 200 MILLIGRAM(S): 20 INJECTION, SOLUTION INTRAVENOUS at 12:42

## 2021-01-01 RX ADMIN — HYDROMORPHONE HYDROCHLORIDE 1.5 MILLIGRAM(S): 2 INJECTION INTRAMUSCULAR; INTRAVENOUS; SUBCUTANEOUS at 03:15

## 2021-01-01 RX ADMIN — DEXTROSE MONOHYDRATE, SODIUM CHLORIDE, AND POTASSIUM CHLORIDE 70 MILLILITER(S): 50; .745; 4.5 INJECTION, SOLUTION INTRAVENOUS at 21:08

## 2021-01-01 RX ADMIN — PANTOPRAZOLE SODIUM 40 MILLIGRAM(S): 20 TABLET, DELAYED RELEASE ORAL at 08:07

## 2021-01-01 RX ADMIN — HYDROMORPHONE HYDROCHLORIDE 1.5 MILLIGRAM(S): 2 INJECTION INTRAMUSCULAR; INTRAVENOUS; SUBCUTANEOUS at 03:00

## 2021-01-01 RX ADMIN — ONDANSETRON 4 MILLIGRAM(S): 8 TABLET, FILM COATED ORAL at 11:33

## 2021-01-01 RX ADMIN — ONDANSETRON 4 MILLIGRAM(S): 8 TABLET, FILM COATED ORAL at 06:11

## 2021-01-01 RX ADMIN — ONDANSETRON 4 MILLIGRAM(S): 8 TABLET, FILM COATED ORAL at 16:38

## 2021-01-01 RX ADMIN — Medication 10 MILLIGRAM(S): at 21:06

## 2021-01-01 RX ADMIN — HYDROMORPHONE HYDROCHLORIDE 1.5 MILLIGRAM(S): 2 INJECTION INTRAMUSCULAR; INTRAVENOUS; SUBCUTANEOUS at 06:07

## 2021-01-01 RX ADMIN — Medication 100 MILLIEQUIVALENT(S): at 11:38

## 2021-01-01 RX ADMIN — ENOXAPARIN SODIUM 40 MILLIGRAM(S): 100 INJECTION SUBCUTANEOUS at 09:13

## 2021-01-01 RX ADMIN — HYDROMORPHONE HYDROCHLORIDE 1.5 MILLIGRAM(S): 2 INJECTION INTRAMUSCULAR; INTRAVENOUS; SUBCUTANEOUS at 16:46

## 2021-01-01 RX ADMIN — Medication 10 MILLIGRAM(S): at 13:55

## 2021-01-01 NOTE — PROGRESS NOTE ADULT - ASSESSMENT
65 year old woman with PMHx  staph infection, chronic abdominal pain, osteoporosis, adrenal insufficiency, [place], s/p cholecystectomy, s/p spinal fusion, chronic intermittent porphyria, anxiety, with multiple previous admissions for acute on chronic abdominal pain presents to [facility] on 12/23/20  for abdominal pain.  Pt over last several days with worsening 10/10 abd pain with associated nausea and vomiting.  admitted for     Severe abdominal pain with inability to tolerate p.o. secondary to  Acute on chronic intermittent porphyria exacerbation  Metabolic encephalopathy   - Dilaudid PCA dc'd, start IV Dilaudid PRN - IV fluids restarted 12/27  -IV protonix, antiemetics   -heme evaluation -  spot urine for PBG, Total porphyrins and creatinine.   -bowel regimen  - dulcolax, miralax changed to PRN  - GI and oncology consult  -Seen by GI--> recommending genetic testing OP and EGD and Colonoscopy OP  12/29 increase dilaudid again; poor po intake, IVFs   12/31 - discussed with Dr Molly Mccauley pt's OP pain mx physician and Dr Mzaa     Pyuria with E coli suspected UTI  -12/27  Start Ceftriaxone 1G daily x 3 days - completed     Hypertension  -12/31 increase Hydralazine IV ATC    Microcytic anemia due to iron deficiency   - check iron, TIBC, FOBT, B12, folate - noted, FOBT - neg   -on oral FeSO4  - GI consult - will need endoscopy and colonoscopy upon d/c   12/31- s/p EGD unremarkable, small hiatal hernia   rec f/u GI for COL as OP     Constipation- miralax, dulcolax as needed  Anxiety -Ativan PRN  Chronic adrenal insufficiency - patient refuses to take steroids, BP wnl, monitor    DVT prophylaxis: Lovenox    Dispo 1/1 - discussed with patient and she agreed to decrease IV dilaudid, to 1.5 q4h.  cont to monitor ; f/u porphyria testing   called by RN that pt reports that she is DNR DNI and paperwork was filled out again by her.  DNR DNI orders placed

## 2021-01-02 LAB
ANION GAP SERPL CALC-SCNC: 7 MMOL/L — SIGNIFICANT CHANGE UP (ref 5–17)
BUN SERPL-MCNC: 18 MG/DL — SIGNIFICANT CHANGE UP (ref 7–23)
CALCIUM SERPL-MCNC: 8.9 MG/DL — SIGNIFICANT CHANGE UP (ref 8.5–10.1)
CHLORIDE SERPL-SCNC: 112 MMOL/L — HIGH (ref 96–108)
CO2 SERPL-SCNC: 21 MMOL/L — LOW (ref 22–31)
CREAT SERPL-MCNC: 0.48 MG/DL — LOW (ref 0.5–1.3)
GLUCOSE SERPL-MCNC: 110 MG/DL — HIGH (ref 70–99)
HCT VFR BLD CALC: 26.8 % — LOW (ref 34.5–45)
HCT VFR BLD CALC: 27.2 % — LOW (ref 34.5–45)
HGB BLD-MCNC: 7.5 G/DL — LOW (ref 11.5–15.5)
HGB BLD-MCNC: 7.9 G/DL — LOW (ref 11.5–15.5)
MAGNESIUM SERPL-MCNC: 2 MG/DL — SIGNIFICANT CHANGE UP (ref 1.6–2.6)
MCHC RBC-ENTMCNC: 20.3 PG — LOW (ref 27–34)
MCHC RBC-ENTMCNC: 28 GM/DL — LOW (ref 32–36)
MCV RBC AUTO: 72.4 FL — LOW (ref 80–100)
PLATELET # BLD AUTO: 294 K/UL — SIGNIFICANT CHANGE UP (ref 150–400)
POTASSIUM SERPL-MCNC: 3.6 MMOL/L — SIGNIFICANT CHANGE UP (ref 3.5–5.3)
POTASSIUM SERPL-SCNC: 3.6 MMOL/L — SIGNIFICANT CHANGE UP (ref 3.5–5.3)
RBC # BLD: 3.7 M/UL — LOW (ref 3.8–5.2)
RBC # FLD: 24.8 % — HIGH (ref 10.3–14.5)
SODIUM SERPL-SCNC: 140 MMOL/L — SIGNIFICANT CHANGE UP (ref 135–145)
WBC # BLD: 13 K/UL — HIGH (ref 3.8–10.5)
WBC # FLD AUTO: 13 K/UL — HIGH (ref 3.8–10.5)

## 2021-01-02 PROCEDURE — 99232 SBSQ HOSP IP/OBS MODERATE 35: CPT

## 2021-01-02 RX ORDER — LACTOBACILLUS ACIDOPHILUS 100MM CELL
1 CAPSULE ORAL
Refills: 0 | Status: DISCONTINUED | OUTPATIENT
Start: 2021-01-02 | End: 2021-01-06

## 2021-01-02 RX ORDER — PANTOPRAZOLE SODIUM 20 MG/1
40 TABLET, DELAYED RELEASE ORAL
Refills: 0 | Status: DISCONTINUED | OUTPATIENT
Start: 2021-01-03 | End: 2021-01-06

## 2021-01-02 RX ORDER — IRON SUCROSE 20 MG/ML
100 INJECTION, SOLUTION INTRAVENOUS EVERY 24 HOURS
Refills: 0 | Status: DISCONTINUED | OUTPATIENT
Start: 2021-01-02 | End: 2021-01-02

## 2021-01-02 RX ORDER — HYDROMORPHONE HYDROCHLORIDE 2 MG/ML
6 INJECTION INTRAMUSCULAR; INTRAVENOUS; SUBCUTANEOUS EVERY 4 HOURS
Refills: 0 | Status: DISCONTINUED | OUTPATIENT
Start: 2021-01-02 | End: 2021-01-06

## 2021-01-02 RX ORDER — ONDANSETRON 8 MG/1
4 TABLET, FILM COATED ORAL EVERY 6 HOURS
Refills: 0 | Status: DISCONTINUED | OUTPATIENT
Start: 2021-01-02 | End: 2021-01-06

## 2021-01-02 RX ORDER — DEXTROSE MONOHYDRATE, SODIUM CHLORIDE, AND POTASSIUM CHLORIDE 50; .745; 4.5 G/1000ML; G/1000ML; G/1000ML
1000 INJECTION, SOLUTION INTRAVENOUS
Refills: 0 | Status: DISCONTINUED | OUTPATIENT
Start: 2021-01-02 | End: 2021-01-03

## 2021-01-02 RX ADMIN — Medication 10 MILLIGRAM(S): at 13:32

## 2021-01-02 RX ADMIN — Medication 1000 UNIT(S): at 21:52

## 2021-01-02 RX ADMIN — DEXTROSE MONOHYDRATE, SODIUM CHLORIDE, AND POTASSIUM CHLORIDE 50 MILLILITER(S): 50; .745; 4.5 INJECTION, SOLUTION INTRAVENOUS at 22:40

## 2021-01-02 RX ADMIN — ONDANSETRON 4 MILLIGRAM(S): 8 TABLET, FILM COATED ORAL at 08:23

## 2021-01-02 RX ADMIN — HYDROMORPHONE HYDROCHLORIDE 1.5 MILLIGRAM(S): 2 INJECTION INTRAMUSCULAR; INTRAVENOUS; SUBCUTANEOUS at 05:27

## 2021-01-02 RX ADMIN — Medication 1 TABLET(S): at 21:52

## 2021-01-02 RX ADMIN — HYDROMORPHONE HYDROCHLORIDE 1.5 MILLIGRAM(S): 2 INJECTION INTRAMUSCULAR; INTRAVENOUS; SUBCUTANEOUS at 01:09

## 2021-01-02 RX ADMIN — HYDROMORPHONE HYDROCHLORIDE 1.5 MILLIGRAM(S): 2 INJECTION INTRAMUSCULAR; INTRAVENOUS; SUBCUTANEOUS at 22:13

## 2021-01-02 RX ADMIN — HYDROMORPHONE HYDROCHLORIDE 1.5 MILLIGRAM(S): 2 INJECTION INTRAMUSCULAR; INTRAVENOUS; SUBCUTANEOUS at 01:24

## 2021-01-02 RX ADMIN — Medication 10 MILLIGRAM(S): at 05:12

## 2021-01-02 RX ADMIN — ENOXAPARIN SODIUM 40 MILLIGRAM(S): 100 INJECTION SUBCUTANEOUS at 09:20

## 2021-01-02 RX ADMIN — PANTOPRAZOLE SODIUM 40 MILLIGRAM(S): 20 TABLET, DELAYED RELEASE ORAL at 08:27

## 2021-01-02 RX ADMIN — AMLODIPINE BESYLATE 2.5 MILLIGRAM(S): 2.5 TABLET ORAL at 09:20

## 2021-01-02 RX ADMIN — ONDANSETRON 4 MILLIGRAM(S): 8 TABLET, FILM COATED ORAL at 13:31

## 2021-01-02 RX ADMIN — HYDROMORPHONE HYDROCHLORIDE 1.5 MILLIGRAM(S): 2 INJECTION INTRAMUSCULAR; INTRAVENOUS; SUBCUTANEOUS at 05:12

## 2021-01-02 RX ADMIN — HYDROMORPHONE HYDROCHLORIDE 1.5 MILLIGRAM(S): 2 INJECTION INTRAMUSCULAR; INTRAVENOUS; SUBCUTANEOUS at 21:52

## 2021-01-02 RX ADMIN — Medication 10 MILLIGRAM(S): at 21:52

## 2021-01-02 RX ADMIN — HYDROMORPHONE HYDROCHLORIDE 1.5 MILLIGRAM(S): 2 INJECTION INTRAMUSCULAR; INTRAVENOUS; SUBCUTANEOUS at 17:35

## 2021-01-02 RX ADMIN — ONDANSETRON 4 MILLIGRAM(S): 8 TABLET, FILM COATED ORAL at 00:59

## 2021-01-02 RX ADMIN — HYDROMORPHONE HYDROCHLORIDE 1.5 MILLIGRAM(S): 2 INJECTION INTRAMUSCULAR; INTRAVENOUS; SUBCUTANEOUS at 09:20

## 2021-01-02 RX ADMIN — ONDANSETRON 4 MILLIGRAM(S): 8 TABLET, FILM COATED ORAL at 17:35

## 2021-01-02 RX ADMIN — HYDROMORPHONE HYDROCHLORIDE 1.5 MILLIGRAM(S): 2 INJECTION INTRAMUSCULAR; INTRAVENOUS; SUBCUTANEOUS at 13:32

## 2021-01-02 NOTE — PROGRESS NOTE ADULT - ASSESSMENT
65 year old woman with PMHx  staph infection, chronic abdominal pain, osteoporosis, adrenal insufficiency, [place], s/p cholecystectomy, s/p spinal fusion, chronic intermittent porphyria, anxiety, with multiple previous admissions for acute on chronic abdominal pain presents to [facility] on 12/23/20  for abdominal pain.  Pt over last several days with worsening 10/10 abd pain with associated nausea and vomiting.  admitted for   Severe abdominal pain with inability to tolerate p.o. secondary to  Acute on chronic intermittent porphyria exacerbation  Metabolic encephalopathy   - Dilaudid PCA dc'd, start IV Dilaudid PRN - IV fluids restarted 12/27  -IV protonix--> PO , antiemetics   -heme evaluation -  spot urine for PBG, Total porphyrins and creatinine.  - not consistent with porphyria   -bowel regimen  - dulcolax, miralax changed to PRN due to diarrhea  - GI and oncology consult  -Seen by GI--> recommending genetic testing OP and EGD and Colonoscopy OP  12/29 increase dilaudid again; poor po intake, IVFs   12/31 - discussed with Dr Molly Mccauley pt's OP pain mx physician and Dr Maza   1/2 - PO dilaudid 6 q4, IV only for severe pain  Diarrhea 1/2/21 - check C diff, GI PCR , hold laxatives   Pyuria with E coli suspected UTI  -12/27  Start Ceftriaxone 1G daily x 3 days - completed   Hypertension  -12/31 increase Hydralazine IV ATC, c/w 2.5 amlodipine  Microcytic anemia due to iron deficiency , worsening   - check iron, TIBC, FOBT, B12, folate - noted, FOBT - neg   -s/p IV iron, on oral FeSO4  - GI consult - will need endoscopy and colonoscopy upon d/c   12/31- s/p EGD unremarkable, small hiatal hernia  rec f/u GI for COL as OP   Constipation, resolved - d/c miralax, d/c dulcolax   Anxiety -Ativan PRN  Chronic adrenal insufficiency - patient refuses to take steroids, BP wnl, monitor  Mood disorder Anxiety, malingering to get IV pain medications  - psychiatry evaluation     DVT prophylaxis: Lovenox    Advanced directives  DNR DNI    Dispo - IV fluids, pain management with PO

## 2021-01-02 NOTE — PROGRESS NOTE ADULT - ASSESSMENT
Imp:  Diarrhea, recent antibiotics  Recurrent abdominal pain and N/V    Rec:  Send stool c. diff and GI PCR

## 2021-01-03 DIAGNOSIS — F43.21 ADJUSTMENT DISORDER WITH DEPRESSED MOOD: ICD-10-CM

## 2021-01-03 DIAGNOSIS — F43.23 ADJUSTMENT DISORDER WITH MIXED ANXIETY AND DEPRESSED MOOD: ICD-10-CM

## 2021-01-03 LAB
ALBUMIN SERPL ELPH-MCNC: 3.2 G/DL — LOW (ref 3.3–5)
ALP SERPL-CCNC: 56 U/L — SIGNIFICANT CHANGE UP (ref 40–120)
ALT FLD-CCNC: 33 U/L — SIGNIFICANT CHANGE UP (ref 12–78)
ANION GAP SERPL CALC-SCNC: 6 MMOL/L — SIGNIFICANT CHANGE UP (ref 5–17)
AST SERPL-CCNC: 19 U/L — SIGNIFICANT CHANGE UP (ref 15–37)
BASOPHILS # BLD AUTO: 0.07 K/UL — SIGNIFICANT CHANGE UP (ref 0–0.2)
BASOPHILS NFR BLD AUTO: 0.6 % — SIGNIFICANT CHANGE UP (ref 0–2)
BILIRUB SERPL-MCNC: 0.4 MG/DL — SIGNIFICANT CHANGE UP (ref 0.2–1.2)
BUN SERPL-MCNC: 12 MG/DL — SIGNIFICANT CHANGE UP (ref 7–23)
CALCIUM SERPL-MCNC: 9.1 MG/DL — SIGNIFICANT CHANGE UP (ref 8.5–10.1)
CHLORIDE SERPL-SCNC: 112 MMOL/L — HIGH (ref 96–108)
CO2 SERPL-SCNC: 22 MMOL/L — SIGNIFICANT CHANGE UP (ref 22–31)
CREAT SERPL-MCNC: 0.48 MG/DL — LOW (ref 0.5–1.3)
CULTURE RESULTS: SIGNIFICANT CHANGE UP
EOSINOPHIL # BLD AUTO: 0.12 K/UL — SIGNIFICANT CHANGE UP (ref 0–0.5)
EOSINOPHIL NFR BLD AUTO: 1 % — SIGNIFICANT CHANGE UP (ref 0–6)
GLUCOSE SERPL-MCNC: 116 MG/DL — HIGH (ref 70–99)
HCT VFR BLD CALC: 25.5 % — LOW (ref 34.5–45)
HGB BLD-MCNC: 7.4 G/DL — LOW (ref 11.5–15.5)
IMM GRANULOCYTES NFR BLD AUTO: 0.8 % — SIGNIFICANT CHANGE UP (ref 0–1.5)
LYMPHOCYTES # BLD AUTO: 3.56 K/UL — HIGH (ref 1–3.3)
LYMPHOCYTES # BLD AUTO: 30 % — SIGNIFICANT CHANGE UP (ref 13–44)
MCHC RBC-ENTMCNC: 20.9 PG — LOW (ref 27–34)
MCHC RBC-ENTMCNC: 29 GM/DL — LOW (ref 32–36)
MCV RBC AUTO: 72 FL — LOW (ref 80–100)
MONOCYTES # BLD AUTO: 0.95 K/UL — HIGH (ref 0–0.9)
MONOCYTES NFR BLD AUTO: 8 % — SIGNIFICANT CHANGE UP (ref 2–14)
NEUTROPHILS # BLD AUTO: 7.09 K/UL — SIGNIFICANT CHANGE UP (ref 1.8–7.4)
NEUTROPHILS NFR BLD AUTO: 59.6 % — SIGNIFICANT CHANGE UP (ref 43–77)
PLATELET # BLD AUTO: 314 K/UL — SIGNIFICANT CHANGE UP (ref 150–400)
POTASSIUM SERPL-MCNC: 3.3 MMOL/L — LOW (ref 3.5–5.3)
POTASSIUM SERPL-SCNC: 3.3 MMOL/L — LOW (ref 3.5–5.3)
PROT SERPL-MCNC: 6.7 GM/DL — SIGNIFICANT CHANGE UP (ref 6–8.3)
RBC # BLD: 3.54 M/UL — LOW (ref 3.8–5.2)
RBC # FLD: 25.8 % — HIGH (ref 10.3–14.5)
SODIUM SERPL-SCNC: 140 MMOL/L — SIGNIFICANT CHANGE UP (ref 135–145)
SPECIMEN SOURCE: SIGNIFICANT CHANGE UP
WBC # BLD: 11.88 K/UL — HIGH (ref 3.8–10.5)
WBC # FLD AUTO: 11.88 K/UL — HIGH (ref 3.8–10.5)

## 2021-01-03 PROCEDURE — 99233 SBSQ HOSP IP/OBS HIGH 50: CPT

## 2021-01-03 RX ORDER — HYDROMORPHONE HYDROCHLORIDE 2 MG/ML
1 INJECTION INTRAMUSCULAR; INTRAVENOUS; SUBCUTANEOUS EVERY 4 HOURS
Refills: 0 | Status: DISCONTINUED | OUTPATIENT
Start: 2021-01-03 | End: 2021-01-06

## 2021-01-03 RX ORDER — POTASSIUM CHLORIDE 20 MEQ
20 PACKET (EA) ORAL
Refills: 0 | Status: COMPLETED | OUTPATIENT
Start: 2021-01-03 | End: 2021-01-03

## 2021-01-03 RX ORDER — DEXTROSE MONOHYDRATE, SODIUM CHLORIDE, AND POTASSIUM CHLORIDE 50; .745; 4.5 G/1000ML; G/1000ML; G/1000ML
1000 INJECTION, SOLUTION INTRAVENOUS
Refills: 0 | Status: DISCONTINUED | OUTPATIENT
Start: 2021-01-03 | End: 2021-01-04

## 2021-01-03 RX ADMIN — PANTOPRAZOLE SODIUM 40 MILLIGRAM(S): 20 TABLET, DELAYED RELEASE ORAL at 06:30

## 2021-01-03 RX ADMIN — Medication 1 TABLET(S): at 11:21

## 2021-01-03 RX ADMIN — HYDROMORPHONE HYDROCHLORIDE 1 MILLIGRAM(S): 2 INJECTION INTRAMUSCULAR; INTRAVENOUS; SUBCUTANEOUS at 13:06

## 2021-01-03 RX ADMIN — AMLODIPINE BESYLATE 2.5 MILLIGRAM(S): 2.5 TABLET ORAL at 11:22

## 2021-01-03 RX ADMIN — HYDROMORPHONE HYDROCHLORIDE 1 MILLIGRAM(S): 2 INJECTION INTRAMUSCULAR; INTRAVENOUS; SUBCUTANEOUS at 23:56

## 2021-01-03 RX ADMIN — HYDROMORPHONE HYDROCHLORIDE 6 MILLIGRAM(S): 2 INJECTION INTRAMUSCULAR; INTRAVENOUS; SUBCUTANEOUS at 17:26

## 2021-01-03 RX ADMIN — HYDROMORPHONE HYDROCHLORIDE 1.5 MILLIGRAM(S): 2 INJECTION INTRAMUSCULAR; INTRAVENOUS; SUBCUTANEOUS at 02:20

## 2021-01-03 RX ADMIN — Medication 20 MILLIEQUIVALENT(S): at 15:50

## 2021-01-03 RX ADMIN — ONDANSETRON 4 MILLIGRAM(S): 8 TABLET, FILM COATED ORAL at 11:34

## 2021-01-03 RX ADMIN — DEXTROSE MONOHYDRATE, SODIUM CHLORIDE, AND POTASSIUM CHLORIDE 50 MILLILITER(S): 50; .745; 4.5 INJECTION, SOLUTION INTRAVENOUS at 22:00

## 2021-01-03 RX ADMIN — HYDROMORPHONE HYDROCHLORIDE 1 MILLIGRAM(S): 2 INJECTION INTRAMUSCULAR; INTRAVENOUS; SUBCUTANEOUS at 23:41

## 2021-01-03 RX ADMIN — Medication 325 MILLIGRAM(S): at 11:21

## 2021-01-03 RX ADMIN — HYDROMORPHONE HYDROCHLORIDE 1.5 MILLIGRAM(S): 2 INJECTION INTRAMUSCULAR; INTRAVENOUS; SUBCUTANEOUS at 06:31

## 2021-01-03 RX ADMIN — Medication 10 MILLIGRAM(S): at 22:00

## 2021-01-03 RX ADMIN — Medication 1000 UNIT(S): at 11:21

## 2021-01-03 RX ADMIN — Medication 10 MILLIGRAM(S): at 06:30

## 2021-01-03 RX ADMIN — ONDANSETRON 4 MILLIGRAM(S): 8 TABLET, FILM COATED ORAL at 08:36

## 2021-01-03 RX ADMIN — Medication 1 TABLET(S): at 22:00

## 2021-01-03 RX ADMIN — ENOXAPARIN SODIUM 40 MILLIGRAM(S): 100 INJECTION SUBCUTANEOUS at 11:22

## 2021-01-03 RX ADMIN — Medication 1 MILLIGRAM(S): at 11:21

## 2021-01-03 RX ADMIN — MAGNESIUM OXIDE 400 MG ORAL TABLET 400 MILLIGRAM(S): 241.3 TABLET ORAL at 08:36

## 2021-01-03 RX ADMIN — Medication 20 MILLIEQUIVALENT(S): at 11:34

## 2021-01-03 RX ADMIN — HYDROMORPHONE HYDROCHLORIDE 1.5 MILLIGRAM(S): 2 INJECTION INTRAMUSCULAR; INTRAVENOUS; SUBCUTANEOUS at 02:07

## 2021-01-03 RX ADMIN — ONDANSETRON 4 MILLIGRAM(S): 8 TABLET, FILM COATED ORAL at 17:26

## 2021-01-03 RX ADMIN — MAGNESIUM OXIDE 400 MG ORAL TABLET 400 MILLIGRAM(S): 241.3 TABLET ORAL at 17:26

## 2021-01-03 NOTE — BEHAVIORAL HEALTH ASSESSMENT NOTE - SUMMARY
65 year-old  female, with history of Anxiety, with no other psychiatric history, with no prior suicidal ideation or suicide attempt, no in-patient hospitalization, no substance abuse, presenting for acute exacerbation of porphyria.    Patient presenting with emotional distress in light of acute exacerbated porphyria. Otherwise, patient is not clinically depressed, with no hopelessness or suicidal ideation. Mentation is improved (as confirmed by sister). Patient has no manic / psychotic symptoms. No delusions elicited. Patient does not appear to be malingering as there is no apparent secondary gain. Patient not interested in psychiatric treatment, however can benefit from emotional support.

## 2021-01-03 NOTE — PROGRESS NOTE ADULT - ASSESSMENT
65 year old woman with PMHx  staph infection, chronic abdominal pain, osteoporosis, adrenal insufficiency, [place], s/p cholecystectomy, s/p spinal fusion, chronic intermittent porphyria, anxiety, with multiple previous admissions for acute on chronic abdominal pain presents to [facility] on 12/23/20  for abdominal pain.  Pt over last several days with worsening 10/10 abd pain with associated nausea and vomiting.  admitted for   Severe abdominal pain with inability to tolerate p.o. secondary to  Acute on chronic intermittent porphyria exacerbation  Metabolic encephalopathy , improving   - Dilaudid PCA dc'd, start IV Dilaudid PRN - IV fluids restarted 12/27  -IV protonix--> PO , antiemetics   -heme evaluation -  spot urine for PBG, Total porphyrins and creatinine.  - not consistent with porphyria   -bowel regimen  - dulcolax, miralax changed to PRN due to diarrhea  - GI and oncology consult  -Seen by GI--> recommending genetic testing OP and EGD and Colonoscopy OP  12/29 increase dilaudid again; poor po intake, IVFs   12/31 - discussed with Dr Molly Mccauley pt's OP pain mx physician and Dr Maza   1/2 - PO dilaudid 6 q4, IV only for severe pain  1/3 - PO or SC dilaudid, stop IV dilaudid  Diarrhea 1/2/21 - check C diff, GI PCR , hold laxatives , check FOBT, O&P   Pyuria with E coli suspected UTI  -12/27  Start Ceftriaxone 1G daily x 3 days - completed   Hypertension  -12/31 increase Hydralazine IV ATC, c/w 2.5 amlodipine  Microcytic anemia due to iron deficiency , worsening   - check iron, TIBC, FOBT, B12, folate - noted, FOBT - neg , repeat FOBT   -s/p IV iron, on oral FeSO4  - 12/31- s/p EGD unremarkable, small hiatal hernia    - GI consult - s/p EGD , needs colonoscopy  d/w Dr. Curiel  - 1/3 - 1 unit PRBC   Constipation, resolved - d/c miralax, d/c dulcolax , now diarrhea  Anxiety   -Ativan PRN  - patient asking if she [facility], deficiently depressed  - psychiatry consult for mood evaluation   Chronic adrenal insufficiency - patient refuses to take steroids, BP wnl, monitor  Mood disorder Anxiety  -  malingering to get IV pain medications  - psychiatry evaluation   Hypokalemia - replace , Iv fluids with K   DVT prophylaxis: Lovenox  Advanced directives  DNR DNI    Dispo - IV fluids, pain management with PO , GI reevaluation ? need for colonoscopy

## 2021-01-03 NOTE — BEHAVIORAL HEALTH ASSESSMENT NOTE - RISK ASSESSMENT
LOW RISK    ACUTE RISK FACTORS: exacerbated acute medical comorbidities, depressed mood    CHRONIC RISK FACTORS: medical comorbidities     PROTECTIVE FACTORS: no prior / current suicidal ideation/intent/plan, future oriented, supportive family, good insight. Low Acute Suicide Risk

## 2021-01-03 NOTE — BEHAVIORAL HEALTH ASSESSMENT NOTE - HPI (INCLUDE ILLNESS QUALITY, SEVERITY, DURATION, TIMING, CONTEXT, MODIFYING FACTORS, ASSOCIATED SIGNS AND SYMPTOMS)
65 year-old  female, with history of Anxiety, with no other psychiatric history, with no prior suicidal ideation or suicide attempt, no in-patient hospitalization, no substance abuse, presenting for acute exacerbation of porphyria.    Patient presenting alert and oriented to person, time, place and situation. Patient is tangential and overinclusive. Patient is anxious, appearing to be elevated by frustration over being hard of hearing and not having her hearing aids. Patient reports coming to hospital for acute exacerbation of porphyria, abdominal pain with nausea, vomiting and diarrhea, along with electrolyte abnormalities. Patient reports emotional distress secondary to acute medical comorbidities, primarily associated pain / discomforted. Reports depressed mood however denies classifying self as clinically depressed. Denies hopelessness. Denies burdensomeness. Reports anergia, however this is confounded by medical comorbidities. Patient reports mild anxiety, again from active medical issues; however at baseline does not excessively worry. Denies panic. Denies manic / psychotic symptoms. No delusions elicited. Reports "I do not like psychiatry; I do not want anything from you." Denies need for psychiatric treatment; denying psychotropic management.    Sister provides collateral, stating: patient does not have a formal psychiatric history, with no prior suicidal ideation or suicide attempt or in-patient hospitalization. Patient has episodes of situational depressed and anxious mood secondary to acute medical comorbidities (primarily porphyria) but otherwise has no complaints when in stable medical condition. Reports having porphyria for 20 years and has been mostly treated at [facility]. Reports patient has had 3-4 attacks this year, with last one starting around Thanksgiving but patient did not want to come to hospital secondary to COVID. Reports patient started having alterations in her mentation, hallucinating, being paranoid, stating sister is trying to kill her, stating seeing her  father in the house etc. Reports now however, patient's mentation is more in line with baseline, although remains in distress. Reports emotional stressors:  father and mother being in nursing home. Denies other emotional stressors. Reports being frustrated over

## 2021-01-03 NOTE — BEHAVIORAL HEALTH ASSESSMENT NOTE - NSBHCHARTREVIEWVS_PSY_A_CORE FT
Vital Signs Last 24 Hrs  T(C): 36.8 (03 Jan 2021 09:34), Max: 36.9 (02 Jan 2021 15:40)  T(F): 98.2 (03 Jan 2021 09:34), Max: 98.5 (02 Jan 2021 15:40)  HR: 100 (03 Jan 2021 09:34) (96 - 116)  BP: 137/42 (03 Jan 2021 09:34) (130/49 - 152/59)  BP(mean): --  RR: 18 (03 Jan 2021 09:34) (18 - 18)  SpO2: 100% (03 Jan 2021 09:34) (99% - 100%)

## 2021-01-03 NOTE — BEHAVIORAL HEALTH ASSESSMENT NOTE - NSBHCHARTREVIEWINVESTIGATE_PSY_A_CORE FT
Ventricular Rate 66 BPM    Atrial Rate 288 BPM    P-R Interval 118 ms    QRS Duration 80 ms    Q-T Interval 456 ms    QTC Calculation(Bazett) 478 ms    P Axis 8 degrees    R Axis 26 degrees    T Axis 37 degrees    Diagnosis Line Atrial fibrillaiton, significant baseline artifact  Septal infarct (cited on or before 31-DEC-2012)  Abnormal ECG    Reconfirmed by JOANN CROWDER, ANANDA HILLMAN ([id number]) on 12/25/2020 6:20:29 AM

## 2021-01-03 NOTE — BEHAVIORAL HEALTH ASSESSMENT NOTE - OTHER
acute exacerbated porphyria did not ambulate - in bed "down and upset." As per HPI anxiety in the setting of pain / discomfort

## 2021-01-04 LAB
ANION GAP SERPL CALC-SCNC: 8 MMOL/L — SIGNIFICANT CHANGE UP (ref 5–17)
BUN SERPL-MCNC: 7 MG/DL — SIGNIFICANT CHANGE UP (ref 7–23)
C DIFF BY PCR RESULT: SIGNIFICANT CHANGE UP
C DIFF TOX GENS STL QL NAA+PROBE: SIGNIFICANT CHANGE UP
CALCIUM SERPL-MCNC: 9.2 MG/DL — SIGNIFICANT CHANGE UP (ref 8.5–10.1)
CHLORIDE SERPL-SCNC: 111 MMOL/L — HIGH (ref 96–108)
CO2 SERPL-SCNC: 22 MMOL/L — SIGNIFICANT CHANGE UP (ref 22–31)
CORTIS AM PEAK SERPL-MCNC: 22.6 UG/DL — HIGH (ref 6–18.4)
CREAT SERPL-MCNC: 0.49 MG/DL — LOW (ref 0.5–1.3)
GLUCOSE SERPL-MCNC: 112 MG/DL — HIGH (ref 70–99)
HCT VFR BLD CALC: 30.7 % — LOW (ref 34.5–45)
HGB BLD-MCNC: 9.5 G/DL — LOW (ref 11.5–15.5)
MAGNESIUM SERPL-MCNC: 2.2 MG/DL — SIGNIFICANT CHANGE UP (ref 1.6–2.6)
MCHC RBC-ENTMCNC: 23.2 PG — LOW (ref 27–34)
MCHC RBC-ENTMCNC: 30.9 GM/DL — LOW (ref 32–36)
MCV RBC AUTO: 75.1 FL — LOW (ref 80–100)
PHOSPHATE SERPL-MCNC: 3.5 MG/DL — SIGNIFICANT CHANGE UP (ref 2.5–4.5)
PLATELET # BLD AUTO: 304 K/UL — SIGNIFICANT CHANGE UP (ref 150–400)
POTASSIUM SERPL-MCNC: 3.4 MMOL/L — LOW (ref 3.5–5.3)
POTASSIUM SERPL-SCNC: 3.4 MMOL/L — LOW (ref 3.5–5.3)
PROCALCITONIN SERPL-MCNC: 0.06 NG/ML — SIGNIFICANT CHANGE UP (ref 0.02–0.1)
RBC # BLD: 4.09 M/UL — SIGNIFICANT CHANGE UP (ref 3.8–5.2)
RBC # FLD: 26.2 % — HIGH (ref 10.3–14.5)
SODIUM SERPL-SCNC: 141 MMOL/L — SIGNIFICANT CHANGE UP (ref 135–145)
WBC # BLD: 12.23 K/UL — HIGH (ref 3.8–10.5)
WBC # FLD AUTO: 12.23 K/UL — HIGH (ref 3.8–10.5)

## 2021-01-04 PROCEDURE — 99232 SBSQ HOSP IP/OBS MODERATE 35: CPT

## 2021-01-04 RX ORDER — LANOLIN ALCOHOL/MO/W.PET/CERES
3 CREAM (GRAM) TOPICAL AT BEDTIME
Refills: 0 | Status: DISCONTINUED | OUTPATIENT
Start: 2021-01-04 | End: 2021-01-06

## 2021-01-04 RX ORDER — POTASSIUM CHLORIDE 20 MEQ
20 PACKET (EA) ORAL
Refills: 0 | Status: COMPLETED | OUTPATIENT
Start: 2021-01-04 | End: 2021-01-04

## 2021-01-04 RX ORDER — DEXTROSE MONOHYDRATE, SODIUM CHLORIDE, AND POTASSIUM CHLORIDE 50; .745; 4.5 G/1000ML; G/1000ML; G/1000ML
1000 INJECTION, SOLUTION INTRAVENOUS
Refills: 0 | Status: DISCONTINUED | OUTPATIENT
Start: 2021-01-04 | End: 2021-01-06

## 2021-01-04 RX ORDER — SOD SULF/SODIUM/NAHCO3/KCL/PEG
2000 SOLUTION, RECONSTITUTED, ORAL ORAL ONCE
Refills: 0 | Status: COMPLETED | OUTPATIENT
Start: 2021-01-04 | End: 2021-01-04

## 2021-01-04 RX ADMIN — HYDROMORPHONE HYDROCHLORIDE 6 MILLIGRAM(S): 2 INJECTION INTRAMUSCULAR; INTRAVENOUS; SUBCUTANEOUS at 08:15

## 2021-01-04 RX ADMIN — MAGNESIUM OXIDE 400 MG ORAL TABLET 400 MILLIGRAM(S): 241.3 TABLET ORAL at 17:02

## 2021-01-04 RX ADMIN — Medication 2000 MILLILITER(S): at 17:54

## 2021-01-04 RX ADMIN — HYDROMORPHONE HYDROCHLORIDE 1 MILLIGRAM(S): 2 INJECTION INTRAMUSCULAR; INTRAVENOUS; SUBCUTANEOUS at 04:33

## 2021-01-04 RX ADMIN — Medication 1 TABLET(S): at 11:32

## 2021-01-04 RX ADMIN — ENOXAPARIN SODIUM 40 MILLIGRAM(S): 100 INJECTION SUBCUTANEOUS at 11:31

## 2021-01-04 RX ADMIN — HYDROMORPHONE HYDROCHLORIDE 6 MILLIGRAM(S): 2 INJECTION INTRAMUSCULAR; INTRAVENOUS; SUBCUTANEOUS at 22:48

## 2021-01-04 RX ADMIN — Medication 325 MILLIGRAM(S): at 11:32

## 2021-01-04 RX ADMIN — Medication 1 MILLIGRAM(S): at 11:32

## 2021-01-04 RX ADMIN — HYDROMORPHONE HYDROCHLORIDE 6 MILLIGRAM(S): 2 INJECTION INTRAMUSCULAR; INTRAVENOUS; SUBCUTANEOUS at 17:03

## 2021-01-04 RX ADMIN — ONDANSETRON 4 MILLIGRAM(S): 8 TABLET, FILM COATED ORAL at 05:40

## 2021-01-04 RX ADMIN — AMLODIPINE BESYLATE 2.5 MILLIGRAM(S): 2.5 TABLET ORAL at 11:32

## 2021-01-04 RX ADMIN — Medication 10 MILLIGRAM(S): at 22:49

## 2021-01-04 RX ADMIN — HYDROMORPHONE HYDROCHLORIDE 1 MILLIGRAM(S): 2 INJECTION INTRAMUSCULAR; INTRAVENOUS; SUBCUTANEOUS at 04:18

## 2021-01-04 RX ADMIN — Medication 3 MILLIGRAM(S): at 22:49

## 2021-01-04 RX ADMIN — ONDANSETRON 4 MILLIGRAM(S): 8 TABLET, FILM COATED ORAL at 17:02

## 2021-01-04 RX ADMIN — DEXTROSE MONOHYDRATE, SODIUM CHLORIDE, AND POTASSIUM CHLORIDE 50 MILLILITER(S): 50; .745; 4.5 INJECTION, SOLUTION INTRAVENOUS at 17:02

## 2021-01-04 RX ADMIN — Medication 20 MILLIEQUIVALENT(S): at 20:56

## 2021-01-04 RX ADMIN — Medication 1000 UNIT(S): at 11:32

## 2021-01-04 RX ADMIN — PANTOPRAZOLE SODIUM 40 MILLIGRAM(S): 20 TABLET, DELAYED RELEASE ORAL at 05:39

## 2021-01-04 RX ADMIN — MAGNESIUM OXIDE 400 MG ORAL TABLET 400 MILLIGRAM(S): 241.3 TABLET ORAL at 08:15

## 2021-01-04 RX ADMIN — Medication 1000 UNIT(S): at 22:49

## 2021-01-04 RX ADMIN — ONDANSETRON 4 MILLIGRAM(S): 8 TABLET, FILM COATED ORAL at 11:33

## 2021-01-04 RX ADMIN — Medication 10 MILLIGRAM(S): at 05:39

## 2021-01-04 RX ADMIN — Medication 10 MILLIGRAM(S): at 14:13

## 2021-01-04 RX ADMIN — HYDROMORPHONE HYDROCHLORIDE 6 MILLIGRAM(S): 2 INJECTION INTRAMUSCULAR; INTRAVENOUS; SUBCUTANEOUS at 23:15

## 2021-01-04 NOTE — PROGRESS NOTE ADULT - ASSESSMENT
65-year-old female with a 17-year history of intermittent abdominal pain  Thought to have a FORM FRUSTE Of porphyria  Of note is abnormal urine studies above  They had also been abnormalWhen checked in 2002  Unclear if this is sufficient to make a diagnosis    Now Also with iron deficiency anemia    Plan;  Continue GI evaluation for new iron deficiency  Attempt to get old records that help make initial diagnosis  Contact [facility] for assistance and interpretation of above lab studies    If patient does not have a variation of porphyria  Will need gastroenterology to help assess what is the underlying cause of abdominal pain  And or psychiatry to help determine if patient is malingering    Until then would continue with aggressive hydration including D5 and pain control as she has been on narcotics for many years

## 2021-01-04 NOTE — PROGRESS NOTE BEHAVIORAL HEALTH - NSBHCHARTREVIEWVS_PSY_A_CORE FT
Vital Signs Last 24 Hrs  T(C): 36.6 (04 Jan 2021 08:53), Max: 36.9 (03 Jan 2021 15:37)  T(F): 97.9 (04 Jan 2021 08:53), Max: 98.4 (03 Jan 2021 15:37)  HR: 93 (04 Jan 2021 08:53) (92 - 107)  BP: 155/65 (04 Jan 2021 08:53) (116/61 - 156/65)  BP(mean): --  RR: 18 (04 Jan 2021 08:53) (18 - 18)  SpO2: 100% (04 Jan 2021 08:53) (99% - 100%)

## 2021-01-04 NOTE — PROGRESS NOTE BEHAVIORAL HEALTH - RISK ASSESSMENT
LOW RISK    ACUTE RISK FACTORS: exacerbated acute medical comorbidities, depressed mood, anxiety    CHRONIC RISK FACTORS: medical comorbidities     PROTECTIVE FACTORS: no prior / current suicidal ideation/intent/plan, future oriented, supportive family, good insight.

## 2021-01-04 NOTE — PROGRESS NOTE BEHAVIORAL HEALTH - MUSCLE TONE / STRENGTH
HISTORY AND PHYSICAL REPORT  This is a preoperative history and physical report as requested by Dr. Nakul Urias for medical clearance to have a right cataract extraction lens implantation on July 11, 2018 at [facility].     HISTORY OF PRESENT ILLNESS  Brittanie is a 83 year old female with complaint of decreased visual acuity right eye. The patient has a difficult time seeing at night as well as reading. This bothers her on a daily basis. The patient has been found to have a mature right cataract. The patient has been offered and is electing undergo right cataract extraction lens implantation. She understands risks and benefits of the procedure..     MEDICATIONS  Current Outpatient Prescriptions   Medication Sig   • metoPROLOL succinate (TOPROL-XL) 50 MG 24 hr tablet TAKE 1 TABLET ONCE DAILY AS DIRECTED (BLOOD PRESSURE + HEART)   • lisinopril (ZESTRIL) 10 MG tablet TAKE 1 TABLET ONCE DAILY AS DIRECTED (BLOOD PRESSURE)   • zolpidem (AMBIEN) 5 MG tablet TAKE 1 TABLET AT BEDTIME AS NEEDED FOR SLEEP   • acetaminophen (TYLENOL) 500 MG tablet Take 500 mg by mouth every 6 hours as needed.   • aspirin 81 MG tablet Take 81 mg by mouth daily.   • cholecalciferol (VITAMIN D3) 1000 UNITS tablet Take 1,000 Units by mouth daily. Takes every am.   • vitamin - therapeutic multivitamins w/minerals (CENTRUM SILVER,THERA-M) TABS Take 1 tablet by mouth 3 times daily.   • Coenzyme Q10 (COQ10) 100 MG CAPS Take  by mouth.     No current facility-administered medications for this visit.      ALLERGIES: no known allergies.    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   • Atrial fibrillation (CMS/HCC)    • Essential (primary) hypertension    • Herniated disc     Back   • Hypertrophic cardiomyopathy (CMS/HCC) 2013    Obstructive   • Malignant neoplasm (CMS/HCC)    • Transient global amnesia 2/2014    Negative neuro  w/u   • Wears glasses      Past Surgical History:   Procedure Laterality Date   • Cardiac catherization  05/07/2014    radial approach    • Cardiac surgery  05/16/2014    EXCISION FIBROELASTOMA OF AORTIC VALVE / SEPTAL MYECTOMY   • Echo loreta  4/11/14   • Eye surgery Left 6/13/018   • Mammo screening bilateral  12/23/2011   • Tonsillectomy and adenoidectomy       Family History   Problem Relation Age of Onset   • Heart disease Mother    • Cancer Father      History   Smoking Status   • Never Smoker   Smokeless Tobacco   • Never Used       REVIEW OF SYSTEMS  Pertinent positives in History of Present Illness.  All other review of systems reviewed and negative.       PHYSICAL EXAMINATION  VITAL SIGNS:    Visit Vitals  /78   Pulse 76   Resp 16   Ht 5' 4\" (1.626 m)   Wt 65.7 kg   BMI 24.86 kg/m²   .  GENERAL:  Well-developed female who appears stated age.  She is alert and oriented x3, and in no acute distress.    SKIN:  Dry.  No rashes.   LYMPH NODES:  No cervical or supraclavicular adenopathy.   HEENT:  Head normocephalic.  Ears are normal bilaterally.  Tympanic membranes are intact and external auditory canals normal.  Extraocular movements intact.  Eyes - Pupils equal, round, reactive to light and accommodation.  Conjunctivae are clear.  Posterior pharynx without erythema or exudate.  NECK:  Supple, no palpable abnormalities.  Thyroid midline and symmetric.  Carotid upstrokes equal bilaterally, no bruit.   LUNGS:  Clear to auscultation.  Breath sounds equal bilaterally.   HEART:  S1 and S2 regular.  No murmur.   ABDOMEN:  Soft and nontender.  No masses.  No hepatosplenomegaly.   EXTREMITIES:  No edema, cyanosis or clubbing.        LAB DIAGNOSTICS  All pertinent laboratory results were reviewed.     IMPRESSION  Patient is medically stable to have a right cataract extraction lens implantation with Dr. Urias on July 11, 2018 at [facility].     PLAN  Patient instructed to discontinue all  use of aspirin and NSAID therapy.  We will be available in the perioperative period for any questions or concerns.      Please copy Dr. Urias on this report.          Normal muscle tone/strength

## 2021-01-04 NOTE — PROGRESS NOTE ADULT - ASSESSMENT
65 year old woman with PMHx  staph infection, chronic abdominal pain, osteoporosis, adrenal insufficiency, [place], s/p cholecystectomy, s/p spinal fusion, chronic intermittent porphyria, anxiety, with multiple previous admissions for acute on chronic abdominal pain presents to [facility] on 12/23/20  for abdominal pain.  Pt over last several days with worsening 10/10 abd pain with associated nausea and vomiting.  admitted for   Severe abdominal pain with inability to tolerate p.o. secondary to  Acute on chronic intermittent porphyria exacerbation  Metabolic encephalopathy , improving   - Dilaudid PCA dc'd, start IV Dilaudid PRN - IV fluids restarted 12/27  -IV protonix--> PO , antiemetics   -heme evaluation -  spot urine for PBG, Total porphyrins and creatinine.  - not consistent with porphyria   -bowel regimen  - dulcolax, miralax changed to PRN due to diarrhea  - GI and oncology consult  -Seen by GI--> recommending genetic testing OP and EGD and Colonoscopy OP  12/29 increase dilaudid again; poor po intake, IVFs   12/31 - discussed with Dr Molly Mccauley pt's OP pain mx physician and Dr Maza   1/2 - PO dilaudid 6 q4, IV only for severe pain  1/3 - PO or SC dilaudid, stop IV dilaudid   1/4 - pain better, plan for colonoscopy by GI Dr. Curiel  Diarrhea 1/2/21 -  C diff pending , GI PCR - neg  , hold laxatives , check FOBT, O&P   - pending , added probiotics  Pyuria with E coli suspected UTI  -12/27  Start Ceftriaxone 1G daily x 3 days - completed   Hypertension  -12/31 increase Hydralazine IV ATC, c/w 2.5 amlodipine  Microcytic anemia due to iron deficiency , worsening   - check iron, TIBC, FOBT, B12, folate - noted, FOBT - neg , repeat FOBT   -s/p IV iron, on oral FeSO4  - 12/31- s/p EGD unremarkable, small hiatal hernia    - GI consult - s/p EGD , needs colonoscopy  d/w Dr. Curiel  - 1/3 - 1 unit PRBC   Constipation, resolved - d/c miralax, d/c dulcolax , now diarrhea  Anxiety   -Ativan PRN  - patient asking if she can die in [place] state, deficiently depressed  - psychiatry consult for mood evaluation   Chronic adrenal insufficiency - patient refuses to take steroids, BP wnl, monitor, cortisone am elevated   Mood disorder Anxiety  -  malingering to get IV pain medications  - psychiatry evaluation , add melatonin 3 hs for insomnia  Hypokalemia - replace , Iv fluids with K   DVT prophylaxis: Lovenox  Advanced directives  DNR DNI    Dispo - IV fluids, pain management with PO , GI reevaluation ,  plan  for colonoscopy

## 2021-01-04 NOTE — PROGRESS NOTE ADULT - ASSESSMENT
Imp:  Unexplained abdominal pain, exam benign but still with discomfort  Iron deficiency anemia    Plan:  Patient now agrees to colonoscopy tomorrow  Risks and benefits reviewed, patient agrees.

## 2021-01-04 NOTE — PROGRESS NOTE BEHAVIORAL HEALTH - NSBHFUPINTERVALHXFT_PSY_A_CORE
Patient presenting less anxious, endorsing being in a nightmare given acute medical comorbidities. Patient is tangential, overinclusive. Patient talking about her family, prior / past experiences etc. Patient denying trauma. Patient reports situational depressed mood, but no hopelessness. Denies suicidal ideation/intent/plan. Denies manic / psychotic symptoms. No delusions elicited. Reports not sleeping well in the last 48 hours. As per RN, she was not given report that patient did not sleep. Denies other safety concerns. Patient averse to psychotropic management but open to Melatonin for sleep.

## 2021-01-05 ENCOUNTER — RESULT REVIEW (OUTPATIENT)
Age: 66
End: 2021-01-05

## 2021-01-05 LAB
ANION GAP SERPL CALC-SCNC: 6 MMOL/L — SIGNIFICANT CHANGE UP (ref 5–17)
BUN SERPL-MCNC: 6 MG/DL — LOW (ref 7–23)
CALCIUM SERPL-MCNC: 9.9 MG/DL — SIGNIFICANT CHANGE UP (ref 8.5–10.1)
CHLORIDE SERPL-SCNC: 116 MMOL/L — HIGH (ref 96–108)
CO2 SERPL-SCNC: 20 MMOL/L — LOW (ref 22–31)
CREAT SERPL-MCNC: 0.48 MG/DL — LOW (ref 0.5–1.3)
GLUCOSE SERPL-MCNC: 112 MG/DL — HIGH (ref 70–99)
HCT VFR BLD CALC: 31.6 % — LOW (ref 34.5–45)
HGB BLD-MCNC: 9.6 G/DL — LOW (ref 11.5–15.5)
MCHC RBC-ENTMCNC: 23.5 PG — LOW (ref 27–34)
MCHC RBC-ENTMCNC: 30.4 GM/DL — LOW (ref 32–36)
MCV RBC AUTO: 77.5 FL — LOW (ref 80–100)
PLATELET # BLD AUTO: 333 K/UL — SIGNIFICANT CHANGE UP (ref 150–400)
POTASSIUM SERPL-MCNC: 4 MMOL/L — SIGNIFICANT CHANGE UP (ref 3.5–5.3)
POTASSIUM SERPL-SCNC: 4 MMOL/L — SIGNIFICANT CHANGE UP (ref 3.5–5.3)
RBC # BLD: 4.08 M/UL — SIGNIFICANT CHANGE UP (ref 3.8–5.2)
RBC # FLD: 27.3 % — HIGH (ref 10.3–14.5)
SODIUM SERPL-SCNC: 142 MMOL/L — SIGNIFICANT CHANGE UP (ref 135–145)
WBC # BLD: 13.82 K/UL — HIGH (ref 3.8–10.5)
WBC # FLD AUTO: 13.82 K/UL — HIGH (ref 3.8–10.5)

## 2021-01-05 PROCEDURE — 99233 SBSQ HOSP IP/OBS HIGH 50: CPT

## 2021-01-05 RX ADMIN — ENOXAPARIN SODIUM 40 MILLIGRAM(S): 100 INJECTION SUBCUTANEOUS at 12:36

## 2021-01-05 RX ADMIN — Medication 20 MILLIEQUIVALENT(S): at 01:15

## 2021-01-05 RX ADMIN — Medication 1000 UNIT(S): at 22:12

## 2021-01-05 RX ADMIN — HYDROMORPHONE HYDROCHLORIDE 6 MILLIGRAM(S): 2 INJECTION INTRAMUSCULAR; INTRAVENOUS; SUBCUTANEOUS at 03:25

## 2021-01-05 RX ADMIN — Medication 1 TABLET(S): at 12:37

## 2021-01-05 RX ADMIN — Medication 1 TABLET(S): at 00:21

## 2021-01-05 RX ADMIN — MAGNESIUM OXIDE 400 MG ORAL TABLET 400 MILLIGRAM(S): 241.3 TABLET ORAL at 12:37

## 2021-01-05 RX ADMIN — ONDANSETRON 4 MILLIGRAM(S): 8 TABLET, FILM COATED ORAL at 06:32

## 2021-01-05 RX ADMIN — MAGNESIUM OXIDE 400 MG ORAL TABLET 400 MILLIGRAM(S): 241.3 TABLET ORAL at 17:30

## 2021-01-05 RX ADMIN — Medication 10 MILLIGRAM(S): at 14:29

## 2021-01-05 RX ADMIN — HYDROMORPHONE HYDROCHLORIDE 6 MILLIGRAM(S): 2 INJECTION INTRAMUSCULAR; INTRAVENOUS; SUBCUTANEOUS at 06:59

## 2021-01-05 RX ADMIN — Medication 1 TABLET(S): at 22:12

## 2021-01-05 RX ADMIN — AMLODIPINE BESYLATE 2.5 MILLIGRAM(S): 2.5 TABLET ORAL at 12:37

## 2021-01-05 RX ADMIN — Medication 3 MILLIGRAM(S): at 22:12

## 2021-01-05 RX ADMIN — HYDROMORPHONE HYDROCHLORIDE 6 MILLIGRAM(S): 2 INJECTION INTRAMUSCULAR; INTRAVENOUS; SUBCUTANEOUS at 12:44

## 2021-01-05 RX ADMIN — Medication 1000 UNIT(S): at 12:37

## 2021-01-05 RX ADMIN — HYDROMORPHONE HYDROCHLORIDE 6 MILLIGRAM(S): 2 INJECTION INTRAMUSCULAR; INTRAVENOUS; SUBCUTANEOUS at 02:57

## 2021-01-05 RX ADMIN — Medication 325 MILLIGRAM(S): at 12:37

## 2021-01-05 RX ADMIN — DEXTROSE MONOHYDRATE, SODIUM CHLORIDE, AND POTASSIUM CHLORIDE 50 MILLILITER(S): 50; .745; 4.5 INJECTION, SOLUTION INTRAVENOUS at 18:46

## 2021-01-05 RX ADMIN — HYDROMORPHONE HYDROCHLORIDE 6 MILLIGRAM(S): 2 INJECTION INTRAMUSCULAR; INTRAVENOUS; SUBCUTANEOUS at 13:15

## 2021-01-05 RX ADMIN — Medication 1 MILLIGRAM(S): at 12:37

## 2021-01-05 RX ADMIN — HYDROMORPHONE HYDROCHLORIDE 6 MILLIGRAM(S): 2 INJECTION INTRAMUSCULAR; INTRAVENOUS; SUBCUTANEOUS at 18:46

## 2021-01-05 RX ADMIN — Medication 10 MILLIGRAM(S): at 06:32

## 2021-01-05 RX ADMIN — Medication 10 MILLIGRAM(S): at 22:13

## 2021-01-05 RX ADMIN — ONDANSETRON 4 MILLIGRAM(S): 8 TABLET, FILM COATED ORAL at 12:37

## 2021-01-05 RX ADMIN — PANTOPRAZOLE SODIUM 40 MILLIGRAM(S): 20 TABLET, DELAYED RELEASE ORAL at 12:37

## 2021-01-05 NOTE — DIETITIAN INITIAL EVALUATION ADULT. - MALNUTRITION
moderate malnutrition in chronic illness Moderate malnutrition in chronic illness r/t persistent nausea, vomiting, and abd pain 2/2 chronic intermittent porphyria AEB PO intake <75% > 1 mo and mild muscle/fat wasting

## 2021-01-05 NOTE — DIETITIAN NUTRITION RISK NOTIFICATION - ADDITIONAL COMMENTS/DIETITIAN RECOMMENDATIONS
1) encourage PO intake as tolerated w/improving symptoms   2) encourage fluid intake to meet fluid needs   3) check wt weekly to track/trend change

## 2021-01-05 NOTE — DIETITIAN INITIAL EVALUATION ADULT. - OTHER INFO
64 y/o F with PMHx staph infection, chronic abdominal pain, osteoporosis, adrenal insufficiency; not taking steroids , [place], s/p cholecystectomy, s/p spinal fusion, chronic intermittent porphyria, anxiety, with multiple previous admissions for acute on chronic abdominal pain presents to [facility] on 12/23/20 for abd pain.  Pt is being managed for severe abd pain with inability to tolerate PO 2/2 acute on chronic intermittent porphyria exacerbation, metabolic encephalopathy, polyuria w/E.coli suspected UTI, HTN, microcytic anemia 2/2 iron deficiency, chronic adrenal insufficiency, anxiety, and hypokalemia.  Pt was seen by Gastroenterologist for a colonoscopy today (1/5); results were normal. Pt c/o persistent nausea, vomiting, and abd pain that is still occurring. She was experiencing constipation which turned into diarrhea post-bowel regimen; BR on hold.

## 2021-01-05 NOTE — DIETITIAN INITIAL EVALUATION ADULT. - PERTINENT LABORATORY DATA
01-05    142  |  116<H>  |  6<L>  ----------------------------<  112<H>  4.0   |  20<L>  |  0.48<L>    Ca    9.9      05 Jan 2021 07:11  Phos  3.5     01-04  Mg     2.2     01-04    BMI: BMI (kg/m2): 24.2 (12-22-20 @ 22:12)  HbA1c:   Glucose: 112 mg/dL  BP: 139/71 (01-05-21 @ 12:12) (116/61 - 156/72)  Vitamin B12, Serum: 478 pg/mL (12-25-20 @ 20:56)  Folate, Serum: >20.0 ng/mL (12-25-20 @ 20:56)  Iron Total, Serum: 16 ug/dL (12-24-20 @ 10:35)

## 2021-01-05 NOTE — PROGRESS NOTE ADULT - NUTRITIONAL ASSESSMENT
This patient has been assessed with a concern for Malnutrition and has been determined to have a diagnosis/diagnoses of Moderate protein-calorie malnutrition.    This patient is being managed with:   Diet Regular-  Supplement Feeding Modality:  Oral  Ensure Enlive Cans or Servings Per Day:  3       Frequency:  Daily  Entered: Jan 5 2021  3:03PM

## 2021-01-05 NOTE — PROGRESS NOTE ADULT - ASSESSMENT
65 year old woman with PMHx  staph infection, chronic abdominal pain, osteoporosis, adrenal insufficiency, [place], s/p cholecystectomy, s/p spinal fusion, chronic intermittent porphyria, anxiety, with multiple previous admissions for acute on chronic abdominal pain presents to [facility] on 12/23/20  for abdominal pain.  Pt over last several days with worsening 10/10 abd pain with associated nausea and vomiting.  admitted for   Severe abdominal pain with inability to tolerate p.o. secondary to  Acute on chronic intermittent porphyria exacerbation  Metabolic encephalopathy , improving   - Dilaudid PCA dc'd, start IV Dilaudid PRN - IV fluids restarted 12/27  -IV protonix--> PO , antiemetics   -heme evaluation -  spot urine for PBG, Total porphyrins and creatinine.  - not consistent with porphyria   -bowel regimen  - dulcolax, miralax changed to PRN due to diarrhea  - GI and oncology consult  -Seen by GI--> recommending genetic testing OP and EGD and Colonoscopy OP  12/29 increase dilaudid again; poor po intake, IVFs   12/31 - discussed with Dr Molly Mccauley pt's OP pain mx physician and Dr Maza   1/2 - PO dilaudid 6 q4, IV only for severe pain  1/3 - PO or SC dilaudid, stop IV dilaudid   1/4 - pain better, plan for colonoscopy by GI Dr. Curiel  1/5 s/p colonoscopy - normal , advance the diet   Diarrhea 1/2/21, resolved -  C diff neg , GI PCR - neg  , hold laxatives , added probiotics  Pyuria with E coli suspected UTI  -12/27  Start Ceftriaxone 1G daily x 3 days - completed   Hypertension  -12/31 increase Hydralazine IV ATC, c/w 2.5 amlodipine  Microcytic anemia due to iron deficiency , worsening   - check iron, TIBC, FOBT, B12, folate - noted, FOBT - neg , repeat FOBT   -s/p IV iron, on oral FeSO4  - 12/31- s/p EGD unremarkable, small hiatal hernia    - GI consult - s/p EGD , needs colonoscopy  d/w Dr. Curiel  - 1/3 - 1 unit PRBC   Constipation, resolved - d/c miralax, d/c dulcolax    Anxiety   -Ativan PRN  - patient asking if she can die in [place], deficiently depressed  - psychiatry consult for mood evaluation   Chronic adrenal insufficiency - patient refuses to take steroids, BP wnl, monitor, cortisone am elevated   Mood disorder Anxiety  -  malingering to get IV pain medications  - psychiatry evaluation , add melatonin 3 hs for insomnia  Hypokalemia, resolved - replace , Iv fluids with K   Mild leukocytosis , likely reactive  DVT prophylaxis: Lovenox  Advanced directives  DNR DNI    Dispo - IV fluids, pain management with PO , GI reevaluation , d/c planning for hugo

## 2021-01-05 NOTE — DIETITIAN INITIAL EVALUATION ADULT. - PERTINENT MEDS FT
MEDICATIONS  (STANDING):  amLODIPine   Tablet 2.5 milliGRAM(s) Oral daily  cholecalciferol 1000 Unit(s) Oral two times a day  dextrose 5% + sodium chloride 0.45% with potassium chloride 20 mEq/L 1000 milliLiter(s) (50 mL/Hr) IV Continuous <Continuous>  enoxaparin Injectable 40 milliGRAM(s) SubCutaneous daily  ferrous    sulfate 325 milliGRAM(s) Oral daily  folic acid 1 milliGRAM(s) Oral daily  hydrALAZINE Injectable 10 milliGRAM(s) IV Push every 8 hours  lactobacillus acidophilus 1 Tablet(s) Oral two times a day with meals  magnesium oxide 400 milliGRAM(s) Oral two times a day with meals  melatonin 3 milliGRAM(s) Oral at bedtime  multivitamin 1 Tablet(s) Oral daily  ondansetron Injectable 4 milliGRAM(s) IV Push <User Schedule>  pantoprazole    Tablet 40 milliGRAM(s) Oral before breakfast    MEDICATIONS  (PRN):  ALBUTerol    90 MICROgram(s) HFA Inhaler 2 Puff(s) Inhalation every 6 hours PRN Shortness of Breath and/or Wheezing  HYDROmorphone   Tablet 6 milliGRAM(s) Oral every 4 hours PRN Moderate Pain (4 - 6)  HYDROmorphone  Injectable 1 milliGRAM(s) SubCutaneous every 4 hours PRN Severe Pain (7 - 10)  LORazepam   Injectable 1 milliGRAM(s) IV Push every 4 hours PRN Nausea and/or Vomiting  naloxone Injectable 0.1 milliGRAM(s) IV Push every 3 minutes PRN For ANY of the following changes in patient status:  A. RR LESS THAN 10 breaths per minute, B. Oxygen saturation LESS THAN 90%, C. Sedation score of 6  ondansetron Injectable 4 milliGRAM(s) IV Push every 6 hours PRN Nausea and/or Vomiting  senna 2 Tablet(s) Oral at bedtime PRN Constipation

## 2021-01-06 ENCOUNTER — TRANSCRIPTION ENCOUNTER (OUTPATIENT)
Age: 66
End: 2021-01-06

## 2021-01-06 VITALS
HEART RATE: 103 BPM | SYSTOLIC BLOOD PRESSURE: 147 MMHG | OXYGEN SATURATION: 100 % | DIASTOLIC BLOOD PRESSURE: 60 MMHG | TEMPERATURE: 98 F

## 2021-01-06 LAB
ALBUMIN SERPL ELPH-MCNC: 3 G/DL — LOW (ref 3.3–5)
ALP SERPL-CCNC: 59 U/L — SIGNIFICANT CHANGE UP (ref 40–120)
ALT FLD-CCNC: 62 U/L — SIGNIFICANT CHANGE UP (ref 12–78)
ANION GAP SERPL CALC-SCNC: 7 MMOL/L — SIGNIFICANT CHANGE UP (ref 5–17)
AST SERPL-CCNC: 34 U/L — SIGNIFICANT CHANGE UP (ref 15–37)
BASOPHILS # BLD AUTO: 0.07 K/UL — SIGNIFICANT CHANGE UP (ref 0–0.2)
BASOPHILS NFR BLD AUTO: 0.8 % — SIGNIFICANT CHANGE UP (ref 0–2)
BILIRUB SERPL-MCNC: 0.4 MG/DL — SIGNIFICANT CHANGE UP (ref 0.2–1.2)
BUN SERPL-MCNC: 5 MG/DL — LOW (ref 7–23)
CALCIUM SERPL-MCNC: 9 MG/DL — SIGNIFICANT CHANGE UP (ref 8.5–10.1)
CHLORIDE SERPL-SCNC: 112 MMOL/L — HIGH (ref 96–108)
CO2 SERPL-SCNC: 22 MMOL/L — SIGNIFICANT CHANGE UP (ref 22–31)
CREAT SERPL-MCNC: 0.46 MG/DL — LOW (ref 0.5–1.3)
EOSINOPHIL # BLD AUTO: 0.18 K/UL — SIGNIFICANT CHANGE UP (ref 0–0.5)
EOSINOPHIL NFR BLD AUTO: 2.1 % — SIGNIFICANT CHANGE UP (ref 0–6)
GLUCOSE SERPL-MCNC: 110 MG/DL — HIGH (ref 70–99)
HCT VFR BLD CALC: 30.3 % — LOW (ref 34.5–45)
HGB BLD-MCNC: 9.2 G/DL — LOW (ref 11.5–15.5)
IMM GRANULOCYTES NFR BLD AUTO: 0.5 % — SIGNIFICANT CHANGE UP (ref 0–1.5)
LYMPHOCYTES # BLD AUTO: 2.33 K/UL — SIGNIFICANT CHANGE UP (ref 1–3.3)
LYMPHOCYTES # BLD AUTO: 27.5 % — SIGNIFICANT CHANGE UP (ref 13–44)
MCHC RBC-ENTMCNC: 23.7 PG — LOW (ref 27–34)
MCHC RBC-ENTMCNC: 30.4 GM/DL — LOW (ref 32–36)
MCV RBC AUTO: 77.9 FL — LOW (ref 80–100)
MONOCYTES # BLD AUTO: 0.75 K/UL — SIGNIFICANT CHANGE UP (ref 0–0.9)
MONOCYTES NFR BLD AUTO: 8.9 % — SIGNIFICANT CHANGE UP (ref 2–14)
NEUTROPHILS # BLD AUTO: 5.09 K/UL — SIGNIFICANT CHANGE UP (ref 1.8–7.4)
NEUTROPHILS NFR BLD AUTO: 60.2 % — SIGNIFICANT CHANGE UP (ref 43–77)
PLATELET # BLD AUTO: 291 K/UL — SIGNIFICANT CHANGE UP (ref 150–400)
POTASSIUM SERPL-MCNC: 3.2 MMOL/L — LOW (ref 3.5–5.3)
POTASSIUM SERPL-SCNC: 3.2 MMOL/L — LOW (ref 3.5–5.3)
PROT SERPL-MCNC: 6.7 GM/DL — SIGNIFICANT CHANGE UP (ref 6–8.3)
RBC # BLD: 3.89 M/UL — SIGNIFICANT CHANGE UP (ref 3.8–5.2)
RBC # FLD: 27.8 % — HIGH (ref 10.3–14.5)
SODIUM SERPL-SCNC: 141 MMOL/L — SIGNIFICANT CHANGE UP (ref 135–145)
WBC # BLD: 8.46 K/UL — SIGNIFICANT CHANGE UP (ref 3.8–10.5)
WBC # FLD AUTO: 8.46 K/UL — SIGNIFICANT CHANGE UP (ref 3.8–10.5)

## 2021-01-06 PROCEDURE — 99239 HOSP IP/OBS DSCHRG MGMT >30: CPT

## 2021-01-06 RX ORDER — LANOLIN ALCOHOL/MO/W.PET/CERES
5 CREAM (GRAM) TOPICAL
Qty: 0 | Refills: 0 | DISCHARGE
Start: 2021-01-06 | End: 2021-02-04

## 2021-01-06 RX ORDER — FERROUS SULFATE 325(65) MG
1 TABLET ORAL
Qty: 30 | Refills: 0
Start: 2021-01-06 | End: 2021-02-04

## 2021-01-06 RX ORDER — LANOLIN ALCOHOL/MO/W.PET/CERES
1 CREAM (GRAM) TOPICAL
Qty: 30 | Refills: 0
Start: 2021-01-06 | End: 2021-02-04

## 2021-01-06 RX ORDER — ONDANSETRON 8 MG/1
4 TABLET, FILM COATED ORAL EVERY 6 HOURS
Refills: 0 | Status: DISCONTINUED | OUTPATIENT
Start: 2021-01-06 | End: 2021-01-06

## 2021-01-06 RX ORDER — ONDANSETRON 8 MG/1
1 TABLET, FILM COATED ORAL
Qty: 60 | Refills: 0
Start: 2021-01-06 | End: 2021-01-15

## 2021-01-06 RX ORDER — AMLODIPINE BESYLATE 2.5 MG/1
1 TABLET ORAL
Qty: 30 | Refills: 0
Start: 2021-01-06 | End: 2021-02-04

## 2021-01-06 RX ORDER — DOCUSATE SODIUM 100 MG
1 CAPSULE ORAL
Qty: 30 | Refills: 0
Start: 2021-01-06 | End: 2021-02-04

## 2021-01-06 RX ORDER — ESOMEPRAZOLE MAGNESIUM 40 MG/1
1 CAPSULE, DELAYED RELEASE ORAL
Qty: 0 | Refills: 0 | DISCHARGE

## 2021-01-06 RX ORDER — POTASSIUM CHLORIDE 20 MEQ
20 PACKET (EA) ORAL
Refills: 0 | Status: COMPLETED | OUTPATIENT
Start: 2021-01-06 | End: 2021-01-06

## 2021-01-06 RX ORDER — HYDROMORPHONE HYDROCHLORIDE 2 MG/ML
3 INJECTION INTRAMUSCULAR; INTRAVENOUS; SUBCUTANEOUS
Qty: 90 | Refills: 0
Start: 2021-01-06 | End: 2021-01-10

## 2021-01-06 RX ORDER — ONDANSETRON 8 MG/1
8 TABLET, FILM COATED ORAL
Qty: 0 | Refills: 0 | DISCHARGE

## 2021-01-06 RX ORDER — PANTOPRAZOLE SODIUM 20 MG/1
1 TABLET, DELAYED RELEASE ORAL
Qty: 30 | Refills: 0
Start: 2021-01-06 | End: 2021-02-04

## 2021-01-06 RX ORDER — MORPHINE SULFATE 50 MG/1
130 CAPSULE, EXTENDED RELEASE ORAL
Qty: 0 | Refills: 0 | DISCHARGE

## 2021-01-06 RX ORDER — HYDROMORPHONE HYDROCHLORIDE 2 MG/ML
1 INJECTION INTRAMUSCULAR; INTRAVENOUS; SUBCUTANEOUS
Qty: 0 | Refills: 0 | DISCHARGE

## 2021-01-06 RX ORDER — FOLIC ACID 0.8 MG
1 TABLET ORAL
Qty: 30 | Refills: 0
Start: 2021-01-06 | End: 2021-02-04

## 2021-01-06 RX ORDER — LACTOBACILLUS ACIDOPHILUS 100MM CELL
1 CAPSULE ORAL
Qty: 60 | Refills: 0
Start: 2021-01-06 | End: 2021-02-04

## 2021-01-06 RX ORDER — POLYETHYLENE GLYCOL 3350 17 G/17G
17 POWDER, FOR SOLUTION ORAL
Qty: 510 | Refills: 0
Start: 2021-01-06 | End: 2021-02-04

## 2021-01-06 RX ADMIN — ENOXAPARIN SODIUM 40 MILLIGRAM(S): 100 INJECTION SUBCUTANEOUS at 09:53

## 2021-01-06 RX ADMIN — HYDROMORPHONE HYDROCHLORIDE 6 MILLIGRAM(S): 2 INJECTION INTRAMUSCULAR; INTRAVENOUS; SUBCUTANEOUS at 06:23

## 2021-01-06 RX ADMIN — AMLODIPINE BESYLATE 2.5 MILLIGRAM(S): 2.5 TABLET ORAL at 09:54

## 2021-01-06 RX ADMIN — ONDANSETRON 4 MILLIGRAM(S): 8 TABLET, FILM COATED ORAL at 09:55

## 2021-01-06 RX ADMIN — HYDROMORPHONE HYDROCHLORIDE 6 MILLIGRAM(S): 2 INJECTION INTRAMUSCULAR; INTRAVENOUS; SUBCUTANEOUS at 13:15

## 2021-01-06 RX ADMIN — Medication 1000 UNIT(S): at 09:54

## 2021-01-06 RX ADMIN — Medication 20 MILLIEQUIVALENT(S): at 14:39

## 2021-01-06 RX ADMIN — Medication 325 MILLIGRAM(S): at 09:54

## 2021-01-06 RX ADMIN — Medication 10 MILLIGRAM(S): at 16:35

## 2021-01-06 RX ADMIN — HYDROMORPHONE HYDROCHLORIDE 6 MILLIGRAM(S): 2 INJECTION INTRAMUSCULAR; INTRAVENOUS; SUBCUTANEOUS at 05:51

## 2021-01-06 RX ADMIN — Medication 1 TABLET(S): at 09:54

## 2021-01-06 RX ADMIN — PANTOPRAZOLE SODIUM 40 MILLIGRAM(S): 20 TABLET, DELAYED RELEASE ORAL at 05:53

## 2021-01-06 RX ADMIN — ONDANSETRON 4 MILLIGRAM(S): 8 TABLET, FILM COATED ORAL at 16:34

## 2021-01-06 RX ADMIN — ONDANSETRON 4 MILLIGRAM(S): 8 TABLET, FILM COATED ORAL at 05:54

## 2021-01-06 RX ADMIN — HYDROMORPHONE HYDROCHLORIDE 6 MILLIGRAM(S): 2 INJECTION INTRAMUSCULAR; INTRAVENOUS; SUBCUTANEOUS at 00:11

## 2021-01-06 RX ADMIN — Medication 20 MILLIEQUIVALENT(S): at 16:34

## 2021-01-06 RX ADMIN — Medication 20 MILLIEQUIVALENT(S): at 11:08

## 2021-01-06 RX ADMIN — MAGNESIUM OXIDE 400 MG ORAL TABLET 400 MILLIGRAM(S): 241.3 TABLET ORAL at 09:54

## 2021-01-06 RX ADMIN — Medication 1 MILLIGRAM(S): at 09:54

## 2021-01-06 RX ADMIN — MAGNESIUM OXIDE 400 MG ORAL TABLET 400 MILLIGRAM(S): 241.3 TABLET ORAL at 16:35

## 2021-01-06 RX ADMIN — Medication 10 MILLIGRAM(S): at 05:53

## 2021-01-06 NOTE — PROGRESS NOTE ADULT - SUBJECTIVE AND OBJECTIVE BOX
Colonoscopy:  Normal    Rec:  Advance diet
    INTERVAL HISTORY:    Patient admitted for abdominal pain.  On fluids, pain medication.  CT scans have been unremarkable.  She has a microcytic anemia secondary to iron deficiency.  During course of abdominal pain urine for PBG was Negative.      Allergies    chlorhexidine containing compounds ([place])    Intolerances        MEDICATIONS  (STANDING):  amLODIPine   Tablet 2.5 milliGRAM(s) Oral daily  cefTRIAXone Injectable.      cefTRIAXone Injectable. 1000 milliGRAM(s) IV Push every 24 hours  cholecalciferol 1000 Unit(s) Oral two times a day  enoxaparin Injectable 40 milliGRAM(s) SubCutaneous daily  ferrous    sulfate 325 milliGRAM(s) Oral daily  folic acid 1 milliGRAM(s) Oral daily  hydrALAZINE Injectable 5 milliGRAM(s) IV Push every 6 hours  magnesium oxide 400 milliGRAM(s) Oral two times a day with meals  multivitamin 1 Tablet(s) Oral daily  ondansetron Injectable 4 milliGRAM(s) IV Push <User Schedule>  pantoprazole  Injectable 40 milliGRAM(s) IV Push daily  saccharomyces boulardii 250 milliGRAM(s) Oral two times a day  sodium chloride 0.9%. 1000 milliLiter(s) (75 mL/Hr) IV Continuous <Continuous>    MEDICATIONS  (PRN):  ALBUTerol    90 MICROgram(s) HFA Inhaler 2 Puff(s) Inhalation every 6 hours PRN Shortness of Breath and/or Wheezing  bisacodyl 5 milliGRAM(s) Oral at bedtime PRN Constipation  HYDROmorphone  Injectable 0.5 milliGRAM(s) IV Push every 4 hours PRN Moderate Pain (4 - 6)  HYDROmorphone  Injectable 1 milliGRAM(s) IV Push every 4 hours PRN Severe Pain (7 - 10)  LORazepam   Injectable 1 milliGRAM(s) IV Push every 4 hours PRN Nausea and/or Vomiting  naloxone Injectable 0.1 milliGRAM(s) IV Push every 3 minutes PRN For ANY of the following changes in patient status:  A. RR LESS THAN 10 breaths per minute, B. Oxygen saturation LESS THAN 90%, C. Sedation score of 6  polyethylene glycol 3350 17 Gram(s) Oral daily PRN Constipation  senna 2 Tablet(s) Oral at bedtime PRN Constipation      Vital Signs Last 24 Hrs  T(C): 36.9 (29 Dec 2020 15:21), Max: 37.7 (29 Dec 2020 09:01)  T(F): 98.5 (29 Dec 2020 15:21), Max: 99.8 (29 Dec 2020 09:01)  HR: 106 (29 Dec 2020 15:21) (85 - 112)  BP: 163/79 (29 Dec 2020 15:21) (148/82 - 184/72)  BP(mean): --  RR: 18 (29 Dec 2020 15:21) (18 - 18)  SpO2: 100% (29 Dec 2020 15:21) (99% - 100%)    PHYSICAL EXAM:    GENERAL: NAD,   HEAD:  Atraumatic, Normocephalic  EYES: EOMI, PERRLA, conjunctiva and sclera clear    NECK: Supple, No JVD, Normal thyroid  NERVOUS SYSTEM:    CHEST/LUNG: Clear to percussion bilaterally; No rales, rhonchi,   HEART: Regular rate and rhythm;   ABDOMEN: Soft, Nontender.  EXTREMITIES:   edema:-  LYMPH: No lymphadenopathy noted        LABS:                        9.5    12.52 )-----------( 323      ( 29 Dec 2020 08:37 )             34.0     12-29    138  |  109<H>  |  8   ----------------------------<  93  3.1<L>   |  21<L>  |  0.46<L>    Ca    9.0      29 Dec 2020 08:37  Mg     2.5     12-29    TPro  7.3  /  Alb  3.3  /  TBili  0.4  /  DBili  x   /  AST  17  /  ALT  24  /  AlkPhos  89  12-28            RADIOLOGY & ADDITIONAL STUDIES:    PATHOLOGY:    
65-year-old female with history of porphyria dating back to   Initially diagnosed with porphyria cutanea tarda and treated with phlebotomy  10 years later developed abdominal pains and based on a urine testing diagnosed with acute intermittent porphyria  For the last 15+ years she has been treated withFor the last 15+ years she has been treated with Intravenous fluids including glucose, and narcotics for pain management  She has had numerous hospitalizations for intermittent episodes of significant abdominal pain and they have been treated as above    Over the last few weeks she has been having increasing abdominal pain nausea vomiting and fell down  In light of Covid and previous experience she refused to come to the hospital  Profoundly depressed as her father recently  and she was unable to see him or attend his   Her sister reports she has been displaying progressively erratic behavior,Significant physical decline Dramatic abdominal pain  Brought to the hospital by ambulance    Found to have significant pain multiple electrolyte abnormalities  CAT scan without evidence of specific GI pathology  Being managed with IV pain medication/self-controlled pump,,  IV hydration    Today clinically feeling better      CBC Full  -  ( 24 Dec 2020 10:35 )  WBC Count : 6.98 K/uL  RBC Count : 4.40 M/uL  Hemoglobin : 8.4 g/dL  Hematocrit : 30.2 %  Platelet Count - Automated : 281 K/uL  Mean Cell Volume : 68.6 fl  Mean Cell Hemoglobin : 19.1 pg  Mean Cell Hemoglobin Concentration : 27.8 gm/dL  Auto Neutrophil # : 3.32 K/uL  Auto Lymphocyte # : 2.77 K/uL  Auto Monocyte # : 0.59 K/uL  Auto Eosinophil # : 0.24 K/uL  Auto Basophil # : 0.05 K/uL  Auto Neutrophil % : 47.6 %  Auto Lymphocyte % : 39.7 %  Auto Monocyte % : 8.5 %  Auto Eosinophil % : 3.4 %  Auto Basophil % : 0.7 %          140  |  111<H>  |  3<L>  ----------------------------<  116<H>  2.8<LL>   |  24  |  0.54    Ca    8.7      24 Dec 2020 10:35  Mg     2.0     12-    TPro  7.7  /  Alb  3.5  /  TBili  0.5  /  DBili  x   /  AST  19  /  ALT  21  /  AlkPhos  73  12-          PT/INR - ( 22 Dec 2020 22:34 )   PT: 12.8 sec;   INR: 1.10 ratio         PTT - ( 22 Dec 2020 22:34 )  PTT:32.3 sec    Vital Signs Last 24 Hrs  T(C): 36.7 (24 Dec 2020 16:00), Max: 37.2 (23 Dec 2020 23:30)  T(F): 98 (24 Dec 2020 16:00), Max: 98.9 (23 Dec 2020 23:30)  HR: 81 (24 Dec 2020 16:00) (68 - 97)  BP: 157/77 (24 Dec 2020 16:00) (123/48 - 157/77)  BP(mean): --  RR: 18 (24 Dec 2020 16:00) (18 - 18)  SpO2: 99% (24 Dec 2020 16:00) (94% - 100%)    
Patient was off the floor during rounds, Having colonoscopy and upper endoscopy  Telephone discussion later in the afternoon with Dr. Curiel  Upper endoscopy and colonoscopy unremarkable    Telephone discussion with Dr. Evelyn Craven, [facility]  [phone]    From a laboratory standpoint  Normal porphobilnogen(PBG),    normal Aminolevulinic Acid (ALA)  make   Diagnosis of acute intermittent porphyria highly unlikely    Suggests molecular study/genetics For porphobilinogen deaminase To help confirm or rule out diagnosis.    She could not explain persistently elevated coproporphyrin 1 and 3 levels, As they had been elevated dating back to April 2002.    Plan:    Outpatient evaluation with capsule endoscopy to help assess because of iron loss    Outpatient molecular studies/genetics to assess porphobilinogen deaminase ,Confirmatory test of porphyria    Outpatient evaluation by gastroenterology and possibly psychiatry, To address alternative diagnoses for patient's purported significant intermittent abdominal pain.    Continue prior regime of outpatient hydration with D5 as well as analgesics/narcotics; We will continue this until gastroenterology and/or psychiatry come up with alternative explanation diagnosis  
Subjective:  Patient is a 65y old  Female who presents with a chief complaint of Abdominal pain      HPI:  65 year old woman with PMHx  staph infection, chronic abdominal pain, osteoporosis, adrenal insufficiency does not taking steroids , [place], s/p cholecystectomy, s/p spinal fusion, chronic intermittent porphyria, anxiety, with multiple previous admissions for acute on chronic abdominal pain presents to [facility] on 12/23/20  for abdominal pain.  Pt over last several days with worsening 10/10 abd pain with associated nausea and vomiting.  ALso with history of altered mental status.  Pt notified Dr. Maza who told her to go to ED for further evaluation and management.  In ED: Pt given IV dilaudid, supportive care and ruled out for other acute processes with imaging and blood work.  She was admitted to  for management.     12/24 -  Patient seen and examined at bedside earlier today, + nausea, no vomiting, + poor po intake, denies cp, + abdominal pain, afebrile, POC discussed   12/25 - pt seen and examined, + nausea, + vomiting, + abdominal pain, poor po intake, denies cp, dyspnea, POC discussed   12/26 - pt seen and examined + nausea, + abdominal pain improving, denies cp, dyspnea, + constipation, POC discussed   12/27- pt seen and examined at bedside, feels tired/weak, still with diffuse abdominal pain, nausea/vomiting, refusing PO meds, incontinent of watery stools x2. afebrile  12/28 - no cp palps sob; pain meds adjusted - abdo pain better  12/29 - tried to lower dialudid dose in AM;  reported pain and dose increased again   12/30 - abdo pain under control not ready to tape r  12/31 - s/p EGD felt nauseous, got ativan; seen with Dr Maza at bedside   1/1 - no cp palps sob, nausea better today, will try [place] diet   1/2- + abdominal pain, lose BM, afebrile, minimal PO intake , reports nausea, POC discussed   Review of system- Rest of the review of system are negative except mentioned in HPI  T(C): 36.9 (01-02-21 @ 15:40), Max: 37.4 (01-02-21 @ 09:05)  T(F): 98.5 (01-02-21 @ 15:40), Max: 99.4 (01-02-21 @ 09:05)  HR: 116 (01-02-21 @ 15:40) (93 - 116)  BP: 134/70 (01-02-21 @ 15:40) (130/53 - 155/62)  RR: 18 (01-02-21 @ 15:40) (18 - 18)  SpO2: 99% (01-02-21 @ 15:40) (97% - 100%)  Wt(kg): --    Physical exam:   GENERAL: NAD  NERVOUS SYSTEM:  Alert & Oriented X3, non- focal exam, Motor Strength 5/5 B/L upper and lower extremities; DTRs 2+ intact and symmetric  HEAD:  Atraumatic, Normocephalic  EYES: EOMI, PERRLA, conjunctiva and sclera clear  HEENT: Moist mucous membranes  NECK: Supple, No JVD  CHEST/LUNG: Clear to auscultation bilaterally; No rales, no rhonchi, no wheezing  HEART: Regular rate and rhythm; No murmurs, no rubs or gallops  ABDOMEN: Soft, diffuse tenderness, Non-distended; Bowel sounds present  GENITOURINARY- Voiding, no suprapubic tenderness  EXTREMITIES:  2+ Peripheral Pulses, No clubbing, cyanosis,   edema  MUSCULOSKELETAL:- No muscle tenderness, Muscle tone normal, No joint tenderness, no Joint swelling,  Joint ROM –normal   SKIN-no rash, no lesion      RECENT CULTURES:  Culture - Urine (12.23.20 @ 01:17)    Specimen Source: .Urine None    Culture Results:   50,000 - 99,000 CFU/mL Escherichia coli  <10,000 CFU/ml Normal Urogenital wanda present  Culture - Urine (12.23.20 @ 01:17)    -  Amikacin: S <=16    -  Amoxicillin/Clavulanic Acid: S <=8/4    -  Ampicillin: R >16 These ampicillin results predict results for amoxicillin    -  Ampicillin/Sulbactam: S 8/4 Enterobacter, Citrobacter, and Serratia may develop resistance during prolonged therapy (3-4 days)    -  Cefepime: S <=2    -  Cefoxitin: S <=8    -  Ceftriaxone: S <=1 Enterobacter, Citrobacter, and Serratia may develop resistance during prolonged therapy    -  Ciprofloxacin: R >2    -  Aztreonam: S <=4    -  Cefazolin: S <=2 (MIC_CL_[id number]) For uncomplicated UTI with K. pneumoniae, E. coli, or P. mirablis: ARMANDO <=16 is sensitive and ARMANDO >=32 is resistant. This also predicts results for oral agents cefaclor, cefdinir, cefpodoxime, cefprozil, cefuroxime axetil, cephalexin and locarbef for uncomplicated UTI. Note that some isolates may be susceptible to these agents while testing resistant to cefazolin.    -  Ertapenem: S <=0.5    -  Gentamicin: S <=2    -  Imipenem: S <=1    -  Levofloxacin: R >4    -  Meropenem: S <=1    -  Nitrofurantoin: S <=32 Should not be used to treat pyelonephritis    -  Piperacillin/Tazobactam: S <=8    -  Tigecycline: S <=2    -  Tobramycin: S <=2    -  Trimethoprim/Sulfamethoxazole: S <=0.5/9.5    Specimen Source: .Urine None    Culture Results:   50,000 - 99,000 CFU/mL Escherichia coli  <10,000 CFU/ml Normal Urogenital wanda present    Organism Identification: Escherichia coli    Organism: Escherichia coli    Method Type: ARMANDO      RADIOLOGY & ADDITIONAL TESTS: all reviewed EKG reviewed  < from: CT Abdomen and Pelvis w/ IV Cont (12.23.20 @ 00:32) >  FINDINGS:  No acute intra-abdominal pathology on CT.        LABS: All Labs Reviewed:  01-02    140  |  112<H>  |  18  ----------------------------<  110<H>  3.6   |  21<L>  |  0.48<L>    Ca    8.9      02 Jan 2021 06:47  Mg     2.0     01-02                          7.9    x     )-----------( x        ( 02 Jan 2021 11:36 )             27.2                             9.7    16.06 )-----------( 384      ( 01 Jan 2021 08:07 )             33.6     01-01    142  |  113<H>  |  13  ----------------------------<  133<H>  3.4<L>   |  21<L>  |  0.47<L>    Ca    9.2      01 Jan 2021 08:07  Mg     2.1     01-01        ALBUTerol    90 MICROgram(s) HFA Inhaler 2 Puff(s) Inhalation every 6 hours PRN  amLODIPine   Tablet 2.5 milliGRAM(s) Oral daily  bisacodyl 5 milliGRAM(s) Oral at bedtime PRN  cholecalciferol 1000 Unit(s) Oral two times a day  dextrose 5% + sodium chloride 0.45% with potassium chloride 20 mEq/L 1000 milliLiter(s) IV Continuous <Continuous>  enoxaparin Injectable 40 milliGRAM(s) SubCutaneous daily  ferrous    sulfate 325 milliGRAM(s) Oral daily  folic acid 1 milliGRAM(s) Oral daily  hydrALAZINE Injectable 10 milliGRAM(s) IV Push every 8 hours  HYDROmorphone  Injectable 0.5 milliGRAM(s) IV Push every 4 hours PRN  HYDROmorphone  Injectable 1.5 milliGRAM(s) IV Push every 4 hours PRN  LORazepam   Injectable 1 milliGRAM(s) IV Push every 4 hours PRN  magnesium oxide 400 milliGRAM(s) Oral two times a day with meals  multivitamin 1 Tablet(s) Oral daily  naloxone Injectable 0.1 milliGRAM(s) IV Push every 3 minutes PRN  ondansetron Injectable 4 milliGRAM(s) IV Push <User Schedule>  pantoprazole  Injectable 40 milliGRAM(s) IV Push daily  polyethylene glycol 3350 17 Gram(s) Oral daily PRN  saccharomyces boulardii 250 milliGRAM(s) Oral two times a day  senna 2 Tablet(s) Oral at bedtime PRN                                                
Subjective:  Patient is a 65y old  Female who presents with a chief complaint of Abdominal pain      HPI:  65 year old woman with PMHx  staph infection, chronic abdominal pain, osteoporosis, adrenal insufficiency, [place], s/p cholecystectomy, s/p spinal fusion, chronic intermittent porphyria, anxiety, with multiple previous admissions for acute on chronic abdominal pain presents [facility] on 12/23/20  for abdominal pain.  Pt over last several days with worsening 10/10 abd pain with associated nausea and vomiting.  ALso with history of altered mental status.  Pt notified Dr. Maza who told her to go to ED for further evaluation and management.  In ED: Pt given IV dilaudid, supportive care and ruled out for other acute processes with imaging and blood work.  She was admitted to  for management.     12/24 -  Patient seen and examined at bedside earlier today, + nausea, no vomiting, + poor po intake, denies cp, + abdominal pain, afebrile, POC discussed   12/25 - pt seen and examined, + nausea, + vomiting, + abdominal pain, poor po intake, denies cp, dyspnea, POC discussed     Review of system- Rest of the review of system are negative except mentioned in HPI    T(C): 37.2 (12-25-20 @ 15:17), Max: 37.2 (12-25-20 @ 15:17)  T(F): 99 (12-25-20 @ 15:17), Max: 99 (12-25-20 @ 15:17)  HR: 86 (12-25-20 @ 15:17) (67 - 89)  BP: 135/95 (12-25-20 @ 15:17) (135/95 - 169/63)  RR: 19 (12-25-20 @ 15:17) (17 - 21)  SpO2: 99% (12-25-20 @ 15:17) (97% - 100%)  Wt(kg): --      Physical exam:   GENERAL: NAD  NERVOUS SYSTEM:  Alert & Oriented X3, non- focal exam, Motor Strength 5/5 B/L upper and lower extremities; DTRs 2+ intact and symmetric  HEAD:  Atraumatic, Normocephalic  EYES: EOMI, PERRLA, conjunctiva and sclera clear  HEENT: Moist mucous membranes  NECK: Supple, No JVD  CHEST/LUNG: Clear to auscultation bilaterally; No rales, no rhonchi, no wheezing  HEART: Regular rate and rhythm; No murmurs, no rubs or gallops  ABDOMEN: Soft, diffuse tenderness, Non-distended; Bowel sounds present  GENITOURINARY- Voiding, no suprapubic tenderness  EXTREMITIES:  2+ Peripheral Pulses, No clubbing, cyanosis,   edema  MUSCULOSKELETAL:- No muscle tenderness, Muscle tone normal, No joint tenderness, no Joint swelling,  Joint ROM –normal   SKIN-no rash, no lesion  LABS: all reviewed  12-25    140  |  109<H>  |  3<L>  ----------------------------<  92  3.2<L>   |  27  |  0.53    Ca    8.7      25 Dec 2020 08:10  Mg     2.0     12-24    TPro  6.7  /  Alb  2.8<L>  /  TBili  0.3  /  DBili  x   /  AST  21  /  ALT  18  /  AlkPhos  69  12-25                         8.3    6.22  )-----------( 262      ( 25 Dec 2020 08:10 )             29.3     LIVER FUNCTIONS - ( 25 Dec 2020 08:10 )  Alb: 2.8 g/dL / Pro: 6.7 gm/dL / ALK PHOS: 69 U/L / ALT: 18 U/L / AST: 21 U/L / GGT: x               RECENT CULTURES:    Culture - Urine (12.23.20 @ 01:17)    Specimen Source: .Urine None    Culture Results:   50,000 - 99,000 CFU/mL Escherichia coli  <10,000 CFU/ml Normal Urogenital wanda present      RADIOLOGY & ADDITIONAL TESTS: all reviewed EKG reviewed      < from: CT Abdomen and Pelvis w/ IV Cont (12.23.20 @ 00:32) >  FINDINGS:  LOWER CHEST: Coronary artery calcification.    LIVER: Within normal limits.  BILE DUCTS: Normal caliber.  GALLBLADDER: Cholecystectomy.  SPLEEN: Within normal limits.  PANCREAS: Within normal limits.  ADRENALS: Within normal limits.  KIDNEYS/URETERS: Bilateral low-attenuation lesions too small to accurately characterize. No hydronephrosis. Symmetric parenchymal enhancement.    BLADDER: Within normal limits.  REPRODUCTIVE ORGANS: Calcified uterine fibroids. Unremarkable bilateral adnexa.    BOWEL: No bowel obstruction. Appendix is normal.  PERITONEUM: No ascites.  VESSELS: Atherosclerosis of the abdominal aorta, which is normal in caliber.  RETROPERITONEUM/LYMPH NODES: No lymphadenopathy.  ABDOMINAL WALL: Within normal limits.  BONES: Stable moderate anterior wedging of the T12 vertebral body with focal kyphosis at the thoracolumbar junction.    IMPRESSION:  No acute intra-abdominal pathology on CT.    < end of copied text >    Current medications:  ALBUTerol    90 MICROgram(s) HFA Inhaler 2 Puff(s) Inhalation every 6 hours PRN  cholecalciferol 1000 Unit(s) Oral two times a day  dextrose 5% + sodium chloride 0.45%. 1000 milliLiter(s) IV Continuous <Continuous>  enoxaparin Injectable 40 milliGRAM(s) SubCutaneous daily  HYDROmorphone PCA (1 mG/mL) 30 milliLiter(s) PCA Continuous PCA Continuous  LORazepam   Injectable 1 milliGRAM(s) IV Push every 4 hours PRN  magnesium oxide 400 milliGRAM(s) Oral two times a day with meals  multivitamin 1 Tablet(s) Oral daily  naloxone Injectable 0.1 milliGRAM(s) IV Push every 3 minutes PRN  ondansetron Injectable 4 milliGRAM(s) IV Push every 6 hours PRN  ondansetron Injectable 4 milliGRAM(s) IV Push every 6 hours PRN  pantoprazole  Injectable 40 milliGRAM(s) IV Push daily  senna 2 Tablet(s) Oral at bedtime PRN            
Subjective:  Patient is a 65y old  Female who presents with a chief complaint of Abdominal pain      HPI:  65 year old woman with PMHx  staph infection, chronic abdominal pain, osteoporosis, adrenal insufficiency, [place], s/p cholecystectomy, s/p spinal fusion, chronic intermittent porphyria, anxiety, with multiple previous admissions for acute on chronic abdominal pain presents to [facility] on 20  for abdominal pain.  Pt over last several days with worsening 10/10 abd pain with associated nausea and vomiting.  ALso with history of altered mental status.  Pt notified Dr. Maza who told her to go to ED for further evaluation and management.  In ED: Pt given IV dilaudid, supportive care and ruled out for other acute processes with imaging and blood work.  She was admitted to  for management.      -  Patient seen and examined at bedside earlier today, + nausea, no vomiting, + poor po intake, denies cp, + abdominal pain, afebrile, POC discussed   Review of system- Rest of the review of system are negative except mentioned in HPI    Objective:   T(C): 36.7 (20 @ 16:00), Max: 37.2 (20 @ 23:30)  HR: 81 (20 @ 16:00) (68 - 97)  BP: 157/77 (- @ 16:00) (123/48 - 157/77)  RR: 18 (20 @ 16:00) (18 - 18)  SpO2: 99% (20 @ 16:00) (94% - 100%)  Wt(kg): --  Daily     Daily   CAPILLARY BLOOD GLUCOSE        Physical exam:   GENERAL: NAD  NERVOUS SYSTEM:  Alert & Oriented X3, non- focal exam, Motor Strength 5/5 B/L upper and lower extremities; DTRs 2+ intact and symmetric  HEAD:  Atraumatic, Normocephalic  EYES: EOMI, PERRLA, conjunctiva and sclera clear  HEENT: Moist mucous membranes  NECK: Supple, No JVD  CHEST/LUNG: Clear to auscultation bilaterally; No rales, no rhonchi, no wheezing  HEART: Regular rate and rhythm; No murmurs, no rubs or gallops  ABDOMEN: Soft, diffuse tenderness, Non-distended; Bowel sounds present  GENITOURINARY- Voiding, no suprapubic tenderness  EXTREMITIES:  2+ Peripheral Pulses, No clubbing, cyanosis,   edema  MUSCULOSKELETAL:- No muscle tenderness, Muscle tone normal, No joint tenderness, no Joint swelling,  Joint ROM –normal   SKIN-no rash, no lesion  LABS: all reviewed                        8.4    6.98  )-----------( 281      ( 24 Dec 2020 10:35 )             30.2     12-24    140  |  111<H>  |  3<L>  ----------------------------<  116<H>  2.8<LL>   |  24  |  0.54    Ca    8.7      24 Dec 2020 10:35  Mg     2.0     12-    TPro  7.7  /  Alb  3.5  /  TBili  0.5  /  DBili  x   /  AST  19  /  ALT  21  /  AlkPhos  73  12-    PT/INR - ( 22 Dec 2020 22:34 )   PT: 12.8 sec;   INR: 1.10 ratio         PTT - ( 22 Dec 2020 22:34 )  PTT:32.3 sec    CARDIAC MARKERS ( 22 Dec 2020 22:34 )  <0.015 ng/mL / x     / x     / x     / x          Urinalysis Basic - ( 23 Dec 2020 01:17 )    Color: Yellow / Appearance: Clear / S.010 / pH: x  Gluc: x / Ketone: Moderate  / Bili: Negative / Urobili: Negative mg/dL   Blood: x / Protein: Negative mg/dL / Nitrite: Negative   Leuk Esterase: Negative / RBC: x / WBC x   Sq Epi: x / Non Sq Epi: x / Bacteria: x          RECENT CULTURES:  Culture - Urine (20 @ 01:17)    Specimen Source: .Urine None    Culture Results:   50,000 - 99,000 CFU/mL Escherichia coli  <10,000 CFU/ml Normal Urogenital wanda present      RADIOLOGY & ADDITIONAL TESTS: all reviewed EKG reviewed    < from: CT Abdomen and Pelvis w/ IV Cont (20 @ 00:32) >  FINDINGS:  LOWER CHEST: Coronary artery calcification.    LIVER: Within normal limits.  BILE DUCTS: Normal caliber.  GALLBLADDER: Cholecystectomy.  SPLEEN: Within normal limits.  PANCREAS: Within normal limits.  ADRENALS: Within normal limits.  KIDNEYS/URETERS: Bilateral low-attenuation lesions too small to accurately characterize. No hydronephrosis. Symmetric parenchymal enhancement.    BLADDER: Within normal limits.  REPRODUCTIVE ORGANS: Calcified uterine fibroids. Unremarkable bilateral adnexa.    BOWEL: No bowel obstruction. Appendix is normal.  PERITONEUM: No ascites.  VESSELS: Atherosclerosis of the abdominal aorta, which is normal in caliber.  RETROPERITONEUM/LYMPH NODES: No lymphadenopathy.  ABDOMINAL WALL: Within normal limits.  BONES: Stable moderate anterior wedging of the T12 vertebral body with focal kyphosis at the thoracolumbar junction.    IMPRESSION:  No acute intra-abdominal pathology on CT.    < end of copied text >    Current medications:  ALBUTerol    90 MICROgram(s) HFA Inhaler 2 Puff(s) Inhalation every 6 hours PRN  cholecalciferol 1000 Unit(s) Oral two times a day  dextrose 5% + sodium chloride 0.45%. 1000 milliLiter(s) IV Continuous <Continuous>  enoxaparin Injectable 40 milliGRAM(s) SubCutaneous daily  HYDROmorphone PCA (1 mG/mL) 30 milliLiter(s) PCA Continuous PCA Continuous  LORazepam   Injectable 1 milliGRAM(s) IV Push every 4 hours PRN  magnesium oxide 400 milliGRAM(s) Oral two times a day with meals  multivitamin 1 Tablet(s) Oral daily  naloxone Injectable 0.1 milliGRAM(s) IV Push every 3 minutes PRN  ondansetron Injectable 4 milliGRAM(s) IV Push every 6 hours PRN  ondansetron Injectable 4 milliGRAM(s) IV Push every 6 hours PRN  pantoprazole  Injectable 40 milliGRAM(s) IV Push daily  senna 2 Tablet(s) Oral at bedtime PRN            
EGD:  Small hiatal hernia  O/W normal    Rec:  Advance diet  Will obviously still need outpatient colonoscopy  Cont supportive care with aim for d/c and outpatient follow up for chronic, presumably functional abdominal pain syndrome
Patient is a 65y old  Female who presents with a chief complaint of Abdominal pain (06 Jan 2021 14:08)      Subective:  seen earlier today  felt better, wanting to go home    PAST MEDICAL & SURGICAL HISTORY:  Staph infection  8/2017    Chronic abdominal pain    Osteoporosis    Adrenal insufficiency, primary    Knee effusion    Porphyria    S/P tonsillectomy    S/P cholecystectomy    H/O spinal fusion        MEDICATIONS  (STANDING):  amLODIPine   Tablet 2.5 milliGRAM(s) Oral daily  cholecalciferol 1000 Unit(s) Oral two times a day  dextrose 5% + sodium chloride 0.45% with potassium chloride 20 mEq/L 1000 milliLiter(s) (50 mL/Hr) IV Continuous <Continuous>  enoxaparin Injectable 40 milliGRAM(s) SubCutaneous daily  ferrous    sulfate 325 milliGRAM(s) Oral daily  folic acid 1 milliGRAM(s) Oral daily  hydrALAZINE Injectable 10 milliGRAM(s) IV Push every 8 hours  lactobacillus acidophilus 1 Tablet(s) Oral two times a day with meals  magnesium oxide 400 milliGRAM(s) Oral two times a day with meals  melatonin 3 milliGRAM(s) Oral at bedtime  multivitamin 1 Tablet(s) Oral daily  ondansetron Injectable 4 milliGRAM(s) IV Push <User Schedule>  pantoprazole    Tablet 40 milliGRAM(s) Oral before breakfast    MEDICATIONS  (PRN):  ALBUTerol    90 MICROgram(s) HFA Inhaler 2 Puff(s) Inhalation every 6 hours PRN Shortness of Breath and/or Wheezing  HYDROmorphone   Tablet 6 milliGRAM(s) Oral every 4 hours PRN Moderate Pain (4 - 6)  HYDROmorphone  Injectable 1 milliGRAM(s) SubCutaneous every 4 hours PRN Severe Pain (7 - 10)  naloxone Injectable 0.1 milliGRAM(s) IV Push every 3 minutes PRN For ANY of the following changes in patient status:  A. RR LESS THAN 10 breaths per minute, B. Oxygen saturation LESS THAN 90%, C. Sedation score of 6  ondansetron   Disintegrating Tablet 4 milliGRAM(s) Oral every 6 hours PRN Nausea and/or Vomiting  senna 2 Tablet(s) Oral at bedtime PRN Constipation      REVIEW OF SYSTEMS:    RESPIRATORY: No shortness of breath  CARDIOVASCULAR: No chest pain  All other review of systems is negative unless indicated above.    Vital Signs Last 24 Hrs  T(C): 36.9 (06 Jan 2021 15:06), Max: 37.1 (06 Jan 2021 05:00)  T(F): 98.4 (06 Jan 2021 15:06), Max: 98.7 (06 Jan 2021 05:00)  HR: 103 (06 Jan 2021 15:06) (89 - 103)  BP: 147/60 (06 Jan 2021 15:06) (141/65 - 161/62)  BP(mean): --  RR: 18 (06 Jan 2021 09:13) (18 - 18)  SpO2: 100% (06 Jan 2021 15:06) (96% - 100%)    PHYSICAL EXAM:    Constitutional: NAD, well-developed  Respiratory: CTAB  Cardiovascular: S1 and S2, RRR  Gastrointestinal: BS+, soft, NT/ND  Extremities: No peripheral edema  Psychiatric: Normal mood, normal affect    LABS:                        9.2    8.46  )-----------( 291      ( 06 Jan 2021 08:12 )             30.3     01-06    141  |  112<H>  |  5<L>  ----------------------------<  110<H>  3.2<L>   |  22  |  0.46<L>    Ca    9.0      06 Jan 2021 08:12    TPro  6.7  /  Alb  3.0<L>  /  TBili  0.4  /  DBili  x   /  AST  34  /  ALT  62  /  AlkPhos  59  01-06      LIVER FUNCTIONS - ( 06 Jan 2021 08:12 )  Alb: 3.0 g/dL / Pro: 6.7 gm/dL / ALK PHOS: 59 U/L / ALT: 62 U/L / AST: 34 U/L / GGT: x             RADIOLOGY & ADDITIONAL STUDIES:
Subjective:  Patient is a 65y old  Female who presents with a chief complaint of Abdominal pain      HPI:  65 year old woman with PMHx  staph infection, chronic abdominal pain, osteoporosis, adrenal insufficiency does not taking steroids , [place], s/p cholecystectomy, s/p spinal fusion, chronic intermittent porphyria, anxiety, with multiple previous admissions for acute on chronic abdominal pain presents [facility] 12/23/20  for abdominal pain.  Pt over last several days with worsening 10/10 abd pain with associated nausea and vomiting.  ALso with history of altered mental status.  Pt notified Dr. Maza who told her to go to ED for further evaluation and management.  In ED: Pt given IV dilaudid, supportive care and ruled out for other acute processes with imaging and blood work.  She was admitted to  for management.     12/24 -  Patient seen and examined at bedside earlier today, + nausea, no vomiting, + poor po intake, denies cp, + abdominal pain, afebrile, POC discussed   12/25 - pt seen and examined, + nausea, + vomiting, + abdominal pain, poor po intake, denies cp, dyspnea, POC discussed   12/26 - pt seen and examined + nausea, + abdominal pain improving, denies cp, dyspnea, + constipation, POC discussed     Review of system- Rest of the review of system are negative except mentioned in HPI    T(C): 36.9 (12-26-20 @ 18:00), Max: 37.1 (12-26-20 @ 04:28)  T(F): 98.4 (12-26-20 @ 18:00), Max: 98.7 (12-26-20 @ 04:28)  HR: 91 (12-26-20 @ 18:00) (80 - 108)  BP: 163/74 (12-26-20 @ 18:00) (146/50 - 163/74)  RR: 18 (12-26-20 @ 18:00) (18 - 19)  SpO2: 100% (12-26-20 @ 18:00) (98% - 100%)  Wt(kg): --    Physical exam:   GENERAL: NAD  NERVOUS SYSTEM:  Alert & Oriented X3, non- focal exam, Motor Strength 5/5 B/L upper and lower extremities; DTRs 2+ intact and symmetric  HEAD:  Atraumatic, Normocephalic  EYES: EOMI, PERRLA, conjunctiva and sclera clear  HEENT: Moist mucous membranes  NECK: Supple, No JVD  CHEST/LUNG: Clear to auscultation bilaterally; No rales, no rhonchi, no wheezing  HEART: Regular rate and rhythm; No murmurs, no rubs or gallops  ABDOMEN: Soft, diffuse tenderness, Non-distended; Bowel sounds present  GENITOURINARY- Voiding, no suprapubic tenderness  EXTREMITIES:  2+ Peripheral Pulses, No clubbing, cyanosis,   edema  MUSCULOSKELETAL:- No muscle tenderness, Muscle tone normal, No joint tenderness, no Joint swelling,  Joint ROM –normal   SKIN-no rash, no lesion  LABS: all reviewed  12-26    142  |  110<H>  |  3<L>  ----------------------------<  128<H>  3.4<L>   |  24  |  0.63    Ca    9.2      26 Dec 2020 08:39  Phos  2.9     12-26  Mg     2.1     12-26    TPro  6.7  /  Alb  2.8<L>  /  TBili  0.3  /  DBili  x   /  AST  21  /  ALT  18  /  AlkPhos  69  12-25                        9.3    6.62  )-----------( 315      ( 26 Dec 2020 08:39 )             33.5     LIVER FUNCTIONS - ( 25 Dec 2020 08:10 )  Alb: 2.8 g/dL / Pro: 6.7 gm/dL / ALK PHOS: 69 U/L / ALT: 18 U/L / AST: 21 U/L / GGT: x           12-25    140  |  109<H>  |  3<L>  ----------------------------<  92  3.2<L>   |  27  |  0.53    Ca    8.7      25 Dec 2020 08:10  Mg     2.0     12-24    TPro  6.7  /  Alb  2.8<L>  /  TBili  0.3  /  DBili  x   /  AST  21  /  ALT  18  /  AlkPhos  69  12-25                         8.3    6.22  )-----------( 262      ( 25 Dec 2020 08:10 )             29.3     LIVER FUNCTIONS - ( 25 Dec 2020 08:10 )  Alb: 2.8 g/dL / Pro: 6.7 gm/dL / ALK PHOS: 69 U/L / ALT: 18 U/L / AST: 21 U/L / GGT: x               RECENT CULTURES:    Culture - Urine (12.23.20 @ 01:17)    Specimen Source: .Urine None    Culture Results:   50,000 - 99,000 CFU/mL Escherichia coli  <10,000 CFU/ml Normal Urogenital wanda present      RADIOLOGY & ADDITIONAL TESTS: all reviewed EKG reviewed      < from: CT Abdomen and Pelvis w/ IV Cont (12.23.20 @ 00:32) >  FINDINGS:  LOWER CHEST: Coronary artery calcification.    LIVER: Within normal limits.  BILE DUCTS: Normal caliber.  GALLBLADDER: Cholecystectomy.  SPLEEN: Within normal limits.  PANCREAS: Within normal limits.  ADRENALS: Within normal limits.  KIDNEYS/URETERS: Bilateral low-attenuation lesions too small to accurately characterize. No hydronephrosis. Symmetric parenchymal enhancement.    BLADDER: Within normal limits.  REPRODUCTIVE ORGANS: Calcified uterine fibroids. Unremarkable bilateral adnexa.    BOWEL: No bowel obstruction. Appendix is normal.  PERITONEUM: No ascites.  VESSELS: Atherosclerosis of the abdominal aorta, which is normal in caliber.  RETROPERITONEUM/LYMPH NODES: No lymphadenopathy.  ABDOMINAL WALL: Within normal limits.  BONES: Stable moderate anterior wedging of the T12 vertebral body with focal kyphosis at the thoracolumbar junction.    IMPRESSION:  No acute intra-abdominal pathology on CT.    < end of copied text >    Current medications:  ALBUTerol    90 MICROgram(s) HFA Inhaler 2 Puff(s) Inhalation every 6 hours PRN  cholecalciferol 1000 Unit(s) Oral two times a day  dextrose 5% + sodium chloride 0.45%. 1000 milliLiter(s) IV Continuous <Continuous>  enoxaparin Injectable 40 milliGRAM(s) SubCutaneous daily  HYDROmorphone PCA (1 mG/mL) 30 milliLiter(s) PCA Continuous PCA Continuous  LORazepam   Injectable 1 milliGRAM(s) IV Push every 4 hours PRN  magnesium oxide 400 milliGRAM(s) Oral two times a day with meals  multivitamin 1 Tablet(s) Oral daily  naloxone Injectable 0.1 milliGRAM(s) IV Push every 3 minutes PRN  ondansetron Injectable 4 milliGRAM(s) IV Push every 6 hours PRN  ondansetron Injectable 4 milliGRAM(s) IV Push every 6 hours PRN  pantoprazole  Injectable 40 milliGRAM(s) IV Push daily  senna 2 Tablet(s) Oral at bedtime PRN            
65-year-old female Long history of porphyria acute intermittent, Numerous episodes of significant abdominal pain nausea vomiting  Now in the midst of a acute crisis  Last week began with nausea vomiting significant pain dehydration confusion  Mental status changes exacerbated by depression over her father's recent death and inability to visit with him, dehydration,  Patient practically deaf, her hearing aids do not work, is unable to read lips in light of people wearing masks and this has created a problem with communication    Today she states she is feeling better although significant pain persists,Had nausea and vomiting last night,Appears in good spirits Despite being ill    PE  65-year-old female alert oriented comfortable in bed significant hearing deficit  Lungs clear  Abdomen soft, significantly tender, Bowel sounds present  Extremities without pitting edema      CBC Full  -  ( 25 Dec 2020 08:10 )  WBC Count : 6.22 K/uL  RBC Count : 4.30 M/uL  Hemoglobin : 8.3 g/dL  Hematocrit : 29.3 %  Platelet Count - Automated : 262 K/uL  Mean Cell Volume : 68.1 fl  Mean Cell Hemoglobin : 19.3 pg  Mean Cell Hemoglobin Concentration : 28.3 gm/dL  Auto Neutrophil # : 3.17 K/uL  Auto Lymphocyte # : 2.30 K/uL  Auto Monocyte # : 0.50 K/uL  Auto Eosinophil # : 0.25 K/uL  Auto Basophil # : 0.00 K/uL  Auto Neutrophil % : 51.0 %  Auto Lymphocyte % : 37.0 %  Auto Monocyte % : 8.0 %  Auto Eosinophil % : 4.0 %  Auto Basophil % : 0.0 %      12-25    140  |  109<H>  |  3<L>  ----------------------------<  92  3.2<L>   |  27  |  0.53    Ca    8.7      25 Dec 2020 08:10  Mg     2.0     12-24    TPro  6.7  /  Alb  2.8<L>  /  TBili  0.3  /  DBili  x   /  AST  21  /  ALT  18  /  AlkPhos  69  12-25        Iron decreased  Iron 16 iron saturation 5% ferritin 7  Albumin 2.8      Vital Signs Last 24 Hrs  T(C): 37.2 (25 Dec 2020 15:17), Max: 37.2 (25 Dec 2020 15:17)  T(F): 99 (25 Dec 2020 15:17), Max: 99 (25 Dec 2020 15:17)  HR: 86 (25 Dec 2020 15:17) (67 - 89)  BP: 135/95 (25 Dec 2020 15:17) (135/95 - 169/63)  BP(mean): --  RR: 19 (25 Dec 2020 15:17) (17 - 21)  SpO2: 99% (25 Dec 2020 15:17) (97% - 100%)
65-year-old female with acute intermittent porphyria  History of recurrent significantly painful abdominal visceral crises  Now with current crisis over the last 1+ weeks  Brought in by ambulance after many days of abdominal pain nausea vomiting diarrhea    On PCA With diludid  Very fatigued this morning    No specific complaint other than persistent abdominal pain    Physical examination lethargic fatigued  Lungs clear  Abdomen tender bowel sounds present  Extremities without pitting edema  Skin warm dry    CBC Full  -  ( 27 Dec 2020 08:39 )  WBC Count : 9.37 K/uL  RBC Count : 5.50 M/uL  Hemoglobin : 10.4 g/dL  Hematocrit : 37.1 %  Platelet Count - Automated : 363 K/uL  Mean Cell Volume : 67.5 fl  Mean Cell Hemoglobin : 18.9 pg  Mean Cell Hemoglobin Concentration : 28.0 gm/dL  Auto Neutrophil # : 7.59 K/uL  Auto Lymphocyte # : 1.12 K/uL  Auto Monocyte # : 0.66 K/uL  Auto Eosinophil # : 0.00 K/uL  Auto Basophil # : 0.00 K/uL  Auto Neutrophil % : 81.0 %  Auto Lymphocyte % : 12.0 %  Auto Monocyte % : 7.0 %  Auto Eosinophil % : 0.0 %  Auto Basophil % : 0.0 %      12-27    139  |  108  |  3<L>  ----------------------------<  164<H>  3.6   |  24  |  0.52    Ca    9.3      27 Dec 2020 08:39  Phos  2.9     12-26  Mg     2.1     12-26    TPro  8.2  /  Alb  3.6  /  TBili  0.5  /  DBili  x   /  AST  24  /  ALT  31  /  AlkPhos  111  12-27              Vital Signs Last 24 Hrs  T(C): 36.4 (27 Dec 2020 09:43), Max: 37.3 (27 Dec 2020 02:22)  T(F): 97.6 (27 Dec 2020 09:43), Max: 99.2 (27 Dec 2020 02:22)  HR: 79 (27 Dec 2020 09:43) (57 - 93)  BP: 163/66 (27 Dec 2020 09:43) (163/66 - 205/72)  BP(mean): --  RR: 16 (27 Dec 2020 09:43) (16 - 18)  SpO2: 100% (27 Dec 2020 09:43) (100% - 100%)
65-year-old female with long history of a porphyria,With acute visceral crisis  Admitted with increasing abdominal pain nausea vomiting Dehydration iron deficiency anemia,Mental status changes    Patient on IV hydration, PCA pain medication,  Has no started antiemetics  Clearly much improved from admission  Mental status back to baseline,Remains challenging to assess for others who do not know her ,And remains significantly depressed over the death of her father,Who she was not able to visit due to Covid pandemic    Abdominal pain persists though less intense,   decreasing nausea  Still using PCA.  Constipation    Abd : SNT  Ext: trace edema      CBC Full  -  ( 26 Dec 2020 08:39 )  WBC Count : 6.62 K/uL  RBC Count : 4.87 M/uL  Hemoglobin : 9.3 g/dL  Hematocrit : 33.5 %  Platelet Count - Automated : 315 K/uL  Mean Cell Volume : 68.8 fl  Mean Cell Hemoglobin : 19.1 pg  Mean Cell Hemoglobin Concentration : 27.8 gm/dL  12-26    142  |  110<H>  |  3<L>  ----------------------------<  128<H>  3.4<L>   |  24  |  0.63    Ca    9.2      26 Dec 2020 08:39  Phos  2.9     12-26  Mg     2.1     12-26    TPro  6.7  /  Alb  2.8<L>  /  TBili  0.3  /  DBili  x   /  AST  21  /  ALT  18  /  AlkPhos  69  12-25      Vital Signs Last 24 Hrs  T(C): 36.6 (26 Dec 2020 14:24), Max: 37.1 (26 Dec 2020 04:28)  T(F): 97.8 (26 Dec 2020 14:24), Max: 98.7 (26 Dec 2020 04:28)  HR: 98 (26 Dec 2020 14:24) (80 - 108)  BP: 149/79 (26 Dec 2020 14:24) (146/50 - 163/60)  BP(mean): --  RR: 18 (26 Dec 2020 14:24) (18 - 19)  SpO2: 100% (26 Dec 2020 14:24) (98% - 100%)    Ferritin : 7  Iron 16  Iron Sat%: 5  
Patient is a 65y old  Female who presents with a chief complaint of Abdominal pain (01 Jan 2021 14:26)      Subective:  Having diarrhea today, currently in BR  Some nausea still    PAST MEDICAL & SURGICAL HISTORY:  Staph infection  8/2017    Chronic abdominal pain    Osteoporosis    Adrenal insufficiency, primary    Knee effusion    Porphyria    S/P tonsillectomy    S/P cholecystectomy    H/O spinal fusion        MEDICATIONS  (STANDING):  amLODIPine   Tablet 2.5 milliGRAM(s) Oral daily  cholecalciferol 1000 Unit(s) Oral two times a day  dextrose 5% + sodium chloride 0.45% with potassium chloride 20 mEq/L 1000 milliLiter(s) (70 mL/Hr) IV Continuous <Continuous>  enoxaparin Injectable 40 milliGRAM(s) SubCutaneous daily  ferrous    sulfate 325 milliGRAM(s) Oral daily  folic acid 1 milliGRAM(s) Oral daily  hydrALAZINE Injectable 10 milliGRAM(s) IV Push every 8 hours  magnesium oxide 400 milliGRAM(s) Oral two times a day with meals  multivitamin 1 Tablet(s) Oral daily  ondansetron Injectable 4 milliGRAM(s) IV Push <User Schedule>  pantoprazole  Injectable 40 milliGRAM(s) IV Push daily  saccharomyces boulardii 250 milliGRAM(s) Oral two times a day    MEDICATIONS  (PRN):  ALBUTerol    90 MICROgram(s) HFA Inhaler 2 Puff(s) Inhalation every 6 hours PRN Shortness of Breath and/or Wheezing  bisacodyl 5 milliGRAM(s) Oral at bedtime PRN Constipation  HYDROmorphone  Injectable 0.5 milliGRAM(s) IV Push every 4 hours PRN Moderate Pain (4 - 6)  HYDROmorphone  Injectable 1.5 milliGRAM(s) IV Push every 4 hours PRN Severe Pain (7 - 10)  LORazepam   Injectable 1 milliGRAM(s) IV Push every 4 hours PRN Nausea and/or Vomiting  naloxone Injectable 0.1 milliGRAM(s) IV Push every 3 minutes PRN For ANY of the following changes in patient status:  A. RR LESS THAN 10 breaths per minute, B. Oxygen saturation LESS THAN 90%, C. Sedation score of 6  ondansetron Injectable 4 milliGRAM(s) IV Push every 6 hours PRN Nausea and/or Vomiting  polyethylene glycol 3350 17 Gram(s) Oral daily PRN Constipation  senna 2 Tablet(s) Oral at bedtime PRN Constipation        Vital Signs Last 24 Hrs  T(C): 37.4 (02 Jan 2021 09:05), Max: 37.4 (02 Jan 2021 09:05)  T(F): 99.4 (02 Jan 2021 09:05), Max: 99.4 (02 Jan 2021 09:05)  HR: 100 (02 Jan 2021 09:05) (93 - 119)  BP: 138/51 (02 Jan 2021 09:05) (130/53 - 155/62)  BP(mean): --  RR: 18 (02 Jan 2021 09:05) (18 - 19)  SpO2: 97% (02 Jan 2021 09:05) (97% - 100%)    PHYSICAL EXAM:    not examined    LABS:                        7.9    x     )-----------( x        ( 02 Jan 2021 11:36 )             27.2     01-02    140  |  112<H>  |  18  ----------------------------<  110<H>  3.6   |  21<L>  |  0.48<L>    Ca    8.9      02 Jan 2021 06:47  Mg     2.0     01-02            RADIOLOGY & ADDITIONAL STUDIES:
Patient is a 65y old  Female who presents with a chief complaint of Abdominal pain (03 Jan 2021 16:15)      Subective:  Feels OK but still with some abdominal pain.  No bleeding reported.    PAST MEDICAL & SURGICAL HISTORY:  Staph infection  8/2017    Chronic abdominal pain    Osteoporosis    Adrenal insufficiency, primary    Knee effusion    Porphyria    S/P tonsillectomy    S/P cholecystectomy    H/O spinal fusion        MEDICATIONS  (STANDING):  amLODIPine   Tablet 2.5 milliGRAM(s) Oral daily  cholecalciferol 1000 Unit(s) Oral two times a day  dextrose 5% + sodium chloride 0.45% with potassium chloride 20 mEq/L 1000 milliLiter(s) (50 mL/Hr) IV Continuous <Continuous>  enoxaparin Injectable 40 milliGRAM(s) SubCutaneous daily  ferrous    sulfate 325 milliGRAM(s) Oral daily  folic acid 1 milliGRAM(s) Oral daily  hydrALAZINE Injectable 10 milliGRAM(s) IV Push every 8 hours  lactobacillus acidophilus 1 Tablet(s) Oral two times a day with meals  magnesium oxide 400 milliGRAM(s) Oral two times a day with meals  multivitamin 1 Tablet(s) Oral daily  ondansetron Injectable 4 milliGRAM(s) IV Push <User Schedule>  pantoprazole    Tablet 40 milliGRAM(s) Oral before breakfast  polyethylene glycol/electrolyte Solution 2000 milliLiter(s) Oral once    MEDICATIONS  (PRN):  ALBUTerol    90 MICROgram(s) HFA Inhaler 2 Puff(s) Inhalation every 6 hours PRN Shortness of Breath and/or Wheezing  HYDROmorphone   Tablet 6 milliGRAM(s) Oral every 4 hours PRN Moderate Pain (4 - 6)  HYDROmorphone  Injectable 1 milliGRAM(s) SubCutaneous every 4 hours PRN Severe Pain (7 - 10)  LORazepam   Injectable 1 milliGRAM(s) IV Push every 4 hours PRN Nausea and/or Vomiting  naloxone Injectable 0.1 milliGRAM(s) IV Push every 3 minutes PRN For ANY of the following changes in patient status:  A. RR LESS THAN 10 breaths per minute, B. Oxygen saturation LESS THAN 90%, C. Sedation score of 6  ondansetron Injectable 4 milliGRAM(s) IV Push every 6 hours PRN Nausea and/or Vomiting  senna 2 Tablet(s) Oral at bedtime PRN Constipation      REVIEW OF SYSTEMS:    RESPIRATORY: No shortness of breath  CARDIOVASCULAR: No chest pain  All other review of systems is negative unless indicated above.    Vital Signs Last 24 Hrs  T(C): 36.6 (04 Jan 2021 08:53), Max: 36.9 (03 Jan 2021 15:37)  T(F): 97.9 (04 Jan 2021 08:53), Max: 98.4 (03 Jan 2021 15:37)  HR: 93 (04 Jan 2021 08:53) (92 - 107)  BP: 155/65 (04 Jan 2021 08:53) (116/61 - 156/65)  BP(mean): --  RR: 18 (04 Jan 2021 08:53) (18 - 18)  SpO2: 100% (04 Jan 2021 08:53) (99% - 100%)    PHYSICAL EXAM:    Constitutional: NAD, well-developed  Respiratory: CTAB  Cardiovascular: S1 and S2, RRR  Gastrointestinal: BS+, soft, NT/ND  Extremities: No peripheral edema  Psychiatric: Normal mood, normal affect    LABS:                        9.5    12.23 )-----------( 304      ( 04 Jan 2021 06:57 )             30.7     01-04    141  |  111<H>  |  7   ----------------------------<  112<H>  3.4<L>   |  22  |  0.49<L>    Ca    9.2      04 Jan 2021 06:57  Phos  3.5     01-04  Mg     2.2     01-04    TPro  6.7  /  Alb  3.2<L>  /  TBili  0.4  /  DBili  x   /  AST  19  /  ALT  33  /  AlkPhos  56  01-03      LIVER FUNCTIONS - ( 03 Jan 2021 08:57 )  Alb: 3.2 g/dL / Pro: 6.7 gm/dL / ALK PHOS: 56 U/L / ALT: 33 U/L / AST: 19 U/L / GGT: x             RADIOLOGY & ADDITIONAL STUDIES:
Patient is a 65y old  Female who presents with a chief complaint of Abdominal pain (29 Dec 2020 21:19)      Subective:  Just waking up, still having pain    PAST MEDICAL & SURGICAL HISTORY:  Staph infection  8/2017    Chronic abdominal pain    Osteoporosis    Adrenal insufficiency, primary    Knee effusion    Porphyria    S/P tonsillectomy    S/P cholecystectomy    H/O spinal fusion        MEDICATIONS  (STANDING):  amLODIPine   Tablet 2.5 milliGRAM(s) Oral daily  cefTRIAXone Injectable.      cefTRIAXone Injectable. 1000 milliGRAM(s) IV Push every 24 hours  cholecalciferol 1000 Unit(s) Oral two times a day  dextrose 5% + sodium chloride 0.45% with potassium chloride 20 mEq/L 1000 milliLiter(s) (80 mL/Hr) IV Continuous <Continuous>  enoxaparin Injectable 40 milliGRAM(s) SubCutaneous daily  ferrous    sulfate 325 milliGRAM(s) Oral daily  folic acid 1 milliGRAM(s) Oral daily  hydrALAZINE Injectable 5 milliGRAM(s) IV Push every 6 hours  magnesium oxide 400 milliGRAM(s) Oral two times a day with meals  multivitamin 1 Tablet(s) Oral daily  ondansetron Injectable 4 milliGRAM(s) IV Push <User Schedule>  pantoprazole  Injectable 40 milliGRAM(s) IV Push daily  saccharomyces boulardii 250 milliGRAM(s) Oral two times a day    MEDICATIONS  (PRN):  ALBUTerol    90 MICROgram(s) HFA Inhaler 2 Puff(s) Inhalation every 6 hours PRN Shortness of Breath and/or Wheezing  bisacodyl 5 milliGRAM(s) Oral at bedtime PRN Constipation  HYDROmorphone  Injectable 1.5 milliGRAM(s) IV Push every 3 hours PRN Severe Pain (7 - 10)  HYDROmorphone  Injectable 0.5 milliGRAM(s) IV Push every 4 hours PRN Moderate Pain (4 - 6)  LORazepam   Injectable 1 milliGRAM(s) IV Push every 4 hours PRN Nausea and/or Vomiting  naloxone Injectable 0.1 milliGRAM(s) IV Push every 3 minutes PRN For ANY of the following changes in patient status:  A. RR LESS THAN 10 breaths per minute, B. Oxygen saturation LESS THAN 90%, C. Sedation score of 6  polyethylene glycol 3350 17 Gram(s) Oral daily PRN Constipation  senna 2 Tablet(s) Oral at bedtime PRN Constipation      REVIEW OF SYSTEMS:    RESPIRATORY: No shortness of breath  CARDIOVASCULAR: No chest pain  All other review of systems is negative unless indicated above.    Vital Signs Last 24 Hrs  T(C): 37.3 (30 Dec 2020 05:45), Max: 37.7 (29 Dec 2020 09:01)  T(F): 99.1 (30 Dec 2020 05:45), Max: 99.8 (29 Dec 2020 09:01)  HR: 91 (30 Dec 2020 05:45) (91 - 109)  BP: 147/63 (30 Dec 2020 05:45) (140/74 - 174/63)  BP(mean): --  RR: 18 (30 Dec 2020 05:45) (18 - 18)  SpO2: 100% (30 Dec 2020 05:45) (95% - 100%)    PHYSICAL EXAM:    Constitutional: NAD, well-developed  Respiratory: CTAB  Cardiovascular: S1 and S2, RRR  Gastrointestinal: BS+, soft, NT/ND  Extremities: No peripheral edema  Psychiatric: Normal mood, normal affect    LABS:                        9.5    12.52 )-----------( 323      ( 29 Dec 2020 08:37 )             34.0     12-29    138  |  109<H>  |  8   ----------------------------<  93  3.1<L>   |  21<L>  |  0.46<L>    Ca    9.0      29 Dec 2020 08:37  Mg     2.5     12-29    TPro  7.3  /  Alb  3.3  /  TBili  0.4  /  DBili  x   /  AST  17  /  ALT  24  /  AlkPhos  89  12-28      LIVER FUNCTIONS - ( 28 Dec 2020 08:53 )  Alb: 3.3 g/dL / Pro: 7.3 gm/dL / ALK PHOS: 89 U/L / ALT: 24 U/L / AST: 17 U/L / GGT: x             RADIOLOGY & ADDITIONAL STUDIES:
Patient is a 65y old  Female who presents with a chief complaint of Abdominal pain /Porphyria (27 Dec 2020 15:05)      Subective:  Resting after ativan  Reportedly continues to have pain    PAST MEDICAL & SURGICAL HISTORY:  Staph infection  8/2017    Chronic abdominal pain    Osteoporosis    Adrenal insufficiency, primary    Knee effusion    Porphyria    S/P tonsillectomy    S/P cholecystectomy    H/O spinal fusion        MEDICATIONS  (STANDING):  amLODIPine   Tablet 2.5 milliGRAM(s) Oral daily  cefTRIAXone Injectable.      cefTRIAXone Injectable. 1000 milliGRAM(s) IV Push every 24 hours  cholecalciferol 1000 Unit(s) Oral two times a day  enoxaparin Injectable 40 milliGRAM(s) SubCutaneous daily  ferrous    sulfate 325 milliGRAM(s) Oral daily  folic acid 1 milliGRAM(s) Oral daily  hydrALAZINE Injectable 5 milliGRAM(s) IV Push every 6 hours  magnesium oxide 400 milliGRAM(s) Oral two times a day with meals  multivitamin 1 Tablet(s) Oral daily  ondansetron Injectable 4 milliGRAM(s) IV Push <User Schedule>  pantoprazole  Injectable 40 milliGRAM(s) IV Push daily  saccharomyces boulardii 250 milliGRAM(s) Oral two times a day  sodium chloride 0.9%. 1000 milliLiter(s) (75 mL/Hr) IV Continuous <Continuous>    MEDICATIONS  (PRN):  ALBUTerol    90 MICROgram(s) HFA Inhaler 2 Puff(s) Inhalation every 6 hours PRN Shortness of Breath and/or Wheezing  bisacodyl 5 milliGRAM(s) Oral at bedtime PRN Constipation  HYDROmorphone  Injectable 1.5 milliGRAM(s) IV Push every 3 hours PRN Severe Pain (7 - 10)  HYDROmorphone  Injectable 0.5 milliGRAM(s) IV Push every 4 hours PRN Moderate Pain (4 - 6)  LORazepam   Injectable 1 milliGRAM(s) IV Push every 4 hours PRN Nausea and/or Vomiting  naloxone Injectable 0.1 milliGRAM(s) IV Push every 3 minutes PRN For ANY of the following changes in patient status:  A. RR LESS THAN 10 breaths per minute, B. Oxygen saturation LESS THAN 90%, C. Sedation score of 6  polyethylene glycol 3350 17 Gram(s) Oral daily PRN Constipation  senna 2 Tablet(s) Oral at bedtime PRN Constipation      REVIEW OF SYSTEMS:    RESPIRATORY: No shortness of breath  CARDIOVASCULAR: No chest pain  All other review of systems is negative unless indicated above.    Vital Signs Last 24 Hrs  T(C): 36.6 (28 Dec 2020 17:55), Max: 37 (28 Dec 2020 10:08)  T(F): 97.8 (28 Dec 2020 17:55), Max: 98.6 (28 Dec 2020 10:08)  HR: 106 (28 Dec 2020 17:55) (74 - 106)  BP: 148/82 (28 Dec 2020 17:55) (148/82 - 171/72)  BP(mean): --  RR: 18 (28 Dec 2020 17:55) (16 - 18)  SpO2: 100% (28 Dec 2020 17:55) (99% - 100%)    PHYSICAL EXAM:    Constitutional: NAD, resting  Respiratory: CTAB  Cardiovascular: S1 and S2, RRR  Gastrointestinal: BS+, soft,  Extremities: No peripheral edema    LABS:                        9.7    13.69 )-----------( 368      ( 28 Dec 2020 08:53 )             34.4     12-28    143  |  111<H>  |  8   ----------------------------<  128<H>  3.3<L>   |  25  |  0.59    Ca    9.1      28 Dec 2020 08:53    TPro  7.3  /  Alb  3.3  /  TBili  0.4  /  DBili  x   /  AST  17  /  ALT  24  /  AlkPhos  89  12-28      LIVER FUNCTIONS - ( 28 Dec 2020 08:53 )  Alb: 3.3 g/dL / Pro: 7.3 gm/dL / ALK PHOS: 89 U/L / ALT: 24 U/L / AST: 17 U/L / GGT: x             RADIOLOGY & ADDITIONAL STUDIES:
Patient seen earlier this morning  Still with significant abdominal pain  Fatigue,Constipated    Fatigue week 65-year-old female with significant hearing deficit,Alert and oriented  Abdomen soft tender to minimal palpation,Hypoactive bowel sounds  Extremities without pitting edema      CBC Full  -  ( 31 Dec 2020 11:48 )  WBC Count : 11.29 K/uL  RBC Count : 4.85 M/uL  Hemoglobin : 9.8 g/dL  Hematocrit : 33.4 %  Platelet Count - Automated : 351 K/uL  Mean Cell Volume : 68.9 fl  Mean Cell Hemoglobin : 20.2 pg  Mean Cell Hemoglobin Concentration : 29.3 gm/dL  Auto Neutrophil # : x  Auto Lymphocyte # : x  Auto Monocyte # : x  Auto Eosinophil # : x  Auto Basophil # : x  Auto Neutrophil % : x  Auto Lymphocyte % : x  Auto Monocyte % : x  Auto Eosinophil % : x  Auto Basophil % : x      12-31    139  |  110<H>  |  4<L>  ----------------------------<  118<H>  3.4<L>   |  20<L>  |  0.43<L>    Ca    9.3      31 Dec 2020 11:48  Mg     2.2     12-31                Vital Signs Last 24 Hrs  T(C): 37.3 (31 Dec 2020 09:03), Max: 37.3 (31 Dec 2020 09:03)  T(F): 99.1 (31 Dec 2020 09:03), Max: 99.1 (31 Dec 2020 09:03)  HR: 89 (31 Dec 2020 16:46) (65 - 104)  BP: 151/70 (31 Dec 2020 16:46) (142/63 - 189/66)  BP(mean): --  RR: 17 (31 Dec 2020 15:31) (17 - 18)  SpO2: 98% (31 Dec 2020 15:31) (98% - 100%)
Patient slowly improving  Abdominal pain persists although no longer on PCA  Nausea persists, last episode of vomiting 2 AM,  Is unable to eat      CBC Full  -  ( 04 Jan 2021 06:57 )  WBC Count : 12.23 K/uL  RBC Count : 4.09 M/uL  Hemoglobin : 9.5 g/dL  Hematocrit : 30.7 %  Platelet Count - Automated : 304 K/uL  Mean Cell Volume : 75.1 fl  Mean Cell Hemoglobin : 23.2 pg  Mean Cell Hemoglobin Concentration : 30.9 gm/dL  Auto Neutrophil # : x  Auto Lymphocyte # : x  Auto Monocyte # : x  Auto Eosinophil # : x  Auto Basophil # : x  Auto Neutrophil % : x  Auto Lymphocyte % : x  Auto Monocyte % : x  Auto Eosinophil % : x  Auto Basophil % : x      01-04    141  |  111<H>  |  7   ----------------------------<  112<H>  3.4<L>   |  22  |  0.49<L>    Ca    9.2      04 Jan 2021 06:57  Phos  3.5     01-04  Mg     2.2     01-04    TPro  6.7  /  Alb  3.2<L>  /  TBili  0.4  /  DBili  x   /  AST  19  /  ALT  33  /  AlkPhos  56  01-03              Vital Signs Last 24 Hrs  T(C): 36.6 (04 Jan 2021 16:04), Max: 36.8 (03 Jan 2021 19:42)  T(F): 97.9 (04 Jan 2021 16:04), Max: 98.2 (03 Jan 2021 19:42)  HR: 102 (04 Jan 2021 16:04) (92 - 107)  BP: 144/72 (04 Jan 2021 16:04) (144/72 - 155/65)  BP(mean): --  RR: 18 (04 Jan 2021 16:04) (18 - 18)  SpO2: 100% (04 Jan 2021 16:04) (99% - 100%)    Porphyrins Quant, Fractionation-Urine (12.23.20 @ 16:50)   Uroporphyrin I - Random Urine: 53.5: Units: mcg/g creat   Uroporphyrin III - Random Urine: 7.2: Units: mcg/g creat   Heptacarboxyporphrin, Random Urine: 4.4: Units: mcg/g creat   Hexacarboxyporphrin, Random Urine: SEE NOTE: Units: mcg/g creat   Result is below reportable range for this analyte.   Pentacarboxyporphrin, Random Urine: 2.8: Units: mcg/g creat   Coproporphyrin I - Random Urine: 162.2: Units: mcg/g creat   Coproporphyrin III - Random Urine: 141.7: Units: mcg/g creat   Total Porphyrins, Random Urine: 371.8: Units: mcg/g creat   Porphyrins Random Urine Interpretation: SEE NOTE: In this urine sample several porphyrins were   minimally elevated in a pattern not consistent   with a particular porphyria. These results may   lack of clinical significance and may represent   the effect of a variety of stressors of the heme   pathway including medications metabolized via   Cytochrome P450 (most commonly sulfonamides,   aminopyrine and diallyl barbiturates), physiologic   steroids, anemia, heavy metal intoxication,   ethanol ingestion and liver disease.   However, these porphyrins levelshave been found   in cases of porphyria cutanea tarda (PCT), either   inherited or secondary to liver disease as well as   in treated PCT patients, hereditary coproporphyria   (HCP), variegate porphyria () and in cases of   acute intermittent porphyria (AIP).     
Subjective:  Patient is a 65y old  Female who presents with a chief complaint of Abdominal pain      HPI:  65 year old woman with PMHx  staph infection, chronic abdominal pain, osteoporosis, adrenal insufficiency does not taking steroids , [place], s/p cholecystectomy, s/p spinal fusion, chronic intermittent porphyria, anxiety, with multiple previous admissions for acute on chronic abdominal pain presents to [facility] on 12/23/20  for abdominal pain.  Pt over last several days with worsening 10/10 abd pain with associated nausea and vomiting.  ALso with history of altered mental status.  Pt notified Dr. Maza who told her to go to ED for further evaluation and management.  In ED: Pt given IV dilaudid, supportive care and ruled out for other acute processes with imaging and blood work.  She was admitted to  for management.     12/24 -  Patient seen and examined at bedside earlier today, + nausea, no vomiting, + poor po intake, denies cp, + abdominal pain, afebrile, POC discussed   12/25 - pt seen and examined, + nausea, + vomiting, + abdominal pain, poor po intake, denies cp, dyspnea, POC discussed   12/26 - pt seen and examined + nausea, + abdominal pain improving, denies cp, dyspnea, + constipation, POC discussed   12/27- pt seen and examined at bedside, feels tired/weak, still with diffuse abdominal pain, nausea/vomiting, refusing PO meds, incontinent of watery stools x2. afebrile  12/28 - no cp palps sob; pain meds adjusted - abdo pain better  Review of system- Rest of the review of system are negative except mentioned in HPI      Physical exam:   GENERAL: NAD  NERVOUS SYSTEM:  Alert & Oriented X3, non- focal exam, Motor Strength 5/5 B/L upper and lower extremities; DTRs 2+ intact and symmetric  HEAD:  Atraumatic, Normocephalic  EYES: EOMI, PERRLA, conjunctiva and sclera clear  HEENT: Moist mucous membranes  NECK: Supple, No JVD  CHEST/LUNG: Clear to auscultation bilaterally; No rales, no rhonchi, no wheezing  HEART: Regular rate and rhythm; No murmurs, no rubs or gallops  ABDOMEN: Soft, diffuse tenderness, Non-distended; Bowel sounds present  GENITOURINARY- Voiding, no suprapubic tenderness  EXTREMITIES:  2+ Peripheral Pulses, No clubbing, cyanosis,   edema  MUSCULOSKELETAL:- No muscle tenderness, Muscle tone normal, No joint tenderness, no Joint swelling,  Joint ROM –normal   SKIN-no rash, no lesion      RECENT CULTURES:  Culture - Urine (12.23.20 @ 01:17)    Specimen Source: .Urine None    Culture Results:   50,000 - 99,000 CFU/mL Escherichia coli  <10,000 CFU/ml Normal Urogenital wanda present    RADIOLOGY & ADDITIONAL TESTS: all reviewed EKG reviewed  < from: CT Abdomen and Pelvis w/ IV Cont (12.23.20 @ 00:32) >  FINDINGS:  No acute intra-abdominal pathology on CT.    LABS: All Labs Reviewed:                        9.7    13.69 )-----------( 368      ( 28 Dec 2020 08:53 )             34.4     12-28    143  |  111<H>  |  8   ----------------------------<  128<H>  3.3<L>   |  25  |  0.59    TPro  7.3  /  Alb  3.3  /  TBili  0.4  /  DBili  x   /  AST  17  /  ALT  24  /  AlkPhos  89  12-28          ALBUTerol    90 MICROgram(s) HFA Inhaler 2 Puff(s) Inhalation every 6 hours PRN  amLODIPine   Tablet 2.5 milliGRAM(s) Oral daily  bisacodyl 5 milliGRAM(s) Oral at bedtime PRN  cefTRIAXone Injectable.      cefTRIAXone Injectable. 1000 milliGRAM(s) IV Push every 24 hours  cholecalciferol 1000 Unit(s) Oral two times a day  enoxaparin Injectable 40 milliGRAM(s) SubCutaneous daily  ferrous    sulfate 325 milliGRAM(s) Oral daily  folic acid 1 milliGRAM(s) Oral daily  hydrALAZINE Injectable 5 milliGRAM(s) IV Push every 6 hours  HYDROmorphone  Injectable 1.5 milliGRAM(s) IV Push every 3 hours PRN  HYDROmorphone  Injectable 0.5 milliGRAM(s) IV Push every 4 hours PRN  LORazepam   Injectable 1 milliGRAM(s) IV Push every 4 hours PRN  magnesium oxide 400 milliGRAM(s) Oral two times a day with meals  multivitamin 1 Tablet(s) Oral daily  naloxone Injectable 0.1 milliGRAM(s) IV Push every 3 minutes PRN  ondansetron Injectable 4 milliGRAM(s) IV Push <User Schedule>  pantoprazole  Injectable 40 milliGRAM(s) IV Push daily  polyethylene glycol 3350 17 Gram(s) Oral daily PRN  saccharomyces boulardii 250 milliGRAM(s) Oral two times a day  senna 2 Tablet(s) Oral at bedtime PRN  sodium chloride 0.9%. 1000 milliLiter(s) IV Continuous <Continuous>                      
Subjective:  Patient is a 65y old  Female who presents with a chief complaint of Abdominal pain      HPI:  65 year old woman with PMHx  staph infection, chronic abdominal pain, osteoporosis, adrenal insufficiency does not taking steroids , [place], s/p cholecystectomy, s/p spinal fusion, chronic intermittent porphyria, anxiety, with multiple previous admissions for acute on chronic abdominal pain presents to [facility] on 12/23/20  for abdominal pain.  Pt over last several days with worsening 10/10 abd pain with associated nausea and vomiting.  ALso with history of altered mental status.  Pt notified Dr. Maza who told her to go to ED for further evaluation and management.  In ED: Pt given IV dilaudid, supportive care and ruled out for other acute processes with imaging and blood work.  She was admitted to  for management.     12/24 -  Patient seen and examined at bedside earlier today, + nausea, no vomiting, + poor po intake, denies cp, + abdominal pain, afebrile, POC discussed   12/25 - pt seen and examined, + nausea, + vomiting, + abdominal pain, poor po intake, denies cp, dyspnea, POC discussed   12/26 - pt seen and examined + nausea, + abdominal pain improving, denies cp, dyspnea, + constipation, POC discussed   12/27- pt seen and examined at bedside, feels tired/weak, still with diffuse abdominal pain, nausea/vomiting, refusing PO meds, incontinent of watery stools x2. afebrile    Review of system- Rest of the review of system are negative except mentioned in HPI    T(C): 36.4 (12-27-20 @ 09:43), Max: 37.3 (12-27-20 @ 02:22)  T(F): 97.6 (12-27-20 @ 09:43), Max: 99.2 (12-27-20 @ 02:22)  HR: 79 (12-27-20 @ 09:43) (57 - 93)  BP: 163/66 (12-27-20 @ 09:43) (163/66 - 205/72)  RR: 16 (12-27-20 @ 09:43) (16 - 18)  SpO2: 100% (12-27-20 @ 09:43) (100% - 100%)  Wt(kg): --    Physical exam:   GENERAL: NAD  NERVOUS SYSTEM:  Alert & Oriented X3, non- focal exam, Motor Strength 5/5 B/L upper and lower extremities; DTRs 2+ intact and symmetric  HEAD:  Atraumatic, Normocephalic  EYES: EOMI, PERRLA, conjunctiva and sclera clear  HEENT: Moist mucous membranes  NECK: Supple, No JVD  CHEST/LUNG: Clear to auscultation bilaterally; No rales, no rhonchi, no wheezing  HEART: Regular rate and rhythm; No murmurs, no rubs or gallops  ABDOMEN: Soft, diffuse tenderness, Non-distended; Bowel sounds present  GENITOURINARY- Voiding, no suprapubic tenderness  EXTREMITIES:  2+ Peripheral Pulses, No clubbing, cyanosis,   edema  MUSCULOSKELETAL:- No muscle tenderness, Muscle tone normal, No joint tenderness, no Joint swelling,  Joint ROM –normal   SKIN-no rash, no lesion  LABS: all reviewed  12-27    139  |  108  |  3<L>  ----------------------------<  164<H>  3.6   |  24  |  0.52    Ca    9.3      27 Dec 2020 08:39  Phos  2.9     12-26  Mg     2.1     12-26    TPro  8.2  /  Alb  3.6  /  TBili  0.5  /  DBili  x   /  AST  24  /  ALT  31  /  AlkPhos  111  12-27                            10.4   9.37  )-----------( 363      ( 27 Dec 2020 08:39 )             37.1             LIVER FUNCTIONS - ( 27 Dec 2020 08:39 )  Alb: 3.6 g/dL / Pro: 8.2 gm/dL / ALK PHOS: 111 U/L / ALT: 31 U/L / AST: 24 U/L / GGT: x                   12-26    142  |  110<H>  |  3<L>  ----------------------------<  128<H>  3.4<L>   |  24  |  0.63    Ca    9.2      26 Dec 2020 08:39  Phos  2.9     12-26  Mg     2.1     12-26    TPro  6.7  /  Alb  2.8<L>  /  TBili  0.3  /  DBili  x   /  AST  21  /  ALT  18  /  AlkPhos  69  12-25                        9.3    6.62  )-----------( 315      ( 26 Dec 2020 08:39 )             33.5     RECENT CULTURES:    Culture - Urine (12.23.20 @ 01:17)    Specimen Source: .Urine None    Culture Results:   50,000 - 99,000 CFU/mL Escherichia coli  <10,000 CFU/ml Normal Urogenital wanda present      RADIOLOGY & ADDITIONAL TESTS: all reviewed EKG reviewed      < from: CT Abdomen and Pelvis w/ IV Cont (12.23.20 @ 00:32) >  FINDINGS:  LOWER CHEST: Coronary artery calcification.    LIVER: Within normal limits.  BILE DUCTS: Normal caliber.  GALLBLADDER: Cholecystectomy.  SPLEEN: Within normal limits.  PANCREAS: Within normal limits.  ADRENALS: Within normal limits.  KIDNEYS/URETERS: Bilateral low-attenuation lesions too small to accurately characterize. No hydronephrosis. Symmetric parenchymal enhancement.    BLADDER: Within normal limits.  REPRODUCTIVE ORGANS: Calcified uterine fibroids. Unremarkable bilateral adnexa.    BOWEL: No bowel obstruction. Appendix is normal.  PERITONEUM: No ascites.  VESSELS: Atherosclerosis of the abdominal aorta, which is normal in caliber.  RETROPERITONEUM/LYMPH NODES: No lymphadenopathy.  ABDOMINAL WALL: Within normal limits.  BONES: Stable moderate anterior wedging of the T12 vertebral body with focal kyphosis at the thoracolumbar junction.    IMPRESSION:  No acute intra-abdominal pathology on CT.    < end of copied text >    MEDICATIONS  (STANDING):  amLODIPine   Tablet 2.5 milliGRAM(s) Oral daily  cefTRIAXone Injectable.      cefTRIAXone Injectable. 1000 milliGRAM(s) IV Push once  cholecalciferol 1000 Unit(s) Oral two times a day  enoxaparin Injectable 40 milliGRAM(s) SubCutaneous daily  ferrous    sulfate 325 milliGRAM(s) Oral daily  folic acid 1 milliGRAM(s) Oral daily  magnesium oxide 400 milliGRAM(s) Oral two times a day with meals  multivitamin 1 Tablet(s) Oral daily  ondansetron Injectable 4 milliGRAM(s) IV Push <User Schedule>  pantoprazole  Injectable 40 milliGRAM(s) IV Push daily  saccharomyces boulardii 250 milliGRAM(s) Oral two times a day  sodium chloride 0.9%. 1000 milliLiter(s) (75 mL/Hr) IV Continuous <Continuous>    MEDICATIONS  (PRN):  ALBUTerol    90 MICROgram(s) HFA Inhaler 2 Puff(s) Inhalation every 6 hours PRN Shortness of Breath and/or Wheezing  bisacodyl 5 milliGRAM(s) Oral at bedtime PRN Constipation  hydrALAZINE Injectable 5 milliGRAM(s) IV Push every 6 hours PRN for SBP > 160  HYDROmorphone  Injectable 0.5 milliGRAM(s) IV Push every 4 hours PRN Moderate Pain (4 - 6)  HYDROmorphone  Injectable 1 milliGRAM(s) IV Push every 4 hours PRN Severe Pain (7 - 10)  LORazepam   Injectable 1 milliGRAM(s) IV Push every 4 hours PRN Nausea and/or Vomiting  naloxone Injectable 0.1 milliGRAM(s) IV Push every 3 minutes PRN For ANY of the following changes in patient status:  A. RR LESS THAN 10 breaths per minute, B. Oxygen saturation LESS THAN 90%, C. Sedation score of 6  polyethylene glycol 3350 17 Gram(s) Oral daily PRN Constipation  senna 2 Tablet(s) Oral at bedtime PRN Constipation            
Subjective:  Patient is a 65y old  Female who presents with a chief complaint of Abdominal pain      HPI:  65 year old woman with PMHx  staph infection, chronic abdominal pain, osteoporosis, adrenal insufficiency does not taking steroids , [place], s/p cholecystectomy, s/p spinal fusion, chronic intermittent porphyria, anxiety, with multiple previous admissions for acute on chronic abdominal pain presents to [facility] on 12/23/20  for abdominal pain.  Pt over last several days with worsening 10/10 abd pain with associated nausea and vomiting.  ALso with history of altered mental status.  Pt notified Dr. Maza who told her to go to ED for further evaluation and management.  In ED: Pt given IV dilaudid, supportive care and ruled out for other acute processes with imaging and blood work.  She was admitted to  for management.     12/24 -  Patient seen and examined at bedside earlier today, + nausea, no vomiting, + poor po intake, denies cp, + abdominal pain, afebrile, POC discussed   12/25 - pt seen and examined, + nausea, + vomiting, + abdominal pain, poor po intake, denies cp, dyspnea, POC discussed   12/26 - pt seen and examined + nausea, + abdominal pain improving, denies cp, dyspnea, + constipation, POC discussed   12/27- pt seen and examined at bedside, feels tired/weak, still with diffuse abdominal pain, nausea/vomiting, refusing PO meds, incontinent of watery stools x2. afebrile  12/28 - no cp palps sob; pain meds adjusted - abdo pain better  12/29 - tried to lower dialudid dose in AM;  reported pain and dose increased again   12/30 - abdo pain under control not ready to tape r  12/31 - s/p EGD felt nauseous, got ativan; seen with Dr Maza at bedside   Review of system- Rest of the review of system are negative except mentioned in HPI      Physical exam:   GENERAL: NAD  NERVOUS SYSTEM:  Alert & Oriented X3, non- focal exam, Motor Strength 5/5 B/L upper and lower extremities; DTRs 2+ intact and symmetric  HEAD:  Atraumatic, Normocephalic  EYES: EOMI, PERRLA, conjunctiva and sclera clear  HEENT: Moist mucous membranes  NECK: Supple, No JVD  CHEST/LUNG: Clear to auscultation bilaterally; No rales, no rhonchi, no wheezing  HEART: Regular rate and rhythm; No murmurs, no rubs or gallops  ABDOMEN: Soft, diffuse tenderness, Non-distended; Bowel sounds present  GENITOURINARY- Voiding, no suprapubic tenderness  EXTREMITIES:  2+ Peripheral Pulses, No clubbing, cyanosis,   edema  MUSCULOSKELETAL:- No muscle tenderness, Muscle tone normal, No joint tenderness, no Joint swelling,  Joint ROM –normal   SKIN-no rash, no lesion      RECENT CULTURES:  Culture - Urine (12.23.20 @ 01:17)    Specimen Source: .Urine None    Culture Results:   50,000 - 99,000 CFU/mL Escherichia coli  <10,000 CFU/ml Normal Urogenital wanda present    RADIOLOGY & ADDITIONAL TESTS: all reviewed EKG reviewed  < from: CT Abdomen and Pelvis w/ IV Cont (12.23.20 @ 00:32) >  FINDINGS:  No acute intra-abdominal pathology on CT.    LABS: All Labs Reviewed:                        9.8    11.29 )-----------( 351      ( 31 Dec 2020 11:48 )             33.4     12-31    139  |  110<H>  |  4<L>  ----------------------------<  118<H>  3.4<L>   |  20<L>  |  0.43<L>    Ca    9.3      31 Dec 2020 11:48  Mg     2.2     12-31        ALBUTerol    90 MICROgram(s) HFA Inhaler 2 Puff(s) Inhalation every 6 hours PRN  amLODIPine   Tablet 2.5 milliGRAM(s) Oral daily  bisacodyl 5 milliGRAM(s) Oral at bedtime PRN  cholecalciferol 1000 Unit(s) Oral two times a day  dextrose 5% + sodium chloride 0.45% with potassium chloride 20 mEq/L 1000 milliLiter(s) IV Continuous <Continuous>  enoxaparin Injectable 40 milliGRAM(s) SubCutaneous daily  ferrous    sulfate 325 milliGRAM(s) Oral daily  folic acid 1 milliGRAM(s) Oral daily  hydrALAZINE Injectable 10 milliGRAM(s) IV Push every 8 hours  HYDROmorphone  Injectable 1.5 milliGRAM(s) IV Push every 3 hours PRN  HYDROmorphone  Injectable 0.5 milliGRAM(s) IV Push every 4 hours PRN  iron sucrose Injectable 200 milliGRAM(s) IV Push every 24 hours  LORazepam   Injectable 1 milliGRAM(s) IV Push every 4 hours PRN  magnesium oxide 400 milliGRAM(s) Oral two times a day with meals  multivitamin 1 Tablet(s) Oral daily  naloxone Injectable 0.1 milliGRAM(s) IV Push every 3 minutes PRN  ondansetron Injectable 4 milliGRAM(s) IV Push <User Schedule>  pantoprazole  Injectable 40 milliGRAM(s) IV Push daily  polyethylene glycol 3350 17 Gram(s) Oral daily PRN  saccharomyces boulardii 250 milliGRAM(s) Oral two times a day  senna 2 Tablet(s) Oral at bedtime PRN                                          
Subjective:  Patient is a 65y old  Female who presents with a chief complaint of Abdominal pain      HPI:  65 year old woman with PMHx  staph infection, chronic abdominal pain, osteoporosis, adrenal insufficiency does not taking steroids , [place], s/p cholecystectomy, s/p spinal fusion, chronic intermittent porphyria, anxiety, with multiple previous admissions for acute on chronic abdominal pain presents [facility] on 12/23/20  for abdominal pain.  Pt over last several days with worsening 10/10 abd pain with associated nausea and vomiting.  ALso with history of altered mental status.  Pt notified Dr. Maza who told her to go to ED for further evaluation and management.  In ED: Pt given IV dilaudid, supportive care and ruled out for other acute processes with imaging and blood work.  She was admitted to  for management.     12/24 -  Patient seen and examined at bedside earlier today, + nausea, no vomiting, + poor po intake, denies cp, + abdominal pain, afebrile, POC discussed   12/25 - pt seen and examined, + nausea, + vomiting, + abdominal pain, poor po intake, denies cp, dyspnea, POC discussed   12/26 - pt seen and examined + nausea, + abdominal pain improving, denies cp, dyspnea, + constipation, POC discussed   12/27- pt seen and examined at bedside, feels tired/weak, still with diffuse abdominal pain, nausea/vomiting, refusing PO meds, incontinent of watery stools x2. afebrile  12/28 - no cp palps sob; pain meds adjusted - abdo pain better  12/29 - tried to lower dialudid dose in AM;  reported pain and dose increased again   Review of system- Rest of the review of system are negative except mentioned in HPI      Physical exam:   GENERAL: NAD  NERVOUS SYSTEM:  Alert & Oriented X3, non- focal exam, Motor Strength 5/5 B/L upper and lower extremities; DTRs 2+ intact and symmetric  HEAD:  Atraumatic, Normocephalic  EYES: EOMI, PERRLA, conjunctiva and sclera clear  HEENT: Moist mucous membranes  NECK: Supple, No JVD  CHEST/LUNG: Clear to auscultation bilaterally; No rales, no rhonchi, no wheezing  HEART: Regular rate and rhythm; No murmurs, no rubs or gallops  ABDOMEN: Soft, diffuse tenderness, Non-distended; Bowel sounds present  GENITOURINARY- Voiding, no suprapubic tenderness  EXTREMITIES:  2+ Peripheral Pulses, No clubbing, cyanosis,   edema  MUSCULOSKELETAL:- No muscle tenderness, Muscle tone normal, No joint tenderness, no Joint swelling,  Joint ROM –normal   SKIN-no rash, no lesion      RECENT CULTURES:  Culture - Urine (12.23.20 @ 01:17)    Specimen Source: .Urine None    Culture Results:   50,000 - 99,000 CFU/mL Escherichia coli  <10,000 CFU/ml Normal Urogenital wanda present    RADIOLOGY & ADDITIONAL TESTS: all reviewed EKG reviewed  < from: CT Abdomen and Pelvis w/ IV Cont (12.23.20 @ 00:32) >  FINDINGS:  No acute intra-abdominal pathology on CT.    LABS: All Labs Reviewed:                        9.5    12.52 )-----------( 323      ( 29 Dec 2020 08:37 )             34.0     12-29    138  |  109<H>  |  8   ----------------------------<  93  3.1<L>   |  21<L>  |  0.46<L>    Ca    9.0      29 Dec 2020 08:37  Mg     2.5     12-29      ALBUTerol    90 MICROgram(s) HFA Inhaler 2 Puff(s) Inhalation every 6 hours PRN  amLODIPine   Tablet 2.5 milliGRAM(s) Oral daily  bisacodyl 5 milliGRAM(s) Oral at bedtime PRN  cefTRIAXone Injectable.      cefTRIAXone Injectable. 1000 milliGRAM(s) IV Push every 24 hours  cholecalciferol 1000 Unit(s) Oral two times a day  enoxaparin Injectable 40 milliGRAM(s) SubCutaneous daily  ferrous    sulfate 325 milliGRAM(s) Oral daily  folic acid 1 milliGRAM(s) Oral daily  hydrALAZINE Injectable 5 milliGRAM(s) IV Push every 6 hours  HYDROmorphone  Injectable 1.5 milliGRAM(s) IV Push every 3 hours PRN  HYDROmorphone  Injectable 0.5 milliGRAM(s) IV Push every 4 hours PRN  LORazepam   Injectable 1 milliGRAM(s) IV Push every 4 hours PRN  magnesium oxide 400 milliGRAM(s) Oral two times a day with meals  multivitamin 1 Tablet(s) Oral daily  naloxone Injectable 0.1 milliGRAM(s) IV Push every 3 minutes PRN  ondansetron Injectable 4 milliGRAM(s) IV Push <User Schedule>  pantoprazole  Injectable 40 milliGRAM(s) IV Push daily  polyethylene glycol 3350 17 Gram(s) Oral daily PRN  saccharomyces boulardii 250 milliGRAM(s) Oral two times a day  senna 2 Tablet(s) Oral at bedtime PRN                        
Subjective:  Patient is a 65y old  Female who presents with a chief complaint of Abdominal pain      HPI:  65 year old woman with PMHx  staph infection, chronic abdominal pain, osteoporosis, adrenal insufficiency does not taking steroids , [place], s/p cholecystectomy, s/p spinal fusion, chronic intermittent porphyria, anxiety, with multiple previous admissions for acute on chronic abdominal pain presents to [facility] on 12/23/20  for abdominal pain.  Pt over last several days with worsening 10/10 abd pain with associated nausea and vomiting.  ALso with history of altered mental status.  Pt notified Dr. Maza who told her to go to ED for further evaluation and management.  In ED: Pt given IV dilaudid, supportive care and ruled out for other acute processes with imaging and blood work.  She was admitted to  for management.     12/24 -  Patient seen and examined at bedside earlier today, + nausea, no vomiting, + poor po intake, denies cp, + abdominal pain, afebrile, POC discussed   12/25 - pt seen and examined, + nausea, + vomiting, + abdominal pain, poor po intake, denies cp, dyspnea, POC discussed   12/26 - pt seen and examined + nausea, + abdominal pain improving, denies cp, dyspnea, + constipation, POC discussed   12/27- pt seen and examined at bedside, feels tired/weak, still with diffuse abdominal pain, nausea/vomiting, refusing PO meds, incontinent of watery stools x2. afebrile  12/28 - no cp palps sob; pain meds adjusted - abdo pain better  12/29 - tried to lower dialudid dose in AM;  reported pain and dose increased again   12/30 - abdo pain under control not ready to tape r  12/31 - s/p EGD felt nauseous, got ativan; seen with Dr Maza at bedside   1/1 - no cp palps sob, nausea better today, will try [place] diet   1/2- + abdominal pain, lose BM, afebrile, minimal PO intake , reports nausea, POC discussed   1/3 - pt seen and examined, asking me  " do I have rights to die in [facility] ? " , reports severe pain, + nausea , afebrile, agreed on blood transfusion, POC discussed     Review of system- Rest of the review of system are negative except mentioned in HPI    T(C): 36.9 (01-03-21 @ 15:37), Max: 36.9 (01-02-21 @ 20:14)  T(F): 98.4 (01-03-21 @ 15:37), Max: 98.4 (01-02-21 @ 20:14)  HR: 102 (01-03-21 @ 15:37) (96 - 102)  BP: 116/61 (01-03-21 @ 15:37) (116/61 - 152/59)  RR: 18 (01-03-21 @ 15:37) (18 - 18)  SpO2: 100% (01-03-21 @ 15:37) (99% - 100%)  Wt(kg): --    Physical exam:   GENERAL: NAD  NERVOUS SYSTEM:  Alert & Oriented X3, non- focal exam, Motor Strength 5/5 B/L upper and lower extremities; DTRs 2+ intact and symmetric  HEAD:  Atraumatic, Normocephalic  EYES: EOMI, PERRLA, conjunctiva and sclera clear  HEENT: Moist mucous membranes  NECK: Supple, No JVD  CHEST/LUNG: Clear to auscultation bilaterally; No rales, no rhonchi, no wheezing  HEART: Regular rate and rhythm; No murmurs, no rubs or gallops  ABDOMEN: Soft, diffuse tenderness, Non-distended; Bowel sounds present  GENITOURINARY- Voiding, no suprapubic tenderness  EXTREMITIES:  2+ Peripheral Pulses, No clubbing, cyanosis,   edema  MUSCULOSKELETAL:- No muscle tenderness, Muscle tone normal, No joint tenderness, no Joint swelling,  Joint ROM –normal   SKIN-no rash, no lesion      RECENT CULTURES:  Culture - Urine (12.23.20 @ 01:17)    Specimen Source: .Urine None    Culture Results:   50,000 - 99,000 CFU/mL Escherichia coli  <10,000 CFU/ml Normal Urogenital wanda present  Culture - Urine (12.23.20 @ 01:17)    -  Amikacin: S <=16    -  Amoxicillin/Clavulanic Acid: S <=8/4    -  Ampicillin: R >16 These ampicillin results predict results for amoxicillin    -  Ampicillin/Sulbactam: S 8/4 Enterobacter, Citrobacter, and Serratia may develop resistance during prolonged therapy (3-4 days)    -  Cefepime: S <=2    -  Cefoxitin: S <=8    -  Ceftriaxone: S <=1 Enterobacter, Citrobacter, and Serratia may develop resistance during prolonged therapy    -  Ciprofloxacin: R >2    -  Aztreonam: S <=4    -  Cefazolin: S <=2 ([id number]_[id number]) For uncomplicated UTI with K. pneumoniae, E. coli, or P. mirablis: ARMANDO <=16 is sensitive and ARMANDO >=32 is resistant. This also predicts results for oral agents cefaclor, cefdinir, cefpodoxime, cefprozil, cefuroxime axetil, cephalexin and locarbef for uncomplicated UTI. Note that some isolates may be susceptible to these agents while testing resistant to cefazolin.    -  Ertapenem: S <=0.5    -  Gentamicin: S <=2    -  Imipenem: S <=1    -  Levofloxacin: R >4    -  Meropenem: S <=1    -  Nitrofurantoin: S <=32 Should not be used to treat pyelonephritis    -  Piperacillin/Tazobactam: S <=8    -  Tigecycline: S <=2    -  Tobramycin: S <=2    -  Trimethoprim/Sulfamethoxazole: S <=0.5/9.5    Specimen Source: .Urine None    Culture Results:   50,000 - 99,000 CFU/mL Escherichia coli  <10,000 CFU/ml Normal Urogenital wanda present    Organism Identification: Escherichia coli    Organism: Escherichia coli    Method Type: ARMANDO      RADIOLOGY & ADDITIONAL TESTS: all reviewed EKG reviewed  < from: CT Abdomen and Pelvis w/ IV Cont (12.23.20 @ 00:32) >  FINDINGS:  No acute intra-abdominal pathology on CT.        LABS: All Labs Reviewed:  01-03    140  |  112<H>  |  12  ----------------------------<  116<H>  3.3<L>   |  22  |  0.48<L>    Ca    9.1      03 Jan 2021 08:57  Mg     2.0     01-02    TPro  6.7  /  Alb  3.2<L>  /  TBili  0.4  /  DBili  x   /  AST  19  /  ALT  33  /  AlkPhos  56  01-03                          7.4    11.88 )-----------( 314      ( 03 Jan 2021 08:57 )             25.5             LIVER FUNCTIONS - ( 03 Jan 2021 08:57 )  Alb: 3.2 g/dL / Pro: 6.7 gm/dL / ALK PHOS: 56 U/L / ALT: 33 U/L / AST: 19 U/L / GGT: x                 ALBUTerol    90 MICROgram(s) HFA Inhaler 2 Puff(s) Inhalation every 6 hours PRN  amLODIPine   Tablet 2.5 milliGRAM(s) Oral daily  bisacodyl 5 milliGRAM(s) Oral at bedtime PRN  cholecalciferol 1000 Unit(s) Oral two times a day  dextrose 5% + sodium chloride 0.45% with potassium chloride 20 mEq/L 1000 milliLiter(s) IV Continuous <Continuous>  enoxaparin Injectable 40 milliGRAM(s) SubCutaneous daily  ferrous    sulfate 325 milliGRAM(s) Oral daily  folic acid 1 milliGRAM(s) Oral daily  hydrALAZINE Injectable 10 milliGRAM(s) IV Push every 8 hours  HYDROmorphone  Injectable 0.5 milliGRAM(s) IV Push every 4 hours PRN  HYDROmorphone  Injectable 1.5 milliGRAM(s) IV Push every 4 hours PRN  LORazepam   Injectable 1 milliGRAM(s) IV Push every 4 hours PRN  magnesium oxide 400 milliGRAM(s) Oral two times a day with meals  multivitamin 1 Tablet(s) Oral daily  naloxone Injectable 0.1 milliGRAM(s) IV Push every 3 minutes PRN  ondansetron Injectable 4 milliGRAM(s) IV Push <User Schedule>  pantoprazole  Injectable 40 milliGRAM(s) IV Push daily  polyethylene glycol 3350 17 Gram(s) Oral daily PRN  saccharomyces boulardii 250 milliGRAM(s) Oral two times a day  senna 2 Tablet(s) Oral at bedtime PRN                                                
Subjective:  Patient is a 65y old  Female who presents with a chief complaint of Abdominal pain      HPI:  65 year old woman with PMHx  staph infection, chronic abdominal pain, osteoporosis, adrenal insufficiency does not taking steroids , [place], s/p cholecystectomy, s/p spinal fusion, chronic intermittent porphyria, anxiety, with multiple previous admissions for acute on chronic abdominal pain presents [facility] on 12/23/20  for abdominal pain.  Pt over last several days with worsening 10/10 abd pain with associated nausea and vomiting.  ALso with history of altered mental status.  Pt notified Dr. Maza who told her to go to ED for further evaluation and management.  In ED: Pt given IV dilaudid, supportive care and ruled out for other acute processes with imaging and blood work.  She was admitted to  for management.     12/24 -  Patient seen and examined at bedside earlier today, + nausea, no vomiting, + poor po intake, denies cp, + abdominal pain, afebrile, POC discussed   12/25 - pt seen and examined, + nausea, + vomiting, + abdominal pain, poor po intake, denies cp, dyspnea, POC discussed   12/26 - pt seen and examined + nausea, + abdominal pain improving, denies cp, dyspnea, + constipation, POC discussed   12/27- pt seen and examined at bedside, feels tired/weak, still with diffuse abdominal pain, nausea/vomiting, refusing PO meds, incontinent of watery stools x2. afebrile  12/28 - no cp palps sob; pain meds adjusted - abdo pain better  12/29 - tried to lower dialudid dose in AM;  reported pain and dose increased again   12/30 - abdo pain under control not ready to tape r  Review of system- Rest of the review of system are negative except mentioned in HPI      Physical exam:   GENERAL: NAD  NERVOUS SYSTEM:  Alert & Oriented X3, non- focal exam, Motor Strength 5/5 B/L upper and lower extremities; DTRs 2+ intact and symmetric  HEAD:  Atraumatic, Normocephalic  EYES: EOMI, PERRLA, conjunctiva and sclera clear  HEENT: Moist mucous membranes  NECK: Supple, No JVD  CHEST/LUNG: Clear to auscultation bilaterally; No rales, no rhonchi, no wheezing  HEART: Regular rate and rhythm; No murmurs, no rubs or gallops  ABDOMEN: Soft, diffuse tenderness, Non-distended; Bowel sounds present  GENITOURINARY- Voiding, no suprapubic tenderness  EXTREMITIES:  2+ Peripheral Pulses, No clubbing, cyanosis,   edema  MUSCULOSKELETAL:- No muscle tenderness, Muscle tone normal, No joint tenderness, no Joint swelling,  Joint ROM –normal   SKIN-no rash, no lesion      RECENT CULTURES:  Culture - Urine (12.23.20 @ 01:17)    Specimen Source: .Urine None    Culture Results:   50,000 - 99,000 CFU/mL Escherichia coli  <10,000 CFU/ml Normal Urogenital wanda present    RADIOLOGY & ADDITIONAL TESTS: all reviewed EKG reviewed  < from: CT Abdomen and Pelvis w/ IV Cont (12.23.20 @ 00:32) >  FINDINGS:  No acute intra-abdominal pathology on CT.    LABS: All Labs Reviewed:                        9.2    10.86 )-----------( 312      ( 30 Dec 2020 07:37 )             31.7     12-30    141  |  111<H>  |  6<L>  ----------------------------<  92  3.4<L>   |  20<L>  |  0.43<L>    Ca    9.0      30 Dec 2020 07:37  Mg     2.3     12-30        ALBUTerol    90 MICROgram(s) HFA Inhaler 2 Puff(s) Inhalation every 6 hours PRN  amLODIPine   Tablet 2.5 milliGRAM(s) Oral daily  bisacodyl 5 milliGRAM(s) Oral at bedtime PRN  cefTRIAXone Injectable.      cefTRIAXone Injectable. 1000 milliGRAM(s) IV Push every 24 hours  cholecalciferol 1000 Unit(s) Oral two times a day  dextrose 5% + sodium chloride 0.45% with potassium chloride 20 mEq/L 1000 milliLiter(s) IV Continuous <Continuous>  dextrose 5% + sodium chloride 0.9% with potassium chloride 20 mEq/L 1000 milliLiter(s) IV Continuous <Continuous>  enoxaparin Injectable 40 milliGRAM(s) SubCutaneous daily  ferrous    sulfate 325 milliGRAM(s) Oral daily  folic acid 1 milliGRAM(s) Oral daily  hydrALAZINE Injectable 5 milliGRAM(s) IV Push every 6 hours  HYDROmorphone  Injectable 1.5 milliGRAM(s) IV Push every 3 hours PRN  HYDROmorphone  Injectable 0.5 milliGRAM(s) IV Push every 4 hours PRN  iron sucrose IVPB 200 milliGRAM(s) IV Intermittent every 24 hours  LORazepam   Injectable 1 milliGRAM(s) IV Push every 4 hours PRN  magnesium oxide 400 milliGRAM(s) Oral two times a day with meals  multivitamin 1 Tablet(s) Oral daily  naloxone Injectable 0.1 milliGRAM(s) IV Push every 3 minutes PRN  ondansetron Injectable 4 milliGRAM(s) IV Push <User Schedule>  pantoprazole  Injectable 40 milliGRAM(s) IV Push daily  polyethylene glycol 3350 17 Gram(s) Oral daily PRN  potassium chloride  10 mEq/50 mL IVPB 10 milliEquivalent(s) IV Intermittent every 1 hour  saccharomyces boulardii 250 milliGRAM(s) Oral two times a day  senna 2 Tablet(s) Oral at bedtime PRN                              
Subjective:  Patient is a 65y old  Female who presents with a chief complaint of Abdominal pain      HPI:  65 year old woman with PMHx  staph infection, chronic abdominal pain, osteoporosis, adrenal insufficiency does not taking steroids , [place], s/p cholecystectomy, s/p spinal fusion, chronic intermittent porphyria, anxiety, with multiple previous admissions for acute on chronic abdominal pain presents to [facility] on 12/23/20  for abdominal pain.  Pt over last several days with worsening 10/10 abd pain with associated nausea and vomiting.  ALso with history of altered mental status.  Pt notified Dr. Maza who told her to go to ED for further evaluation and management.  In ED: Pt given IV dilaudid, supportive care and ruled out for other acute processes with imaging and blood work.  She was admitted to  for management.     12/24 -  Patient seen and examined at bedside earlier today, + nausea, no vomiting, + poor po intake, denies cp, + abdominal pain, afebrile, POC discussed   12/25 - pt seen and examined, + nausea, + vomiting, + abdominal pain, poor po intake, denies cp, dyspnea, POC discussed   12/26 - pt seen and examined + nausea, + abdominal pain improving, denies cp, dyspnea, + constipation, POC discussed   12/27- pt seen and examined at bedside, feels tired/weak, still with diffuse abdominal pain, nausea/vomiting, refusing PO meds, incontinent of watery stools x2. afebrile  12/28 - no cp palps sob; pain meds adjusted - abdo pain better  12/29 - tried to lower dialudid dose in AM;  reported pain and dose increased again   12/30 - abdo pain under control not ready to tape r  12/31 - s/p EGD felt nauseous, got ativan; seen with Dr Maza at bedside   1/1 - no cp palps sob, nausea better today, will try [place] diet   1/2- + abdominal pain, lose BM, afebrile, minimal PO intake , reports nausea, POC discussed   1/3 - pt seen and examined, asking me  " do I have rights to die in [facility] ? " , reports severe pain, + nausea , afebrile, agreed on blood transfusion, POC discussed   1/4 - pt seen and examined, feels better , pain under control with PO dilaudid, and SQ , diarrhea subsided, tolerates liquids, POC discussed     Review of system- Rest of the review of system are negative except mentioned in HPI    T(C): 36.6 (01-04-21 @ 08:53), Max: 36.8 (01-03-21 @ 16:18)  T(F): 97.9 (01-04-21 @ 08:53), Max: 98.2 (01-03-21 @ 16:18)  HR: 93 (01-04-21 @ 08:53) (92 - 107)  BP: 155/65 (01-04-21 @ 08:53) (145/70 - 156/65)  RR: 18 (01-04-21 @ 08:53) (18 - 18)  SpO2: 100% (01-04-21 @ 08:53) (99% - 100%)  Wt(kg): --      Physical exam:   GENERAL: NAD  NERVOUS SYSTEM:  Alert & Oriented X3, non- focal exam, Motor Strength 5/5 B/L upper and lower extremities; DTRs 2+ intact and symmetric  HEAD:  Atraumatic, Normocephalic  EYES: EOMI, PERRLA, conjunctiva and sclera clear  HEENT: Moist mucous membranes  NECK: Supple, No JVD  CHEST/LUNG: Clear to auscultation bilaterally; No rales, no rhonchi, no wheezing  HEART: Regular rate and rhythm; No murmurs, no rubs or gallops  ABDOMEN: Soft, diffuse tenderness, Non-distended; Bowel sounds present  GENITOURINARY- Voiding, no suprapubic tenderness  EXTREMITIES:  2+ Peripheral Pulses, No clubbing, cyanosis,   edema  MUSCULOSKELETAL:- No muscle tenderness, Muscle tone normal, No joint tenderness, no Joint swelling,  Joint ROM –normal   SKIN-no rash, no lesion      RECENT CULTURES:  Culture - Urine (12.23.20 @ 01:17)    Specimen Source: .Urine None    Culture Results:   50,000 - 99,000 CFU/mL Escherichia coli  <10,000 CFU/ml Normal Urogenital wanda present  Culture - Urine (12.23.20 @ 01:17)    -  -  Ceftriaxone: S <=1 Enterobacter, Citrobacter, and Serratia may develop resistance during prolonged therapy    -  Ciprofloxacin: R >2   50,000 - 99,000 CFU/mL Escherichia coli  <10,000 CFU/ml Normal Urogenital wanda present    Organism Identification: Escherichia coli    Organism: Escherichia coli    Method Type: ARMANDO      RADIOLOGY & ADDITIONAL TESTS: all reviewed EKG reviewed    < from: CT Abdomen and Pelvis w/ IV Cont (12.23.20 @ 00:32) >  FINDINGS:  No acute intra-abdominal pathology on CT.        LABS: All Labs Reviewed:  01-04    141  |  111<H>  |  7   ----------------------------<  112<H>  3.4<L>   |  22  |  0.49<L>    Ca    9.2      04 Jan 2021 06:57  Phos  3.5     01-04  Mg     2.2     01-04    TPro  6.7  /  Alb  3.2<L>  /  TBili  0.4  /  DBili  x   /  AST  19  /  ALT  33  /  AlkPhos  56  01-03                            9.5    12.23 )-----------( 304      ( 04 Jan 2021 06:57 )             30.7             LIVER FUNCTIONS - ( 03 Jan 2021 08:57 )  Alb: 3.2 g/dL / Pro: 6.7 gm/dL / ALK PHOS: 56 U/L / ALT: 33 U/L / AST: 19 U/L / GGT: x                   01-03    140  |  112<H>  |  12  ----------------------------<  116<H>  3.3<L>   |  22  |  0.48<L>    Ca    9.1      03 Jan 2021 08:57  Mg     2.0     01-02    TPro  6.7  /  Alb  3.2<L>  /  TBili  0.4  /  DBili  x   /  AST  19  /  ALT  33  /  AlkPhos  56  01-03                          7.4    11.88 )-----------( 314      ( 03 Jan 2021 08:57 )             25.5             LIVER FUNCTIONS - ( 03 Jan 2021 08:57 )  Alb: 3.2 g/dL / Pro: 6.7 gm/dL / ALK PHOS: 56 U/L / ALT: 33 U/L / AST: 19 U/L / GGT: x             MEDICATIONS  (STANDING):  amLODIPine   Tablet 2.5 milliGRAM(s) Oral daily  cholecalciferol 1000 Unit(s) Oral two times a day  dextrose 5% + sodium chloride 0.45% with potassium chloride 20 mEq/L 1000 milliLiter(s) (50 mL/Hr) IV Continuous <Continuous>  enoxaparin Injectable 40 milliGRAM(s) SubCutaneous daily  ferrous    sulfate 325 milliGRAM(s) Oral daily  folic acid 1 milliGRAM(s) Oral daily  hydrALAZINE Injectable 10 milliGRAM(s) IV Push every 8 hours  lactobacillus acidophilus 1 Tablet(s) Oral two times a day with meals  magnesium oxide 400 milliGRAM(s) Oral two times a day with meals  melatonin 3 milliGRAM(s) Oral at bedtime  multivitamin 1 Tablet(s) Oral daily  ondansetron Injectable 4 milliGRAM(s) IV Push <User Schedule>  pantoprazole    Tablet 40 milliGRAM(s) Oral before breakfast  polyethylene glycol/electrolyte Solution 2000 milliLiter(s) Oral once  potassium chloride    Tablet ER 20 milliEquivalent(s) Oral every 2 hours  MEDICATIONS  (PRN):  ALBUTerol    90 MICROgram(s) HFA Inhaler 2 Puff(s) Inhalation every 6 hours PRN Shortness of Breath and/or Wheezing  HYDROmorphone   Tablet 6 milliGRAM(s) Oral every 4 hours PRN Moderate Pain (4 - 6)  HYDROmorphone  Injectable 1 milliGRAM(s) SubCutaneous every 4 hours PRN Severe Pain (7 - 10)  LORazepam   Injectable 1 milliGRAM(s) IV Push every 4 hours PRN Nausea and/or Vomiting  naloxone Injectable 0.1 milliGRAM(s) IV Push every 3 minutes PRN For ANY of the following changes in patient status:  A. RR LESS THAN 10 breaths per minute, B. Oxygen saturation LESS THAN 90%, C. Sedation score of 6  ondansetron Injectable 4 milliGRAM(s) IV Push every 6 hours PRN Nausea and/or Vomiting  senna 2 Tablet(s) Oral at bedtime PRN Constipation                                          
Subjective:  Patient is a 65y old  Female who presents with a chief complaint of Abdominal pain      HPI:  65 year old woman with PMHx  staph infection, chronic abdominal pain, osteoporosis, adrenal insufficiency does not taking steroids , [place], s/p cholecystectomy, s/p spinal fusion, chronic intermittent porphyria, anxiety, with multiple previous admissions for acute on chronic abdominal pain presents to [facility] on 12/23/20  for abdominal pain.  Pt over last several days with worsening 10/10 abd pain with associated nausea and vomiting.  ALso with history of altered mental status.  Pt notified Dr. Maza who told her to go to ED for further evaluation and management.  In ED: Pt given IV dilaudid, supportive care and ruled out for other acute processes with imaging and blood work.  She was admitted to  for management.     12/24 -  Patient seen and examined at bedside earlier today, + nausea, no vomiting, + poor po intake, denies cp, + abdominal pain, afebrile, POC discussed   12/25 - pt seen and examined, + nausea, + vomiting, + abdominal pain, poor po intake, denies cp, dyspnea, POC discussed   12/26 - pt seen and examined + nausea, + abdominal pain improving, denies cp, dyspnea, + constipation, POC discussed   12/27- pt seen and examined at bedside, feels tired/weak, still with diffuse abdominal pain, nausea/vomiting, refusing PO meds, incontinent of watery stools x2. afebrile  12/28 - no cp palps sob; pain meds adjusted - abdo pain better  12/29 - tried to lower dialudid dose in AM;  reported pain and dose increased again   12/30 - abdo pain under control not ready to tape r  12/31 - s/p EGD felt nauseous, got ativan; seen with Dr Maza at bedside   1/1 - no cp palps sob, nausea better today, will try [place] diet   Review of system- Rest of the review of system are negative except mentioned in HPI      Physical exam:   GENERAL: NAD  NERVOUS SYSTEM:  Alert & Oriented X3, non- focal exam, Motor Strength 5/5 B/L upper and lower extremities; DTRs 2+ intact and symmetric  HEAD:  Atraumatic, Normocephalic  EYES: EOMI, PERRLA, conjunctiva and sclera clear  HEENT: Moist mucous membranes  NECK: Supple, No JVD  CHEST/LUNG: Clear to auscultation bilaterally; No rales, no rhonchi, no wheezing  HEART: Regular rate and rhythm; No murmurs, no rubs or gallops  ABDOMEN: Soft, diffuse tenderness, Non-distended; Bowel sounds present  GENITOURINARY- Voiding, no suprapubic tenderness  EXTREMITIES:  2+ Peripheral Pulses, No clubbing, cyanosis,   edema  MUSCULOSKELETAL:- No muscle tenderness, Muscle tone normal, No joint tenderness, no Joint swelling,  Joint ROM –normal   SKIN-no rash, no lesion      RECENT CULTURES:  Culture - Urine (12.23.20 @ 01:17)    Specimen Source: .Urine None    Culture Results:   50,000 - 99,000 CFU/mL Escherichia coli  <10,000 CFU/ml Normal Urogenital wanda present    RADIOLOGY & ADDITIONAL TESTS: all reviewed EKG reviewed  < from: CT Abdomen and Pelvis w/ IV Cont (12.23.20 @ 00:32) >  FINDINGS:  No acute intra-abdominal pathology on CT.        LABS: All Labs Reviewed:                        9.7    16.06 )-----------( 384      ( 01 Jan 2021 08:07 )             33.6     01-01    142  |  113<H>  |  13  ----------------------------<  133<H>  3.4<L>   |  21<L>  |  0.47<L>    Ca    9.2      01 Jan 2021 08:07  Mg     2.1     01-01        ALBUTerol    90 MICROgram(s) HFA Inhaler 2 Puff(s) Inhalation every 6 hours PRN  amLODIPine   Tablet 2.5 milliGRAM(s) Oral daily  bisacodyl 5 milliGRAM(s) Oral at bedtime PRN  cholecalciferol 1000 Unit(s) Oral two times a day  dextrose 5% + sodium chloride 0.45% with potassium chloride 20 mEq/L 1000 milliLiter(s) IV Continuous <Continuous>  enoxaparin Injectable 40 milliGRAM(s) SubCutaneous daily  ferrous    sulfate 325 milliGRAM(s) Oral daily  folic acid 1 milliGRAM(s) Oral daily  hydrALAZINE Injectable 10 milliGRAM(s) IV Push every 8 hours  HYDROmorphone  Injectable 0.5 milliGRAM(s) IV Push every 4 hours PRN  HYDROmorphone  Injectable 1.5 milliGRAM(s) IV Push every 4 hours PRN  LORazepam   Injectable 1 milliGRAM(s) IV Push every 4 hours PRN  magnesium oxide 400 milliGRAM(s) Oral two times a day with meals  multivitamin 1 Tablet(s) Oral daily  naloxone Injectable 0.1 milliGRAM(s) IV Push every 3 minutes PRN  ondansetron Injectable 4 milliGRAM(s) IV Push <User Schedule>  pantoprazole  Injectable 40 milliGRAM(s) IV Push daily  polyethylene glycol 3350 17 Gram(s) Oral daily PRN  saccharomyces boulardii 250 milliGRAM(s) Oral two times a day  senna 2 Tablet(s) Oral at bedtime PRN                                                
Subjective:  Patient is a 65y old  Female who presents with a chief complaint of Abdominal pain      HPI:  65 year old woman with PMHx  staph infection, chronic abdominal pain, osteoporosis, adrenal insufficiency does not taking steroids , [place], s/p cholecystectomy, s/p spinal fusion, chronic intermittent porphyria, anxiety, with multiple previous admissions for acute on chronic abdominal pain presents to [facility] on 12/23/20  for abdominal pain.  Pt over last several days with worsening 10/10 abd pain with associated nausea and vomiting.  ALso with history of altered mental status.  Pt notified Dr. Maza who told her to go to ED for further evaluation and management.  In ED: Pt given IV dilaudid, supportive care and ruled out for other acute processes with imaging and blood work.  She was admitted to  for management.     12/24 -  Patient seen and examined at bedside earlier today, + nausea, no vomiting, + poor po intake, denies cp, + abdominal pain, afebrile, POC discussed   12/25 - pt seen and examined, + nausea, + vomiting, + abdominal pain, poor po intake, denies cp, dyspnea, POC discussed   12/26 - pt seen and examined + nausea, + abdominal pain improving, denies cp, dyspnea, + constipation, POC discussed   12/27- pt seen and examined at bedside, feels tired/weak, still with diffuse abdominal pain, nausea/vomiting, refusing PO meds, incontinent of watery stools x2. afebrile  12/28 - no cp palps sob; pain meds adjusted - abdo pain better  12/29 - tried to lower dialudid dose in AM;  reported pain and dose increased again   12/30 - abdo pain under control not ready to tape r  12/31 - s/p EGD felt nauseous, got ativan; seen with Dr Maza at bedside   1/1 - no cp palps sob, nausea better today, will try [place] diet   1/2- + abdominal pain, lose BM, afebrile, minimal PO intake , reports nausea, POC discussed   1/3 - pt seen and examined, asking me  " do I have rights to die in [facility] ? " , reports severe pain, + nausea , afebrile, agreed on blood transfusion, POC discussed   1/4 - pt seen and examined, feels better , pain under control with PO dilaudid, and SQ , diarrhea subsided, tolerates liquids, POC discussed   1/5 - pt seen and examined, feels better, afebrile, s/p colonoscopy , tolerated some po intake, diet advanced , d/c planning discussed    Review of system- Rest of the review of system are negative except mentioned in HPI    T(C): 36.6 (01-05-21 @ 15:18), Max: 36.7 (01-05-21 @ 06:17)  T(F): 97.9 (01-05-21 @ 15:18), Max: 98 (01-05-21 @ 06:17)  HR: 109 (01-05-21 @ 15:18) (83 - 112)  BP: 128/63 (01-05-21 @ 15:18) (128/63 - 156/72)  RR: 18 (01-05-21 @ 15:18) (18 - 18)  SpO2: 99% (01-05-21 @ 15:18) (98% - 100%)  Wt(kg): --    Physical exam:   GENERAL: NAD  NERVOUS SYSTEM:  Alert & Oriented X3, non- focal exam, Motor Strength 5/5 B/L upper and lower extremities; DTRs 2+ intact and symmetric  HEAD:  Atraumatic, Normocephalic  EYES: EOMI, PERRLA, conjunctiva and sclera clear  HEENT: Moist mucous membranes  NECK: Supple, No JVD  CHEST/LUNG: Clear to auscultation bilaterally; No rales, no rhonchi, no wheezing  HEART: Regular rate and rhythm; No murmurs, no rubs or gallops  ABDOMEN: Soft, diffuse tenderness, Non-distended; Bowel sounds present  GENITOURINARY- Voiding, no suprapubic tenderness  EXTREMITIES:  2+ Peripheral Pulses, No clubbing, cyanosis,   edema  MUSCULOSKELETAL:- No muscle tenderness, Muscle tone normal, No joint tenderness, no Joint swelling,  Joint ROM –normal   SKIN-no rash, no lesion      RECENT CULTURES:  Culture - Urine (12.23.20 @ 01:17)    Specimen Source: .Urine None    Culture Results:   50,000 - 99,000 CFU/mL Escherichia coli  <10,000 CFU/ml Normal Urogenital wanda present  Culture - Urine (12.23.20 @ 01:17)    -  -  Ceftriaxone: S <=1 Enterobacter, Citrobacter, and Serratia may develop resistance during prolonged therapy    -  Ciprofloxacin: R >2   50,000 - 99,000 CFU/mL Escherichia coli  <10,000 CFU/ml Normal Urogenital wanda present    Organism Identification: Escherichia coli    Organism: Escherichia coli    Method Type: ARMANDO  ?< from: Colonoscopy (01.05.21 @ 10:25) >  IMPRESS Impression:          - The terminal ileum and entire examined colon are     IMPRESS                      normal.    IMPRESS                      - Biopsies were taken with a cold forceps from the     IMPRESS                      ascending colon and descending colon for evaluation of     IMPRESS     microscopic colitis.    ENDORECOMMENDATION Recommendation:      - Await pathology results.    < end of copied text >      RADIOLOGY & ADDITIONAL TESTS: all reviewed EKG reviewed    < from: CT Abdomen and Pelvis w/ IV Cont (12.23.20 @ 00:32) >  FINDINGS:  No acute intra-abdominal pathology on CT.        LABS: All Labs Reviewed:  01-05    142  |  116<H>  |  6<L>  ----------------------------<  112<H>  4.0   |  20<L>  |  0.48<L>    Ca    9.9      05 Jan 2021 07:11  Phos  3.5     01-04  Mg     2.2     01-04                        9.6    13.82 )-----------( 333      ( 05 Jan 2021 07:11 )             31.6       01-04    141  |  111<H>  |  7   ----------------------------<  112<H>  3.4<L>   |  22  |  0.49<L>    Ca    9.2      04 Jan 2021 06:57  Phos  3.5     01-04  Mg     2.2     01-04    TPro  6.7  /  Alb  3.2<L>  /  TBili  0.4  /  DBili  x   /  AST  19  /  ALT  33  /  AlkPhos  56  01-03                            9.5    12.23 )-----------( 304      ( 04 Jan 2021 06:57 )             30.7           MEDICATIONS  (STANDING):  amLODIPine   Tablet 2.5 milliGRAM(s) Oral daily  cholecalciferol 1000 Unit(s) Oral two times a day  dextrose 5% + sodium chloride 0.45% with potassium chloride 20 mEq/L 1000 milliLiter(s) (50 mL/Hr) IV Continuous <Continuous>  enoxaparin Injectable 40 milliGRAM(s) SubCutaneous daily  ferrous    sulfate 325 milliGRAM(s) Oral daily  folic acid 1 milliGRAM(s) Oral daily  hydrALAZINE Injectable 10 milliGRAM(s) IV Push every 8 hours  lactobacillus acidophilus 1 Tablet(s) Oral two times a day with meals  magnesium oxide 400 milliGRAM(s) Oral two times a day with meals  melatonin 3 milliGRAM(s) Oral at bedtime  multivitamin 1 Tablet(s) Oral daily  ondansetron Injectable 4 milliGRAM(s) IV Push <User Schedule>  pantoprazole    Tablet 40 milliGRAM(s) Oral before breakfast  polyethylene glycol/electrolyte Solution 2000 milliLiter(s) Oral once  potassium chloride    Tablet ER 20 milliEquivalent(s) Oral every 2 hours  MEDICATIONS  (PRN):  ALBUTerol    90 MICROgram(s) HFA Inhaler 2 Puff(s) Inhalation every 6 hours PRN Shortness of Breath and/or Wheezing  HYDROmorphone   Tablet 6 milliGRAM(s) Oral every 4 hours PRN Moderate Pain (4 - 6)  HYDROmorphone  Injectable 1 milliGRAM(s) SubCutaneous every 4 hours PRN Severe Pain (7 - 10)  LORazepam   Injectable 1 milliGRAM(s) IV Push every 4 hours PRN Nausea and/or Vomiting  naloxone Injectable 0.1 milliGRAM(s) IV Push every 3 minutes PRN For ANY of the following changes in patient status:  A. RR LESS THAN 10 breaths per minute, B. Oxygen saturation LESS THAN 90%, C. Sedation score of 6  ondansetron Injectable 4 milliGRAM(s) IV Push every 6 hours PRN Nausea and/or Vomiting  senna 2 Tablet(s) Oral at bedtime PRN Constipation

## 2021-01-06 NOTE — DISCHARGE NOTE PROVIDER - CARE PROVIDERS DIRECT ADDRESSES
,[email or web address],DirectAddress_Unknown,DirectAddress_Unknown

## 2021-01-06 NOTE — DISCHARGE NOTE PROVIDER - DETAILS OF MALNUTRITION DIAGNOSIS/DIAGNOSES
This patient has been assessed with a concern for Malnutrition and was treated during this hospitalization for the following Nutrition diagnosis/diagnoses:     -  01/05/2021: Moderate protein-calorie malnutrition

## 2021-01-06 NOTE — DISCHARGE NOTE PROVIDER - CARE PROVIDER_API CALL
Suman Seay)  Medical Oncology  [address], [address] [place] [address]  Phone: [phone]  Fax: [phone]  Follow Up Time: 1-3 days    pain management,   Phone: (   )    -  Fax: (   )    -  Follow Up Time: 1 week    Ulises Curiel)  Gastroenterology; Internal Medicine  [address], [address]  [place] [address]  Phone: [phone]  Fax: [phone]  Follow Up Time: 1 week

## 2021-01-06 NOTE — PROGRESS NOTE ADULT - PROVIDER SPECIALTY LIST ADULT
Heme/Onc
Hospitalist
Hospitalist
Gastroenterology
Gastroenterology
Hospitalist
Gastroenterology
Heme/Onc
Hospitalist
Gastroenterology
Heme/Onc
Hospitalist

## 2021-01-06 NOTE — DISCHARGE NOTE NURSING/CASE MANAGEMENT/SOCIAL WORK - PATIENT PORTAL LINK FT
You can access the FollowMyHealth Patient Portal offered by [facility] by registering at the following website: [email or web address]. By joining [facility]’s FollowMyHealth portal, you will also be able to view your health information using other applications (apps) compatible with our system.

## 2021-01-06 NOTE — DISCHARGE NOTE PROVIDER - NSDCMRMEDTOKEN_GEN_ALL_CORE_FT
amLODIPine 2.5 mg oral tablet: 1 tab(s) orally once a day  calcium (as calcium citrate) 250 mg oral tablet: 1 tab(s) orally 2 times a day  cholecalciferol 1000 intl units oral tablet: 1 tab(s) orally 2 times a day  Dilaudid 2 mg oral tablet: 3 tab(s) orally every 4 hours  for severe pain, 2 tab every 4-6 hours for moderate pain MDD:36  Dulcolax Stool Softener 100 mg oral capsule: 1 cap(s) orally once a day  -for constipation   ferrous sulfate 325 mg (65 mg elemental iron) oral tablet: 1 tab(s) orally once a day  folic acid 1 mg oral tablet: 1 tab(s) orally once a day  lactobacillus acidophilus oral capsule: 1 cap(s) orally 2 times a day 2 hours after antibiotics  magnesium oxide 500 mg oral tablet: 1 tab(s) orally 2 times a day  melatonin 3 mg oral tablet: 1 tab(s) orally once a day (at bedtime)  MiraLax oral powder for reconstitution: 17 gram(s) orally once a day, As Needed -for constipation do not take it if daily bowel movements  multivitamin: 1 tab(s) orally once a day  ondansetron 4 mg oral tablet, disintegratin tab(s) orally every 6 hours, As needed, Nausea and/or Vomiting  pantoprazole 40 mg oral delayed release tablet: 1 tab(s) orally once a day (before a meal)  ProAir HFA 90 mcg/inh inhalation aerosol: 2 puff(s) inhaled 4 times a day, As Needed bronchospasm

## 2021-01-06 NOTE — DISCHARGE NOTE PROVIDER - HOSPITAL COURSE
Patient is a 65y old  Female who presents with a chief complaint of Abdominal pain      HPI:  65 year old woman with PMHx  staph infection, chronic abdominal pain, osteoporosis, adrenal insufficiency does not taking steroids , [place], s/p cholecystectomy, s/p spinal fusion, chronic intermittent porphyria, anxiety, with multiple previous admissions for acute on chronic abdominal pain presents to [facility] on 12/23/20  for abdominal pain.  Pt over last several days with worsening 10/10 abd pain with associated nausea and vomiting.  ALso with history of altered mental status.  Pt notified Dr. Maza who told her to go to ED for further evaluation and management.  In ED: Pt given IV dilaudid, supportive care and ruled out for other acute processes with imaging and blood work.  She was admitted to  for management.     12/24 -  Patient seen and examined at bedside earlier today, + nausea, no vomiting, + poor po intake, denies cp, + abdominal pain, afebrile, POC discussed   12/25 - pt seen and examined, + nausea, + vomiting, + abdominal pain, poor po intake, denies cp, dyspnea, POC discussed   12/26 - pt seen and examined + nausea, + abdominal pain improving, denies cp, dyspnea, + constipation, POC discussed   12/27- pt seen and examined at bedside, feels tired/weak, still with diffuse abdominal pain, nausea/vomiting, refusing PO meds, incontinent of watery stools x2. afebrile  12/28 - no cp palps sob; pain meds adjusted - abdo pain better  12/29 - tried to lower dialudid dose in AM;  reported pain and dose increased again   12/30 - abdo pain under control not ready to tape r  12/31 - s/p EGD felt nauseous, got ativan; seen with Dr Maza at bedside   1/1 - no cp palps sob, nausea better today, will try [place] diet   1/2- + abdominal pain, lose BM, afebrile, minimal PO intake , reports nausea, POC discussed   1/3 - pt seen and examined, asking me  " do I have rights to die in [facility] ? " , reports severe pain, + nausea , afebrile, agreed on blood transfusion, POC discussed   1/4 - pt seen and examined, feels better , pain under control with PO dilaudid, and SQ , diarrhea subsided, tolerates liquids, POC discussed   1/5 - pt seen and examined, feels better, afebrile, s/p colonoscopy , tolerated some po intake, diet advanced , d/c planning discussed  1/6 - pt seen and examined , denies cp, dyspnea, abdominal pain controlled with PO meds, tolerated dinner last night and breakfast without problems  Review of system- Rest of the review of system are negative except mentioned in HPI  T(C): 36.9 (01-06-21 @ 09:13), Max: 37.1 (01-06-21 @ 05:00)  T(F): 98.4 (01-06-21 @ 09:13), Max: 98.7 (01-06-21 @ 05:00)  HR: 95 (01-06-21 @ 09:13) (89 - 109)  BP: 161/62 (01-06-21 @ 09:13) (128/63 - 161/62)  RR: 18 (01-06-21 @ 09:13) (18 - 18)  SpO2: 100% (01-06-21 @ 09:13) (96% - 100%)  Wt(kg): --  NERVOUS SYSTEM:  Alert & Oriented X3, non- focal exam, Motor Strength 5/5 B/L upper and lower extremities; DTRs 2+ intact and symmetric  HEAD:  Atraumatic, Normocephalic  EYES: EOMI, PERRLA, conjunctiva and sclera clear  HEENT: Moist mucous membranes  NECK: Supple, No JVD  CHEST/LUNG: Clear to auscultation bilaterally; No rales, no rhonchi, no wheezing  HEART: Regular rate and rhythm; No murmurs, no rubs or gallops  ABDOMEN: Soft, diffuse tenderness, Non-distended; Bowel sounds present  GENITOURINARY- Voiding, no suprapubic tenderness  EXTREMITIES:  2+ Peripheral Pulses, No clubbing, cyanosis,   edema  MUSCULOSKELETAL:- No muscle tenderness, Muscle tone normal, No joint tenderness, no Joint swelling,  Joint ROM –normal   SKIN-no rash, no lesion     65 year old woman with PMHx  staph infection, chronic abdominal pain, osteoporosis, adrenal insufficiency, [place], s/p cholecystectomy, s/p spinal fusion, chronic intermittent porphyria, anxiety, with multiple previous admissions for acute on chronic abdominal pain presents to [facility] on 12/23/20  for abdominal pain.  Pt over last several days with worsening 10/10 abd pain with associated nausea and vomiting.  admitted for   Severe abdominal pain with inability to tolerate p.o. secondary to  possible Acute on chronic intermittent porphyria exacerbation, will need more testing as o/p to confirm or r/o porphyria   Metabolic encephalopathy , improving   - Dilaudid PCA dc'd, start IV Dilaudid PRN - IV fluids restarted 12/27, s/p IV protonix--> PO , antiemetics   -heme evaluation -  spot urine for PBG, Total porphyrins and creatinine.  - not consistent with porphyria   -bowel regimen  - dulcolax, miralax changed to PRN due to diarrhea   - GI and oncology consult  - EGD 12/31 - neg   12/29 increase dilaudid again; poor po intake, IVFs   12/31 - discussed with Dr Molly Mccauley pt's OP pain mx physician and Dr Maza   1/2 - PO dilaudid 6 q4, IV only for severe pain  1/3 - PO or SC dilaudid, stop IV dilaudid   1/4 - pain better, plan for colonoscopy by GI Dr. Curiel  1/5 s/p colonoscopy - normal , advance the diet   1/6 - tolerating PO without vomiting, taking po medications, afebrile   Diarrhea 1/2/21, resolved -  C diff neg , GI PCR - neg  , hold laxatives , added probiotics  Pyuria with E coli suspected UTI -12/27  Start Ceftriaxone 1G daily x 3 days - completed   Hypertension -12/31 increase Hydralazine IV ATC, c/w 2.5 amlodipine  Microcytic anemia due to iron deficiency , worsening   - check iron, TIBC, FOBT, B12, folate - noted, FOBT -  12/26/20 neg   -s/p IV iron, on oral FeSO4  - 12/31- s/p EGD unremarkable, small hiatal hernia    - GI consult - s/p EGD , needs colonoscopy  d/w Dr. Curiel  - 1/3 - 1 unit PRBC   Constipation, resolved - d/c miralax, d/c dulcolax    Anxiety   -Ativan PRN  - patient asking if she can [facility] in [facility], deficiently depressed  - psychiatry consult for mood evaluation   Chronic adrenal insufficiency - patient refuses to take steroids, BP wnl, monitor, cortisone am elevated   Mood disorder Anxiety  -  psychiatry consult - ruled out malingering to get IV pain medications  - psychiatry evaluation , add melatonin 3 hs for insomnia  Hypokalemia, resolved - replace , Iv fluids with K   IV hydration as outpatient can be titrated down if fluids intake improves   Mild leukocytosis , likely reactive, resolved  Advanced directives  DNR DNI  Disposition - medically optimized to be discharged home with  home care close follow up with PCP, hematologist and GI within 1 week   return to ED if fever, abdominal pain, nausea, vomiting, chest pain, dyspnea  Discharge plan discussed with patient, RN  Patient advised to follow up with PCP within 3-7 days  time spend 40 min  Discharge note faxed to PCP with my contact information to call me back   PCP Dr. Boxer

## 2021-01-06 NOTE — DISCHARGE NOTE PROVIDER - PROVIDER TOKENS
PROVIDER:[TOKEN:[[id number]],FOLLOWUP:[1-3 days]],FREE:[LAST:[pain management],PHONE:[(   )    -],FAX:[(   )    -],FOLLOWUP:[1 week]],PROVIDER:[TOKEN:[[id number]],FOLLOWUP:[1 week]]

## 2021-01-06 NOTE — DISCHARGE NOTE PROVIDER - NSDCCPCAREPLAN_GEN_ALL_CORE_FT
PRINCIPAL DISCHARGE DIAGNOSIS  Diagnosis: Abdominal pain  Assessment and Plan of Treatment: continue pain medications with laxatives, follow up with DR. Curiel for colonoscopy biopsies results and further care   take probiotics, antinausea medications as needed      SECONDARY DISCHARGE DIAGNOSES  Diagnosis: Anemia  Assessment and Plan of Treatment: check blood work in 1 week to monitor Hemoglobin, take iron with laxatives, follow up with hematologist for further management and work-up    Diagnosis: HTN (hypertension)  Assessment and Plan of Treatment: take amlodipine, follow up with PCP within 1 week Dr. Boxer for further care    Diagnosis: Adjustment disorder with mixed anxiety and depressed mood  Assessment and Plan of Treatment: follow up with psychiatry for anxiety

## 2021-01-11 DIAGNOSIS — E80.21 ACUTE INTERMITTENT (HEPATIC) PORPHYRIA: ICD-10-CM

## 2021-01-11 DIAGNOSIS — M81.0 AGE-RELATED OSTEOPOROSIS WITHOUT CURRENT PATHOLOGICAL FRACTURE: ICD-10-CM

## 2021-01-11 DIAGNOSIS — E44.0 MODERATE PROTEIN-CALORIE MALNUTRITION: ICD-10-CM

## 2021-01-11 DIAGNOSIS — Z98.1 ARTHRODESIS STATUS: ICD-10-CM

## 2021-01-11 DIAGNOSIS — E87.6 HYPOKALEMIA: ICD-10-CM

## 2021-01-11 DIAGNOSIS — K44.9 DIAPHRAGMATIC HERNIA WITHOUT OBSTRUCTION OR GANGRENE: ICD-10-CM

## 2021-01-11 DIAGNOSIS — D50.9 IRON DEFICIENCY ANEMIA, UNSPECIFIED: ICD-10-CM

## 2021-01-11 DIAGNOSIS — R19.7 DIARRHEA, UNSPECIFIED: ICD-10-CM

## 2021-01-11 DIAGNOSIS — E86.0 DEHYDRATION: ICD-10-CM

## 2021-01-11 DIAGNOSIS — G93.41 METABOLIC ENCEPHALOPATHY: ICD-10-CM

## 2021-01-11 DIAGNOSIS — R10.9 UNSPECIFIED ABDOMINAL PAIN: ICD-10-CM

## 2021-01-11 DIAGNOSIS — F43.23 ADJUSTMENT DISORDER WITH MIXED ANXIETY AND DEPRESSED MOOD: ICD-10-CM

## 2021-01-11 DIAGNOSIS — N39.0 URINARY TRACT INFECTION, SITE NOT SPECIFIED: ICD-10-CM

## 2021-01-11 DIAGNOSIS — B96.20 UNSPECIFIED ESCHERICHIA COLI [E. COLI] AS THE CAUSE OF DISEASES CLASSIFIED ELSEWHERE: ICD-10-CM

## 2021-01-11 DIAGNOSIS — K59.00 CONSTIPATION, UNSPECIFIED: ICD-10-CM

## 2021-01-11 DIAGNOSIS — I10 ESSENTIAL (PRIMARY) HYPERTENSION: ICD-10-CM

## 2021-01-11 DIAGNOSIS — E27.40 UNSPECIFIED ADRENOCORTICAL INSUFFICIENCY: ICD-10-CM

## 2021-01-15 NOTE — ED PROVIDER NOTE - INPATIENT RESIDENT/ACP NOTIFIED DATE
Pt requesting a 90 day supply of methocarbamol 750 mg pt takes 1 tablet by mouth ever 6 hours as needed for muscle spasms  Pt uses the [facility] on [address] in [place]  Pt is almost  out of medication   Pt does not need gabapentin she told the pharmacy and that is taken care of  They need a script for 90 day supply of methocarbamol       Pt's umber is [phone]    FYI pt and spouse tested negative for covid-19  They have no symptoms 
10-Aug-2017 16:33

## 2021-01-31 ENCOUNTER — INPATIENT (INPATIENT)
Facility: HOSPITAL | Age: 66
LOS: 2 days | Discharge: HOME | DRG: 392 | End: 2021-02-03
Attending: INTERNAL MEDICINE | Admitting: FAMILY MEDICINE
Payer: MEDICARE

## 2021-01-31 VITALS
RESPIRATION RATE: 18 BRPM | TEMPERATURE: 99 F | DIASTOLIC BLOOD PRESSURE: 84 MMHG | HEIGHT: 66 IN | OXYGEN SATURATION: 100 % | SYSTOLIC BLOOD PRESSURE: 159 MMHG | HEART RATE: 60 BPM | WEIGHT: 130.07 LBS

## 2021-01-31 DIAGNOSIS — R11.2 NAUSEA WITH VOMITING, UNSPECIFIED: ICD-10-CM

## 2021-01-31 DIAGNOSIS — Z90.49 ACQUIRED ABSENCE OF OTHER SPECIFIED PARTS OF DIGESTIVE TRACT: Chronic | ICD-10-CM

## 2021-01-31 DIAGNOSIS — Z90.89 ACQUIRED ABSENCE OF OTHER ORGANS: Chronic | ICD-10-CM

## 2021-01-31 DIAGNOSIS — Z98.1 ARTHRODESIS STATUS: Chronic | ICD-10-CM

## 2021-01-31 LAB
ADD ON TEST-SPECIMEN IN LAB: SIGNIFICANT CHANGE UP
ALBUMIN SERPL ELPH-MCNC: 3.3 G/DL — SIGNIFICANT CHANGE UP (ref 3.3–5)
ALP SERPL-CCNC: 84 U/L — SIGNIFICANT CHANGE UP (ref 40–120)
ALT FLD-CCNC: 23 U/L — SIGNIFICANT CHANGE UP (ref 12–78)
ANION GAP SERPL CALC-SCNC: 8 MMOL/L — SIGNIFICANT CHANGE UP (ref 5–17)
APPEARANCE UR: CLEAR — SIGNIFICANT CHANGE UP
AST SERPL-CCNC: 16 U/L — SIGNIFICANT CHANGE UP (ref 15–37)
BASOPHILS # BLD AUTO: 0.03 K/UL — SIGNIFICANT CHANGE UP (ref 0–0.2)
BASOPHILS NFR BLD AUTO: 0.3 % — SIGNIFICANT CHANGE UP (ref 0–2)
BILIRUB SERPL-MCNC: 0.3 MG/DL — SIGNIFICANT CHANGE UP (ref 0.2–1.2)
BILIRUB UR-MCNC: NEGATIVE — SIGNIFICANT CHANGE UP
BUN SERPL-MCNC: 5 MG/DL — LOW (ref 7–23)
CALCIUM SERPL-MCNC: 9.4 MG/DL — SIGNIFICANT CHANGE UP (ref 8.5–10.1)
CHLORIDE SERPL-SCNC: 109 MMOL/L — HIGH (ref 96–108)
CO2 SERPL-SCNC: 25 MMOL/L — SIGNIFICANT CHANGE UP (ref 22–31)
COLOR SPEC: YELLOW — SIGNIFICANT CHANGE UP
CREAT SERPL-MCNC: 0.75 MG/DL — SIGNIFICANT CHANGE UP (ref 0.5–1.3)
DIFF PNL FLD: ABNORMAL
EOSINOPHIL # BLD AUTO: 0 K/UL — SIGNIFICANT CHANGE UP (ref 0–0.5)
EOSINOPHIL NFR BLD AUTO: 0 % — SIGNIFICANT CHANGE UP (ref 0–6)
GLUCOSE SERPL-MCNC: 138 MG/DL — HIGH (ref 70–99)
GLUCOSE UR QL: NEGATIVE MG/DL — SIGNIFICANT CHANGE UP
HCG SERPL-ACNC: <1 MIU/ML — SIGNIFICANT CHANGE UP
HCT VFR BLD CALC: 40.9 % — SIGNIFICANT CHANGE UP (ref 34.5–45)
HGB BLD-MCNC: 12.7 G/DL — SIGNIFICANT CHANGE UP (ref 11.5–15.5)
IMM GRANULOCYTES NFR BLD AUTO: 0.7 % — SIGNIFICANT CHANGE UP (ref 0–1.5)
KETONES UR-MCNC: ABNORMAL
LACTATE SERPL-SCNC: 1.2 MMOL/L — SIGNIFICANT CHANGE UP (ref 0.7–2)
LEUKOCYTE ESTERASE UR-ACNC: ABNORMAL
LIDOCAIN IGE QN: 72 U/L — LOW (ref 73–393)
LYMPHOCYTES # BLD AUTO: 0.99 K/UL — LOW (ref 1–3.3)
LYMPHOCYTES # BLD AUTO: 10.8 % — LOW (ref 13–44)
MCHC RBC-ENTMCNC: 25.3 PG — LOW (ref 27–34)
MCHC RBC-ENTMCNC: 31.1 GM/DL — LOW (ref 32–36)
MCV RBC AUTO: 81.6 FL — SIGNIFICANT CHANGE UP (ref 80–100)
MONOCYTES # BLD AUTO: 0.15 K/UL — SIGNIFICANT CHANGE UP (ref 0–0.9)
MONOCYTES NFR BLD AUTO: 1.6 % — LOW (ref 2–14)
NEUTROPHILS # BLD AUTO: 7.94 K/UL — HIGH (ref 1.8–7.4)
NEUTROPHILS NFR BLD AUTO: 86.6 % — HIGH (ref 43–77)
NITRITE UR-MCNC: NEGATIVE — SIGNIFICANT CHANGE UP
PCP SPEC-MCNC: SIGNIFICANT CHANGE UP
PH UR: 8 — SIGNIFICANT CHANGE UP (ref 5–8)
PLATELET # BLD AUTO: 402 K/UL — HIGH (ref 150–400)
POTASSIUM SERPL-MCNC: 3.5 MMOL/L — SIGNIFICANT CHANGE UP (ref 3.5–5.3)
POTASSIUM SERPL-SCNC: 3.5 MMOL/L — SIGNIFICANT CHANGE UP (ref 3.5–5.3)
PROT SERPL-MCNC: 7.8 GM/DL — SIGNIFICANT CHANGE UP (ref 6–8.3)
PROT UR-MCNC: 15 MG/DL
RBC # BLD: 5.01 M/UL — SIGNIFICANT CHANGE UP (ref 3.8–5.2)
RBC # FLD: 21.7 % — HIGH (ref 10.3–14.5)
SARS-COV-2 RNA SPEC QL NAA+PROBE: SIGNIFICANT CHANGE UP
SODIUM SERPL-SCNC: 142 MMOL/L — SIGNIFICANT CHANGE UP (ref 135–145)
SP GR SPEC: 1.01 — SIGNIFICANT CHANGE UP (ref 1.01–1.02)
UROBILINOGEN FLD QL: NEGATIVE MG/DL — SIGNIFICANT CHANGE UP
WBC # BLD: 9.17 K/UL — SIGNIFICANT CHANGE UP (ref 3.8–10.5)
WBC # FLD AUTO: 9.17 K/UL — SIGNIFICANT CHANGE UP (ref 3.8–10.5)

## 2021-01-31 PROCEDURE — 85027 COMPLETE CBC AUTOMATED: CPT

## 2021-01-31 PROCEDURE — 93306 TTE W/DOPPLER COMPLETE: CPT

## 2021-01-31 PROCEDURE — 97116 GAIT TRAINING THERAPY: CPT | Mod: GP

## 2021-01-31 PROCEDURE — 82542 COL CHROMOTOGRAPHY QUAL/QUAN: CPT

## 2021-01-31 PROCEDURE — 70450 CT HEAD/BRAIN W/O DYE: CPT

## 2021-01-31 PROCEDURE — 82135 ASSAY AMINOLEVULINIC ACID: CPT

## 2021-01-31 PROCEDURE — 70450 CT HEAD/BRAIN W/O DYE: CPT | Mod: 26

## 2021-01-31 PROCEDURE — 97163 PT EVAL HIGH COMPLEX 45 MIN: CPT | Mod: GP

## 2021-01-31 PROCEDURE — 82728 ASSAY OF FERRITIN: CPT

## 2021-01-31 PROCEDURE — 86769 SARS-COV-2 COVID-19 ANTIBODY: CPT

## 2021-01-31 PROCEDURE — 83615 LACTATE (LD) (LDH) ENZYME: CPT

## 2021-01-31 PROCEDURE — 87040 BLOOD CULTURE FOR BACTERIA: CPT

## 2021-01-31 PROCEDURE — 99223 1ST HOSP IP/OBS HIGH 75: CPT

## 2021-01-31 PROCEDURE — 80048 BASIC METABOLIC PNL TOTAL CA: CPT

## 2021-01-31 PROCEDURE — 83540 ASSAY OF IRON: CPT

## 2021-01-31 PROCEDURE — 83550 IRON BINDING TEST: CPT

## 2021-01-31 PROCEDURE — 93010 ELECTROCARDIOGRAM REPORT: CPT

## 2021-01-31 PROCEDURE — 80061 LIPID PANEL: CPT

## 2021-01-31 PROCEDURE — 74177 CT ABD & PELVIS W/CONTRAST: CPT

## 2021-01-31 PROCEDURE — 74177 CT ABD & PELVIS W/CONTRAST: CPT | Mod: 26

## 2021-01-31 PROCEDURE — 82530 CORTISOL FREE: CPT

## 2021-01-31 PROCEDURE — 83880 ASSAY OF NATRIURETIC PEPTIDE: CPT

## 2021-01-31 PROCEDURE — 85025 COMPLETE CBC W/AUTO DIFF WBC: CPT

## 2021-01-31 PROCEDURE — 80307 DRUG TEST PRSMV CHEM ANLYZR: CPT

## 2021-01-31 PROCEDURE — 83735 ASSAY OF MAGNESIUM: CPT

## 2021-01-31 PROCEDURE — 97530 THERAPEUTIC ACTIVITIES: CPT | Mod: GP

## 2021-01-31 PROCEDURE — 84120 ASSAY OF URINE PORPHYRINS: CPT

## 2021-01-31 PROCEDURE — 83036 HEMOGLOBIN GLYCOSYLATED A1C: CPT

## 2021-01-31 PROCEDURE — 80053 COMPREHEN METABOLIC PANEL: CPT

## 2021-01-31 PROCEDURE — 84311 SPECTROPHOTOMETRY: CPT

## 2021-01-31 PROCEDURE — 84443 ASSAY THYROID STIM HORMONE: CPT

## 2021-01-31 PROCEDURE — 70551 MRI BRAIN STEM W/O DYE: CPT

## 2021-01-31 PROCEDURE — 71045 X-RAY EXAM CHEST 1 VIEW: CPT | Mod: 26

## 2021-01-31 PROCEDURE — 36415 COLL VENOUS BLD VENIPUNCTURE: CPT

## 2021-01-31 PROCEDURE — 84100 ASSAY OF PHOSPHORUS: CPT

## 2021-01-31 RX ORDER — POLYETHYLENE GLYCOL 3350 17 G/17G
17 POWDER, FOR SOLUTION ORAL
Refills: 0 | Status: DISCONTINUED | OUTPATIENT
Start: 2021-01-31 | End: 2021-02-03

## 2021-01-31 RX ORDER — PANTOPRAZOLE SODIUM 20 MG/1
40 TABLET, DELAYED RELEASE ORAL
Refills: 0 | Status: DISCONTINUED | OUTPATIENT
Start: 2021-01-31 | End: 2021-02-03

## 2021-01-31 RX ORDER — SENNA PLUS 8.6 MG/1
2 TABLET ORAL AT BEDTIME
Refills: 0 | Status: DISCONTINUED | OUTPATIENT
Start: 2021-01-31 | End: 2021-02-03

## 2021-01-31 RX ORDER — CHOLECALCIFEROL (VITAMIN D3) 125 MCG
2000 CAPSULE ORAL
Refills: 0 | Status: DISCONTINUED | OUTPATIENT
Start: 2021-01-31 | End: 2021-02-03

## 2021-01-31 RX ORDER — HALOPERIDOL DECANOATE 100 MG/ML
5 INJECTION INTRAMUSCULAR ONCE
Refills: 0 | Status: COMPLETED | OUTPATIENT
Start: 2021-01-31 | End: 2021-01-31

## 2021-01-31 RX ORDER — CEFTRIAXONE 500 MG/1
INJECTION, POWDER, FOR SOLUTION INTRAMUSCULAR; INTRAVENOUS
Refills: 0 | Status: DISCONTINUED | OUTPATIENT
Start: 2021-01-31 | End: 2021-01-31

## 2021-01-31 RX ORDER — SODIUM CHLORIDE 9 MG/ML
1000 INJECTION INTRAMUSCULAR; INTRAVENOUS; SUBCUTANEOUS ONCE
Refills: 0 | Status: COMPLETED | OUTPATIENT
Start: 2021-01-31 | End: 2021-01-31

## 2021-01-31 RX ORDER — ONDANSETRON 8 MG/1
2 TABLET, FILM COATED ORAL
Refills: 0 | Status: DISCONTINUED | OUTPATIENT
Start: 2021-01-31 | End: 2021-02-01

## 2021-01-31 RX ORDER — CEFTRIAXONE 500 MG/1
INJECTION, POWDER, FOR SOLUTION INTRAMUSCULAR; INTRAVENOUS
Refills: 0 | Status: DISCONTINUED | OUTPATIENT
Start: 2021-01-31 | End: 2021-02-02

## 2021-01-31 RX ORDER — CEFTRIAXONE 500 MG/1
1000 INJECTION, POWDER, FOR SOLUTION INTRAMUSCULAR; INTRAVENOUS ONCE
Refills: 0 | Status: COMPLETED | OUTPATIENT
Start: 2021-01-31 | End: 2021-01-31

## 2021-01-31 RX ORDER — LACTOBACILLUS ACIDOPHILUS 100MM CELL
1 CAPSULE ORAL
Refills: 0 | Status: DISCONTINUED | OUTPATIENT
Start: 2021-01-31 | End: 2021-02-03

## 2021-01-31 RX ORDER — HYDROMORPHONE HYDROCHLORIDE 2 MG/ML
1 INJECTION INTRAMUSCULAR; INTRAVENOUS; SUBCUTANEOUS EVERY 6 HOURS
Refills: 0 | Status: DISCONTINUED | OUTPATIENT
Start: 2021-01-31 | End: 2021-02-01

## 2021-01-31 RX ORDER — FAMOTIDINE 10 MG/ML
20 INJECTION INTRAVENOUS ONCE
Refills: 0 | Status: COMPLETED | OUTPATIENT
Start: 2021-01-31 | End: 2021-01-31

## 2021-01-31 RX ORDER — ALBUTEROL 90 UG/1
2 AEROSOL, METERED ORAL EVERY 4 HOURS
Refills: 0 | Status: DISCONTINUED | OUTPATIENT
Start: 2021-01-31 | End: 2021-02-03

## 2021-01-31 RX ORDER — LANOLIN ALCOHOL/MO/W.PET/CERES
3 CREAM (GRAM) TOPICAL AT BEDTIME
Refills: 0 | Status: DISCONTINUED | OUTPATIENT
Start: 2021-01-31 | End: 2021-02-03

## 2021-01-31 RX ORDER — ONDANSETRON 8 MG/1
4 TABLET, FILM COATED ORAL ONCE
Refills: 0 | Status: COMPLETED | OUTPATIENT
Start: 2021-01-31 | End: 2021-01-31

## 2021-01-31 RX ORDER — FOLIC ACID 0.8 MG
1 TABLET ORAL DAILY
Refills: 0 | Status: DISCONTINUED | OUTPATIENT
Start: 2021-01-31 | End: 2021-02-03

## 2021-01-31 RX ORDER — SODIUM CHLORIDE 9 MG/ML
1000 INJECTION, SOLUTION INTRAVENOUS
Refills: 0 | Status: DISCONTINUED | OUTPATIENT
Start: 2021-01-31 | End: 2021-02-01

## 2021-01-31 RX ORDER — CEFTRIAXONE 500 MG/1
1000 INJECTION, POWDER, FOR SOLUTION INTRAMUSCULAR; INTRAVENOUS EVERY 24 HOURS
Refills: 0 | Status: DISCONTINUED | OUTPATIENT
Start: 2021-02-01 | End: 2021-02-02

## 2021-01-31 RX ORDER — AMLODIPINE BESYLATE 2.5 MG/1
10 TABLET ORAL DAILY
Refills: 0 | Status: DISCONTINUED | OUTPATIENT
Start: 2021-01-31 | End: 2021-02-03

## 2021-01-31 RX ORDER — HYDROMORPHONE HYDROCHLORIDE 2 MG/ML
2 INJECTION INTRAMUSCULAR; INTRAVENOUS; SUBCUTANEOUS EVERY 6 HOURS
Refills: 0 | Status: DISCONTINUED | OUTPATIENT
Start: 2021-01-31 | End: 2021-02-01

## 2021-01-31 RX ORDER — FERROUS SULFATE 325(65) MG
325 TABLET ORAL DAILY
Refills: 0 | Status: DISCONTINUED | OUTPATIENT
Start: 2021-01-31 | End: 2021-02-03

## 2021-01-31 RX ORDER — MORPHINE SULFATE 50 MG/1
2 CAPSULE, EXTENDED RELEASE ORAL ONCE
Refills: 0 | Status: DISCONTINUED | OUTPATIENT
Start: 2021-01-31 | End: 2021-01-31

## 2021-01-31 RX ORDER — HYDRALAZINE HCL 50 MG
5 TABLET ORAL ONCE
Refills: 0 | Status: COMPLETED | OUTPATIENT
Start: 2021-01-31 | End: 2021-01-31

## 2021-01-31 RX ORDER — MAGNESIUM OXIDE 400 MG ORAL TABLET 241.3 MG
400 TABLET ORAL
Refills: 0 | Status: DISCONTINUED | OUTPATIENT
Start: 2021-01-31 | End: 2021-02-01

## 2021-01-31 RX ADMIN — Medication 1 TABLET(S): at 17:18

## 2021-01-31 RX ADMIN — HYDROMORPHONE HYDROCHLORIDE 2 MILLIGRAM(S): 2 INJECTION INTRAMUSCULAR; INTRAVENOUS; SUBCUTANEOUS at 21:54

## 2021-01-31 RX ADMIN — AMLODIPINE BESYLATE 2.5 MILLIGRAM(S): 2.5 TABLET ORAL at 17:16

## 2021-01-31 RX ADMIN — ONDANSETRON 2 MILLIGRAM(S): 8 TABLET, FILM COATED ORAL at 22:35

## 2021-01-31 RX ADMIN — ONDANSETRON 4 MILLIGRAM(S): 8 TABLET, FILM COATED ORAL at 12:50

## 2021-01-31 RX ADMIN — Medication 2000 UNIT(S): at 21:55

## 2021-01-31 RX ADMIN — SODIUM CHLORIDE 1000 MILLILITER(S): 9 INJECTION INTRAMUSCULAR; INTRAVENOUS; SUBCUTANEOUS at 12:00

## 2021-01-31 RX ADMIN — HALOPERIDOL DECANOATE 5 MILLIGRAM(S): 100 INJECTION INTRAMUSCULAR at 14:50

## 2021-01-31 RX ADMIN — Medication 1 TABLET(S): at 17:19

## 2021-01-31 RX ADMIN — Medication 1 MILLIGRAM(S): at 17:17

## 2021-01-31 RX ADMIN — ONDANSETRON 2 MILLIGRAM(S): 8 TABLET, FILM COATED ORAL at 17:25

## 2021-01-31 RX ADMIN — FAMOTIDINE 20 MILLIGRAM(S): 10 INJECTION INTRAVENOUS at 12:00

## 2021-01-31 RX ADMIN — Medication 3 MILLIGRAM(S): at 21:54

## 2021-01-31 RX ADMIN — CEFTRIAXONE 1000 MILLIGRAM(S): 500 INJECTION, POWDER, FOR SOLUTION INTRAMUSCULAR; INTRAVENOUS at 17:50

## 2021-01-31 RX ADMIN — SODIUM CHLORIDE 125 MILLILITER(S): 9 INJECTION, SOLUTION INTRAVENOUS at 23:46

## 2021-01-31 RX ADMIN — MORPHINE SULFATE 2 MILLIGRAM(S): 50 CAPSULE, EXTENDED RELEASE ORAL at 13:24

## 2021-01-31 RX ADMIN — Medication 5 MILLIGRAM(S): at 23:19

## 2021-01-31 RX ADMIN — SODIUM CHLORIDE 1000 MILLILITER(S): 9 INJECTION INTRAMUSCULAR; INTRAVENOUS; SUBCUTANEOUS at 12:58

## 2021-01-31 RX ADMIN — MAGNESIUM OXIDE 400 MG ORAL TABLET 400 MILLIGRAM(S): 241.3 TABLET ORAL at 17:19

## 2021-01-31 RX ADMIN — HYDROMORPHONE HYDROCHLORIDE 1 MILLIGRAM(S): 2 INJECTION INTRAMUSCULAR; INTRAVENOUS; SUBCUTANEOUS at 17:21

## 2021-01-31 NOTE — ED PROVIDER NOTE - CARE PLAN
Principal Discharge DX:	Intractable vomiting with nausea  Secondary Diagnosis:	Chronic abdominal pain

## 2021-01-31 NOTE — H&P ADULT - NSHPOUTPATIENTPROVIDERS_GEN_ALL_CORE
PCP - Johnathan Boxer Heme/Onc - [place]  ID - Jacky  Cardio - [place]  General Surgery - Toño Durbin

## 2021-01-31 NOTE — ED PROVIDER NOTE - GASTROINTESTINAL, MLM
Abdomen soft, non-tender, no guarding. Abdomen soft, no guarding. bowel sounds minimal. +diffuse abd tenderness especially LLQ

## 2021-01-31 NOTE — ED PROVIDER NOTE - MUSCULOSKELETAL, MLM
Spine appears normal, range of motion is not limited, no muscle or joint tenderness Spine appears normal, range of motion is not limited, no muscle or joint tenderness castellanos x4 no focal swelling tenderness.

## 2021-01-31 NOTE — ED PROVIDER NOTE - PROGRESS NOTE DETAILS
Chief complaint:   Chief Complaint   Patient presents with   • Arm Pain     x 2 days       Vitals:  Visit Vitals  /64 (BP Location: RUE, Patient Position: Sitting, Cuff Size: Large Adult)   Pulse 60   Temp 98 °F (36.7 °C) (Oral)   Resp 16   Wt 99.8 kg   SpO2 96%   BMI 29.84 kg/m²       HISTORY OF PRESENT ILLNESS     HPI    Saturday afternoon right hand became numb   Also has significant pain  2-4th fingers were also involved  Goes up to the elbow   Also with tingling   Difficulty using the hand   Tried ice, heat, ibuprofen, Oxycodone 10 mg all without relief   Follows with pain management who did an MRI of the neck this month  He has a f/u with them tomorrow to discuss     Other significant problems:  There are no active problems to display for this patient.      PAST MEDICAL, FAMILY AND SOCIAL HISTORY     Medications:  Current Outpatient Prescriptions   Medication   • atorvastatin (LIPITOR) 20 MG tablet   • ketoconazole (NIZORAL) 2 % cream   • ketoconazole (NIZORAL) 2 % shampoo   • lamoTRIgine (LAMICTAL) 100 MG tablet   • levothyroxine (SYNTHROID, LEVOTHROID) 125 MCG tablet   • lisinopril-hydrochlorothiazide (PRINZIDE,ZESTORETIC) 20-25 MG per tablet   • oxybutynin (DITROPAN) 5 MG tablet   • OxyCODONE HCl 10 MG immediate release tablet   • pantoprazole (PROTONIX) 40 MG tablet   • trazodone (DESYREL) 150 MG tablet   • tiZANidine (ZANAFLEX) 4 MG tablet   • Solifenacin Succinate (VESICARE PO)   • ondansetron (ZOFRAN ODT) 4 MG disintegrating tablet   • sertraline (ZOLOFT) 100 MG tablet   • hydrochlorothiazide (MICROZIDE) 25 MG tablet   • potassium chloride ER 20 mEq (K-DUR) 20 MEQ tablet   • DIAZepam (VALIUM) 2 MG tablet   • cephALEXin (KEFLEX) 500 MG capsule   • clonazePAM (KLONOPIN) 1 MG tablet   • gabapentin (NEURONTIN) 600 MG tablet   • gemfibrozil (LOPID) 600 MG tablet   • cephALEXin (KEFLEX) 500 MG capsule     No current facility-administered medications for this visit.        Allergies:  ALLERGIES:  No  Known Allergies    Past Medical  History/Surgeries:  Past Medical History:   Diagnosis Date   • Absence seizure (CMS/HCC)    • Anxiety    • Anxiety and depression    • Depressive disorder    • GERD (gastroesophageal reflux disease)    • Heart murmur    • Hyperlipidemia    • Hypertension    • Hypothyroid    • Meningitis     AS A CHILD   • Narcolepsy    • Prostate cancer (CMS/HCC)     s/p brachytherapy   • Seizures (CMS/HCC)        Past Surgical History:   Procedure Laterality Date   • INGUINAL HERNIA REPAIR      left   • LIPOMA RESECTION     • ORIF HUMERUS FRACTURE     • REMOVAL OF TONSILS,<15 Y/O         Family History:  No family history on file.    Social History:  Social History   Substance Use Topics   • Smoking status: Current Every Day Smoker     Types: Cigars   • Smokeless tobacco: Never Used      Comment: smokes 1-2 small cigars per day; quit 1ppd cigarettes 2008   • Alcohol use No      Comment: Last drink 2006        REVIEW OF SYSTEMS     Review of Systems   Musculoskeletal: Positive for back pain and neck pain.   Neurological: Positive for numbness. Negative for weakness.       PHYSICAL EXAM     Physical Exam   Constitutional: He is oriented to person, place, and time. He appears well-developed and well-nourished. No distress.   Neurological: He is alert and oriented to person, place, and time. He has normal strength. A sensory deficit is present. Coordination and gait normal.   Slight decrease in sensation to right hand and fingers 2-4   Nursing note and vitals reviewed.      ASSESSMENT/PLAN     1. Cervical radiculopathy      Worsening of symptoms  Advised to keep f/u apt with pain management tomorrow to f/u on MRI results  Nothing urgent that needs to be done today  Patient agrees to plan and will f/u tomorrow       Orders Placed This Encounter   • DIAZepam (VALIUM) 2 MG tablet       Return if symptoms worsen or fail to improve.    Patient Instructions were given      Supervising physician, Dr. Samson  Eleazar       Dr. Ly:  Case d/w Dr. Curiel (pt's GI MD): will give consult tomorrow if admitted.  Pt still w/ abd pain, N/V/D, poor po tolerance.  Admission for intractable V accepted by Dr. Burns, requests CT A/P (+ordered).

## 2021-01-31 NOTE — ED PROVIDER NOTE - QRS
HPI     Contact Lens Follow Up    Additional comments: contact lens follow up with general eye examination.             Comments   Patient in today for contact lens follow-up with general eye examination.    Refer to additional patient encounter notes dated 04/06/2017.         Last edited by Jaylon Crisostomo, OD on 4/6/2017 10:58 AM. (History)            Assessment /Plan     For exam results, see Encounter Report.    1. Encounter for fitting or adjustment of spectacles or contact lenses                    History of sickle cell hemoglobin C disease.  Bilateral periheral retinal changes/scarring.      Bilateral fundus photos.  Generate referral for retina consult.     Myopia in each eye.  Spectacle lens Rx issued for use in lieu of CLs.    Wearing soft contact leneses.  Good contact lens lens fit in each eye.  Wearing CLs well in both eyes.   Power of right CL okay as is.  Showing need for power change in left lens per over-refraction done today.   Defer CL Rx.  Ms. Sotomayor will send info on current CL Rx,  Thereafter, finalize CL Rx and will send new CL Rx to Ms. Sotomayor.    Repeat general eye examination and refraction, and contact lens follow-up, in one year.       Received message from Ms. Sotomayor that she is currently wearing Acuvue Oasys SCLs:    OD  8.4  14.0   -2.75    OS  8.4   14.0  -2.75  Thus, if new CL Rx were to be issued per over-refraction done on 04/06/2017, the bertrand would be -2.75 in the right eye, and -3.25 in the left eye.  However, the refraction done following removal of the CLs on 04/06/2017 indicated the need for correction as follows:     OD  -4.50 DS     OS  -3.50 DS   Defer new CL Rx.  Will have Ms. Jeannineon call Ms. Sotomayor to have her return for a few minutes for reassessment of over-refraction (over CLs) AND repeat of post CL refraction        PRWP anterior leads

## 2021-01-31 NOTE — ED PROVIDER NOTE - CARDIAC, MLM
Normal rate, regular rhythm.  Heart sounds S1, S2.  No murmurs, rubs or gallops. Normal rate, regular rhythm.  Heart sounds S1, S2.  No murmurs, rubs or gallops. normal radial pulse

## 2021-01-31 NOTE — ED ADULT NURSE REASSESSMENT NOTE - NS ED NURSE REASSESS COMMENT FT1
Attempted to contact Dr. Burns regarding maintenance fluids infusion via port or peripheral access, voicemail left requesting order for port use for fluids to be administered via IV access. Pt to be transferred to  for admission.

## 2021-01-31 NOTE — ED ADULT NURSE REASSESSMENT NOTE - NS ED NURSE REASSESS COMMENT FT1
Pt attempted po trial.  Pt reports being unable to tolerate even small sip of po liquid.  MD notified.

## 2021-01-31 NOTE — H&P ADULT - HISTORY OF PRESENT ILLNESS
64 yo WF PMH chronic intermittent porphyria, anxiety, adrenal insufficiency not on steroids, hx of MSSA bacteremia in [facility] (8/2017), chronic abdominal pain was recently admitted to  from 12/23/2020 to 1/6/2021 for same. Had full EGD/Colonoscopy which were normal, biopsies from colon report normal mucosa. Patient states she has intermittent porphyria for which she has a R IJ mediport in place for daily D5/NS fluid infusions. Follows with Dr Maza who sent patient in for intractable vomiting and intractable abdominal pain. Patient unable to tolerate PO, reports she gets mild symptomatic improvement from pain medication and zofran. Notably, she reports while vomiting at home she had been sitting on the edge of her bed and fell forward, hitting the top of her head on her bed side table. She did not lose consciousness. Discussed with prior physician and baseline mentation is similar to presentation. Dr Curiel from gastroenterology saw patient in past and had been following due to above.  PMH /PSH  s/p spinal fusion    s/p cholecystectomy    s/p tonsillectomy.  Adrenal Insufficiency   Chronic abdominal pain    Chronic Intermittent Porphryia    ICU Vital Signs Last 24 Hrs  T(C): 37.1 (31 Jan 2021 14:57), Max: 37.1 (31 Jan 2021 10:25)  T(F): 98.7 (31 Jan 2021 14:57), Max: 98.8 (31 Jan 2021 10:25)  HR: 77 (31 Jan 2021 14:57) (60 - 77)  BP: 156/83 (31 Jan 2021 14:57) (154/81 - 187/77)  BP(mean): 105 (31 Jan 2021 14:57) (105 - 105)  ABP: --  ABP(mean): --  RR: 20 (31 Jan 2021 14:57) (18 - 20)  SpO2: 100% (31 Jan 2021 14:57) (97% - 100%)    PHYSICAL EXAM:  GENERAL: NAD, speaks in full sentences, but slurred words, reports due to dehydration, no signs of respiratory distress  HEAD:  Atraumatic, Normocephalic  EYES: EOMI, PERRL, conjunctiva and sclera clear  NECK: Supple, No JVD, R neck/chest visualized mediport  CHEST/LUNG: Clear to auscultation bilaterally; No wheeze; No crackles; No accessory muscles used  HEART: Regular rate and rhythm; No murmurs;   ABDOMEN: Soft, Nondistended; Bowel sounds present; No guarding, + tenderness to deep palpation LLQ and LUQ only  EXTREMITIES:  2+ Peripheral Pulses, No cyanosis or edema  PSYCH: AAOx3  NEUROLOGY: non-focal  SKIN: No rashes or lesions    Labs/imaging  CBC Full  -  ( 31 Jan 2021 11:07 )  WBC Count : 9.17 K/uL  RBC Count : 5.01 M/uL  Hemoglobin : 12.7 g/dL  Hematocrit : 40.9 %  Platelet Count - Automated : 402 K/uL  Mean Cell Volume : 81.6 fl  Mean Cell Hemoglobin : 25.3 pg  Mean Cell Hemoglobin Concentration : 31.1 gm/dL  Auto Neutrophil # : 7.94 K/uL  Auto Lymphocyte # : 0.99 K/uL  Auto Monocyte # : 0.15 K/uL  Auto Eosinophil # : 0.00 K/uL  Auto Basophil # : 0.03 K/uL  Auto Neutrophil % : 86.6 %  Auto Lymphocyte % : 10.8 %  Auto Monocyte % : 1.6 %  Auto Eosinophil % : 0.0 %  Auto Basophil % : 0.3 %    01-31  142  |  109<H>  |  5<L>  ----------------------------<  138<H>  3.5   |  25  |  0.75    Ca    9.4      31 Jan 2021 11:07  TPro  7.8  /  Alb  3.3  /  TBili  0.3  /  DBili  x   /  AST  16  /  ALT  23  /  AlkPhos  84  01-31    < from: CT Abdomen and Pelvis w/ IV Cont (01.31.21 @ 15:38) >  COMPARISON: 12/23/2020    PROCEDURE:  CT of the Abdomen and Pelvis wasperformed with intravenous contrast.  Intravenous contrast: 90 ml Omnipaque 350. 10 ml discarded.  Oral contrast: None.  Sagittal and coronal reformats were performed.    FINDINGS:  LOWER CHEST: Within normal limits.    LIVER: Within normal limits.  BILE DUCTS: Normal caliber.  GALLBLADDER: Cholecystectomy.  SPLEEN: Within normal limits.  PANCREAS: Within normal limits.  ADRENALS: Within normal limits.  KIDNEYS/URETERS: Within normal limits.    BLADDER: Within normal limits.  REPRODUCTIVE ORGANS:Calcified uterine myomas.    BOWEL: No bowel obstruction. The appendix is visualized and is normal.No evidence of diverticulitis or colitis. No evidence of gastroenteritis.  PERITONEUM: No ascites.  VESSELS: Atherosclerotic changes.  RETROPERITONEUM/LYMPH NODES: No lymphadenopathy.  ABDOMINAL WALL: Within normal limits.  BONES: Degenerative changes.    IMPRESSION:  Unremarkable CT scan of the abdomen and pelvis. No change since 12/23/2020.  < end of copied text >  < from: CT Head No Cont (01.31.21 @ 15:35) >  The brain demonstrates no abnormal attenuation.   No acute cerebral cortical infarct is seen.  No intracranial hemorrhage is found.  No mass effect is found in the brain.  The ventricles, sulci and basal cisterns appear unremarkable.  The orbits are unremarkable.  The paranasal sinuses are clear.  The nasal cavity appears intact.  The nasopharynx is symmetric.  The central skull base, petrous temporal bones and calvarium remain intact.    IMPRESSION:   Unremarkable head CT.    (01.31.21 @ 15:10) Urine Microscopic-Add On (NC) (01.31.21 @ 12:42)   Red Blood Cell - Urine: 0-5 /HPF   White Blood Cell - Urine: 6-10   Bacteria: Few   Epithelial Cells: Few Urinalysis (01.31.21 @ 12:42)   pH Urine: 8.0   Glucose Qualitative, Urine: Negative mg/dL   Blood, Urine: Trace   Color: Yellow   Urine Appearance: Clear   Bilirubin: Negative   Ketone - Urine: Large   Specific Gravity: 1.010   Protein, Urine: 15 mg/dL   Urobilinogen: Negative mg/dL   Nitrite: Negative   Leukocyte Esterase Concentration: Trace Lactate, Blood: 1.2 mmol/L (01.31.21 @ 11:54) Manual Differential (01.31.21 @ 11:07)   Platelet Morphology: Normal   Manual Smear Verification: Performed   Red Cell Morphology: Abnormal   Elliptocytes: Slight   Anisocytosis: Moderate   Tear Drops: Slight   Poikilocytosis: Slight   Polychromasia: SlighCOVID-19 PCR: NotDetec: You can help in the fight against COVID-19. [facility] may contact Comprehensive Metabolic Panel in AM (01.06.21 @ 08:12)   Sodium, Serum: 141 mmol/L   Potassium, Serum: 3.2 mmol/L   Chloride, Serum: 112 mmol/L   Carbon Dioxide, Serum: 22 mmol/L   Anion Gap, Serum: 7 mmol/L   Blood Urea Nitrogen, Serum: 5 mg/dL   Creatinine, Serum: 0.46 mg/dL   Glucose, Serum: 110 mg/dL   Calcium, Total Serum: 9.0 mg/dL   Protein Total, Serum: 6.7 gm/dL   Albumin, Serum: 3.0 g/dL   Bilirubin Total, Serum: 0.4 mg/dL   Alkaline Phosphatase, Serum: 59 U/L   Aspartate Aminotransferase (AST/SGOT): 34 U/L   Alanine Aminotransferase (ALT/SGPT): 62 U/L Complete Blood Count + Automated Diff in AM (01.06.21 @ 08:12)   WBC Count: 8.46 K/uL   RBC Count: 3.89 M/uL   Hemoglobin: 9.2 g/dL   Hematocrit: 30.3 %   Mean Cell Volume: 77.9 fl   Mean Cell Hemoglobin: 23.7 pg   Mean Cell Hemoglobin Conc: 30.4 gm/dL   Red Cell Distrib Width: 27.8 %   Platelet Count - Automated: 291 K/uL lostridium difficile Toxin by PCR (01.02.21 @ 00:15) I PCR Panel, Stool (01.02.21 @ 00:15)   Specimen Source: .Stool Feces   Culture Results:   GI PCR Results: NOT detected Surgical Pathology Report:   ACCESSION No: [id number]   MARIANNE RAMÍREZ 2   Surgical Final Report   Final Diagnosis   1. Duodenum (biopsy):   - Small bowel mucosa with no diagnostic abnormalities identified.   - Villous architecture preserved.   2. Stomach (biopsy):   - Gastric antral type mucosa with patchy mild chronic   inflammation and congestion   - A Diff Quik stain for H. pylori organisms is negative.   3. Stomach (biopsy):   - Gastric oxyntic type mucosa with a lymphoid aggregate.   - A Diff Quik stain for H. pylori organisms is negative. orphobilinogen, Urine Quantitative (12.23.20 @ 01:17)   Total Urine Volume: Comment: No total volume submitted. Aminolevulinic Acid - Urine: 2.1: INTERPRETIVE GUIDE:   Elevated Urine - Ceftriaxone: S <=1 Enterobacter, Citrobacter, and Serratia may develop resistance during prolonged therapy (12.23.20 @ 01:17)

## 2021-01-31 NOTE — ED PROVIDER NOTE - ENMT, MLM
Airway patent, Nasal mucosa clear. Mouth with normal mucosa. Throat has no vesicles, no oropharyngeal exudates and uvula is midline. Airway patent, Nasal mucosa clear. oropharynx clear mucus membranes mildly dry.

## 2021-01-31 NOTE — ED PROVIDER NOTE - OBJECTIVE STATEMENT
66 y/o female with pmhx of staph infection, chronic abd pain, osteoporosis, adrenal insufficiency, knee effusion, porphyria presents to the ED c/o abd pain associated with N/V/D. 64 y/o female with pmhx of staph infection, chronic abd pain, osteoporosis, adrenal insufficiency, knee effusion, porphyria presents to the ED c/o abd pain associated with N/V/D x6 days. Last night around dinner time she states that her vomiting persisted. Endorses 11 episodes of vomiting. Pt state that her abd pain is located in the LLQ. Denies hx of cyclic vomiting syndrome or diverticulitis. Describes pain as sharp and burning. rates pain 9/10. Denies cough or SOB. Denies known COVID exposure. PCP; Jonathan Boxer. GI: Moisés

## 2021-01-31 NOTE — ED PROVIDER NOTE - CLINICAL SUMMARY MEDICAL DECISION MAKING FREE TEXT BOX
64 y/o female with pmhx of staph infection, chronic abd pain, osteoporosis, adrenal insufficiency, knee effusion, porphyria presents for abd pain. she was hospitalized december 23rd to January 6th for abd pain nvd, r/o porphyria. cdiff negative. inpatient EGD nonspecific. BIBA from home c/o reoccurrence overnight of abd pain with nv, poor po tolerance. +diarrhea continued after prior d/c. diffuse abd tenderness especially LLQ. vital signs stable plan: EKG, chest XR, labs including preg, lactate, lipase, covid swab iv Pepcid Zofran po trial discuss with YAZMIN Curiel

## 2021-01-31 NOTE — ED PROVIDER NOTE - CONSTITUTIONAL, MLM
Well appearing, awake, alert, oriented to person, place, time/situation and in no apparent distress. normal... white female adult, awake, alert, oriented to person, place, time/situation and in no apparent distress. intermittent paroxysm of abd pain. ill appearing

## 2021-01-31 NOTE — H&P ADULT - ASSESSMENT
64 yo WF PMH chronic intermittent porphyria, anxiety, adrenal insufficiency not on steroids, hx of MSSA bacteremia in [facility] (8/2017), chronic abdominal pain was recently admitted to  from 12/23/2020 to 1/6/2021 for same. Had full EGD/Colonoscopy which were normal, biopsies from colon report normal mucosa. Patient states she has intermittent porphyria for which she has a R IJ mediport in place for daily D5/NS fluid infusions. Follows with Dr Maza who sent patient in for intractable vomiting and intractable abdominal pain. Patient unable to tolerate PO, reports she gets mild symptomatic improvement from pain medication and zofran. Notably, she reports while vomiting at home she had been sitting on the edge of her bed and fell forward, hitting the top of her head on her bed side table. She did not lose consciousness. Discussed with prior physician and baseline mentation is similar to presentation. Dr Curiel from gastroenterology saw patient in past and had been following due to above.  Admitted with fall, dehydration, intractable vomiting and intractable abdominal pain.      Fall  2/2 intractable vomiting and dehydration  CT head neg  Cr doubled since last admission, hemoconcentrated by CBC , with HGB and PLT increased significantly  IV hydration 125cc/hr with potassium and magnesium supplementation  fall precautions    Intractable abdominal pain and vomiting, suspect per history remote possibilty of acute intermittent porphyria crisis.  Recent EGD/Colonoscopy unremarkable  CT abd/pelvis today unchanged from 1/2021.  Zofran PRN  Dilaudid PRN hold for sedation  Treat underlying illness  ED discussed with Dr Curiel who will evaluate patient 2/1.  Replace electrolytes and hydrate    Reported history of chronic intermittent porphyria  Patient unlikely having flare  IV hydration as above  repeat ALA and PBG levels (urine 24 hr and plasma respectively)  Follow up outpt Dr Estrella    Anxiety   Resume home xanax dosing PRN, hold for sedation    Hx of Adrenal insufficiency  Cortisol levels appropriate last admission  Currently normotensive, and at times requiring antihypertensive meds  Not on steroids  Continue current management    Depression/Mood disorder  patient denies, but affect abnormal  Behavioral health eval noted from prior admission  Continue to monitor off antipsychotic and antidepressants per psychiatric recommendations.    Anemia, STEPHANIE, appears hemoconcentrated.  EGD/[place] as above unremarkable  Iron supp    UA mild positive   UCX previously susceptible to ceftriaxone    VTE ppx lovenox 40mg qd

## 2021-01-31 NOTE — ED PROVIDER NOTE - EYES, MLM
Clear bilaterally, pupils equal, round and reactive to light. Clear bilaterally, pupils equal, round and reactive to light. PERLIOMI

## 2021-01-31 NOTE — ED ADULT TRIAGE NOTE - CHIEF COMPLAINT QUOTE
Pt BIBA with report abdominal pain, n/v/d x days. Pt reports hx of porphyria.  Denies fever at home.

## 2021-02-01 LAB
A1C WITH ESTIMATED AVERAGE GLUCOSE RESULT: 4.9 % — SIGNIFICANT CHANGE UP (ref 4–5.6)
ALBUMIN SERPL ELPH-MCNC: 3.3 G/DL — SIGNIFICANT CHANGE UP (ref 3.3–5)
ALP SERPL-CCNC: 81 U/L — SIGNIFICANT CHANGE UP (ref 40–120)
ALT FLD-CCNC: 20 U/L — SIGNIFICANT CHANGE UP (ref 12–78)
ANION GAP SERPL CALC-SCNC: 10 MMOL/L — SIGNIFICANT CHANGE UP (ref 5–17)
AST SERPL-CCNC: 12 U/L — LOW (ref 15–37)
BASOPHILS # BLD AUTO: 0.06 K/UL — SIGNIFICANT CHANGE UP (ref 0–0.2)
BASOPHILS NFR BLD AUTO: 0.4 % — SIGNIFICANT CHANGE UP (ref 0–2)
BILIRUB SERPL-MCNC: 0.3 MG/DL — SIGNIFICANT CHANGE UP (ref 0.2–1.2)
BUN SERPL-MCNC: 4 MG/DL — LOW (ref 7–23)
CALCIUM SERPL-MCNC: 8.7 MG/DL — SIGNIFICANT CHANGE UP (ref 8.5–10.1)
CHLORIDE SERPL-SCNC: 110 MMOL/L — HIGH (ref 96–108)
CHOLEST SERPL-MCNC: 128 MG/DL — SIGNIFICANT CHANGE UP
CO2 SERPL-SCNC: 20 MMOL/L — LOW (ref 22–31)
CREAT SERPL-MCNC: 0.68 MG/DL — SIGNIFICANT CHANGE UP (ref 0.5–1.3)
CULTURE RESULTS: SIGNIFICANT CHANGE UP
EOSINOPHIL # BLD AUTO: 0.01 K/UL — SIGNIFICANT CHANGE UP (ref 0–0.5)
EOSINOPHIL NFR BLD AUTO: 0.1 % — SIGNIFICANT CHANGE UP (ref 0–6)
ESTIMATED AVERAGE GLUCOSE: 94 MG/DL — SIGNIFICANT CHANGE UP (ref 68–114)
FERRITIN SERPL-MCNC: 143 NG/ML — SIGNIFICANT CHANGE UP (ref 15–150)
GLUCOSE SERPL-MCNC: 148 MG/DL — HIGH (ref 70–99)
HCT VFR BLD CALC: 39.9 % — SIGNIFICANT CHANGE UP (ref 34.5–45)
HDLC SERPL-MCNC: 45 MG/DL — LOW
HGB BLD-MCNC: 12.6 G/DL — SIGNIFICANT CHANGE UP (ref 11.5–15.5)
IMM GRANULOCYTES NFR BLD AUTO: 0.4 % — SIGNIFICANT CHANGE UP (ref 0–1.5)
IRON SATN MFR SERPL: 16 % — SIGNIFICANT CHANGE UP (ref 14–50)
IRON SATN MFR SERPL: 36 UG/DL — SIGNIFICANT CHANGE UP (ref 30–160)
LDH SERPL L TO P-CCNC: 215 U/L — SIGNIFICANT CHANGE UP (ref 84–241)
LIPID PNL WITH DIRECT LDL SERPL: 61 MG/DL — SIGNIFICANT CHANGE UP
LYMPHOCYTES # BLD AUTO: 1.61 K/UL — SIGNIFICANT CHANGE UP (ref 1–3.3)
LYMPHOCYTES # BLD AUTO: 10.6 % — LOW (ref 13–44)
MAGNESIUM SERPL-MCNC: 2.7 MG/DL — HIGH (ref 1.6–2.6)
MCHC RBC-ENTMCNC: 25.5 PG — LOW (ref 27–34)
MCHC RBC-ENTMCNC: 31.6 GM/DL — LOW (ref 32–36)
MCV RBC AUTO: 80.6 FL — SIGNIFICANT CHANGE UP (ref 80–100)
MONOCYTES # BLD AUTO: 0.9 K/UL — SIGNIFICANT CHANGE UP (ref 0–0.9)
MONOCYTES NFR BLD AUTO: 5.9 % — SIGNIFICANT CHANGE UP (ref 2–14)
NEUTROPHILS # BLD AUTO: 12.55 K/UL — HIGH (ref 1.8–7.4)
NEUTROPHILS NFR BLD AUTO: 82.6 % — HIGH (ref 43–77)
NON HDL CHOLESTEROL: 84 MG/DL — SIGNIFICANT CHANGE UP
NT-PROBNP SERPL-SCNC: 1745 PG/ML — HIGH (ref 0–125)
PLATELET # BLD AUTO: 439 K/UL — HIGH (ref 150–400)
POTASSIUM SERPL-MCNC: 3.1 MMOL/L — LOW (ref 3.5–5.3)
POTASSIUM SERPL-SCNC: 3.1 MMOL/L — LOW (ref 3.5–5.3)
PROT SERPL-MCNC: 7.7 GM/DL — SIGNIFICANT CHANGE UP (ref 6–8.3)
RBC # BLD: 4.95 M/UL — SIGNIFICANT CHANGE UP (ref 3.8–5.2)
RBC # FLD: 22 % — HIGH (ref 10.3–14.5)
SARS-COV-2 IGG SERPL QL IA: NEGATIVE — SIGNIFICANT CHANGE UP
SARS-COV-2 IGM SERPL IA-ACNC: 0.07 INDEX — SIGNIFICANT CHANGE UP
SODIUM SERPL-SCNC: 140 MMOL/L — SIGNIFICANT CHANGE UP (ref 135–145)
SPECIMEN SOURCE: SIGNIFICANT CHANGE UP
TIBC SERPL-MCNC: 222 UG/DL — SIGNIFICANT CHANGE UP (ref 220–430)
TRIGL SERPL-MCNC: 114 MG/DL — SIGNIFICANT CHANGE UP
TSH SERPL-MCNC: 1.57 UU/ML — SIGNIFICANT CHANGE UP (ref 0.34–4.82)
UIBC SERPL-MCNC: 187 UG/DL — SIGNIFICANT CHANGE UP (ref 110–370)
WBC # BLD: 15.19 K/UL — HIGH (ref 3.8–10.5)
WBC # FLD AUTO: 15.19 K/UL — HIGH (ref 3.8–10.5)

## 2021-02-01 PROCEDURE — 93306 TTE W/DOPPLER COMPLETE: CPT | Mod: 26

## 2021-02-01 PROCEDURE — 99232 SBSQ HOSP IP/OBS MODERATE 35: CPT

## 2021-02-01 RX ORDER — DEXTROSE MONOHYDRATE, SODIUM CHLORIDE, AND POTASSIUM CHLORIDE 50; .745; 4.5 G/1000ML; G/1000ML; G/1000ML
1000 INJECTION, SOLUTION INTRAVENOUS
Refills: 0 | Status: DISCONTINUED | OUTPATIENT
Start: 2021-02-01 | End: 2021-02-02

## 2021-02-01 RX ORDER — HYDROMORPHONE HYDROCHLORIDE 2 MG/ML
1 INJECTION INTRAMUSCULAR; INTRAVENOUS; SUBCUTANEOUS EVERY 4 HOURS
Refills: 0 | Status: DISCONTINUED | OUTPATIENT
Start: 2021-02-01 | End: 2021-02-03

## 2021-02-01 RX ORDER — HYDRALAZINE HCL 50 MG
5 TABLET ORAL ONCE
Refills: 0 | Status: COMPLETED | OUTPATIENT
Start: 2021-02-01 | End: 2021-02-01

## 2021-02-01 RX ORDER — ONDANSETRON 8 MG/1
4 TABLET, FILM COATED ORAL EVERY 6 HOURS
Refills: 0 | Status: DISCONTINUED | OUTPATIENT
Start: 2021-02-01 | End: 2021-02-03

## 2021-02-01 RX ORDER — HYDROMORPHONE HYDROCHLORIDE 2 MG/ML
4 INJECTION INTRAMUSCULAR; INTRAVENOUS; SUBCUTANEOUS EVERY 4 HOURS
Refills: 0 | Status: DISCONTINUED | OUTPATIENT
Start: 2021-02-01 | End: 2021-02-03

## 2021-02-01 RX ORDER — HYDRALAZINE HCL 50 MG
10 TABLET ORAL EVERY 6 HOURS
Refills: 0 | Status: DISCONTINUED | OUTPATIENT
Start: 2021-02-01 | End: 2021-02-03

## 2021-02-01 RX ORDER — POTASSIUM CHLORIDE 20 MEQ
10 PACKET (EA) ORAL
Refills: 0 | Status: COMPLETED | OUTPATIENT
Start: 2021-02-01 | End: 2021-02-01

## 2021-02-01 RX ORDER — HYDROMORPHONE HYDROCHLORIDE 2 MG/ML
2 INJECTION INTRAMUSCULAR; INTRAVENOUS; SUBCUTANEOUS EVERY 4 HOURS
Refills: 0 | Status: DISCONTINUED | OUTPATIENT
Start: 2021-02-01 | End: 2021-02-03

## 2021-02-01 RX ADMIN — Medication 100 MILLIEQUIVALENT(S): at 17:28

## 2021-02-01 RX ADMIN — HYDROMORPHONE HYDROCHLORIDE 4 MILLIGRAM(S): 2 INJECTION INTRAMUSCULAR; INTRAVENOUS; SUBCUTANEOUS at 13:09

## 2021-02-01 RX ADMIN — HYDROMORPHONE HYDROCHLORIDE 4 MILLIGRAM(S): 2 INJECTION INTRAMUSCULAR; INTRAVENOUS; SUBCUTANEOUS at 21:47

## 2021-02-01 RX ADMIN — DEXTROSE MONOHYDRATE, SODIUM CHLORIDE, AND POTASSIUM CHLORIDE 75 MILLILITER(S): 50; .745; 4.5 INJECTION, SOLUTION INTRAVENOUS at 13:00

## 2021-02-01 RX ADMIN — Medication 1 TABLET(S): at 09:12

## 2021-02-01 RX ADMIN — ONDANSETRON 4 MILLIGRAM(S): 8 TABLET, FILM COATED ORAL at 21:55

## 2021-02-01 RX ADMIN — HYDROMORPHONE HYDROCHLORIDE 1 MILLIGRAM(S): 2 INJECTION INTRAMUSCULAR; INTRAVENOUS; SUBCUTANEOUS at 03:09

## 2021-02-01 RX ADMIN — Medication 5 MILLIGRAM(S): at 03:40

## 2021-02-01 RX ADMIN — Medication 1 TABLET(S): at 09:14

## 2021-02-01 RX ADMIN — Medication 1 MILLIGRAM(S): at 09:11

## 2021-02-01 RX ADMIN — Medication 100 MILLIEQUIVALENT(S): at 13:01

## 2021-02-01 RX ADMIN — ONDANSETRON 4 MILLIGRAM(S): 8 TABLET, FILM COATED ORAL at 09:06

## 2021-02-01 RX ADMIN — Medication 10 MILLIGRAM(S): at 13:01

## 2021-02-01 RX ADMIN — Medication 1 TABLET(S): at 20:57

## 2021-02-01 RX ADMIN — Medication 100 MILLIEQUIVALENT(S): at 15:21

## 2021-02-01 RX ADMIN — HYDROMORPHONE HYDROCHLORIDE 1 MILLIGRAM(S): 2 INJECTION INTRAMUSCULAR; INTRAVENOUS; SUBCUTANEOUS at 09:06

## 2021-02-01 RX ADMIN — Medication 2000 UNIT(S): at 20:56

## 2021-02-01 RX ADMIN — PANTOPRAZOLE SODIUM 40 MILLIGRAM(S): 20 TABLET, DELAYED RELEASE ORAL at 09:12

## 2021-02-01 RX ADMIN — CEFTRIAXONE 1000 MILLIGRAM(S): 500 INJECTION, POWDER, FOR SOLUTION INTRAMUSCULAR; INTRAVENOUS at 15:43

## 2021-02-01 RX ADMIN — Medication 10 MILLIGRAM(S): at 21:48

## 2021-02-01 RX ADMIN — SENNA PLUS 2 TABLET(S): 8.6 TABLET ORAL at 20:57

## 2021-02-01 RX ADMIN — AMLODIPINE BESYLATE 2.5 MILLIGRAM(S): 2.5 TABLET ORAL at 09:12

## 2021-02-01 RX ADMIN — Medication 325 MILLIGRAM(S): at 09:12

## 2021-02-01 RX ADMIN — Medication 3 MILLIGRAM(S): at 20:57

## 2021-02-01 RX ADMIN — MAGNESIUM OXIDE 400 MG ORAL TABLET 400 MILLIGRAM(S): 241.3 TABLET ORAL at 09:12

## 2021-02-01 RX ADMIN — Medication 2000 UNIT(S): at 09:12

## 2021-02-01 RX ADMIN — HYDROMORPHONE HYDROCHLORIDE 4 MILLIGRAM(S): 2 INJECTION INTRAMUSCULAR; INTRAVENOUS; SUBCUTANEOUS at 17:29

## 2021-02-01 NOTE — PATIENT PROFILE ADULT - IS PATIENT PREGNANT?
Thanks for visiting us today!    You were given a packet of information handouts at today's well child exam. Please keep them handy for future reference.     If you haven't already liked us on Facebook, please do so!  Just search for \"[facility]\"    If you are not on Facebook and would like to see the information we post, you can find it at this website:    [email or web address]    Remember these important phone numbers:    [phone] for phone nurses during the day and our nurse answering service at night    [phone]  for scheduling or changing future appointments    When leaving a message for our staff, please include:   • the spelling of your child’s full name and date of birth  • your full name and relationship to child  • best phone number and time to reach you   • reason for the call  --------------------------------------------------------------------------------------------------------------------        
no

## 2021-02-01 NOTE — PHYSICAL THERAPY INITIAL EVALUATION ADULT - MODALITIES TREATMENT COMMENTS
Pt left as rec'd supine in bed with IV intact; CBIR; Bed alarm activated; Pt instructed to not get up alone; RN Sagrario made aware

## 2021-02-01 NOTE — PHYSICAL THERAPY INITIAL EVALUATION ADULT - DIAGNOSIS, PT EVAL
,  Routing refill request to provider for review/approval because:  Drug not on the G refill protocol   Please review and advise or sign if appropriate  Unable to complete prescription refill per RN Medication Refill Policy.................... Magdalene Uriarte ....................  6/28/2018   1:49 PM             Idiopathic Chronic Abdominal pain

## 2021-02-01 NOTE — PROVIDER CONTACT NOTE (OTHER) - BACKGROUND
Patient has had elevated BP since being admitted
Patient has intractable abd pain and some nausea. Elevated BP on assessment

## 2021-02-01 NOTE — PHYSICAL THERAPY INITIAL EVALUATION ADULT - LEVEL OF INDEPENDENCE: SUPINE/SIT, REHAB EVAL
TBA; [place] OOB Ambulation at pt's request due to abdominal pain and nausea; Will attempt to asses functional mobility at a later date

## 2021-02-01 NOTE — PROGRESS NOTE ADULT - ASSESSMENT
66 yo WF PMH chronic intermittent porphyria, anxiety, adrenal insufficiency not on steroids, hx of MSSA bacteremia in mediport (8/2017), chronic abdominal pain was recently admitted to  from 12/23/2020 to 1/6/2021 for same. Had full EGD/Colonoscopy which were normal, biopsies from colon report normal mucosa. Patient states she has intermittent porphyria for which she has a R IJ mediport in place for daily D5/NS fluid infusions. Follows with Dr Maza who sent patient in for intractable vomiting and intractable abdominal pain. Patient unable to tolerate PO, reports she gets mild symptomatic improvement from pain medication and zofran. Notably, she reports while vomiting at home she had been sitting on the edge of her bed and fell forward, hitting the top of her head on her bed side table. She did not lose consciousness. Discussed with prior physician and baseline mentation is similar to presentation. Dr Curiel from gastroenterology saw patient in past and had been following due to above.  Admitted with fall, dehydration, intractable vomiting and intractable abdominal pain.      Fall due to  intractable vomiting and dehydration r/o orthostatics  CT head neg, pt stays that she stop taking amlodipine  Cr doubled since last admission, hemoconcentrated by CBC , with HGB and PLT increased significantly  IV hydration 125cc/hr--> 75 , tolerates some clears  fall precautions, check orthostatics  Hypokalemia Hypermagnesemia  - replace K, stop magnesium, recheck level in am  Intractable abdominal pain and vomiting, suspect per history remote possibility of acute intermittent porphyria crisis.  Recent EGD/Colonoscopy unremarkable  CT abd/pelvis today unchanged from 1/2021.  Zofran PRN, Dilaudid PRN hold for sedation  Treat underlying illness  ED discussed with Dr Curiel who will evaluate patient 2/1.  Replace electrolytes and hydrate  Reported history of chronic intermittent porphyria  Patient unlikely having flare  IV hydration as above  repeat ALA and PBG levels (urine 24 hr and plasma respectively)  Follow up outpt Dr Estrella  HTN  - c/w amlodipine 2.5, hydralazine IV prn when can not take po meds  Anxiety   Resume home xanax dosing PRN, hold for sedation  Hx of Adrenal insufficiency  Cortisol levels appropriate last admission  Currently normotensive, and at times requiring antihypertensive meds  Not on steroids  Depression/Mood disorder  patient denies, but affect abnormal  Behavioral health eval noted from prior admission  Continue to monitor off antipsychotic and antidepressants per psychiatric recommendations.  Anemia, STEPHANIE, appears hemoconcentrated.  EGD/[place] as above unremarkable  Iron supp  Pyuria suspected UTI  UCX previously susceptible to ceftriaxone  IV ceftriaxone, f/u urine cx  Elevated BNP, with HTN   - repeat echo , check lipid profile, a1c     VTE ppx lovenox 40mg qd

## 2021-02-02 LAB
A1C WITH ESTIMATED AVERAGE GLUCOSE RESULT: 4.8 % — SIGNIFICANT CHANGE UP (ref 4–5.6)
ANION GAP SERPL CALC-SCNC: 9 MMOL/L — SIGNIFICANT CHANGE UP (ref 5–17)
BUN SERPL-MCNC: 5 MG/DL — LOW (ref 7–23)
CALCIUM SERPL-MCNC: 9.6 MG/DL — SIGNIFICANT CHANGE UP (ref 8.5–10.1)
CHLORIDE SERPL-SCNC: 113 MMOL/L — HIGH (ref 96–108)
CHOLEST SERPL-MCNC: 109 MG/DL — SIGNIFICANT CHANGE UP
CO2 SERPL-SCNC: 19 MMOL/L — LOW (ref 22–31)
CREAT SERPL-MCNC: 0.67 MG/DL — SIGNIFICANT CHANGE UP (ref 0.5–1.3)
ESTIMATED AVERAGE GLUCOSE: 91 MG/DL — SIGNIFICANT CHANGE UP (ref 68–114)
GLUCOSE SERPL-MCNC: 142 MG/DL — HIGH (ref 70–99)
HCT VFR BLD CALC: 42.3 % — SIGNIFICANT CHANGE UP (ref 34.5–45)
HDLC SERPL-MCNC: 51 MG/DL — SIGNIFICANT CHANGE UP
HGB BLD-MCNC: 13.3 G/DL — SIGNIFICANT CHANGE UP (ref 11.5–15.5)
LIPID PNL WITH DIRECT LDL SERPL: 40 MG/DL — SIGNIFICANT CHANGE UP
MAGNESIUM SERPL-MCNC: 2.5 MG/DL — SIGNIFICANT CHANGE UP (ref 1.6–2.6)
MCHC RBC-ENTMCNC: 25.7 PG — LOW (ref 27–34)
MCHC RBC-ENTMCNC: 31.4 GM/DL — LOW (ref 32–36)
MCV RBC AUTO: 81.7 FL — SIGNIFICANT CHANGE UP (ref 80–100)
NON HDL CHOLESTEROL: 58 MG/DL — SIGNIFICANT CHANGE UP
PHOSPHATE SERPL-MCNC: 3.2 MG/DL — SIGNIFICANT CHANGE UP (ref 2.5–4.5)
PLATELET # BLD AUTO: 488 K/UL — HIGH (ref 150–400)
POTASSIUM SERPL-MCNC: 3.7 MMOL/L — SIGNIFICANT CHANGE UP (ref 3.5–5.3)
POTASSIUM SERPL-SCNC: 3.7 MMOL/L — SIGNIFICANT CHANGE UP (ref 3.5–5.3)
RBC # BLD: 5.18 M/UL — SIGNIFICANT CHANGE UP (ref 3.8–5.2)
RBC # FLD: 22.2 % — HIGH (ref 10.3–14.5)
SODIUM SERPL-SCNC: 141 MMOL/L — SIGNIFICANT CHANGE UP (ref 135–145)
TRIGL SERPL-MCNC: 90 MG/DL — SIGNIFICANT CHANGE UP
WBC # BLD: 12.28 K/UL — HIGH (ref 3.8–10.5)
WBC # FLD AUTO: 12.28 K/UL — HIGH (ref 3.8–10.5)

## 2021-02-02 PROCEDURE — 99233 SBSQ HOSP IP/OBS HIGH 50: CPT

## 2021-02-02 PROCEDURE — 70551 MRI BRAIN STEM W/O DYE: CPT | Mod: 26

## 2021-02-02 PROCEDURE — 70450 CT HEAD/BRAIN W/O DYE: CPT | Mod: 26

## 2021-02-02 RX ORDER — METOPROLOL TARTRATE 50 MG
25 TABLET ORAL ONCE
Refills: 0 | Status: COMPLETED | OUTPATIENT
Start: 2021-02-02 | End: 2021-02-02

## 2021-02-02 RX ORDER — PROCHLORPERAZINE MALEATE 5 MG
10 TABLET ORAL EVERY 6 HOURS
Refills: 0 | Status: DISCONTINUED | OUTPATIENT
Start: 2021-02-02 | End: 2021-02-02

## 2021-02-02 RX ORDER — DEXTROSE MONOHYDRATE, SODIUM CHLORIDE, AND POTASSIUM CHLORIDE 50; .745; 4.5 G/1000ML; G/1000ML; G/1000ML
1000 INJECTION, SOLUTION INTRAVENOUS
Refills: 0 | Status: DISCONTINUED | OUTPATIENT
Start: 2021-02-02 | End: 2021-02-03

## 2021-02-02 RX ORDER — PROCHLORPERAZINE MALEATE 5 MG
10 TABLET ORAL EVERY 6 HOURS
Refills: 0 | Status: DISCONTINUED | OUTPATIENT
Start: 2021-02-02 | End: 2021-02-03

## 2021-02-02 RX ORDER — METOPROLOL TARTRATE 50 MG
25 TABLET ORAL
Refills: 0 | Status: DISCONTINUED | OUTPATIENT
Start: 2021-02-02 | End: 2021-02-03

## 2021-02-02 RX ORDER — HYDRALAZINE HCL 50 MG
5 TABLET ORAL ONCE
Refills: 0 | Status: COMPLETED | OUTPATIENT
Start: 2021-02-02 | End: 2021-02-02

## 2021-02-02 RX ORDER — HEPARIN SODIUM 5000 [USP'U]/ML
5000 INJECTION INTRAVENOUS; SUBCUTANEOUS EVERY 8 HOURS
Refills: 0 | Status: DISCONTINUED | OUTPATIENT
Start: 2021-02-02 | End: 2021-02-03

## 2021-02-02 RX ADMIN — Medication 10 MILLIGRAM(S): at 14:41

## 2021-02-02 RX ADMIN — HEPARIN SODIUM 5000 UNIT(S): 5000 INJECTION INTRAVENOUS; SUBCUTANEOUS at 20:58

## 2021-02-02 RX ADMIN — Medication 25 MILLIGRAM(S): at 05:33

## 2021-02-02 RX ADMIN — Medication 25 MILLIGRAM(S): at 21:12

## 2021-02-02 RX ADMIN — ONDANSETRON 4 MILLIGRAM(S): 8 TABLET, FILM COATED ORAL at 05:00

## 2021-02-02 RX ADMIN — Medication 25 MILLIGRAM(S): at 10:25

## 2021-02-02 RX ADMIN — DEXTROSE MONOHYDRATE, SODIUM CHLORIDE, AND POTASSIUM CHLORIDE 75 MILLILITER(S): 50; .745; 4.5 INJECTION, SOLUTION INTRAVENOUS at 04:56

## 2021-02-02 RX ADMIN — Medication 3 MILLIGRAM(S): at 20:58

## 2021-02-02 RX ADMIN — Medication 10 MILLIGRAM(S): at 04:07

## 2021-02-02 RX ADMIN — Medication 1 TABLET(S): at 20:58

## 2021-02-02 RX ADMIN — HYDROMORPHONE HYDROCHLORIDE 4 MILLIGRAM(S): 2 INJECTION INTRAMUSCULAR; INTRAVENOUS; SUBCUTANEOUS at 20:57

## 2021-02-02 RX ADMIN — HYDROMORPHONE HYDROCHLORIDE 4 MILLIGRAM(S): 2 INJECTION INTRAMUSCULAR; INTRAVENOUS; SUBCUTANEOUS at 16:45

## 2021-02-02 RX ADMIN — Medication 5 MILLIGRAM(S): at 00:47

## 2021-02-02 RX ADMIN — Medication 2000 UNIT(S): at 20:58

## 2021-02-02 RX ADMIN — AMLODIPINE BESYLATE 10 MILLIGRAM(S): 2.5 TABLET ORAL at 10:26

## 2021-02-02 RX ADMIN — HYDROMORPHONE HYDROCHLORIDE 4 MILLIGRAM(S): 2 INJECTION INTRAMUSCULAR; INTRAVENOUS; SUBCUTANEOUS at 12:45

## 2021-02-02 RX ADMIN — SENNA PLUS 2 TABLET(S): 8.6 TABLET ORAL at 20:58

## 2021-02-02 RX ADMIN — Medication 10 MILLIGRAM(S): at 21:03

## 2021-02-02 RX ADMIN — DEXTROSE MONOHYDRATE, SODIUM CHLORIDE, AND POTASSIUM CHLORIDE 60 MILLILITER(S): 50; .745; 4.5 INJECTION, SOLUTION INTRAVENOUS at 18:02

## 2021-02-02 NOTE — PROVIDER CONTACT NOTE (CHANGE IN STATUS NOTIFICATION) - SITUATION
Notified Dr. Rosa that patient was having difficulty finding words, having confusion and stated she was "feeling fuzzy"

## 2021-02-02 NOTE — PROVIDER CONTACT NOTE (CHANGE IN STATUS NOTIFICATION) - ASSESSMENT
Neuro check stable, patient becoming more oriented as we engaged more with her, Neuro check stable, patient becoming more oriented and able to speak better the more we engaged with her

## 2021-02-02 NOTE — PROVIDER CONTACT NOTE (OTHER) - ACTION/TREATMENT ORDERED:
Ok to order 5mg of hydralazine as per Dr. Rosa. Will con't to monitor patient and blood pressure
one time dose 5 mg hydralazine IV
MD Rosa aware, no further orders received. Will continue to monitor.

## 2021-02-02 NOTE — PROGRESS NOTE ADULT - ASSESSMENT
66 yo WF PMH chronic intermittent porphyria, anxiety, adrenal insufficiency not on steroids, hx of MSSA bacteremia in mediport (8/2017), chronic abdominal pain was recently admitted to  from 12/23/2020 to 1/6/2021 for same. Had full EGD/Colonoscopy which were normal, biopsies from colon report normal mucosa. Patient states she has intermittent porphyria for which she has a R IJ mediport in place for daily D5/NS fluid infusions. Follows with Dr Maza who sent patient in for intractable vomiting and intractable abdominal pain. Patient unable to tolerate PO, reports she gets mild symptomatic improvement from pain medication and zofran. Notably, she reports while vomiting at home she had been sitting on the edge of her bed and fell forward, hitting the top of her head on her bed side table. She did not lose consciousness. Discussed with prior physician and baseline mentation is similar to presentation. Dr Curiel from gastroenterology saw patient in past and had been following due to above.  Admitted with fall, dehydration, intractable vomiting and intractable abdominal pain.      Fall due to  intractable vomiting and dehydration r/o orthostatics  CT head neg, pt stays that she stop taking amlodipine  Cr doubled since last admission, hemoconcentrated by CBC , with HGB and PLT increased significantly  IV hydration 125cc/hr--> 75 , tolerates some clears  fall precautions, check orthostatics  2/2 -mild met acidosis - monitor, cont IVFs     Intractable abdominal pain and vomiting, suspect per history remote possibility of acute intermittent porphyria crisis.  Recent EGD/Colonoscopy unremarkable  CT abd/pelvis today unchanged from 1/2021.  Zofran PRN, Dilaudid PRN hold for sedation  Treat underlying illness  ED discussed with Dr Curiel who will evaluate patient 2/1.  Replace electrolytes and hydrate    Reported history of chronic intermittent porphyria  Patient unlikely having flare  IV hydration as above  repeat ALA and PBG levels (urine 24 hr and plasma respectively)  Follow up outpt Dr Estrella    HTN  - c/w amlodipine 2.5, hydralazine IV prn when can not take po meds    Anxiety   Resume home xanax dosing PRN, hold for sedation    Hx of Adrenal insufficiency  Cortisol levels appropriate last admission  Currently normotensive, and at times requiring antihypertensive meds  Not on steroids    Depression/Mood disorder  patient denies, but affect abnormal  Behavioral health eval noted from prior admission  Continue to monitor off antipsychotic and antidepressants per psychiatric recommendations.  2/2 - discussed with RN - several deaths in the family this past year - will get psychiatry consult     Anemia, STEPHANIE, appears hemoconcentrated.  EGD/[place] as above unremarkable  Iron supp    Pyuria suspected UTI  UCX previously susceptible to ceftriaxone  IV ceftriaxone, f/u urine cx - NTD  2/2 - watch off abx     Elevated BNP, with HTN   - repeat echo , check lipid profile, a1c     VTE ppx lovenox 40mg qd    66 yo WF PMH chronic intermittent porphyria, anxiety, adrenal insufficiency not on steroids, hx of MSSA bacteremia in mediport (8/2017), chronic abdominal pain was recently admitted to  from 12/23/2020 to 1/6/2021 for same. Had full EGD/Colonoscopy which were normal, biopsies from colon report normal mucosa. Patient states she has intermittent porphyria for which she has a R IJ mediport in place for daily D5/NS fluid infusions. Follows with Dr Maza who sent patient in for intractable vomiting and intractable abdominal pain. Patient unable to tolerate PO, reports she gets mild symptomatic improvement from pain medication and zofran. Notably, she reports while vomiting at home she had been sitting on the edge of her bed and fell forward, hitting the top of her head on her bed side table. She did not lose consciousness. Discussed with prior physician and baseline mentation is similar to presentation. Dr Curiel from gastroenterology saw patient in past and had been following due to above.  Admitted with fall, dehydration, intractable vomiting and intractable abdominal pain.      Fall due to  intractable vomiting and dehydration r/o orthostatics  CT head neg, pt stays that she stop taking amlodipine  Cr doubled since last admission, hemoconcentrated by CBC , with HGB and PLT increased significantly  IV hydration 125cc/hr--> 75 , tolerates some clears  fall precautions, check orthostatics  2/2 -mild met acidosis - monitor, cont IVFs   MRI brain ordered    Intractable abdominal pain and vomiting, suspect per history remote possibility of acute intermittent porphyria crisis.  Recent EGD/Colonoscopy unremarkable  CT abd/pelvis today unchanged from 1/2021.  Zofran PRN, Dilaudid PRN hold for sedation  Treat underlying illness  ED discussed with Dr Curiel who will evaluate patient 2/1.  Replace electrolytes and hydrate    Reported history of chronic intermittent porphyria  Patient unlikely having flare  IV hydration as above  repeat ALA and PBG levels (urine 24 hr and plasma respectively)  Follow up outpt Dr Estrella    HTN  - c/w amlodipine 2.5, hydralazine IV prn when can not take po meds    Anxiety   Resume home xanax dosing PRN, hold for sedation    Hx of Adrenal insufficiency  Cortisol levels appropriate last admission  Currently normotensive, and at times requiring antihypertensive meds  Not on steroids    Depression/Mood disorder  patient denies, but affect abnormal  Behavioral health eval noted from prior admission  Continue to monitor off antipsychotic and antidepressants per psychiatric recommendations.  2/2 - discussed with RN - several deaths in the family this past year - will get psychiatry consult     Anemia, STEPHANIE, appears hemoconcentrated.  EGD/[place] as above unremarkable  Iron supp    Pyuria suspected UTI  UCX previously susceptible to ceftriaxone  IV ceftriaxone, f/u urine cx - NTD  2/2 - watch off abx     Elevated BNP, with HTN   - repeat echo , check lipid profile, a1c   2/2 - on amlodipine, BB - pt encouraged to be compliant with her meds     VTE ppx lovenox 40mg qd

## 2021-02-02 NOTE — PROVIDER CONTACT NOTE (OTHER) - SITUATION
Consult left with Dr. Maza answering service.
Patient received hydralazine with no relief. BP still elevated on reassessment
Spoke with Linh regarding consult
Pt blood pressure 173/65 HR:85 after 10mg of hydralazine given.
Patient BP still elevated after second dose of 5mg hydralazine

## 2021-02-02 NOTE — CONSULT NOTE ADULT - SUBJECTIVE AND OBJECTIVE BOX
HPI:  64 yo WF PMH chronic intermittent porphyria, anxiety, adrenal insufficiency not on steroids, hx of MSSA bacteremia in mediport (8/2017), chronic abdominal pain was recently admitted to  from 12/23/2020 to 1/6/2021 for same. Had full EGD/Colonoscopy which were normal, biopsies from colon report normal mucosa. Patient states she has intermittent porphyria for which she has a R IJ mediport in place for daily D5/NS fluid infusions. Follows with Dr Maza who sent patient in for intractable vomiting and intractable abdominal pain. Patient unable to tolerate PO, reports she gets mild symptomatic improvement from pain medication and zofran. Notably, she reports while vomiting at home she had been sitting on the edge of her bed and fell forward, hitting the top of her head on her bed side table. She did not lose consciousness. Discussed with prior physician and baseline mentation is similar to presentation. Dr Curiel from gastroenterology saw patient in past and had been following due to above.  PMH /PSH  s/p spinal fusion    s/p cholecystectomy    s/p tonsillectomy.  Adrenal Insufficiency   Chronic abdominal pain    Chronic Intermittent Porphryia  -----------------------  Of note patient likely does note have porphyria based on recent evaluation.  Current still has lower abdominal pain but also somewhat confused now  CT abd/pel without acute abnormalities    Home meds:  calcium (as calcium citrate) 250 mg oral tablet: 1 tab(s) orally 2 times a day (01 Feb 2021 13:08)  cholecalciferol 1000 intl units oral tablet: 1 tab(s) orally 2 times a day (01 Feb 2021 13:08)  HYDROmorphone 2 mg oral tablet: 3 tab(s) orally every 4 hours -6 hours for moderate pain MDD:36 (01 Feb 2021 13:08)  magnesium oxide 500 mg oral tablet: 1 tab(s) orally 2 times a day (01 Feb 2021 13:08)  melatonin 3 mg oral tablet: 5 tab(s) orally once a day (at bedtime) (01 Feb 2021 13:08)  multivitamin: 1 tab(s) orally once a day (01 Feb 2021 13:08)  ProAir HFA 90 mcg/inh inhalation aerosol: 2 puff(s) inhaled 4 times a day, As Needed bronchospasm (01 Feb 2021 13:08)      MEDICATIONS  (STANDING):  amLODIPine   Tablet 10 milliGRAM(s) Oral daily  cefTRIAXone Injectable.      cefTRIAXone Injectable. 1000 milliGRAM(s) IV Push every 24 hours  cholecalciferol 2000 Unit(s) Oral two times a day  ferrous    sulfate 325 milliGRAM(s) Oral daily  folic acid 1 milliGRAM(s) Oral daily  lactobacillus acidophilus 1 Tablet(s) Oral two times a day  melatonin 3 milliGRAM(s) Oral at bedtime  metoprolol tartrate 25 milliGRAM(s) Oral two times a day  multivitamin Oral Tab/Cap - Peds 1 Tablet(s) Oral daily  pantoprazole    Tablet 40 milliGRAM(s) Oral before breakfast  polyethylene glycol 3350 17 Gram(s) Oral two times a day  senna 2 Tablet(s) Oral at bedtime    MEDICATIONS  (PRN):  ALBUTerol  90 MICROgram(s) HFA Inhaler - Peds 2 Puff(s) Inhalation every 4 hours PRN Shortness of Breath and/or Wheezing  hydrALAZINE Injectable 10 milliGRAM(s) IV Push every 6 hours PRN for SBP > 140  HYDROmorphone   Tablet 4 milliGRAM(s) Oral every 4 hours PRN Severe Pain (7 - 10)  HYDROmorphone   Tablet 2 milliGRAM(s) Oral every 4 hours PRN Mild Pain (1 - 3)  HYDROmorphone  Injectable 1 milliGRAM(s) IV Push every 4 hours PRN breakthrough pain, when severe PO is not enough  ondansetron Injectable 4 milliGRAM(s) IV Push every 6 hours PRN Nausea and/or Vomiting  prochlorperazine   Tablet 10 milliGRAM(s) Oral every 6 hours PRN nausea/vomiting when ondansentron does not work      Allergies    chlorhexidine containing compounds (Rash)    Intolerances        SOCIAL HISTORY:    FAMILY HISTORY:  Family history of porphyria (Grandparent)        ROS  As above  Otherwise unremarkable    Vital Signs Last 24 Hrs  T(C): 37.7 (02 Feb 2021 09:28), Max: 37.7 (02 Feb 2021 09:28)  T(F): 99.9 (02 Feb 2021 09:28), Max: 99.9 (02 Feb 2021 09:28)  HR: 72 (02 Feb 2021 09:28) (65 - 111)  BP: 166/60 (02 Feb 2021 09:28) (156/86 - 185/75)  BP(mean): --  RR: 18 (02 Feb 2021 09:28) (18 - 18)  SpO2: 96% (02 Feb 2021 09:28) (96% - 100%)    Constitutional: NAD, awake and oriented but somewhat somnolent  Respiratory: CTAB  Cardiovascular: S1 and S2, RRR  Gastrointestinal: BS+, soft, moderate lower abdominal tenderness  Extremities: No peripheral edema  Psychiatric: Normal mood, normal affect  Skin: No rashes  No asterixis    LABS:                        13.3   12.28 )-----------( 488      ( 02 Feb 2021 07:59 )             42.3     02-02    141  |  113<H>  |  5<L>  ----------------------------<  142<H>  3.7   |  19<L>  |  0.67    Ca    9.6      02 Feb 2021 07:59  Phos  3.2     02-02  Mg     2.5     02-02    TPro  7.7  /  Alb  3.3  /  TBili  0.3  /  DBili  x   /  AST  12<L>  /  ALT  20  /  AlkPhos  81  02-01      LIVER FUNCTIONS - ( 01 Feb 2021 08:29 )  Alb: 3.3 g/dL / Pro: 7.7 gm/dL / ALK PHOS: 81 U/L / ALT: 20 U/L / AST: 12 U/L / GGT: x             RADIOLOGY & ADDITIONAL STUDIES:

## 2021-02-02 NOTE — PROVIDER CONTACT NOTE (CHANGE IN STATUS NOTIFICATION) - BACKGROUND
Pts blood pressure has been hypertensive even after hydralazine was given. BP now is 185/75m 10mg of hydralazine given

## 2021-02-02 NOTE — PROGRESS NOTE ADULT - SUBJECTIVE AND OBJECTIVE BOX
Subjective:  Patient is a 65y old  Female who presents with a chief complaint of intractable vomiting, fall   HPI:    66 yo WF PMH chronic intermittent porphyria, anxiety, adrenal insufficiency not on steroids, hx of MSSA bacteremia in [facility] (2017), chronic abdominal pain was recently admitted to  from 2020 to 2021 for same. Had full EGD/Colonoscopy which were normal, biopsies from colon report normal mucosa. Patient states she has intermittent porphyria for which she has a R IJ mediport in place for daily D5/NS fluid infusions. Follows with Dr Maza who sent patient in for intractable vomiting and intractable abdominal pain. Patient unable to tolerate PO, reports she gets mild symptomatic improvement from pain medication and zofran. Notably, she reports while vomiting at home she had been sitting on the edge of her bed and fell forward, hitting the top of her head on her bed side table. She did not lose consciousness. Discussed with prior physician and baseline mentation is similar to presentation. Dr Curiel from gastroenterology saw patient in past and had been following due to above.  PMH /PSH s/p spinal fusion  , s/p cholecystectomy  , s/p tonsillectomy,  Adrenal Insufficiency , Chronic abdominal pain , Chronic Intermittent Porphryia    2/ - pt seen and examined, reports nausea, abdominal pain, + poor po intake, + vomiting in am after meds , denies cough, CP, dyspnea, + dysuria. POC discussed  Review of system- Rest of the review of system are negative except mentioned in HPI    T(C): 36.8 (21 @ 09:07), Max: 37.3 (21 @ 22:47)  T(F): 98.3 (21 @ 09:07), Max: 99.2 (21 @ 05:14)  HR: 84 (21 @ 11:58) (63 - 88)  BP: 174/82 (21 @ 11:58) (156/83 - 190/64)  RR: 18 (21 @ 11:58) (18 - 20)  SpO2: 99% (21 @ 11:58) (97% - 100%)  Wt(kg): --    PHYSICAL EXAM:  GENERAL: NAD, speaks in full sentences, but slurred words, reports due to dehydration, no signs of respiratory distress  HEAD:  Atraumatic, Normocephalic  EYES: EOMI, PERRL, conjunctiva and sclera clear  NECK: Supple, No JVD, R neck/chest visualized mediport  CHEST/LUNG: Clear to auscultation bilaterally; No wheeze; No crackles; No accessory muscles used  HEART: Regular rate and rhythm; No murmurs;   ABDOMEN: Soft, Nondistended; Bowel sounds present; No guarding, + tenderness to deep palpation LLQ and LUQ only  EXTREMITIES:  2+ Peripheral Pulses, No cyanosis or edema  PSYCH: AAOx3  NEUROLOGY: non-focal  SKIN: No rashes or lesions      140  |  110<H>  |  4<L>  ----------------------------<  148<H>  3.1<L>   |  20<L>  |  0.68    Ca    8.7      2021 08:29  Mg     2.7         TPro  7.7  /  Alb  3.3  /  TBili  0.3  /  DBili  x   /  AST  12<L>  /  ALT  20  /  AlkPhos  81                              12.6   15.19 )-----------( 439      ( 2021 08:29 )             39.9     LIVER FUNCTIONS - ( 2021 08:29 )  Alb: 3.3 g/dL / Pro: 7.7 gm/dL / ALK PHOS: 81 U/L / ALT: 20 U/L / AST: 12 U/L / GGT: x           Urinalysis Basic - ( 2021 12:42 )    Color: Yellow / Appearance: Clear / S.010 / pH: x  Gluc: x / Ketone: Large  / Bili: Negative / Urobili: Negative mg/dL   Blood: x / Protein: 15 mg/dL / Nitrite: Negative   Leuk Esterase: Trace / RBC: 0-5 /HPF / WBC 6-10   Sq Epi: x / Non Sq Epi: Few / Bacteria: Few        142  |  109<H>  |  5<L>  ----------------------------<  138<H>  3.5   |  25  |  0.75    Ca    9.4      2021 11:07  TPro  7.8  /  Alb  3.3  /  TBili  0.3  /  DBili  x   /  AST  16  /  ALT  23  /  AlkPhos  84      RADIOLOGY & ADDITIONAL TESTS: all reviewed EKG reviewed  < from: CT Abdomen and Pelvis w/ IV Cont (21 @ 15:38) >  COMPARISON: 2020    PROCEDURE:  CT of the Abdomen and Pelvis wasperformed with intravenous contrast.  Intravenous contrast: 90 ml Omnipaque 350. 10 ml discarded.  Oral contrast: None.  Sagittal and coronal reformats were performed.    FINDINGS:  LOWER CHEST: Within normal limits.    LIVER: Within normal limits.  BILE DUCTS: Normal caliber.  GALLBLADDER: Cholecystectomy.  SPLEEN: Within normal limits.  PANCREAS: Within normal limits.  ADRENALS: Within normal limits.  KIDNEYS/URETERS: Within normal limits.    BLADDER: Within normal limits.  REPRODUCTIVE ORGANS:Calcified uterine myomas.    BOWEL: No bowel obstruction. The appendix is visualized and is normal.No evidence of diverticulitis or colitis. No evidence of gastroenteritis.  PERITONEUM: No ascites.  VESSELS: Atherosclerotic changes.  RETROPERITONEUM/LYMPH NODES: No lymphadenopathy.  ABDOMINAL WALL: Within normal limits.  BONES: Degenerative changes.    IMPRESSION:  Unremarkable CT scan of the abdomen and pelvis. No change since 2020.  < end of copied text >  < from: CT Head No Cont (21 @ 15:35) >  The brain demonstrates no abnormal attenuation.   No acute cerebral cortical infarct is seen.  No intracranial hemorrhage is found.  No mass effect is found in the brain.  The ventricles, sulci and basal cisterns appear unremarkable.  The orbits are unremarkable.  The paranasal sinuses are clear.  The nasal cavity appears intact.  The nasopharynx is symmetric.  The central skull base, petrous temporal bones and calvarium remain intact.    IMPRESSION:   Unremarkable head CT.    (COVID-19 PCR: NotDete: You can help in the fight against COVID-19. [facility] may contact    Surgical Pathology Report:   ACCESSION No: [id number]   MARIANNE RAMÍREZ 2   Surgical Final Report   Final Diagnosis   1. Duodenum (biopsy):   - Small bowel mucosa with no diagnostic abnormalities identified.   - Villous architecture preserved.   2. Stomach (biopsy):   - Gastric antral type mucosa with patchy mild chronic   inflammation and congestion   - A Diff Quik stain for H. pylori organisms is negative.   3. Stomach (biopsy):   - Gastric oxyntic type mucosa with a lymphoid aggregate.   - A Diff Quik stain for H. pylori organisms is negative. orphobilinogen,   Urine Quantitative (. @ 01:17)   Total Urine Volume: Comment: No total volume submitted. Aminolevulinic Acid - Urine: 2.1: INTERPRETIVE GUIDE:         RECENT CULTURES:      Current medications:  ALBUTerol  90 MICROgram(s) HFA Inhaler - Peds 2 Puff(s) Inhalation every 4 hours PRN  amLODIPine   Tablet 2.5 milliGRAM(s) Oral daily  cefTRIAXone Injectable.      cefTRIAXone Injectable. 1000 milliGRAM(s) IV Push every 24 hours  cholecalciferol 2000 Unit(s) Oral two times a day  ferrous    sulfate 325 milliGRAM(s) Oral daily  folic acid 1 milliGRAM(s) Oral daily  hydrALAZINE Injectable 10 milliGRAM(s) IV Push every 6 hours PRN  HYDROmorphone   Tablet 2 milliGRAM(s) Oral every 4 hours PRN  HYDROmorphone   Tablet 4 milliGRAM(s) Oral every 4 hours PRN  HYDROmorphone  Injectable 1 milliGRAM(s) IV Push every 4 hours PRN  lactobacillus acidophilus 1 Tablet(s) Oral two times a day  magnesium oxide 400 milliGRAM(s) Oral three times a day with meals  melatonin 3 milliGRAM(s) Oral at bedtime  multivitamin Oral Tab/Cap - Peds 1 Tablet(s) Oral daily  ondansetron Injectable 4 milliGRAM(s) IV Push every 6 hours PRN  pantoprazole    Tablet 40 milliGRAM(s) Oral before breakfast  polyethylene glycol 3350 17 Gram(s) Oral two times a day  senna 2 Tablet(s) Oral at bedtime            
Subjective:  Patient is a 65y old  Female who presents with a chief complaint of intractable vomiting, fall   HPI:    66 yo WF PMH chronic intermittent porphyria, anxiety, adrenal insufficiency not on steroids, hx of MSSA bacteremia in [facility] (8/2017), chronic abdominal pain was recently admitted to  from 12/23/2020 to 1/6/2021 for same. Had full EGD/Colonoscopy which were normal, biopsies from colon report normal mucosa. Patient states she has intermittent porphyria for which she has a R IJ mediport in place for daily D5/NS fluid infusions. Follows with Dr Maza who sent patient in for intractable vomiting and intractable abdominal pain. Patient unable to tolerate PO, reports she gets mild symptomatic improvement from pain medication and zofran. Notably, she reports while vomiting at home she had been sitting on the edge of her bed and fell forward, hitting the top of her head on her bed side table. She did not lose consciousness. Discussed with prior physician and baseline mentation is similar to presentation. Dr Curiel from gastroenterology saw patient in past and had been following due to above.  PMH /PSH s/p spinal fusion  , s/p cholecystectomy  , s/p tonsillectomy,  Adrenal Insufficiency , Chronic abdominal pain , Chronic Intermittent Porphryia    2/1 - pt seen and examined, reports nausea, abdominal pain, + poor po intake, + vomiting in am after meds , denies cough, CP, dyspnea, + dysuria. POC discussed  2/2 - no cp palps sob; reports porr po intake and an episode of emesis this morning; states she has abdo pain - allowed a limited examn; is having BMS    PHYSICAL EXAM:  GENERAL: NAD, speaks in full sentences, reports due to dehydration, no signs of respiratory distress  HEAD:  Atraumatic, Normocephalic  EYES: EOMI, PERRL, conjunctiva and sclera clear  NECK: Supple, No JVD, R neck/chest visualized [facility]  CHEST/LUNG: Clear to auscultation bilaterally; No wheeze; No crackles; No accessory muscles used  HEART: Regular rate and rhythm; No murmurs;   ABDOMEN: abdo soft but she reports tenderness to deep palpation in epigastric area   EXTREMITIES:  2+ Peripheral Pulses, No cyanosis or edema  PSYCH: AAOx3  NEUROLOGY: non-focal  SKIN: No rashes or lesions      CT of the Abdomen and Pelvis wasperformed with intravenous contrast.  LOWER CHEST: Within normal limits.  LIVER: Within normal limits.  BILE DUCTS: Normal caliber.  GALLBLADDER: Cholecystectomy.  SPLEEN: Within normal limits.  PANCREAS: Within normal limits.  ADRENALS: Within normal limits.  KIDNEYS/URETERS: Within normal limits.  BLADDER: Within normal limits.  REPRODUCTIVE ORGANS:Calcified uterine myomas.  BOWEL: No bowel obstruction. The appendix is visualized and is normal.No evidence of diverticulitis or colitis. No evidence of gastroenteritis.  PERITONEUM: No ascites.  VESSELS: Atherosclerotic changes.  RETROPERITONEUM/LYMPH NODES: No lymphadenopathy.  ABDOMINAL WALL: Within normal limits.  BONES: Degenerative changes.  IMPRESSION:  Unremarkable CT scan of the abdomen and pelvis. No change since 12/23/2020.      < from: CT Head No Cont (01.31.21 @ 15:35) >  The brain demonstrates no abnormal attenuation.   No acute cerebral cortical infarct is seen.  No intracranial hemorrhage is found.  No mass effect is found in the brain.  The ventricles, sulci and basal cisterns appear unremarkable.  The orbits are unremarkable.  The paranasal sinuses are clear.  The nasal cavity appears intact.  The nasopharynx is symmetric.  The central skull base, petrous temporal bones and calvarium remain intact.  IMPRESSION:   Unremarkable head CT.    Surgical Pathology Report:   ACCESSION No: [id number]   MARIANNE RAMÍREZ 2   Surgical Final Report   Final Diagnosis   1. Duodenum (biopsy):   - Small bowel mucosa with no diagnostic abnormalities identified.   - Villous architecture preserved.   2. Stomach (biopsy):   - Gastric antral type mucosa with patchy mild chronic   inflammation and congestion   - A Diff Quik stain for H. pylori organisms is negative.   3. Stomach (biopsy):   - Gastric oxyntic type mucosa with a lymphoid aggregate.   - A Diff Quik stain for H. pylori organisms is negative. orphobilinogen,   Urine Quantitative (12.23.20 @ 01:17)   Total Urine Volume: Comment: No total volume submitted. Aminolevulinic Acid - Urine: 2.1: INTERPRETIVE GUIDE:       LABS: All Labs Reviewed:                        13.3   12.28 )-----------( 488      ( 02 Feb 2021 07:59 )             42.3     141  |  113<H>  |  5<L>  ----------------------------<  142<H>  3.7   |  19<L>  |  0.67  TPro  7.7  /  Alb  3.3  /  TBili  0.3  /  DBili  x   /  AST  12<L>  /  ALT  20  /  AlkPhos  81  02-01    ALBUTerol  90 MICROgram(s) HFA Inhaler - Peds 2 Puff(s) Inhalation every 4 hours PRN  amLODIPine   Tablet 10 milliGRAM(s) Oral daily  cholecalciferol 2000 Unit(s) Oral two times a day  ferrous    sulfate 325 milliGRAM(s) Oral daily  folic acid 1 milliGRAM(s) Oral daily  hydrALAZINE Injectable 10 milliGRAM(s) IV Push every 6 hours PRN  HYDROmorphone   Tablet 4 milliGRAM(s) Oral every 4 hours PRN  HYDROmorphone   Tablet 2 milliGRAM(s) Oral every 4 hours PRN  HYDROmorphone  Injectable 1 milliGRAM(s) IV Push every 4 hours PRN  lactobacillus acidophilus 1 Tablet(s) Oral two times a day  melatonin 3 milliGRAM(s) Oral at bedtime  metoprolol tartrate 25 milliGRAM(s) Oral two times a day  multivitamin Oral Tab/Cap - Peds 1 Tablet(s) Oral daily  ondansetron Injectable 4 milliGRAM(s) IV Push every 6 hours PRN  pantoprazole    Tablet 40 milliGRAM(s) Oral before breakfast  polyethylene glycol 3350 17 Gram(s) Oral two times a day  prochlorperazine   Injectable 10 milliGRAM(s) IV Push every 6 hours PRN  senna 2 Tablet(s) Oral at bedtime

## 2021-02-02 NOTE — CONSULT NOTE ADULT - ASSESSMENT
Imp:  Chronic/recurrent abdominal pain  Strongly doubt porhyria    Rec:  Supportive care for now  Will re-order the genetic testing for porphyria which patient states she never received in the mail

## 2021-02-02 NOTE — PROVIDER CONTACT NOTE (OTHER) - REASON
Hematology consult.
GI Consult
patient hypertensive
Elevated BP
Patient received BP meds with no relief, on reassessment elevated BP

## 2021-02-03 ENCOUNTER — TRANSCRIPTION ENCOUNTER (OUTPATIENT)
Age: 66
End: 2021-02-03

## 2021-02-03 VITALS
OXYGEN SATURATION: 98 % | HEART RATE: 95 BPM | DIASTOLIC BLOOD PRESSURE: 74 MMHG | SYSTOLIC BLOOD PRESSURE: 134 MMHG | TEMPERATURE: 98 F | RESPIRATION RATE: 18 BRPM

## 2021-02-03 LAB
ANION GAP SERPL CALC-SCNC: 10 MMOL/L — SIGNIFICANT CHANGE UP (ref 5–17)
BUN SERPL-MCNC: 12 MG/DL — SIGNIFICANT CHANGE UP (ref 7–23)
CALCIUM SERPL-MCNC: 9.5 MG/DL — SIGNIFICANT CHANGE UP (ref 8.5–10.1)
CHLORIDE SERPL-SCNC: 110 MMOL/L — HIGH (ref 96–108)
CO2 SERPL-SCNC: 20 MMOL/L — LOW (ref 22–31)
CREAT SERPL-MCNC: 0.59 MG/DL — SIGNIFICANT CHANGE UP (ref 0.5–1.3)
GLUCOSE SERPL-MCNC: 116 MG/DL — HIGH (ref 70–99)
HCT VFR BLD CALC: 43.1 % — SIGNIFICANT CHANGE UP (ref 34.5–45)
HGB BLD-MCNC: 13.2 G/DL — SIGNIFICANT CHANGE UP (ref 11.5–15.5)
MAGNESIUM SERPL-MCNC: 2.5 MG/DL — SIGNIFICANT CHANGE UP (ref 1.6–2.6)
MCHC RBC-ENTMCNC: 25.6 PG — LOW (ref 27–34)
MCHC RBC-ENTMCNC: 30.6 GM/DL — LOW (ref 32–36)
MCV RBC AUTO: 83.5 FL — SIGNIFICANT CHANGE UP (ref 80–100)
PLATELET # BLD AUTO: 450 K/UL — HIGH (ref 150–400)
POTASSIUM SERPL-MCNC: 3.5 MMOL/L — SIGNIFICANT CHANGE UP (ref 3.5–5.3)
POTASSIUM SERPL-SCNC: 3.5 MMOL/L — SIGNIFICANT CHANGE UP (ref 3.5–5.3)
RBC # BLD: 5.16 M/UL — SIGNIFICANT CHANGE UP (ref 3.8–5.2)
RBC # FLD: 21.7 % — HIGH (ref 10.3–14.5)
SODIUM SERPL-SCNC: 140 MMOL/L — SIGNIFICANT CHANGE UP (ref 135–145)
WBC # BLD: 12.42 K/UL — HIGH (ref 3.8–10.5)
WBC # FLD AUTO: 12.42 K/UL — HIGH (ref 3.8–10.5)

## 2021-02-03 PROCEDURE — 90792 PSYCH DIAG EVAL W/MED SRVCS: CPT

## 2021-02-03 PROCEDURE — 99239 HOSP IP/OBS DSCHRG MGMT >30: CPT

## 2021-02-03 RX ORDER — POTASSIUM CHLORIDE 20 MEQ
40 PACKET (EA) ORAL ONCE
Refills: 0 | Status: COMPLETED | OUTPATIENT
Start: 2021-02-03 | End: 2021-02-03

## 2021-02-03 RX ORDER — METOPROLOL TARTRATE 50 MG
1 TABLET ORAL
Qty: 60 | Refills: 0
Start: 2021-02-03 | End: 2021-03-04

## 2021-02-03 RX ORDER — AMLODIPINE BESYLATE 2.5 MG/1
1 TABLET ORAL
Qty: 30 | Refills: 0
Start: 2021-02-03 | End: 2021-03-04

## 2021-02-03 RX ADMIN — Medication 10 MILLIGRAM(S): at 00:49

## 2021-02-03 RX ADMIN — Medication 2000 UNIT(S): at 10:07

## 2021-02-03 RX ADMIN — Medication 1 MILLIGRAM(S): at 10:08

## 2021-02-03 RX ADMIN — PANTOPRAZOLE SODIUM 40 MILLIGRAM(S): 20 TABLET, DELAYED RELEASE ORAL at 10:08

## 2021-02-03 RX ADMIN — HYDROMORPHONE HYDROCHLORIDE 4 MILLIGRAM(S): 2 INJECTION INTRAMUSCULAR; INTRAVENOUS; SUBCUTANEOUS at 11:42

## 2021-02-03 RX ADMIN — Medication 1 TABLET(S): at 10:07

## 2021-02-03 RX ADMIN — Medication 1 TABLET(S): at 10:08

## 2021-02-03 RX ADMIN — HEPARIN SODIUM 5000 UNIT(S): 5000 INJECTION INTRAVENOUS; SUBCUTANEOUS at 05:21

## 2021-02-03 RX ADMIN — AMLODIPINE BESYLATE 10 MILLIGRAM(S): 2.5 TABLET ORAL at 10:08

## 2021-02-03 RX ADMIN — Medication 325 MILLIGRAM(S): at 10:08

## 2021-02-03 RX ADMIN — Medication 10 MILLIGRAM(S): at 12:17

## 2021-02-03 RX ADMIN — Medication 25 MILLIGRAM(S): at 10:08

## 2021-02-03 RX ADMIN — HYDROMORPHONE HYDROCHLORIDE 4 MILLIGRAM(S): 2 INJECTION INTRAMUSCULAR; INTRAVENOUS; SUBCUTANEOUS at 05:25

## 2021-02-03 RX ADMIN — Medication 10 MILLIGRAM(S): at 05:25

## 2021-02-03 NOTE — BEHAVIORAL HEALTH ASSESSMENT NOTE - SUMMARY
65 year-old  female, with history of Anxiety, with no other psychiatric history, with no prior suicidal ideation or suicide attempt, no in-patient hospitalization, no substance abuse, presenting for acute exacerbation of porphyria. Pt is surprised that psychiatry is called. Initially she stages that she  is ghere because she watched a movie about a man who travels around the world , but later clarified that she is in hospital because she fell. PT denies depress and anxiety. She states that in hospital her appetite and sleep are  poor, but she milner not want any meds. She denies any type of SI, HI, ah, VH., PI. NO substance abuse. Saw psych once in HH month ago, no other hx. NO hx of psychotropic meds.

## 2021-02-03 NOTE — DISCHARGE NOTE PROVIDER - NSDCMRMEDTOKEN_GEN_ALL_CORE_FT
amLODIPine 10 mg oral tablet: 1 tab(s) orally once a day  calcium (as calcium citrate) 250 mg oral tablet: 1 tab(s) orally 2 times a day  cholecalciferol 1000 intl units oral tablet: 1 tab(s) orally 2 times a day  Dulcolax Stool Softener 100 mg oral capsule: 1 cap(s) orally once a day  -for constipation   ferrous sulfate 325 mg (65 mg elemental iron) oral tablet: 1 tab(s) orally once a day  folic acid 1 mg oral tablet: 1 tab(s) orally once a day  HYDROmorphone 2 mg oral tablet: 3 tab(s) orally every 4 hours -6 hours for moderate pain MDD:36  lactobacillus acidophilus oral capsule: 1 cap(s) orally 2 times a day 2 hours after antibiotics  magnesium oxide 500 mg oral tablet: 1 tab(s) orally 2 times a day  melatonin 3 mg oral tablet: 5 tab(s) orally once a day (at bedtime)  metoprolol tartrate 25 mg oral tablet: 1 tab(s) orally 2 times a day  MiraLax oral powder for reconstitution: 17 gram(s) orally once a day, As Needed -for constipation do not take it if daily bowel movements (does not take it at home)  multivitamin: 1 tab(s) orally once a day  ondansetron 4 mg oral tablet, disintegratin tab(s) orally every 6 hours, As needed, Nausea and/or Vomiting  pantoprazole 40 mg oral delayed release tablet: 1 tab(s) orally once a day (before a meal)  ProAir HFA 90 mcg/inh inhalation aerosol: 2 puff(s) inhaled 4 times a day, As Needed bronchospasm

## 2021-02-03 NOTE — DISCHARGE NOTE PROVIDER - CARE PROVIDER_API CALL
Ulises Curiel)  Gastroenterology; Internal Medicine  [address], [address]  [place] [address]  Phone: [phone]  Fax: [phone]  Follow Up Time: 1 week    Suman Seay)  Medical Oncology  [address] [place] [address]  Phone: [phone]  Fax: [phone]  Follow Up Time: 1 week

## 2021-02-03 NOTE — DISCHARGE NOTE PROVIDER - PROVIDER TOKENS
PROVIDER:[TOKEN:[[id number]],FOLLOWUP:[1 week]],PROVIDER:[TOKEN:[[id number]],FOLLOWUP:[1 week]]

## 2021-02-03 NOTE — BEHAVIORAL HEALTH ASSESSMENT NOTE - RISK ASSESSMENT
LOW RISK    ACUTE RISK FACTORS: exacerbated acute medical comorbidities,     CHRONIC RISK FACTORS: medical comorbidities     PROTECTIVE FACTORS: no prior / current suicidal ideation/intent/plan, future oriented, supportive family, good insight. Low Acute Suicide Risk

## 2021-02-03 NOTE — BEHAVIORAL HEALTH ASSESSMENT NOTE - HPI (INCLUDE ILLNESS QUALITY, SEVERITY, DURATION, TIMING, CONTEXT, MODIFYING FACTORS, ASSOCIATED SIGNS AND SYMPTOMS)
Pt is a 65 year-old  female, with history of Anxiety, with no formal psychiatric history, with no prior suicidal ideation or suicide attempt, no in-patient hospitalization, no substance abuse, presenting s/p fall.     Patient presenting alert and oriented to person, time, place and situation. Patient is tangential and overinclusive. Patient is anxious, appearing to be elevated by frustration over being hard of hearing and not having her hearing aids. Patient reports coming to hospital for acute exacerbation of porphyria, abdominal pain with nausea, vomiting and diarrhea, along with electrolyte abnormalities. Patient reports emotional distress secondary to acute medical comorbidities, primarily associated pain / discomforted. Reports depressed mood however denies classifying self as clinically depressed. Denies hopelessness. Denies burdensomeness. Reports anergia, however this is confounded by medical comorbidities. Patient reports mild anxiety, again from active medical issues; however at baseline does not excessively worry. Denies panic. Denies manic / psychotic symptoms. No delusions elicited. Reports "I do not like psychiatry; I do not want anything from you." Denies need for psychiatric treatment; denying psychotropic management.    Sister provides collateral, stating: patient does not have a formal psychiatric history, with no prior suicidal ideation or suicide attempt or in-patient hospitalization. Patient has episodes of situational depressed and anxious mood secondary to acute medical comorbidities (primarily porphyria) but otherwise has no complaints when in stable medical condition. Reports having porphyria for 20 years and has been mostly treated at [facility]. Reports patient has had 3-4 attacks this year, with last one starting around Thanksgiving but patient did not want to come to hospital secondary to COVID. Reports patient started having alterations in her mentation, hallucinating, being paranoid, stating sister is trying to kill her, stating seeing her  father in the house etc. Reports now however, patient's mentation is more in line with baseline, although remains in distress. Reports emotional stressors:  father and mother being in nursing home. Denies other emotional stressors. Reports being frustrated over

## 2021-02-03 NOTE — DISCHARGE NOTE PROVIDER - HOSPITAL COURSE
64 yo WF PMH chronic intermittent porphyria, anxiety, adrenal insufficiency not on steroids, hx of MSSA bacteremia in mediport (8/2017), chronic abdominal pain was recently admitted to  from 12/23/2020 to 1/6/2021 for same. Had full EGD/Colonoscopy which were normal, biopsies from colon report normal mucosa. Patient states she has intermittent porphyria for which she has a R IJ mediport in place for daily D5/NS fluid infusions. Follows with Dr Maza who sent patient in for intractable vomiting and intractable abdominal pain. Patient unable to tolerate PO, reports she gets mild symptomatic improvement from pain medication and zofran. Notably, she reports while vomiting at home she had been sitting on the edge of her bed and fell forward, hitting the top of her head on her bed side table. She did not lose consciousness. Discussed with prior physician and baseline mentation is similar to presentation. Dr Curiel from gastroenterology saw patient in past and had been following due to above.  PMH /PSH s/p spinal fusion  , s/p cholecystectomy  , s/p tonsillectomy,  Adrenal Insufficiency , Chronic abdominal pain , Chronic Intermittent Porphryia    HOSPITAL COURSE  The patient was admitted due to Fall due to  intractable vomiting and dehydration   CT head neg, BPs are elevated and pt stays that she stopped taking amlodipine  Labs suggestive of hemoconcentration -  CBC with HGB and PLT increased significantly  IV hydration 125cc/hr--> 75 , tolerates some clears  fall precautions, check orthostatics  2/3 - Pt had been weak and tired and MRI brain ordered, results noted - unremarkable  psychosomatic component /depression? Pt seen by psychiatry, okay to discharge     Intractable abdominal pain and vomiting per patient  Recent EGD/Colonoscopy unremarkable  CT abd/pelvis today unchanged from 1/2021.  Zofran PRN    Reported history of chronic intermittent porphyria  Patient unlikely having flare  repeat ALA and PBG levels (urine 24 hr and plasma respectively); Prophyria testing has been negative so far .   F/U with GI for further genetic testing  Follow up outpt Dr Estrella and Dr Curiel    HTN - c/w amlodipine etc     Anxiety - Resume home xanax dosing PRN, hold for sedation    Hx of Adrenal insufficiency - Cortisol levels appropriate last admission, is hypertensive, not on steroids     Depression/Mood disorder  patient denies, but affect abnormal  Continue to monitor off antipsychotic and antidepressants per psychiatric recommendations.    Anemia, STEPHANIE, appears hemoconcentrated. - EGD/[place] as above unremarkable    Pyuria suspected UTI  UCX previously susceptible to ceftriaxone  IV ceftriaxone, f/u urine cx - NTD - Pt is stable off abx     Elevated BNP, with HTN  - repeat echo  - unremarkable, EF 65%     2/3 - Pt is stable for discharge - CT imaging of brain, CT Abdo Pelvis and MRI brain are unremarkable; ECHO is unremarkable    OTHER DETAILS  2/3 - no cp palps sob; is feeling better, talking, on po intake  PHYSICAL EXAM:  GENERAL: NAD, able to lie flat in bed  EYES: EOMI, PERRLA, normal sclera  ENT: Moist mucous membranes  NECK: Supple, No JVD, no nuchal rigidity  CHEST/LUNG: Clear to auscultation bilaterally; No rales, rhonchi, wheezing, or rubs. Unlabored respirations  HEART: Regular rate and rhythm; No murmurs, rubs, or gallops  ABDOMEN: allowed abdo examn today - Soft, Nontender, Nondistended. normal bowel sounds   EXTREMITIES:  no pitting bilaterally  NERVOUS SYSTEM:  Alert & Oriented X3, speech clear. No focal motor or sensory deficits  MSK: FROM all 4 extremities, full and equal strength    LABS: All Labs Reviewed:                      13.2   12.42 )-----------( 450      ( 03 Feb 2021 07:24 )             43.1   140  |  110<H>  |  12  ----------------------------<  116<H>  3.5   |  20<L>  |  0.59  < from: MR Head No Cont (02.02.21 @ 11:30) >  There is no abnormal restricted diffusion to suggest acute infarction. Scattered periventricular and subcortical white matter T2 /FLAIR hyperintensitiesare seen without mass effect, nonspecific, likely representing mild chronic microvascular changes.  Normal T2 flow-voids are seen within  the intracranial vasculature. The lateral ventricles and cortical sulci are age-appropriate in size and configuration. There is no mass, mass effect, or extra-axial fluid collection. There is no susceptibility artifact to suggest hemorrhage. Midline structures are normal.  The visualized paranasal sinuses, mastoid air cells and orbits are normal.  < from: CT Abdomen and Pelvis w/ IV Cont (01.31.21 @ 15:38) >  Unremarkable CT scan of the abdomen and pelvis.    discussed with patient and team at [facility]  time spent on  discharge - 55 mins

## 2021-02-03 NOTE — DISCHARGE NOTE NURSING/CASE MANAGEMENT/SOCIAL WORK - PATIENT PORTAL LINK FT
You can access the FollowMyHealth Patient Portal offered by [facility] by registering at the following website: [email or web address]. By joining [facility]’s FollowMyHealth portal, you will also be able to view your health information using other applications (apps) compatible with our system.

## 2021-02-03 NOTE — DISCHARGE NOTE PROVIDER - NSDCCPCAREPLAN_GEN_ALL_CORE_FT
PRINCIPAL DISCHARGE DIAGNOSIS  Diagnosis: Nausea & vomiting  Assessment and Plan of Treatment: better with bowel rest, antiemetics and IVFs, follow-up with GI Dr Curiel      SECONDARY DISCHARGE DIAGNOSES  Diagnosis: Chronic abdominal pain  Assessment and Plan of Treatment: follow-up with Nella Curiel and Link

## 2021-02-04 RX ORDER — AMLODIPINE BESYLATE 2.5 MG/1
1 TABLET ORAL
Qty: 30 | Refills: 0
Start: 2021-02-04 | End: 2021-03-05

## 2021-02-05 RX ORDER — METOPROLOL TARTRATE 50 MG
1 TABLET ORAL
Qty: 60 | Refills: 0
Start: 2021-02-05 | End: 2021-03-06

## 2021-02-05 RX ORDER — AMLODIPINE BESYLATE 2.5 MG/1
1 TABLET ORAL
Qty: 30 | Refills: 0
Start: 2021-02-05 | End: 2021-03-06

## 2021-02-06 LAB
CULTURE RESULTS: SIGNIFICANT CHANGE UP
SPECIMEN SOURCE: SIGNIFICANT CHANGE UP

## 2021-02-08 LAB
COPROPORPHYRIN I - 24 HOUR URINE: 26.7 MCG/24 H — SIGNIFICANT CHANGE UP (ref 7.1–48.7)
COPROPORPHYRIN I - RANDOM URINE: 61.9 — HIGH (ref 6.5–33.2)
COPROPORPHYRIN III - 24 HOUR URINE: 65 MCG/24 H — SIGNIFICANT CHANGE UP (ref 11–148.5)
COPROPORPHYRIN III - RANDOM URINE: 152.1 — HIGH (ref 4.8–88.6)
HEPTA-CP UR-MCNC: SIGNIFICANT CHANGE UP
HEPTA-CP UR-MCNC: SIGNIFICANT CHANGE UP MCG/24 H
HEXA-CP UR-MCNC: SIGNIFICANT CHANGE UP
HEXA-CP UR-MCNC: SIGNIFICANT CHANGE UP MCG/24 H
PENTA-CPI 24H STL-MRATE: 1 MCG/24 H — SIGNIFICANT CHANGE UP
PENTACARBOXYPORPHRIN, RANDOM URINE: 2.1 — SIGNIFICANT CHANGE UP
PORPHYRINS FRACTIONATED 24 HOUR URINE INTERPRETATION: SIGNIFICANT CHANGE UP
PORPHYRINS RANDOM URINE INTERPRETATION: SIGNIFICANT CHANGE UP
PORPHYRINS UR-MCNC: 1000 ML — SIGNIFICANT CHANGE UP
PORPHYRINS UR-MCNC: 251.4 — HIGH (ref 27–153.6)
UROPOR 24H SFR UR: 108.9 MCG/24 H — SIGNIFICANT CHANGE UP (ref 35–210.7)
UROPORPHYRIN I - 24 HOUR URINE: 14.1 MCG/24 H — SIGNIFICANT CHANGE UP (ref 4.1–22.4)
UROPORPHYRIN I - RANDOM URINE: 31 — HIGH (ref 3.6–21.1)
UROPORPHYRIN III - 24 HOUR URINE: 2.1 MCG/24 H — SIGNIFICANT CHANGE UP (ref 0.7–7.4)
UROPORPHYRIN III - RANDOM URINE: 4.3 — SIGNIFICANT CHANGE UP

## 2021-02-09 ENCOUNTER — EMERGENCY (EMERGENCY)
Facility: HOSPITAL | Age: 66
LOS: 0 days | Discharge: HOME | End: 2021-02-09
Attending: EMERGENCY MEDICINE
Payer: MEDICARE

## 2021-02-09 VITALS
HEART RATE: 86 BPM | OXYGEN SATURATION: 99 % | TEMPERATURE: 98 F | RESPIRATION RATE: 16 BRPM | DIASTOLIC BLOOD PRESSURE: 67 MMHG | WEIGHT: 145.06 LBS | SYSTOLIC BLOOD PRESSURE: 147 MMHG | HEIGHT: 66 IN

## 2021-02-09 VITALS
SYSTOLIC BLOOD PRESSURE: 132 MMHG | OXYGEN SATURATION: 100 % | TEMPERATURE: 98 F | RESPIRATION RATE: 16 BRPM | HEART RATE: 82 BPM | DIASTOLIC BLOOD PRESSURE: 74 MMHG

## 2021-02-09 DIAGNOSIS — M25.469 EFFUSION, UNSPECIFIED KNEE: ICD-10-CM

## 2021-02-09 DIAGNOSIS — Z90.49 ACQUIRED ABSENCE OF OTHER SPECIFIED PARTS OF DIGESTIVE TRACT: Chronic | ICD-10-CM

## 2021-02-09 DIAGNOSIS — E27.40 UNSPECIFIED ADRENOCORTICAL INSUFFICIENCY: ICD-10-CM

## 2021-02-09 DIAGNOSIS — M81.0 AGE-RELATED OSTEOPOROSIS WITHOUT CURRENT PATHOLOGICAL FRACTURE: ICD-10-CM

## 2021-02-09 DIAGNOSIS — E83.41 HYPERMAGNESEMIA: ICD-10-CM

## 2021-02-09 DIAGNOSIS — E80.20 UNSPECIFIED PORPHYRIA: ICD-10-CM

## 2021-02-09 DIAGNOSIS — K51.30 ULCERATIVE (CHRONIC) RECTOSIGMOIDITIS WITHOUT COMPLICATIONS: ICD-10-CM

## 2021-02-09 DIAGNOSIS — E87.6 HYPOKALEMIA: ICD-10-CM

## 2021-02-09 DIAGNOSIS — R19.7 DIARRHEA, UNSPECIFIED: ICD-10-CM

## 2021-02-09 DIAGNOSIS — Z98.1 ARTHRODESIS STATUS: ICD-10-CM

## 2021-02-09 DIAGNOSIS — R10.32 LEFT LOWER QUADRANT PAIN: ICD-10-CM

## 2021-02-09 DIAGNOSIS — G89.29 OTHER CHRONIC PAIN: ICD-10-CM

## 2021-02-09 DIAGNOSIS — N39.0 URINARY TRACT INFECTION, SITE NOT SPECIFIED: ICD-10-CM

## 2021-02-09 DIAGNOSIS — M19.90 UNSPECIFIED OSTEOARTHRITIS, UNSPECIFIED SITE: ICD-10-CM

## 2021-02-09 DIAGNOSIS — R10.30 LOWER ABDOMINAL PAIN, UNSPECIFIED: ICD-10-CM

## 2021-02-09 DIAGNOSIS — E87.2 ACIDOSIS: ICD-10-CM

## 2021-02-09 DIAGNOSIS — R55 SYNCOPE AND COLLAPSE: ICD-10-CM

## 2021-02-09 DIAGNOSIS — I10 ESSENTIAL (PRIMARY) HYPERTENSION: ICD-10-CM

## 2021-02-09 DIAGNOSIS — R10.9 UNSPECIFIED ABDOMINAL PAIN: ICD-10-CM

## 2021-02-09 DIAGNOSIS — Z79.51 LONG TERM (CURRENT) USE OF INHALED STEROIDS: ICD-10-CM

## 2021-02-09 DIAGNOSIS — Z91.81 HISTORY OF FALLING: ICD-10-CM

## 2021-02-09 DIAGNOSIS — Z88.2 ALLERGY STATUS TO SULFONAMIDES: ICD-10-CM

## 2021-02-09 DIAGNOSIS — Z79.810 LONG TERM (CURRENT) USE OF SELECTIVE ESTROGEN RECEPTOR MODULATORS (SERMS): ICD-10-CM

## 2021-02-09 DIAGNOSIS — D50.9 IRON DEFICIENCY ANEMIA, UNSPECIFIED: ICD-10-CM

## 2021-02-09 DIAGNOSIS — E86.0 DEHYDRATION: ICD-10-CM

## 2021-02-09 DIAGNOSIS — F43.23 ADJUSTMENT DISORDER WITH MIXED ANXIETY AND DEPRESSED MOOD: ICD-10-CM

## 2021-02-09 DIAGNOSIS — Z79.4 LONG TERM (CURRENT) USE OF INSULIN: ICD-10-CM

## 2021-02-09 DIAGNOSIS — Z79.83 LONG TERM (CURRENT) USE OF BISPHOSPHONATES: ICD-10-CM

## 2021-02-09 DIAGNOSIS — Z79.52 LONG TERM (CURRENT) USE OF SYSTEMIC STEROIDS: ICD-10-CM

## 2021-02-09 DIAGNOSIS — Z79.891 LONG TERM (CURRENT) USE OF OPIATE ANALGESIC: ICD-10-CM

## 2021-02-09 DIAGNOSIS — Z98.1 ARTHRODESIS STATUS: Chronic | ICD-10-CM

## 2021-02-09 DIAGNOSIS — Z79.1 LONG TERM (CURRENT) USE OF NON-STEROIDAL ANTI-INFLAMMATORIES (NSAID): ICD-10-CM

## 2021-02-09 DIAGNOSIS — Z90.89 ACQUIRED ABSENCE OF OTHER ORGANS: Chronic | ICD-10-CM

## 2021-02-09 DIAGNOSIS — Z88.8 ALLERGY STATUS TO OTHER DRUGS, MEDICAMENTS AND BIOLOGICAL SUBSTANCES: ICD-10-CM

## 2021-02-09 DIAGNOSIS — R11.2 NAUSEA WITH VOMITING, UNSPECIFIED: ICD-10-CM

## 2021-02-09 LAB
ALBUMIN SERPL ELPH-MCNC: 3.5 G/DL — SIGNIFICANT CHANGE UP (ref 3.3–5)
ALP SERPL-CCNC: 87 U/L — SIGNIFICANT CHANGE UP (ref 40–120)
ALT FLD-CCNC: 66 U/L — SIGNIFICANT CHANGE UP (ref 12–78)
ANION GAP SERPL CALC-SCNC: 7 MMOL/L — SIGNIFICANT CHANGE UP (ref 5–17)
APPEARANCE UR: CLEAR — SIGNIFICANT CHANGE UP
APTT BLD: 34.9 SEC — SIGNIFICANT CHANGE UP (ref 27.5–35.5)
AST SERPL-CCNC: 16 U/L — SIGNIFICANT CHANGE UP (ref 15–37)
BASOPHILS # BLD AUTO: 0.03 K/UL — SIGNIFICANT CHANGE UP (ref 0–0.2)
BASOPHILS NFR BLD AUTO: 0.2 % — SIGNIFICANT CHANGE UP (ref 0–2)
BILIRUB SERPL-MCNC: 0.4 MG/DL — SIGNIFICANT CHANGE UP (ref 0.2–1.2)
BILIRUB UR-MCNC: NEGATIVE — SIGNIFICANT CHANGE UP
BUN SERPL-MCNC: 8 MG/DL — SIGNIFICANT CHANGE UP (ref 7–23)
CALCIUM SERPL-MCNC: 10.1 MG/DL — SIGNIFICANT CHANGE UP (ref 8.5–10.1)
CHLORIDE SERPL-SCNC: 108 MMOL/L — SIGNIFICANT CHANGE UP (ref 96–108)
CO2 SERPL-SCNC: 26 MMOL/L — SIGNIFICANT CHANGE UP (ref 22–31)
COLOR SPEC: YELLOW — SIGNIFICANT CHANGE UP
CREAT SERPL-MCNC: 0.54 MG/DL — SIGNIFICANT CHANGE UP (ref 0.5–1.3)
DIFF PNL FLD: NEGATIVE — SIGNIFICANT CHANGE UP
EOSINOPHIL # BLD AUTO: 0.02 K/UL — SIGNIFICANT CHANGE UP (ref 0–0.5)
EOSINOPHIL NFR BLD AUTO: 0.1 % — SIGNIFICANT CHANGE UP (ref 0–6)
GLUCOSE SERPL-MCNC: 115 MG/DL — HIGH (ref 70–99)
GLUCOSE UR QL: NEGATIVE MG/DL — SIGNIFICANT CHANGE UP
HCT VFR BLD CALC: 46.6 % — HIGH (ref 34.5–45)
HGB BLD-MCNC: 14.9 G/DL — SIGNIFICANT CHANGE UP (ref 11.5–15.5)
IMM GRANULOCYTES NFR BLD AUTO: 0.4 % — SIGNIFICANT CHANGE UP (ref 0–1.5)
INR BLD: 1.05 RATIO — SIGNIFICANT CHANGE UP (ref 0.88–1.16)
KETONES UR-MCNC: ABNORMAL
LACTATE SERPL-SCNC: 1.2 MMOL/L — SIGNIFICANT CHANGE UP (ref 0.7–2)
LEUKOCYTE ESTERASE UR-ACNC: NEGATIVE — SIGNIFICANT CHANGE UP
LYMPHOCYTES # BLD AUTO: 1.53 K/UL — SIGNIFICANT CHANGE UP (ref 1–3.3)
LYMPHOCYTES # BLD AUTO: 11.3 % — LOW (ref 13–44)
MCHC RBC-ENTMCNC: 25.6 PG — LOW (ref 27–34)
MCHC RBC-ENTMCNC: 32 GM/DL — SIGNIFICANT CHANGE UP (ref 32–36)
MCV RBC AUTO: 80.2 FL — SIGNIFICANT CHANGE UP (ref 80–100)
MONOCYTES # BLD AUTO: 0.85 K/UL — SIGNIFICANT CHANGE UP (ref 0–0.9)
MONOCYTES NFR BLD AUTO: 6.3 % — SIGNIFICANT CHANGE UP (ref 2–14)
NEUTROPHILS # BLD AUTO: 11.04 K/UL — HIGH (ref 1.8–7.4)
NEUTROPHILS NFR BLD AUTO: 81.7 % — HIGH (ref 43–77)
NITRITE UR-MCNC: NEGATIVE — SIGNIFICANT CHANGE UP
PH UR: 8 — SIGNIFICANT CHANGE UP (ref 5–8)
PLATELET # BLD AUTO: 307 K/UL — SIGNIFICANT CHANGE UP (ref 150–400)
POTASSIUM SERPL-MCNC: 3.5 MMOL/L — SIGNIFICANT CHANGE UP (ref 3.5–5.3)
POTASSIUM SERPL-SCNC: 3.5 MMOL/L — SIGNIFICANT CHANGE UP (ref 3.5–5.3)
PROT SERPL-MCNC: 8 GM/DL — SIGNIFICANT CHANGE UP (ref 6–8.3)
PROT UR-MCNC: NEGATIVE MG/DL — SIGNIFICANT CHANGE UP
PROTHROM AB SERPL-ACNC: 12.3 SEC — SIGNIFICANT CHANGE UP (ref 10.6–13.6)
RBC # BLD: 5.81 M/UL — HIGH (ref 3.8–5.2)
RBC # FLD: 19.1 % — HIGH (ref 10.3–14.5)
SARS-COV-2 RNA SPEC QL NAA+PROBE: SIGNIFICANT CHANGE UP
SODIUM SERPL-SCNC: 141 MMOL/L — SIGNIFICANT CHANGE UP (ref 135–145)
SP GR SPEC: 1.01 — SIGNIFICANT CHANGE UP (ref 1.01–1.02)
TROPONIN I SERPL-MCNC: <0.015 NG/ML — SIGNIFICANT CHANGE UP (ref 0.01–0.04)
TROPONIN I SERPL-MCNC: <0.015 NG/ML — SIGNIFICANT CHANGE UP (ref 0.01–0.04)
UROBILINOGEN FLD QL: NEGATIVE MG/DL — SIGNIFICANT CHANGE UP
WBC # BLD: 13.53 K/UL — HIGH (ref 3.8–10.5)
WBC # FLD AUTO: 13.53 K/UL — HIGH (ref 3.8–10.5)

## 2021-02-09 PROCEDURE — 80053 COMPREHEN METABOLIC PANEL: CPT

## 2021-02-09 PROCEDURE — 85730 THROMBOPLASTIN TIME PARTIAL: CPT

## 2021-02-09 PROCEDURE — 36415 COLL VENOUS BLD VENIPUNCTURE: CPT

## 2021-02-09 PROCEDURE — 99284 EMERGENCY DEPT VISIT MOD MDM: CPT

## 2021-02-09 PROCEDURE — 99284 EMERGENCY DEPT VISIT MOD MDM: CPT | Mod: 25

## 2021-02-09 PROCEDURE — 93010 ELECTROCARDIOGRAM REPORT: CPT

## 2021-02-09 PROCEDURE — 96376 TX/PRO/DX INJ SAME DRUG ADON: CPT | Mod: XU

## 2021-02-09 PROCEDURE — U0003: CPT

## 2021-02-09 PROCEDURE — 96375 TX/PRO/DX INJ NEW DRUG ADDON: CPT | Mod: XU

## 2021-02-09 PROCEDURE — 85025 COMPLETE CBC W/AUTO DIFF WBC: CPT

## 2021-02-09 PROCEDURE — 85610 PROTHROMBIN TIME: CPT

## 2021-02-09 PROCEDURE — 96365 THER/PROPH/DIAG IV INF INIT: CPT | Mod: XU

## 2021-02-09 PROCEDURE — 93005 ELECTROCARDIOGRAM TRACING: CPT

## 2021-02-09 PROCEDURE — 84484 ASSAY OF TROPONIN QUANT: CPT

## 2021-02-09 PROCEDURE — 83605 ASSAY OF LACTIC ACID: CPT

## 2021-02-09 PROCEDURE — 74177 CT ABD & PELVIS W/CONTRAST: CPT | Mod: 26

## 2021-02-09 PROCEDURE — 87040 BLOOD CULTURE FOR BACTERIA: CPT

## 2021-02-09 PROCEDURE — 87493 C DIFF AMPLIFIED PROBE: CPT

## 2021-02-09 PROCEDURE — 87086 URINE CULTURE/COLONY COUNT: CPT

## 2021-02-09 PROCEDURE — 71045 X-RAY EXAM CHEST 1 VIEW: CPT

## 2021-02-09 PROCEDURE — 86769 SARS-COV-2 COVID-19 ANTIBODY: CPT

## 2021-02-09 PROCEDURE — 96367 TX/PROPH/DG ADDL SEQ IV INF: CPT | Mod: XU

## 2021-02-09 PROCEDURE — 71045 X-RAY EXAM CHEST 1 VIEW: CPT | Mod: 26

## 2021-02-09 PROCEDURE — U0005: CPT

## 2021-02-09 PROCEDURE — 81003 URINALYSIS AUTO W/O SCOPE: CPT

## 2021-02-09 PROCEDURE — 74177 CT ABD & PELVIS W/CONTRAST: CPT

## 2021-02-09 RX ORDER — ONDANSETRON 8 MG/1
4 TABLET, FILM COATED ORAL ONCE
Refills: 0 | Status: COMPLETED | OUTPATIENT
Start: 2021-02-09 | End: 2021-02-09

## 2021-02-09 RX ORDER — OXYCODONE HYDROCHLORIDE 5 MG/1
1 TABLET ORAL
Qty: 12 | Refills: 0
Start: 2021-02-09 | End: 2021-02-11

## 2021-02-09 RX ORDER — MOXIFLOXACIN HYDROCHLORIDE TABLETS, 400 MG 400 MG/1
1 TABLET, FILM COATED ORAL
Qty: 20 | Refills: 0
Start: 2021-02-09 | End: 2021-02-18

## 2021-02-09 RX ORDER — MORPHINE SULFATE 50 MG/1
2 CAPSULE, EXTENDED RELEASE ORAL ONCE
Refills: 0 | Status: DISCONTINUED | OUTPATIENT
Start: 2021-02-09 | End: 2021-02-09

## 2021-02-09 RX ORDER — MORPHINE SULFATE 50 MG/1
4 CAPSULE, EXTENDED RELEASE ORAL ONCE
Refills: 0 | Status: DISCONTINUED | OUTPATIENT
Start: 2021-02-09 | End: 2021-02-09

## 2021-02-09 RX ORDER — METRONIDAZOLE 500 MG
500 TABLET ORAL ONCE
Refills: 0 | Status: COMPLETED | OUTPATIENT
Start: 2021-02-09 | End: 2021-02-09

## 2021-02-09 RX ORDER — METRONIDAZOLE 500 MG
1 TABLET ORAL
Qty: 21 | Refills: 0
Start: 2021-02-09 | End: 2021-02-15

## 2021-02-09 RX ORDER — CIPROFLOXACIN LACTATE 400MG/40ML
400 VIAL (ML) INTRAVENOUS ONCE
Refills: 0 | Status: COMPLETED | OUTPATIENT
Start: 2021-02-09 | End: 2021-02-09

## 2021-02-09 RX ORDER — SODIUM CHLORIDE 9 MG/ML
1850 INJECTION INTRAMUSCULAR; INTRAVENOUS; SUBCUTANEOUS ONCE
Refills: 0 | Status: COMPLETED | OUTPATIENT
Start: 2021-02-09 | End: 2021-02-09

## 2021-02-09 RX ADMIN — Medication 400 MILLIGRAM(S): at 20:59

## 2021-02-09 RX ADMIN — Medication 100 MILLIGRAM(S): at 21:05

## 2021-02-09 RX ADMIN — ONDANSETRON 4 MILLIGRAM(S): 8 TABLET, FILM COATED ORAL at 18:05

## 2021-02-09 RX ADMIN — Medication 500 MILLIGRAM(S): at 22:05

## 2021-02-09 RX ADMIN — SODIUM CHLORIDE 1850 MILLILITER(S): 9 INJECTION INTRAMUSCULAR; INTRAVENOUS; SUBCUTANEOUS at 17:00

## 2021-02-09 RX ADMIN — Medication 200 MILLIGRAM(S): at 19:59

## 2021-02-09 RX ADMIN — MORPHINE SULFATE 4 MILLIGRAM(S): 50 CAPSULE, EXTENDED RELEASE ORAL at 18:05

## 2021-02-09 RX ADMIN — MORPHINE SULFATE 2 MILLIGRAM(S): 50 CAPSULE, EXTENDED RELEASE ORAL at 20:27

## 2021-02-09 RX ADMIN — SODIUM CHLORIDE 1850 MILLILITER(S): 9 INJECTION INTRAMUSCULAR; INTRAVENOUS; SUBCUTANEOUS at 18:05

## 2021-02-09 NOTE — ED PROVIDER NOTE - PHYSICAL EXAMINATION
Constitutional: NAD AAOx3. No sharpe sign.   Eyes: PERRLA EOMI. No raccoon eyes.   Head: Normocephalic atraumatic. No signs of head trauma.   Mouth: MMM  Cardiac: regular rate   Resp: Lungs CTAB  GI: +left upper TTP. +left lower TTP. No rebound, no guarding.   Neuro: CN2-12 intact  Skin: No rashes. No bruising to back or chest.   MSK: no midline spinal tenderness. Constitutional: mild distress AAOx3. No sharpe sign.   Eyes: PERRLA EOMI. No raccoon eyes.   Head: Normocephalic atraumatic. No signs of head trauma.   Mouth: MMM  Cardiac: regular rate   Resp: Lungs CTAB  GI: +left upper TTP. +left lower TTP. No rebound, no guarding.   Neuro: CN2-12 intact moving all extremities  Skin: No rashes. No bruising to back or chest.   MSK: no midline spinal tenderness.

## 2021-02-09 NOTE — ED ADULT NURSE REASSESSMENT NOTE - COMFORT CARE
assisted to bathroom/assisted to bedpan/repositioned/side rails up
assisted to bedpan/repositioned/side rails up

## 2021-02-09 NOTE — ED PROVIDER NOTE - NSFOLLOWUPINSTRUCTIONS_ED_ALL_ED_FT
1. return for worsening symptoms or anything concerning to you  2. take all home meds as prescribed  3. follow up with your pmd call to make an appointment  4. follow up with GI call to make an appointment  5. take cipro and flagyl as directed  6. Take Tylenol 650 mg every 6 hours as needed for pain.  7. Take oxycodone 5 mg every 6 hours as needed for pain; do not drink alcohol while taking this medication.    Diverticulitis    WHAT YOU NEED TO KNOW:    Diverticulitis is a condition that causes small pockets along your intestine called diverticula to become inflamed or infected. This is caused by hard bowel movements, food, or bacteria that get stuck in the pockets.         DISCHARGE INSTRUCTIONS:    Return to the emergency department if:     You have bowel movement or foul-smelling discharge leaking from your vagina or in your urine.      You have severe diarrhea.      You urinate less than usual or not at all.      You are not able to have a bowel movement.      You cannot stop vomiting.       You have severe abdominal pain, a fever, and your abdomen is larger than usual.       You have new or increased blood in your bowel movements.     Contact your healthcare provider if:     You have pain when you urinate.      Your symptoms get worse or do not go away.       You have questions or concerns about your condition or care.     Medicines:     Antibiotics may be given to help treat a bacterial infection.      Prescription pain medicine may be given. Ask your healthcare provider how to take this medicine safely. Some prescription pain medicines contain acetaminophen. Do not take other medicines that contain acetaminophen without talking to your healthcare provider. Too much acetaminophen may cause liver damage. Prescription pain medicine may cause constipation. Ask your healthcare provider how to prevent or treat constipation.       Take your medicine as directed. Contact your healthcare provider if you think your medicine is not helping or if you have side effects. Tell him or her if you are allergic to any medicine. Keep a list of the medicines, vitamins, and herbs you take. Include the amounts, and when and why you take them. Bring the list or the pill bottles to follow-up visits. Carry your medicine list with you in case of an emergency.    Clear liquid diet: A clear liquid diet includes any liquids that you can see through. Examples include water, ginger-dipika, cranberry or apple juice, frozen fruit ice, or broth. Stay on a clear liquid diet until your symptoms are gone, or as directed.     Follow up with your healthcare provider as directed: You may need to return for a colonoscopy. When your symptoms are gone, you may need a low-fat, high-fiber diet to prevent diverticulitis from developing again. Your healthcare provider or dietitian can help you create meal plans. Write down your questions so you remember to ask them during your visits.

## 2021-02-09 NOTE — ED PROVIDER NOTE - CLINICAL SUMMARY MEDICAL DECISION MAKING FREE TEXT BOX
65F with PMHx of adrenal insufficieny, chronic abdominal pain p/w abd pain, diarrhea and syncope. Exam with TTP of LLQ. Concern for colitis, potentially C-diff vs diverticulitis. Will obtain labs, CT and reassess.

## 2021-02-09 NOTE — ED PROVIDER NOTE - NS ED ROS FT
Constitutional: No fever or chills  Eyes: No visual changes  HEENT: No throat pain  CV: No chest pain  Resp: No SOB no cough  GI: +Abdominal pain. +Nausea. No vomiting.   : No dysuria  MSK: No musculoskeletal pain  Skin: No rash  Neuro: No headache

## 2021-02-09 NOTE — ED ADULT NURSE REASSESSMENT NOTE - NS ED NURSE REASSESS COMMENT FT1
Received report from NATTY Sena.  Patient resting comfortably in stretcher in lowest locked position, VSS at this time.  Patient c/o abdominal pain, will medicate as per MAR.  Awaiting swab result and inpatient bed placement, will continue plan of care.

## 2021-02-09 NOTE — ED PROVIDER NOTE - NS_ ATTENDINGSCRIBEDETAILS _ED_A_ED_FT
I, Aamir Mendez MD,  performed the initial face to face bedside interview with this patient regarding history of present illness, review of symptoms and relevant past medical, social and family history.  I completed an independent physical examination.  I was the initial provider who evaluated this patient.  The history, relevant review of systems, past medical and surgical history, medical decision making, and physical examination was documented by the scribe in my presence and I attest to the accuracy of the documentation.

## 2021-02-09 NOTE — ED PROVIDER NOTE - PATIENT PORTAL LINK FT
You can access the FollowMyHealth Patient Portal offered by [facility] by registering at the following website: [email or web address]. By joining [facility]’s FollowMyHealth portal, you will also be able to view your health information using other applications (apps) compatible with our system.

## 2021-02-09 NOTE — ED PROVIDER NOTE - OBJECTIVE STATEMENT
64 y/o female with PMHx of adrenal insufficieny, chronic abdominal pain, osteoporosis, porphyria presents to ED c/o lower abdominal pain for x2 days, diarrhea and syncopal episode. Also c/o nausea, took Zofran PTA. No prior hx of C-diff. Pt had a UTI recently, unsure which abx she used. Denies fevers. Nonsmoker. No EtOH use.

## 2021-02-09 NOTE — ED PROVIDER NOTE - PROGRESS NOTE DETAILS
ct with proctocolitis. trop negative x 2. no signs of sepsis. vss. pain improved. tolerating PO abd soft non-tender. pt has GI doc. will d/c with follow up and strict return precautions. Aamir Mendez M.D., Attending Physician

## 2021-02-09 NOTE — ED ADULT TRIAGE NOTE - CHIEF COMPLAINT QUOTE
Patient comes to ED for syncope x3. Patient lives home with sister. Patient has chronic abdominal pain with diarrhea. Patient has been complaining of pain and diarrhea. Patient had 2 syncope witnessed by sister no fall. 3rd time patient syncope and fell unwitnessed. - LOC. GCS 15. - blood thinners

## 2021-02-10 LAB
C DIFF BY PCR RESULT: DETECTED
C DIFF TOX GENS STL QL NAA+PROBE: SIGNIFICANT CHANGE UP
CULTURE RESULTS: SIGNIFICANT CHANGE UP
SARS-COV-2 IGG SERPL QL IA: NEGATIVE — SIGNIFICANT CHANGE UP
SARS-COV-2 IGM SERPL IA-ACNC: 0.06 INDEX — SIGNIFICANT CHANGE UP
SPECIMEN SOURCE: SIGNIFICANT CHANGE UP

## 2021-02-11 RX ORDER — VANCOMYCIN HCL 1 G
1 VIAL (EA) INTRAVENOUS
Qty: 40 | Refills: 0
Start: 2021-02-11

## 2021-02-11 NOTE — ED POST DISCHARGE NOTE - DETAILS
Notified patient of +C. diff. Switched Cipro to Vancomycin 125mg PO Q6hrs x 10d. f/u with GI Dr. Curiel scheduled in 1 week. ~Jeyson Rogers PA-C

## 2021-02-14 LAB
CORTICOSTEROID BINDING GLOBULIN RESULT: 1.8 MG/DL — SIGNIFICANT CHANGE UP
CORTIS F/TOTAL MFR SERPL: 54 % — SIGNIFICANT CHANGE UP
CORTIS SERPL-MCNC: 26 UG/DL — HIGH
CORTISOL, FREE RESULT: 14 UG/DL — HIGH

## 2021-02-28 ENCOUNTER — INPATIENT (INPATIENT)
Facility: HOSPITAL | Age: 66
LOS: 7 days | Discharge: HOME | DRG: 372 | End: 2021-03-08
Attending: INTERNAL MEDICINE | Admitting: INTERNAL MEDICINE
Payer: MEDICARE

## 2021-02-28 VITALS
OXYGEN SATURATION: 100 % | HEIGHT: 66 IN | RESPIRATION RATE: 18 BRPM | DIASTOLIC BLOOD PRESSURE: 65 MMHG | TEMPERATURE: 98 F | SYSTOLIC BLOOD PRESSURE: 136 MMHG | HEART RATE: 102 BPM

## 2021-02-28 DIAGNOSIS — R33.9 RETENTION OF URINE, UNSPECIFIED: ICD-10-CM

## 2021-02-28 DIAGNOSIS — A04.72 ENTEROCOLITIS DUE TO CLOSTRIDIUM DIFFICILE, NOT SPECIFIED AS RECURRENT: ICD-10-CM

## 2021-02-28 DIAGNOSIS — Z90.89 ACQUIRED ABSENCE OF OTHER ORGANS: Chronic | ICD-10-CM

## 2021-02-28 DIAGNOSIS — Z90.49 ACQUIRED ABSENCE OF OTHER SPECIFIED PARTS OF DIGESTIVE TRACT: Chronic | ICD-10-CM

## 2021-02-28 DIAGNOSIS — Z98.1 ARTHRODESIS STATUS: Chronic | ICD-10-CM

## 2021-02-28 LAB
ALBUMIN SERPL ELPH-MCNC: 3.7 G/DL — SIGNIFICANT CHANGE UP (ref 3.3–5)
ALP SERPL-CCNC: 238 U/L — HIGH (ref 40–120)
ALT FLD-CCNC: 63 U/L — SIGNIFICANT CHANGE UP (ref 12–78)
ANION GAP SERPL CALC-SCNC: 8 MMOL/L — SIGNIFICANT CHANGE UP (ref 5–17)
APTT BLD: 33.1 SEC — SIGNIFICANT CHANGE UP (ref 27.5–35.5)
AST SERPL-CCNC: 22 U/L — SIGNIFICANT CHANGE UP (ref 15–37)
BASOPHILS # BLD AUTO: 0.03 K/UL — SIGNIFICANT CHANGE UP (ref 0–0.2)
BASOPHILS NFR BLD AUTO: 0.3 % — SIGNIFICANT CHANGE UP (ref 0–2)
BILIRUB SERPL-MCNC: 0.4 MG/DL — SIGNIFICANT CHANGE UP (ref 0.2–1.2)
BUN SERPL-MCNC: 9 MG/DL — SIGNIFICANT CHANGE UP (ref 7–23)
CALCIUM SERPL-MCNC: 10 MG/DL — SIGNIFICANT CHANGE UP (ref 8.5–10.1)
CHLORIDE SERPL-SCNC: 109 MMOL/L — HIGH (ref 96–108)
CO2 SERPL-SCNC: 23 MMOL/L — SIGNIFICANT CHANGE UP (ref 22–31)
CREAT SERPL-MCNC: 0.84 MG/DL — SIGNIFICANT CHANGE UP (ref 0.5–1.3)
EOSINOPHIL # BLD AUTO: 0.01 K/UL — SIGNIFICANT CHANGE UP (ref 0–0.5)
EOSINOPHIL NFR BLD AUTO: 0.1 % — SIGNIFICANT CHANGE UP (ref 0–6)
GLUCOSE SERPL-MCNC: 159 MG/DL — HIGH (ref 70–99)
HCT VFR BLD CALC: 47 % — HIGH (ref 34.5–45)
HGB BLD-MCNC: 15.3 G/DL — SIGNIFICANT CHANGE UP (ref 11.5–15.5)
IMM GRANULOCYTES NFR BLD AUTO: 0.4 % — SIGNIFICANT CHANGE UP (ref 0–1.5)
INR BLD: 1.06 RATIO — SIGNIFICANT CHANGE UP (ref 0.88–1.16)
LACTATE SERPL-SCNC: 2.2 MMOL/L — HIGH (ref 0.7–2)
LIDOCAIN IGE QN: 68 U/L — LOW (ref 73–393)
LYMPHOCYTES # BLD AUTO: 1.16 K/UL — SIGNIFICANT CHANGE UP (ref 1–3.3)
LYMPHOCYTES # BLD AUTO: 11.6 % — LOW (ref 13–44)
MCHC RBC-ENTMCNC: 26.6 PG — LOW (ref 27–34)
MCHC RBC-ENTMCNC: 32.6 GM/DL — SIGNIFICANT CHANGE UP (ref 32–36)
MCV RBC AUTO: 81.7 FL — SIGNIFICANT CHANGE UP (ref 80–100)
MONOCYTES # BLD AUTO: 0.32 K/UL — SIGNIFICANT CHANGE UP (ref 0–0.9)
MONOCYTES NFR BLD AUTO: 3.2 % — SIGNIFICANT CHANGE UP (ref 2–14)
NEUTROPHILS # BLD AUTO: 8.44 K/UL — HIGH (ref 1.8–7.4)
NEUTROPHILS NFR BLD AUTO: 84.4 % — HIGH (ref 43–77)
PLATELET # BLD AUTO: 381 K/UL — SIGNIFICANT CHANGE UP (ref 150–400)
POTASSIUM SERPL-MCNC: 3.8 MMOL/L — SIGNIFICANT CHANGE UP (ref 3.5–5.3)
POTASSIUM SERPL-SCNC: 3.8 MMOL/L — SIGNIFICANT CHANGE UP (ref 3.5–5.3)
PROT SERPL-MCNC: 9.1 GM/DL — HIGH (ref 6–8.3)
PROTHROM AB SERPL-ACNC: 12.3 SEC — SIGNIFICANT CHANGE UP (ref 10.6–13.6)
RBC # BLD: 5.75 M/UL — HIGH (ref 3.8–5.2)
RBC # FLD: 17.9 % — HIGH (ref 10.3–14.5)
SARS-COV-2 RNA SPEC QL NAA+PROBE: SIGNIFICANT CHANGE UP
SODIUM SERPL-SCNC: 140 MMOL/L — SIGNIFICANT CHANGE UP (ref 135–145)
WBC # BLD: 10 K/UL — SIGNIFICANT CHANGE UP (ref 3.8–10.5)
WBC # FLD AUTO: 10 K/UL — SIGNIFICANT CHANGE UP (ref 3.8–10.5)

## 2021-02-28 PROCEDURE — 86255 FLUORESCENT ANTIBODY SCREEN: CPT

## 2021-02-28 PROCEDURE — 96375 TX/PRO/DX INJ NEW DRUG ADDON: CPT | Mod: XU

## 2021-02-28 PROCEDURE — 99222 1ST HOSP IP/OBS MODERATE 55: CPT

## 2021-02-28 PROCEDURE — 96376 TX/PRO/DX INJ SAME DRUG ADON: CPT | Mod: XU

## 2021-02-28 PROCEDURE — 85027 COMPLETE CBC AUTOMATED: CPT

## 2021-02-28 PROCEDURE — 80053 COMPREHEN METABOLIC PANEL: CPT

## 2021-02-28 PROCEDURE — 36415 COLL VENOUS BLD VENIPUNCTURE: CPT

## 2021-02-28 PROCEDURE — 74177 CT ABD & PELVIS W/CONTRAST: CPT | Mod: 26

## 2021-02-28 PROCEDURE — 71045 X-RAY EXAM CHEST 1 VIEW: CPT | Mod: 26

## 2021-02-28 PROCEDURE — 97116 GAIT TRAINING THERAPY: CPT | Mod: GP

## 2021-02-28 PROCEDURE — 86769 SARS-COV-2 COVID-19 ANTIBODY: CPT

## 2021-02-28 PROCEDURE — 82977 ASSAY OF GGT: CPT

## 2021-02-28 PROCEDURE — 97162 PT EVAL MOD COMPLEX 30 MIN: CPT | Mod: GP

## 2021-02-28 PROCEDURE — 80048 BASIC METABOLIC PNL TOTAL CA: CPT

## 2021-02-28 PROCEDURE — 96374 THER/PROPH/DIAG INJ IV PUSH: CPT | Mod: XU

## 2021-02-28 PROCEDURE — 86039 ANTINUCLEAR ANTIBODIES (ANA): CPT

## 2021-02-28 PROCEDURE — 80074 ACUTE HEPATITIS PANEL: CPT

## 2021-02-28 PROCEDURE — 93010 ELECTROCARDIOGRAM REPORT: CPT

## 2021-02-28 RX ORDER — LANOLIN ALCOHOL/MO/W.PET/CERES
5 CREAM (GRAM) TOPICAL AT BEDTIME
Refills: 0 | Status: DISCONTINUED | OUTPATIENT
Start: 2021-02-28 | End: 2021-03-08

## 2021-02-28 RX ORDER — VANCOMYCIN HCL 1 G
250 VIAL (EA) INTRAVENOUS EVERY 6 HOURS
Refills: 0 | Status: DISCONTINUED | OUTPATIENT
Start: 2021-02-28 | End: 2021-03-01

## 2021-02-28 RX ORDER — MORPHINE SULFATE 50 MG/1
4 CAPSULE, EXTENDED RELEASE ORAL ONCE
Refills: 0 | Status: DISCONTINUED | OUTPATIENT
Start: 2021-02-28 | End: 2021-02-28

## 2021-02-28 RX ORDER — ENOXAPARIN SODIUM 100 MG/ML
40 INJECTION SUBCUTANEOUS DAILY
Refills: 0 | Status: DISCONTINUED | OUTPATIENT
Start: 2021-02-28 | End: 2021-03-08

## 2021-02-28 RX ORDER — SODIUM CHLORIDE 9 MG/ML
1000 INJECTION, SOLUTION INTRAVENOUS
Refills: 0 | Status: DISCONTINUED | OUTPATIENT
Start: 2021-02-28 | End: 2021-03-02

## 2021-02-28 RX ORDER — METRONIDAZOLE 500 MG
500 TABLET ORAL EVERY 8 HOURS
Refills: 0 | Status: DISCONTINUED | OUTPATIENT
Start: 2021-02-28 | End: 2021-03-01

## 2021-02-28 RX ORDER — ONDANSETRON 8 MG/1
4 TABLET, FILM COATED ORAL ONCE
Refills: 0 | Status: COMPLETED | OUTPATIENT
Start: 2021-02-28 | End: 2021-02-28

## 2021-02-28 RX ORDER — HYDROMORPHONE HYDROCHLORIDE 2 MG/ML
3 INJECTION INTRAMUSCULAR; INTRAVENOUS; SUBCUTANEOUS
Qty: 0 | Refills: 0 | DISCHARGE

## 2021-02-28 RX ORDER — CHOLECALCIFEROL (VITAMIN D3) 125 MCG
1 CAPSULE ORAL
Qty: 0 | Refills: 0 | DISCHARGE

## 2021-02-28 RX ORDER — LACTOBACILLUS ACIDOPHILUS 100MM CELL
1 CAPSULE ORAL
Refills: 0 | Status: DISCONTINUED | OUTPATIENT
Start: 2021-02-28 | End: 2021-03-08

## 2021-02-28 RX ORDER — HYDROMORPHONE HYDROCHLORIDE 2 MG/ML
1 INJECTION INTRAMUSCULAR; INTRAVENOUS; SUBCUTANEOUS EVERY 4 HOURS
Refills: 0 | Status: DISCONTINUED | OUTPATIENT
Start: 2021-02-28 | End: 2021-03-02

## 2021-02-28 RX ORDER — AMLODIPINE BESYLATE 2.5 MG/1
10 TABLET ORAL DAILY
Refills: 0 | Status: DISCONTINUED | OUTPATIENT
Start: 2021-02-28 | End: 2021-03-08

## 2021-02-28 RX ORDER — SODIUM CHLORIDE 9 MG/ML
2000 INJECTION INTRAMUSCULAR; INTRAVENOUS; SUBCUTANEOUS ONCE
Refills: 0 | Status: COMPLETED | OUTPATIENT
Start: 2021-02-28 | End: 2021-02-28

## 2021-02-28 RX ORDER — FERROUS SULFATE 325(65) MG
325 TABLET ORAL DAILY
Refills: 0 | Status: DISCONTINUED | OUTPATIENT
Start: 2021-02-28 | End: 2021-03-08

## 2021-02-28 RX ORDER — ONDANSETRON 8 MG/1
4 TABLET, FILM COATED ORAL EVERY 6 HOURS
Refills: 0 | Status: DISCONTINUED | OUTPATIENT
Start: 2021-02-28 | End: 2021-03-01

## 2021-02-28 RX ORDER — METOPROLOL TARTRATE 50 MG
25 TABLET ORAL
Refills: 0 | Status: DISCONTINUED | OUTPATIENT
Start: 2021-02-28 | End: 2021-03-08

## 2021-02-28 RX ORDER — ALBUTEROL 90 UG/1
2 AEROSOL, METERED ORAL
Qty: 0 | Refills: 0 | DISCHARGE

## 2021-02-28 RX ORDER — TAMSULOSIN HYDROCHLORIDE 0.4 MG/1
0.4 CAPSULE ORAL AT BEDTIME
Refills: 0 | Status: DISCONTINUED | OUTPATIENT
Start: 2021-02-28 | End: 2021-03-08

## 2021-02-28 RX ADMIN — ONDANSETRON 4 MILLIGRAM(S): 8 TABLET, FILM COATED ORAL at 10:23

## 2021-02-28 RX ADMIN — HYDROMORPHONE HYDROCHLORIDE 1 MILLIGRAM(S): 2 INJECTION INTRAMUSCULAR; INTRAVENOUS; SUBCUTANEOUS at 16:01

## 2021-02-28 RX ADMIN — ONDANSETRON 4 MILLIGRAM(S): 8 TABLET, FILM COATED ORAL at 22:00

## 2021-02-28 RX ADMIN — Medication 250 MILLIGRAM(S): at 17:42

## 2021-02-28 RX ADMIN — SODIUM CHLORIDE 2000 MILLILITER(S): 9 INJECTION INTRAMUSCULAR; INTRAVENOUS; SUBCUTANEOUS at 10:23

## 2021-02-28 RX ADMIN — Medication 500 MILLIGRAM(S): at 22:02

## 2021-02-28 RX ADMIN — Medication 5 MILLIGRAM(S): at 22:01

## 2021-02-28 RX ADMIN — ENOXAPARIN SODIUM 40 MILLIGRAM(S): 100 INJECTION SUBCUTANEOUS at 17:38

## 2021-02-28 RX ADMIN — TAMSULOSIN HYDROCHLORIDE 0.4 MILLIGRAM(S): 0.4 CAPSULE ORAL at 22:02

## 2021-02-28 RX ADMIN — MORPHINE SULFATE 4 MILLIGRAM(S): 50 CAPSULE, EXTENDED RELEASE ORAL at 10:24

## 2021-02-28 RX ADMIN — ONDANSETRON 4 MILLIGRAM(S): 8 TABLET, FILM COATED ORAL at 11:36

## 2021-02-28 RX ADMIN — ONDANSETRON 4 MILLIGRAM(S): 8 TABLET, FILM COATED ORAL at 13:47

## 2021-02-28 RX ADMIN — MORPHINE SULFATE 4 MILLIGRAM(S): 50 CAPSULE, EXTENDED RELEASE ORAL at 11:47

## 2021-02-28 RX ADMIN — Medication 25 MILLIGRAM(S): at 22:02

## 2021-02-28 RX ADMIN — HYDROMORPHONE HYDROCHLORIDE 1 MILLIGRAM(S): 2 INJECTION INTRAMUSCULAR; INTRAVENOUS; SUBCUTANEOUS at 21:59

## 2021-02-28 RX ADMIN — MORPHINE SULFATE 4 MILLIGRAM(S): 50 CAPSULE, EXTENDED RELEASE ORAL at 13:49

## 2021-02-28 RX ADMIN — Medication 1 TABLET(S): at 22:02

## 2021-02-28 RX ADMIN — AMLODIPINE BESYLATE 10 MILLIGRAM(S): 2.5 TABLET ORAL at 16:01

## 2021-02-28 RX ADMIN — SODIUM CHLORIDE 100 MILLILITER(S): 9 INJECTION, SOLUTION INTRAVENOUS at 17:38

## 2021-02-28 NOTE — H&P ADULT - NSHPPHYSICALEXAM_GEN_ALL_CORE
PHYSICAL EXAM:    Daily Height in cm: 167.64 (28 Feb 2021 09:43)    Daily     ICU Vital Signs Last 24 Hrs  T(C): 36.9 (28 Feb 2021 13:44), Max: 36.9 (28 Feb 2021 09:43)  T(F): 98.4 (28 Feb 2021 13:44), Max: 98.4 (28 Feb 2021 09:43)  HR: 94 (28 Feb 2021 13:44) (92 - 102)  BP: 150/78 (28 Feb 2021 13:44) (136/65 - 158/75)  BP(mean): 97 (28 Feb 2021 13:44) (97 - 99)  ABP: --  ABP(mean): --  RR: 18 (28 Feb 2021 13:44) (18 - 19)  SpO2: 100% (28 Feb 2021 13:44) (100% - 100%)      Constitutional: Weak and ill appearing  HEENT: Atraumatic, OLGA, Normal, No congestion  Respiratory: Breath Sounds normal, no rhonchi/wheeze  Cardiovascular: N S1S2;   Gastrointestinal: Abdomen soft, gen tender, Bowel Sounds present  Extremities: No edema, peripheral pulses present  Neurological: AAO x 3, no gross focal motor deficits  Skin: Non cellulitic, no rash, ulcers  Lymph Nodes: No lymphadenopathy noted  Back: No CVA tenderness   Musculoskeletal: non tender  Breasts: Deferred  Genitourinary: deferred  Rectal: Deferred

## 2021-02-28 NOTE — ED PROVIDER NOTE - OBJECTIVE STATEMENT
64 y/o female with PMHx of adrenal insufficieny, chronic abdominal pain, osteoporosis, porphyria, s/p cholecystectomy presents to the ED c/o lower abd pain, vomiting and diarrhea onset yesterday at midnight. Associated subjective fever. Pt is still continuing abx treatment for C. diff. Pt did follow up with YAZMIN Curiel after last C. diff diagnosis. Denies chest pain, SOB. Allergic to gabapentin.

## 2021-02-28 NOTE — PATIENT PROFILE ADULT - NSPROMEDSADMININFO_GEN_A_NUR
cut pills in half/difficulty swallowing pills difficulty swallowing large tablets/cut pills in half/difficulty swallowing pills

## 2021-02-28 NOTE — PATIENT PROFILE ADULT - ABILITY TO HEAR (WITH HEARING AID OR HEARING APPLIANCE IF NORMALLY USED):
b/l hearing aids/Severely Impaired: absence of useful hearing b/l hearing aids/Mildly to Moderately Impaired: difficulty hearing in some environments or speaker may need to increase volume or speak distinctly

## 2021-02-28 NOTE — ED PROVIDER NOTE - CLINICAL SUMMARY MEDICAL DECISION MAKING FREE TEXT BOX
66 y/o female who was recent diagnosed with C diff on vancomycin PO, following with Dr. Curiel, presents to ED for abd pain, N/V/D. Symptoms worsening. Exam with LLQ tenderness, pt in moderate distress and vomiting. Will obtain labs CT and reassess.

## 2021-02-28 NOTE — H&P ADULT - NSHPLABSRESULTS_GEN_ALL_CORE
Lab Results:  CBC  CBC Full  -  ( 28 Feb 2021 10:13 )  WBC Count : 10.00 K/uL  RBC Count : 5.75 M/uL  Hemoglobin : 15.3 g/dL  Hematocrit : 47.0 %  Platelet Count - Automated : 381 K/uL  Mean Cell Volume : 81.7 fl  Mean Cell Hemoglobin : 26.6 pg  Mean Cell Hemoglobin Concentration : 32.6 gm/dL  Auto Neutrophil # : 8.44 K/uL  Auto Lymphocyte # : 1.16 K/uL  Auto Monocyte # : 0.32 K/uL  Auto Eosinophil # : 0.01 K/uL  Auto Basophil # : 0.03 K/uL  Auto Neutrophil % : 84.4 %  Auto Lymphocyte % : 11.6 %  Auto Monocyte % : 3.2 %  Auto Eosinophil % : 0.1 %  Auto Basophil % : 0.3 %    .		Differential:	[] Automated		[] Manual  Chemistry                        15.3   10.00 )-----------( 381      ( 28 Feb 2021 10:13 )             47.0     02-28    140  |  109<H>  |  9   ----------------------------<  159<H>  3.8   |  23  |  0.84    Ca    10.0      28 Feb 2021 10:13    TPro  9.1<H>  /  Alb  3.7  /  TBili  0.4  /  DBili  x   /  AST  22  /  ALT  63  /  AlkPhos  238<H>  02-28    LIVER FUNCTIONS - ( 28 Feb 2021 10:13 )  Alb: 3.7 g/dL / Pro: 9.1 gm/dL / ALK PHOS: 238 U/L / ALT: 63 U/L / AST: 22 U/L / GGT: x           PT/INR - ( 28 Feb 2021 10:13 )   PT: 12.3 sec;   INR: 1.06 ratio         PTT - ( 28 Feb 2021 10:13 )  PTT:33.1 sec        < from: CT Abdomen and Pelvis w/ IV Cont (02.28.21 @ 12:55) >    IMPRESSION:  Colitis involving the rectosigmoid colon is improved.      < end of copied text >    MEDICATIONS  (STANDING):  amLODIPine   Tablet 10 milliGRAM(s) Oral daily  dextrose 5% + sodium chloride 0.9%. 1000 milliLiter(s) (100 mL/Hr) IV Continuous <Continuous>  enoxaparin Injectable 40 milliGRAM(s) SubCutaneous daily  ferrous    sulfate 325 milliGRAM(s) Oral daily  lactobacillus acidophilus 1 Tablet(s) Oral two times a day  melatonin 5 milliGRAM(s) Oral at bedtime  metoprolol tartrate 25 milliGRAM(s) Oral two times a day  metroNIDAZOLE    Tablet 500 milliGRAM(s) Oral every 8 hours  multivitamin 1 Tablet(s) Oral daily  vancomycin    Solution 250 milliGRAM(s) Oral every 6 hours    MEDICATIONS  (PRN):  HYDROmorphone  Injectable 1 milliGRAM(s) IV Push every 4 hours PRN Moderate Pain (4 - 6)  ondansetron Injectable 4 milliGRAM(s) IV Push every 6 hours PRN Nausea

## 2021-02-28 NOTE — H&P ADULT - ASSESSMENT
65/F with PMHx of recent C diff still on treatment, adrenal insufficieny, hearing difficulty, chronic abdominal pain, osteoporosis, porphyria, s/p cholecystectomy presents to the ED c/o lower abd pain, vomiting and diarrhea which started yesterday at midnight. Associated with subjective fever. Pt is still continuing treatment for C. diff. Pt did follow up with YAZMIN Curiel after last C. diff diagnosis. No CP/SOB. CT abdo was done in ED which showed improved rectosigmoid colitis. Also, she was having difficulty urinating, so a Moon was placed and about a liter of urine came out. Pt admitted with     1) Abd Pain + Diarrhea + recent C diff: likely ongoing C diff colitis:  admit  iv fluids  stool for c diff by PCR and GI PCR  increase vanco to 250 mg po q 6 hrs and start flagyl 500 mg po q 8 hrs till stool PCR results become available  ID/GI eval  pain meds prn  contact isolation    2) Urinary retention, new:   moon placed in  urology eval    3) HTN: cont amlodipine and BB    4) Hearing impaired: uses hearing aids    5) DVT PPX: lovenox 65/F with PMHx of recent C diff still on treatment, adrenal insufficieny, hearing difficulty, chronic abdominal pain, osteoporosis, porphyria, s/p cholecystectomy presents to the ED c/o lower abd pain, vomiting and diarrhea which started yesterday at midnight. Associated with subjective fever. Pt is still continuing treatment for C. diff. Pt did follow up with YAZMIN Curiel after last C. diff diagnosis. No CP/SOB. CT abdo was done in ED which showed improved rectosigmoid colitis. Also, she was having difficulty urinating, so a Moon was placed and about a liter of urine came out. Pt admitted with     1) Abd Pain + Diarrhea + elevated lactate 2.2, normalized to 1.2 + recent C diff: likely ongoing C diff colitis:  admit  iv fluids  stool for c diff by PCR and GI PCR  increase vanco to 250 mg po q 6 hrs and start flagyl 500 mg po q 8 hrs till stool PCR results become available  ID/GI eval  pain meds prn  contact isolation    2) Urinary retention, new:   moon placed in  urology eval    3) HTN: cont amlodipine and BB    4) Hearing impaired: uses hearing aids    5) DVT PPX: lovenox

## 2021-02-28 NOTE — ED ADULT TRIAGE NOTE - CHIEF COMPLAINT QUOTE
Patient complains of generalized abdominal pain, vomiting and diarrhea starting yesterday; recently completed treatment for C. diff.  Patient also reports falling to floor at home because she was worried about diarrhea, complains of R hip pain, denies other injury.  Patient does not answer questions fully.    Pt has port for hydration.

## 2021-02-28 NOTE — ED ADULT NURSE NOTE - NSIMPLEMENTINTERV_GEN_ALL_ED
Implemented All Fall Risk Interventions:  [place] to call system. Call bell, personal items and telephone within reach. Instruct patient to call for assistance. Room bathroom lighting operational. Non-slip footwear when patient is off stretcher. Physically safe environment: no spills, clutter or unnecessary equipment. Stretcher in lowest position, wheels locked, appropriate side rails in place. Provide visual cue, wrist band, yellow gown, etc. Monitor gait and stability. Monitor for mental status changes and reorient to person, place, and time. Review medications for side effects contributing to fall risk. Reinforce activity limits and safety measures with patient and family.

## 2021-02-28 NOTE — ED PROVIDER NOTE - CARE PLAN
Principal Discharge DX:	C. difficile colitis  Secondary Diagnosis:	Intractable abdominal pain  Secondary Diagnosis:	Intractable vomiting  Secondary Diagnosis:	Urinary retention

## 2021-02-28 NOTE — H&P ADULT - NSICDXPASTMEDICALHX_GEN_ALL_CORE_FT
PAST MEDICAL HISTORY:  Adrenal insufficiency, primary     Chronic abdominal pain     Knee effusion     Osteoporosis     Porphyria     Staph infection 8/2017

## 2021-02-28 NOTE — H&P ADULT - HISTORY OF PRESENT ILLNESS
65/F with PMHx of recent C diff still on treatment, adrenal insufficieny, hearing difficulty, chronic abdominal pain, osteoporosis, porphyria, s/p cholecystectomy presents to the ED c/o lower abd pain, vomiting and diarrhea which started yesterday at midnight. Associated with subjective fever. Pt is still continuing treatment for C. diff. Pt did follow up with YAZMIN Curiel after last C. diff diagnosis. No CP/SOB. CT abdo was done in ED which showed improved rectosigmoid colitis.

## 2021-02-28 NOTE — ED PROVIDER NOTE - PROGRESS NOTE DETAILS
ct with colitis. pt still with significant pain nausea and vomiting. pt also with urinary retention moon placed and > 1000cc return. pt endorsed to Dr. Tracey sood. Aamir Mendez M.D., Attending Physician

## 2021-02-28 NOTE — ED ADULT NURSE NOTE - OBJECTIVE STATEMENT
Pt reports recent hospitalization for colitis with recent dx of cdiff. Pt reports increased abd pain and continued diarrhea. IVL and labs as ordered with report given to new RN and pt medicated by RN.

## 2021-02-28 NOTE — ED PROVIDER NOTE - NS ED ROS FT
Constitutional: +subjective fever, no chills  Eyes: No visual changes  HEENT: No throat pain  CV: No chest pain  Resp: No SOB no cough  GI: +lower abd pain, +nausea, +vomiting, +diarrhea   : No dysuria  MSK: No musculoskeletal pain  Skin: No rash  Neuro: No headache

## 2021-02-28 NOTE — ED PROVIDER NOTE - PHYSICAL EXAMINATION
Constitutional: +moderate distress, +vomiting AAOx3  Eyes: PERRLA EOMI  Head: Normocephalic atraumatic  Mouth: MMM  Cardiac: regular rate   Resp: Lungs CTAB  GI: Abd +TTP LLQ, no rebound nor guarding, non-distended and soft  Neuro: CN2-12 intact  Skin: No rashes Constitutional: +moderate distress, +vomiting AAOx3  Eyes: PERRLA EOMI  Head: Normocephalic atraumatic  Mouth: MMM  Cardiac: tachycardic  Resp: Lungs CTAB  GI: Abd +TTP LLQ, no rebound nor guarding, non-distended and soft  Neuro: CN2-12 intact  Skin: No rashes

## 2021-02-28 NOTE — ED ADULT NURSE REASSESSMENT NOTE - NS ED NURSE REASSESS COMMENT FT1
Pt c/o increased abdominal pressure and inability to void. Pt bladder scanned >252 noted. As per Dr. Mendez, catheterize patient for retention. Pt voided 800 cc urine, states some minor relief. Pt pending ct scan, will continue to monitor for safety and comfort.

## 2021-03-01 LAB
ANION GAP SERPL CALC-SCNC: 8 MMOL/L — SIGNIFICANT CHANGE UP (ref 5–17)
BUN SERPL-MCNC: 4 MG/DL — LOW (ref 7–23)
CALCIUM SERPL-MCNC: 9.2 MG/DL — SIGNIFICANT CHANGE UP (ref 8.5–10.1)
CHLORIDE SERPL-SCNC: 110 MMOL/L — HIGH (ref 96–108)
CO2 SERPL-SCNC: 21 MMOL/L — LOW (ref 22–31)
CREAT SERPL-MCNC: 0.54 MG/DL — SIGNIFICANT CHANGE UP (ref 0.5–1.3)
GLUCOSE SERPL-MCNC: 146 MG/DL — HIGH (ref 70–99)
POTASSIUM SERPL-MCNC: 3.2 MMOL/L — LOW (ref 3.5–5.3)
POTASSIUM SERPL-SCNC: 3.2 MMOL/L — LOW (ref 3.5–5.3)
SARS-COV-2 IGG SERPL QL IA: NEGATIVE — SIGNIFICANT CHANGE UP
SARS-COV-2 IGM SERPL IA-ACNC: 0.07 INDEX — SIGNIFICANT CHANGE UP
SODIUM SERPL-SCNC: 139 MMOL/L — SIGNIFICANT CHANGE UP (ref 135–145)

## 2021-03-01 PROCEDURE — 99233 SBSQ HOSP IP/OBS HIGH 50: CPT

## 2021-03-01 RX ORDER — POTASSIUM CHLORIDE 20 MEQ
40 PACKET (EA) ORAL ONCE
Refills: 0 | Status: COMPLETED | OUTPATIENT
Start: 2021-03-01 | End: 2021-03-01

## 2021-03-01 RX ORDER — PROCHLORPERAZINE MALEATE 5 MG
10 TABLET ORAL EVERY 12 HOURS
Refills: 0 | Status: DISCONTINUED | OUTPATIENT
Start: 2021-03-01 | End: 2021-03-01

## 2021-03-01 RX ORDER — AMITRIPTYLINE HCL 25 MG
25 TABLET ORAL AT BEDTIME
Refills: 0 | Status: DISCONTINUED | OUTPATIENT
Start: 2021-03-01 | End: 2021-03-08

## 2021-03-01 RX ORDER — VANCOMYCIN HCL 1 G
125 VIAL (EA) INTRAVENOUS EVERY 6 HOURS
Refills: 0 | Status: DISCONTINUED | OUTPATIENT
Start: 2021-03-01 | End: 2021-03-08

## 2021-03-01 RX ORDER — PROCHLORPERAZINE MALEATE 5 MG
10 TABLET ORAL EVERY 6 HOURS
Refills: 0 | Status: DISCONTINUED | OUTPATIENT
Start: 2021-03-01 | End: 2021-03-08

## 2021-03-01 RX ADMIN — Medication 25 MILLIGRAM(S): at 20:48

## 2021-03-01 RX ADMIN — HYDROMORPHONE HYDROCHLORIDE 1 MILLIGRAM(S): 2 INJECTION INTRAMUSCULAR; INTRAVENOUS; SUBCUTANEOUS at 20:20

## 2021-03-01 RX ADMIN — Medication 40 MILLIEQUIVALENT(S): at 09:45

## 2021-03-01 RX ADMIN — Medication 1 TABLET(S): at 09:45

## 2021-03-01 RX ADMIN — ENOXAPARIN SODIUM 40 MILLIGRAM(S): 100 INJECTION SUBCUTANEOUS at 09:45

## 2021-03-01 RX ADMIN — Medication 1 TABLET(S): at 20:48

## 2021-03-01 RX ADMIN — Medication 250 MILLIGRAM(S): at 11:13

## 2021-03-01 RX ADMIN — Medication 500 MILLIGRAM(S): at 05:35

## 2021-03-01 RX ADMIN — AMLODIPINE BESYLATE 10 MILLIGRAM(S): 2.5 TABLET ORAL at 09:45

## 2021-03-01 RX ADMIN — Medication 125 MILLIGRAM(S): at 23:22

## 2021-03-01 RX ADMIN — Medication 250 MILLIGRAM(S): at 00:10

## 2021-03-01 RX ADMIN — HYDROMORPHONE HYDROCHLORIDE 1 MILLIGRAM(S): 2 INJECTION INTRAMUSCULAR; INTRAVENOUS; SUBCUTANEOUS at 15:45

## 2021-03-01 RX ADMIN — Medication 5 MILLIGRAM(S): at 23:22

## 2021-03-01 RX ADMIN — Medication 250 MILLIGRAM(S): at 05:35

## 2021-03-01 RX ADMIN — Medication 325 MILLIGRAM(S): at 09:45

## 2021-03-01 RX ADMIN — HYDROMORPHONE HYDROCHLORIDE 1 MILLIGRAM(S): 2 INJECTION INTRAMUSCULAR; INTRAVENOUS; SUBCUTANEOUS at 04:27

## 2021-03-01 RX ADMIN — TAMSULOSIN HYDROCHLORIDE 0.4 MILLIGRAM(S): 0.4 CAPSULE ORAL at 20:49

## 2021-03-01 RX ADMIN — Medication 25 MILLIGRAM(S): at 09:45

## 2021-03-01 RX ADMIN — Medication 10 MILLIGRAM(S): at 11:09

## 2021-03-01 RX ADMIN — Medication 10 MILLIGRAM(S): at 17:05

## 2021-03-01 RX ADMIN — ONDANSETRON 4 MILLIGRAM(S): 8 TABLET, FILM COATED ORAL at 06:11

## 2021-03-01 RX ADMIN — Medication 125 MILLIGRAM(S): at 17:14

## 2021-03-01 NOTE — CONSULT NOTE ADULT - ASSESSMENT
Will start Flomax. Discussed off label use in women for urinary retention.   Trial of void in 3-4 days, check PVR if significant reinsert [place].   Follow up as out patient. 
Imp:  Chronic abdominal pain = doubt porphyria  Elevated Alk phos which is new  C. diff, controlled/resolving    Rec:  Cont PO vanco  Check liver serologies and GGT  Await porphyria genetics which was sent outpatient  Start amitriptyline 25 mg qhs
65/F with PMHx of recent C diff still on treatment, adrenal insufficieny, hearing difficulty, chronic abdominal pain, osteoporosis, porphyria, s/p cholecystectomy presents to the ED c/o lower abd pain, vomiting and diarrhea which started 2/28 at midnight. Associated with subjective fever. Pt is still continuing treatment for C. diff. Pt did follow up with YAZMIN Curiel after last C. diff diagnosis. No CP/SOB. CT abdo was done in ED which showed improved rectosigmoid colitis. Pt given oral flagyl/oral vancomycin.     1. diarrhea. recent c diff colitis  - imaging reviewed, diarrhea improving   - dx with cdad 2/9  - continue with oral vancomycin [id number]   - if unable to tolerate po, can do IV flagyl  - contact precautions  - fu cbc    2. other issues - care per medicine

## 2021-03-02 LAB
ADD ON TEST-SPECIMEN IN LAB: SIGNIFICANT CHANGE UP
GGT SERPL-CCNC: 362 U/L — HIGH (ref 8–40)
HAV IGM SER-ACNC: SIGNIFICANT CHANGE UP
HBV CORE IGM SER-ACNC: SIGNIFICANT CHANGE UP
HBV SURFACE AG SER-ACNC: SIGNIFICANT CHANGE UP
HCV AB S/CO SERPL IA: 0.1 S/CO — SIGNIFICANT CHANGE UP (ref 0–0.99)
HCV AB SERPL-IMP: SIGNIFICANT CHANGE UP

## 2021-03-02 PROCEDURE — 99233 SBSQ HOSP IP/OBS HIGH 50: CPT

## 2021-03-02 RX ORDER — HYDROMORPHONE HYDROCHLORIDE 2 MG/ML
2 INJECTION INTRAMUSCULAR; INTRAVENOUS; SUBCUTANEOUS EVERY 6 HOURS
Refills: 0 | Status: DISCONTINUED | OUTPATIENT
Start: 2021-03-02 | End: 2021-03-08

## 2021-03-02 RX ORDER — POTASSIUM CHLORIDE 20 MEQ
10 PACKET (EA) ORAL
Refills: 0 | Status: COMPLETED | OUTPATIENT
Start: 2021-03-02 | End: 2021-03-02

## 2021-03-02 RX ADMIN — Medication 125 MILLIGRAM(S): at 23:37

## 2021-03-02 RX ADMIN — Medication 100 MILLIEQUIVALENT(S): at 13:25

## 2021-03-02 RX ADMIN — Medication 5 MILLIGRAM(S): at 20:51

## 2021-03-02 RX ADMIN — Medication 25 MILLIGRAM(S): at 10:36

## 2021-03-02 RX ADMIN — Medication 125 MILLIGRAM(S): at 05:35

## 2021-03-02 RX ADMIN — HYDROMORPHONE HYDROCHLORIDE 1 MILLIGRAM(S): 2 INJECTION INTRAMUSCULAR; INTRAVENOUS; SUBCUTANEOUS at 03:40

## 2021-03-02 RX ADMIN — Medication 1 TABLET(S): at 20:51

## 2021-03-02 RX ADMIN — Medication 10 MILLIGRAM(S): at 16:43

## 2021-03-02 RX ADMIN — HYDROMORPHONE HYDROCHLORIDE 2 MILLIGRAM(S): 2 INJECTION INTRAMUSCULAR; INTRAVENOUS; SUBCUTANEOUS at 20:02

## 2021-03-02 RX ADMIN — Medication 100 MILLIEQUIVALENT(S): at 15:04

## 2021-03-02 RX ADMIN — HYDROMORPHONE HYDROCHLORIDE 2 MILLIGRAM(S): 2 INJECTION INTRAMUSCULAR; INTRAVENOUS; SUBCUTANEOUS at 13:53

## 2021-03-02 RX ADMIN — Medication 1 TABLET(S): at 10:36

## 2021-03-02 RX ADMIN — Medication 25 MILLIGRAM(S): at 20:51

## 2021-03-02 RX ADMIN — Medication 10 MILLIGRAM(S): at 03:44

## 2021-03-02 RX ADMIN — HYDROMORPHONE HYDROCHLORIDE 1 MILLIGRAM(S): 2 INJECTION INTRAMUSCULAR; INTRAVENOUS; SUBCUTANEOUS at 08:04

## 2021-03-02 RX ADMIN — AMLODIPINE BESYLATE 10 MILLIGRAM(S): 2.5 TABLET ORAL at 10:35

## 2021-03-02 RX ADMIN — ENOXAPARIN SODIUM 40 MILLIGRAM(S): 100 INJECTION SUBCUTANEOUS at 10:35

## 2021-03-02 RX ADMIN — TAMSULOSIN HYDROCHLORIDE 0.4 MILLIGRAM(S): 0.4 CAPSULE ORAL at 20:51

## 2021-03-02 RX ADMIN — SODIUM CHLORIDE 100 MILLILITER(S): 9 INJECTION, SOLUTION INTRAVENOUS at 05:35

## 2021-03-02 RX ADMIN — Medication 10 MILLIGRAM(S): at 23:37

## 2021-03-02 RX ADMIN — Medication 125 MILLIGRAM(S): at 11:47

## 2021-03-02 RX ADMIN — Medication 125 MILLIGRAM(S): at 17:40

## 2021-03-02 RX ADMIN — Medication 325 MILLIGRAM(S): at 10:35

## 2021-03-02 RX ADMIN — Medication 100 MILLIEQUIVALENT(S): at 11:35

## 2021-03-02 RX ADMIN — Medication 10 MILLIGRAM(S): at 11:47

## 2021-03-02 NOTE — CDI QUERY NOTE - NSCDIOTHERTXTBX_GEN_ALL_CORE_HH
Documentation of elevated lactate 2.2.    NS 2 liter fluid    Please clarify if clinically significant:  A) Clinically significant for lactic acidosis  B) Not clinically significant for lactic acidosis  C) Unable to determine  D) other ( Please specify condition)

## 2021-03-03 LAB
ANA TITR SER: NEGATIVE — SIGNIFICANT CHANGE UP
ANION GAP SERPL CALC-SCNC: 9 MMOL/L — SIGNIFICANT CHANGE UP (ref 5–17)
BUN SERPL-MCNC: 7 MG/DL — SIGNIFICANT CHANGE UP (ref 7–23)
CALCIUM SERPL-MCNC: 9.5 MG/DL — SIGNIFICANT CHANGE UP (ref 8.5–10.1)
CHLORIDE SERPL-SCNC: 107 MMOL/L — SIGNIFICANT CHANGE UP (ref 96–108)
CO2 SERPL-SCNC: 24 MMOL/L — SIGNIFICANT CHANGE UP (ref 22–31)
CREAT SERPL-MCNC: 0.53 MG/DL — SIGNIFICANT CHANGE UP (ref 0.5–1.3)
GLUCOSE SERPL-MCNC: 105 MG/DL — HIGH (ref 70–99)
MITOCHONDRIA AB SER-ACNC: SIGNIFICANT CHANGE UP
POTASSIUM SERPL-MCNC: 3.1 MMOL/L — LOW (ref 3.5–5.3)
POTASSIUM SERPL-SCNC: 3.1 MMOL/L — LOW (ref 3.5–5.3)
SMOOTH MUSCLE AB SER-ACNC: SIGNIFICANT CHANGE UP
SODIUM SERPL-SCNC: 140 MMOL/L — SIGNIFICANT CHANGE UP (ref 135–145)

## 2021-03-03 PROCEDURE — 99233 SBSQ HOSP IP/OBS HIGH 50: CPT

## 2021-03-03 RX ORDER — ALPRAZOLAM 0.25 MG
0.25 TABLET ORAL ONCE
Refills: 0 | Status: DISCONTINUED | OUTPATIENT
Start: 2021-03-03 | End: 2021-03-03

## 2021-03-03 RX ORDER — POTASSIUM CHLORIDE 20 MEQ
40 PACKET (EA) ORAL ONCE
Refills: 0 | Status: COMPLETED | OUTPATIENT
Start: 2021-03-03 | End: 2021-03-03

## 2021-03-03 RX ADMIN — Medication 10 MILLIGRAM(S): at 11:46

## 2021-03-03 RX ADMIN — Medication 125 MILLIGRAM(S): at 05:45

## 2021-03-03 RX ADMIN — Medication 1 TABLET(S): at 09:27

## 2021-03-03 RX ADMIN — Medication 40 MILLIEQUIVALENT(S): at 11:36

## 2021-03-03 RX ADMIN — Medication 10 MILLIGRAM(S): at 05:45

## 2021-03-03 RX ADMIN — Medication 25 MILLIGRAM(S): at 22:20

## 2021-03-03 RX ADMIN — HYDROMORPHONE HYDROCHLORIDE 2 MILLIGRAM(S): 2 INJECTION INTRAMUSCULAR; INTRAVENOUS; SUBCUTANEOUS at 02:22

## 2021-03-03 RX ADMIN — HYDROMORPHONE HYDROCHLORIDE 2 MILLIGRAM(S): 2 INJECTION INTRAMUSCULAR; INTRAVENOUS; SUBCUTANEOUS at 20:37

## 2021-03-03 RX ADMIN — Medication 0.25 MILLIGRAM(S): at 22:20

## 2021-03-03 RX ADMIN — Medication 10 MILLIGRAM(S): at 17:30

## 2021-03-03 RX ADMIN — Medication 125 MILLIGRAM(S): at 17:30

## 2021-03-03 RX ADMIN — HYDROMORPHONE HYDROCHLORIDE 2 MILLIGRAM(S): 2 INJECTION INTRAMUSCULAR; INTRAVENOUS; SUBCUTANEOUS at 14:21

## 2021-03-03 RX ADMIN — Medication 125 MILLIGRAM(S): at 23:21

## 2021-03-03 RX ADMIN — HYDROMORPHONE HYDROCHLORIDE 2 MILLIGRAM(S): 2 INJECTION INTRAMUSCULAR; INTRAVENOUS; SUBCUTANEOUS at 09:15

## 2021-03-03 RX ADMIN — HYDROMORPHONE HYDROCHLORIDE 2 MILLIGRAM(S): 2 INJECTION INTRAMUSCULAR; INTRAVENOUS; SUBCUTANEOUS at 08:17

## 2021-03-03 RX ADMIN — ENOXAPARIN SODIUM 40 MILLIGRAM(S): 100 INJECTION SUBCUTANEOUS at 09:27

## 2021-03-03 RX ADMIN — Medication 325 MILLIGRAM(S): at 09:27

## 2021-03-03 RX ADMIN — Medication 5 MILLIGRAM(S): at 23:21

## 2021-03-03 RX ADMIN — TAMSULOSIN HYDROCHLORIDE 0.4 MILLIGRAM(S): 0.4 CAPSULE ORAL at 22:20

## 2021-03-03 RX ADMIN — Medication 125 MILLIGRAM(S): at 11:35

## 2021-03-03 RX ADMIN — HYDROMORPHONE HYDROCHLORIDE 2 MILLIGRAM(S): 2 INJECTION INTRAMUSCULAR; INTRAVENOUS; SUBCUTANEOUS at 15:15

## 2021-03-03 RX ADMIN — AMLODIPINE BESYLATE 10 MILLIGRAM(S): 2.5 TABLET ORAL at 09:27

## 2021-03-03 RX ADMIN — Medication 25 MILLIGRAM(S): at 09:27

## 2021-03-04 LAB
ALBUMIN SERPL ELPH-MCNC: 3.7 G/DL — SIGNIFICANT CHANGE UP (ref 3.3–5)
ALP SERPL-CCNC: 150 U/L — HIGH (ref 40–120)
ALT FLD-CCNC: 40 U/L — SIGNIFICANT CHANGE UP (ref 12–78)
ANION GAP SERPL CALC-SCNC: 11 MMOL/L — SIGNIFICANT CHANGE UP (ref 5–17)
AST SERPL-CCNC: 28 U/L — SIGNIFICANT CHANGE UP (ref 15–37)
BILIRUB SERPL-MCNC: 0.5 MG/DL — SIGNIFICANT CHANGE UP (ref 0.2–1.2)
BUN SERPL-MCNC: 11 MG/DL — SIGNIFICANT CHANGE UP (ref 7–23)
CALCIUM SERPL-MCNC: 10 MG/DL — SIGNIFICANT CHANGE UP (ref 8.5–10.1)
CHLORIDE SERPL-SCNC: 107 MMOL/L — SIGNIFICANT CHANGE UP (ref 96–108)
CO2 SERPL-SCNC: 22 MMOL/L — SIGNIFICANT CHANGE UP (ref 22–31)
CREAT SERPL-MCNC: 0.62 MG/DL — SIGNIFICANT CHANGE UP (ref 0.5–1.3)
GLUCOSE SERPL-MCNC: 115 MG/DL — HIGH (ref 70–99)
POTASSIUM SERPL-MCNC: 3.1 MMOL/L — LOW (ref 3.5–5.3)
POTASSIUM SERPL-SCNC: 3.1 MMOL/L — LOW (ref 3.5–5.3)
PROT SERPL-MCNC: 7.8 GM/DL — SIGNIFICANT CHANGE UP (ref 6–8.3)
SODIUM SERPL-SCNC: 140 MMOL/L — SIGNIFICANT CHANGE UP (ref 135–145)

## 2021-03-04 PROCEDURE — 99233 SBSQ HOSP IP/OBS HIGH 50: CPT

## 2021-03-04 RX ORDER — POTASSIUM CHLORIDE 20 MEQ
40 PACKET (EA) ORAL EVERY 4 HOURS
Refills: 0 | Status: COMPLETED | OUTPATIENT
Start: 2021-03-04 | End: 2021-03-04

## 2021-03-04 RX ORDER — DEXTROSE MONOHYDRATE, SODIUM CHLORIDE, AND POTASSIUM CHLORIDE 50; .745; 4.5 G/1000ML; G/1000ML; G/1000ML
1000 INJECTION, SOLUTION INTRAVENOUS
Refills: 0 | Status: DISCONTINUED | OUTPATIENT
Start: 2021-03-04 | End: 2021-03-06

## 2021-03-04 RX ORDER — ALPRAZOLAM 0.25 MG
0.25 TABLET ORAL ONCE
Refills: 0 | Status: DISCONTINUED | OUTPATIENT
Start: 2021-03-04 | End: 2021-03-04

## 2021-03-04 RX ORDER — LOPERAMIDE HCL 2 MG
2 TABLET ORAL ONCE
Refills: 0 | Status: COMPLETED | OUTPATIENT
Start: 2021-03-04 | End: 2021-03-04

## 2021-03-04 RX ADMIN — AMLODIPINE BESYLATE 10 MILLIGRAM(S): 2.5 TABLET ORAL at 09:45

## 2021-03-04 RX ADMIN — Medication 25 MILLIGRAM(S): at 09:45

## 2021-03-04 RX ADMIN — Medication 325 MILLIGRAM(S): at 09:45

## 2021-03-04 RX ADMIN — Medication 125 MILLIGRAM(S): at 05:56

## 2021-03-04 RX ADMIN — Medication 2 MILLIGRAM(S): at 12:33

## 2021-03-04 RX ADMIN — Medication 10 MILLIGRAM(S): at 20:53

## 2021-03-04 RX ADMIN — HYDROMORPHONE HYDROCHLORIDE 2 MILLIGRAM(S): 2 INJECTION INTRAMUSCULAR; INTRAVENOUS; SUBCUTANEOUS at 12:33

## 2021-03-04 RX ADMIN — HYDROMORPHONE HYDROCHLORIDE 2 MILLIGRAM(S): 2 INJECTION INTRAMUSCULAR; INTRAVENOUS; SUBCUTANEOUS at 20:52

## 2021-03-04 RX ADMIN — HYDROMORPHONE HYDROCHLORIDE 2 MILLIGRAM(S): 2 INJECTION INTRAMUSCULAR; INTRAVENOUS; SUBCUTANEOUS at 05:56

## 2021-03-04 RX ADMIN — Medication 10 MILLIGRAM(S): at 06:01

## 2021-03-04 RX ADMIN — DEXTROSE MONOHYDRATE, SODIUM CHLORIDE, AND POTASSIUM CHLORIDE 80 MILLILITER(S): 50; .745; 4.5 INJECTION, SOLUTION INTRAVENOUS at 12:33

## 2021-03-04 RX ADMIN — HYDROMORPHONE HYDROCHLORIDE 2 MILLIGRAM(S): 2 INJECTION INTRAMUSCULAR; INTRAVENOUS; SUBCUTANEOUS at 13:33

## 2021-03-04 RX ADMIN — ENOXAPARIN SODIUM 40 MILLIGRAM(S): 100 INJECTION SUBCUTANEOUS at 09:46

## 2021-03-04 RX ADMIN — Medication 0.25 MILLIGRAM(S): at 23:18

## 2021-03-04 RX ADMIN — Medication 25 MILLIGRAM(S): at 21:14

## 2021-03-04 RX ADMIN — HYDROMORPHONE HYDROCHLORIDE 2 MILLIGRAM(S): 2 INJECTION INTRAMUSCULAR; INTRAVENOUS; SUBCUTANEOUS at 21:50

## 2021-03-04 RX ADMIN — Medication 125 MILLIGRAM(S): at 17:49

## 2021-03-04 RX ADMIN — Medication 40 MILLIEQUIVALENT(S): at 17:49

## 2021-03-04 RX ADMIN — Medication 125 MILLIGRAM(S): at 12:33

## 2021-03-04 RX ADMIN — Medication 25 MILLIGRAM(S): at 21:13

## 2021-03-04 RX ADMIN — Medication 1 TABLET(S): at 09:45

## 2021-03-04 RX ADMIN — Medication 40 MILLIEQUIVALENT(S): at 12:33

## 2021-03-04 RX ADMIN — Medication 5 MILLIGRAM(S): at 21:13

## 2021-03-04 RX ADMIN — TAMSULOSIN HYDROCHLORIDE 0.4 MILLIGRAM(S): 0.4 CAPSULE ORAL at 21:14

## 2021-03-04 NOTE — PROVIDER CONTACT NOTE (OTHER) - SITUATION
Dr. Curiel's answering service is aware of consult.
Dr. Simeon's answering service is aware of consult.
notified Dr Wahl's office of consult spokes to [place]
notified Dr Boxer's office of admission

## 2021-03-05 DIAGNOSIS — A04.72 ENTEROCOLITIS DUE TO CLOSTRIDIUM DIFFICILE, NOT SPECIFIED AS RECURRENT: ICD-10-CM

## 2021-03-05 DIAGNOSIS — R10.9 UNSPECIFIED ABDOMINAL PAIN: ICD-10-CM

## 2021-03-05 LAB
ANION GAP SERPL CALC-SCNC: 8 MMOL/L — SIGNIFICANT CHANGE UP (ref 5–17)
BUN SERPL-MCNC: 8 MG/DL — SIGNIFICANT CHANGE UP (ref 7–23)
CALCIUM SERPL-MCNC: 9.9 MG/DL — SIGNIFICANT CHANGE UP (ref 8.5–10.1)
CHLORIDE SERPL-SCNC: 107 MMOL/L — SIGNIFICANT CHANGE UP (ref 96–108)
CO2 SERPL-SCNC: 20 MMOL/L — LOW (ref 22–31)
CREAT SERPL-MCNC: 0.56 MG/DL — SIGNIFICANT CHANGE UP (ref 0.5–1.3)
CULTURE RESULTS: SIGNIFICANT CHANGE UP
CULTURE RESULTS: SIGNIFICANT CHANGE UP
GLUCOSE SERPL-MCNC: 127 MG/DL — HIGH (ref 70–99)
POTASSIUM SERPL-MCNC: 4 MMOL/L — SIGNIFICANT CHANGE UP (ref 3.5–5.3)
POTASSIUM SERPL-SCNC: 4 MMOL/L — SIGNIFICANT CHANGE UP (ref 3.5–5.3)
SODIUM SERPL-SCNC: 135 MMOL/L — SIGNIFICANT CHANGE UP (ref 135–145)
SPECIMEN SOURCE: SIGNIFICANT CHANGE UP
SPECIMEN SOURCE: SIGNIFICANT CHANGE UP

## 2021-03-05 PROCEDURE — 99233 SBSQ HOSP IP/OBS HIGH 50: CPT

## 2021-03-05 RX ORDER — ALPRAZOLAM 0.25 MG
0.25 TABLET ORAL EVERY 8 HOURS
Refills: 0 | Status: DISCONTINUED | OUTPATIENT
Start: 2021-03-05 | End: 2021-03-08

## 2021-03-05 RX ADMIN — Medication 125 MILLIGRAM(S): at 17:38

## 2021-03-05 RX ADMIN — DEXTROSE MONOHYDRATE, SODIUM CHLORIDE, AND POTASSIUM CHLORIDE 80 MILLILITER(S): 50; .745; 4.5 INJECTION, SOLUTION INTRAVENOUS at 00:10

## 2021-03-05 RX ADMIN — Medication 125 MILLIGRAM(S): at 23:46

## 2021-03-05 RX ADMIN — Medication 125 MILLIGRAM(S): at 10:56

## 2021-03-05 RX ADMIN — DEXTROSE MONOHYDRATE, SODIUM CHLORIDE, AND POTASSIUM CHLORIDE 80 MILLILITER(S): 50; .745; 4.5 INJECTION, SOLUTION INTRAVENOUS at 17:38

## 2021-03-05 RX ADMIN — HYDROMORPHONE HYDROCHLORIDE 2 MILLIGRAM(S): 2 INJECTION INTRAMUSCULAR; INTRAVENOUS; SUBCUTANEOUS at 12:49

## 2021-03-05 RX ADMIN — Medication 25 MILLIGRAM(S): at 10:56

## 2021-03-05 RX ADMIN — Medication 10 MILLIGRAM(S): at 05:28

## 2021-03-05 RX ADMIN — Medication 10 MILLIGRAM(S): at 20:01

## 2021-03-05 RX ADMIN — HYDROMORPHONE HYDROCHLORIDE 2 MILLIGRAM(S): 2 INJECTION INTRAMUSCULAR; INTRAVENOUS; SUBCUTANEOUS at 05:28

## 2021-03-05 RX ADMIN — ENOXAPARIN SODIUM 40 MILLIGRAM(S): 100 INJECTION SUBCUTANEOUS at 10:56

## 2021-03-05 RX ADMIN — Medication 1 TABLET(S): at 10:55

## 2021-03-05 RX ADMIN — Medication 0.25 MILLIGRAM(S): at 23:55

## 2021-03-05 RX ADMIN — Medication 325 MILLIGRAM(S): at 10:56

## 2021-03-05 RX ADMIN — TAMSULOSIN HYDROCHLORIDE 0.4 MILLIGRAM(S): 0.4 CAPSULE ORAL at 21:31

## 2021-03-05 RX ADMIN — Medication 25 MILLIGRAM(S): at 21:31

## 2021-03-05 RX ADMIN — Medication 5 MILLIGRAM(S): at 21:31

## 2021-03-05 RX ADMIN — HYDROMORPHONE HYDROCHLORIDE 2 MILLIGRAM(S): 2 INJECTION INTRAMUSCULAR; INTRAVENOUS; SUBCUTANEOUS at 21:40

## 2021-03-05 RX ADMIN — HYDROMORPHONE HYDROCHLORIDE 2 MILLIGRAM(S): 2 INJECTION INTRAMUSCULAR; INTRAVENOUS; SUBCUTANEOUS at 06:28

## 2021-03-05 RX ADMIN — HYDROMORPHONE HYDROCHLORIDE 2 MILLIGRAM(S): 2 INJECTION INTRAMUSCULAR; INTRAVENOUS; SUBCUTANEOUS at 19:59

## 2021-03-05 RX ADMIN — Medication 1 TABLET(S): at 10:56

## 2021-03-05 RX ADMIN — AMLODIPINE BESYLATE 10 MILLIGRAM(S): 2.5 TABLET ORAL at 10:56

## 2021-03-05 RX ADMIN — Medication 10 MILLIGRAM(S): at 12:49

## 2021-03-05 RX ADMIN — Medication 125 MILLIGRAM(S): at 05:29

## 2021-03-05 NOTE — CONSULT NOTE ADULT - SUBJECTIVE AND OBJECTIVE BOX
HPI:  65/F with PMHx of recent C diff still on treatment, adrenal insufficieny, hearing difficulty, chronic abdominal pain, osteoporosis,, s/p cholecystectomy presents to the ED c/o lower abd pain, vomiting and diarrhea which started yesterday at midnight. Associated with subjective fever. Pt is still continuing treatment for C. diff. Pt did follow up with YAZMIN Curiel after last C. diff diagnosis. No CP/SOB. CT abdo was done in ED which showed improved rectosigmoid colitis.  (28 Feb 2021 14:23)  ----------------------  Pain improved and no diarrhea.    PAST MEDICAL & SURGICAL HISTORY:  Staph infection  8/2017    Chronic abdominal pain    Osteoporosis    Adrenal insufficiency, primary    Knee effusion    Porphyria    S/P tonsillectomy    S/P cholecystectomy    H/O spinal fusion        Home Medications:  calcium (as calcium citrate) 250 mg oral tablet: 1 tab(s) orally 2 times a day (28 Feb 2021 14:34)  cholecalciferol 1000 intl units oral tablet: 1 tab(s) orally 2 times a day (28 Feb 2021 14:34)  magnesium oxide 500 mg oral tablet: 1 tab(s) orally 2 times a day (28 Feb 2021 14:34)  melatonin 3 mg oral tablet: 5 tab(s) orally once a day (at bedtime) (28 Feb 2021 14:34)  multivitamin: 1 tab(s) orally once a day (28 Feb 2021 14:34)      MEDICATIONS  (STANDING):  amLODIPine   Tablet 10 milliGRAM(s) Oral daily  dextrose 5% + sodium chloride 0.9%. 1000 milliLiter(s) (100 mL/Hr) IV Continuous <Continuous>  enoxaparin Injectable 40 milliGRAM(s) SubCutaneous daily  ferrous    sulfate 325 milliGRAM(s) Oral daily  lactobacillus acidophilus 1 Tablet(s) Oral two times a day  melatonin 5 milliGRAM(s) Oral at bedtime  metoprolol tartrate 25 milliGRAM(s) Oral two times a day  multivitamin 1 Tablet(s) Oral daily  tamsulosin 0.4 milliGRAM(s) Oral at bedtime  vancomycin    Solution 125 milliGRAM(s) Oral every 6 hours    MEDICATIONS  (PRN):  aluminum hydroxide/magnesium hydroxide/simethicone Suspension 30 milliLiter(s) Oral every 4 hours PRN Dyspepsia  HYDROmorphone  Injectable 1 milliGRAM(s) IV Push every 4 hours PRN Moderate Pain (4 - 6)  prochlorperazine   Injectable 10 milliGRAM(s) IntraMuscular every 6 hours PRN nausea      Allergies    chlorhexidine containing compounds (Rash)  gabapentin (Unknown)    Intolerances        SOCIAL HISTORY:    FAMILY HISTORY:  Family history of porphyria (Grandparent)        ROS  As above  Otherwise unremarkable    Vital Signs Last 24 Hrs  T(C): 36.8 (01 Mar 2021 16:41), Max: 36.9 (01 Mar 2021 09:04)  T(F): 98.3 (01 Mar 2021 16:41), Max: 98.5 (01 Mar 2021 09:04)  HR: 85 (01 Mar 2021 16:41) (85 - 98)  BP: 130/81 (01 Mar 2021 16:41) (130/81 - 155/77)  BP(mean): --  RR: 16 (01 Mar 2021 16:41) (16 - 17)  SpO2: 97% (01 Mar 2021 16:41) (97% - 100%)    Constitutional: NAD, well-developed  Respiratory: CTAB  Cardiovascular: S1 and S2, RRR  Gastrointestinal: BS+, soft, NT/ND  Extremities: No peripheral edema  Psychiatric: Normal mood, normal affect  Skin: No rashes    LABS:                        15.3   10.00 )-----------( 381      ( 28 Feb 2021 10:13 )             47.0     03-01    139  |  110<H>  |  4<L>  ----------------------------<  146<H>  3.2<L>   |  21<L>  |  0.54    Ca    9.2      01 Mar 2021 08:37    TPro  9.1<H>  /  Alb  3.7  /  TBili  0.4  /  DBili  x   /  AST  22  /  ALT  63  /  AlkPhos  238<H>  02-28    PT/INR - ( 28 Feb 2021 10:13 )   PT: 12.3 sec;   INR: 1.06 ratio         PTT - ( 28 Feb 2021 10:13 )  PTT:33.1 sec  LIVER FUNCTIONS - ( 28 Feb 2021 10:13 )  Alb: 3.7 g/dL / Pro: 9.1 gm/dL / ALK PHOS: 238 U/L / ALT: 63 U/L / AST: 22 U/L / GGT: x             RADIOLOGY & ADDITIONAL STUDIES:
Patient is a 65y old  Female who presents with a chief complaint of abd pain, diarrhea (05 Mar 2021 17:02)      HPI:  65/F with PMHx of recent C diff still on treatment, adrenal insufficieny, hearing difficulty, chronic abdominal pain, osteoporosis, porphyria, s/p cholecystectomy presents to the ED c/o lower abd pain, vomiting and diarrhea  Pt is still continuing treatment for C. diff. Pt did follow up with YAZMIN Curiel after last C. diff diagnosis. No CP/SOB. CT abdo was done in ED which showed improved rectosigmoid colitis.  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  she has had numerous admission for abdominal pain    currently having abdominal pain as well as diarrhea      PAST MEDICAL & SURGICAL HISTORY:  Staph infection  8/2017    Chronic abdominal pain    Osteoporosis    Adrenal insufficiency, primary    Knee effusion    Porphyria    S/P tonsillectomy    S/P cholecystectomy    H/O spinal fusion     Porphyria        REVIEW OF SYSTEMS    General:	    weakness  abdominal pain  diarrhea        Allergies    chlorhexidine containing compounds (Rash)  gabapentin (Unknown)    Intolerances        Occupation:  [employer]: Denied Smoking: Denied Drug Use: Denied  Marital Status:         FAMILY HISTORY:  Family history of porphyria (Grandparent)        Home Medications:  calcium (as calcium citrate) 250 mg oral tablet: 1 tab(s) orally 2 times a day (28 Feb 2021 14:34)  cholecalciferol 1000 intl units oral tablet: 1 tab(s) orally 2 times a day (28 Feb 2021 14:34)  magnesium oxide 500 mg oral tablet: 1 tab(s) orally 2 times a day (28 Feb 2021 14:34)  melatonin 3 mg oral tablet: 5 tab(s) orally once a day (at bedtime) (28 Feb 2021 14:34)  multivitamin: 1 tab(s) orally once a day (28 Feb 2021 14:34)      MEDICATIONS  (STANDING):  amitriptyline 25 milliGRAM(s) Oral at bedtime  amLODIPine   Tablet 10 milliGRAM(s) Oral daily  dextrose 5% + sodium chloride 0.45% with potassium chloride 20 mEq/L 1000 milliLiter(s) (80 mL/Hr) IV Continuous <Continuous>  enoxaparin Injectable 40 milliGRAM(s) SubCutaneous daily  ferrous    sulfate 325 milliGRAM(s) Oral daily  lactobacillus acidophilus 1 Tablet(s) Oral two times a day  melatonin 5 milliGRAM(s) Oral at bedtime  metoprolol tartrate 25 milliGRAM(s) Oral two times a day  multivitamin 1 Tablet(s) Oral daily  tamsulosin 0.4 milliGRAM(s) Oral at bedtime  vancomycin    Solution 125 milliGRAM(s) Oral every 6 hours    MEDICATIONS  (PRN):  ALPRAZolam 0.25 milliGRAM(s) Oral every 8 hours PRN anxiety  aluminum hydroxide/magnesium hydroxide/simethicone Suspension 30 milliLiter(s) Oral every 4 hours PRN Dyspepsia  HYDROmorphone   Tablet 2 milliGRAM(s) Oral every 6 hours PRN Severe Pain (7 - 10)  prochlorperazine   Injectable 10 milliGRAM(s) IntraMuscular every 6 hours PRN nausea      PHYSICAL EXAM:  Vital Signs Last 24 Hrs  T(C): 36.6 (05 Mar 2021 21:30), Max: 37 (05 Mar 2021 10:46)  T(F): 97.8 (05 Mar 2021 21:30), Max: 98.6 (05 Mar 2021 10:46)  HR: 112 (05 Mar 2021 21:30) (96 - 112)  BP: 116/66 (05 Mar 2021 21:30) (116/66 - 144/78)  BP(mean): --  RR: 19 (05 Mar 2021 21:30) (18 - 19)  SpO2: 98% (05 Mar 2021 21:30) (98% - 100%)      Gen:  frail  appears chronically ill  chest clear  abdomen soft and mildly tender  BS+  neuro TAWANNA           LABS:    05 Mar 2021 13:55    135    |  107    |  8      ----------------------------<  127    4.0     |  20     |  0.56     Ca    9.9        05 Mar 2021 13:55    TPro  7.8    /  Alb  3.7    /  TBili  0.5    /  DBili  x      /  AST  28     /  ALT  40     /  AlkPhos  150    04 Mar 2021 06:36    LIVER FUNCTIONS - ( 04 Mar 2021 06:36 )  Alb: 3.7 g/dL / Pro: 7.8 gm/dL / ALK PHOS: 150 U/L / ALT: 40 U/L / AST: 28 U/L / GGT: x               Culture - Blood (collected 02-28-21 @ 11:27)  Source: .Blood None  Final Report (03-05-21 @ 19:00):    No Growth Final    Culture - Blood (collected 02-28-21 @ 10:13)  Source: .Blood None  Final Report (03-05-21 @ 16:00):    No Growth Final    Cl Diff + by PCR 2/9/21      RADIOLOGY & ADDITIONAL STUDIES:      EXAM:  CT ABDOMEN AND PELVIS IC                            PROCEDURE DATE:  02/09/2021          INTERPRETATION:  CLINICAL INFORMATION: 65-year-old female with left lower quadrant pain    COMPARISON: CT scan 01/31/2021    PROCEDURE:  CT of the Abdomen and Pelvis was performed with intravenous contrast.  Intravenous contrast: 90 ml Omnipaque 350. 10 ml discarded.  Oral contrast: None.  Sagittal and coronal reformats were performed.    FINDINGS:  LOWER CHEST: Within normal limits.    LIVER: Within normal limits.  BILE DUCTS: Normal caliber.  GALLBLADDER: Cholecystectomy.  SPLEEN: Within normal limits.  PANCREAS: Within normal limits.  ADRENALS: Within normal limits.  KIDNEYS/URETERS: Within normal limits.    BLADDER: Within normal limits.  REPRODUCTIVE ORGANS: Small uterus with calcified leiomyomata.    BOWEL: No bowel obstruction. Appendix normal. Mural edema rectum and sigmoid colon with mild mural thickening distal descending colon consistent with proctocolitis  PERITONEUM: No ascites.  VESSELS: Within normal limits.  RETROPERITONEUM/LYMPH NODES: No lymphadenopathy.  ABDOMINAL WALL: Within normal limits.  BONES: Compression deformity T12 vertebral body    IMPRESSION:  Proctocolitis extending to the distal descending colon    
Patient is a 65y old  Female who presents with a chief complaint of abd pain, diarrhea (28 Feb 2021 19:27)    HPI:  65/F with PMHx of recent C diff still on treatment, adrenal insufficieny, hearing difficulty, chronic abdominal pain, osteoporosis, porphyria, s/p cholecystectomy presents to the ED c/o lower abd pain, vomiting and diarrhea which started 2/28 at midnight. Associated with subjective fever. Pt is still continuing treatment for C. diff. Pt did follow up with YAZMIN Curiel after last C. diff diagnosis. No CP/SOB. CT abdo was done in ED which showed improved rectosigmoid colitis. Pt given oral flagyl/oral vancomycin.       PMH: as above  PSH: as above  Meds: per reconciliation sheet, noted below  MEDICATIONS  (STANDING):  amLODIPine   Tablet 10 milliGRAM(s) Oral daily  dextrose 5% + sodium chloride 0.9%. 1000 milliLiter(s) (100 mL/Hr) IV Continuous <Continuous>  enoxaparin Injectable 40 milliGRAM(s) SubCutaneous daily  ferrous    sulfate 325 milliGRAM(s) Oral daily  lactobacillus acidophilus 1 Tablet(s) Oral two times a day  melatonin 5 milliGRAM(s) Oral at bedtime  metoprolol tartrate 25 milliGRAM(s) Oral two times a day  multivitamin 1 Tablet(s) Oral daily  tamsulosin 0.4 milliGRAM(s) Oral at bedtime  vancomycin    Solution 125 milliGRAM(s) Oral every 6 hours    Allergies    chlorhexidine containing compounds (Rash)  gabapentin (Unknown)    Intolerances      Social: no smoking, no alcohol, no illegal drugs; no recent travel, no exposure to TB  FAMILY HISTORY:  Family history of porphyria (Grandparent)       no history of premature cardiovascular disease in first degree relatives    ROS: the patient denies fever, no chills, no HA, no dizziness, no sore throat, no blurry vision, no CP, no palpitations, no SOB, no cough, no  no N/V, no dysuria, no leg pain, no claudication, no rash, no joint aches, no rectal pain or bleeding, no night sweats  All other systems reviewed and are negative    Vital Signs Last 24 Hrs  T(C): 36.9 (01 Mar 2021 09:04), Max: 36.9 (28 Feb 2021 13:44)  T(F): 98.5 (01 Mar 2021 09:04), Max: 98.5 (01 Mar 2021 09:04)  HR: 92 (01 Mar 2021 09:04) (92 - 98)  BP: 155/77 (01 Mar 2021 09:04) (133/72 - 155/77)  BP(mean): 99 (28 Feb 2021 15:47) (97 - 99)  RR: 17 (01 Mar 2021 09:04) (17 - 18)  SpO2: 97% (01 Mar 2021 09:04) (97% - 100%)  Daily     Daily     PE:  Constitutional: frail looking  HEENT: NC/AT, EOMI, PERRLA, conjunctivae clear; ears and nose atraumatic; pharynx benign  Neck: supple; thyroid not palpable  Back: no tenderness  Respiratory: respiratory effort normal; clear to auscultation  Cardiovascular: S1S2 regular, no murmurs  Abdomen: soft, tender, not distended, positive BS; liver and spleen WNL  Genitourinary: no suprapubic tenderness  Lymphatic: no LN palpable  Musculoskeletal: no muscle tenderness, no joint swelling or tenderness  Extremities: no pedal edema  Neurological/ Psychiatric: AxOx3, Judgement and insight normal;  moving all extremities  Skin: no rashes; no palpable lesions    Labs: all available labs reviewed                        15.3   10.00 )-----------( 381      ( 28 Feb 2021 10:13 )             47.0     03-01    139  |  110<H>  |  4<L>  ----------------------------<  146<H>  3.2<L>   |  21<L>  |  0.54    Ca    9.2      01 Mar 2021 08:37    TPro  9.1<H>  /  Alb  3.7  /  TBili  0.4  /  DBili  x   /  AST  22  /  ALT  63  /  AlkPhos  238<H>  02-28     LIVER FUNCTIONS - ( 28 Feb 2021 10:13 )  Alb: 3.7 g/dL / Pro: 9.1 gm/dL / ALK PHOS: 238 U/L / ALT: 63 U/L / AST: 22 U/L / GGT: x                 Radiology: all available radiological tests reviewed  < from: CT Abdomen and Pelvis w/ IV Cont (02.28.21 @ 12:55) >  EXAM:  CT ABDOMEN AND PELVIS IC                            PROCEDURE DATE:  02/28/2021          INTERPRETATION:  CLINICAL INFORMATION: Left lower quadrant pain.    COMPARISON: CT abdomen pelvis 2/9/2021.    PROCEDURE:  CT of the Abdomen and Pelvis was performed with intravenous contrast.  Intravenous contrast: 90 ml Omnipaque 350. 10 ml discarded.  Oral contrast: None.  Sagittal and coronal reformats were performed.    FINDINGS:  LOWER CHEST: Within normal limits.    LIVER: Within normal limits.  BILE DUCTS: Normal caliber.  GALLBLADDER: Cholecystectomy.  SPLEEN: Within normal limits.  PANCREAS: Within normal limits.  ADRENALS: Within normal limits.  KIDNEYS/URETERS: No hydronephrosis. Renal hypodensities are too small to characterize.    BLADDER: Rodarte catheter.  REPRODUCTIVE ORGANS: Calcified fibroids.    BOWEL: No bowel obstruction. Appendix is normal. Colon is underdistended limiting its evaluation however mural thickening and pericolonic fat stranding involving the rectosigmoid colon is decreased from the prior exam.  PERITONEUM: No ascites, abscess, or free air.  VESSELS: Within normal limits.  RETROPERITONEUM/LYMPH NODES: No lymphadenopathy.  ABDOMINAL WALL: Within normal limits.  BONES: Unchanged T12 vertebral body compression deformity.    IMPRESSION:  Colitis involving the rectosigmoid colon is improved.    < end of copied text >    Advanced directives addressed: full resuscitation
CHIEF COMPLAINT:  Urinary retention    HISTORY OF PRESENT ILLNESS:  64 yo female admitted for intractable vomiting and intractable abdominal pain.   Being treated for Colitis.   Required indwelling Rodarte catheter for urinary retention.   Denies any difficulty before this.     PAST MEDICAL & SURGICAL HISTORY:  Staph infection  8/2017      Chronic abdominal pain    Osteoporosis    Adrenal insufficiency, primary    Knee effusion    Porphyria    S/P tonsillectomy    S/P cholecystectomy    H/O spinal fusion        REVIEW OF SYSTEMS:  All other review of systems is negative unless indicated above.    MEDICATIONS  (STANDING):  amLODIPine   Tablet 10 milliGRAM(s) Oral daily  dextrose 5% + sodium chloride 0.9%. 1000 milliLiter(s) (100 mL/Hr) IV Continuous <Continuous>  enoxaparin Injectable 40 milliGRAM(s) SubCutaneous daily  ferrous    sulfate 325 milliGRAM(s) Oral daily  lactobacillus acidophilus 1 Tablet(s) Oral two times a day  melatonin 5 milliGRAM(s) Oral at bedtime  metoprolol tartrate 25 milliGRAM(s) Oral two times a day  metroNIDAZOLE    Tablet 500 milliGRAM(s) Oral every 8 hours  multivitamin 1 Tablet(s) Oral daily  vancomycin    Solution 250 milliGRAM(s) Oral every 6 hours    MEDICATIONS  (PRN):  HYDROmorphone  Injectable 1 milliGRAM(s) IV Push every 4 hours PRN Moderate Pain (4 - 6)  ondansetron Injectable 4 milliGRAM(s) IV Push every 6 hours PRN Nausea      Allergies    chlorhexidine containing compounds (Rash)  gabapentin (Unknown)    Intolerances        SOCIAL HISTORY:    FAMILY HISTORY:  Family history of porphyria (Grandparent)        Vital Signs Last 24 Hrs  T(C): 36.7 (28 Feb 2021 16:25), Max: 36.9 (28 Feb 2021 09:43)  T(F): 98 (28 Feb 2021 16:25), Max: 98.4 (28 Feb 2021 09:43)  HR: 96 (28 Feb 2021 16:25) (92 - 102)  BP: 148/72 (28 Feb 2021 16:25) (136/65 - 158/75)  BP(mean): 99 (28 Feb 2021 15:47) (97 - 99)  RR: 17 (28 Feb 2021 16:25) (17 - 19)  SpO2: 99% (28 Feb 2021 16:25) (99% - 100%)    PHYSICAL EXAM:    Constitutional: No acute distress  HEENT: EOMI, Normal Hearing  Neck: Supple  Back: No costovertebral angle tenderness  Respiratory: Normal respiratory effort    Cardiovascular: Normal peripheral circulation   Abd: Soft, non distended, non tender  Rodarte to drainage bag- clear urine   Extremities: No peripheral edema  Neurological: No focal deficits  Psychiatric: Normal mood, normal affect  Musculoskeletal: Moving all 4 extremities  Skin: No rashes    LABS:                        15.3   10.00 )-----------( 381      ( 28 Feb 2021 10:13 )             47.0     02-28    140  |  109<H>  |  9   ----------------------------<  159<H>  3.8   |  23  |  0.84    Ca    10.0      28 Feb 2021 10:13    TPro  9.1<H>  /  Alb  3.7  /  TBili  0.4  /  DBili  x   /  AST  22  /  ALT  63  /  AlkPhos  238<H>  02-28    PT/INR - ( 28 Feb 2021 10:13 )   PT: 12.3 sec;   INR: 1.06 ratio         PTT - ( 28 Feb 2021 10:13 )  PTT:33.1 sec

## 2021-03-05 NOTE — PROGRESS NOTE ADULT - ASSESSMENT
Imp:  Chronic abdominal pain  C. diff    Rec:  Cont amitriptyline 25 mg qhs  cont po vancomycin
Imp:  Chronic abdominal pain  Diarrhea, recent C. diff    Rec:  Cont po vanco  add imodium  Cont amitriptyline
Imp:  Chronic abdominal pain and nausea  C. diff, which I don't think is playing a major role here    Rec:  Cont supportive care

## 2021-03-06 LAB
ALBUMIN SERPL ELPH-MCNC: 3.5 G/DL — SIGNIFICANT CHANGE UP (ref 3.3–5)
ALP SERPL-CCNC: 118 U/L — SIGNIFICANT CHANGE UP (ref 40–120)
ALT FLD-CCNC: 38 U/L — SIGNIFICANT CHANGE UP (ref 12–78)
ANION GAP SERPL CALC-SCNC: 8 MMOL/L — SIGNIFICANT CHANGE UP (ref 5–17)
AST SERPL-CCNC: 23 U/L — SIGNIFICANT CHANGE UP (ref 15–37)
BILIRUB SERPL-MCNC: 0.3 MG/DL — SIGNIFICANT CHANGE UP (ref 0.2–1.2)
BUN SERPL-MCNC: 5 MG/DL — LOW (ref 7–23)
CALCIUM SERPL-MCNC: 9.7 MG/DL — SIGNIFICANT CHANGE UP (ref 8.5–10.1)
CHLORIDE SERPL-SCNC: 110 MMOL/L — HIGH (ref 96–108)
CO2 SERPL-SCNC: 22 MMOL/L — SIGNIFICANT CHANGE UP (ref 22–31)
CREAT SERPL-MCNC: 0.53 MG/DL — SIGNIFICANT CHANGE UP (ref 0.5–1.3)
GLUCOSE SERPL-MCNC: 128 MG/DL — HIGH (ref 70–99)
HCT VFR BLD CALC: 46 % — HIGH (ref 34.5–45)
HGB BLD-MCNC: 15.1 G/DL — SIGNIFICANT CHANGE UP (ref 11.5–15.5)
MCHC RBC-ENTMCNC: 26.8 PG — LOW (ref 27–34)
MCHC RBC-ENTMCNC: 32.8 GM/DL — SIGNIFICANT CHANGE UP (ref 32–36)
MCV RBC AUTO: 81.6 FL — SIGNIFICANT CHANGE UP (ref 80–100)
PLATELET # BLD AUTO: 443 K/UL — HIGH (ref 150–400)
POTASSIUM SERPL-MCNC: 4 MMOL/L — SIGNIFICANT CHANGE UP (ref 3.5–5.3)
POTASSIUM SERPL-SCNC: 4 MMOL/L — SIGNIFICANT CHANGE UP (ref 3.5–5.3)
PROT SERPL-MCNC: 7.4 GM/DL — SIGNIFICANT CHANGE UP (ref 6–8.3)
RBC # BLD: 5.64 M/UL — HIGH (ref 3.8–5.2)
RBC # FLD: 16.3 % — HIGH (ref 10.3–14.5)
SODIUM SERPL-SCNC: 140 MMOL/L — SIGNIFICANT CHANGE UP (ref 135–145)
WBC # BLD: 14.36 K/UL — HIGH (ref 3.8–10.5)
WBC # FLD AUTO: 14.36 K/UL — HIGH (ref 3.8–10.5)

## 2021-03-06 PROCEDURE — 99232 SBSQ HOSP IP/OBS MODERATE 35: CPT

## 2021-03-06 RX ADMIN — Medication 125 MILLIGRAM(S): at 05:35

## 2021-03-06 RX ADMIN — Medication 0.25 MILLIGRAM(S): at 12:34

## 2021-03-06 RX ADMIN — Medication 125 MILLIGRAM(S): at 10:59

## 2021-03-06 RX ADMIN — DEXTROSE MONOHYDRATE, SODIUM CHLORIDE, AND POTASSIUM CHLORIDE 80 MILLILITER(S): 50; .745; 4.5 INJECTION, SOLUTION INTRAVENOUS at 05:57

## 2021-03-06 RX ADMIN — HYDROMORPHONE HYDROCHLORIDE 2 MILLIGRAM(S): 2 INJECTION INTRAMUSCULAR; INTRAVENOUS; SUBCUTANEOUS at 05:57

## 2021-03-06 RX ADMIN — Medication 25 MILLIGRAM(S): at 23:18

## 2021-03-06 RX ADMIN — HYDROMORPHONE HYDROCHLORIDE 2 MILLIGRAM(S): 2 INJECTION INTRAMUSCULAR; INTRAVENOUS; SUBCUTANEOUS at 20:46

## 2021-03-06 RX ADMIN — Medication 25 MILLIGRAM(S): at 10:57

## 2021-03-06 RX ADMIN — Medication 1 TABLET(S): at 10:57

## 2021-03-06 RX ADMIN — HYDROMORPHONE HYDROCHLORIDE 2 MILLIGRAM(S): 2 INJECTION INTRAMUSCULAR; INTRAVENOUS; SUBCUTANEOUS at 20:21

## 2021-03-06 RX ADMIN — Medication 10 MILLIGRAM(S): at 08:43

## 2021-03-06 RX ADMIN — HYDROMORPHONE HYDROCHLORIDE 2 MILLIGRAM(S): 2 INJECTION INTRAMUSCULAR; INTRAVENOUS; SUBCUTANEOUS at 13:36

## 2021-03-06 RX ADMIN — Medication 325 MILLIGRAM(S): at 10:56

## 2021-03-06 RX ADMIN — TAMSULOSIN HYDROCHLORIDE 0.4 MILLIGRAM(S): 0.4 CAPSULE ORAL at 23:18

## 2021-03-06 RX ADMIN — HYDROMORPHONE HYDROCHLORIDE 2 MILLIGRAM(S): 2 INJECTION INTRAMUSCULAR; INTRAVENOUS; SUBCUTANEOUS at 06:52

## 2021-03-06 RX ADMIN — Medication 10 MILLIGRAM(S): at 17:10

## 2021-03-06 RX ADMIN — Medication 125 MILLIGRAM(S): at 17:10

## 2021-03-06 RX ADMIN — AMLODIPINE BESYLATE 10 MILLIGRAM(S): 2.5 TABLET ORAL at 10:56

## 2021-03-06 RX ADMIN — ENOXAPARIN SODIUM 40 MILLIGRAM(S): 100 INJECTION SUBCUTANEOUS at 10:56

## 2021-03-06 RX ADMIN — Medication 5 MILLIGRAM(S): at 23:18

## 2021-03-06 RX ADMIN — Medication 10 MILLIGRAM(S): at 23:18

## 2021-03-07 LAB
ANION GAP SERPL CALC-SCNC: 10 MMOL/L — SIGNIFICANT CHANGE UP (ref 5–17)
BUN SERPL-MCNC: 8 MG/DL — SIGNIFICANT CHANGE UP (ref 7–23)
CALCIUM SERPL-MCNC: 9.6 MG/DL — SIGNIFICANT CHANGE UP (ref 8.5–10.1)
CHLORIDE SERPL-SCNC: 109 MMOL/L — HIGH (ref 96–108)
CO2 SERPL-SCNC: 21 MMOL/L — LOW (ref 22–31)
CREAT SERPL-MCNC: 0.6 MG/DL — SIGNIFICANT CHANGE UP (ref 0.5–1.3)
GLUCOSE SERPL-MCNC: 112 MG/DL — HIGH (ref 70–99)
HCT VFR BLD CALC: 43.3 % — SIGNIFICANT CHANGE UP (ref 34.5–45)
HGB BLD-MCNC: 14 G/DL — SIGNIFICANT CHANGE UP (ref 11.5–15.5)
MCHC RBC-ENTMCNC: 26.6 PG — LOW (ref 27–34)
MCHC RBC-ENTMCNC: 32.3 GM/DL — SIGNIFICANT CHANGE UP (ref 32–36)
MCV RBC AUTO: 82.3 FL — SIGNIFICANT CHANGE UP (ref 80–100)
PLATELET # BLD AUTO: 401 K/UL — HIGH (ref 150–400)
POTASSIUM SERPL-MCNC: 3.8 MMOL/L — SIGNIFICANT CHANGE UP (ref 3.5–5.3)
POTASSIUM SERPL-SCNC: 3.8 MMOL/L — SIGNIFICANT CHANGE UP (ref 3.5–5.3)
RBC # BLD: 5.26 M/UL — HIGH (ref 3.8–5.2)
RBC # FLD: 16 % — HIGH (ref 10.3–14.5)
SODIUM SERPL-SCNC: 140 MMOL/L — SIGNIFICANT CHANGE UP (ref 135–145)
WBC # BLD: 10.89 K/UL — HIGH (ref 3.8–10.5)
WBC # FLD AUTO: 10.89 K/UL — HIGH (ref 3.8–10.5)

## 2021-03-07 PROCEDURE — 99232 SBSQ HOSP IP/OBS MODERATE 35: CPT

## 2021-03-07 RX ADMIN — Medication 125 MILLIGRAM(S): at 00:19

## 2021-03-07 RX ADMIN — Medication 125 MILLIGRAM(S): at 10:51

## 2021-03-07 RX ADMIN — Medication 25 MILLIGRAM(S): at 23:26

## 2021-03-07 RX ADMIN — Medication 0.25 MILLIGRAM(S): at 06:56

## 2021-03-07 RX ADMIN — HYDROMORPHONE HYDROCHLORIDE 2 MILLIGRAM(S): 2 INJECTION INTRAMUSCULAR; INTRAVENOUS; SUBCUTANEOUS at 13:38

## 2021-03-07 RX ADMIN — HYDROMORPHONE HYDROCHLORIDE 2 MILLIGRAM(S): 2 INJECTION INTRAMUSCULAR; INTRAVENOUS; SUBCUTANEOUS at 21:15

## 2021-03-07 RX ADMIN — Medication 5 MILLIGRAM(S): at 23:26

## 2021-03-07 RX ADMIN — ENOXAPARIN SODIUM 40 MILLIGRAM(S): 100 INJECTION SUBCUTANEOUS at 10:46

## 2021-03-07 RX ADMIN — Medication 25 MILLIGRAM(S): at 10:49

## 2021-03-07 RX ADMIN — Medication 125 MILLIGRAM(S): at 06:32

## 2021-03-07 RX ADMIN — Medication 1 TABLET(S): at 10:49

## 2021-03-07 RX ADMIN — Medication 10 MILLIGRAM(S): at 22:33

## 2021-03-07 RX ADMIN — HYDROMORPHONE HYDROCHLORIDE 2 MILLIGRAM(S): 2 INJECTION INTRAMUSCULAR; INTRAVENOUS; SUBCUTANEOUS at 04:47

## 2021-03-07 RX ADMIN — Medication 125 MILLIGRAM(S): at 17:22

## 2021-03-07 RX ADMIN — Medication 125 MILLIGRAM(S): at 23:25

## 2021-03-07 RX ADMIN — AMLODIPINE BESYLATE 10 MILLIGRAM(S): 2.5 TABLET ORAL at 10:46

## 2021-03-07 RX ADMIN — Medication 25 MILLIGRAM(S): at 23:25

## 2021-03-07 RX ADMIN — HYDROMORPHONE HYDROCHLORIDE 2 MILLIGRAM(S): 2 INJECTION INTRAMUSCULAR; INTRAVENOUS; SUBCUTANEOUS at 05:08

## 2021-03-07 RX ADMIN — Medication 10 MILLIGRAM(S): at 15:11

## 2021-03-07 RX ADMIN — TAMSULOSIN HYDROCHLORIDE 0.4 MILLIGRAM(S): 0.4 CAPSULE ORAL at 23:25

## 2021-03-07 RX ADMIN — Medication 325 MILLIGRAM(S): at 10:46

## 2021-03-07 RX ADMIN — HYDROMORPHONE HYDROCHLORIDE 2 MILLIGRAM(S): 2 INJECTION INTRAMUSCULAR; INTRAVENOUS; SUBCUTANEOUS at 20:48

## 2021-03-07 NOTE — PROGRESS NOTE ADULT - REASON FOR ADMISSION
abd pain, diarrhea

## 2021-03-07 NOTE — PROGRESS NOTE ADULT - SUBJECTIVE AND OBJECTIVE BOX
CC: abd pain, diarrhea  History of Present Illness:   65/F with PMHx of recent C diff still on treatment, adrenal insufficieny, hearing difficulty, chronic abdominal pain, osteoporosis, porphyria, s/p cholecystectomy presents to the ED c/o lower abd pain, vomiting and diarrhea which started yesterday at midnight. Associated with subjective fever. Pt is still continuing treatment for C. diff. Pt did follow up with YAZMIN Curiel after last C. diff diagnosis. No CP/SOB. CT abdo was done in ED which showed improved rectosigmoid colitis.     3/1: Seen at bedside, pt lying in bed, asleep, sleeping heavy, unable to wake up with verbal stimuli.  Pt appears comfortable.  No overnight events.   3/2: Pt lying in bed, alert, awake, admits to chronic abd pain.  No fever, chills, n, vomiting.    3/3: Sitting up, states she feels weak.  6 bowel movements overnight.  Otherwise alert but lethargic appearing.   3/4: LYing in bed, weak appearing, no specific complaints, states still has multiple loos stool and nausea.    REVIEW OF SYSTEMS: All other review of systems is negative unless indicated above.    Physical Exam:   Vital Signs Last 24 Hrs  T(C): 36.3 (04 Mar 2021 09:22), Max: 36.7 (03 Mar 2021 20:34)  T(F): 97.3 (04 Mar 2021 09:22), Max: 98 (03 Mar 2021 20:34)  HR: 105 (04 Mar 2021 09:22) (101 - 109)  BP: 130/79 (04 Mar 2021 09:22) (128/78 - 134/84)  BP(mean): --  RR: 18 (04 Mar 2021 09:22) (18 - 18)  SpO2: 99% (04 Mar 2021 09:22) (97% - 99%)      PHYSICAL EXAM:    Constitutional: NAD,  well-developed, weak appearing  HEENT: PERR, EOMI, Normal Hearing, MMM  Neck: Soft and supple  Respiratory: Breath sounds are clear bilaterally, No wheezing, rales or rhonchi  Cardiovascular: S1 and S2, regular rate and rhythm, no Murmurs, gallops or rubs  Gastrointestinal: Bowel Sounds present, soft, nontender, nondistended, no guarding, no rebound  Extremities: No peripheral edema  Neurological: A/O x 3, no focal deficits in my limited exam    MEDICATIONS  (STANDING):  amitriptyline 25 milliGRAM(s) Oral at bedtime  amLODIPine   Tablet 10 milliGRAM(s) Oral daily  dextrose 5% + sodium chloride 0.45% with potassium chloride 20 mEq/L 1000 milliLiter(s) (80 mL/Hr) IV Continuous <Continuous>  enoxaparin Injectable 40 milliGRAM(s) SubCutaneous daily  ferrous    sulfate 325 milliGRAM(s) Oral daily  lactobacillus acidophilus 1 Tablet(s) Oral two times a day  melatonin 5 milliGRAM(s) Oral at bedtime  metoprolol tartrate 25 milliGRAM(s) Oral two times a day  multivitamin 1 Tablet(s) Oral daily  potassium chloride   Powder 40 milliEquivalent(s) Oral every 4 hours  tamsulosin 0.4 milliGRAM(s) Oral at bedtime  vancomycin    Solution 125 milliGRAM(s) Oral every 6 hours    MEDICATIONS  (PRN):  aluminum hydroxide/magnesium hydroxide/simethicone Suspension 30 milliLiter(s) Oral every 4 hours PRN Dyspepsia  HYDROmorphone   Tablet 2 milliGRAM(s) Oral every 6 hours PRN Severe Pain (7 - 10)  prochlorperazine   Injectable 10 milliGRAM(s) IntraMuscular every 6 hours PRN nausea          03-04    140  |  107  |  11  ----------------------------<  115<H>  3.1<L>   |  22  |  0.62    Ca    10.0      04 Mar 2021 06:36    TPro  7.8  /  Alb  3.7  /  TBili  0.5  /  DBili  x   /  AST  28  /  ALT  40  /  AlkPhos  150<H>  03-04    CAPILLARY BLOOD GLUCOSE        LIVER FUNCTIONS - ( 04 Mar 2021 06:36 )  Alb: 3.7 g/dL / Pro: 7.8 gm/dL / ALK PHOS: 150 U/L / ALT: 40 U/L / AST: 28 U/L / GGT: x                 Assessment and Plan:  65/F with PMHx of recent C diff still on treatment, adrenal insufficieny, hearing difficulty, chronic abdominal pain, osteoporosis, porphyria, s/p cholecystectomy presents to the ED c/o lower abd pain, vomiting and diarrhea.    1) Abd Pain / Diarrhea / hypokalemia : Due to cdiff colitis:  -lactic acidosis resolved with IVFs  -CT shows improving rectosigmoid colitis   -diarrhea resolved  -leukocytosis resolved   -Stool cdiff positive  -c/w oral vanco q6h  -Supportive care  -oral KCl supplementation today  -resume IVFs for dehydration  -abd pain chronic - GI following, c/w supportive care/pain management (converted IV to oral pain meds)      2) Urinary retention: New onset: RESOLVED  -moon out  -c/w flomax  -uro eval appreciated    3) HTN: cont amlodipine and BB    4) Hearing impaired: uses hearing aids    5) DVT PPX: lovenox    
CC: Abd pain, diarrhea  History of Present Illness:   65/F with PMHx of recent C diff still on treatment, adrenal insufficieny, hearing difficulty, chronic abdominal pain, osteoporosis, porphyria, s/p cholecystectomy presents to the ED c/o lower abd pain, vomiting and diarrhea which started yesterday at midnight. Associated with subjective fever. Pt is still continuing treatment for C. diff. Pt did follow up with YAZMIN Curiel after last C. diff diagnosis. No CP/SOB. CT abdo was done in ED which showed improved rectosigmoid colitis.     3/1: Seen at bedside, pt lying in bed, asleep, sleeping heavy, unable to wake up with verbal stimuli.  Pt appears comfortable.  No overnight events.   3/2: Pt lying in bed, alert, awake, admits to chronic abd pain.  No fever, chills, n, vomiting.    3/3: Sitting up, states she feels weak.  6 bowel movements overnight.  Otherwise alert but lethargic appearing.   3/4: LYing in bed, weak appearing, no specific complaints, states still has multiple loos stool and nausea.  3/5: Seen at bedside, weak appearing, states she has been having frequent diarrhea however unable to quantify by nursing staff. Discussed possible need for CARL which at this time pt declines however will have PT evaluate and assess.  Pt in agreement with plan.  3/6: Sitting in chair, less weak today, denies diarrhea.  No overall complaints.  3/7: Slow to improve, no diarrhea. Denies fever, chills, N, V.    REVIEW OF SYSTEMS: All other review of systems is negative unless indicated above.    Physical Exam:   Vital Signs Last 24 Hrs  T(C): 36.4 (07 Mar 2021 08:13), Max: 36.7 (06 Mar 2021 16:34)  T(F): 97.5 (07 Mar 2021 08:13), Max: 98 (06 Mar 2021 16:34)  HR: 101 (07 Mar 2021 08:13) (99 - 109)  BP: 126/87 (07 Mar 2021 08:13) (104/68 - 129/67)  BP(mean): --  RR: 16 (07 Mar 2021 08:13) (16 - 18)  SpO2: 100% (07 Mar 2021 08:13) (98% - 100%)    PHYSICAL EXAM:    Constitutional: NAD,  well-developed, weak appearing  HEENT: PERR, EOMI, Normal Hearing, MMM  Neck: Soft and supple  Respiratory: Breath sounds are clear bilaterally, No wheezing, rales or rhonchi  Cardiovascular: S1 and S2, regular rate and rhythm, no Murmurs, gallops or rubs  Gastrointestinal: Bowel Sounds present, soft, nontender, nondistended, no guarding, no rebound  Extremities: No peripheral edema  Neurological: A/O x 3, no focal deficits in my limited exam    MEDICATIONS  (STANDING):  amitriptyline 25 milliGRAM(s) Oral at bedtime  amLODIPine   Tablet 10 milliGRAM(s) Oral daily  enoxaparin Injectable 40 milliGRAM(s) SubCutaneous daily  ferrous    sulfate 325 milliGRAM(s) Oral daily  lactobacillus acidophilus 1 Tablet(s) Oral two times a day  melatonin 5 milliGRAM(s) Oral at bedtime  metoprolol tartrate 25 milliGRAM(s) Oral two times a day  multivitamin 1 Tablet(s) Oral daily  tamsulosin 0.4 milliGRAM(s) Oral at bedtime  vancomycin    Solution 125 milliGRAM(s) Oral every 6 hours    MEDICATIONS  (PRN):  ALPRAZolam 0.25 milliGRAM(s) Oral every 8 hours PRN anxiety  aluminum hydroxide/magnesium hydroxide/simethicone Suspension 30 milliLiter(s) Oral every 4 hours PRN Dyspepsia  HYDROmorphone   Tablet 2 milliGRAM(s) Oral every 6 hours PRN Severe Pain (7 - 10)  prochlorperazine   Injectable 10 milliGRAM(s) IntraMuscular every 6 hours PRN nausea                              14.0   10.89 )-----------( 401      ( 07 Mar 2021 08:17 )             43.3     03-07    140  |  109<H>  |  8   ----------------------------<  112<H>  3.8   |  21<L>  |  0.60    Ca    9.6      07 Mar 2021 08:17    TPro  7.4  /  Alb  3.5  /  TBili  0.3  /  DBili  x   /  AST  23  /  ALT  38  /  AlkPhos  118  03-06    CAPILLARY BLOOD GLUCOSE        LIVER FUNCTIONS - ( 06 Mar 2021 08:34 )  Alb: 3.5 g/dL / Pro: 7.4 gm/dL / ALK PHOS: 118 U/L / ALT: 38 U/L / AST: 23 U/L / GGT: x               Assessment and Plan:  65/F with PMHx of recent C diff still on treatment, adrenal insufficieny, hearing difficulty, chronic abdominal pain, osteoporosis, porphyria, s/p cholecystectomy presents to the ED c/o lower abd pain, vomiting and diarrhea.    1) Abd Pain / Diarrhea / hypokalemia : Due to cdiff colitis   -lactic acidosis resolved with IVFs  -Stool cdiff positive  -CT shows improving rectosigmoid colitis   -leukocytosis normalizing  -diarrhea improving  -potassium normal  -c/w oral vanco q6h  -Supportive care  -stopped IVFs   -monitor BMP - stable  -abd pain chronic - being followed by Dr. Curiel out patient    2) Urinary retention: New onset: RESOLVED  -moon out  -c/w flomax  -uro eval appreciated    3) HTN: cont amlodipine and BB    4) Hearing impaired: uses hearing aids    5) DVT PPX: lovenox    Dispo:  -d/c home tomorrow with probable home care.    
  CC: abd pain, diarrhea    History of Present Illness:     65/F with PMHx of recent C diff still on treatment, adrenal insufficieny, hearing difficulty, chronic abdominal pain, osteoporosis, porphyria, s/p cholecystectomy presents to the ED c/o lower abd pain, vomiting and diarrhea which started yesterday at midnight. Associated with subjective fever. Pt is still continuing treatment for C. diff. Pt did follow up with YAZMIN Curiel after last C. diff diagnosis. No CP/SOB. CT abdo was done in ED which showed improved rectosigmoid colitis.     3/1: Seen at bedside, pt lying in bed, asleep, sleeping heavy, unable to wake up with verbal stimuli.  Pt appears comfortable.  No overnight events.     REVIEW OF SYSTEMS: All other review of systems is negative unless indicated above.    Physical Exam:   Vital Signs Last 24 Hrs  T(C): 36.9 (01 Mar 2021 09:04), Max: 36.9 (28 Feb 2021 13:44)  T(F): 98.5 (01 Mar 2021 09:04), Max: 98.5 (01 Mar 2021 09:04)  HR: 92 (01 Mar 2021 09:04) (92 - 98)  BP: 155/77 (01 Mar 2021 09:04) (133/72 - 155/77)  BP(mean): 99 (28 Feb 2021 15:47) (97 - 99)  RR: 17 (01 Mar 2021 09:04) (17 - 18)  SpO2: 97% (01 Mar 2021 09:04) (97% - 100%)    PHYSICAL EXAM:    Constitutional: NAD, asleep, well-developed  HEENT: PERR, EOMI, Normal Hearing, MMM  Neck: Soft and supple  Respiratory: Breath sounds are clear bilaterally, No wheezing, rales or rhonchi  Cardiovascular: S1 and S2, regular rate and rhythm, no Murmurs, gallops or rubs  Gastrointestinal: Bowel Sounds present, soft, nontender, nondistended, no guarding, no rebound  Extremities: No peripheral edema  Neurological: A/O x 3, no focal deficits in my limited exam      MEDICATIONS  (STANDING):  amLODIPine   Tablet 10 milliGRAM(s) Oral daily  dextrose 5% + sodium chloride 0.9%. 1000 milliLiter(s) (100 mL/Hr) IV Continuous <Continuous>  enoxaparin Injectable 40 milliGRAM(s) SubCutaneous daily  ferrous    sulfate 325 milliGRAM(s) Oral daily  lactobacillus acidophilus 1 Tablet(s) Oral two times a day  melatonin 5 milliGRAM(s) Oral at bedtime  metoprolol tartrate 25 milliGRAM(s) Oral two times a day  multivitamin 1 Tablet(s) Oral daily  tamsulosin 0.4 milliGRAM(s) Oral at bedtime  vancomycin    Solution 125 milliGRAM(s) Oral every 6 hours    MEDICATIONS  (PRN):  HYDROmorphone  Injectable 1 milliGRAM(s) IV Push every 4 hours PRN Moderate Pain (4 - 6)  prochlorperazine   Injectable 10 milliGRAM(s) IntraMuscular every 12 hours PRN nausea                              15.3   10.00 )-----------( 381      ( 28 Feb 2021 10:13 )             47.0     03-01    139  |  110<H>  |  4<L>  ----------------------------<  146<H>  3.2<L>   |  21<L>  |  0.54    Ca    9.2      01 Mar 2021 08:37    TPro  9.1<H>  /  Alb  3.7  /  TBili  0.4  /  DBili  x   /  AST  22  /  ALT  63  /  AlkPhos  238<H>  02-28    CAPILLARY BLOOD GLUCOSE        LIVER FUNCTIONS - ( 28 Feb 2021 10:13 )  Alb: 3.7 g/dL / Pro: 9.1 gm/dL / ALK PHOS: 238 U/L / ALT: 63 U/L / AST: 22 U/L / GGT: x           PT/INR - ( 28 Feb 2021 10:13 )   PT: 12.3 sec;   INR: 1.06 ratio         PTT - ( 28 Feb 2021 10:13 )  PTT:33.1 sec      Assessment and Plan:  65/F with PMHx of recent C diff still on treatment, adrenal insufficieny, hearing difficulty, chronic abdominal pain, osteoporosis, porphyria, s/p cholecystectomy presents to the ED c/o lower abd pain, vomiting and diarrhea.    1) Abd Pain / Diarrhea : Due to cdiff colitis:  -CT shows improving rectosigmoid colitis   -Stool cdiff positive  -IVFs  -oral vanco q6h  -hold IV flagyl per ID  -Supportive care  -IV KCl for hypokalemia today     2) Urinary retention: New onset  -Ordarte placed  -started flomax  -uro eval appreciated  -trial void tomorrow    3) HTN: cont amlodipine and BB    4) Hearing impaired: uses hearing aids    5) DVT PPX: lovenox    
  CC: abd pain, diarrhea  History of Present Illness:   65/F with PMHx of recent C diff still on treatment, adrenal insufficieny, hearing difficulty, chronic abdominal pain, osteoporosis, porphyria, s/p cholecystectomy presents to the ED c/o lower abd pain, vomiting and diarrhea which started yesterday at midnight. Associated with subjective fever. Pt is still continuing treatment for C. diff. Pt did follow up with YAZMIN Curiel after last C. diff diagnosis. No CP/SOB. CT abdo was done in ED which showed improved rectosigmoid colitis.     3/1: Seen at bedside, pt lying in bed, asleep, sleeping heavy, unable to wake up with verbal stimuli.  Pt appears comfortable.  No overnight events.   3/2: Pt lying in bed, alert, awake, admits to chronic abd pain.  No fever, chills, n, vomiting.    3/3: Sitting up, states she feels weak.  6 bowel movements overnight.  Otherwise alert but lethargic appearing.     REVIEW OF SYSTEMS: All other review of systems is negative unless indicated above.    Physical Exam:   Vital Signs Last 24 Hrs  T(C): 36.6 (03 Mar 2021 09:35), Max: 36.8 (02 Mar 2021 20:57)  T(F): 97.9 (03 Mar 2021 09:35), Max: 98.3 (02 Mar 2021 20:57)  HR: 104 (03 Mar 2021 09:35) (104 - 120)  BP: 143/77 (03 Mar 2021 09:35) (141/80 - 157/83)  BP(mean): --  RR: 18 (03 Mar 2021 09:35) (18 - 18)  SpO2: 99% (03 Mar 2021 09:35) (99% - 100%)    PHYSICAL EXAM:    Constitutional: NAD,  well-developed, weak appearing  HEENT: PERR, EOMI, Normal Hearing, MMM  Neck: Soft and supple  Respiratory: Breath sounds are clear bilaterally, No wheezing, rales or rhonchi  Cardiovascular: S1 and S2, regular rate and rhythm, no Murmurs, gallops or rubs  Gastrointestinal: Bowel Sounds present, soft, nontender, nondistended, no guarding, no rebound  Extremities: No peripheral edema  Neurological: A/O x 3, no focal deficits in my limited exam    MEDICATIONS  (STANDING):  amitriptyline 25 milliGRAM(s) Oral at bedtime  amLODIPine   Tablet 10 milliGRAM(s) Oral daily  enoxaparin Injectable 40 milliGRAM(s) SubCutaneous daily  ferrous    sulfate 325 milliGRAM(s) Oral daily  lactobacillus acidophilus 1 Tablet(s) Oral two times a day  melatonin 5 milliGRAM(s) Oral at bedtime  metoprolol tartrate 25 milliGRAM(s) Oral two times a day  multivitamin 1 Tablet(s) Oral daily  tamsulosin 0.4 milliGRAM(s) Oral at bedtime  vancomycin    Solution 125 milliGRAM(s) Oral every 6 hours    MEDICATIONS  (PRN):  aluminum hydroxide/magnesium hydroxide/simethicone Suspension 30 milliLiter(s) Oral every 4 hours PRN Dyspepsia  HYDROmorphone   Tablet 2 milliGRAM(s) Oral every 6 hours PRN Severe Pain (7 - 10)  prochlorperazine   Injectable 10 milliGRAM(s) IntraMuscular every 6 hours PRN nausea          03-03    140  |  107  |  7   ----------------------------<  105<H>  3.1<L>   |  24  |  0.53    Ca    9.5      03 Mar 2021 07:26      CAPILLARY BLOOD GLUCOSE        LIVER FUNCTIONS - ( 02 Mar 2021 08:38 )  Alb: x     / Pro: x     / ALK PHOS: x     / ALT: x     / AST: x     / GGT: 362 U/L             Assessment and Plan:  65/F with PMHx of recent C diff still on treatment, adrenal insufficieny, hearing difficulty, chronic abdominal pain, osteoporosis, porphyria, s/p cholecystectomy presents to the ED c/o lower abd pain, vomiting and diarrhea.    1) Abd Pain / Diarrhea / hypokalemia : Due to cdiff colitis:  -lactic acidosis resolved with IVFs  -CT shows improving rectosigmoid colitis   -diarrhea resolved  -leukocytosis resolved   -Stool cdiff positive  -stop IVFs today  -c/w oral vanco q6h  -Supportive care  -oral KCl supplementation today  -abd pain chronic - GI following, c/w supportive care/pain management (converted IV to oral pain meds)      2) Urinary retention: New onset: RESOLVED  -moon out  -c/w flomax  -uro eval appreciated    3) HTN: cont amlodipine and BB    4) Hearing impaired: uses hearing aids    5) DVT PPX: lovenox    
Patient is a 65y old  Female who presents with a chief complaint of abd pain, diarrhea (03 Mar 2021 12:16)      Subective:  Pain still present  No N/V  Complains of diarrhea    PAST MEDICAL & SURGICAL HISTORY:  Staph infection  8/2017    Chronic abdominal pain    Osteoporosis    Adrenal insufficiency, primary    Knee effusion    Porphyria    S/P tonsillectomy    S/P cholecystectomy    H/O spinal fusion        MEDICATIONS  (STANDING):  amitriptyline 25 milliGRAM(s) Oral at bedtime  amLODIPine   Tablet 10 milliGRAM(s) Oral daily  enoxaparin Injectable 40 milliGRAM(s) SubCutaneous daily  ferrous    sulfate 325 milliGRAM(s) Oral daily  lactobacillus acidophilus 1 Tablet(s) Oral two times a day  loperamide 2 milliGRAM(s) Oral once  melatonin 5 milliGRAM(s) Oral at bedtime  metoprolol tartrate 25 milliGRAM(s) Oral two times a day  multivitamin 1 Tablet(s) Oral daily  potassium chloride   Powder 40 milliEquivalent(s) Oral every 4 hours  tamsulosin 0.4 milliGRAM(s) Oral at bedtime  vancomycin    Solution 125 milliGRAM(s) Oral every 6 hours    MEDICATIONS  (PRN):  aluminum hydroxide/magnesium hydroxide/simethicone Suspension 30 milliLiter(s) Oral every 4 hours PRN Dyspepsia  HYDROmorphone   Tablet 2 milliGRAM(s) Oral every 6 hours PRN Severe Pain (7 - 10)  prochlorperazine   Injectable 10 milliGRAM(s) IntraMuscular every 6 hours PRN nausea      REVIEW OF SYSTEMS:    RESPIRATORY: No shortness of breath  CARDIOVASCULAR: No chest pain  All other review of systems is negative unless indicated above.    Vital Signs Last 24 Hrs  T(C): 36.3 (04 Mar 2021 09:22), Max: 36.7 (03 Mar 2021 20:34)  T(F): 97.3 (04 Mar 2021 09:22), Max: 98 (03 Mar 2021 20:34)  HR: 105 (04 Mar 2021 09:22) (101 - 109)  BP: 130/79 (04 Mar 2021 09:22) (128/78 - 134/84)  BP(mean): --  RR: 18 (04 Mar 2021 09:22) (18 - 18)  SpO2: 99% (04 Mar 2021 09:22) (97% - 99%)    PHYSICAL EXAM:    Constitutional: NAD, well-developed  Respiratory: CTAB  Cardiovascular: S1 and S2, RRR  Gastrointestinal: BS+, soft, NT/ND  Extremities: No peripheral edema  Psychiatric: Normal mood, normal affect    LABS:    03-04    140  |  107  |  11  ----------------------------<  115<H>  3.1<L>   |  22  |  0.62    Ca    10.0      04 Mar 2021 06:36    TPro  7.8  /  Alb  3.7  /  TBili  0.5  /  DBili  x   /  AST  28  /  ALT  40  /  AlkPhos  150<H>  03-04      LIVER FUNCTIONS - ( 04 Mar 2021 06:36 )  Alb: 3.7 g/dL / Pro: 7.8 gm/dL / ALK PHOS: 150 U/L / ALT: 40 U/L / AST: 28 U/L / GGT: x             RADIOLOGY & ADDITIONAL STUDIES:
Patient is a 65y old  Female who presents with a chief complaint of abd pain, diarrhea (05 Mar 2021 13:25)      Subective:  resting  No diarrhea today. Per staff still has nausea and abdominal pain    PAST MEDICAL & SURGICAL HISTORY:  Staph infection  8/2017    Chronic abdominal pain    Osteoporosis    Adrenal insufficiency, primary    Knee effusion    Porphyria    S/P tonsillectomy    S/P cholecystectomy    H/O spinal fusion        MEDICATIONS  (STANDING):  amitriptyline 25 milliGRAM(s) Oral at bedtime  amLODIPine   Tablet 10 milliGRAM(s) Oral daily  dextrose 5% + sodium chloride 0.45% with potassium chloride 20 mEq/L 1000 milliLiter(s) (80 mL/Hr) IV Continuous <Continuous>  enoxaparin Injectable 40 milliGRAM(s) SubCutaneous daily  ferrous    sulfate 325 milliGRAM(s) Oral daily  lactobacillus acidophilus 1 Tablet(s) Oral two times a day  melatonin 5 milliGRAM(s) Oral at bedtime  metoprolol tartrate 25 milliGRAM(s) Oral two times a day  multivitamin 1 Tablet(s) Oral daily  tamsulosin 0.4 milliGRAM(s) Oral at bedtime  vancomycin    Solution 125 milliGRAM(s) Oral every 6 hours    MEDICATIONS  (PRN):  ALPRAZolam 0.25 milliGRAM(s) Oral every 8 hours PRN anxiety  aluminum hydroxide/magnesium hydroxide/simethicone Suspension 30 milliLiter(s) Oral every 4 hours PRN Dyspepsia  HYDROmorphone   Tablet 2 milliGRAM(s) Oral every 6 hours PRN Severe Pain (7 - 10)  prochlorperazine   Injectable 10 milliGRAM(s) IntraMuscular every 6 hours PRN nausea      REVIEW OF SYSTEMS:    RESPIRATORY: No shortness of breath  CARDIOVASCULAR: No chest pain  All other review of systems is negative unless indicated above.    Vital Signs Last 24 Hrs  T(C): 36.4 (05 Mar 2021 16:05), Max: 37 (05 Mar 2021 10:46)  T(F): 97.6 (05 Mar 2021 16:05), Max: 98.6 (05 Mar 2021 10:46)  HR: 96 (05 Mar 2021 16:05) (96 - 110)  BP: 141/68 (05 Mar 2021 16:05) (141/68 - 152/82)  BP(mean): --  RR: 18 (05 Mar 2021 16:05) (18 - 19)  SpO2: 100% (05 Mar 2021 16:05) (99% - 100%)    PHYSICAL EXAM:    Constitutional: NAD  Resting    LABS:    03-05    135  |  107  |  8   ----------------------------<  127<H>  4.0   |  20<L>  |  0.56    Ca    9.9      05 Mar 2021 13:55    TPro  7.8  /  Alb  3.7  /  TBili  0.5  /  DBili  x   /  AST  28  /  ALT  40  /  AlkPhos  150<H>  03-04      LIVER FUNCTIONS - ( 04 Mar 2021 06:36 )  Alb: 3.7 g/dL / Pro: 7.8 gm/dL / ALK PHOS: 150 U/L / ALT: 40 U/L / AST: 28 U/L / GGT: x             RADIOLOGY & ADDITIONAL STUDIES:
  CC: abd pain, diarrhea  History of Present Illness:   65/F with PMHx of recent C diff still on treatment, adrenal insufficieny, hearing difficulty, chronic abdominal pain, osteoporosis, porphyria, s/p cholecystectomy presents to the ED c/o lower abd pain, vomiting and diarrhea which started yesterday at midnight. Associated with subjective fever. Pt is still continuing treatment for C. diff. Pt did follow up with YAZMIN Curiel after last C. diff diagnosis. No CP/SOB. CT abdo was done in ED which showed improved rectosigmoid colitis.     3/1: Seen at bedside, pt lying in bed, asleep, sleeping heavy, unable to wake up with verbal stimuli.  Pt appears comfortable.  No overnight events.   3/2: Pt lying in bed, alert, awake, admits to chronic abd pain.  No fever, chills, n, vomiting.      REVIEW OF SYSTEMS: All other review of systems is negative unless indicated above.    Physical Exam:   Vital Signs Last 24 Hrs  T(C): 36.3 (02 Mar 2021 07:58), Max: 36.8 (01 Mar 2021 16:41)  T(F): 97.4 (02 Mar 2021 07:58), Max: 98.3 (01 Mar 2021 16:41)  HR: 114 (02 Mar 2021 07:58) (85 - 114)  BP: 130/71 (02 Mar 2021 07:58) (114/59 - 130/81)  BP(mean): --  RR: 18 (02 Mar 2021 07:58) (16 - 18)  SpO2: 98% (02 Mar 2021 07:58) (97% - 99%)    PHYSICAL EXAM:    Constitutional: NAD,  well-developed  HEENT: PERR, EOMI, Normal Hearing, MMM  Neck: Soft and supple  Respiratory: Breath sounds are clear bilaterally, No wheezing, rales or rhonchi  Cardiovascular: S1 and S2, regular rate and rhythm, no Murmurs, gallops or rubs  Gastrointestinal: Bowel Sounds present, soft, nontender, nondistended, no guarding, no rebound  Extremities: No peripheral edema  Neurological: A/O x 3, no focal deficits in my limited exam    MEDICATIONS  (STANDING):  amitriptyline 25 milliGRAM(s) Oral at bedtime  amLODIPine   Tablet 10 milliGRAM(s) Oral daily  enoxaparin Injectable 40 milliGRAM(s) SubCutaneous daily  ferrous    sulfate 325 milliGRAM(s) Oral daily  HYDROmorphone   Tablet 2 milliGRAM(s) Oral every 6 hours  lactobacillus acidophilus 1 Tablet(s) Oral two times a day  melatonin 5 milliGRAM(s) Oral at bedtime  metoprolol tartrate 25 milliGRAM(s) Oral two times a day  multivitamin 1 Tablet(s) Oral daily  potassium chloride  10 mEq/100 mL IVPB 10 milliEquivalent(s) IV Intermittent every 1 hour  tamsulosin 0.4 milliGRAM(s) Oral at bedtime  vancomycin    Solution 125 milliGRAM(s) Oral every 6 hours    MEDICATIONS  (PRN):  aluminum hydroxide/magnesium hydroxide/simethicone Suspension 30 milliLiter(s) Oral every 4 hours PRN Dyspepsia  prochlorperazine   Injectable 10 milliGRAM(s) IntraMuscular every 6 hours PRN nausea          03-02    143  |  113<H>  |  5<L>  ----------------------------<  130<H>  2.9<LL>   |  23  |  0.54    Ca    9.3      02 Mar 2021 10:23      CAPILLARY BLOOD GLUCOSE          Assessment and Plan:  65/F with PMHx of recent C diff still on treatment, adrenal insufficieny, hearing difficulty, chronic abdominal pain, osteoporosis, porphyria, s/p cholecystectomy presents to the ED c/o lower abd pain, vomiting and diarrhea.    1) Abd Pain / Diarrhea : Due to cdiff colitis:  -CT shows improving rectosigmoid colitis   -diarrhea resolved  -leukocytosis resolved   -Stool cdiff positive  -stop IVFs today  -c/w oral vanco q6h  -Supportive care  -IV KCl for hypokalemia today   -abd pain chronic - GI following, c/w supportive care/pain management (convert IV to oral pain meds)      2) Urinary retention: New onset  -d/c moon  -c/w flomax  -uro eval appreciated  -trial void now, if fails will stop amitriptyline.      3) HTN: cont amlodipine and BB    4) Hearing impaired: uses hearing aids    5) DVT PPX: lovenox      
CC: Abd pain, diarrhea  History of Present Illness:   65/F with PMHx of recent C diff still on treatment, adrenal insufficieny, hearing difficulty, chronic abdominal pain, osteoporosis, porphyria, s/p cholecystectomy presents to the ED c/o lower abd pain, vomiting and diarrhea which started yesterday at midnight. Associated with subjective fever. Pt is still continuing treatment for C. diff. Pt did follow up with YAZMIN Curiel after last C. diff diagnosis. No CP/SOB. CT abdo was done in ED which showed improved rectosigmoid colitis.     3/1: Seen at bedside, pt lying in bed, asleep, sleeping heavy, unable to wake up with verbal stimuli.  Pt appears comfortable.  No overnight events.   3/2: Pt lying in bed, alert, awake, admits to chronic abd pain.  No fever, chills, n, vomiting.    3/3: Sitting up, states she feels weak.  6 bowel movements overnight.  Otherwise alert but lethargic appearing.   3/4: LYing in bed, weak appearing, no specific complaints, states still has multiple loos stool and nausea.  3/5: Seen at bedside, weak appearing, states she has been having frequent diarrhea however unable to quantify by nursing staff. Discussed possible need for CARL which at this time pt declines however will have PT evaluate and assess.  Pt in agreement with plan.    REVIEW OF SYSTEMS: All other review of systems is negative unless indicated above.    Physical Exam:   Vital Signs Last 24 Hrs  T(C): 37 (05 Mar 2021 10:46), Max: 37 (05 Mar 2021 10:46)  T(F): 98.6 (05 Mar 2021 10:46), Max: 98.6 (05 Mar 2021 10:46)  HR: 109 (05 Mar 2021 10:46) (101 - 110)  BP: 144/78 (05 Mar 2021 10:46) (127/80 - 152/82)  BP(mean): --  RR: 19 (05 Mar 2021 10:46) (18 - 19)  SpO2: 100% (05 Mar 2021 10:46) (99% - 100%)    PHYSICAL EXAM:    Constitutional: NAD,  well-developed, weak appearing  HEENT: PERR, EOMI, Normal Hearing, MMM  Neck: Soft and supple  Respiratory: Breath sounds are clear bilaterally, No wheezing, rales or rhonchi  Cardiovascular: S1 and S2, regular rate and rhythm, no Murmurs, gallops or rubs  Gastrointestinal: Bowel Sounds present, soft, nontender, nondistended, no guarding, no rebound  Extremities: No peripheral edema  Neurological: A/O x 3, no focal deficits in my limited exam    MEDICATIONS  (STANDING):  amitriptyline 25 milliGRAM(s) Oral at bedtime  amLODIPine   Tablet 10 milliGRAM(s) Oral daily  dextrose 5% + sodium chloride 0.45% with potassium chloride 20 mEq/L 1000 milliLiter(s) (80 mL/Hr) IV Continuous <Continuous>  enoxaparin Injectable 40 milliGRAM(s) SubCutaneous daily  ferrous    sulfate 325 milliGRAM(s) Oral daily  lactobacillus acidophilus 1 Tablet(s) Oral two times a day  melatonin 5 milliGRAM(s) Oral at bedtime  metoprolol tartrate 25 milliGRAM(s) Oral two times a day  multivitamin 1 Tablet(s) Oral daily  potassium chloride   Powder 40 milliEquivalent(s) Oral every 4 hours  tamsulosin 0.4 milliGRAM(s) Oral at bedtime  vancomycin    Solution 125 milliGRAM(s) Oral every 6 hours    MEDICATIONS  (PRN):  aluminum hydroxide/magnesium hydroxide/simethicone Suspension 30 milliLiter(s) Oral every 4 hours PRN Dyspepsia  HYDROmorphone   Tablet 2 milliGRAM(s) Oral every 6 hours PRN Severe Pain (7 - 10)  prochlorperazine   Injectable 10 milliGRAM(s) IntraMuscular every 6 hours PRN nausea          03-04    140  |  107  |  11  ----------------------------<  115<H>  3.1<L>   |  22  |  0.62    Ca    10.0      04 Mar 2021 06:36    TPro  7.8  /  Alb  3.7  /  TBili  0.5  /  DBili  x   /  AST  28  /  ALT  40  /  AlkPhos  150<H>  03-04    CAPILLARY BLOOD GLUCOSE        LIVER FUNCTIONS - ( 04 Mar 2021 06:36 )  Alb: 3.7 g/dL / Pro: 7.8 gm/dL / ALK PHOS: 150 U/L / ALT: 40 U/L / AST: 28 U/L / GGT: x                 Assessment and Plan:  65/F with PMHx of recent C diff still on treatment, adrenal insufficieny, hearing difficulty, chronic abdominal pain, osteoporosis, porphyria, s/p cholecystectomy presents to the ED c/o lower abd pain, vomiting and diarrhea.    1) Abd Pain / Diarrhea / hypokalemia : Due to cdiff colitis   -lactic acidosis resolved with IVFs  -Stool cdiff positive  -CT shows improving rectosigmoid colitis   -leukocytosis resolved   -per pt diarrhea still present however unable to objectively quantify, would be unusual to be persistent given improved findings on CT.  Pt still still hypokalemic however which is in line with her symptoms of diarrhea.   -c/w oral vanco q6h  -Supportive care  -c/w IVFs with KCl  -monitor BMP  -abd pain chronic - GI following, c/w supportive care/pain management (converted IV to oral pain meds).  Pt is being worked up for porphyria as out patient, genetic testing pending though very low on her differential.  Pt has had an extensive work up both outpatient and inpatient.  Her symptoms seem to be out of proportion to findings, thus there is some suspicion for a psychosomatic component.  Will continue to monitor.   -PT eval    2) Urinary retention: New onset: RESOLVED  -moon out  -c/w flomax  -uro eval appreciated    3) HTN: cont amlodipine and BB    4) Hearing impaired: uses hearing aids    5) DVT PPX: lovenox  
CC: Abd pain, diarrhea  History of Present Illness:   65/F with PMHx of recent C diff still on treatment, adrenal insufficieny, hearing difficulty, chronic abdominal pain, osteoporosis, porphyria, s/p cholecystectomy presents to the ED c/o lower abd pain, vomiting and diarrhea which started yesterday at midnight. Associated with subjective fever. Pt is still continuing treatment for C. diff. Pt did follow up with YAZMIN Curiel after last C. diff diagnosis. No CP/SOB. CT abdo was done in ED which showed improved rectosigmoid colitis.     3/1: Seen at bedside, pt lying in bed, asleep, sleeping heavy, unable to wake up with verbal stimuli.  Pt appears comfortable.  No overnight events.   3/2: Pt lying in bed, alert, awake, admits to chronic abd pain.  No fever, chills, n, vomiting.    3/3: Sitting up, states she feels weak.  6 bowel movements overnight.  Otherwise alert but lethargic appearing.   3/4: LYing in bed, weak appearing, no specific complaints, states still has multiple loos stool and nausea.  3/5: Seen at bedside, weak appearing, states she has been having frequent diarrhea however unable to quantify by nursing staff. Discussed possible need for CARL which at this time pt declines however will have PT evaluate and assess.  Pt in agreement with plan.  3/6: Sitting in chair, less weak today, denies diarrhea.  No overall complaints.    REVIEW OF SYSTEMS: All other review of systems is negative unless indicated above.    Physical Exam:   Vital Signs Last 24 Hrs  T(C): 36.4 (06 Mar 2021 08:22), Max: 36.6 (05 Mar 2021 21:30)  T(F): 97.5 (06 Mar 2021 08:22), Max: 97.8 (05 Mar 2021 21:30)  HR: 113 (06 Mar 2021 08:22) (96 - 113)  BP: 119/70 (06 Mar 2021 08:22) (116/66 - 141/68)  BP(mean): --  RR: 15 (06 Mar 2021 08:22) (15 - 20)  SpO2: 98% (06 Mar 2021 08:22) (98% - 100%)    PHYSICAL EXAM:    Constitutional: NAD,  well-developed, weak appearing  HEENT: PERR, EOMI, Normal Hearing, MMM  Neck: Soft and supple  Respiratory: Breath sounds are clear bilaterally, No wheezing, rales or rhonchi  Cardiovascular: S1 and S2, regular rate and rhythm, no Murmurs, gallops or rubs  Gastrointestinal: Bowel Sounds present, soft, nontender, nondistended, no guarding, no rebound  Extremities: No peripheral edema  Neurological: A/O x 3, no focal deficits in my limited exam    MEDICATIONS  (STANDING):  amitriptyline 25 milliGRAM(s) Oral at bedtime  amLODIPine   Tablet 10 milliGRAM(s) Oral daily  enoxaparin Injectable 40 milliGRAM(s) SubCutaneous daily  ferrous    sulfate 325 milliGRAM(s) Oral daily  lactobacillus acidophilus 1 Tablet(s) Oral two times a day  melatonin 5 milliGRAM(s) Oral at bedtime  metoprolol tartrate 25 milliGRAM(s) Oral two times a day  multivitamin 1 Tablet(s) Oral daily  tamsulosin 0.4 milliGRAM(s) Oral at bedtime  vancomycin    Solution 125 milliGRAM(s) Oral every 6 hours    MEDICATIONS  (PRN):  ALPRAZolam 0.25 milliGRAM(s) Oral every 8 hours PRN anxiety  aluminum hydroxide/magnesium hydroxide/simethicone Suspension 30 milliLiter(s) Oral every 4 hours PRN Dyspepsia  HYDROmorphone   Tablet 2 milliGRAM(s) Oral every 6 hours PRN Severe Pain (7 - 10)  prochlorperazine   Injectable 10 milliGRAM(s) IntraMuscular every 6 hours PRN nausea                                15.1   14.36 )-----------( 443      ( 06 Mar 2021 08:34 )             46.0     03-06    140  |  110<H>  |  5<L>  ----------------------------<  128<H>  4.0   |  22  |  0.53    Ca    9.7      06 Mar 2021 08:34    TPro  7.4  /  Alb  3.5  /  TBili  0.3  /  DBili  x   /  AST  23  /  ALT  38  /  AlkPhos  118  03-06    CAPILLARY BLOOD GLUCOSE        LIVER FUNCTIONS - ( 06 Mar 2021 08:34 )  Alb: 3.5 g/dL / Pro: 7.4 gm/dL / ALK PHOS: 118 U/L / ALT: 38 U/L / AST: 23 U/L / GGT: x                 Assessment and Plan:  65/F with PMHx of recent C diff still on treatment, adrenal insufficieny, hearing difficulty, chronic abdominal pain, osteoporosis, porphyria, s/p cholecystectomy presents to the ED c/o lower abd pain, vomiting and diarrhea.    1) Abd Pain / Diarrhea / hypokalemia : Due to cdiff colitis   -lactic acidosis resolved with IVFs  -Stool cdiff positive  -CT shows improving rectosigmoid colitis   -leukocytosis present again, unclear why elevated today, repeat for tomorrow  -diarrhea improving  -potassium normal  -c/w oral vanco q6h  -Supportive care  -stop IVFs today  -monitor BMP  -abd pain chronic - being followed by Dr. Curiel out patient    2) Urinary retention: New onset: RESOLVED  -moon out  -c/w flomax  -uro eval appreciated    3) HTN: cont amlodipine and BB    4) Hearing impaired: uses hearing aids    5) DVT PPX: lovenox    
Patient is a 65y old  Female who presents with a chief complaint of abd pain, diarrhea (01 Mar 2021 17:09)      Subective:  No N/V  Still some lower abdominal discomfort  No diarrhea    PAST MEDICAL & SURGICAL HISTORY:  Staph infection  8/2017    Chronic abdominal pain    Osteoporosis    Adrenal insufficiency, primary    Knee effusion    S/P tonsillectomy    S/P cholecystectomy    H/O spinal fusion        MEDICATIONS  (STANDING):  amitriptyline 25 milliGRAM(s) Oral at bedtime  amLODIPine   Tablet 10 milliGRAM(s) Oral daily  dextrose 5% + sodium chloride 0.9%. 1000 milliLiter(s) (100 mL/Hr) IV Continuous <Continuous>  enoxaparin Injectable 40 milliGRAM(s) SubCutaneous daily  ferrous    sulfate 325 milliGRAM(s) Oral daily  lactobacillus acidophilus 1 Tablet(s) Oral two times a day  melatonin 5 milliGRAM(s) Oral at bedtime  metoprolol tartrate 25 milliGRAM(s) Oral two times a day  multivitamin 1 Tablet(s) Oral daily  tamsulosin 0.4 milliGRAM(s) Oral at bedtime  vancomycin    Solution 125 milliGRAM(s) Oral every 6 hours    MEDICATIONS  (PRN):  aluminum hydroxide/magnesium hydroxide/simethicone Suspension 30 milliLiter(s) Oral every 4 hours PRN Dyspepsia  HYDROmorphone  Injectable 1 milliGRAM(s) IV Push every 4 hours PRN Moderate Pain (4 - 6)  prochlorperazine   Injectable 10 milliGRAM(s) IntraMuscular every 6 hours PRN nausea      REVIEW OF SYSTEMS:    RESPIRATORY: No shortness of breath  CARDIOVASCULAR: No chest pain  All other review of systems is negative unless indicated above.    Vital Signs Last 24 Hrs  T(C): 36.3 (02 Mar 2021 07:58), Max: 36.9 (01 Mar 2021 09:04)  T(F): 97.4 (02 Mar 2021 07:58), Max: 98.5 (01 Mar 2021 09:04)  HR: 114 (02 Mar 2021 07:58) (85 - 114)  BP: 130/71 (02 Mar 2021 07:58) (114/59 - 155/77)  BP(mean): --  RR: 18 (02 Mar 2021 07:58) (16 - 18)  SpO2: 98% (02 Mar 2021 07:58) (97% - 99%)    PHYSICAL EXAM:    Constitutional: NAD, well-developed  Respiratory: CTAB  Cardiovascular: S1 and S2, mildly tachy, regular  Gastrointestinal: BS+, soft, mild lower abdominal tenderness  Extremities: No peripheral edema  Psychiatric: Normal mood, normal affect    LABS:                        15.3   10.00 )-----------( 381      ( 28 Feb 2021 10:13 )             47.0     03-01    139  |  110<H>  |  4<L>  ----------------------------<  146<H>  3.2<L>   |  21<L>  |  0.54    Ca    9.2      01 Mar 2021 08:37    TPro  9.1<H>  /  Alb  3.7  /  TBili  0.4  /  DBili  x   /  AST  22  /  ALT  63  /  AlkPhos  238<H>  02-28    PT/INR - ( 28 Feb 2021 10:13 )   PT: 12.3 sec;   INR: 1.06 ratio         PTT - ( 28 Feb 2021 10:13 )  PTT:33.1 sec  LIVER FUNCTIONS - ( 28 Feb 2021 10:13 )  Alb: 3.7 g/dL / Pro: 9.1 gm/dL / ALK PHOS: 238 U/L / ALT: 63 U/L / AST: 22 U/L / GGT: x             RADIOLOGY & ADDITIONAL STUDIES:

## 2021-03-07 NOTE — PROGRESS NOTE ADULT - PROVIDER SPECIALTY LIST ADULT
Hospitalist
Gastroenterology
Gastroenterology
Genetics/Metabolism

## 2021-03-08 ENCOUNTER — TRANSCRIPTION ENCOUNTER (OUTPATIENT)
Age: 66
End: 2021-03-08

## 2021-03-08 VITALS
RESPIRATION RATE: 17 BRPM | HEART RATE: 98 BPM | DIASTOLIC BLOOD PRESSURE: 60 MMHG | SYSTOLIC BLOOD PRESSURE: 118 MMHG | TEMPERATURE: 97 F | OXYGEN SATURATION: 100 %

## 2021-03-08 LAB
ANION GAP SERPL CALC-SCNC: 10 MMOL/L — SIGNIFICANT CHANGE UP (ref 5–17)
BUN SERPL-MCNC: 10 MG/DL — SIGNIFICANT CHANGE UP (ref 7–23)
CALCIUM SERPL-MCNC: 9.7 MG/DL — SIGNIFICANT CHANGE UP (ref 8.5–10.1)
CHLORIDE SERPL-SCNC: 107 MMOL/L — SIGNIFICANT CHANGE UP (ref 96–108)
CO2 SERPL-SCNC: 22 MMOL/L — SIGNIFICANT CHANGE UP (ref 22–31)
CREAT SERPL-MCNC: 0.59 MG/DL — SIGNIFICANT CHANGE UP (ref 0.5–1.3)
GLUCOSE SERPL-MCNC: 113 MG/DL — HIGH (ref 70–99)
POTASSIUM SERPL-MCNC: 3.4 MMOL/L — LOW (ref 3.5–5.3)
POTASSIUM SERPL-SCNC: 3.4 MMOL/L — LOW (ref 3.5–5.3)
SODIUM SERPL-SCNC: 139 MMOL/L — SIGNIFICANT CHANGE UP (ref 135–145)

## 2021-03-08 PROCEDURE — 99239 HOSP IP/OBS DSCHRG MGMT >30: CPT

## 2021-03-08 RX ORDER — VANCOMYCIN HCL 1 G
1 VIAL (EA) INTRAVENOUS
Qty: 28 | Refills: 0
Start: 2021-03-08 | End: 2021-03-14

## 2021-03-08 RX ORDER — POTASSIUM CHLORIDE 20 MEQ
1 PACKET (EA) ORAL
Qty: 7 | Refills: 0
Start: 2021-03-08 | End: 2021-03-14

## 2021-03-08 RX ORDER — HYDROMORPHONE HYDROCHLORIDE 2 MG/ML
1 INJECTION INTRAMUSCULAR; INTRAVENOUS; SUBCUTANEOUS
Qty: 28 | Refills: 0
Start: 2021-03-08 | End: 2021-03-14

## 2021-03-08 RX ORDER — AMITRIPTYLINE HCL 25 MG
1 TABLET ORAL
Qty: 30 | Refills: 0
Start: 2021-03-08 | End: 2021-04-06

## 2021-03-08 RX ORDER — POTASSIUM CHLORIDE 20 MEQ
20 PACKET (EA) ORAL ONCE
Refills: 0 | Status: COMPLETED | OUTPATIENT
Start: 2021-03-08 | End: 2021-03-08

## 2021-03-08 RX ORDER — MAGNESIUM OXIDE 400 MG ORAL TABLET 241.3 MG
1 TABLET ORAL
Qty: 0 | Refills: 0 | DISCHARGE

## 2021-03-08 RX ADMIN — Medication 1 TABLET(S): at 08:28

## 2021-03-08 RX ADMIN — Medication 20 MILLIEQUIVALENT(S): at 13:11

## 2021-03-08 RX ADMIN — HYDROMORPHONE HYDROCHLORIDE 2 MILLIGRAM(S): 2 INJECTION INTRAMUSCULAR; INTRAVENOUS; SUBCUTANEOUS at 13:11

## 2021-03-08 RX ADMIN — AMLODIPINE BESYLATE 10 MILLIGRAM(S): 2.5 TABLET ORAL at 08:27

## 2021-03-08 RX ADMIN — Medication 125 MILLIGRAM(S): at 13:12

## 2021-03-08 RX ADMIN — Medication 325 MILLIGRAM(S): at 08:28

## 2021-03-08 RX ADMIN — Medication 25 MILLIGRAM(S): at 13:10

## 2021-03-08 RX ADMIN — Medication 10 MILLIGRAM(S): at 13:11

## 2021-03-08 RX ADMIN — Medication 0.25 MILLIGRAM(S): at 08:28

## 2021-03-08 RX ADMIN — HYDROMORPHONE HYDROCHLORIDE 2 MILLIGRAM(S): 2 INJECTION INTRAMUSCULAR; INTRAVENOUS; SUBCUTANEOUS at 06:24

## 2021-03-08 RX ADMIN — ENOXAPARIN SODIUM 40 MILLIGRAM(S): 100 INJECTION SUBCUTANEOUS at 08:28

## 2021-03-08 RX ADMIN — Medication 125 MILLIGRAM(S): at 06:26

## 2021-03-08 NOTE — DISCHARGE NOTE PROVIDER - HOSPITAL COURSE
CC: Abd pain, diarrhea.  HPI:  65/F with PMHx of recent C diff still on treatment, adrenal insufficieny, hearing difficulty, chronic abdominal pain, osteoporosis, porphyria, s/p cholecystectomy presents to the ED c/o lower abd pain, vomiting and diarrhea which started yesterday at midnight. Associated with subjective fever. Pt is still continuing treatment for C. diff. Pt did follow up with YAZMIN Curiel after last C. diff diagnosis. No CP/SOB. CT abdo was done in ED which showed improved rectosigmoid colitis.     Hospital course:   Pt was admitted, cdiff positive.  CT scan showing improving colitis.  Pt was placed on oral vanco.  Diarrhea slow to improve but better overall.  Pt is opioid dependent for her chronic abd pain for which she will follow up out patient for further evaluation, including pending work up for porphyia.  Pt HD stable, electrolytes improved after IVFs and potassium supplementation.  Pt is stable for discharge home.  She was offered CARL but declines and wishes to go home with home car.e    REVIEW OF SYSTEMS: All other review of systems is negative unless indicated above.    Vital Signs Last 24 Hrs  T(C): 36.2 (08 Mar 2021 08:01), Max: 36.6 (07 Mar 2021 15:42)  T(F): 97.2 (08 Mar 2021 08:01), Max: 97.8 (07 Mar 2021 15:42)  HR: 98 (08 Mar 2021 08:01) (93 - 102)  BP: 118/60 (08 Mar 2021 08:01) (107/74 - 119/73)  BP(mean): --  RR: 17 (08 Mar 2021 08:01) (17 - 18)  SpO2: 100% (08 Mar 2021 08:01) (97% - 100%)      PHYSICAL EXAM:    Constitutional: NAD,  well-developed, weak appearing  HEENT: PERR, EOMI, Normal Hearing, MMM  Neck: Soft and supple  Respiratory: Breath sounds are clear bilaterally, No wheezing, rales or rhonchi  Cardiovascular: S1 and S2, regular rate and rhythm, no Murmurs, gallops or rubs  Gastrointestinal: Bowel Sounds present, soft, nontender, nondistended, no guarding, no rebound  Extremities: No peripheral edema  Neurological: A/O x 3, no focal deficits in my limited exam      med/labs: Reviewed and interpreted     Assessment and Plan:  65/F with PMHx of recent C diff still on treatment, adrenal insufficieny, hearing difficulty, chronic abdominal pain, osteoporosis, porphyria, s/p cholecystectomy presents to the ED c/o lower abd pain, vomiting and diarrhea.    1) Abd Pain / Diarrhea / hypokalemia : Due to cdiff colitis: IMPROVING  -lactic acidosis resolved with IVFs  -Stool cdiff positive  -CT shows improving rectosigmoid colitis   -leukocytosis normalizing  -diarrhea improving  -potassium normal  -c/w oral vanco q6h  -Supportive care  -stopped IVFs   -monitor BMP - stable  -abd pain chronic - being followed by Dr. Curiel out patient    2) Urinary retention: New onset: RESOLVED  -moon out  -c/w flomax  -uro eval appreciated    3) HTN: cont amlodipine and BB    4) Hearing impaired: uses hearing aids    5) DVT PPX: lovenox    Dispo:  -d/c home tomorrow with home care.    Attending Statement: 40 minutes spent on total encounter and discharge planning.

## 2021-03-08 NOTE — DISCHARGE NOTE NURSING/CASE MANAGEMENT/SOCIAL WORK - PATIENT PORTAL LINK FT
You can access the FollowMyHealth Patient Portal offered by [facility] by registering at the following website: [email or web address]. By joining [email or web address]’s FollowMyHealth portal, you will also be able to view your health information using other applications (apps) compatible with our system.

## 2021-03-08 NOTE — DISCHARGE NOTE PROVIDER - PROVIDER TOKENS
PROVIDER:[TOKEN:[[id number]],FOLLOWUP:[1 week]],FREE:[LAST:[follow up pcp 1 week],PHONE:[(   )    -],FAX:[(   )    -]]

## 2021-03-08 NOTE — DISCHARGE NOTE PROVIDER - NSDCMRMEDTOKEN_GEN_ALL_CORE_FT
amitriptyline 25 mg oral tablet: 1 tab(s) orally once a day (at bedtime)  amLODIPine 10 mg oral tablet: 1 tab(s) orally once a day  calcium (as calcium citrate) 250 mg oral tablet: 1 tab(s) orally 2 times a day  cholecalciferol 1000 intl units oral tablet: 1 tab(s) orally 2 times a day  ferrous sulfate 325 mg (65 mg elemental iron) oral tablet: 1 tab(s) orally once a day  HYDROmorphone 2 mg oral tablet: 1 tab(s) orally every 6 hours, As Needed -Severe Pain (7 - 10) - for severe pain MDD:8mg   melatonin 3 mg oral tablet: 5 tab(s) orally once a day (at bedtime)  metoprolol tartrate 25 mg oral tablet: 1 tab(s) orally 2 times a day  multivitamin: 1 tab(s) orally once a day  potassium chloride 20 mEq oral tablet, extended release: 1 tab(s) orally once  vancomycin 125 mg oral capsule: 1 cap(s) orally every 6 hours

## 2021-03-08 NOTE — DISCHARGE NOTE PROVIDER - CARE PROVIDER_API CALL
Ulises Curiel)  Gastroenterology; Internal Medicine  [address] [facility], [address] [place] [address]  Phone: [phone]  Fax: [phone]  Follow Up Time: 1 week    follow up pcp 1 week,   Phone: (   )    -  Fax: (   )    -  Follow Up Time:

## 2021-03-08 NOTE — DISCHARGE NOTE PROVIDER - NSDCCPCAREPLAN_GEN_ALL_CORE_FT
PRINCIPAL DISCHARGE DIAGNOSIS  Diagnosis: C. difficile colitis  Assessment and Plan of Treatment: Take oral vanco for 7 more days.  follow up with Dr. Marcelo      SECONDARY DISCHARGE DIAGNOSES  Diagnosis: Urinary retention  Assessment and Plan of Treatment: Resolved.  Follow up with pcp 1 week.    Diagnosis: Intractable abdominal pain  Assessment and Plan of Treatment: Pain management.  Follow up with pcp and pain specialist.  Take lower dose dilaudid as prescribed.

## 2021-03-15 DIAGNOSIS — R33.9 RETENTION OF URINE, UNSPECIFIED: ICD-10-CM

## 2021-03-15 DIAGNOSIS — R74.8 ABNORMAL LEVELS OF OTHER SERUM ENZYMES: ICD-10-CM

## 2021-03-15 DIAGNOSIS — Z87.891 PERSONAL HISTORY OF NICOTINE DEPENDENCE: ICD-10-CM

## 2021-03-15 DIAGNOSIS — Z88.8 ALLERGY STATUS TO OTHER DRUGS, MEDICAMENTS AND BIOLOGICAL SUBSTANCES STATUS: ICD-10-CM

## 2021-03-15 DIAGNOSIS — I10 ESSENTIAL (PRIMARY) HYPERTENSION: ICD-10-CM

## 2021-03-15 DIAGNOSIS — A04.71 ENTEROCOLITIS DUE TO CLOSTRIDIUM DIFFICILE, RECURRENT: ICD-10-CM

## 2021-03-15 DIAGNOSIS — E87.2 ACIDOSIS: ICD-10-CM

## 2021-03-15 DIAGNOSIS — E87.6 HYPOKALEMIA: ICD-10-CM

## 2021-03-28 ENCOUNTER — INPATIENT (INPATIENT)
Facility: HOSPITAL | Age: 66
LOS: 7 days | Discharge: HOME | DRG: 372 | End: 2021-04-05
Attending: FAMILY MEDICINE | Admitting: HOSPITALIST
Payer: MEDICARE

## 2021-03-28 VITALS
HEART RATE: 86 BPM | WEIGHT: 128.09 LBS | TEMPERATURE: 98 F | DIASTOLIC BLOOD PRESSURE: 73 MMHG | HEIGHT: 66 IN | SYSTOLIC BLOOD PRESSURE: 160 MMHG | OXYGEN SATURATION: 100 % | RESPIRATION RATE: 18 BRPM

## 2021-03-28 DIAGNOSIS — R10.9 UNSPECIFIED ABDOMINAL PAIN: ICD-10-CM

## 2021-03-28 DIAGNOSIS — Z90.89 ACQUIRED ABSENCE OF OTHER ORGANS: Chronic | ICD-10-CM

## 2021-03-28 DIAGNOSIS — Z90.49 ACQUIRED ABSENCE OF OTHER SPECIFIED PARTS OF DIGESTIVE TRACT: Chronic | ICD-10-CM

## 2021-03-28 DIAGNOSIS — Z98.1 ARTHRODESIS STATUS: Chronic | ICD-10-CM

## 2021-03-28 LAB
ADD ON TEST-SPECIMEN IN LAB: SIGNIFICANT CHANGE UP
ALBUMIN SERPL ELPH-MCNC: 3.7 G/DL — SIGNIFICANT CHANGE UP (ref 3.3–5)
ALP SERPL-CCNC: 97 U/L — SIGNIFICANT CHANGE UP (ref 40–120)
ALT FLD-CCNC: 20 U/L — SIGNIFICANT CHANGE UP (ref 12–78)
ANION GAP SERPL CALC-SCNC: 6 MMOL/L — SIGNIFICANT CHANGE UP (ref 5–17)
APPEARANCE UR: CLEAR — SIGNIFICANT CHANGE UP
APTT BLD: 33.2 SEC — SIGNIFICANT CHANGE UP (ref 27.5–35.5)
AST SERPL-CCNC: 15 U/L — SIGNIFICANT CHANGE UP (ref 15–37)
BASOPHILS # BLD AUTO: 0.03 K/UL — SIGNIFICANT CHANGE UP (ref 0–0.2)
BASOPHILS NFR BLD AUTO: 0.4 % — SIGNIFICANT CHANGE UP (ref 0–2)
BILIRUB SERPL-MCNC: 0.4 MG/DL — SIGNIFICANT CHANGE UP (ref 0.2–1.2)
BILIRUB UR-MCNC: NEGATIVE — SIGNIFICANT CHANGE UP
BUN SERPL-MCNC: 3 MG/DL — LOW (ref 7–23)
CALCIUM SERPL-MCNC: 9.9 MG/DL — SIGNIFICANT CHANGE UP (ref 8.5–10.1)
CHLORIDE SERPL-SCNC: 109 MMOL/L — HIGH (ref 96–108)
CO2 SERPL-SCNC: 25 MMOL/L — SIGNIFICANT CHANGE UP (ref 22–31)
COLOR SPEC: YELLOW — SIGNIFICANT CHANGE UP
CREAT SERPL-MCNC: 0.69 MG/DL — SIGNIFICANT CHANGE UP (ref 0.5–1.3)
DIFF PNL FLD: NEGATIVE — SIGNIFICANT CHANGE UP
EOSINOPHIL # BLD AUTO: 0.02 K/UL — SIGNIFICANT CHANGE UP (ref 0–0.5)
EOSINOPHIL NFR BLD AUTO: 0.3 % — SIGNIFICANT CHANGE UP (ref 0–6)
GLUCOSE SERPL-MCNC: 128 MG/DL — HIGH (ref 70–99)
GLUCOSE UR QL: NEGATIVE MG/DL — SIGNIFICANT CHANGE UP
HCT VFR BLD CALC: 46.5 % — HIGH (ref 34.5–45)
HGB BLD-MCNC: 15 G/DL — SIGNIFICANT CHANGE UP (ref 11.5–15.5)
IMM GRANULOCYTES NFR BLD AUTO: 0.4 % — SIGNIFICANT CHANGE UP (ref 0–1.5)
INR BLD: 1.03 RATIO — SIGNIFICANT CHANGE UP (ref 0.88–1.16)
KETONES UR-MCNC: ABNORMAL
LACTATE SERPL-SCNC: 1.9 MMOL/L — SIGNIFICANT CHANGE UP (ref 0.7–2)
LEUKOCYTE ESTERASE UR-ACNC: NEGATIVE — SIGNIFICANT CHANGE UP
LYMPHOCYTES # BLD AUTO: 1.19 K/UL — SIGNIFICANT CHANGE UP (ref 1–3.3)
LYMPHOCYTES # BLD AUTO: 17.1 % — SIGNIFICANT CHANGE UP (ref 13–44)
MCHC RBC-ENTMCNC: 26.6 PG — LOW (ref 27–34)
MCHC RBC-ENTMCNC: 32.3 GM/DL — SIGNIFICANT CHANGE UP (ref 32–36)
MCV RBC AUTO: 82.4 FL — SIGNIFICANT CHANGE UP (ref 80–100)
MONOCYTES # BLD AUTO: 0.24 K/UL — SIGNIFICANT CHANGE UP (ref 0–0.9)
MONOCYTES NFR BLD AUTO: 3.5 % — SIGNIFICANT CHANGE UP (ref 2–14)
NEUTROPHILS # BLD AUTO: 5.43 K/UL — SIGNIFICANT CHANGE UP (ref 1.8–7.4)
NEUTROPHILS NFR BLD AUTO: 78.3 % — HIGH (ref 43–77)
NITRITE UR-MCNC: NEGATIVE — SIGNIFICANT CHANGE UP
PH UR: 8 — SIGNIFICANT CHANGE UP (ref 5–8)
PLATELET # BLD AUTO: 267 K/UL — SIGNIFICANT CHANGE UP (ref 150–400)
POTASSIUM SERPL-MCNC: 3.6 MMOL/L — SIGNIFICANT CHANGE UP (ref 3.5–5.3)
POTASSIUM SERPL-SCNC: 3.6 MMOL/L — SIGNIFICANT CHANGE UP (ref 3.5–5.3)
PROT SERPL-MCNC: 8.2 GM/DL — SIGNIFICANT CHANGE UP (ref 6–8.3)
PROT UR-MCNC: NEGATIVE MG/DL — SIGNIFICANT CHANGE UP
PROTHROM AB SERPL-ACNC: 11.9 SEC — SIGNIFICANT CHANGE UP (ref 10.6–13.6)
RBC # BLD: 5.64 M/UL — HIGH (ref 3.8–5.2)
RBC # FLD: 14.4 % — SIGNIFICANT CHANGE UP (ref 10.3–14.5)
SARS-COV-2 RNA SPEC QL NAA+PROBE: SIGNIFICANT CHANGE UP
SODIUM SERPL-SCNC: 140 MMOL/L — SIGNIFICANT CHANGE UP (ref 135–145)
SP GR SPEC: 1.01 — SIGNIFICANT CHANGE UP (ref 1.01–1.02)
UROBILINOGEN FLD QL: NEGATIVE MG/DL — SIGNIFICANT CHANGE UP
WBC # BLD: 6.94 K/UL — SIGNIFICANT CHANGE UP (ref 3.8–10.5)
WBC # FLD AUTO: 6.94 K/UL — SIGNIFICANT CHANGE UP (ref 3.8–10.5)

## 2021-03-28 PROCEDURE — 86769 SARS-COV-2 COVID-19 ANTIBODY: CPT

## 2021-03-28 PROCEDURE — 36415 COLL VENOUS BLD VENIPUNCTURE: CPT

## 2021-03-28 PROCEDURE — 80053 COMPREHEN METABOLIC PANEL: CPT

## 2021-03-28 PROCEDURE — 71260 CT THORAX DX C+: CPT | Mod: 26

## 2021-03-28 PROCEDURE — 99223 1ST HOSP IP/OBS HIGH 75: CPT

## 2021-03-28 PROCEDURE — 85025 COMPLETE CBC W/AUTO DIFF WBC: CPT

## 2021-03-28 PROCEDURE — 97163 PT EVAL HIGH COMPLEX 45 MIN: CPT | Mod: GP

## 2021-03-28 PROCEDURE — 99285 EMERGENCY DEPT VISIT HI MDM: CPT

## 2021-03-28 PROCEDURE — 72125 CT NECK SPINE W/O DYE: CPT | Mod: 26

## 2021-03-28 PROCEDURE — 80048 BASIC METABOLIC PNL TOTAL CA: CPT

## 2021-03-28 PROCEDURE — 85027 COMPLETE CBC AUTOMATED: CPT

## 2021-03-28 PROCEDURE — 97116 GAIT TRAINING THERAPY: CPT | Mod: GP

## 2021-03-28 PROCEDURE — 87493 C DIFF AMPLIFIED PROBE: CPT

## 2021-03-28 PROCEDURE — 74177 CT ABD & PELVIS W/CONTRAST: CPT | Mod: 26

## 2021-03-28 PROCEDURE — 70450 CT HEAD/BRAIN W/O DYE: CPT | Mod: 26

## 2021-03-28 PROCEDURE — 87507 IADNA-DNA/RNA PROBE TQ 12-25: CPT

## 2021-03-28 RX ORDER — ONDANSETRON 8 MG/1
4 TABLET, FILM COATED ORAL ONCE
Refills: 0 | Status: COMPLETED | OUTPATIENT
Start: 2021-03-28 | End: 2021-03-28

## 2021-03-28 RX ORDER — PANTOPRAZOLE SODIUM 20 MG/1
40 TABLET, DELAYED RELEASE ORAL ONCE
Refills: 0 | Status: COMPLETED | OUTPATIENT
Start: 2021-03-28 | End: 2021-03-28

## 2021-03-28 RX ORDER — OXYCODONE HYDROCHLORIDE 5 MG/1
5 TABLET ORAL EVERY 6 HOURS
Refills: 0 | Status: DISCONTINUED | OUTPATIENT
Start: 2021-03-28 | End: 2021-04-04

## 2021-03-28 RX ORDER — POTASSIUM CHLORIDE 20 MEQ
20 PACKET (EA) ORAL ONCE
Refills: 0 | Status: COMPLETED | OUTPATIENT
Start: 2021-03-28 | End: 2021-03-28

## 2021-03-28 RX ORDER — MORPHINE SULFATE 50 MG/1
4 CAPSULE, EXTENDED RELEASE ORAL ONCE
Refills: 0 | Status: DISCONTINUED | OUTPATIENT
Start: 2021-03-28 | End: 2021-03-28

## 2021-03-28 RX ORDER — ONDANSETRON 8 MG/1
4 TABLET, FILM COATED ORAL EVERY 6 HOURS
Refills: 0 | Status: DISCONTINUED | OUTPATIENT
Start: 2021-03-28 | End: 2021-04-05

## 2021-03-28 RX ORDER — OXYCODONE HYDROCHLORIDE 5 MG/1
5 TABLET ORAL ONCE
Refills: 0 | Status: DISCONTINUED | OUTPATIENT
Start: 2021-03-28 | End: 2021-03-28

## 2021-03-28 RX ORDER — SODIUM CHLORIDE 9 MG/ML
1000 INJECTION INTRAMUSCULAR; INTRAVENOUS; SUBCUTANEOUS
Refills: 0 | Status: DISCONTINUED | OUTPATIENT
Start: 2021-03-28 | End: 2021-03-29

## 2021-03-28 RX ORDER — VANCOMYCIN HCL 1 G
125 VIAL (EA) INTRAVENOUS EVERY 6 HOURS
Refills: 0 | Status: DISCONTINUED | OUTPATIENT
Start: 2021-03-28 | End: 2021-04-01

## 2021-03-28 RX ORDER — AMITRIPTYLINE HCL 25 MG
25 TABLET ORAL AT BEDTIME
Refills: 0 | Status: DISCONTINUED | OUTPATIENT
Start: 2021-03-28 | End: 2021-04-05

## 2021-03-28 RX ORDER — CHOLECALCIFEROL (VITAMIN D3) 125 MCG
1000 CAPSULE ORAL
Refills: 0 | Status: DISCONTINUED | OUTPATIENT
Start: 2021-03-28 | End: 2021-04-05

## 2021-03-28 RX ORDER — LIDOCAINE 4 G/100G
1 CREAM TOPICAL ONCE
Refills: 0 | Status: COMPLETED | OUTPATIENT
Start: 2021-03-28 | End: 2021-03-28

## 2021-03-28 RX ORDER — ONDANSETRON 8 MG/1
4 TABLET, FILM COATED ORAL ONCE
Refills: 0 | Status: DISCONTINUED | OUTPATIENT
Start: 2021-03-28 | End: 2021-03-28

## 2021-03-28 RX ORDER — AMLODIPINE BESYLATE 2.5 MG/1
10 TABLET ORAL DAILY
Refills: 0 | Status: DISCONTINUED | OUTPATIENT
Start: 2021-03-28 | End: 2021-04-05

## 2021-03-28 RX ORDER — METOPROLOL TARTRATE 50 MG
25 TABLET ORAL
Refills: 0 | Status: DISCONTINUED | OUTPATIENT
Start: 2021-03-28 | End: 2021-04-05

## 2021-03-28 RX ORDER — SODIUM CHLORIDE 9 MG/ML
1800 INJECTION INTRAMUSCULAR; INTRAVENOUS; SUBCUTANEOUS ONCE
Refills: 0 | Status: COMPLETED | OUTPATIENT
Start: 2021-03-28 | End: 2021-03-28

## 2021-03-28 RX ORDER — ACETAMINOPHEN 500 MG
650 TABLET ORAL ONCE
Refills: 0 | Status: DISCONTINUED | OUTPATIENT
Start: 2021-03-28 | End: 2021-03-28

## 2021-03-28 RX ORDER — LANOLIN ALCOHOL/MO/W.PET/CERES
3 CREAM (GRAM) TOPICAL AT BEDTIME
Refills: 0 | Status: DISCONTINUED | OUTPATIENT
Start: 2021-03-28 | End: 2021-04-05

## 2021-03-28 RX ORDER — HYDROMORPHONE HYDROCHLORIDE 2 MG/ML
0.5 INJECTION INTRAMUSCULAR; INTRAVENOUS; SUBCUTANEOUS EVERY 4 HOURS
Refills: 0 | Status: DISCONTINUED | OUTPATIENT
Start: 2021-03-28 | End: 2021-03-31

## 2021-03-28 RX ORDER — CALCIUM CARBONATE 500(1250)
1 TABLET ORAL
Refills: 0 | Status: DISCONTINUED | OUTPATIENT
Start: 2021-03-28 | End: 2021-04-05

## 2021-03-28 RX ORDER — ACETAMINOPHEN 500 MG
650 TABLET ORAL EVERY 6 HOURS
Refills: 0 | Status: DISCONTINUED | OUTPATIENT
Start: 2021-03-28 | End: 2021-04-05

## 2021-03-28 RX ORDER — FERROUS SULFATE 325(65) MG
325 TABLET ORAL DAILY
Refills: 0 | Status: DISCONTINUED | OUTPATIENT
Start: 2021-03-28 | End: 2021-04-05

## 2021-03-28 RX ADMIN — ONDANSETRON 4 MILLIGRAM(S): 8 TABLET, FILM COATED ORAL at 14:09

## 2021-03-28 RX ADMIN — SODIUM CHLORIDE 1800 MILLILITER(S): 9 INJECTION INTRAMUSCULAR; INTRAVENOUS; SUBCUTANEOUS at 14:30

## 2021-03-28 RX ADMIN — MORPHINE SULFATE 4 MILLIGRAM(S): 50 CAPSULE, EXTENDED RELEASE ORAL at 19:05

## 2021-03-28 RX ADMIN — LIDOCAINE 1 PATCH: 4 CREAM TOPICAL at 17:48

## 2021-03-28 RX ADMIN — ONDANSETRON 4 MILLIGRAM(S): 8 TABLET, FILM COATED ORAL at 17:14

## 2021-03-28 RX ADMIN — HYDROMORPHONE HYDROCHLORIDE 0.5 MILLIGRAM(S): 2 INJECTION INTRAMUSCULAR; INTRAVENOUS; SUBCUTANEOUS at 23:20

## 2021-03-28 RX ADMIN — SODIUM CHLORIDE 125 MILLILITER(S): 9 INJECTION INTRAMUSCULAR; INTRAVENOUS; SUBCUTANEOUS at 19:05

## 2021-03-28 RX ADMIN — MORPHINE SULFATE 4 MILLIGRAM(S): 50 CAPSULE, EXTENDED RELEASE ORAL at 14:34

## 2021-03-28 RX ADMIN — MORPHINE SULFATE 4 MILLIGRAM(S): 50 CAPSULE, EXTENDED RELEASE ORAL at 14:09

## 2021-03-28 RX ADMIN — SODIUM CHLORIDE 1800 MILLILITER(S): 9 INJECTION INTRAMUSCULAR; INTRAVENOUS; SUBCUTANEOUS at 13:30

## 2021-03-28 RX ADMIN — PANTOPRAZOLE SODIUM 40 MILLIGRAM(S): 20 TABLET, DELAYED RELEASE ORAL at 17:58

## 2021-03-28 NOTE — ED ADULT NURSE REASSESSMENT NOTE - NS ED NURSE REASSESS COMMENT FT1
Report received from previous RN for continuity of care. Patient calm and cooperative, has had 1 BM on shift and was medicated for pain at shift change. Spoke to sister about pre hospital care, patient has chronic C. Diff and just finished a course of ABx. Patient obviously dehydrated with dry mucous membranes. Assisted to restroom. Pending bed assignment however to be held in the ED overnight. Safety maintained.

## 2021-03-28 NOTE — H&P ADULT - HISTORY OF PRESENT ILLNESS
CC:  Patient is a 65y old  Female who presents with a chief complaint of NVD.    HPI:  64F.  recent hx of CDI.  diagnosis 2 weeks prior to admission and treated w/ vancomycin PO.  admitted 03/28/21.  BIBA to ED c/o abdominal pain.  a/w NVD.  located across her abdomen.  diarrhea profuse at times.  has been feeling week due to dehydration.  she reports falling on her R side and c/o rib pain.    in ED, patient given 1.8L of NS and Protonix iv.  patient continued to c/o abdominal pain and R sided rib pain.  was unable to take PO pain medications due to nausea and dry heaving.  will be admitted for IVFs, antiemetics and pain management.  DC home deferred since patient was actively vomiting.    ROS:      all other review of systems are negative unless indicated above.    PAST MEDICAL & SURGICAL HISTORY:  Staph infection  8/2017    Chronic abdominal pain    Osteoporosis    Adrenal insufficiency, primary    Knee effusion    Porphyria    S/P tonsillectomy    S/P cholecystectomy    H/O spinal fusion        Allergies    chlorhexidine containing compounds (Rash)  gabapentin (Unknown)    Intolerances        Home Medications:  calcium (as calcium citrate) 250 mg oral tablet: 1 tab(s) orally 2 times a day (28 Feb 2021 14:34)  cholecalciferol 1000 intl units oral tablet: 1 tab(s) orally 2 times a day (28 Feb 2021 14:34)  melatonin 3 mg oral tablet: 5 tab(s) orally once a day (at bedtime) (28 Feb 2021 14:34)  multivitamin: 1 tab(s) orally once a day (28 Feb 2021 14:34)      Social History:      FAMILY HISTORY:  Family history of porphyria (Grandparent)        Vital Signs Last 24 Hrs  T(C): 36.6 (28 Mar 2021 19:58), Max: 36.6 (28 Mar 2021 19:58)  T(F): 97.8 (28 Mar 2021 19:58), Max: 97.8 (28 Mar 2021 19:58)  HR: 84 (28 Mar 2021 19:58) (84 - 87)  BP: 151/73 (28 Mar 2021 19:58) (148/74 - 160/73)  BP(mean): --  RR: 16 (28 Mar 2021 19:58) (16 - 18)  SpO2: 100% (28 Mar 2021 19:58) (100% - 100%)    Constitutional: NAD.   HEENT: PERRL, EOMI, MMM.  Neck: Soft and supple, No carotid bruit, No JVD  Respiratory: Breath sounds are clear bilaterally, No wheezing, rales or rhonchi  Chest: TTP R chest wall.  Cardiovascular: S1 and S2, regular rate and rhythm, no murmur, rub or gallop.  Gastrointestinal: soft.  ND.  mild diffuse TTP.  no guarding or rebound.  Extremities: No peripheral edema  Vascular: 2+ peripheral pulses  Neurological: A/O x 3, no focal deficits  Musculoskeletal: 5/5 strength b/l upper and lower extremities  Skin:  no visible rashes.                         15.0   6.94  )-----------( 267      ( 28 Mar 2021 13:21 )             46.5       PT/INR - ( 28 Mar 2021 13:21 )   PT: 11.9 sec;   INR: 1.03 ratio         PTT - ( 28 Mar 2021 13:21 )  PTT:33.2 sec    03-28    140  |  109<H>  |  3<L>  ----------------------------<  128<H>  3.6   |  25  |  0.69    Ca    9.9      28 Mar 2021 13:21    TPro  8.2  /  Alb  3.7  /  TBili  0.4  /  DBili  x   /  AST  15  /  ALT  20  /  AlkPhos  97  03-28      LIVER FUNCTIONS - ( 28 Mar 2021 13:21 )  Alb: 3.7 g/dL / Pro: 8.2 gm/dL / ALK PHOS: 97 U/L / ALT: 20 U/L / AST: 15 U/L / GGT: x             CARDIAC MARKERS ( 28 Mar 2021 13:21 )  <0.015 ng/mL / x     / x     / x     / x        < from: CT Head No Cont (03.28.21 @ 15:08) >  IMPRESSION:    1)  the skull base and calvarium appear intact.  2)  no intracerebral hemorrhage, contusion, or extracerebral collections are identified.    < end of copied text >  < from: CT Cervical Spine No Cont (03.28.21 @ 15:09) >    IMPRESSION:    No acute fracture identified. Underlying degenerative changes inclusive of ankylosis C4-C7 and spondylosis at C3-4.    < end of copied text >  < from: CT Chest w/ IV Cont (03.28.21 @ 15:11) >  IMPRESSION:  No evidence of acute abnormality in the chest, abdomen, or pelvis.    < end of copied text >

## 2021-03-28 NOTE — ED ADULT NURSE NOTE - OBJECTIVE STATEMENT
pt presents to ed via ems from home for evaluation of nausea. vomiting, and diarrhea with dx of cdiff and completed oral abx treatment. pt reports weakness from diarrhea, vitals stable. pt moaning in pain. reporting nausea, denies vomiting ariana . ekg done placed on monitor

## 2021-03-28 NOTE — ED ADULT NURSE REASSESSMENT NOTE - NS ED NURSE REASSESS COMMENT FT1
Patient only had one BM while in the ED and it was on previous shift. Patient denies ABD pain at this time. Hospitalist at bedside.

## 2021-03-28 NOTE — H&P ADULT - ASSESSMENT
64F.  recent hx of CDI.  diagnosis 2 weeks prior to admission and treated w/ vancomycin PO.  admitted 03/28/21.  BIBA to ED c/o abdominal pain.  a/w NVD.      AB pain, NVD.  hx CDI.  - GI PCR.  - clear liquid diet.  - IVFs.  - analgesics.  - antiemetics.  - GI consult.  - ID consult.    weakness.  nontraumatic fall.  - imaging:  no acute findings.  - pain management.    DVT prophylaxis.  - SCD.    disposition.  - general medical heart.    communication.  - RN. 64F.  recent hx of CDI.  diagnosis 2 weeks prior to admission and treated w/ vancomycin PO.  admitted 03/28/21.  BIBA to ED c/o abdominal pain.  a/w NVD.      AB pain, NVD.  hx CDI.  - GI PCR.  - vancomycin PO.  - clear liquid diet.  - IVFs.  - analgesics.  - antiemetics.  - GI consult.  - ID consult.    weakness.  nontraumatic fall.  - imaging:  no acute findings.  - pain management.    DVT prophylaxis.  - SCD.    disposition.  - general medical heart.    communication.  - RN.

## 2021-03-28 NOTE — ED ADULT TRIAGE NOTE - CHIEF COMPLAINT QUOTE
pt presents to ED brought in by ambulance from home due to N/V/D pt was recently just treated for CDIFF 2 weeks ago with vanco PO pt was now started on second coarse of vanco PO

## 2021-03-28 NOTE — ED PROVIDER NOTE - CLINICAL SUMMARY MEDICAL DECISION MAKING FREE TEXT BOX
65 year old female with chronic diarrhea presents to the ED for weakness from the diarrhea and a fall today, exam with TTP of the right chest wall and old bruising to forehead. Will obtain labs, CT and reassess. 65 year old female with chronic diarrhea presents to the ED for weakness from the diarrhea and a fall today, exam with TTP of the right chest wall and old bruising to forehead. will r/o traumatic injury, Will obtain labs, CT and reassess.

## 2021-03-28 NOTE — ED PROVIDER NOTE - PROGRESS NOTE DETAILS
signed Kaylene Mendoza PA-C   65F c/o abd pain, N/V/D, and right sided rib pain s/p fall today. pt says she has been having diarrhea and lower abd pain for 14 hours as well as vomiting. she says she felt weak today and when she got out of bed to go to the bathroom a few hours ago she fell and hit the right side of her ribs on the bed. Denies LOC, says she hit her head but "not hard". Not on anticoagulation. Pt has hx of C diff and finished her abx last week. Pt alert, appears chronically ill and uncomfortable. Dry oral mucous membranes and diffuse abd TTP, worst in LLQ. +TPP left lateral rib cage near ribs 6 7 8, no crepitus, erythema, or ecchymosis. Plan labs, fluids, imaging and reassessment. Pt agrees with plan of  care. signed Kaylene Mendoza PA-C   Pt still in pain, dry heaving in room. Will admit for IV fluids and analgesia. UNable to DC home since actively vomiting. Pt agrees with admission and plan of care. Case discussed with and admission accepted by hospitalist Dr. Jacobo. signed Kaylene Mendoza PA-C   Pt vomiting, unable to take PO meds. requests IV protonix. signed Kaylene Mnedoza PA-C   No significant findings on labwork or imaging. Pt still c/o abd pain and right sided rib pain. UA pending. Will try PO analgesia and reassess.

## 2021-03-28 NOTE — ED PROVIDER NOTE - PHYSICAL EXAMINATION
Constitutional: NAD AAOx3  Eyes: PERRLA EOMI  ENT: No sharpe sign, no raccoon eyes, no hemotympanum, no csf rhinorrhea, no nasal septal hematoma  Mouth: MMM  Cardiac: regular rate   Resp: Lungs CTAB  GI: Abd s/nt/nd  Neuro: CN2-12 intact  Skin: No rashes, no bruising to chest, back, abdomen or extremities +old bruising to forehead  Msk: No midline spinal ttp, full ROM of neck, c-collar cleared clinically and with provocative testing, no ttp of facial bones, pelvis stable, full ROM of all extremities without any ttp of extremities + TTP of the right chest wall Constitutional: NAD AAOx3  Eyes: PERRLA EOMI  ENT: No sharpe sign, no raccoon eyes,   Mouth: MMM  Cardiac: regular rate   Resp: Lungs CTAB  GI: Abd s/nd + mild diffuse ttp no rebound or guarding  Neuro: CN2-12 intact  Skin: No rashes, no bruising to chest, back, abdomen or extremities +old bruising to forehead  Msk: No midline spinal ttp, no ttp of facial bones, pelvis stable, full ROM of all extremities without any ttp of extremities + TTP of the right chest wall

## 2021-03-28 NOTE — ED STATDOCS - PROGRESS NOTE DETAILS
signed Kaylene Mendoza PA-C   65F c/o abd pain, N/V/D, and right sided rib pain s/p fall today. pt says she has been having diarrhea and lower abd pain for 14 hours as well as vomiting. she says she felt weak today and when she got out of bed to go to the bathroom a few hours ago she fell and hit the right side of her ribs on the bed. Denies LOC, says she hit her head but "not hard". Not on anticoagulation. Pt has hx of C diff and finished her abx last week. Pt alert, appears chronically ill and uncomfortable. Dry oral mucous membranes and diffuse abd TTP, worst in LLQ. +TPP left lateral rib cage near ribs 6 7 8, no crepitus, erythema, or ecchymosis. Plan labs, fluids, imaging and reassessment. Pt agrees with plan of  care.

## 2021-03-28 NOTE — ED PROVIDER NOTE - OBJECTIVE STATEMENT
65/F with PMHx of recent C diff, adrenal insufficieny, hearing difficulty, chronic abdominal pain, osteoporosis, porphyria, s/p cholecystectomy BIBA to the ED c/ abd pain, N/V. Pt states that her pain is diffusely across her abd and feels similar in quality to her pain the last time she was in [facility]. Pt states her pain is associated with n/v/d. States she has had diarrhea for "14 hours straight". Pt also reports that she fell today due to feeling weak. Pt fell on her right side. No head injury or LOC. Pt also states that has felt "hot and cold" for the past two days. Has not taken any meds for her pain PTA.

## 2021-03-28 NOTE — ED PROVIDER NOTE - NS ED ROS FT
Constitutional: No fever or chills +weakness  Eyes: No visual changes  HEENT: No throat pain  CV: No chest pain  Resp: No SOB no cough  GI: +abd pain, nausea/vomiting, diarrhea  : No dysuria  MSK: No musculoskeletal pain  Skin: No rash  Neuro: No headache

## 2021-03-29 LAB
BASOPHILS # BLD AUTO: 0.04 K/UL — SIGNIFICANT CHANGE UP (ref 0–0.2)
BASOPHILS NFR BLD AUTO: 0.4 % — SIGNIFICANT CHANGE UP (ref 0–2)
CULTURE RESULTS: SIGNIFICANT CHANGE UP
EOSINOPHIL # BLD AUTO: 0.13 K/UL — SIGNIFICANT CHANGE UP (ref 0–0.5)
EOSINOPHIL NFR BLD AUTO: 1.5 % — SIGNIFICANT CHANGE UP (ref 0–6)
HCT VFR BLD CALC: 37.9 % — SIGNIFICANT CHANGE UP (ref 34.5–45)
HGB BLD-MCNC: 12.4 G/DL — SIGNIFICANT CHANGE UP (ref 11.5–15.5)
IMM GRANULOCYTES NFR BLD AUTO: 0.3 % — SIGNIFICANT CHANGE UP (ref 0–1.5)
LYMPHOCYTES # BLD AUTO: 2.4 K/UL — SIGNIFICANT CHANGE UP (ref 1–3.3)
LYMPHOCYTES # BLD AUTO: 26.9 % — SIGNIFICANT CHANGE UP (ref 13–44)
MCHC RBC-ENTMCNC: 27.3 PG — SIGNIFICANT CHANGE UP (ref 27–34)
MCHC RBC-ENTMCNC: 32.7 GM/DL — SIGNIFICANT CHANGE UP (ref 32–36)
MCV RBC AUTO: 83.5 FL — SIGNIFICANT CHANGE UP (ref 80–100)
MONOCYTES # BLD AUTO: 0.68 K/UL — SIGNIFICANT CHANGE UP (ref 0–0.9)
MONOCYTES NFR BLD AUTO: 7.6 % — SIGNIFICANT CHANGE UP (ref 2–14)
NEUTROPHILS # BLD AUTO: 5.63 K/UL — SIGNIFICANT CHANGE UP (ref 1.8–7.4)
NEUTROPHILS NFR BLD AUTO: 63.3 % — SIGNIFICANT CHANGE UP (ref 43–77)
PLATELET # BLD AUTO: 268 K/UL — SIGNIFICANT CHANGE UP (ref 150–400)
RBC # BLD: 4.54 M/UL — SIGNIFICANT CHANGE UP (ref 3.8–5.2)
RBC # FLD: 14.6 % — HIGH (ref 10.3–14.5)
SPECIMEN SOURCE: SIGNIFICANT CHANGE UP
WBC # BLD: 8.91 K/UL — SIGNIFICANT CHANGE UP (ref 3.8–10.5)
WBC # FLD AUTO: 8.91 K/UL — SIGNIFICANT CHANGE UP (ref 3.8–10.5)

## 2021-03-29 PROCEDURE — 99233 SBSQ HOSP IP/OBS HIGH 50: CPT

## 2021-03-29 PROCEDURE — 99222 1ST HOSP IP/OBS MODERATE 55: CPT

## 2021-03-29 RX ORDER — SODIUM CHLORIDE 9 MG/ML
1000 INJECTION INTRAMUSCULAR; INTRAVENOUS; SUBCUTANEOUS
Refills: 0 | Status: DISCONTINUED | OUTPATIENT
Start: 2021-03-29 | End: 2021-04-02

## 2021-03-29 RX ORDER — HYDROMORPHONE HYDROCHLORIDE 2 MG/ML
1 INJECTION INTRAMUSCULAR; INTRAVENOUS; SUBCUTANEOUS ONCE
Refills: 0 | Status: DISCONTINUED | OUTPATIENT
Start: 2021-03-29 | End: 2021-03-29

## 2021-03-29 RX ADMIN — HYDROMORPHONE HYDROCHLORIDE 0.5 MILLIGRAM(S): 2 INJECTION INTRAMUSCULAR; INTRAVENOUS; SUBCUTANEOUS at 07:58

## 2021-03-29 RX ADMIN — HYDROMORPHONE HYDROCHLORIDE 0.5 MILLIGRAM(S): 2 INJECTION INTRAMUSCULAR; INTRAVENOUS; SUBCUTANEOUS at 12:19

## 2021-03-29 RX ADMIN — MORPHINE SULFATE 4 MILLIGRAM(S): 50 CAPSULE, EXTENDED RELEASE ORAL at 07:59

## 2021-03-29 RX ADMIN — Medication 125 MILLIGRAM(S): at 09:04

## 2021-03-29 RX ADMIN — ONDANSETRON 4 MILLIGRAM(S): 8 TABLET, FILM COATED ORAL at 18:02

## 2021-03-29 RX ADMIN — HYDROMORPHONE HYDROCHLORIDE 0.5 MILLIGRAM(S): 2 INJECTION INTRAMUSCULAR; INTRAVENOUS; SUBCUTANEOUS at 10:49

## 2021-03-29 RX ADMIN — HYDROMORPHONE HYDROCHLORIDE 0.5 MILLIGRAM(S): 2 INJECTION INTRAMUSCULAR; INTRAVENOUS; SUBCUTANEOUS at 04:16

## 2021-03-29 RX ADMIN — Medication 125 MILLIGRAM(S): at 15:36

## 2021-03-29 RX ADMIN — HYDROMORPHONE HYDROCHLORIDE 0.5 MILLIGRAM(S): 2 INJECTION INTRAMUSCULAR; INTRAVENOUS; SUBCUTANEOUS at 22:30

## 2021-03-29 RX ADMIN — LIDOCAINE 1 PATCH: 4 CREAM TOPICAL at 08:00

## 2021-03-29 RX ADMIN — Medication 125 MILLIGRAM(S): at 21:42

## 2021-03-29 RX ADMIN — HYDROMORPHONE HYDROCHLORIDE 0.5 MILLIGRAM(S): 2 INJECTION INTRAMUSCULAR; INTRAVENOUS; SUBCUTANEOUS at 08:06

## 2021-03-29 RX ADMIN — HYDROMORPHONE HYDROCHLORIDE 1 MILLIGRAM(S): 2 INJECTION INTRAMUSCULAR; INTRAVENOUS; SUBCUTANEOUS at 16:00

## 2021-03-29 RX ADMIN — Medication 25 MILLIGRAM(S): at 21:42

## 2021-03-29 RX ADMIN — HYDROMORPHONE HYDROCHLORIDE 0.5 MILLIGRAM(S): 2 INJECTION INTRAMUSCULAR; INTRAVENOUS; SUBCUTANEOUS at 18:02

## 2021-03-29 RX ADMIN — HYDROMORPHONE HYDROCHLORIDE 0.5 MILLIGRAM(S): 2 INJECTION INTRAMUSCULAR; INTRAVENOUS; SUBCUTANEOUS at 21:42

## 2021-03-29 RX ADMIN — HYDROMORPHONE HYDROCHLORIDE 1 MILLIGRAM(S): 2 INJECTION INTRAMUSCULAR; INTRAVENOUS; SUBCUTANEOUS at 15:36

## 2021-03-29 RX ADMIN — HYDROMORPHONE HYDROCHLORIDE 0.5 MILLIGRAM(S): 2 INJECTION INTRAMUSCULAR; INTRAVENOUS; SUBCUTANEOUS at 19:00

## 2021-03-29 RX ADMIN — SODIUM CHLORIDE 100 MILLILITER(S): 9 INJECTION INTRAMUSCULAR; INTRAVENOUS; SUBCUTANEOUS at 18:03

## 2021-03-29 RX ADMIN — ONDANSETRON 4 MILLIGRAM(S): 8 TABLET, FILM COATED ORAL at 23:38

## 2021-03-29 RX ADMIN — AMLODIPINE BESYLATE 10 MILLIGRAM(S): 2.5 TABLET ORAL at 09:04

## 2021-03-29 RX ADMIN — SODIUM CHLORIDE 80 MILLILITER(S): 9 INJECTION INTRAMUSCULAR; INTRAVENOUS; SUBCUTANEOUS at 15:35

## 2021-03-29 RX ADMIN — ONDANSETRON 4 MILLIGRAM(S): 8 TABLET, FILM COATED ORAL at 01:21

## 2021-03-29 RX ADMIN — Medication 125 MILLIGRAM(S): at 01:21

## 2021-03-29 RX ADMIN — ONDANSETRON 4 MILLIGRAM(S): 8 TABLET, FILM COATED ORAL at 08:06

## 2021-03-29 RX ADMIN — LIDOCAINE 1 PATCH: 4 CREAM TOPICAL at 07:59

## 2021-03-29 RX ADMIN — HYDROMORPHONE HYDROCHLORIDE 0.5 MILLIGRAM(S): 2 INJECTION INTRAMUSCULAR; INTRAVENOUS; SUBCUTANEOUS at 14:35

## 2021-03-29 RX ADMIN — Medication 1 TABLET(S): at 21:42

## 2021-03-29 RX ADMIN — Medication 25 MILLIGRAM(S): at 09:04

## 2021-03-29 NOTE — ED ADULT NURSE REASSESSMENT NOTE - NS ED NURSE REASSESS COMMENT FT1
Pt moved back to peds, vitals taken and stable. Pt c/o increasing pain/nausea, medicated for both. Commode placed on bedside and IVF infusing, #22 placed to right wrist. Pt has a right chest wall port that she accesses at home independently for hydration at home. Isolation precautions in place.

## 2021-03-29 NOTE — CONSULT NOTE ADULT - SUBJECTIVE AND OBJECTIVE BOX
Patient is a 65y old  Female who presents with a chief complaint of abdominal pain.     65 year old woman with chronic abd pain followed by Dr. Curiel, recent c diff infection, admitted with n/v/d and abdominal pain.     Patient has worse abdominal pain than usual, across her abdomen. Can be 8/10. Of note, fell on her R side and has severe pain on R chest wall as well. Also with diarrhea,watery in nature, non bloody, can be over 10x day. Has had nausea as well as vomiting. No sick contacts.     PAST MEDICAL & SURGICAL HISTORY:  Staph infection  8/2017    Chronic abdominal pain    Osteoporosis    Adrenal insufficiency, primary    Knee effusion    Porphyria    S/P tonsillectomy    S/P cholecystectomy    H/O spinal fusion        MEDICATIONS  (STANDING):  amitriptyline 25 milliGRAM(s) Oral at bedtime  amLODIPine   Tablet 10 milliGRAM(s) Oral daily  calcium carbonate    500 mG (Tums) Chewable 1 Tablet(s) Chew two times a day  cholecalciferol 1000 Unit(s) Oral two times a day  ferrous    sulfate 325 milliGRAM(s) Oral daily  melatonin 3 milliGRAM(s) Oral at bedtime  metoprolol tartrate 25 milliGRAM(s) Oral two times a day  multivitamin 1 Tablet(s) Oral daily  sodium chloride 0.9%. 1000 milliLiter(s) (100 mL/Hr) IV Continuous <Continuous>  vancomycin    Solution 125 milliGRAM(s) Oral every 6 hours    MEDICATIONS  (PRN):  acetaminophen   Tablet .. 650 milliGRAM(s) Oral every 6 hours PRN Mild Pain (1 - 3)  HYDROmorphone  Injectable 0.5 milliGRAM(s) IV Push every 4 hours PRN Moderate Pain (4 - 6)  ondansetron Injectable 4 milliGRAM(s) IV Push every 6 hours PRN Nausea and/or Vomiting  oxyCODONE    IR 5 milliGRAM(s) Oral every 6 hours PRN Moderate Pain (4 - 6)      Allergies    chlorhexidine containing compounds (Rash)  gabapentin (Unknown)    Intolerances      SOCIAL HISTORY:    FAMILY HISTORY:  Family history of porphyria (Grandparent)      Vital Signs Last 24 Hrs  T(C): 36.3 (29 Mar 2021 20:55), Max: 36.8 (29 Mar 2021 07:04)  T(F): 97.4 (29 Mar 2021 20:55), Max: 98.2 (29 Mar 2021 07:04)  HR: 89 (29 Mar 2021 20:55) (74 - 89)  BP: 159/72 (29 Mar 2021 20:55) (143/88 - 164/74)  BP(mean): 34 (29 Mar 2021 20:55) (34 - 112)  RR: 19 (29 Mar 2021 20:55) (16 - 24)  SpO2: 100% (29 Mar 2021 20:55) (96% - 100%)    PHYSICAL EXAM:  Gen: comfortable appearing  Abd: soft    LABS:                        12.4   8.91  )-----------( 268      ( 29 Mar 2021 06:31 )             37.9     03-29    142  |  112<H>  |  4<L>  ----------------------------<  88  3.3<L>   |  24  |  0.51    Ca    8.9      29 Mar 2021 07:27    TPro  8.2  /  Alb  3.7  /  TBili  0.4  /  DBili  x   /  AST  15  /  ALT  20  /  AlkPhos  97  03-28    PT/INR - ( 28 Mar 2021 13:21 )   PT: 11.9 sec;   INR: 1.03 ratio         PTT - ( 28 Mar 2021 13:21 )  PTT:33.2 sec  LIVER FUNCTIONS - ( 28 Mar 2021 13:21 )  Alb: 3.7 g/dL / Pro: 8.2 gm/dL / ALK PHOS: 97 U/L / ALT: 20 U/L / AST: 15 U/L / GGT: x             RADIOLOGY & ADDITIONAL STUDIES: CT abdomen without acute findings
Patient is a 65y old  Female who presents with a chief complaint of   HPI:  CC:  Patient is a 65y old  Female who presents with a chief complaint of NVD.    HPI:  64F.  recent hx of CDI.  diagnosis 2 weeks prior to admission and treated w/ vancomycin PO  admitted 21 for evaluation of worsening abdominal pain, nausea, vomiting and diarrhea; denies fever chills or any other complaints. Patient is hard of hearing and history mostly from medical record.     PAST MEDICAL & SURGICAL HISTORY:  Staph infection  2017    Chronic abdominal pain    Osteoporosis    Adrenal insufficiency, primary    Knee effusion    Porphyria    S/P tonsillectomy    S/P cholecystectomy    H/O spinal fusion      PMH: as above  PSH: as above  Meds: per reconciliation sheet, noted below  MEDICATIONS  (STANDING):  amitriptyline 25 milliGRAM(s) Oral at bedtime  amLODIPine   Tablet 10 milliGRAM(s) Oral daily  calcium carbonate    500 mG (Tums) Chewable 1 Tablet(s) Chew two times a day  cholecalciferol 1000 Unit(s) Oral two times a day  ferrous    sulfate 325 milliGRAM(s) Oral daily  melatonin 3 milliGRAM(s) Oral at bedtime  metoprolol tartrate 25 milliGRAM(s) Oral two times a day  multivitamin 1 Tablet(s) Oral daily  vancomycin    Solution 125 milliGRAM(s) Oral every 6 hours    MEDICATIONS  (PRN):  acetaminophen   Tablet .. 650 milliGRAM(s) Oral every 6 hours PRN Mild Pain (1 - 3)  HYDROmorphone  Injectable 0.5 milliGRAM(s) IV Push every 4 hours PRN Moderate Pain (4 - 6)  ondansetron Injectable 4 milliGRAM(s) IV Push every 6 hours PRN Nausea and/or Vomiting  oxyCODONE    IR 5 milliGRAM(s) Oral every 6 hours PRN Moderate Pain (4 - 6)    Allergies    chlorhexidine containing compounds (Rash)  gabapentin (Unknown)    Intolerances      Social: no smoking, no alcohol, no illegal drugs; no recent travel, no exposure to TB  FAMILY HISTORY:  Family history of porphyria (Grandparent)       ROS unable to obtain secondary to patient medical condition     Vital Signs Last 24 Hrs  T(C): 36.6 (29 Mar 2021 11:41), Max: 36.8 (29 Mar 2021 07:04)  T(F): 97.8 (29 Mar 2021 11:41), Max: 98.2 (29 Mar 2021 07:04)  HR: 74 (29 Mar 2021 11:41) (74 - 87)  BP: 152/78 (29 Mar 2021 11:41) (143/88 - 160/73)  BP(mean): 112 (29 Mar 2021 07:04) (112 - 112)  RR: 22 (29 Mar 2021 11:41) (16 - 24)  SpO2: 96% (29 Mar 2021 11:41) (96% - 100%)  Daily Height in cm: 167.64 (28 Mar 2021 13:06)    Daily     PE:    Constitutional: frail looking  HEENT: NC/AT, EOMI, PERRLA, conjunctivae clear; ears and nose atraumatic; pharynx clear  Neck: supple; thyroid not palpable  Back: no tenderness  Respiratory: respiratory effort normal; clear to auscultation  Cardiovascular: S1S2 regular, no murmurs  Abdomen: soft, mildly difusely tender, not distended, positive BS; no liver or spleen organomegaly  Genitourinary: no suprapubic tenderness  Musculoskeletal: no muscle tenderness, no joint swelling or tenderness  Neurological/ Psychiatric: AxOx3, judgement and insight normal;  moving all extremities  Skin: no rashes; no palpable lesions    Labs: all available labs reviewed                        12.4   8.91  )-----------( 268      ( 29 Mar 2021 06:31 )             37.9     03-29    142  |  112<H>  |  4<L>  ----------------------------<  88  3.3<L>   |  24  |  0.51    Ca    8.9      29 Mar 2021 07:27    TPro  8.2  /  Alb  3.7  /  TBili  0.4  /  DBili  x   /  AST  15  /  ALT  20  /  AlkPhos  97  03-28     LIVER FUNCTIONS - ( 28 Mar 2021 13:21 )  Alb: 3.7 g/dL / Pro: 8.2 gm/dL / ALK PHOS: 97 U/L / ALT: 20 U/L / AST: 15 U/L / GGT: x           Urinalysis Basic - ( 28 Mar 2021 17:56 )    Color: Yellow / Appearance: Clear / S.010 / pH: x  Gluc: x / Ketone: Small  / Bili: Negative / Urobili: Negative mg/dL   Blood: x / Protein: Negative mg/dL / Nitrite: Negative   Leuk Esterase: Negative / RBC: x / WBC x   Sq Epi: x / Non Sq Epi: x / Bacteria: x      < from: CT Abdomen and Pelvis w/ IV Cont (21 @ 15:12) >    EXAM:  CT ABDOMEN AND PELVIS IC                          EXAM:  CT CHEST IC                            PROCEDURE DATE:  2021          INTERPRETATION:  CLINICAL INFORMATION: Abdominal pain. Diarrhea. Rib pain. Status post fall. Tenderness overthe left lateral sixth, seventh eighth ribs.    COMPARISON: 2021    CONTRAST/COMPLICATIONS:  IV Contrast: Omnipaque 350,   90 cc administered   10 cc discarded  Oral Contrast: NONE  Complications: None reported at time of study completion    PROCEDURE:  CT of the Chest, Abdomen and Pelvis was performed.  Imaging was performed through the chest in the arterial phase followed by imaging of the abdomen and pelvis in the portal venous phase.  Sagittal and coronal reformats were performed.    FINDINGS:  CHEST:  LUNGS AND LARGE AIRWAYS: Patent central airways. No pulmonary nodules.  PLEURA: No pleural effusion.  VESSELS: Within normal limits.  HEART: Heart size is normal. No pericardial effusion.  MEDIASTINUM AND BAKARI: No lymphadenopathy.  CHEST WALL AND LOWER NECK: Within normal limits.    ABDOMEN AND PELVIS:  LIVER: Within normal limits.  BILE DUCTS: Postcholecystectomy biliary ductal dilatation.  GALLBLADDER: Cholecystectomy.  SPLEEN: Within normal limits.  PANCREAS: Within normal limits.  ADRENALS: Within normal limits.  KIDNEYS/URETERS: Subcentimeter renal lesions too small to characterize.    BLADDER: Within normal limits.  REPRODUCTIVE ORGANS: Calcified uterine myoma. Bilateral adnexa within normal limits.    BOWEL: No bowel obstruction. Appendix is normal.  PERITONEUM: No ascites.  VESSELS: Within normal limits.  RETROPERITONEUM/LYMPH NODES: No lymphadenopathy.  ABDOMINAL WALL: Within normal limits.  BONES: Scoliosis and degenerative change. Old compression fracture of the T12 vertebral body. Old right-sided rib fractures. No evidence of displaced rib fracture.    IMPRESSION:  No evidence of acute abnormality in the chest, abdomen, or pelvis.      < end of copied text >      Radiology: all available radiological tests reviewed    Advanced directives addressed: full resuscitation

## 2021-03-29 NOTE — ED ADULT NURSE REASSESSMENT NOTE - NS ED NURSE REASSESS COMMENT FT1
pt still complaining of nasuea/abdominal pain, medicated for pain per MD orders, will administer nausea medication when able to do so based off previous dose administration.  pt otherwise no comaplints, vss, resting in bed, tolerated medications well.

## 2021-03-29 NOTE — ED ADULT NURSE REASSESSMENT NOTE - NS ED NURSE REASSESS COMMENT FT1
Pt received alert and oriented-VSS afebrile. Pt c/o nausea and increasing abdominal pain-medicated for both as ordered. Pt OOB ambulating with standby assist-pt had a BM this morning no sample collected-hat placed in toilet and pt told to notify staff when she goes. Isolation precautions in place, all needs addressed-call bell in reach.

## 2021-03-29 NOTE — CONSULT NOTE ADULT - ASSESSMENT
65 year old woman with chronic abd pain followed by Dr. Curiel, recent c diff infection, admitted with n/v/d and abdominal pain.     She has a acute on chronic abd pain. Chronic abd pain could be secondary to ?porphyria vs adrenal insufficiency?   In terms of n/v/d, I am not sure this is recurrent c diff. Her white count normal and no colitis on CT scan. Sounds more like gastroenteritis but since she has electrlyte abnormalities defer to ID eval and would continue vancomycin. Would obtain a c diff toxin as the PCR will still be positive, toxin can r/o active disease/active toxin. F/u stool infectious studies.   Would replete lytes, would give IVF, would treat nausea and vomiting aggressively with zofran, PPI. Ok for clears if can tolerate. 
64F.  recent hx of CDI.  diagnosis 2 weeks prior to admission and treated w/ vancomycin PO  admitted 03/28/21 for evaluation of worsening abdominal pain, nausea, vomiting and diarrhea; denies fever chills or any other complaints. Patient is hard of hearing and history mostly from medical record.     1. Patient admitted with recurrent CDiff colitis; severe dehydration  - follow up cultures   - serial cbc and monitor temperature   - reviewed prior medical records to evaluate for resistant or atypical pathogens   - iv hydration and supportive care   - contact isolation   - will continue po vancomycin as ordered  - advance diet slowly  2. other issues; per medicine

## 2021-03-29 NOTE — PROGRESS NOTE ADULT - ASSESSMENT
64F.  recent hx of CDI.  diagnosis 2 weeks prior to admission and treated w/ vancomycin PO.  admitted 03/28/21.  BIBA to ED c/o abdominal pain.  a/w NVD.      AB pain, NVD.  hx CDI.  - GI PCR.  - vancomycin PO.  - clear liquid diet.  - IVFs.  - analgesics.  - antiemetics.  - GI consult.  - ID consult.    weakness.  nontraumatic fall.  - imaging:  no acute findings.  - pain management.    DVT prophylaxis.  - SCD.    disposition.  - general medical heart.    communication.  - RN.

## 2021-03-29 NOTE — ED ADULT NURSE REASSESSMENT NOTE - NS ED NURSE REASSESS COMMENT FT1
Pt continues resting-vitals taken and stable. Pt does not want any food-but agreeable to ice chips. Pt c/o pain to abd "6/10", pain meds due in 30 minutes, will be medicated as ordered. All needs addressed and call bell in reach.

## 2021-03-30 LAB
ANION GAP SERPL CALC-SCNC: 9 MMOL/L — SIGNIFICANT CHANGE UP (ref 5–17)
BUN SERPL-MCNC: 6 MG/DL — LOW (ref 7–23)
CALCIUM SERPL-MCNC: 9.8 MG/DL — SIGNIFICANT CHANGE UP (ref 8.5–10.1)
CHLORIDE SERPL-SCNC: 107 MMOL/L — SIGNIFICANT CHANGE UP (ref 96–108)
CO2 SERPL-SCNC: 23 MMOL/L — SIGNIFICANT CHANGE UP (ref 22–31)
COVID-19 SPIKE DOMAIN AB INTERP: NEGATIVE — SIGNIFICANT CHANGE UP
COVID-19 SPIKE DOMAIN ANTIBODY RESULT: 0.4 U/ML — SIGNIFICANT CHANGE UP
CREAT SERPL-MCNC: 0.65 MG/DL — SIGNIFICANT CHANGE UP (ref 0.5–1.3)
GLUCOSE SERPL-MCNC: 82 MG/DL — SIGNIFICANT CHANGE UP (ref 70–99)
HCT VFR BLD CALC: 44.4 % — SIGNIFICANT CHANGE UP (ref 34.5–45)
HGB BLD-MCNC: 14.2 G/DL — SIGNIFICANT CHANGE UP (ref 11.5–15.5)
MCHC RBC-ENTMCNC: 26.5 PG — LOW (ref 27–34)
MCHC RBC-ENTMCNC: 32 GM/DL — SIGNIFICANT CHANGE UP (ref 32–36)
MCV RBC AUTO: 82.8 FL — SIGNIFICANT CHANGE UP (ref 80–100)
PLATELET # BLD AUTO: 346 K/UL — SIGNIFICANT CHANGE UP (ref 150–400)
POTASSIUM SERPL-MCNC: 3.4 MMOL/L — LOW (ref 3.5–5.3)
POTASSIUM SERPL-SCNC: 3.4 MMOL/L — LOW (ref 3.5–5.3)
RBC # BLD: 5.36 M/UL — HIGH (ref 3.8–5.2)
RBC # FLD: 14.3 % — SIGNIFICANT CHANGE UP (ref 10.3–14.5)
SARS-COV-2 IGG+IGM SERPL QL IA: 0.4 U/ML — SIGNIFICANT CHANGE UP
SARS-COV-2 IGG+IGM SERPL QL IA: NEGATIVE — SIGNIFICANT CHANGE UP
SODIUM SERPL-SCNC: 139 MMOL/L — SIGNIFICANT CHANGE UP (ref 135–145)
WBC # BLD: 9.71 K/UL — SIGNIFICANT CHANGE UP (ref 3.8–10.5)
WBC # FLD AUTO: 9.71 K/UL — SIGNIFICANT CHANGE UP (ref 3.8–10.5)

## 2021-03-30 PROCEDURE — 99233 SBSQ HOSP IP/OBS HIGH 50: CPT

## 2021-03-30 RX ORDER — POTASSIUM CHLORIDE 20 MEQ
40 PACKET (EA) ORAL ONCE
Refills: 0 | Status: COMPLETED | OUTPATIENT
Start: 2021-03-30 | End: 2021-03-30

## 2021-03-30 RX ADMIN — HYDROMORPHONE HYDROCHLORIDE 0.5 MILLIGRAM(S): 2 INJECTION INTRAMUSCULAR; INTRAVENOUS; SUBCUTANEOUS at 17:40

## 2021-03-30 RX ADMIN — HYDROMORPHONE HYDROCHLORIDE 0.5 MILLIGRAM(S): 2 INJECTION INTRAMUSCULAR; INTRAVENOUS; SUBCUTANEOUS at 17:00

## 2021-03-30 RX ADMIN — HYDROMORPHONE HYDROCHLORIDE 0.5 MILLIGRAM(S): 2 INJECTION INTRAMUSCULAR; INTRAVENOUS; SUBCUTANEOUS at 12:59

## 2021-03-30 RX ADMIN — ONDANSETRON 4 MILLIGRAM(S): 8 TABLET, FILM COATED ORAL at 21:57

## 2021-03-30 RX ADMIN — Medication 1 TABLET(S): at 08:47

## 2021-03-30 RX ADMIN — Medication 1000 UNIT(S): at 08:47

## 2021-03-30 RX ADMIN — SODIUM CHLORIDE 100 MILLILITER(S): 9 INJECTION INTRAMUSCULAR; INTRAVENOUS; SUBCUTANEOUS at 21:58

## 2021-03-30 RX ADMIN — HYDROMORPHONE HYDROCHLORIDE 0.5 MILLIGRAM(S): 2 INJECTION INTRAMUSCULAR; INTRAVENOUS; SUBCUTANEOUS at 11:58

## 2021-03-30 RX ADMIN — HYDROMORPHONE HYDROCHLORIDE 0.5 MILLIGRAM(S): 2 INJECTION INTRAMUSCULAR; INTRAVENOUS; SUBCUTANEOUS at 06:25

## 2021-03-30 RX ADMIN — SODIUM CHLORIDE 100 MILLILITER(S): 9 INJECTION INTRAMUSCULAR; INTRAVENOUS; SUBCUTANEOUS at 13:27

## 2021-03-30 RX ADMIN — Medication 125 MILLIGRAM(S): at 17:00

## 2021-03-30 RX ADMIN — Medication 25 MILLIGRAM(S): at 21:56

## 2021-03-30 RX ADMIN — Medication 40 MILLIEQUIVALENT(S): at 11:58

## 2021-03-30 RX ADMIN — HYDROMORPHONE HYDROCHLORIDE 0.5 MILLIGRAM(S): 2 INJECTION INTRAMUSCULAR; INTRAVENOUS; SUBCUTANEOUS at 06:55

## 2021-03-30 RX ADMIN — ONDANSETRON 4 MILLIGRAM(S): 8 TABLET, FILM COATED ORAL at 08:48

## 2021-03-30 RX ADMIN — HYDROMORPHONE HYDROCHLORIDE 0.5 MILLIGRAM(S): 2 INJECTION INTRAMUSCULAR; INTRAVENOUS; SUBCUTANEOUS at 23:29

## 2021-03-30 RX ADMIN — Medication 25 MILLIGRAM(S): at 08:47

## 2021-03-30 RX ADMIN — OXYCODONE HYDROCHLORIDE 5 MILLIGRAM(S): 5 TABLET ORAL at 21:57

## 2021-03-30 RX ADMIN — Medication 25 MILLIGRAM(S): at 21:57

## 2021-03-30 RX ADMIN — Medication 125 MILLIGRAM(S): at 21:56

## 2021-03-30 RX ADMIN — OXYCODONE HYDROCHLORIDE 5 MILLIGRAM(S): 5 TABLET ORAL at 22:27

## 2021-03-30 RX ADMIN — Medication 125 MILLIGRAM(S): at 11:58

## 2021-03-30 RX ADMIN — Medication 125 MILLIGRAM(S): at 06:25

## 2021-03-30 RX ADMIN — Medication 1 TABLET(S): at 21:56

## 2021-03-30 RX ADMIN — Medication 325 MILLIGRAM(S): at 08:47

## 2021-03-30 RX ADMIN — AMLODIPINE BESYLATE 10 MILLIGRAM(S): 2.5 TABLET ORAL at 08:47

## 2021-03-30 RX ADMIN — HYDROMORPHONE HYDROCHLORIDE 0.5 MILLIGRAM(S): 2 INJECTION INTRAMUSCULAR; INTRAVENOUS; SUBCUTANEOUS at 22:59

## 2021-03-30 RX ADMIN — Medication 1000 UNIT(S): at 21:57

## 2021-03-30 RX ADMIN — Medication 3 MILLIGRAM(S): at 21:57

## 2021-03-30 RX ADMIN — HYDROMORPHONE HYDROCHLORIDE 0.5 MILLIGRAM(S): 2 INJECTION INTRAMUSCULAR; INTRAVENOUS; SUBCUTANEOUS at 02:41

## 2021-03-30 NOTE — PROGRESS NOTE ADULT - ASSESSMENT
64F.  recent hx of CDI.  diagnosis 2 weeks prior to admission and treated w/ vancomycin PO.  admitted 03/28/21.  BIBA to ED c/o abdominal pain.  a/w NVD.      gastroenteritis.  hx CDI.  - GI PCR.  - C. dif toxin (PCR will still be positive).  - vancomycin PO.  - clear liquid diet.  - IVFs.  - supplement electrolytes.  - analgesics.  - antiemetics.  - GI.  - ID consult.    weakness.  nontraumatic fall.  - imaging:  no acute findings.  - pain management.    DVT prophylaxis.  - SCD.    disposition.  - general medical heart.    communication.  - RN.

## 2021-03-30 NOTE — PHYSICAL THERAPY INITIAL EVALUATION ADULT - GENERAL OBSERVATIONS, REHAB EVAL
pt received in bed on 2S, on contact isolation precautions. pt bracing abdomen with pillow due to c/o nausea and abd pain. +IV. pt willing to participate with PT. post session pt left in bed, call bell in reach, bed alarm on, RN informed of pts status/performance.

## 2021-03-30 NOTE — PROGRESS NOTE ADULT - ASSESSMENT
64F.  recent hx of CDI.  diagnosis 2 weeks prior to admission and treated w/ vancomycin PO  admitted 03/28/21 for evaluation of worsening abdominal pain, nausea, vomiting and diarrhea; denies fever chills or any other complaints. Patient is hard of hearing and history mostly from medical record.     1. Patient admitted with recurrent CDiff colitis; severe dehydration  - follow up cultures   - serial cbc and monitor temperature   - reviewed prior medical records to evaluate for resistant or atypical pathogens   - iv hydration and supportive care   - contact isolation   - will continue po vancomycin as ordered, day #2  - advance diet slowly  2. other issues; per medicine

## 2021-03-31 LAB
ANION GAP SERPL CALC-SCNC: 7 MMOL/L — SIGNIFICANT CHANGE UP (ref 5–17)
BUN SERPL-MCNC: 5 MG/DL — LOW (ref 7–23)
C DIFF BY PCR RESULT: SIGNIFICANT CHANGE UP
C DIFF TOX GENS STL QL NAA+PROBE: SIGNIFICANT CHANGE UP
CALCIUM SERPL-MCNC: 9.9 MG/DL — SIGNIFICANT CHANGE UP (ref 8.5–10.1)
CHLORIDE SERPL-SCNC: 108 MMOL/L — SIGNIFICANT CHANGE UP (ref 96–108)
CO2 SERPL-SCNC: 23 MMOL/L — SIGNIFICANT CHANGE UP (ref 22–31)
CREAT SERPL-MCNC: 0.56 MG/DL — SIGNIFICANT CHANGE UP (ref 0.5–1.3)
CULTURE RESULTS: SIGNIFICANT CHANGE UP
GLUCOSE SERPL-MCNC: 96 MG/DL — SIGNIFICANT CHANGE UP (ref 70–99)
HCT VFR BLD CALC: 44.7 % — SIGNIFICANT CHANGE UP (ref 34.5–45)
HGB BLD-MCNC: 14.7 G/DL — SIGNIFICANT CHANGE UP (ref 11.5–15.5)
MCHC RBC-ENTMCNC: 27 PG — SIGNIFICANT CHANGE UP (ref 27–34)
MCHC RBC-ENTMCNC: 32.9 GM/DL — SIGNIFICANT CHANGE UP (ref 32–36)
MCV RBC AUTO: 82 FL — SIGNIFICANT CHANGE UP (ref 80–100)
PLATELET # BLD AUTO: 319 K/UL — SIGNIFICANT CHANGE UP (ref 150–400)
POTASSIUM SERPL-MCNC: 3.4 MMOL/L — LOW (ref 3.5–5.3)
POTASSIUM SERPL-SCNC: 3.4 MMOL/L — LOW (ref 3.5–5.3)
RBC # BLD: 5.45 M/UL — HIGH (ref 3.8–5.2)
RBC # FLD: 14.2 % — SIGNIFICANT CHANGE UP (ref 10.3–14.5)
SODIUM SERPL-SCNC: 138 MMOL/L — SIGNIFICANT CHANGE UP (ref 135–145)
SPECIMEN SOURCE: SIGNIFICANT CHANGE UP
WBC # BLD: 8.85 K/UL — SIGNIFICANT CHANGE UP (ref 3.8–10.5)
WBC # FLD AUTO: 8.85 K/UL — SIGNIFICANT CHANGE UP (ref 3.8–10.5)

## 2021-03-31 PROCEDURE — 99232 SBSQ HOSP IP/OBS MODERATE 35: CPT

## 2021-03-31 RX ORDER — POTASSIUM CHLORIDE 20 MEQ
40 PACKET (EA) ORAL ONCE
Refills: 0 | Status: COMPLETED | OUTPATIENT
Start: 2021-03-31 | End: 2021-03-31

## 2021-03-31 RX ADMIN — HYDROMORPHONE HYDROCHLORIDE 0.5 MILLIGRAM(S): 2 INJECTION INTRAMUSCULAR; INTRAVENOUS; SUBCUTANEOUS at 09:36

## 2021-03-31 RX ADMIN — HYDROMORPHONE HYDROCHLORIDE 0.5 MILLIGRAM(S): 2 INJECTION INTRAMUSCULAR; INTRAVENOUS; SUBCUTANEOUS at 14:25

## 2021-03-31 RX ADMIN — HYDROMORPHONE HYDROCHLORIDE 0.5 MILLIGRAM(S): 2 INJECTION INTRAMUSCULAR; INTRAVENOUS; SUBCUTANEOUS at 03:25

## 2021-03-31 RX ADMIN — OXYCODONE HYDROCHLORIDE 5 MILLIGRAM(S): 5 TABLET ORAL at 17:24

## 2021-03-31 RX ADMIN — Medication 325 MILLIGRAM(S): at 09:38

## 2021-03-31 RX ADMIN — Medication 1 TABLET(S): at 23:06

## 2021-03-31 RX ADMIN — Medication 25 MILLIGRAM(S): at 09:37

## 2021-03-31 RX ADMIN — Medication 25 MILLIGRAM(S): at 21:30

## 2021-03-31 RX ADMIN — AMLODIPINE BESYLATE 10 MILLIGRAM(S): 2.5 TABLET ORAL at 09:37

## 2021-03-31 RX ADMIN — OXYCODONE HYDROCHLORIDE 5 MILLIGRAM(S): 5 TABLET ORAL at 06:42

## 2021-03-31 RX ADMIN — Medication 125 MILLIGRAM(S): at 09:37

## 2021-03-31 RX ADMIN — Medication 650 MILLIGRAM(S): at 06:42

## 2021-03-31 RX ADMIN — Medication 650 MILLIGRAM(S): at 21:32

## 2021-03-31 RX ADMIN — Medication 125 MILLIGRAM(S): at 03:26

## 2021-03-31 RX ADMIN — Medication 650 MILLIGRAM(S): at 22:00

## 2021-03-31 RX ADMIN — SODIUM CHLORIDE 100 MILLILITER(S): 9 INJECTION INTRAMUSCULAR; INTRAVENOUS; SUBCUTANEOUS at 09:57

## 2021-03-31 RX ADMIN — Medication 125 MILLIGRAM(S): at 23:06

## 2021-03-31 RX ADMIN — Medication 40 MILLIEQUIVALENT(S): at 14:25

## 2021-03-31 RX ADMIN — Medication 125 MILLIGRAM(S): at 14:26

## 2021-03-31 RX ADMIN — SODIUM CHLORIDE 100 MILLILITER(S): 9 INJECTION INTRAMUSCULAR; INTRAVENOUS; SUBCUTANEOUS at 18:49

## 2021-03-31 RX ADMIN — HYDROMORPHONE HYDROCHLORIDE 0.5 MILLIGRAM(S): 2 INJECTION INTRAMUSCULAR; INTRAVENOUS; SUBCUTANEOUS at 10:53

## 2021-03-31 RX ADMIN — Medication 1000 UNIT(S): at 21:30

## 2021-03-31 RX ADMIN — ONDANSETRON 4 MILLIGRAM(S): 8 TABLET, FILM COATED ORAL at 20:49

## 2021-03-31 RX ADMIN — Medication 1 TABLET(S): at 09:38

## 2021-03-31 RX ADMIN — OXYCODONE HYDROCHLORIDE 5 MILLIGRAM(S): 5 TABLET ORAL at 18:57

## 2021-03-31 RX ADMIN — OXYCODONE HYDROCHLORIDE 5 MILLIGRAM(S): 5 TABLET ORAL at 23:57

## 2021-03-31 RX ADMIN — ONDANSETRON 4 MILLIGRAM(S): 8 TABLET, FILM COATED ORAL at 09:40

## 2021-03-31 RX ADMIN — Medication 3 MILLIGRAM(S): at 21:30

## 2021-03-31 RX ADMIN — HYDROMORPHONE HYDROCHLORIDE 0.5 MILLIGRAM(S): 2 INJECTION INTRAMUSCULAR; INTRAVENOUS; SUBCUTANEOUS at 15:36

## 2021-03-31 RX ADMIN — Medication 1000 UNIT(S): at 09:37

## 2021-03-31 RX ADMIN — HYDROMORPHONE HYDROCHLORIDE 0.5 MILLIGRAM(S): 2 INJECTION INTRAMUSCULAR; INTRAVENOUS; SUBCUTANEOUS at 03:55

## 2021-03-31 NOTE — PROGRESS NOTE ADULT - ASSESSMENT
64F.  recent hx of CDI.  diagnosis 2 weeks prior to admission and treated w/ vancomycin PO.  admitted 03/28/21.  BIBA to ED c/o abdominal pain.  a/w NVD.      #gastroenteritis.  hx CDI.  - GI PCR. negative   - C. dif toxin (PCR will still be positive). progress  - vancomycin PO.  - clear liquid diet.  - IVFs.  - supplement electrolytes.  - analgesics.  - antiemetics.  - GI.  - ID consulted    # Left ear discomfort  normal physical exam     #weakness.  nontraumatic fall.  - imaging:  no acute findings.  - pain management.    DVT prophylaxis.  - SCD.    disposition.  - general medical heart.  Likely dc home when medically stable  64F.  recent hx of CDI.  diagnosis 2 weeks prior to admission and treated w/ vancomycin PO.  admitted 03/28/21.  BIBA to ED c/o abdominal pain.  a/w NVD.      #gastroenteritis.  hx CDI.  - GI PCR. negative   - C. dif toxin (PCR will still be positive). progress  - vancomycin PO.  - clear liquid diet.  - IVFs.  - supplement electrolytes.  - analgesics.  - antiemetics.  - GI.  - ID consulted    # Hypokalemia   replete and recheck in the am       # Left ear discomfort  normal physical exam     #weakness.  nontraumatic fall.  - imaging:  no acute findings.  - pain management.    #DVT prophylaxis.  - SCD.    disposition.  - general medical heart.  Likely dc home when medically stable

## 2021-03-31 NOTE — PROGRESS NOTE ADULT - ASSESSMENT
64F.  recent hx of CDI.  diagnosis 2 weeks prior to admission and treated w/ vancomycin PO  admitted 03/28/21 for evaluation of worsening abdominal pain, nausea, vomiting and diarrhea; denies fever chills or any other complaints. Patient is hard of hearing and history mostly from medical record.     1. Patient admitted with recurrent CDiff colitis; severe dehydration  - follow up cultures   - serial cbc and monitor temperature   - reviewed prior medical records to evaluate for resistant or atypical pathogens   - iv hydration and supportive care   - contact isolation   - will continue po vancomycin as ordered, day #3  - advance diet slowly  2. other issues; per medicine

## 2021-04-01 PROCEDURE — 99232 SBSQ HOSP IP/OBS MODERATE 35: CPT

## 2021-04-01 RX ORDER — VANCOMYCIN HCL 1 G
500 VIAL (EA) INTRAVENOUS EVERY 6 HOURS
Refills: 0 | Status: DISCONTINUED | OUTPATIENT
Start: 2021-04-01 | End: 2021-04-05

## 2021-04-01 RX ADMIN — ONDANSETRON 4 MILLIGRAM(S): 8 TABLET, FILM COATED ORAL at 23:58

## 2021-04-01 RX ADMIN — OXYCODONE HYDROCHLORIDE 5 MILLIGRAM(S): 5 TABLET ORAL at 06:45

## 2021-04-01 RX ADMIN — ONDANSETRON 4 MILLIGRAM(S): 8 TABLET, FILM COATED ORAL at 11:03

## 2021-04-01 RX ADMIN — Medication 1000 UNIT(S): at 22:07

## 2021-04-01 RX ADMIN — Medication 500 MILLIGRAM(S): at 23:58

## 2021-04-01 RX ADMIN — Medication 25 MILLIGRAM(S): at 22:08

## 2021-04-01 RX ADMIN — SODIUM CHLORIDE 100 MILLILITER(S): 9 INJECTION INTRAMUSCULAR; INTRAVENOUS; SUBCUTANEOUS at 23:58

## 2021-04-01 RX ADMIN — Medication 500 MILLIGRAM(S): at 16:57

## 2021-04-01 RX ADMIN — Medication 25 MILLIGRAM(S): at 22:07

## 2021-04-01 RX ADMIN — Medication 1 TABLET(S): at 22:07

## 2021-04-01 RX ADMIN — OXYCODONE HYDROCHLORIDE 5 MILLIGRAM(S): 5 TABLET ORAL at 18:39

## 2021-04-01 RX ADMIN — OXYCODONE HYDROCHLORIDE 5 MILLIGRAM(S): 5 TABLET ORAL at 12:33

## 2021-04-01 RX ADMIN — ONDANSETRON 4 MILLIGRAM(S): 8 TABLET, FILM COATED ORAL at 05:06

## 2021-04-01 RX ADMIN — Medication 3 MILLIGRAM(S): at 23:58

## 2021-04-01 RX ADMIN — SODIUM CHLORIDE 100 MILLILITER(S): 9 INJECTION INTRAMUSCULAR; INTRAVENOUS; SUBCUTANEOUS at 15:40

## 2021-04-01 RX ADMIN — ONDANSETRON 4 MILLIGRAM(S): 8 TABLET, FILM COATED ORAL at 16:57

## 2021-04-01 RX ADMIN — Medication 125 MILLIGRAM(S): at 06:10

## 2021-04-01 RX ADMIN — OXYCODONE HYDROCHLORIDE 5 MILLIGRAM(S): 5 TABLET ORAL at 00:21

## 2021-04-01 RX ADMIN — SODIUM CHLORIDE 100 MILLILITER(S): 9 INJECTION INTRAMUSCULAR; INTRAVENOUS; SUBCUTANEOUS at 05:06

## 2021-04-01 NOTE — PROGRESS NOTE ADULT - ASSESSMENT
64F.  recent hx of CDI.  diagnosis 2 weeks prior to admission and treated w/ vancomycin PO  admitted 03/28/21 for evaluation of worsening abdominal pain, nausea, vomiting and diarrhea; denies fever chills or any other complaints. Patient is hard of hearing and history mostly from medical record.     1. Patient admitted with recurrent CDiff colitis; severe dehydration  - follow up cultures   - serial cbc and monitor temperature   - reviewed prior medical records to evaluate for resistant or atypical pathogens   - iv hydration and supportive care   - contact isolation   - will continue po vancomycin  day #4, but increase dose to 500 mg q 6 hours  - can see false negative Cdiff toxin assay, especially if urine is in the sample  - advance diet slowly  2. other issues; per medicine

## 2021-04-02 LAB
ALBUMIN SERPL ELPH-MCNC: 3.7 G/DL — SIGNIFICANT CHANGE UP (ref 3.3–5)
ALP SERPL-CCNC: 93 U/L — SIGNIFICANT CHANGE UP (ref 40–120)
ALT FLD-CCNC: 20 U/L — SIGNIFICANT CHANGE UP (ref 12–78)
ANION GAP SERPL CALC-SCNC: 6 MMOL/L — SIGNIFICANT CHANGE UP (ref 5–17)
AST SERPL-CCNC: 12 U/L — LOW (ref 15–37)
BILIRUB SERPL-MCNC: 0.6 MG/DL — SIGNIFICANT CHANGE UP (ref 0.2–1.2)
BUN SERPL-MCNC: 4 MG/DL — LOW (ref 7–23)
CALCIUM SERPL-MCNC: 9.4 MG/DL — SIGNIFICANT CHANGE UP (ref 8.5–10.1)
CHLORIDE SERPL-SCNC: 108 MMOL/L — SIGNIFICANT CHANGE UP (ref 96–108)
CO2 SERPL-SCNC: 24 MMOL/L — SIGNIFICANT CHANGE UP (ref 22–31)
CREAT SERPL-MCNC: 0.72 MG/DL — SIGNIFICANT CHANGE UP (ref 0.5–1.3)
CULTURE RESULTS: SIGNIFICANT CHANGE UP
CULTURE RESULTS: SIGNIFICANT CHANGE UP
GLUCOSE SERPL-MCNC: 177 MG/DL — HIGH (ref 70–99)
HCT VFR BLD CALC: 45.2 % — HIGH (ref 34.5–45)
HGB BLD-MCNC: 14.9 G/DL — SIGNIFICANT CHANGE UP (ref 11.5–15.5)
MCHC RBC-ENTMCNC: 26.9 PG — LOW (ref 27–34)
MCHC RBC-ENTMCNC: 33 GM/DL — SIGNIFICANT CHANGE UP (ref 32–36)
MCV RBC AUTO: 81.6 FL — SIGNIFICANT CHANGE UP (ref 80–100)
PLATELET # BLD AUTO: 378 K/UL — SIGNIFICANT CHANGE UP (ref 150–400)
POTASSIUM SERPL-MCNC: 3.5 MMOL/L — SIGNIFICANT CHANGE UP (ref 3.5–5.3)
POTASSIUM SERPL-SCNC: 3.5 MMOL/L — SIGNIFICANT CHANGE UP (ref 3.5–5.3)
PROT SERPL-MCNC: 7.9 GM/DL — SIGNIFICANT CHANGE UP (ref 6–8.3)
RBC # BLD: 5.54 M/UL — HIGH (ref 3.8–5.2)
RBC # FLD: 14.2 % — SIGNIFICANT CHANGE UP (ref 10.3–14.5)
SODIUM SERPL-SCNC: 138 MMOL/L — SIGNIFICANT CHANGE UP (ref 135–145)
SPECIMEN SOURCE: SIGNIFICANT CHANGE UP
SPECIMEN SOURCE: SIGNIFICANT CHANGE UP
WBC # BLD: 14.97 K/UL — HIGH (ref 3.8–10.5)
WBC # FLD AUTO: 14.97 K/UL — HIGH (ref 3.8–10.5)

## 2021-04-02 PROCEDURE — 99232 SBSQ HOSP IP/OBS MODERATE 35: CPT

## 2021-04-02 RX ORDER — POTASSIUM CHLORIDE 20 MEQ
20 PACKET (EA) ORAL ONCE
Refills: 0 | Status: COMPLETED | OUTPATIENT
Start: 2021-04-02 | End: 2021-04-02

## 2021-04-02 RX ADMIN — ONDANSETRON 4 MILLIGRAM(S): 8 TABLET, FILM COATED ORAL at 19:05

## 2021-04-02 RX ADMIN — Medication 500 MILLIGRAM(S): at 19:05

## 2021-04-02 RX ADMIN — AMLODIPINE BESYLATE 10 MILLIGRAM(S): 2.5 TABLET ORAL at 10:00

## 2021-04-02 RX ADMIN — Medication 1000 UNIT(S): at 21:14

## 2021-04-02 RX ADMIN — SODIUM CHLORIDE 100 MILLILITER(S): 9 INJECTION INTRAMUSCULAR; INTRAVENOUS; SUBCUTANEOUS at 09:47

## 2021-04-02 RX ADMIN — OXYCODONE HYDROCHLORIDE 5 MILLIGRAM(S): 5 TABLET ORAL at 08:00

## 2021-04-02 RX ADMIN — ONDANSETRON 4 MILLIGRAM(S): 8 TABLET, FILM COATED ORAL at 06:01

## 2021-04-02 RX ADMIN — OXYCODONE HYDROCHLORIDE 5 MILLIGRAM(S): 5 TABLET ORAL at 02:10

## 2021-04-02 RX ADMIN — Medication 1 TABLET(S): at 09:59

## 2021-04-02 RX ADMIN — OXYCODONE HYDROCHLORIDE 5 MILLIGRAM(S): 5 TABLET ORAL at 21:54

## 2021-04-02 RX ADMIN — Medication 25 MILLIGRAM(S): at 21:14

## 2021-04-02 RX ADMIN — OXYCODONE HYDROCHLORIDE 5 MILLIGRAM(S): 5 TABLET ORAL at 01:31

## 2021-04-02 RX ADMIN — OXYCODONE HYDROCHLORIDE 5 MILLIGRAM(S): 5 TABLET ORAL at 22:54

## 2021-04-02 RX ADMIN — Medication 500 MILLIGRAM(S): at 06:01

## 2021-04-02 RX ADMIN — Medication 1000 UNIT(S): at 09:59

## 2021-04-02 RX ADMIN — OXYCODONE HYDROCHLORIDE 5 MILLIGRAM(S): 5 TABLET ORAL at 15:17

## 2021-04-02 RX ADMIN — Medication 500 MILLIGRAM(S): at 12:39

## 2021-04-02 RX ADMIN — Medication 3 MILLIGRAM(S): at 21:14

## 2021-04-02 RX ADMIN — Medication 1 TABLET(S): at 21:14

## 2021-04-02 RX ADMIN — Medication 25 MILLIGRAM(S): at 10:00

## 2021-04-02 RX ADMIN — Medication 325 MILLIGRAM(S): at 10:00

## 2021-04-02 RX ADMIN — ONDANSETRON 4 MILLIGRAM(S): 8 TABLET, FILM COATED ORAL at 12:40

## 2021-04-02 NOTE — PROGRESS NOTE ADULT - ASSESSMENT
64F.  recent hx of CDI.  diagnosis 2 weeks prior to admission and treated w/ vancomycin PO  admitted 03/28/21 for evaluation of worsening abdominal pain, nausea, vomiting and diarrhea; denies fever chills or any other complaints. Patient is hard of hearing and history mostly from medical record.     1. Patient admitted with recurrent CDiff colitis; severe dehydration  - follow up cultures   - serial cbc and monitor temperature   - reviewed prior medical records to evaluate for resistant or atypical pathogens   - iv hydration and supportive care   - contact isolation   - will continue po vancomycin  day #5, but increase dose to 500 mg q 6 hours  - can see false negative Cdiff toxin assay, especially if urine is in the sample  - advance diet slowly  - most likely would benefit from staying in hospital another 48 hours  2. other issues; per medicine

## 2021-04-03 LAB
ANION GAP SERPL CALC-SCNC: 5 MMOL/L — SIGNIFICANT CHANGE UP (ref 5–17)
BUN SERPL-MCNC: 5 MG/DL — LOW (ref 7–23)
CALCIUM SERPL-MCNC: 10.1 MG/DL — SIGNIFICANT CHANGE UP (ref 8.5–10.1)
CHLORIDE SERPL-SCNC: 108 MMOL/L — SIGNIFICANT CHANGE UP (ref 96–108)
CO2 SERPL-SCNC: 23 MMOL/L — SIGNIFICANT CHANGE UP (ref 22–31)
CREAT SERPL-MCNC: 0.75 MG/DL — SIGNIFICANT CHANGE UP (ref 0.5–1.3)
GLUCOSE SERPL-MCNC: 113 MG/DL — HIGH (ref 70–99)
HCT VFR BLD CALC: 49.6 % — HIGH (ref 34.5–45)
HGB BLD-MCNC: 16 G/DL — HIGH (ref 11.5–15.5)
MCHC RBC-ENTMCNC: 27 PG — SIGNIFICANT CHANGE UP (ref 27–34)
MCHC RBC-ENTMCNC: 32.3 GM/DL — SIGNIFICANT CHANGE UP (ref 32–36)
MCV RBC AUTO: 83.8 FL — SIGNIFICANT CHANGE UP (ref 80–100)
PLATELET # BLD AUTO: 323 K/UL — SIGNIFICANT CHANGE UP (ref 150–400)
POTASSIUM SERPL-MCNC: 4.2 MMOL/L — SIGNIFICANT CHANGE UP (ref 3.5–5.3)
POTASSIUM SERPL-SCNC: 4.2 MMOL/L — SIGNIFICANT CHANGE UP (ref 3.5–5.3)
RBC # BLD: 5.92 M/UL — HIGH (ref 3.8–5.2)
RBC # FLD: 14.4 % — SIGNIFICANT CHANGE UP (ref 10.3–14.5)
SODIUM SERPL-SCNC: 136 MMOL/L — SIGNIFICANT CHANGE UP (ref 135–145)
WBC # BLD: 8.37 K/UL — SIGNIFICANT CHANGE UP (ref 3.8–10.5)
WBC # FLD AUTO: 8.37 K/UL — SIGNIFICANT CHANGE UP (ref 3.8–10.5)

## 2021-04-03 PROCEDURE — 99232 SBSQ HOSP IP/OBS MODERATE 35: CPT

## 2021-04-03 RX ADMIN — ONDANSETRON 4 MILLIGRAM(S): 8 TABLET, FILM COATED ORAL at 13:24

## 2021-04-03 RX ADMIN — Medication 25 MILLIGRAM(S): at 10:30

## 2021-04-03 RX ADMIN — Medication 25 MILLIGRAM(S): at 22:19

## 2021-04-03 RX ADMIN — Medication 500 MILLIGRAM(S): at 19:02

## 2021-04-03 RX ADMIN — Medication 1 TABLET(S): at 10:30

## 2021-04-03 RX ADMIN — Medication 500 MILLIGRAM(S): at 13:24

## 2021-04-03 RX ADMIN — OXYCODONE HYDROCHLORIDE 5 MILLIGRAM(S): 5 TABLET ORAL at 22:41

## 2021-04-03 RX ADMIN — Medication 1000 UNIT(S): at 22:19

## 2021-04-03 RX ADMIN — Medication 1 TABLET(S): at 10:31

## 2021-04-03 RX ADMIN — ONDANSETRON 4 MILLIGRAM(S): 8 TABLET, FILM COATED ORAL at 01:06

## 2021-04-03 RX ADMIN — OXYCODONE HYDROCHLORIDE 5 MILLIGRAM(S): 5 TABLET ORAL at 23:30

## 2021-04-03 RX ADMIN — Medication 500 MILLIGRAM(S): at 01:03

## 2021-04-03 RX ADMIN — Medication 1 TABLET(S): at 22:19

## 2021-04-03 RX ADMIN — AMLODIPINE BESYLATE 10 MILLIGRAM(S): 2.5 TABLET ORAL at 10:30

## 2021-04-03 RX ADMIN — OXYCODONE HYDROCHLORIDE 5 MILLIGRAM(S): 5 TABLET ORAL at 10:29

## 2021-04-03 RX ADMIN — Medication 1000 UNIT(S): at 10:30

## 2021-04-03 RX ADMIN — ONDANSETRON 4 MILLIGRAM(S): 8 TABLET, FILM COATED ORAL at 06:55

## 2021-04-03 RX ADMIN — OXYCODONE HYDROCHLORIDE 5 MILLIGRAM(S): 5 TABLET ORAL at 05:04

## 2021-04-03 RX ADMIN — Medication 500 MILLIGRAM(S): at 06:48

## 2021-04-03 RX ADMIN — OXYCODONE HYDROCHLORIDE 5 MILLIGRAM(S): 5 TABLET ORAL at 16:29

## 2021-04-03 RX ADMIN — ONDANSETRON 4 MILLIGRAM(S): 8 TABLET, FILM COATED ORAL at 19:03

## 2021-04-03 RX ADMIN — Medication 325 MILLIGRAM(S): at 10:30

## 2021-04-03 RX ADMIN — OXYCODONE HYDROCHLORIDE 5 MILLIGRAM(S): 5 TABLET ORAL at 04:04

## 2021-04-04 PROCEDURE — 99232 SBSQ HOSP IP/OBS MODERATE 35: CPT

## 2021-04-04 RX ADMIN — OXYCODONE HYDROCHLORIDE 5 MILLIGRAM(S): 5 TABLET ORAL at 17:55

## 2021-04-04 RX ADMIN — ONDANSETRON 4 MILLIGRAM(S): 8 TABLET, FILM COATED ORAL at 11:49

## 2021-04-04 RX ADMIN — Medication 325 MILLIGRAM(S): at 10:04

## 2021-04-04 RX ADMIN — OXYCODONE HYDROCHLORIDE 5 MILLIGRAM(S): 5 TABLET ORAL at 11:48

## 2021-04-04 RX ADMIN — Medication 1 TABLET(S): at 10:04

## 2021-04-04 RX ADMIN — Medication 500 MILLIGRAM(S): at 06:36

## 2021-04-04 RX ADMIN — Medication 25 MILLIGRAM(S): at 22:09

## 2021-04-04 RX ADMIN — Medication 1000 UNIT(S): at 10:04

## 2021-04-04 RX ADMIN — Medication 500 MILLIGRAM(S): at 17:56

## 2021-04-04 RX ADMIN — Medication 500 MILLIGRAM(S): at 11:44

## 2021-04-04 RX ADMIN — ONDANSETRON 4 MILLIGRAM(S): 8 TABLET, FILM COATED ORAL at 00:35

## 2021-04-04 RX ADMIN — ONDANSETRON 4 MILLIGRAM(S): 8 TABLET, FILM COATED ORAL at 17:56

## 2021-04-04 RX ADMIN — Medication 25 MILLIGRAM(S): at 10:04

## 2021-04-04 RX ADMIN — AMLODIPINE BESYLATE 10 MILLIGRAM(S): 2.5 TABLET ORAL at 10:04

## 2021-04-04 RX ADMIN — Medication 1 TABLET(S): at 22:09

## 2021-04-04 RX ADMIN — ONDANSETRON 4 MILLIGRAM(S): 8 TABLET, FILM COATED ORAL at 06:36

## 2021-04-04 RX ADMIN — OXYCODONE HYDROCHLORIDE 5 MILLIGRAM(S): 5 TABLET ORAL at 06:37

## 2021-04-04 RX ADMIN — Medication 500 MILLIGRAM(S): at 00:35

## 2021-04-04 RX ADMIN — Medication 1000 UNIT(S): at 22:09

## 2021-04-04 RX ADMIN — OXYCODONE HYDROCHLORIDE 5 MILLIGRAM(S): 5 TABLET ORAL at 05:59

## 2021-04-04 NOTE — PROGRESS NOTE ADULT - SUBJECTIVE AND OBJECTIVE BOX
Patient is a 65y old  Female h/o recent hx of CDAD,  diagnosis 2 weeks prior to admission and treated w/ vancomycin PO,  who presents with a chief complaint of nausea vomiting diarrhea and electrolyte abnormalities   -found to have likely Cdiff colitis    4.1: c/o diarrhea   4.2: less diarrhea, feels better   4.3: only 3 BMs overnight, no other distress   4.4: on full liquids, c/o some abd pain, less diarrhea, went only once this am            REVIEW OF SYSTEMS:    CONSTITUTIONAL: No weakness, No fevers or chills  ENT: No ear ache, No sorethroat  NECK: No pain, No stiffness  RESPIRATORY: No cough, No wheezing, No hemoptysis; No dyspnea  CARDIOVASCULAR: No chest pain, No palpitations  GASTROINTESTINAL: No abd pain, No nausea, No vomiting, No hematemesis, No diarrhea or constipation. No melena, No hematochezia.  GENITOURINARY: No dysuria, No  hematuria  NEUROLOGICAL: No diplopia, No paresthesia, No motor dysfunction  MUSCULOSKELETAL: No arthralgia, No myalgia  SKIN: No rashes, or lesions   PSYCH: no anxiety, no suicidal ideation    All other review of systems is negative unless indicated above    Vital Signs Last 24 Hrs  T(C): 36.3 (04 Apr 2021 07:51), Max: 36.3 (03 Apr 2021 16:23)  T(F): 97.3 (04 Apr 2021 07:51), Max: 97.3 (03 Apr 2021 16:23)  HR: 77 (04 Apr 2021 07:51) (77 - 102)  BP: 131/74 (04 Apr 2021 07:51) (122/72 - 136/78)  BP(mean): --  RR: 19 (04 Apr 2021 07:51) (18 - 19)  SpO2: 99% (04 Apr 2021 07:51) (99% - 100%)    PHYSICAL EXAM:    GENERAL: NAD, Well nourished  HEENT:  NC/AT, EOMI, PERRLA, No scleral icterus, Moist mucous membranes  NECK: Supple, No JVD  CNS:  Alert & Oriented X3, Motor Strength 5/5 B/L upper and lower extremities; DTRs 2+ intact   LUNG: Normal Breath sounds, Clear to auscultation bilaterally, No rales, No rhonchi, No wheezing  HEART: RRR; No murmurs, No rubs  ABDOMEN: +BS, ST/ND, mild diffuse ttp  GENITOURINARY: Voiding, Bladder not distended  EXTREMITIES:  2+ Peripheral Pulses, No clubbing, No cyanosis, No tibial edema  MUSCULOSKELTAL: Joints normal ROM, No TTP, No effusion  VAGINAL: deferred  SKIN: no rashes  RECTAL: deferred, not indicated  BREAST: deferred    Labs:                        16.0   8.37  )-----------( 323      ( 03 Apr 2021 17:03 )             49.6     04-03    136  |  108  |  5<L>  ----------------------------<  113<H>  4.2   |  23  |  0.75    Ca    10.1      03 Apr 2021 17:03             Cultures:       MEDICATIONS  (STANDING):  amitriptyline 25 milliGRAM(s) Oral at bedtime  amLODIPine   Tablet 10 milliGRAM(s) Oral daily  calcium carbonate    500 mG (Tums) Chewable 1 Tablet(s) Chew two times a day  cholecalciferol 1000 Unit(s) Oral two times a day  ferrous    sulfate 325 milliGRAM(s) Oral daily  melatonin 3 milliGRAM(s) Oral at bedtime  metoprolol tartrate 25 milliGRAM(s) Oral two times a day  multivitamin 1 Tablet(s) Oral daily  sodium chloride 0.9%. 1000 milliLiter(s) (100 mL/Hr) IV Continuous <Continuous>  vancomycin    Solution 500 milliGRAM(s) Oral every 6 hours    MEDICATIONS  (PRN):  acetaminophen   Tablet .. 650 milliGRAM(s) Oral every 6 hours PRN Mild Pain (1 - 3)  HYDROmorphone  Injectable 0.5 milliGRAM(s) IV Push every 4 hours PRN Moderate Pain (4 - 6)  ondansetron Injectable 4 milliGRAM(s) IV Push every 6 hours PRN Nausea and/or Vomiting  oxyCODONE    IR 5 milliGRAM(s) Oral every 6 hours PRN Moderate Pain (4 - 6)      all labs reviewed  all imaging reviewed    a/p:    1. Cdiff colitis: improving on oral Vanco   - GI PCR. negative,   - C. diff PCR negative but suspect false negative results  - vancomycin PO increased to 500mg QID per ID  - advance to regular diet today  if tolerated diet, d/c tomorrow on oral vanco     # Hypokalemia   replete and recheck in the am     #Htn: stable      
CC:  Patient is a 65y old  Female h/o recent hx of CDAD,  diagnosis 2 weeks prior to admission and treated w/ vancomycin PO,  who presents with a chief complaint of nausea vomiting diarrhea and electrolyte abnormalities (29 Mar 2021 23:34)    4.1: c/o diarrhea             REVIEW OF SYSTEMS:    CONSTITUTIONAL: No weakness, No fevers or chills  ENT: No ear ache, No sorethroat  NECK: No pain, No stiffness  RESPIRATORY: No cough, No wheezing, No hemoptysis; No dyspnea  CARDIOVASCULAR: No chest pain, No palpitations  GASTROINTESTINAL: No abd pain, No nausea, No vomiting, No hematemesis, No diarrhea or constipation. No melena, No hematochezia.  GENITOURINARY: No dysuria, No  hematuria  NEUROLOGICAL: No diplopia, No paresthesia, No motor dysfunction  MUSCULOSKELETAL: No arthralgia, No myalgia  SKIN: No rashes, or lesions   PSYCH: no anxiety, no suicidal ideation    All other review of systems is negative unless indicated above    Vital Signs Last 24 Hrs  T(C): 36.6 (01 Apr 2021 08:01), Max: 36.6 (31 Mar 2021 15:15)  T(F): 97.9 (01 Apr 2021 08:01), Max: 97.9 (01 Apr 2021 08:01)  HR: 93 (01 Apr 2021 08:01) (89 - 93)  BP: 155/77 (01 Apr 2021 08:01) (148/82 - 163/77)  BP(mean): --  RR: 18 (01 Apr 2021 08:01) (18 - 18)  SpO2: 100% (01 Apr 2021 08:01) (96% - 100%)    PHYSICAL EXAM:    GENERAL: NAD, Well nourished  HEENT:  NC/AT, EOMI, PERRLA, No scleral icterus, Moist mucous membranes  NECK: Supple, No JVD  CNS:  Alert & Oriented X3, Motor Strength 5/5 B/L upper and lower extremities; DTRs 2+ intact   LUNG: Normal Breath sounds, Clear to auscultation bilaterally, No rales, No rhonchi, No wheezing  HEART: RRR; No murmurs, No rubs  ABDOMEN: +BS, ST/ND/NT  GENITOURINARY: Voiding, Bladder not distended  EXTREMITIES:  2+ Peripheral Pulses, No clubbing, No cyanosis, No tibial edema  MUSCULOSKELTAL: Joints normal ROM, No TTP, No effusion  VAGINAL: deferred  SKIN: no rashes  RECTAL: deferred, not indicated  BREAST: deferred                          14.7   8.85  )-----------( 319      ( 31 Mar 2021 08:23 )             44.7     03-31    138  |  108  |  5<L>  ----------------------------<  96  3.4<L>   |  23  |  0.56    Ca    9.9      31 Mar 2021 08:23      Vancomycin levels:   Cultures:     MEDICATIONS  (STANDING):  amitriptyline 25 milliGRAM(s) Oral at bedtime  amLODIPine   Tablet 10 milliGRAM(s) Oral daily  calcium carbonate    500 mG (Tums) Chewable 1 Tablet(s) Chew two times a day  cholecalciferol 1000 Unit(s) Oral two times a day  ferrous    sulfate 325 milliGRAM(s) Oral daily  melatonin 3 milliGRAM(s) Oral at bedtime  metoprolol tartrate 25 milliGRAM(s) Oral two times a day  multivitamin 1 Tablet(s) Oral daily  sodium chloride 0.9%. 1000 milliLiter(s) (100 mL/Hr) IV Continuous <Continuous>  vancomycin    Solution 500 milliGRAM(s) Oral every 6 hours    MEDICATIONS  (PRN):  acetaminophen   Tablet .. 650 milliGRAM(s) Oral every 6 hours PRN Mild Pain (1 - 3)  HYDROmorphone  Injectable 0.5 milliGRAM(s) IV Push every 4 hours PRN Moderate Pain (4 - 6)  ondansetron Injectable 4 milliGRAM(s) IV Push every 6 hours PRN Nausea and/or Vomiting  oxyCODONE    IR 5 milliGRAM(s) Oral every 6 hours PRN Moderate Pain (4 - 6)      all labs reviewed  all imaging reviewed        #gastroenteritis.  hx CDI.  - GI PCR. negative,   - C. diff PCR negative but suspect false negative results  - vancomycin PO per ID  - clear liquid diet.  - IVFs.  - supplement electrolytes.  - analgesics.  - antiemetics.  - GI.  - ID consulted    # Hypokalemia   replete and recheck in the am       #weakness.  nontraumatic fall.  - imaging:  no acute findings.  - pain management.    #DVT prophylaxis.  - SCD.  
Date of service: 04-02-21 @ 15:29      Patient lying in bed; frequency of diarrhea is less; was able to sit in chair today; afebrile      ROS: no fever or chills; denies dizziness, no HA, no SOB or cough, no abdominal pain, no constipation; no dysuria, no urinary frequency, no legs pain, no rashes    MEDICATIONS  (STANDING):  amitriptyline 25 milliGRAM(s) Oral at bedtime  amLODIPine   Tablet 10 milliGRAM(s) Oral daily  calcium carbonate    500 mG (Tums) Chewable 1 Tablet(s) Chew two times a day  cholecalciferol 1000 Unit(s) Oral two times a day  ferrous    sulfate 325 milliGRAM(s) Oral daily  melatonin 3 milliGRAM(s) Oral at bedtime  metoprolol tartrate 25 milliGRAM(s) Oral two times a day  multivitamin 1 Tablet(s) Oral daily  vancomycin    Solution 500 milliGRAM(s) Oral every 6 hours    MEDICATIONS  (PRN):  acetaminophen   Tablet .. 650 milliGRAM(s) Oral every 6 hours PRN Mild Pain (1 - 3)  HYDROmorphone  Injectable 0.5 milliGRAM(s) IV Push every 4 hours PRN Moderate Pain (4 - 6)  ondansetron Injectable 4 milliGRAM(s) IV Push every 6 hours PRN Nausea and/or Vomiting  oxyCODONE    IR 5 milliGRAM(s) Oral every 6 hours PRN Moderate Pain (4 - 6)      Vital Signs Last 24 Hrs  T(C): 36.7 (02 Apr 2021 07:54), Max: 36.7 (01 Apr 2021 16:01)  T(F): 98.1 (02 Apr 2021 07:54), Max: 98.1 (02 Apr 2021 07:54)  HR: 100 (02 Apr 2021 07:54) (98 - 100)  BP: 128/77 (02 Apr 2021 07:54) (128/77 - 154/59)  BP(mean): --  RR: 18 (02 Apr 2021 07:54) (18 - 18)  SpO2: 100% (02 Apr 2021 07:54) (97% - 100%)        Physical Exam:        Constitutional: frail looking  HEENT: NC/AT, EOMI, PERRLA, conjunctivae clear; ears and nose atraumatic; pharynx clear  Neck: supple; thyroid not palpable  Back: no tenderness  Respiratory: respiratory effort normal; clear to auscultation  Cardiovascular: S1S2 regular, no murmurs  Abdomen: soft, mildly difusely tender, not distended, positive BS; no liver or spleen organomegaly  Genitourinary: no suprapubic tenderness  Musculoskeletal: no muscle tenderness, no joint swelling or tenderness  Neurological/ Psychiatric: AxOx3, judgement and insight normal;  moving all extremities  Skin: no rashes; no palpable lesions    Labs: all available labs reviewed                       Labs:             Labs:                   Labs:                         Labs:                        14.9   14.97 )-----------( 378      ( 02 Apr 2021 10:29 )             45.2     04-02    138  |  108  |  4<L>  ----------------------------<  177<H>  3.5   |  24  |  0.72    Ca    9.4      02 Apr 2021 10:29    TPro  7.9  /  Alb  3.7  /  TBili  0.6  /  DBili  x   /  AST  12<L>  /  ALT  20  /  AlkPhos  93  04-02           Cultures:       GI PCR Panel, Stool (collected 03-30-21 @ 16:45)  Source: .Stool Feces  Final Report (03-31-21 @ 03:16):    GI PCR Results: NOT detected    *******Please Note:*******    GI panel PCR evaluates for:    Campylobacter, Plesiomonas shigelloides, Salmonella,    Vibrio, Yersinia enterocolitica, Enteroaggregative    Escherichia coli (EAEC), Enteropathogenic E.coli (EPEC),    Enterotoxigenic E. coli (ETEC) lt/st, Shiga-like    toxin-producing E. coli (STEC) stx1/stx2,    Shigella/ Enteroinvasive E. coli (EIEC), Cryptosporidium,    Cyclospora cayetanensis, Entamoeba histolytica,    Giardia lamblia, Adenovirus F 40/41, Astrovirus,    Norovirus GI/GII, Rotavirus A, Sapovirus    Culture - Urine (collected 03-28-21 @ 17:56)  Source: .Urine Clean Catch (Midstream)  Final Report (03-29-21 @ 19:40):    <10,000 CFU/mL Normal Urogenital Sonia    Culture - Blood (collected 03-28-21 @ 13:50)  Source: .Blood None  Preliminary Report (03-29-21 @ 18:02):    No growth to date.    Culture - Blood (collected 03-28-21 @ 13:21)  Source: .Blood Blood-Peripheral  Preliminary Report (03-29-21 @ 18:02):    No growth to date.              Clostridium difficile Toxin by PCR (03.30.21 @ 16:45)    Clostridium difficile Toxin by PCR: The results of this test should be interpreted with consideration of all  clinical and laboratory findings. This test determines the presence of  the C. difficile tcdB gene at a given time and is not intended to  identify antibiotic associated disease or C. difficile infection without  clinical context.  Successful treatment is based on the resolution of clinical symptoms.  This test should not be used as a "test of cure" because C. difficile DNA  will persist after successful treatment. Repeat testing will not be  permitted.    This test is performed on the BD MAX system using Real-Time PCR and  fluorogenic target-specific hybridization.    C Diff by PCR Result: NotDetec                    < from: CT Abdomen and Pelvis w/ IV Cont (03.28.21 @ 15:12) >    EXAM:  CT ABDOMEN AND PELVIS IC                          EXAM:  CT CHEST IC                            PROCEDURE DATE:  03/28/2021          INTERPRETATION:  CLINICAL INFORMATION: Abdominal pain. Diarrhea. Rib pain. Status post fall. Tenderness overthe left lateral sixth, seventh eighth ribs.    COMPARISON: 2/28/2021    CONTRAST/COMPLICATIONS:  IV Contrast: Omnipaque 350,   90 cc administered   10 cc discarded  Oral Contrast: NONE  Complications: None reported at time of study completion    PROCEDURE:  CT of the Chest, Abdomen and Pelvis was performed.  Imaging was performed through the chest in the arterial phase followed by imaging of the abdomen and pelvis in the portal venous phase.  Sagittal and coronal reformats were performed.    FINDINGS:  CHEST:  LUNGS AND LARGE AIRWAYS: Patent central airways. No pulmonary nodules.  PLEURA: No pleural effusion.  VESSELS: Within normal limits.  HEART: Heart size is normal. No pericardial effusion.  MEDIASTINUM AND BAKARI: No lymphadenopathy.  CHEST WALL AND LOWER NECK: Within normal limits.    ABDOMEN AND PELVIS:  LIVER: Within normal limits.  BILE DUCTS: Postcholecystectomy biliary ductal dilatation.  GALLBLADDER: Cholecystectomy.  SPLEEN: Within normal limits.  PANCREAS: Within normal limits.  ADRENALS: Within normal limits.  KIDNEYS/URETERS: Subcentimeter renal lesions too small to characterize.    BLADDER: Within normal limits.  REPRODUCTIVE ORGANS: Calcified uterine myoma. Bilateral adnexa within normal limits.    BOWEL: No bowel obstruction. Appendix is normal.  PERITONEUM: No ascites.  VESSELS: Within normal limits.  RETROPERITONEUM/LYMPH NODES: No lymphadenopathy.  ABDOMINAL WALL: Within normal limits.  BONES: Scoliosis and degenerative change. Old compression fracture of the T12 vertebral body. Old right-sided rib fractures. No evidence of displaced rib fracture.    IMPRESSION:  No evidence of acute abnormality in the chest, abdomen, or pelvis.      < end of copied text >      Radiology: all available radiological tests reviewed    Advanced directives addressed: full resuscitation
Patient is a 65y old  Female h/o recent hx of CDAD,  diagnosis 2 weeks prior to admission and treated w/ vancomycin PO,  who presents with a chief complaint of nausea vomiting diarrhea and electrolyte abnormalities     4.1: c/o diarrhea   4.2: less diarrhea, feels better             REVIEW OF SYSTEMS:    CONSTITUTIONAL: No weakness, No fevers or chills  ENT: No ear ache, No sorethroat  NECK: No pain, No stiffness  RESPIRATORY: No cough, No wheezing, No hemoptysis; No dyspnea  CARDIOVASCULAR: No chest pain, No palpitations  GASTROINTESTINAL: No abd pain, No nausea, No vomiting, No hematemesis, No diarrhea or constipation. No melena, No hematochezia.  GENITOURINARY: No dysuria, No  hematuria  NEUROLOGICAL: No diplopia, No paresthesia, No motor dysfunction  MUSCULOSKELETAL: No arthralgia, No myalgia  SKIN: No rashes, or lesions   PSYCH: no anxiety, no suicidal ideation    All other review of systems is negative unless indicated above    Vital Signs Last 24 Hrs  T(C): 36.7 (02 Apr 2021 07:54), Max: 36.7 (01 Apr 2021 16:01)  T(F): 98.1 (02 Apr 2021 07:54), Max: 98.1 (02 Apr 2021 07:54)  HR: 100 (02 Apr 2021 07:54) (98 - 100)  BP: 128/77 (02 Apr 2021 07:54) (128/77 - 154/59)  RR: 18 (02 Apr 2021 07:54) (18 - 18)  SpO2: 100% (02 Apr 2021 07:54) (97% - 100%)    PHYSICAL EXAM:    GENERAL: NAD, Well nourished  HEENT:  NC/AT, EOMI, PERRLA, No scleral icterus, Moist mucous membranes  NECK: Supple, No JVD  CNS:  Alert & Oriented X3, Motor Strength 5/5 B/L upper and lower extremities; DTRs 2+ intact   LUNG: Normal Breath sounds, Clear to auscultation bilaterally, No rales, No rhonchi, No wheezing  HEART: RRR; No murmurs, No rubs  ABDOMEN: +BS, ST/ND/NT  GENITOURINARY: Voiding, Bladder not distended  EXTREMITIES:  2+ Peripheral Pulses, No clubbing, No cyanosis, No tibial edema  MUSCULOSKELTAL: Joints normal ROM, No TTP, No effusion  VAGINAL: deferred  SKIN: no rashes  RECTAL: deferred, not indicated  BREAST: deferred    Labs:                        14.9   14.97 )-----------( 378      ( 02 Apr 2021 10:29 )             45.2     04-02    138  |  108  |  4<L>  ----------------------------<  177<H>  3.5   |  24  |  0.72    Ca    9.4      02 Apr 2021 10:29    TPro  7.9  /  Alb  3.7  /  TBili  0.6  /  DBili  x   /  AST  12<L>  /  ALT  20  /  AlkPhos  93  04-02           Cultures:       Vancomycin levels:   Cultures:     MEDICATIONS  (STANDING):  amitriptyline 25 milliGRAM(s) Oral at bedtime  amLODIPine   Tablet 10 milliGRAM(s) Oral daily  calcium carbonate    500 mG (Tums) Chewable 1 Tablet(s) Chew two times a day  cholecalciferol 1000 Unit(s) Oral two times a day  ferrous    sulfate 325 milliGRAM(s) Oral daily  melatonin 3 milliGRAM(s) Oral at bedtime  metoprolol tartrate 25 milliGRAM(s) Oral two times a day  multivitamin 1 Tablet(s) Oral daily  sodium chloride 0.9%. 1000 milliLiter(s) (100 mL/Hr) IV Continuous <Continuous>  vancomycin    Solution 500 milliGRAM(s) Oral every 6 hours    MEDICATIONS  (PRN):  acetaminophen   Tablet .. 650 milliGRAM(s) Oral every 6 hours PRN Mild Pain (1 - 3)  HYDROmorphone  Injectable 0.5 milliGRAM(s) IV Push every 4 hours PRN Moderate Pain (4 - 6)  ondansetron Injectable 4 milliGRAM(s) IV Push every 6 hours PRN Nausea and/or Vomiting  oxyCODONE    IR 5 milliGRAM(s) Oral every 6 hours PRN Moderate Pain (4 - 6)      all labs reviewed  all imaging reviewed        #gastroenteritis.    - GI PCR. negative,   - C. diff PCR negative but suspect false negative results  - vancomycin PO increased to 500mg QID per ID  - clear liquid diet.  - IVFs.  - supplement electrolytes.  - antiemetics.  - GI.  - ID consulted  advance diet to full lliquids     # Hypokalemia   replete and recheck in the am     #Htn: stable      #weakness.  nontraumatic fall.  - imaging:  no acute findings.  - pain management.    #DVT prophylaxis.  - SCD.  
Patient is a 65y old  Female h/o recent hx of CDAD,  diagnosis 2 weeks prior to admission and treated w/ vancomycin PO,  who presents with a chief complaint of nausea vomiting diarrhea and electrolyte abnormalities     4.1: c/o diarrhea   4.2: less diarrhea, feels better   4.3: only 3 BMs overnight, no other distress             REVIEW OF SYSTEMS:    CONSTITUTIONAL: No weakness, No fevers or chills  ENT: No ear ache, No sorethroat  NECK: No pain, No stiffness  RESPIRATORY: No cough, No wheezing, No hemoptysis; No dyspnea  CARDIOVASCULAR: No chest pain, No palpitations  GASTROINTESTINAL: No abd pain, No nausea, No vomiting, No hematemesis, No diarrhea or constipation. No melena, No hematochezia.  GENITOURINARY: No dysuria, No  hematuria  NEUROLOGICAL: No diplopia, No paresthesia, No motor dysfunction  MUSCULOSKELETAL: No arthralgia, No myalgia  SKIN: No rashes, or lesions   PSYCH: no anxiety, no suicidal ideation    All other review of systems is negative unless indicated above    Vital Signs Last 24 Hrs  T(C): 36.4 (03 Apr 2021 07:49), Max: 36.5 (02 Apr 2021 16:13)  T(F): 97.6 (03 Apr 2021 07:49), Max: 97.7 (02 Apr 2021 16:13)  HR: 88 (03 Apr 2021 07:49) (82 - 100)  BP: 119/77 (03 Apr 2021 07:49) (119/77 - 149/72)  RR: 19 (03 Apr 2021 07:49) (18 - 19)  SpO2: 100% (03 Apr 2021 07:49) (99% - 100%)    PHYSICAL EXAM:    GENERAL: NAD, Well nourished  HEENT:  NC/AT, EOMI, PERRLA, No scleral icterus, Moist mucous membranes  NECK: Supple, No JVD  CNS:  Alert & Oriented X3, Motor Strength 5/5 B/L upper and lower extremities; DTRs 2+ intact   LUNG: Normal Breath sounds, Clear to auscultation bilaterally, No rales, No rhonchi, No wheezing  HEART: RRR; No murmurs, No rubs  ABDOMEN: +BS, ST/ND/NT  GENITOURINARY: Voiding, Bladder not distended  EXTREMITIES:  2+ Peripheral Pulses, No clubbing, No cyanosis, No tibial edema  MUSCULOSKELTAL: Joints normal ROM, No TTP, No effusion  VAGINAL: deferred  SKIN: no rashes  RECTAL: deferred, not indicated  BREAST: deferred    Labs:                        14.9   14.97 )-----------( 378      ( 02 Apr 2021 10:29 )             45.2     04-02    138  |  108  |  4<L>  ----------------------------<  177<H>  3.5   |  24  |  0.72    Ca    9.4      02 Apr 2021 10:29    TPro  7.9  /  Alb  3.7  /  TBili  0.6  /  DBili  x   /  AST  12<L>  /  ALT  20  /  AlkPhos  93  04-02        MEDICATIONS  (STANDING):  amitriptyline 25 milliGRAM(s) Oral at bedtime  amLODIPine   Tablet 10 milliGRAM(s) Oral daily  calcium carbonate    500 mG (Tums) Chewable 1 Tablet(s) Chew two times a day  cholecalciferol 1000 Unit(s) Oral two times a day  ferrous    sulfate 325 milliGRAM(s) Oral daily  melatonin 3 milliGRAM(s) Oral at bedtime  metoprolol tartrate 25 milliGRAM(s) Oral two times a day  multivitamin 1 Tablet(s) Oral daily  sodium chloride 0.9%. 1000 milliLiter(s) (100 mL/Hr) IV Continuous <Continuous>  vancomycin    Solution 500 milliGRAM(s) Oral every 6 hours    MEDICATIONS  (PRN):  acetaminophen   Tablet .. 650 milliGRAM(s) Oral every 6 hours PRN Mild Pain (1 - 3)  HYDROmorphone  Injectable 0.5 milliGRAM(s) IV Push every 4 hours PRN Moderate Pain (4 - 6)  ondansetron Injectable 4 milliGRAM(s) IV Push every 6 hours PRN Nausea and/or Vomiting  oxyCODONE    IR 5 milliGRAM(s) Oral every 6 hours PRN Moderate Pain (4 - 6)      all labs reviewed  all imaging reviewed        #gastroenteritis.    - GI PCR. negative,   - C. diff PCR negative but suspect false negative results  - vancomycin PO increased to 500mg QID per ID  - clear liquid diet.  - IVFs.  - supplement electrolytes.  - antiemetics.  - GI.  - ID consulted  advance diet to full lliquids     # Hypokalemia   replete and recheck in the am     #Htn: stable      #weakness.  nontraumatic fall.  - imaging:  no acute findings.  - pain management.    #DVT prophylaxis.  - SCD.  
    Date of service: 03-30-21 @ 13:49      Patient lying in bed; had soft bowel movement, still with difuse abdominal pain      ROS unable to obtain secondary to patient medical condition     MEDICATIONS  (STANDING):  amitriptyline 25 milliGRAM(s) Oral at bedtime  amLODIPine   Tablet 10 milliGRAM(s) Oral daily  calcium carbonate    500 mG (Tums) Chewable 1 Tablet(s) Chew two times a day  cholecalciferol 1000 Unit(s) Oral two times a day  ferrous    sulfate 325 milliGRAM(s) Oral daily  melatonin 3 milliGRAM(s) Oral at bedtime  metoprolol tartrate 25 milliGRAM(s) Oral two times a day  multivitamin 1 Tablet(s) Oral daily  sodium chloride 0.9%. 1000 milliLiter(s) (100 mL/Hr) IV Continuous <Continuous>  vancomycin    Solution 125 milliGRAM(s) Oral every 6 hours    MEDICATIONS  (PRN):  acetaminophen   Tablet .. 650 milliGRAM(s) Oral every 6 hours PRN Mild Pain (1 - 3)  HYDROmorphone  Injectable 0.5 milliGRAM(s) IV Push every 4 hours PRN Moderate Pain (4 - 6)  ondansetron Injectable 4 milliGRAM(s) IV Push every 6 hours PRN Nausea and/or Vomiting  oxyCODONE    IR 5 milliGRAM(s) Oral every 6 hours PRN Moderate Pain (4 - 6)      Vital Signs Last 24 Hrs  T(C): 36.4 (30 Mar 2021 08:20), Max: 36.6 (29 Mar 2021 14:59)  T(F): 97.5 (30 Mar 2021 08:20), Max: 97.8 (29 Mar 2021 14:59)  HR: 83 (30 Mar 2021 08:20) (74 - 89)  BP: 175/79 (30 Mar 2021 08:20) (146/80 - 175/79)  BP(mean): 34 (29 Mar 2021 20:55) (34 - 98)  RR: 19 (30 Mar 2021 08:20) (19 - 22)  SpO2: 97% (30 Mar 2021 08:20) (96% - 100%)        Physical Exam:        Constitutional: frail looking  HEENT: NC/AT, EOMI, PERRLA, conjunctivae clear; ears and nose atraumatic; pharynx clear  Neck: supple; thyroid not palpable  Back: no tenderness  Respiratory: respiratory effort normal; clear to auscultation  Cardiovascular: S1S2 regular, no murmurs  Abdomen: soft, mildly difusely tender, not distended, positive BS; no liver or spleen organomegaly  Genitourinary: no suprapubic tenderness  Musculoskeletal: no muscle tenderness, no joint swelling or tenderness  Neurological/ Psychiatric: AxOx3, judgement and insight normal;  moving all extremities  Skin: no rashes; no palpable lesions    Labs: all available labs reviewed                       Labs:                        14.2   9.71  )-----------( 346      ( 30 Mar 2021 08:27 )             44.4     03-30    139  |  107  |  6<L>  ----------------------------<  82  3.4<L>   |  23  |  0.65    Ca    9.8      30 Mar 2021 08:27             Cultures:       Culture - Urine (collected 03-28-21 @ 17:56)  Source: .Urine Clean Catch (Midstream)  Final Report (03-29-21 @ 19:40):    <10,000 CFU/mL Normal Urogenital Sonia    Culture - Blood (collected 03-28-21 @ 13:50)  Source: .Blood None  Preliminary Report (03-29-21 @ 18:02):    No growth to date.    Culture - Blood (collected 03-28-21 @ 13:21)  Source: .Blood Blood-Peripheral  Preliminary Report (03-29-21 @ 18:02):    No growth to date.          < from: CT Abdomen and Pelvis w/ IV Cont (03.28.21 @ 15:12) >    EXAM:  CT ABDOMEN AND PELVIS IC                          EXAM:  CT CHEST IC                            PROCEDURE DATE:  03/28/2021          INTERPRETATION:  CLINICAL INFORMATION: Abdominal pain. Diarrhea. Rib pain. Status post fall. Tenderness overthe left lateral sixth, seventh eighth ribs.    COMPARISON: 2/28/2021    CONTRAST/COMPLICATIONS:  IV Contrast: Omnipaque 350,   90 cc administered   10 cc discarded  Oral Contrast: NONE  Complications: None reported at time of study completion    PROCEDURE:  CT of the Chest, Abdomen and Pelvis was performed.  Imaging was performed through the chest in the arterial phase followed by imaging of the abdomen and pelvis in the portal venous phase.  Sagittal and coronal reformats were performed.    FINDINGS:  CHEST:  LUNGS AND LARGE AIRWAYS: Patent central airways. No pulmonary nodules.  PLEURA: No pleural effusion.  VESSELS: Within normal limits.  HEART: Heart size is normal. No pericardial effusion.  MEDIASTINUM AND BAKARI: No lymphadenopathy.  CHEST WALL AND LOWER NECK: Within normal limits.    ABDOMEN AND PELVIS:  LIVER: Within normal limits.  BILE DUCTS: Postcholecystectomy biliary ductal dilatation.  GALLBLADDER: Cholecystectomy.  SPLEEN: Within normal limits.  PANCREAS: Within normal limits.  ADRENALS: Within normal limits.  KIDNEYS/URETERS: Subcentimeter renal lesions too small to characterize.    BLADDER: Within normal limits.  REPRODUCTIVE ORGANS: Calcified uterine myoma. Bilateral adnexa within normal limits.    BOWEL: No bowel obstruction. Appendix is normal.  PERITONEUM: No ascites.  VESSELS: Within normal limits.  RETROPERITONEUM/LYMPH NODES: No lymphadenopathy.  ABDOMINAL WALL: Within normal limits.  BONES: Scoliosis and degenerative change. Old compression fracture of the T12 vertebral body. Old right-sided rib fractures. No evidence of displaced rib fracture.    IMPRESSION:  No evidence of acute abnormality in the chest, abdomen, or pelvis.      < end of copied text >      Radiology: all available radiological tests reviewed    Advanced directives addressed: full resuscitation
  Date of service: 03-31-21 @ 10:47      Patient lying in bed; afebrile, abdominal pain is better, stool more formed      ROS: no fever or chills; denies dizziness, no HA, no SOB or cough,  no diarrhea or constipation; no dysuria, no urinary frequency, no legs pain, no rashes    MEDICATIONS  (STANDING):  amitriptyline 25 milliGRAM(s) Oral at bedtime  amLODIPine   Tablet 10 milliGRAM(s) Oral daily  calcium carbonate    500 mG (Tums) Chewable 1 Tablet(s) Chew two times a day  cholecalciferol 1000 Unit(s) Oral two times a day  ferrous    sulfate 325 milliGRAM(s) Oral daily  melatonin 3 milliGRAM(s) Oral at bedtime  metoprolol tartrate 25 milliGRAM(s) Oral two times a day  multivitamin 1 Tablet(s) Oral daily  sodium chloride 0.9%. 1000 milliLiter(s) (100 mL/Hr) IV Continuous <Continuous>  vancomycin    Solution 125 milliGRAM(s) Oral every 6 hours    MEDICATIONS  (PRN):  acetaminophen   Tablet .. 650 milliGRAM(s) Oral every 6 hours PRN Mild Pain (1 - 3)  HYDROmorphone  Injectable 0.5 milliGRAM(s) IV Push every 4 hours PRN Moderate Pain (4 - 6)  ondansetron Injectable 4 milliGRAM(s) IV Push every 6 hours PRN Nausea and/or Vomiting  oxyCODONE    IR 5 milliGRAM(s) Oral every 6 hours PRN Moderate Pain (4 - 6)      Vital Signs Last 24 Hrs  T(C): 36.4 (31 Mar 2021 07:30), Max: 36.5 (30 Mar 2021 15:30)  T(F): 97.6 (31 Mar 2021 07:30), Max: 97.7 (30 Mar 2021 15:30)  HR: 79 (31 Mar 2021 07:30) (79 - 91)  BP: 145/80 (31 Mar 2021 07:30) (142/81 - 161/69)  BP(mean): --  RR: 18 (31 Mar 2021 07:30) (18 - 18)  SpO2: 98% (31 Mar 2021 07:30) (94% - 98%)        Physical Exam:        Constitutional: frail looking  HEENT: NC/AT, EOMI, PERRLA, conjunctivae clear; ears and nose atraumatic; pharynx clear  Neck: supple; thyroid not palpable  Back: no tenderness  Respiratory: respiratory effort normal; clear to auscultation  Cardiovascular: S1S2 regular, no murmurs  Abdomen: soft, mildly difusely tender, not distended, positive BS; no liver or spleen organomegaly  Genitourinary: no suprapubic tenderness  Musculoskeletal: no muscle tenderness, no joint swelling or tenderness  Neurological/ Psychiatric: AxOx3, judgement and insight normal;  moving all extremities  Skin: no rashes; no palpable lesions    Labs: all available labs reviewed                       Labs:             Labs:                        14.7   8.85  )-----------( 319      ( 31 Mar 2021 08:23 )             44.7     03-31    138  |  108  |  5<L>  ----------------------------<  96  3.4<L>   |  23  |  0.56    Ca    9.9      31 Mar 2021 08:23             Cultures:       GI PCR Panel, Stool (collected 03-30-21 @ 16:45)  Source: .Stool Feces  Final Report (03-31-21 @ 03:16):    GI PCR Results: NOT detected    *******Please Note:*******    GI panel PCR evaluates for:    Campylobacter, Plesiomonas shigelloides, Salmonella,    Vibrio, Yersinia enterocolitica, Enteroaggregative    Escherichia coli (EAEC), Enteropathogenic E.coli (EPEC),    Enterotoxigenic E. coli (ETEC) lt/st, Shiga-like    toxin-producing E. coli (STEC) stx1/stx2,    Shigella/ Enteroinvasive E. coli (EIEC), Cryptosporidium,    Cyclospora cayetanensis, Entamoeba histolytica,    Giardia lamblia, Adenovirus F 40/41, Astrovirus,    Norovirus GI/GII, Rotavirus A, Sapovirus    Culture - Urine (collected 03-28-21 @ 17:56)  Source: .Urine Clean Catch (Midstream)  Final Report (03-29-21 @ 19:40):    <10,000 CFU/mL Normal Urogenital Sonia    Culture - Blood (collected 03-28-21 @ 13:50)  Source: .Blood None  Preliminary Report (03-29-21 @ 18:02):    No growth to date.    Culture - Blood (collected 03-28-21 @ 13:21)  Source: .Blood Blood-Peripheral  Preliminary Report (03-29-21 @ 18:02):    No growth to date.                < from: CT Abdomen and Pelvis w/ IV Cont (03.28.21 @ 15:12) >    EXAM:  CT ABDOMEN AND PELVIS IC                          EXAM:  CT CHEST IC                            PROCEDURE DATE:  03/28/2021          INTERPRETATION:  CLINICAL INFORMATION: Abdominal pain. Diarrhea. Rib pain. Status post fall. Tenderness overthe left lateral sixth, seventh eighth ribs.    COMPARISON: 2/28/2021    CONTRAST/COMPLICATIONS:  IV Contrast: Omnipaque 350,   90 cc administered   10 cc discarded  Oral Contrast: NONE  Complications: None reported at time of study completion    PROCEDURE:  CT of the Chest, Abdomen and Pelvis was performed.  Imaging was performed through the chest in the arterial phase followed by imaging of the abdomen and pelvis in the portal venous phase.  Sagittal and coronal reformats were performed.    FINDINGS:  CHEST:  LUNGS AND LARGE AIRWAYS: Patent central airways. No pulmonary nodules.  PLEURA: No pleural effusion.  VESSELS: Within normal limits.  HEART: Heart size is normal. No pericardial effusion.  MEDIASTINUM AND BAKARI: No lymphadenopathy.  CHEST WALL AND LOWER NECK: Within normal limits.    ABDOMEN AND PELVIS:  LIVER: Within normal limits.  BILE DUCTS: Postcholecystectomy biliary ductal dilatation.  GALLBLADDER: Cholecystectomy.  SPLEEN: Within normal limits.  PANCREAS: Within normal limits.  ADRENALS: Within normal limits.  KIDNEYS/URETERS: Subcentimeter renal lesions too small to characterize.    BLADDER: Within normal limits.  REPRODUCTIVE ORGANS: Calcified uterine myoma. Bilateral adnexa within normal limits.    BOWEL: No bowel obstruction. Appendix is normal.  PERITONEUM: No ascites.  VESSELS: Within normal limits.  RETROPERITONEUM/LYMPH NODES: No lymphadenopathy.  ABDOMINAL WALL: Within normal limits.  BONES: Scoliosis and degenerative change. Old compression fracture of the T12 vertebral body. Old right-sided rib fractures. No evidence of displaced rib fracture.    IMPRESSION:  No evidence of acute abnormality in the chest, abdomen, or pelvis.      < end of copied text >      Radiology: all available radiological tests reviewed    Advanced directives addressed: full resuscitation
CC:  Patient is a 65y old  Female who presents with a chief complaint of   SUBJECTIVE:     -no new complaints or issues at current time.    ROS:  all other review of systems are negative unless indicated above.    acetaminophen   Tablet .. 650 milliGRAM(s) Oral every 6 hours PRN  amitriptyline 25 milliGRAM(s) Oral at bedtime  amLODIPine   Tablet 10 milliGRAM(s) Oral daily  calcium carbonate    500 mG (Tums) Chewable 1 Tablet(s) Chew two times a day  cholecalciferol 1000 Unit(s) Oral two times a day  ferrous    sulfate 325 milliGRAM(s) Oral daily  HYDROmorphone  Injectable 0.5 milliGRAM(s) IV Push every 4 hours PRN  melatonin 3 milliGRAM(s) Oral at bedtime  metoprolol tartrate 25 milliGRAM(s) Oral two times a day  multivitamin 1 Tablet(s) Oral daily  ondansetron Injectable 4 milliGRAM(s) IV Push every 6 hours PRN  oxyCODONE    IR 5 milliGRAM(s) Oral every 6 hours PRN  sodium chloride 0.9%. 1000 milliLiter(s) IV Continuous <Continuous>  vancomycin    Solution 125 milliGRAM(s) Oral every 6 hours    T(C): 36.6 (03-29-21 @ 14:59), Max: 36.8 (03-29-21 @ 07:04)  HR: 85 (03-29-21 @ 15:43) (74 - 87)  BP: 156/88 (03-29-21 @ 15:43) (143/88 - 164/74)  RR: 22 (03-29-21 @ 14:59) (16 - 24)  SpO2: 98% (03-29-21 @ 14:59) (96% - 100%)    Constitutional: NAD.   HEENT: PERRL, EOMI, MMM.  Neck: Soft and supple, No carotid bruit, No JVD  Respiratory: Breath sounds are clear bilaterally, No wheezing, rales or rhonchi  Cardiovascular: S1 and S2, regular rate and rhythm, no murmur, rub or gallop.  Gastrointestinal: Bowel Sounds present, soft, nontender, nondistended, no guarding, no rebound, no mass.  Extremities: No peripheral edema  Vascular: 2+ peripheral pulses  Neurological: A/O x , no focal deficits  Musculoskeletal: 5/5 strength b/l upper and lower extremities  Skin:  no visible rashes.                         12.4   8.91  )-----------( 268      ( 29 Mar 2021 06:31 )             37.9     PT/INR - ( 28 Mar 2021 13:21 )   PT: 11.9 sec;   INR: 1.03 ratio         PTT - ( 28 Mar 2021 13:21 )  PTT:33.2 sec  03-29    142  |  112<H>  |  4<L>  ----------------------------<  88  3.3<L>   |  24  |  0.51    Ca    8.9      29 Mar 2021 07:27    TPro  8.2  /  Alb  3.7  /  TBili  0.4  /  DBili  x   /  AST  15  /  ALT  20  /  AlkPhos  97  03-28  
CC:  Patient is a 65y old  Female who presents with a chief complaint of nasuea vomiting and electryte abnormaltities (29 Mar 2021 23:34)    SUBJECTIVE:     -no new complaints or issues at current time.    ROS:  all other review of systems are negative unless indicated above.    acetaminophen   Tablet .. 650 milliGRAM(s) Oral every 6 hours PRN  amitriptyline 25 milliGRAM(s) Oral at bedtime  amLODIPine   Tablet 10 milliGRAM(s) Oral daily  calcium carbonate    500 mG (Tums) Chewable 1 Tablet(s) Chew two times a day  cholecalciferol 1000 Unit(s) Oral two times a day  ferrous    sulfate 325 milliGRAM(s) Oral daily  HYDROmorphone  Injectable 0.5 milliGRAM(s) IV Push every 4 hours PRN  melatonin 3 milliGRAM(s) Oral at bedtime  metoprolol tartrate 25 milliGRAM(s) Oral two times a day  multivitamin 1 Tablet(s) Oral daily  ondansetron Injectable 4 milliGRAM(s) IV Push every 6 hours PRN  oxyCODONE    IR 5 milliGRAM(s) Oral every 6 hours PRN  sodium chloride 0.9%. 1000 milliLiter(s) IV Continuous <Continuous>  vancomycin    Solution 125 milliGRAM(s) Oral every 6 hours    T(C): 36.5 (03-30-21 @ 15:30), Max: 36.6 (03-30-21 @ 02:58)  HR: 88 (03-30-21 @ 15:30) (74 - 89)  BP: 142/81 (03-30-21 @ 15:30) (142/81 - 175/79)  RR: 18 (03-30-21 @ 15:30) (18 - 20)  SpO2: 98% (03-30-21 @ 15:30) (97% - 100%)    Constitutional: NAD.   HEENT: PERRL, EOMI, MMM.  Neck: Soft and supple, No carotid bruit, No JVD  Respiratory: Breath sounds are clear bilaterally, No wheezing, rales or rhonchi  Cardiovascular: S1 and S2, regular rate and rhythm, no murmur, rub or gallop.  Gastrointestinal: Bowel Sounds present, soft, nontender, nondistended, no guarding, no rebound, no mass.  Extremities: No peripheral edema  Vascular: 2+ peripheral pulses  Neurological: A/O x 3, no focal deficits  Musculoskeletal: 5/5 strength b/l upper and lower extremities  Skin:  no visible rashes.                         14.2   9.71  )-----------( 346      ( 30 Mar 2021 08:27 )             44.4       03-30    139  |  107  |  6<L>  ----------------------------<  82  3.4<L>   |  23  |  0.65    Ca    9.8      30 Mar 2021 08:27    
CC:  Patient is a 65y old  Female who presents with a chief complaint of nasuea vomiting and electryte abnormaltities (29 Mar 2021 23:34)    SUBJECTIVE:     -no new complaints or issues at current time.  c/o left ear discomfort last diarrhea 6:30 am as per patient       Objective   T(C): 36.4 (03-31-21 @ 07:30), Max: 36.5 (03-30-21 @ 15:30)  T(F): 97.6 (03-31-21 @ 07:30), Max: 97.7 (03-30-21 @ 15:30)  HR: 79 (03-31-21 @ 07:30) (79 - 91)  BP: 145/80 (03-31-21 @ 07:30) (142/81 - 161/69)  RR: 18 (03-31-21 @ 07:30) (18 - 18)  SpO2: 98% (03-31-21 @ 07:30) (94% - 98%)  Wt(kg): --  Constitutional: NAD.   HEENT: PERRL, EOMI, MMM.  Neck: Soft and supple, No carotid bruit, No JVD  Respiratory: Breath sounds are clear bilaterally, No wheezing, rales or rhonchi  Cardiovascular: S1 and S2, regular rate and rhythm, no murmur, rub or gallop.  Gastrointestinal: Bowel Sounds present, soft, nontender, nondistended, no guarding, no rebound, no mass.  Extremities: No peripheral edema  Vascular: 2+ peripheral pulses  Neurological: A/O x 3, no focal deficits  Musculoskeletal: 5/5 strength b/l upper and lower extremities  Skin:  no visible rashes.   03-31    138  |  108  |  5<L>  ----------------------------<  96  3.4<L>   |  23  |  0.56    Ca    9.9      31 Mar 2021 08:23                         14.7   8.85  )-----------( 319      ( 31 Mar 2021 08:23 )        MEDICATIONS  (STANDING):  amitriptyline 25 milliGRAM(s) Oral at bedtime  amLODIPine   Tablet 10 milliGRAM(s) Oral daily  calcium carbonate    500 mG (Tums) Chewable 1 Tablet(s) Chew two times a day  cholecalciferol 1000 Unit(s) Oral two times a day  ferrous    sulfate 325 milliGRAM(s) Oral daily  melatonin 3 milliGRAM(s) Oral at bedtime  metoprolol tartrate 25 milliGRAM(s) Oral two times a day  multivitamin 1 Tablet(s) Oral daily  potassium chloride    Tablet ER 40 milliEquivalent(s) Oral once  sodium chloride 0.9%. 1000 milliLiter(s) (100 mL/Hr) IV Continuous <Continuous>  vancomycin    Solution 125 milliGRAM(s) Oral every 6 hours    MEDICATIONS  (PRN):  acetaminophen   Tablet .. 650 milliGRAM(s) Oral every 6 hours PRN Mild Pain (1 - 3)  HYDROmorphone  Injectable 0.5 milliGRAM(s) IV Push every 4 hours PRN Moderate Pain (4 - 6)  ondansetron Injectable 4 milliGRAM(s) IV Push every 6 hours PRN Nausea and/or Vomiting  oxyCODONE    IR 5 milliGRAM(s) Oral every 6 hours PRN Moderate Pain (4 - 6)    
Date of service: 04-01-21 @ 14:36      Patient lying in bed; has significantly more loose stool; afebrile      ROS: no fever or chills; denies dizziness, no HA, no SOB or cough, no abdominal pain, no constipation; no dysuria, no urinary frequency, no legs pain, no rashes    MEDICATIONS  (STANDING):  amitriptyline 25 milliGRAM(s) Oral at bedtime  amLODIPine   Tablet 10 milliGRAM(s) Oral daily  calcium carbonate    500 mG (Tums) Chewable 1 Tablet(s) Chew two times a day  cholecalciferol 1000 Unit(s) Oral two times a day  ferrous    sulfate 325 milliGRAM(s) Oral daily  melatonin 3 milliGRAM(s) Oral at bedtime  metoprolol tartrate 25 milliGRAM(s) Oral two times a day  multivitamin 1 Tablet(s) Oral daily  sodium chloride 0.9%. 1000 milliLiter(s) (100 mL/Hr) IV Continuous <Continuous>  vancomycin    Solution 500 milliGRAM(s) Oral every 6 hours    MEDICATIONS  (PRN):  acetaminophen   Tablet .. 650 milliGRAM(s) Oral every 6 hours PRN Mild Pain (1 - 3)  HYDROmorphone  Injectable 0.5 milliGRAM(s) IV Push every 4 hours PRN Moderate Pain (4 - 6)  ondansetron Injectable 4 milliGRAM(s) IV Push every 6 hours PRN Nausea and/or Vomiting  oxyCODONE    IR 5 milliGRAM(s) Oral every 6 hours PRN Moderate Pain (4 - 6)      Vital Signs Last 24 Hrs  T(C): 36.6 (01 Apr 2021 08:01), Max: 36.6 (31 Mar 2021 15:15)  T(F): 97.9 (01 Apr 2021 08:01), Max: 97.9 (01 Apr 2021 08:01)  HR: 93 (01 Apr 2021 08:01) (89 - 93)  BP: 155/77 (01 Apr 2021 08:01) (148/82 - 163/77)  BP(mean): --  RR: 18 (01 Apr 2021 08:01) (18 - 18)  SpO2: 100% (01 Apr 2021 08:01) (96% - 100%)        Physical Exam:        Constitutional: frail looking  HEENT: NC/AT, EOMI, PERRLA, conjunctivae clear; ears and nose atraumatic; pharynx clear  Neck: supple; thyroid not palpable  Back: no tenderness  Respiratory: respiratory effort normal; clear to auscultation  Cardiovascular: S1S2 regular, no murmurs  Abdomen: soft, mildly difusely tender, not distended, positive BS; no liver or spleen organomegaly  Genitourinary: no suprapubic tenderness  Musculoskeletal: no muscle tenderness, no joint swelling or tenderness  Neurological/ Psychiatric: AxOx3, judgement and insight normal;  moving all extremities  Skin: no rashes; no palpable lesions    Labs: all available labs reviewed                       Labs:             Labs:                   Labs:                        14.7   8.85  )-----------( 319      ( 31 Mar 2021 08:23 )             44.7     03-31    138  |  108  |  5<L>  ----------------------------<  96  3.4<L>   |  23  |  0.56    Ca    9.9      31 Mar 2021 08:23             Cultures:       GI PCR Panel, Stool (collected 03-30-21 @ 16:45)  Source: .Stool Feces  Final Report (03-31-21 @ 03:16):    GI PCR Results: NOT detected    *******Please Note:*******    GI panel PCR evaluates for:    Campylobacter, Plesiomonas shigelloides, Salmonella,    Vibrio, Yersinia enterocolitica, Enteroaggregative    Escherichia coli (EAEC), Enteropathogenic E.coli (EPEC),    Enterotoxigenic E. coli (ETEC) lt/st, Shiga-like    toxin-producing E. coli (STEC) stx1/stx2,    Shigella/ Enteroinvasive E. coli (EIEC), Cryptosporidium,    Cyclospora cayetanensis, Entamoeba histolytica,    Giardia lamblia, Adenovirus F 40/41, Astrovirus,    Norovirus GI/GII, Rotavirus A, Sapovirus    Culture - Urine (collected 03-28-21 @ 17:56)  Source: .Urine Clean Catch (Midstream)  Final Report (03-29-21 @ 19:40):    <10,000 CFU/mL Normal Urogenital Sonia    Culture - Blood (collected 03-28-21 @ 13:50)  Source: .Blood None  Preliminary Report (03-29-21 @ 18:02):    No growth to date.    Culture - Blood (collected 03-28-21 @ 13:21)  Source: .Blood Blood-Peripheral  Preliminary Report (03-29-21 @ 18:02):    No growth to date.          Clostridium difficile Toxin by PCR (03.30.21 @ 16:45)    Clostridium difficile Toxin by PCR: The results of this test should be interpreted with consideration of all  clinical and laboratory findings. This test determines the presence of  the C. difficile tcdB gene at a given time and is not intended to  identify antibiotic associated disease or C. difficile infection without  clinical context.  Successful treatment is based on the resolution of clinical symptoms.  This test should not be used as a "test of cure" because C. difficile DNA  will persist after successful treatment. Repeat testing will not be  permitted.    This test is performed on the BD MAX system using Real-Time PCR and  fluorogenic target-specific hybridization.    C Diff by PCR Result: NotDetec                    < from: CT Abdomen and Pelvis w/ IV Cont (03.28.21 @ 15:12) >    EXAM:  CT ABDOMEN AND PELVIS IC                          EXAM:  CT CHEST IC                            PROCEDURE DATE:  03/28/2021          INTERPRETATION:  CLINICAL INFORMATION: Abdominal pain. Diarrhea. Rib pain. Status post fall. Tenderness overthe left lateral sixth, seventh eighth ribs.    COMPARISON: 2/28/2021    CONTRAST/COMPLICATIONS:  IV Contrast: Omnipaque 350,   90 cc administered   10 cc discarded  Oral Contrast: NONE  Complications: None reported at time of study completion    PROCEDURE:  CT of the Chest, Abdomen and Pelvis was performed.  Imaging was performed through the chest in the arterial phase followed by imaging of the abdomen and pelvis in the portal venous phase.  Sagittal and coronal reformats were performed.    FINDINGS:  CHEST:  LUNGS AND LARGE AIRWAYS: Patent central airways. No pulmonary nodules.  PLEURA: No pleural effusion.  VESSELS: Within normal limits.  HEART: Heart size is normal. No pericardial effusion.  MEDIASTINUM AND BAKARI: No lymphadenopathy.  CHEST WALL AND LOWER NECK: Within normal limits.    ABDOMEN AND PELVIS:  LIVER: Within normal limits.  BILE DUCTS: Postcholecystectomy biliary ductal dilatation.  GALLBLADDER: Cholecystectomy.  SPLEEN: Within normal limits.  PANCREAS: Within normal limits.  ADRENALS: Within normal limits.  KIDNEYS/URETERS: Subcentimeter renal lesions too small to characterize.    BLADDER: Within normal limits.  REPRODUCTIVE ORGANS: Calcified uterine myoma. Bilateral adnexa within normal limits.    BOWEL: No bowel obstruction. Appendix is normal.  PERITONEUM: No ascites.  VESSELS: Within normal limits.  RETROPERITONEUM/LYMPH NODES: No lymphadenopathy.  ABDOMINAL WALL: Within normal limits.  BONES: Scoliosis and degenerative change. Old compression fracture of the T12 vertebral body. Old right-sided rib fractures. No evidence of displaced rib fracture.    IMPRESSION:  No evidence of acute abnormality in the chest, abdomen, or pelvis.      < end of copied text >      Radiology: all available radiological tests reviewed    Advanced directives addressed: full resuscitation

## 2021-04-04 NOTE — PROGRESS NOTE ADULT - PROVIDER SPECIALTY LIST ADULT
Hospitalist
Infectious Disease
Hospitalist
Infectious Disease
Infectious Disease
Hospitalist
Infectious Disease

## 2021-04-05 ENCOUNTER — TRANSCRIPTION ENCOUNTER (OUTPATIENT)
Age: 66
End: 2021-04-05

## 2021-04-05 VITALS
HEART RATE: 85 BPM | SYSTOLIC BLOOD PRESSURE: 117 MMHG | RESPIRATION RATE: 16 BRPM | TEMPERATURE: 98 F | DIASTOLIC BLOOD PRESSURE: 86 MMHG | OXYGEN SATURATION: 100 %

## 2021-04-05 PROCEDURE — 99239 HOSP IP/OBS DSCHRG MGMT >30: CPT

## 2021-04-05 RX ORDER — VANCOMYCIN HCL 1 G
2 VIAL (EA) INTRAVENOUS
Qty: 116 | Refills: 0
Start: 2021-04-05 | End: 2021-04-20

## 2021-04-05 RX ORDER — OXYCODONE HYDROCHLORIDE 5 MG/1
5 TABLET ORAL EVERY 6 HOURS
Refills: 0 | Status: DISCONTINUED | OUTPATIENT
Start: 2021-04-05 | End: 2021-04-05

## 2021-04-05 RX ORDER — VANCOMYCIN HCL 1 G
2 VIAL (EA) INTRAVENOUS
Qty: 116 | Refills: 0
Start: 2021-04-05 | End: 2021-04-19

## 2021-04-05 RX ORDER — VANCOMYCIN HCL 1 G
2 VIAL (EA) INTRAVENOUS
Qty: 116 | Refills: 0
Start: 2021-04-05 | End: 2021-04-18

## 2021-04-05 RX ADMIN — ONDANSETRON 4 MILLIGRAM(S): 8 TABLET, FILM COATED ORAL at 00:03

## 2021-04-05 RX ADMIN — Medication 500 MILLIGRAM(S): at 06:05

## 2021-04-05 RX ADMIN — Medication 25 MILLIGRAM(S): at 11:05

## 2021-04-05 RX ADMIN — AMLODIPINE BESYLATE 10 MILLIGRAM(S): 2.5 TABLET ORAL at 11:05

## 2021-04-05 RX ADMIN — ONDANSETRON 4 MILLIGRAM(S): 8 TABLET, FILM COATED ORAL at 06:05

## 2021-04-05 RX ADMIN — OXYCODONE HYDROCHLORIDE 5 MILLIGRAM(S): 5 TABLET ORAL at 18:03

## 2021-04-05 RX ADMIN — Medication 500 MILLIGRAM(S): at 18:03

## 2021-04-05 RX ADMIN — OXYCODONE HYDROCHLORIDE 5 MILLIGRAM(S): 5 TABLET ORAL at 12:01

## 2021-04-05 RX ADMIN — Medication 500 MILLIGRAM(S): at 12:01

## 2021-04-05 RX ADMIN — Medication 325 MILLIGRAM(S): at 11:05

## 2021-04-05 RX ADMIN — OXYCODONE HYDROCHLORIDE 5 MILLIGRAM(S): 5 TABLET ORAL at 06:05

## 2021-04-05 RX ADMIN — Medication 1000 UNIT(S): at 11:05

## 2021-04-05 RX ADMIN — Medication 1 TABLET(S): at 11:05

## 2021-04-05 RX ADMIN — Medication 500 MILLIGRAM(S): at 00:04

## 2021-04-05 RX ADMIN — OXYCODONE HYDROCHLORIDE 5 MILLIGRAM(S): 5 TABLET ORAL at 06:46

## 2021-04-05 RX ADMIN — OXYCODONE HYDROCHLORIDE 5 MILLIGRAM(S): 5 TABLET ORAL at 00:49

## 2021-04-05 RX ADMIN — OXYCODONE HYDROCHLORIDE 5 MILLIGRAM(S): 5 TABLET ORAL at 00:03

## 2021-04-05 RX ADMIN — ONDANSETRON 4 MILLIGRAM(S): 8 TABLET, FILM COATED ORAL at 12:01

## 2021-04-05 RX ADMIN — ONDANSETRON 4 MILLIGRAM(S): 8 TABLET, FILM COATED ORAL at 18:03

## 2021-04-05 NOTE — DISCHARGE NOTE PROVIDER - NSDCCPCAREPLAN_GEN_ALL_CORE_FT
PRINCIPAL DISCHARGE DIAGNOSIS  Diagnosis: Clostridium difficile diarrhea  Assessment and Plan of Treatment: Continue taking Vancomycin 500 mg every 6 hours by mouth for 2 weeks   Drink plenty of fluids to stay hydrated and eat a well balanced diet      SECONDARY DISCHARGE DIAGNOSES  Diagnosis: Polycythemia  Assessment and Plan of Treatment: Your hemoglobin and red blood cell count was slightly elevate   Follow up with your primary care provider within 1 week to have your complete blood count rechecked     PRINCIPAL DISCHARGE DIAGNOSIS  Diagnosis: Clostridium difficile diarrhea  Assessment and Plan of Treatment: Continue taking Vancomycin 500 mg every 6 hours by mouth for 2 weeks   Drink plenty of fluids to stay hydrated and eat a well balanced diet      SECONDARY DISCHARGE DIAGNOSES  Diagnosis: Polycythemia  Assessment and Plan of Treatment: Your hemoglobin and red blood cell count was slightly elevate   Follow up with your primary care provider within 1 week to have your complete blood count rechecked  calcified uterine myoma - follow up with PMD/GYN for outpatient pekvic US

## 2021-04-05 NOTE — DISCHARGE NOTE PROVIDER - NSDCPNSUBOBJ_GEN_ALL_CORE
Patient is a 65y old  Female h/o recent hx of CDAD,  diagnosis 2 weeks prior to admission and treated w/ vancomycin PO,  who presents with a chief complaint of nausea vomiting diarrhea and electrolyte abnormalities   -found to have likely Cdiff colitis    4.1: c/o diarrhea   4.2: less diarrhea, feels better   4.3: only 3 BMs overnight, no other distress   4.4: on full liquids, c/o some abd pain, less diarrhea, went only once this am  4/5 Advanced to regular diet without any nausea and able to tolerate po intake. Her BM are now formed and no longer having diarrhea. She continues to complain of abdominal pain but notes that this is similar to her chronic abdominal pain for which she takes Dilaudid 2-4 mg q6h prn at home. Denies any cp, sob, n/v/d.     REVIEW OF SYSTEMS:  All other review of systems is negative unless indicated above    PHYSICAL EXAM:  GENERAL: NAD, Well nourished  HEENT:  NC/AT, EOMI, PERRL, No scleral icterus, Moist mucous membranes  NECK: Supple, No JVD  CNS:  Alert & Oriented X3  LUNG: Normal Breath sounds, Clear to auscultation bilaterally, No rales, No rhonchi, No wheezing  HEART: RRR; No murmurs, No rubs  ABDOMEN: +BS, ST/ND, mild diffuse ttp, no guarding or grimacing noted.   GENITOURINARY: Voiding, Bladder not distended  EXTREMITIES:  2+ Peripheral Pulses, No clubbing, No cyanosis, No tibial edema  MUSCULOSKELTAL: Joints normal ROM, No TTP, No effusion  VAGINAL: deferred  SKIN: no rashes  RECTAL: deferred, not indicated  BREAST: deferred    Labs:                        16.0   8.37  )-----------( 323      ( 03 Apr 2021 17:03 )             49.6   04-03    136  |  108  |  5<L>  ----------------------------<  113<H>  4.2   |  23  |  0.75    Ca    10.1      03 Apr 2021 17:03    CT Head No Cont (03.28.21 @ 15:08) >  IMPRESSION:  1)  the skull base and calvarium appear intact.  2)  no intracerebral hemorrhage, contusion, or extracerebral collections are identified.    CT Cervical Spine No Cont (03.28.21 @ 15:09) >  IMPRESSION:  No acute fracture identified. Underlying degenerative changes inclusive of ankylosis C4-C7 and spondylosis at C3-4.    CT Chest w/ IV Cont (03.28.21 @ 15:11) >  IMPRESSION:  No evidence of acute abnormality in the chest, abdomen, or pelvis.    CT Abdomen and Pelvis w/ IV Cont (03.28.21 @ 15:12) >  IMPRESSION:  No evidence of acute abnormality in the chest, abdomen, or pelvis.    Assessment and Plan:  **See hospital course

## 2021-04-05 NOTE — DISCHARGE NOTE PROVIDER - CARE PROVIDERS DIRECT ADDRESSES
,DirectAddress_Unknown,[email or web address]

## 2021-04-05 NOTE — DISCHARGE NOTE PROVIDER - PROVIDER TOKENS
PROVIDER:[TOKEN:[[id number]],FOLLOWUP:[2 weeks]],PROVIDER:[TOKEN:[[id number]],FOLLOWUP:[1-3 days]]

## 2021-04-05 NOTE — DISCHARGE NOTE NURSING/CASE MANAGEMENT/SOCIAL WORK - PATIENT PORTAL LINK FT
You can access the FollowMyHealth Patient Portal offered by [facility] by registering at the following website: [email or web address]. By joining [facility]’s FollowMyHealth portal, you will also be able to view your health information using other applications (apps) compatible with our system.

## 2021-04-05 NOTE — DISCHARGE NOTE PROVIDER - CARE PROVIDER_API CALL
Mickey Covarrubias)  Gastroenterology; Internal Medicine  [address] [place] [address]  Phone: [phone]  Fax: [phone]  Follow Up Time: 2 weeks    Boxer, Jonathan A  INTERNAL MEDICINE  [address] [place] [address]  Phone: [phone]  Fax: [phone]  Follow Up Time: 1-3 days

## 2021-04-05 NOTE — DISCHARGE NOTE PROVIDER - NSDCFUADDINST_GEN_ALL_CORE_FT
Follow up with your primary care provider within 1 week from discharge. Continue home medications.     If you develop any worsening symptoms, chest pain, shortness of breath, weakness, worsening confusion, unable to eat or drink, or are not urinating, please contact your medical provider immediately or go to your nearest emergency department

## 2021-04-05 NOTE — DISCHARGE NOTE PROVIDER - HOSPITAL COURSE
64F w/ recent bryan of Cdiff infection diagnosis 2 weeks prior to admission and treated w/ vancomycin PO. BIBA to ED c/o abdominal pain located across her abdomen a/w N/V/D which has been profuse at times.  has been feeling week due to dehydration. She  reports falling on her R side and c/o rib pain. In the ED, CTH/C-spine, CT chest, CTAP unremarkable and patient was given 1.8L of NS and Protonix iv and patient continued to c/o abdominal pain and R sided rib pain. She was unable to take PO pain medications due to nausea and dry heaving.  Patient admitted to med/surg for inability to tolerate PO intake and pain management. ID consulted and vancomycin increased to 500 mg q6h and will continue to take for 2 additional weeks upon d/c. Patient to continue taking home medications and f/u with PCP within 1 week.     # Cdiff colitis: improving on oral Vanco   - GI PCR. negative  - C. diff PCR negative but suspect false negative results  -Tolerating regular diet   - Vancomycin PO increased to 500mg QID per ID; continue for 2 weeks upon d/c     # Hypokalemia   -Repleted   -Resolved     #Polycythemia   -Asymptomatic   -Follow up for repeat cbc in 1 week with primary care provider     #HTN:   -Continue home medications, BP stable    Patient to be d/c home. Discussed plan of care, incidental findings, discharge plan, follow up, and return precautions with patient. Patient verbalizes understanding and agrees with plan of care. 64F w/ recent bryan of Cdiff infection diagnosis 2 weeks prior to admission and treated w/ vancomycin PO. BIBA to ED c/o abdominal pain located across her abdomen a/w N/V/D which has been profuse at times.  has been feeling week due to dehydration. She  reports falling on her R side and c/o rib pain. In the ED, CTH/C-spine, CT chest, CTAP unremarkable and patient was given 1.8L of NS and Protonix iv and patient continued to c/o abdominal pain and R sided rib pain. She was unable to take PO pain medications due to nausea and dry heaving.  Patient admitted to med/surg for inability to tolerate PO intake and pain management. ID consulted and vancomycin increased to 500 mg q6h and will continue to take for 2 additional weeks upon d/c. Patient to continue taking home medications and f/u with PCP within 1 week.     # recurrent Cdiff colitis: improving on oral Vanco   - GI PCR. negative  - C. diff PCR negative but suspect false negative results  -Tolerating regular diet   - Vancomycin PO increased to 500mg QID per ID; continue for 2 weeks upon d/c  as per Dr roger    # Hypokalemia   -Repleted   -Resolved     #Polycythemia   -Asymptomatic   -Follow up for repeat cbc in 1 week with primary care provider     # incidental finding calcified uterine myoma - f/u with PMD and GYN as outpatient  renal lesions too small to characterise - f/u with PMD as outpatient for outpatient renal US and monitoring       #HTN:   -Continue home medications, BP stable    Patient to be d/c home. Discussed plan of care, incidental findings, discharge plan, follow up, and return precautions with patient. Patient verbalizes understanding and agrees with plan of care.

## 2021-04-05 NOTE — DISCHARGE NOTE PROVIDER - NSDCMRMEDTOKEN_GEN_ALL_CORE_FT
amitriptyline 25 mg oral tablet: 1 tab(s) orally once a day (at bedtime)  amLODIPine 10 mg oral tablet: 1 tab(s) orally once a day  calcium (as calcium citrate) 250 mg oral tablet: 1 tab(s) orally 2 times a day  cholecalciferol 1000 intl units oral tablet: 1 tab(s) orally 2 times a day  ferrous sulfate 325 mg (65 mg elemental iron) oral tablet: 1 tab(s) orally once a day  HYDROmorphone 2 mg oral tablet: 1 tab(s) orally every 6 hours, As Needed -Severe Pain (7 - 10) - for severe pain MDD:8mg   melatonin 3 mg oral tablet: 5 tab(s) orally once a day (at bedtime)  metoprolol tartrate 25 mg oral tablet: 1 tab(s) orally 2 times a day  multivitamin: 1 tab(s) orally once a day  potassium chloride 20 mEq oral tablet, extended release: 1 tab(s) orally once  vancomycin 250 mg oral capsule: 2 cap(s) orally every 6 hours

## 2021-04-07 RX ORDER — VANCOMYCIN HCL 1 G
10 VIAL (EA) INTRAVENOUS
Qty: 280 | Refills: 0
Start: 2021-04-07 | End: 2021-04-13

## 2021-04-07 NOTE — CHART NOTE - NSCHARTNOTEFT_GEN_A_CORE
I called patient
Full note to follow. Covering for Dr. Curiel. Pt with recently treated c diff here for n/v/d and abd pain. Normal white count, CT abd negative, no colitis. Could just be gastroenteritis but would send C diff stool Toxins A/B to see if she has active disease as her PCR will continue to be positive. Clears ok for now. PPI, anti nauesa medications.

## 2021-04-12 DIAGNOSIS — E87.6 HYPOKALEMIA: ICD-10-CM
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
## 2021-04-12 DIAGNOSIS — A04.71 ENTEROCOLITIS DUE TO CLOSTRIDIUM DIFFICILE, RECURRENT: ICD-10-CM
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
## 2021-04-12 DIAGNOSIS — D75.1 SECONDARY POLYCYTHEMIA: ICD-10-CM

## 2021-04-12 DIAGNOSIS — D25.9 LEIOMYOMA OF UTERUS, UNSPECIFIED: ICD-10-CM

## 2021-04-12 DIAGNOSIS — E86.0 DEHYDRATION: ICD-10-CM

## 2021-04-12 DIAGNOSIS — E80.20 UNSPECIFIED PORPHYRIA: ICD-10-CM
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
## 2021-04-12 DIAGNOSIS — Z88.8 ALLERGY STATUS TO OTHER DRUGS, MEDICAMENTS AND BIOLOGICAL SUBSTANCES STATUS: ICD-10-CM

## 2021-04-12 DIAGNOSIS — H91.90 UNSPECIFIED HEARING LOSS, UNSPECIFIED EAR: ICD-10-CM

## 2021-04-12 DIAGNOSIS — M81.0 AGE-RELATED OSTEOPOROSIS WITHOUT CURRENT PATHOLOGICAL FRACTURE: ICD-10-CM

## 2021-04-12 DIAGNOSIS — E27.1 PRIMARY ADRENOCORTICAL INSUFFICIENCY: ICD-10-CM

## 2021-05-02 NOTE — DISCHARGE NOTE ADULT - NS TRANSFER PROSTHESIS:
65 year old male with PMH of CAD s/p MI, PCI/CABG, CHFrEF (15-20%), has ICD, HTN, celiac disease, DM, neuropathy, chronic b/l LE ulcers, severe chronic pain,  hx infected R TKR with MRSA (was eventually cemented so has limited ROM R knee), and history of cholecystostomy tube placement in February 2020 for acute cholecystitis s/p removal in May 2020, with fistula formation and persistent bilious drainage from the prior tract site as well as a RUQ abdominal abscess at the site s/p drainage admitted for septic arthritis.     # Septic arthritis of Left knee - not better, pain worse  -  s/p arthrocentesis of left knee - consistent with bacterial septic infection   - s/p OR washout 4/26 - purulent fluid in left knee with significant tricompartmental arthritis   - Blood Cx persitently positive form 4/26 to 4/28 - MSSA,   - OR Cx 4/26 Staph aureus  - cont nafcillin as per ID, pt will need long course of ABx.   - cont daily BCx  - ortho on board; plan to repeat I&D and drain removal     # Persistent bacteremia, MSSA, likely from septic arthritis, r/o IE  - nava canceled due to positive COVID test  - will repeat COVID tests on Sunday, pt need NAVA done in light of AICD, if positive for vegetations AICD should be extracted     # biliary drainage from perc sudhir site  - Previous Intraabdominal Cx 9/4/2020 -- VRE faecium and pseudomonas aeruginosa  - CT Abdomen and Pelvis w/ IV Cont (04.26.21 @ 04:42): No evidence of acute intra-abdominal pathology. Decreased area of right upper quadrant subcutaneous stranding at site of prior percutaneous cholecystostomy, without evidence of abscess.  - pt needs ERCP and cholecystectomy, GI recommended to do it OP  - IR consult for possible vac dressing, they deferred it to surgery, surgery do not recommended vac dressing, just simple dry dressing.  - HIDA scan was ordered but pt refused, will reorder again  - will send Cx from drain    # Positive COVID PCR - pt has high AB titer, had COVID and had vaccine, no clinical signs of infection, no need for isolation    # persistent pain due to septic arthritis - cont current regimen of opioids, laxatives to avoid constipation    # Chronic iron deficiency anemia with component of ACD - stable Hb      Diet: DASH/carb consistent   Activity: as tolerated  DVT Prophylaxis: lovenox  Code Status: Full  Disposition: Home once NAVA performed if repeat covid -ve/ ID recs/ surgery + ortho recommendations PT   none

## 2021-06-23 NOTE — ED ADULT TRIAGE NOTE - PAIN RATING/NUMBER SCALE (0-10): REST
Detail Level: Zone
Additional Notes: continue clobetasol x 1 more week\\ndoes not require refills at this time
Render Risk Assessment In Note?: no
9

## 2021-08-19 NOTE — BEHAVIORAL HEALTH ASSESSMENT NOTE - NSBHCHARTREVIEWVS_PSY_A_CORE FT
Octavio Abel Patient Age: 72 year old  MESSAGE: Interpreting service used: No      IM/FP- Work In Appointment Request-         Per Patient, needs to be seen for Follow Up  Immediate Care        Timeframe:   5 days-      Are there available appointments with the patients PCP?  No    Is patient willing to see a trusted partner or PCP only?  PCP only-              Patient prefers to be seen at: [facility]    Additional Information:  For pneumonia              Is patient having new or worsening symptoms? No- [facility]- Route message to provider's clinical pool    .    (route message HIGH PRIORITY for same day/next day requests)       ALLERGIES:  Lisinopril, Losartan, Celexa, and Citalopram  Current Outpatient Medications   Medication   • azithromycin (ZITHROMAX) 250 MG tablet   • cefUROXime (CEFTIN) 500 MG tablet   • doxazosin (CARDURA) 4 MG tablet   • dilTIAZem (CARDIZEM CD) 300 MG 24 hr capsule   • PARoxetine (PAXIL) 20 MG tablet     No current facility-administered medications for this visit.     PHARMACY to use: Shown below          Pharmacy preference(s) on file:   [id number] [place] [facility] [address] [facility] [address]  [facility] [address]  [facility] [address]  Phone: [phone] Fax: [phone]      CALL BACK INFO: Ok to leave response (including medical information) with family member or on answering machine      PCP: Vitor Baldwin MD         INS: Payor: [facility] MEDICARE ADVANTAGE / Plan: MEDICARE ADVANTAGE [id number] / Product Type: PF   PATIENT ADDRESS:  [address] [place] [id number]     Vital Signs Last 24 Hrs  T(C): 36.8 (03 Jan 2021 09:34), Max: 36.9 (02 Jan 2021 15:40)  T(F): 98.2 (03 Jan 2021 09:34), Max: 98.5 (02 Jan 2021 15:40)  HR: 100 (03 Jan 2021 09:34) (96 - 116)  BP: 137/42 (03 Jan 2021 09:34) (130/49 - 152/59)  BP(mean): --  RR: 18 (03 Jan 2021 09:34) (18 - 18)  SpO2: 100% (03 Jan 2021 09:34) (99% - 100%)

## 2021-10-21 NOTE — DISCHARGE NOTE ADULT - NS MD DC FALL RISK RISK
[address]  [facility] [address]  Phone: [phone]    October 21, 2021        Erin TAYLOR Gabi  [address] [facility] [address]          To whom it may concern:    RE: Erin Khan is cleared to take over the counter cough medications as needed when she develops a cough or acute illness. Thank you.     Please contact me for questions or concerns.      Sincerely,        Hussain Ryder PA-C   For information on Fall & Injury Prevention, visit [email or web address]

## 2021-11-03 NOTE — ED ADULT TRIAGE NOTE - PATIENT ON (OXYGEN DELIVERY METHOD)
[facility] ambulatory encounter  FAMILY PRACTICE ANNUAL PHYSICAL EXAM    CHIEF COMPLAINT:  No chief complaint on file.       HISTORY OF PRESENT ILLNESS:    Nilda Hutchins is a pleasant 64 year old female who presents today for ***    New concerns discussed include: ***    PROBLEM LIST:    Patient Active Problem List   Diagnosis   • Rosacea   • Hand dermatitis   • Urticaria   • Thoracic or lumbosacral neuritis or radiculitis, unspecified   • Degeneration of lumbar or lumbosacral intervertebral disc   • Lumbosacral spondylosis without myelopathy   • Pain in limb   • Sacroiliitis, not elsewhere classified   • AUTONOMIC DISORDER sudomotor, preganlionic   • Insomnia   • Anxiety   • Tic douloureux       HISTORIES:    {History Links:2016720::\"I have reviewed the past medical history, family history, social history, medications and allergies listed in the medical record as obtained by my nursing staff and support staff and agree with their documentation.\"}    REVIEW OF SYSTEMS:    {ROS:4576831}     PHYSICAL EXAM:    {PHYSICAL EXAM:7053505}    LABORATORY DATA:    {LAB RESULTS - SUPER LIST:3772929}    IMAGING STUDIES:    {IMAGIN::\"N/A\"}    There is no height or weight on file to calculate BMI.    BMI ASSESSMENT/PLAN:  {[facility] BMI Follow-Up Plan:037809}        DEPRESSION ASSESSMENT/PLAN:  {[facility] Depression Screening Follow-Up:767544}    ASSESSMENT:    No diagnosis found.    PLAN:    No orders of the defined types were placed in this encounter.      No follow-ups on file.    Screenings/Treatments Needed:  {MEDICARE WELLNESS SERVICES:1995782}    Instructions provided as documented in the after visit summary.    The {Patient and X:4990701::\"patient\"} indicated understanding of the diagnosis and agreed with the plan of care.      room air

## 2021-12-17 ENCOUNTER — OUTPATIENT (OUTPATIENT)
Dept: OUTPATIENT SERVICES | Facility: HOSPITAL | Age: 66
LOS: 1 days | Discharge: HOME | End: 2021-12-17
Payer: MEDICARE

## 2021-12-17 DIAGNOSIS — Z45.2 ENCOUNTER FOR ADJUSTMENT AND MANAGEMENT OF VASCULAR ACCESS DEVICE: ICD-10-CM

## 2021-12-17 DIAGNOSIS — Z90.49 ACQUIRED ABSENCE OF OTHER SPECIFIED PARTS OF DIGESTIVE TRACT: Chronic | ICD-10-CM

## 2021-12-17 DIAGNOSIS — Z98.1 ARTHRODESIS STATUS: Chronic | ICD-10-CM

## 2021-12-17 DIAGNOSIS — Z90.89 ACQUIRED ABSENCE OF OTHER ORGANS: Chronic | ICD-10-CM

## 2021-12-17 DIAGNOSIS — E80.20 UNSPECIFIED PORPHYRIA: ICD-10-CM

## 2021-12-17 PROCEDURE — 76937 US GUIDE VASCULAR ACCESS: CPT

## 2021-12-17 PROCEDURE — 76937 US GUIDE VASCULAR ACCESS: CPT | Mod: 26

## 2021-12-17 PROCEDURE — 36410 VNPNXR 3YR/> PHY/QHP DX/THER: CPT

## 2021-12-21 DIAGNOSIS — E80.20 UNSPECIFIED PORPHYRIA: ICD-10-CM

## 2021-12-21 NOTE — PROVIDER CONTACT NOTE (CRITICAL VALUE NOTIFICATION) - TEST AND RESULT REPORTED:
Patient presented after waiting 30 minutes with no reaction to allergy injections. Discharged from clinic.    Leana NGO RN    
blood c/s collected on 8/10/17 preliminary shows aerobic bottle with gram positive cocci in clusters and chains
blood cultures positive in aerobic and anaerobic bottles gram positive cocci in clusters

## 2022-01-01 NOTE — DIETITIAN INITIAL EVALUATION ADULT. - SIGNS/SYMPTOMS
Problem: NICU 27-29 weeks: Week of life 7 until discharge  Goal: Activity/Safety  Outcome: Progressing Towards Goal  Goal: Diagnostic Test/Procedures  Outcome: Progressing Towards Goal  Goal: Nutrition/Diet  Outcome: Progressing Towards Goal  Goal: Medications  Outcome: Progressing Towards Goal  Goal: Respiratory  Outcome: Progressing Towards Goal  Goal: Treatments/Interventions/Procedures  Outcome: Progressing Towards Goal  Goal: *Absence of infection signs and symptoms  Outcome: Progressing Towards Goal     1530 Bedside, Verbal, and Written shift change report given to Sami Herrera RN (oncoming nurse) by Riya Chandra RN (offgoing nurse). Report included the following information SBAR, Intake/Output, MAR, Recent Results, and Alarm Parameters . 1600 Hands on. Fussy. Offered PO. PO full feed, no issues. 1900 Baby sleeping well, fed by NGT on pump. 2200 Hands on. Baby fussy but consoles. PO full feed. Placed in chair with hiccups. skin eruptions, N/V

## 2022-01-13 NOTE — PROGRESS NOTE ADULT - SUBJECTIVE AND OBJECTIVE BOX
"Physical Therapy Treatment    Patient Name:  Peg Cabezas   MRN:  [id number]    Recommendations:     Discharge Recommendations:  nursing facility, skilled,LTACH (long-term acute care hospital)   Discharge Equipment Recommendations: other (see comments) (TBD)   Barriers to discharge: desat with limited mobility and increase assist with mobility    Assessment:     Peg Cabezas is a 85 y.o. female admitted with a medical diagnosis of Acute on chronic respiratory failure with hypoxia.  She presents with the following impairments/functional limitations:  weakness,impaired endurance,impaired self care skills,impaired functional mobilty,gait instability,impaired balance,decreased lower extremity function,decreased safety awareness,impaired cardiopulmonary response to activity,pain.    Pt found up in chair on 40L O2 via NC SAO2 91%. Seated ankle DF desat to 85% with prolong rest break with emphasis on PLB to increase to 91%. Seated knee ext desat to 85% with prolong rest break with emphasis on PLB to increase to 91%. Not appropriate for mobility this date due to desat with very limited mobility.     Rehab Prognosis: Fair; patient would benefit from acute skilled PT services to address these deficits and reach maximum level of function.    Recent Surgery: * No surgery found *      Plan:     During this hospitalization, patient to be seen 5 x/week to address the identified rehab impairments via gait training,therapeutic activities,therapeutic exercises,neuromuscular re-education and progress toward the following goals:    · Plan of Care Expires:  02/13/22    Subjective     Chief Complaint: "I can't catch my breath"  Patient/Family Comments/goals: get better  Pain/Comfort:  · Pain Rating 1: 0/10      Objective:     Communicated with RN prior to session.  Patient found HOB elevated with peripheral IV,telemetry,blood pressure cuff,pulse ox (continuous),oxygen (vapotherm) upon PT entry to room.     General Precautions: Standard, " 62F with PMHx AIP, chronic pain, anxiety with recent acute flare of AIP who now p/w severe RLQ pain radiating to her right flank, starting ~ 3 days PTA. Nothing mitigates nor exacerbates. a/w intermittent fever.   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   Baseline anorexia & constipation- last BM ~ 1 week ago.     When initially examined, Pt in severe distress d/t localized RLQ pain. CT(A/P) reviewed with Rads- no evidence of appendicitis but profound stool retention. Pain significantly reduced with IV Dilaudid/Toradol.      8/11: No O/N events. Significantly more comfortable on PCA. Still no BM. Afebrile. Notified of STAU in 2/2 blood Cx's  8/12: No O/N events. Upset d/t fecal incontinence, now refusing Miralax. Abdominal pain significantly improved after multiple BM's  8/13: No O/N events. No new complaints. Finally getting some rest. Pain controlled.   8/14: No O/N events. No new complaints. Frequent BM's, still with mild crampy abdominal pain. Discussed implications of persistent Staph bacteremia @ length.  8/15: No O/N events. No new complaints. Tolerated Mediport removal w/o issue. Remains afebrile. BM's daily, appropriately loose.  8/16: No O/N events. No new complaints. Essentially status quo. Remains. afebrile.  8/17: No O/N events. No new complaints. Afebrile. Feels well. Using PCA less often, but still required.     Physical Exam:  · Constitutional	detailed exam	  · Constitutional Details	well-developed; distress due to pain	  · Neck	No bruits; no thyromegaly or nodules	  · Back	No deformity or limitation of movement	  · Respiratory	Breath Sounds equal & clear to percussion & auscultation, no accessory muscle use	  · Cardiovascular	Regular rate & rhythm, normal S1, S2; no murmurs, gallops or rubs; no S3, S4	  · Gastrointestinal	detailed exam	  · GI Normal	soft	  · GI Abnormal	tender  bowel sounds hypoactive  guarding	  · Tenderness	RLQ	  · Genitourinary	Normal genitalia; no lesions; no discharge	  · Extremities	No cyanosis, clubbing or edema	  · Vascular	Equal and normal pulses (carotid, femoral, dorsalis pedis)	  · Neurological	Alert & oriented; no sensory, motor or coordination deficits, normal reflexes	  · Lymph Nodes	No lymphadedenopathy	  · Musculoskeletal	No joint pain, swelling or deformity; no limitation of movement	      Labs and Results:  T(C): 36.4 (08-17-17 @ 12:38), Max: 36.8 (08-16-17 @ 20:28)  HR: 90 (08-17-17 @ 12:38) (83 - 101)  BP: 156/72 (08-17-17 @ 12:38) (154/65 - 181/73)  RR: 16 (08-17-17 @ 12:38) (16 - 18)  SpO2: 97% (08-17-17 @ 12:38) (97% - 100%)  Wt(kg): --                              11.6   8.5   )-----------( 362      ( 17 Aug 2017 06:43 )             34.4     17 Aug 2017 06:43    141    |  107    |  5      ----------------------------<  87     3.8     |  25     |  0.58     Ca    9.2        17 Aug 2017 06:43  Phos  3.6       17 Aug 2017 06:43  Mg     2.3       17 Aug 2017 06:43    Assessment and Plan:   Assessment:  · Assessment		  62F with aforementioned PMHx, now a/w    * Severe acute RLQ pain, unclear etiology, suspect fecal retention as primary cause, appendix visualized & wnl, lactate wnl- doubt bowel ischemia; KUB unrevealing but obscured by remaining contrast  - PRN analgesics/antiemetics  - c/w senna , psyllium  - Hem/GI evals appreciated    * Sepsis 2/2 MSSA bacteremia d/t Mediport with persistence on surveillance blood Cx's  - c/w Ancef Q8  - surveillance blood Cx's 8/16 NGTD, catheter Cx NGTD  - Cardio eval for GENARO appreciated- plan to proceed if blood Cx's remain (+)    * Chronic pain, anxiety- status quo; i-STOP c/w Pt report  - c/w MS Contin 230mg Q12  - Dilaudid PCA  - Ativan Q4 PRN    * GERD, stable  - c/w PPI    * hypokalemia  - replete PRN    * DVT PPx- Lovenox    * DNR/DNI airborne,fall,droplet,contact   Orthopedic Precautions:N/A   Braces: N/A  Respiratory Status: High flow, flow 40 L/min, concentration 100%     Functional Mobility:  · No appropriate for mobility this date due to desat with seated TE to 85% on 40L 100%      AM-PAC 6 CLICK MOBILITY          Therapeutic Activities and Exercises:   Pt educated on POC, importance of time OOB, pursed lip breathing, need for O2 at this time, effect of O2 on mobility, discharge recommendation, need for assist with mobility, use of call bell to seek assistance as needed and fall prevention      Patient left up in chair with all lines intact and call button in reach..    GOALS:   Multidisciplinary Problems     Physical Therapy Goals        Problem: Physical Therapy Goal    Goal Priority Disciplines Outcome Goal Variances Interventions   Physical Therapy Goal     PT, PT/OT Ongoing, Progressing     Description: Goals to be met by: Discharge     Patient will increase functional independence with mobility by performin. Supine to sit with Supervision  2. Sit to stand transfer with Supervision  3. Bed to chair transfer with Supervision using Rolling Walker  4. Gait  x 100 feet with Supervision using Rolling Walker.                          Time Tracking:     PT Received On: 22  PT Start Time: 1052     PT Stop Time: 1104  PT Total Time (min): 12 min     Billable Minutes: Therapeutic Activity 12    Treatment Type: Treatment  PT/PTA: PT     PTA Visit Number: 0     2022

## 2022-01-25 NOTE — PROGRESS NOTE ADULT - REASON FOR ADMISSION
Abdominal pain
Acute crisis/ Porphyria
Abdominal pain
Abdominal pain /Porphyria
Abdominal pain
Detail Level: Detailed
Quality 265: Biopsy Follow-Up: Biopsy results reviewed, communicated, tracked, and documented

## 2022-09-15 NOTE — INPATIENT CERTIFICATION FOR MEDICARE PATIENTS - IN ORDER TO MEET MEDICARE REQUIREMENTS.
In order to meet Medicare requirements, the clinical documentation must support the information cited in the admission order.  Please be sure to provide detailed and clear documentation about the following in the admitting note/history and physical: Tarsorrhaphy Text: A tarsorrhaphy was performed using Frost sutures.

## 2022-12-13 NOTE — H&P ADULT - EXTREMITIES
Fax received [place] [facility] [place]/17/21 for review and signature.  Put in Dr. Bingham's yellow's folder.  
Paperwork signed and faxed.    
No cyanosis, clubbing or edema

## 2024-05-28 NOTE — DISCHARGE NOTE ADULT - CARE PLAN
Pneumonia due to infectious organism    
Principal Discharge DX:	Abdominal pain  Goal:	improve  Instructions for follow-up, activity and diet:	c/w pain meds, f/u with Dr Seay

## 2024-07-22 NOTE — ED STATDOCS - CARDIAC, MLM
CARDIOLOGY OFFICE NOTE        ORIGINAL REASON FOR CONSULT:  Establish Cardiac Care (01/26/2024)  Stuart Huang MD   [address]  [facility] [place] [address]    Luis Wright is a 71 year old male with the following issues: CARDIAC INVESTIGATIONS/IMAGING   Age 71  Dyslipidemia  PAF (first episode 04/2021)  CAD= based on CCTA  Mild MR  Asthma  RA (Dr. Burt)  Idiopathic pulmonary fibrosis (Dr. Pritchard)  Former smoker  COVID-19 (8/23/23)    NT proBNP: None  10 year ASCVD risk: NA  [id number] Score: 2   H2FpEF Score:  4  NYHA FC: 1  Plaque Years: 8,418 (TOH=085, 9/23/2010) ECHO (02/06/24): LVEF 60% E/e' 6.1 GLS -13.4% RSVP 25 mmHg  Treadmill STRESS TEST (08/04/21): Normal exercise capacity 10.9 METS, no evidence of myocardial ischemic by EKG, no chest pain.  ECG (08/31/23): NSR.  Coronary CTA w/ Calcium Scoring (08/03/21): Total Ca 1585. Advanced, calcific, multivessel CAD without apparent flow-limiting stenoses.  PFT 6 Min Walk (11/24/23): Oxygenation at rest with activity on room air is acceptable. Normal PFT.  GI WORKUP: Colonoscopy (04/21/23): Non-bleeding internal hemorrhoids.  Sleep Study (05/08/24): Mild Obstructive Sleep apnea(ALEJANDRINA) with an AHI of 7.2/hr.      Mr. Wright presents in follow up from 01/26/2024 accompanied by his wife, Renetta.  At last visit we recommended an echo to reassess heart structure and function, start eliquis 5 mg BID for anticoagulation due to PAF, refer to EP(Dr. Mcclelland) for PAF, and to continue current medications.    Since last visit, he completed his echo(stable, EF 60%) ,he underwent a sleep study where he was diagnosed with mild obstructive sleep apnea with an AHI of 7.2/hr. He was in agreement to a CPAP machine. Using an auto-titrating CPAP between 5 and 15 cm of water. On 06/14/24 he had an ablation for a.fib with Dr. Mcclelland which was a successful ablation of paroxysmal atrial fibrillation involving PFA for pulmonary vein isolation x 4. He was discharged after which  instruction to continue eliquis and 4 hours of bedrest.     Today, ***.    Patient at this time denies history of orthopnea, paroxysmal nocturnal dyspnea, bendopnea, palpitations, lightheadedness/dizziness, presyncope/syncope, edema and any anginal type chest pain.    AF HISTORY / INCIDENTS     Patient-tracked history as of 2024 OV      CARDIAC MEDICATION CHANGES     24: start eliquis 5 mg BID for anticoagulation due to PAF.    ACTIVITY LEVEL     Walks outside almost every day when weather permits.  Small gym in apartment complex - uses stationary bike.     REVIEW OF SYSTEMS     Negative other than what has been specified in the history.     RECENT HOSPITALIZATION/S     NA    PERSONAL/SOCIAL HISTORY     Lives with his wife, Renetta, in an apartment in [facility] ([facility] [place]   Lived in [place] for ~12 years, recently moved back to [place] in 2023 (family in [place]).   Retired in . Former  +  for [employer].   2 grandchildren + 1 on the way (all in [facility] area).   Former smoker (quit early ).     FAMILY HISTORY     Renetta (wife) is a patient of Dr. Saxena.  Father  of MI at age 59.  Father's side - every male except 1  of MI before age of 60; exception is his uncle (dad's brother) who lived to age 89.  Sister (13 years older) - lung condition, no known heart issues  Brother - Parkinson's + Lewy Body Dementia; passed away at age 73.   No known substantial heart disease on mother's side.   No family history of SCD (sudden cardiac death) or premature CAD.    SURGICAL HISTORY     Past Surgical History:   Procedure Laterality Date    Ablation atrial fib - cv  2024    Cardiac stress test complete      nl    Colonoscopy diagnostic  02/15/2011    due 5 years 2/15/16    Colonoscopy remove lesions hot biopsy forceps  2005    Hyperplastic polyp and tubular adenoma, Dr Burch    Echo heart stress      normal    Eye surgery Right      Cataract    Open access colonoscopy  2018    for diarrhea    Removal gallbladder  1994    Cholecystectomy    Remv pilonidal lesion simple  06/01/1972    Tonsillectomy and adenoidectomy  1964    Tonsillectomy w Adenoids; age 12     ALEJANDRINA RISK ASSESSMENT     NA    PAD/AAA RISK ASSESSMENT (ULTRASOUND/ABIs)     Needed    CURRENT MEDICATIONS     Current Outpatient Medications   Medication Sig Dispense Refill    metoPROLOL tartrate (LOPRESSOR) 50 MG tablet Take 1 tablet by mouth 3 times daily as needed (Take 50 mg at onset of AF and may repeat x 2 every hour to max of 3 tabs in 24 hrs.). 60 tablet 1    Nintedanib Esylate 100 MG Cap Take 1 capsule by mouth every 12 hours. Take with food.      celecoxib (CeleBREX) 200 MG capsule [None received] (Patient not taking: Reported on 6/17/2024)      famotidine (PEPCID) 40 MG tablet Take 40 mg by mouth.      Certolizumab Pegol (CIMZIA SC) Inject 1 Dose into the skin every 30 days.      predniSONE (DELTASONE) 5 MG tablet TAKE 5 X 3 DAYS, 4 X 3 DAYS, 3 X 3 DAYS, 2 X 3 DAYS, THEN 1 FOR 3 DAYS (Patient not taking: Reported on 6/17/2024)      clobetasol 0.05 % lotion Apply topically 2 times daily. 118 mL 0    apixaBAN (ELIQUIS) 5 MG Tab Take 1 tablet by mouth every 12 hours. 180 tablet 3    Lactobacillus Rhamnosus, GG, (Culturelle) Cap Take 1 capsule by mouth daily.      Lysine HCl 1000 MG Tab Take 1,000 mg by mouth in the morning and 1,000 mg in the evening.      Multiple Vitamin (MULTIVITAMIN PO) Take 1 capsule by mouth nightly.      Coenzyme Q10 (Co Q-10) 200 MG Cap Take 1 capsule by mouth daily.      Ascorbic Acid (vitamin C) 500 MG tablet Take 500 mg by mouth daily.      metoPROLOL succinate (TOPROL-XL) 100 MG 24 hr tablet Take 100 mg by mouth daily.      Rosuvastatin Calcium (CRESTOR PO) Take 20 mg by mouth nightly. From an MD in [place].      loratadine-pseudoephedrine (CLARITIN-D 24-HOUR)  MG per 24 hr tablet Take 1 tablet by mouth daily. 30 tablet 1    MULTIVITAMINS PO TABS 1  TABLET DAILY 0 0     No current facility-administered medications for this visit.      PHYSICAL EXAMINATION         6/14/2024    11:45 AM 6/14/2024    12:00 PM 6/14/2024    12:15 PM 6/14/2024     2:30 PM 6/17/2024     7:36 AM   [facility] Extended Vitals - Weight in Kg/Lb   BP  98/54  105/54 100/60   Pulse 87 89 85 78 72   Resp  16      Weight kg     73.483 kg   Weight lb     162 lb   Height     5' 7\"   Height cm     170.2 cm   BMI     25.37   Pulse Ox 94 % 93 % 96 % 94 % 98 %   Patient Position  Supine  Supine    BP Location  RUE - Right upper extremity  RUE - Right upper extremity      General:  No acute distress.  HEENT:  PERRL, normocephalic/atraumatic, clear oropharynx.  Neck:  Supple, FROM.  Trachea central.  No JVD (jugular vein distention) or carotid bruit.  CVS:  S1, S2 normal.  No M/R/G.  Pulm:  Clear to auscultation/percussion.  No wheeze/rhonchi/rales.  Ext:  No cyanosis/clubbing/edema.  Skin:  No rash/palpable nodules.    LABORATORY     Recent Labs   Lab 06/14/24  0552 06/12/24  0937 04/04/24  0704 02/14/24  0915 12/07/23  0659 08/31/23  1159   HGB  --  15.7 15.1 15.5  --   --    PLT  --  154 138* 146  --   --    Sodium  --  139 139 137   < >  --    Potassium 5.1 5.3* 5.6* 4.9   < >  --    BUN  --  19 17 14   < >  --    Creatinine  --  0.82 0.8 0.87   < >  --    Glomerular Filtration Rate  --  >90  --  >90  --   --    Troponin I, High Sensitivity  --   --   --   --   --  8   C-REACTIVE PROTEIN  --   --  0.3  --   --   --     < > = values in this interval not displayed.                    ECHOCARDIOGRAM (05/21/2021)         UPCOMING APPOINTMENTS     Future Appointments   Date Time Provider Department [place]   7/26/2024  8:30 AM Johnathan Kaiser MD [facility]   8/7/2024 11:30 AM [id number]   8/14/2024  3:15 PM Le, MD [facility]   12/17/2024 12:00 PM SLM PLM 1 [facility]   12/17/2024 12:30 PM SLM PLM 1 [facility]   12/17/2024  1:30 PM Leh,  MD [place] [facility]MP[facility]2 [facility]     ASSESSMENT      Luis Wright is a 71 year old male with the following cardiovascular profile:    Age 71  Dyslipidemia  Paroxysmal atrial fibrillation  Coronary artery disease  Mild MR  Asthma  Idiopathic pulmonary fibrosis  RA  Former smoker    Middle-aged man with paroxysmal atrial fibrillation  C2V score=2 (vascular disease)  Very much interested in ablation  Patient knows that he is on flecainide which does not have a clear indication in patients with vascular disease for pill in the pocket approach.     RECOMMENDATIONS     Echocardiogram to assess heart structure and function.   Start Eliquis 5 mg BID for anticoagulation due to PAF.   Refer to EP (Dr. Mcclelland) for PAF. Initial discussion of radiofrequency ablation today.  Continue current medications.     Follow up in 6 months.     normal rate, regular rhythm, and no murmur.

## 2024-11-08 NOTE — PATIENT PROFILE ADULT - HOW PATIENT ADDRESSED, PROFILE
Patient called wanting her ESCITALOPRAM 10 MG TAB- ONE TAB BY MOUTH DAILY- 90 DAY REFILL  to be sent to [facility] due to her insurance not paying for it at [facility].    [facility] pharmacy [address] [address]  Phone no. [phone]    Please give her a call back.    Belén

## 2024-12-06 NOTE — PROVIDER CONTACT NOTE (OTHER) - DATE AND TIME:
28-Mar-2021 21:36
on the discharge service for the patient. I have reviewed and made amendments to the documentation where necessary.

## 2025-01-24 NOTE — ED ADULT NURSE NOTE - CADM POA CENTRAL LINE
Stacey Heath  Age: 55 y.o.  MRN: [id number]  Date: 1/21/2025  Location of service: in community    Program Details  Medicaid Community Clinical Case Management  Status: Enrolled  Effective Dates: 10/17/2023 - present  Responsible Staff: NAREN Davidson      Goals Reviewed:  Problem: Kidney Issues       Goal: Improve health to get on kidney transplant list       Priority: High        Problem: Risk of Uncoordinated Care       Goal: Care will be Coordinated and Supported by a Multidisciplinary Team of Providers       Priority: High          Summary:  This writer met with patient in the hospital for a community visit.   Patient was at dialysis when this writer met with patient. Patient states dialysis has been going well the last several days.   This writer engages in relationship building as patient discusses her newest grandson's birth.    Appointment start time: 1015  Appointment completion time: 1118  Total time spent with patient (in minutes): 63  Non-Billable Time: 63  Billable Time Total: 0    Roberta Gallego RN    
No

## 2025-01-29 NOTE — ED ADULT NURSE NOTE - CHIEF COMPLAINT
Patient wonders if it is okay to do her labs today. Labs are currently in process. Closing encounter   The patient is a 61y Female complaining of abdominal pain.

## 2025-02-22 ENCOUNTER — INPATIENT (INPATIENT)
Facility: HOSPITAL | Age: 70
LOS: 27 days | Discharge: NURSING, REHAB OR LONG-TERM CARE | DRG: 871 | End: 2025-03-22
Attending: FAMILY MEDICINE | Admitting: FAMILY MEDICINE
Payer: MEDICARE

## 2025-02-22 VITALS
HEART RATE: 100 BPM | DIASTOLIC BLOOD PRESSURE: 70 MMHG | OXYGEN SATURATION: 100 % | HEIGHT: 64 IN | WEIGHT: 100.09 LBS | SYSTOLIC BLOOD PRESSURE: 124 MMHG | RESPIRATION RATE: 21 BRPM

## 2025-02-22 DIAGNOSIS — K80.50 CALCULUS OF BILE DUCT WITHOUT CHOLANGITIS OR CHOLECYSTITIS WITHOUT OBSTRUCTION: ICD-10-CM

## 2025-02-22 DIAGNOSIS — Z90.89 ACQUIRED ABSENCE OF OTHER ORGANS: Chronic | ICD-10-CM

## 2025-02-22 DIAGNOSIS — Z90.49 ACQUIRED ABSENCE OF OTHER SPECIFIED PARTS OF DIGESTIVE TRACT: Chronic | ICD-10-CM

## 2025-02-22 DIAGNOSIS — Z98.1 ARTHRODESIS STATUS: Chronic | ICD-10-CM

## 2025-02-22 LAB
ALBUMIN SERPL ELPH-MCNC: 1.9 G/DL — LOW (ref 3.3–5)
ALP SERPL-CCNC: 1009 U/L — HIGH (ref 40–120)
ALT FLD-CCNC: 45 U/L — SIGNIFICANT CHANGE UP (ref 12–78)
ANION GAP SERPL CALC-SCNC: 11 MMOL/L — SIGNIFICANT CHANGE UP (ref 5–17)
APPEARANCE UR: CLEAR — SIGNIFICANT CHANGE UP
APTT BLD: 36.7 SEC — HIGH (ref 24.5–35.6)
AST SERPL-CCNC: 65 U/L — HIGH (ref 15–37)
BACTERIA # UR AUTO: NEGATIVE /HPF — SIGNIFICANT CHANGE UP
BASOPHILS # BLD AUTO: 0.05 K/UL — SIGNIFICANT CHANGE UP (ref 0–0.2)
BASOPHILS # BLD MANUAL: 0 K/UL — SIGNIFICANT CHANGE UP (ref 0–0.2)
BASOPHILS NFR BLD AUTO: 0.2 % — SIGNIFICANT CHANGE UP (ref 0–2)
BASOPHILS NFR BLD MANUAL: 0 % — SIGNIFICANT CHANGE UP (ref 0–2)
BILIRUB SERPL-MCNC: 3.2 MG/DL — HIGH (ref 0.2–1.2)
BILIRUB UR-MCNC: ABNORMAL
BUN SERPL-MCNC: 11 MG/DL — SIGNIFICANT CHANGE UP (ref 7–23)
CALCIUM SERPL-MCNC: 9.6 MG/DL — SIGNIFICANT CHANGE UP (ref 8.5–10.1)
CAST: 1 /LPF — SIGNIFICANT CHANGE UP (ref 0–4)
CHLORIDE SERPL-SCNC: 100 MMOL/L — SIGNIFICANT CHANGE UP (ref 96–108)
CO2 SERPL-SCNC: 22 MMOL/L — SIGNIFICANT CHANGE UP (ref 22–31)
COLOR SPEC: SIGNIFICANT CHANGE UP
CREAT SERPL-MCNC: 0.42 MG/DL — LOW (ref 0.5–1.3)
DIFF PNL FLD: ABNORMAL
EGFR: 107 ML/MIN/1.73M2 — SIGNIFICANT CHANGE UP
EGFR: 107 ML/MIN/1.73M2 — SIGNIFICANT CHANGE UP
EOSINOPHIL # BLD AUTO: 0.04 K/UL — SIGNIFICANT CHANGE UP (ref 0–0.5)
EOSINOPHIL # BLD MANUAL: 0 K/UL — SIGNIFICANT CHANGE UP (ref 0–0.5)
EOSINOPHIL NFR BLD AUTO: 0.2 % — SIGNIFICANT CHANGE UP (ref 0–6)
EOSINOPHIL NFR BLD MANUAL: 0 % — SIGNIFICANT CHANGE UP (ref 0–6)
FLUAV AG NPH QL: SIGNIFICANT CHANGE UP
FLUBV AG NPH QL: SIGNIFICANT CHANGE UP
GLUCOSE SERPL-MCNC: 120 MG/DL — HIGH (ref 70–99)
GLUCOSE UR QL: NEGATIVE MG/DL — SIGNIFICANT CHANGE UP
HCT VFR BLD CALC: 33 % — LOW (ref 34.5–45)
HGB BLD-MCNC: 10.7 G/DL — LOW (ref 11.5–15.5)
IMM GRANULOCYTES # BLD AUTO: 0.16 K/UL — HIGH (ref 0–0.07)
IMM GRANULOCYTES NFR BLD AUTO: 0.6 % — SIGNIFICANT CHANGE UP (ref 0–0.9)
INR BLD: 1.34 RATIO — HIGH (ref 0.85–1.16)
KETONES UR-MCNC: 80 MG/DL
LACTATE SERPL-SCNC: 0.8 MMOL/L — SIGNIFICANT CHANGE UP (ref 0.7–2)
LEUKOCYTE ESTERASE UR-ACNC: ABNORMAL
LIDOCAIN IGE QN: 25 U/L — SIGNIFICANT CHANGE UP (ref 13–75)
LYMPHOCYTES # BLD AUTO: 0.73 K/UL — LOW (ref 1–3.3)
LYMPHOCYTES # BLD MANUAL: 1.01 K/UL — SIGNIFICANT CHANGE UP (ref 1–3.3)
LYMPHOCYTES NFR BLD AUTO: 2.8 % — LOW (ref 13–44)
LYMPHOCYTES NFR BLD MANUAL: 4 % — LOW (ref 13–44)
MANUAL SMEAR VERIFICATION: SIGNIFICANT CHANGE UP
MCHC RBC-ENTMCNC: 27.6 PG — SIGNIFICANT CHANGE UP (ref 27–34)
MCHC RBC-ENTMCNC: 32.4 G/DL — SIGNIFICANT CHANGE UP (ref 32–36)
MCV RBC AUTO: 85.1 FL — SIGNIFICANT CHANGE UP (ref 80–100)
MONOCYTES # BLD AUTO: 0.64 K/UL — SIGNIFICANT CHANGE UP (ref 0–0.9)
MONOCYTES # BLD MANUAL: 1.01 K/UL — HIGH (ref 0–0.9)
MONOCYTES NFR BLD AUTO: 2.5 % — SIGNIFICANT CHANGE UP (ref 2–14)
MONOCYTES NFR BLD MANUAL: 4 % — SIGNIFICANT CHANGE UP (ref 2–14)
NEUTROPHILS # BLD AUTO: 24.28 K/UL — HIGH (ref 1.8–7.4)
NEUTROPHILS # BLD MANUAL: 23.22 K/UL — HIGH (ref 1.8–7.4)
NEUTROPHILS NFR BLD AUTO: 93.7 % — HIGH (ref 43–77)
NEUTROPHILS NFR BLD MANUAL: 92 % — HIGH (ref 43–77)
NITRITE UR-MCNC: POSITIVE
NRBC # BLD AUTO: 0 K/UL — SIGNIFICANT CHANGE UP (ref 0–0)
NRBC # FLD: 0 K/UL — SIGNIFICANT CHANGE UP (ref 0–0)
NRBC BLD AUTO-RTO: 0 /100 WBCS — SIGNIFICANT CHANGE UP (ref 0–0)
PH UR: 6.5 — SIGNIFICANT CHANGE UP (ref 5–8)
PLAT MORPH BLD: NORMAL — SIGNIFICANT CHANGE UP
PLATELET # BLD AUTO: 361 K/UL — SIGNIFICANT CHANGE UP (ref 150–400)
PMV BLD: 11.4 FL — SIGNIFICANT CHANGE UP (ref 7–13)
POTASSIUM SERPL-MCNC: 3.9 MMOL/L — SIGNIFICANT CHANGE UP (ref 3.5–5.3)
POTASSIUM SERPL-SCNC: 3.9 MMOL/L — SIGNIFICANT CHANGE UP (ref 3.5–5.3)
PROT SERPL-MCNC: 7.5 GM/DL — SIGNIFICANT CHANGE UP (ref 6–8.3)
PROT UR-MCNC: 100 MG/DL
PROTHROM AB SERPL-ACNC: 15.8 SEC — HIGH (ref 9.9–13.4)
RBC # BLD: 3.88 M/UL — SIGNIFICANT CHANGE UP (ref 3.8–5.2)
RBC # FLD: 14.7 % — HIGH (ref 10.3–14.5)
RBC BLD AUTO: NORMAL — SIGNIFICANT CHANGE UP
RBC CASTS # UR COMP ASSIST: 9 /HPF — HIGH (ref 0–4)
RSV RNA NPH QL NAA+NON-PROBE: SIGNIFICANT CHANGE UP
SARS-COV-2 RNA SPEC QL NAA+PROBE: SIGNIFICANT CHANGE UP
SODIUM SERPL-SCNC: 133 MMOL/L — LOW (ref 135–145)
SP GR SPEC: 1.02 — SIGNIFICANT CHANGE UP (ref 1–1.03)
SQUAMOUS # UR AUTO: 2 /HPF — SIGNIFICANT CHANGE UP (ref 0–5)
TROPONIN I, HIGH SENSITIVITY RESULT: 4.45 NG/L — SIGNIFICANT CHANGE UP
UROBILINOGEN FLD QL: 4 MG/DL (ref 0.2–1)
WBC # BLD: 25.24 K/UL — HIGH (ref 3.8–10.5)
WBC # FLD AUTO: 25.24 K/UL — HIGH (ref 3.8–10.5)
WBC MORPHOLOGY: NORMAL — SIGNIFICANT CHANGE UP
WBC UR QL: 4 /HPF — SIGNIFICANT CHANGE UP (ref 0–5)

## 2025-02-22 PROCEDURE — 82962 GLUCOSE BLOOD TEST: CPT

## 2025-02-22 PROCEDURE — 83540 ASSAY OF IRON: CPT

## 2025-02-22 PROCEDURE — 74176 CT ABD & PELVIS W/O CONTRAST: CPT | Mod: MC

## 2025-02-22 PROCEDURE — 85018 HEMOGLOBIN: CPT

## 2025-02-22 PROCEDURE — 87507 IADNA-DNA/RNA PROBE TQ 12-25: CPT

## 2025-02-22 PROCEDURE — 92526 ORAL FUNCTION THERAPY: CPT | Mod: GN

## 2025-02-22 PROCEDURE — 85014 HEMATOCRIT: CPT

## 2025-02-22 PROCEDURE — 71250 CT THORAX DX C-: CPT | Mod: MC

## 2025-02-22 PROCEDURE — 84100 ASSAY OF PHOSPHORUS: CPT

## 2025-02-22 PROCEDURE — 82550 ASSAY OF CK (CPK): CPT

## 2025-02-22 PROCEDURE — P9047: CPT | Mod: JZ

## 2025-02-22 PROCEDURE — 82746 ASSAY OF FOLIC ACID SERUM: CPT

## 2025-02-22 PROCEDURE — 81001 URINALYSIS AUTO W/SCOPE: CPT

## 2025-02-22 PROCEDURE — C1764: CPT

## 2025-02-22 PROCEDURE — 84132 ASSAY OF SERUM POTASSIUM: CPT

## 2025-02-22 PROCEDURE — 82977 ASSAY OF GGT: CPT

## 2025-02-22 PROCEDURE — 84134 ASSAY OF PREALBUMIN: CPT

## 2025-02-22 PROCEDURE — 84443 ASSAY THYROID STIM HORMONE: CPT

## 2025-02-22 PROCEDURE — 87086 URINE CULTURE/COLONY COUNT: CPT

## 2025-02-22 PROCEDURE — 92610 EVALUATE SWALLOWING FUNCTION: CPT | Mod: GN

## 2025-02-22 PROCEDURE — C1889: CPT

## 2025-02-22 PROCEDURE — 97162 PT EVAL MOD COMPLEX 30 MIN: CPT | Mod: GP

## 2025-02-22 PROCEDURE — 33285 INSJ SUBQ CAR RHYTHM MNTR: CPT

## 2025-02-22 PROCEDURE — 74177 CT ABD & PELVIS W/CONTRAST: CPT | Mod: MC

## 2025-02-22 PROCEDURE — 93308 TTE F-UP OR LMTD: CPT

## 2025-02-22 PROCEDURE — 87186 SC STD MICRODIL/AGAR DIL: CPT

## 2025-02-22 PROCEDURE — 82272 OCCULT BLD FECES 1-3 TESTS: CPT

## 2025-02-22 PROCEDURE — 99223 1ST HOSP IP/OBS HIGH 75: CPT

## 2025-02-22 PROCEDURE — 99285 EMERGENCY DEPT VISIT HI MDM: CPT

## 2025-02-22 PROCEDURE — 93321 DOPPLER ECHO F-UP/LMTD STD: CPT

## 2025-02-22 PROCEDURE — 93325 DOPPLER ECHO COLOR FLOW MAPG: CPT

## 2025-02-22 PROCEDURE — 99497 ADVNCD CARE PLAN 30 MIN: CPT | Mod: 25

## 2025-02-22 PROCEDURE — 84478 ASSAY OF TRIGLYCERIDES: CPT

## 2025-02-22 PROCEDURE — 97530 THERAPEUTIC ACTIVITIES: CPT | Mod: GP

## 2025-02-22 PROCEDURE — 71045 X-RAY EXAM CHEST 1 VIEW: CPT

## 2025-02-22 PROCEDURE — 36415 COLL VENOUS BLD VENIPUNCTURE: CPT

## 2025-02-22 PROCEDURE — 97116 GAIT TRAINING THERAPY: CPT | Mod: GP

## 2025-02-22 PROCEDURE — 82803 BLOOD GASES ANY COMBINATION: CPT

## 2025-02-22 PROCEDURE — 82140 ASSAY OF AMMONIA: CPT

## 2025-02-22 PROCEDURE — 85045 AUTOMATED RETICULOCYTE COUNT: CPT

## 2025-02-22 PROCEDURE — 87040 BLOOD CULTURE FOR BACTERIA: CPT

## 2025-02-22 PROCEDURE — 70551 MRI BRAIN STEM W/O DYE: CPT | Mod: MC

## 2025-02-22 PROCEDURE — 82728 ASSAY OF FERRITIN: CPT

## 2025-02-22 PROCEDURE — 83550 IRON BINDING TEST: CPT

## 2025-02-22 PROCEDURE — 74176 CT ABD & PELVIS W/O CONTRAST: CPT | Mod: 26

## 2025-02-22 PROCEDURE — 84145 PROCALCITONIN (PCT): CPT

## 2025-02-22 PROCEDURE — C1769: CPT

## 2025-02-22 PROCEDURE — 84425 ASSAY OF VITAMIN B-1: CPT

## 2025-02-22 PROCEDURE — 93312 ECHO TRANSESOPHAGEAL: CPT

## 2025-02-22 PROCEDURE — 99285 EMERGENCY DEPT VISIT HI MDM: CPT | Mod: 25

## 2025-02-22 PROCEDURE — 88312 SPECIAL STAINS GROUP 1: CPT

## 2025-02-22 PROCEDURE — 85025 COMPLETE CBC W/AUTO DIFF WBC: CPT

## 2025-02-22 PROCEDURE — 71045 X-RAY EXAM CHEST 1 VIEW: CPT | Mod: 26

## 2025-02-22 PROCEDURE — 83735 ASSAY OF MAGNESIUM: CPT

## 2025-02-22 PROCEDURE — 84311 SPECTROPHOTOMETRY: CPT

## 2025-02-22 PROCEDURE — 96365 THER/PROPH/DIAG IV INF INIT: CPT

## 2025-02-22 PROCEDURE — 72125 CT NECK SPINE W/O DYE: CPT | Mod: 26

## 2025-02-22 PROCEDURE — 82607 VITAMIN B-12: CPT

## 2025-02-22 PROCEDURE — 83036 HEMOGLOBIN GLYCOSYLATED A1C: CPT

## 2025-02-22 PROCEDURE — 82533 TOTAL CORTISOL: CPT

## 2025-02-22 PROCEDURE — 97110 THERAPEUTIC EXERCISES: CPT | Mod: GP

## 2025-02-22 PROCEDURE — 82542 COL CHROMOTOGRAPHY QUAL/QUAN: CPT

## 2025-02-22 PROCEDURE — 93005 ELECTROCARDIOGRAM TRACING: CPT

## 2025-02-22 PROCEDURE — 93010 ELECTROCARDIOGRAM REPORT: CPT

## 2025-02-22 PROCEDURE — 84146 ASSAY OF PROLACTIN: CPT

## 2025-02-22 PROCEDURE — 88305 TISSUE EXAM BY PATHOLOGIST: CPT

## 2025-02-22 PROCEDURE — 83090 ASSAY OF HOMOCYSTEINE: CPT

## 2025-02-22 PROCEDURE — 84550 ASSAY OF BLOOD/URIC ACID: CPT

## 2025-02-22 PROCEDURE — 94640 AIRWAY INHALATION TREATMENT: CPT

## 2025-02-22 PROCEDURE — 71275 CT ANGIOGRAPHY CHEST: CPT | Mod: MC

## 2025-02-22 PROCEDURE — 93306 TTE W/DOPPLER COMPLETE: CPT

## 2025-02-22 PROCEDURE — 82248 BILIRUBIN DIRECT: CPT

## 2025-02-22 PROCEDURE — 87150 DNA/RNA AMPLIFIED PROBE: CPT

## 2025-02-22 PROCEDURE — 80048 BASIC METABOLIC PNL TOTAL CA: CPT

## 2025-02-22 PROCEDURE — 70544 MR ANGIOGRAPHY HEAD W/O DYE: CPT | Mod: MC

## 2025-02-22 PROCEDURE — 84484 ASSAY OF TROPONIN QUANT: CPT

## 2025-02-22 PROCEDURE — 80053 COMPREHEN METABOLIC PANEL: CPT

## 2025-02-22 PROCEDURE — 70450 CT HEAD/BRAIN W/O DYE: CPT | Mod: 26

## 2025-02-22 PROCEDURE — 83010 ASSAY OF HAPTOGLOBIN QUANT: CPT

## 2025-02-22 PROCEDURE — 80061 LIPID PANEL: CPT

## 2025-02-22 PROCEDURE — 83690 ASSAY OF LIPASE: CPT

## 2025-02-22 PROCEDURE — 83880 ASSAY OF NATRIURETIC PEPTIDE: CPT

## 2025-02-22 PROCEDURE — 87077 CULTURE AEROBIC IDENTIFY: CPT

## 2025-02-22 PROCEDURE — 93320 DOPPLER ECHO COMPLETE: CPT

## 2025-02-22 PROCEDURE — 95816 EEG AWAKE AND DROWSY: CPT

## 2025-02-22 PROCEDURE — 96375 TX/PRO/DX INJ NEW DRUG ADDON: CPT

## 2025-02-22 PROCEDURE — 85610 PROTHROMBIN TIME: CPT

## 2025-02-22 PROCEDURE — 70547 MR ANGIOGRAPHY NECK W/O DYE: CPT

## 2025-02-22 PROCEDURE — 99222 1ST HOSP IP/OBS MODERATE 55: CPT

## 2025-02-22 PROCEDURE — 82306 VITAMIN D 25 HYDROXY: CPT

## 2025-02-22 PROCEDURE — 82150 ASSAY OF AMYLASE: CPT

## 2025-02-22 PROCEDURE — 80076 HEPATIC FUNCTION PANEL: CPT

## 2025-02-22 PROCEDURE — 86140 C-REACTIVE PROTEIN: CPT

## 2025-02-22 PROCEDURE — 97163 PT EVAL HIGH COMPLEX 45 MIN: CPT | Mod: GP

## 2025-02-22 PROCEDURE — 71250 CT THORAX DX C-: CPT | Mod: 26

## 2025-02-22 PROCEDURE — 76000 FLUOROSCOPY <1 HR PHYS/QHP: CPT

## 2025-02-22 PROCEDURE — 85027 COMPLETE CBC AUTOMATED: CPT

## 2025-02-22 RX ORDER — SODIUM CHLORIDE 9 G/1000ML
1000 INJECTION, SOLUTION INTRAVENOUS
Refills: 0 | Status: DISCONTINUED | OUTPATIENT
Start: 2025-02-22 | End: 2025-02-22

## 2025-02-22 RX ORDER — ONDANSETRON HCL/PF 4 MG/2 ML
4 VIAL (ML) INJECTION EVERY 8 HOURS
Refills: 0 | Status: DISCONTINUED | OUTPATIENT
Start: 2025-02-22 | End: 2025-03-22

## 2025-02-22 RX ORDER — MAGNESIUM, ALUMINUM HYDROXIDE 200-200 MG
30 TABLET,CHEWABLE ORAL EVERY 4 HOURS
Refills: 0 | Status: DISCONTINUED | OUTPATIENT
Start: 2025-02-22 | End: 2025-03-22

## 2025-02-22 RX ORDER — MELATONIN 5 MG
3 TABLET ORAL AT BEDTIME
Refills: 0 | Status: DISCONTINUED | OUTPATIENT
Start: 2025-02-22 | End: 2025-03-22

## 2025-02-22 RX ORDER — CEFEPIME 2 G/20ML
INJECTION, POWDER, FOR SOLUTION INTRAVENOUS
Refills: 0 | Status: DISCONTINUED | OUTPATIENT
Start: 2025-02-22 | End: 2025-02-22

## 2025-02-22 RX ORDER — CEFEPIME 2 G/20ML
2000 INJECTION, POWDER, FOR SOLUTION INTRAVENOUS ONCE
Refills: 0 | Status: COMPLETED | OUTPATIENT
Start: 2025-02-22 | End: 2025-02-22

## 2025-02-22 RX ORDER — SODIUM CHLORIDE 9 G/1000ML
1400 INJECTION, SOLUTION INTRAVENOUS ONCE
Refills: 0 | Status: COMPLETED | OUTPATIENT
Start: 2025-02-22 | End: 2025-02-22

## 2025-02-22 RX ORDER — PIPERACILLIN-TAZO-DEXTROSE,ISO 3.375G/5
3.38 IV SOLUTION, PIGGYBACK PREMIX FROZEN(ML) INTRAVENOUS ONCE
Refills: 0 | Status: COMPLETED | OUTPATIENT
Start: 2025-02-22 | End: 2025-02-22

## 2025-02-22 RX ORDER — LIDOCAINE HYDROCHLORIDE 20 MG/ML
1 JELLY TOPICAL DAILY
Refills: 0 | Status: DISCONTINUED | OUTPATIENT
Start: 2025-02-22 | End: 2025-03-22

## 2025-02-22 RX ORDER — CEFEPIME 2 G/20ML
2000 INJECTION, POWDER, FOR SOLUTION INTRAVENOUS EVERY 8 HOURS
Refills: 0 | Status: DISCONTINUED | OUTPATIENT
Start: 2025-02-22 | End: 2025-02-22

## 2025-02-22 RX ORDER — PIPERACILLIN-TAZO-DEXTROSE,ISO 3.375G/5
3.38 IV SOLUTION, PIGGYBACK PREMIX FROZEN(ML) INTRAVENOUS EVERY 8 HOURS
Refills: 0 | Status: DISCONTINUED | OUTPATIENT
Start: 2025-02-23 | End: 2025-02-28

## 2025-02-22 RX ORDER — VANCOMYCIN HCL IN 5 % DEXTROSE 1.5G/250ML
PLASTIC BAG, INJECTION (ML) INTRAVENOUS
Refills: 0 | Status: DISCONTINUED | OUTPATIENT
Start: 2025-02-22 | End: 2025-02-22

## 2025-02-22 RX ORDER — LORAZEPAM 4 MG/ML
1 VIAL (ML) INJECTION EVERY 4 HOURS
Refills: 0 | Status: DISCONTINUED | OUTPATIENT
Start: 2025-02-22 | End: 2025-02-27

## 2025-02-22 RX ORDER — VANCOMYCIN HCL IN 5 % DEXTROSE 1.5G/250ML
750 PLASTIC BAG, INJECTION (ML) INTRAVENOUS ONCE
Refills: 0 | Status: COMPLETED | OUTPATIENT
Start: 2025-02-22 | End: 2025-02-22

## 2025-02-22 RX ORDER — ACETAMINOPHEN 500 MG/5ML
650 LIQUID (ML) ORAL EVERY 6 HOURS
Refills: 0 | Status: DISCONTINUED | OUTPATIENT
Start: 2025-02-22 | End: 2025-03-22

## 2025-02-22 RX ORDER — HYDROMORPHONE/SOD CHLOR,ISO/PF 2 MG/10 ML
8 SYRINGE (ML) INJECTION EVERY 6 HOURS
Refills: 0 | Status: DISCONTINUED | OUTPATIENT
Start: 2025-02-22 | End: 2025-02-27

## 2025-02-22 RX ORDER — ENOXAPARIN SODIUM 100 MG/ML
40 INJECTION SUBCUTANEOUS EVERY 24 HOURS
Refills: 0 | Status: DISCONTINUED | OUTPATIENT
Start: 2025-02-22 | End: 2025-03-17

## 2025-02-22 RX ORDER — VANCOMYCIN HCL IN 5 % DEXTROSE 1.5G/250ML
125 PLASTIC BAG, INJECTION (ML) INTRAVENOUS EVERY 6 HOURS
Refills: 0 | Status: DISCONTINUED | OUTPATIENT
Start: 2025-02-22 | End: 2025-03-17

## 2025-02-22 RX ORDER — PIPERACILLIN-TAZO-DEXTROSE,ISO 3.375G/5
3.38 IV SOLUTION, PIGGYBACK PREMIX FROZEN(ML) INTRAVENOUS ONCE
Refills: 0 | Status: DISCONTINUED | OUTPATIENT
Start: 2025-02-22 | End: 2025-02-22

## 2025-02-22 RX ORDER — SODIUM CHLORIDE 9 G/1000ML
1000 INJECTION, SOLUTION INTRAVENOUS
Refills: 0 | Status: DISCONTINUED | OUTPATIENT
Start: 2025-02-22 | End: 2025-02-27

## 2025-02-22 RX ADMIN — Medication 250 MILLIGRAM(S): at 10:11

## 2025-02-22 RX ADMIN — ENOXAPARIN SODIUM 40 MILLIGRAM(S): 100 INJECTION SUBCUTANEOUS at 16:38

## 2025-02-22 RX ADMIN — SODIUM CHLORIDE 1400 MILLILITER(S): 9 INJECTION, SOLUTION INTRAVENOUS at 09:44

## 2025-02-22 RX ADMIN — SODIUM CHLORIDE 1400 MILLILITER(S): 9 INJECTION, SOLUTION INTRAVENOUS at 10:45

## 2025-02-22 RX ADMIN — Medication 25 GRAM(S): at 21:56

## 2025-02-22 RX ADMIN — LIDOCAINE HYDROCHLORIDE 1 PATCH: 20 JELLY TOPICAL at 16:59

## 2025-02-22 RX ADMIN — Medication 750 MILLIGRAM(S): at 11:11

## 2025-02-22 RX ADMIN — Medication 200 GRAM(S): at 16:54

## 2025-02-22 RX ADMIN — CEFEPIME 2000 MILLIGRAM(S): 2 INJECTION, POWDER, FOR SOLUTION INTRAVENOUS at 09:56

## 2025-02-22 RX ADMIN — SODIUM CHLORIDE 100 MILLILITER(S): 9 INJECTION, SOLUTION INTRAVENOUS at 16:37

## 2025-02-23 LAB
ALBUMIN SERPL ELPH-MCNC: 1.8 G/DL — LOW (ref 3.3–5)
ALP SERPL-CCNC: 911 U/L — HIGH (ref 40–120)
ALT FLD-CCNC: 33 U/L — SIGNIFICANT CHANGE UP (ref 12–78)
ANION GAP SERPL CALC-SCNC: 10 MMOL/L — SIGNIFICANT CHANGE UP (ref 5–17)
ANION GAP SERPL CALC-SCNC: 13 MMOL/L — SIGNIFICANT CHANGE UP (ref 5–17)
AST SERPL-CCNC: 31 U/L — SIGNIFICANT CHANGE UP (ref 15–37)
BASOPHILS # BLD AUTO: 0.05 K/UL — SIGNIFICANT CHANGE UP (ref 0–0.2)
BASOPHILS NFR BLD AUTO: 0.2 % — SIGNIFICANT CHANGE UP (ref 0–2)
BILIRUB SERPL-MCNC: 2.1 MG/DL — HIGH (ref 0.2–1.2)
BUN SERPL-MCNC: 6 MG/DL — LOW (ref 7–23)
BUN SERPL-MCNC: 7 MG/DL — SIGNIFICANT CHANGE UP (ref 7–23)
CALCIUM SERPL-MCNC: 8.7 MG/DL — SIGNIFICANT CHANGE UP (ref 8.5–10.1)
CALCIUM SERPL-MCNC: 9.5 MG/DL — SIGNIFICANT CHANGE UP (ref 8.5–10.1)
CHLORIDE SERPL-SCNC: 103 MMOL/L — SIGNIFICANT CHANGE UP (ref 96–108)
CHLORIDE SERPL-SCNC: 103 MMOL/L — SIGNIFICANT CHANGE UP (ref 96–108)
CO2 SERPL-SCNC: 21 MMOL/L — LOW (ref 22–31)
CO2 SERPL-SCNC: 23 MMOL/L — SIGNIFICANT CHANGE UP (ref 22–31)
CREAT SERPL-MCNC: 0.29 MG/DL — LOW (ref 0.5–1.3)
CREAT SERPL-MCNC: 0.33 MG/DL — LOW (ref 0.5–1.3)
CULTURE RESULTS: NO GROWTH — SIGNIFICANT CHANGE UP
EGFR: 114 ML/MIN/1.73M2 — SIGNIFICANT CHANGE UP
EGFR: 114 ML/MIN/1.73M2 — SIGNIFICANT CHANGE UP
EGFR: 117 ML/MIN/1.73M2 — SIGNIFICANT CHANGE UP
EGFR: 117 ML/MIN/1.73M2 — SIGNIFICANT CHANGE UP
EOSINOPHIL # BLD AUTO: 0.02 K/UL — SIGNIFICANT CHANGE UP (ref 0–0.5)
EOSINOPHIL NFR BLD AUTO: 0.1 % — SIGNIFICANT CHANGE UP (ref 0–6)
GLUCOSE SERPL-MCNC: 130 MG/DL — HIGH (ref 70–99)
GLUCOSE SERPL-MCNC: 131 MG/DL — HIGH (ref 70–99)
HCT VFR BLD CALC: 34.1 % — LOW (ref 34.5–45)
HGB BLD-MCNC: 11.2 G/DL — LOW (ref 11.5–15.5)
IMM GRANULOCYTES # BLD AUTO: 0.18 K/UL — HIGH (ref 0–0.07)
IMM GRANULOCYTES NFR BLD AUTO: 0.7 % — SIGNIFICANT CHANGE UP (ref 0–0.9)
LYMPHOCYTES # BLD AUTO: 1.26 K/UL — SIGNIFICANT CHANGE UP (ref 1–3.3)
LYMPHOCYTES NFR BLD AUTO: 5.2 % — LOW (ref 13–44)
MCHC RBC-ENTMCNC: 26.9 PG — LOW (ref 27–34)
MCHC RBC-ENTMCNC: 32.8 G/DL — SIGNIFICANT CHANGE UP (ref 32–36)
MCV RBC AUTO: 81.8 FL — SIGNIFICANT CHANGE UP (ref 80–100)
MONOCYTES # BLD AUTO: 0.88 K/UL — SIGNIFICANT CHANGE UP (ref 0–0.9)
MONOCYTES NFR BLD AUTO: 3.6 % — SIGNIFICANT CHANGE UP (ref 2–14)
NEUTROPHILS # BLD AUTO: 21.73 K/UL — HIGH (ref 1.8–7.4)
NEUTROPHILS NFR BLD AUTO: 90.2 % — HIGH (ref 43–77)
NRBC # BLD AUTO: 0 K/UL — SIGNIFICANT CHANGE UP (ref 0–0)
NRBC # FLD: 0 K/UL — SIGNIFICANT CHANGE UP (ref 0–0)
NRBC BLD AUTO-RTO: 0 /100 WBCS — SIGNIFICANT CHANGE UP (ref 0–0)
PLATELET # BLD AUTO: 440 K/UL — HIGH (ref 150–400)
PMV BLD: 11.4 FL — SIGNIFICANT CHANGE UP (ref 7–13)
POTASSIUM SERPL-MCNC: 2.8 MMOL/L — CRITICAL LOW (ref 3.5–5.3)
POTASSIUM SERPL-MCNC: 3.1 MMOL/L — LOW (ref 3.5–5.3)
POTASSIUM SERPL-SCNC: 2.8 MMOL/L — CRITICAL LOW (ref 3.5–5.3)
POTASSIUM SERPL-SCNC: 3.1 MMOL/L — LOW (ref 3.5–5.3)
PROT SERPL-MCNC: 6.9 GM/DL — SIGNIFICANT CHANGE UP (ref 6–8.3)
RBC # BLD: 4.17 M/UL — SIGNIFICANT CHANGE UP (ref 3.8–5.2)
RBC # FLD: 14.6 % — HIGH (ref 10.3–14.5)
SODIUM SERPL-SCNC: 136 MMOL/L — SIGNIFICANT CHANGE UP (ref 135–145)
SODIUM SERPL-SCNC: 137 MMOL/L — SIGNIFICANT CHANGE UP (ref 135–145)
SPECIMEN SOURCE: SIGNIFICANT CHANGE UP
WBC # BLD: 24.12 K/UL — HIGH (ref 3.8–10.5)
WBC # FLD AUTO: 24.12 K/UL — HIGH (ref 3.8–10.5)

## 2025-02-23 PROCEDURE — 99233 SBSQ HOSP IP/OBS HIGH 50: CPT

## 2025-02-23 RX ORDER — POTASSIUM CHLORIDE, DEXTROSE MONOHYDRATE AND SODIUM CHLORIDE 150; 5; 900 MG/100ML; G/100ML; MG/100ML
1000 INJECTION, SOLUTION INTRAVENOUS
Refills: 0 | Status: DISCONTINUED | OUTPATIENT
Start: 2025-02-23 | End: 2025-02-24

## 2025-02-23 RX ORDER — ACETAMINOPHEN 500 MG/5ML
675 LIQUID (ML) ORAL ONCE
Refills: 0 | Status: COMPLETED | OUTPATIENT
Start: 2025-02-23 | End: 2025-02-23

## 2025-02-23 RX ORDER — LORAZEPAM 4 MG/ML
0.5 VIAL (ML) INJECTION ONCE
Refills: 0 | Status: DISCONTINUED | OUTPATIENT
Start: 2025-02-23 | End: 2025-02-23

## 2025-02-23 RX ADMIN — POTASSIUM CHLORIDE, DEXTROSE MONOHYDRATE AND SODIUM CHLORIDE 60 MILLILITER(S): 150; 5; 900 INJECTION, SOLUTION INTRAVENOUS at 17:05

## 2025-02-23 RX ADMIN — ENOXAPARIN SODIUM 40 MILLIGRAM(S): 100 INJECTION SUBCUTANEOUS at 17:06

## 2025-02-23 RX ADMIN — Medication 40 MILLIEQUIVALENT(S): at 17:05

## 2025-02-23 RX ADMIN — Medication 25 GRAM(S): at 14:30

## 2025-02-23 RX ADMIN — LIDOCAINE HYDROCHLORIDE 1 PATCH: 20 JELLY TOPICAL at 00:47

## 2025-02-23 RX ADMIN — Medication 8 MILLIGRAM(S): at 12:55

## 2025-02-23 RX ADMIN — Medication 8 MILLIGRAM(S): at 12:25

## 2025-02-23 RX ADMIN — Medication 100 MILLIEQUIVALENT(S): at 11:58

## 2025-02-23 RX ADMIN — Medication 1 MILLIGRAM(S): at 15:05

## 2025-02-23 RX ADMIN — LIDOCAINE HYDROCHLORIDE 1 PATCH: 20 JELLY TOPICAL at 12:06

## 2025-02-23 RX ADMIN — Medication 25 GRAM(S): at 22:44

## 2025-02-23 RX ADMIN — LIDOCAINE HYDROCHLORIDE 1 PATCH: 20 JELLY TOPICAL at 04:33

## 2025-02-23 RX ADMIN — Medication 100 MILLIEQUIVALENT(S): at 10:22

## 2025-02-23 RX ADMIN — Medication 675 MILLIGRAM(S): at 10:17

## 2025-02-23 RX ADMIN — Medication 25 GRAM(S): at 06:38

## 2025-02-23 RX ADMIN — Medication 100 MILLIEQUIVALENT(S): at 14:30

## 2025-02-23 RX ADMIN — SODIUM CHLORIDE 100 MILLILITER(S): 9 INJECTION, SOLUTION INTRAVENOUS at 02:00

## 2025-02-23 RX ADMIN — Medication 270 MILLIGRAM(S): at 09:47

## 2025-02-24 DIAGNOSIS — Z71.89 OTHER SPECIFIED COUNSELING: ICD-10-CM

## 2025-02-24 DIAGNOSIS — E46 UNSPECIFIED PROTEIN-CALORIE MALNUTRITION: ICD-10-CM

## 2025-02-24 DIAGNOSIS — Z87.19 PERSONAL HISTORY OF OTHER DISEASES OF THE DIGESTIVE SYSTEM: ICD-10-CM

## 2025-02-24 LAB
ALBUMIN SERPL ELPH-MCNC: 1.8 G/DL — LOW (ref 3.3–5)
ALP SERPL-CCNC: 926 U/L — HIGH (ref 40–120)
ALT FLD-CCNC: 36 U/L — SIGNIFICANT CHANGE UP (ref 12–78)
ANION GAP SERPL CALC-SCNC: 8 MMOL/L — SIGNIFICANT CHANGE UP (ref 5–17)
ANION GAP SERPL CALC-SCNC: 9 MMOL/L — SIGNIFICANT CHANGE UP (ref 5–17)
AST SERPL-CCNC: 41 U/L — HIGH (ref 15–37)
BASOPHILS # BLD AUTO: 0.04 K/UL — SIGNIFICANT CHANGE UP (ref 0–0.2)
BASOPHILS NFR BLD AUTO: 0.2 % — SIGNIFICANT CHANGE UP (ref 0–2)
BILIRUB SERPL-MCNC: 1.7 MG/DL — HIGH (ref 0.2–1.2)
BUN SERPL-MCNC: 5 MG/DL — LOW (ref 7–23)
BUN SERPL-MCNC: 6 MG/DL — LOW (ref 7–23)
CALCIUM SERPL-MCNC: 8.7 MG/DL — SIGNIFICANT CHANGE UP (ref 8.5–10.1)
CALCIUM SERPL-MCNC: 9.3 MG/DL — SIGNIFICANT CHANGE UP (ref 8.5–10.1)
CHLORIDE SERPL-SCNC: 109 MMOL/L — HIGH (ref 96–108)
CHLORIDE SERPL-SCNC: 110 MMOL/L — HIGH (ref 96–108)
CO2 SERPL-SCNC: 18 MMOL/L — LOW (ref 22–31)
CO2 SERPL-SCNC: 22 MMOL/L — SIGNIFICANT CHANGE UP (ref 22–31)
CREAT SERPL-MCNC: 0.41 MG/DL — LOW (ref 0.5–1.3)
CREAT SERPL-MCNC: 0.44 MG/DL — LOW (ref 0.5–1.3)
EGFR: 106 ML/MIN/1.73M2 — SIGNIFICANT CHANGE UP
EGFR: 106 ML/MIN/1.73M2 — SIGNIFICANT CHANGE UP
EGFR: 108 ML/MIN/1.73M2 — SIGNIFICANT CHANGE UP
EGFR: 108 ML/MIN/1.73M2 — SIGNIFICANT CHANGE UP
EOSINOPHIL # BLD AUTO: 0.44 K/UL — SIGNIFICANT CHANGE UP (ref 0–0.5)
EOSINOPHIL NFR BLD AUTO: 2.6 % — SIGNIFICANT CHANGE UP (ref 0–6)
GLUCOSE SERPL-MCNC: 121 MG/DL — HIGH (ref 70–99)
GLUCOSE SERPL-MCNC: 133 MG/DL — HIGH (ref 70–99)
HCT VFR BLD CALC: 34.3 % — LOW (ref 34.5–45)
HGB BLD-MCNC: 11.2 G/DL — LOW (ref 11.5–15.5)
IMM GRANULOCYTES # BLD AUTO: 0.18 K/UL — HIGH (ref 0–0.07)
IMM GRANULOCYTES NFR BLD AUTO: 1.1 % — HIGH (ref 0–0.9)
LYMPHOCYTES # BLD AUTO: 1.62 K/UL — SIGNIFICANT CHANGE UP (ref 1–3.3)
LYMPHOCYTES NFR BLD AUTO: 9.7 % — LOW (ref 13–44)
MCHC RBC-ENTMCNC: 27.3 PG — SIGNIFICANT CHANGE UP (ref 27–34)
MCHC RBC-ENTMCNC: 32.7 G/DL — SIGNIFICANT CHANGE UP (ref 32–36)
MCV RBC AUTO: 83.5 FL — SIGNIFICANT CHANGE UP (ref 80–100)
MONOCYTES # BLD AUTO: 0.94 K/UL — HIGH (ref 0–0.9)
MONOCYTES NFR BLD AUTO: 5.6 % — SIGNIFICANT CHANGE UP (ref 2–14)
NEUTROPHILS # BLD AUTO: 13.47 K/UL — HIGH (ref 1.8–7.4)
NEUTROPHILS NFR BLD AUTO: 80.8 % — HIGH (ref 43–77)
NRBC # BLD AUTO: 0 K/UL — SIGNIFICANT CHANGE UP (ref 0–0)
NRBC # FLD: 0 K/UL — SIGNIFICANT CHANGE UP (ref 0–0)
NRBC BLD AUTO-RTO: 0 /100 WBCS — SIGNIFICANT CHANGE UP (ref 0–0)
PLATELET # BLD AUTO: 454 K/UL — HIGH (ref 150–400)
PMV BLD: 11.2 FL — SIGNIFICANT CHANGE UP (ref 7–13)
POTASSIUM SERPL-MCNC: 2.8 MMOL/L — CRITICAL LOW (ref 3.5–5.3)
POTASSIUM SERPL-MCNC: 3.7 MMOL/L — SIGNIFICANT CHANGE UP (ref 3.5–5.3)
POTASSIUM SERPL-SCNC: 2.8 MMOL/L — CRITICAL LOW (ref 3.5–5.3)
POTASSIUM SERPL-SCNC: 3.7 MMOL/L — SIGNIFICANT CHANGE UP (ref 3.5–5.3)
PROT SERPL-MCNC: 6.4 GM/DL — SIGNIFICANT CHANGE UP (ref 6–8.3)
RBC # BLD: 4.11 M/UL — SIGNIFICANT CHANGE UP (ref 3.8–5.2)
RBC # FLD: 14.8 % — HIGH (ref 10.3–14.5)
SODIUM SERPL-SCNC: 137 MMOL/L — SIGNIFICANT CHANGE UP (ref 135–145)
SODIUM SERPL-SCNC: 139 MMOL/L — SIGNIFICANT CHANGE UP (ref 135–145)
WBC # BLD: 16.69 K/UL — HIGH (ref 3.8–10.5)
WBC # FLD AUTO: 16.69 K/UL — HIGH (ref 3.8–10.5)

## 2025-02-24 PROCEDURE — 99233 SBSQ HOSP IP/OBS HIGH 50: CPT

## 2025-02-24 PROCEDURE — 43265 ERCP LITHOTRIPSY CALCULI: CPT

## 2025-02-24 PROCEDURE — 43262 ENDO CHOLANGIOPANCREATOGRAPH: CPT

## 2025-02-24 PROCEDURE — 99222 1ST HOSP IP/OBS MODERATE 55: CPT

## 2025-02-24 PROCEDURE — 43273 ENDOSCOPIC PANCREATOSCOPY: CPT

## 2025-02-24 RX ORDER — SODIUM CHLORIDE 9 G/1000ML
1000 INJECTION, SOLUTION INTRAVENOUS
Refills: 0 | Status: DISCONTINUED | OUTPATIENT
Start: 2025-02-24 | End: 2025-02-24

## 2025-02-24 RX ORDER — OXYCODONE HYDROCHLORIDE 30 MG/1
5 TABLET ORAL ONCE
Refills: 0 | Status: DISCONTINUED | OUTPATIENT
Start: 2025-02-24 | End: 2025-02-24

## 2025-02-24 RX ORDER — ONDANSETRON HCL/PF 4 MG/2 ML
4 VIAL (ML) INJECTION ONCE
Refills: 0 | Status: DISCONTINUED | OUTPATIENT
Start: 2025-02-24 | End: 2025-02-24

## 2025-02-24 RX ORDER — SODIUM CHLORIDE 9 G/1000ML
1000 INJECTION, SOLUTION INTRAVENOUS
Refills: 0 | Status: DISCONTINUED | OUTPATIENT
Start: 2025-02-24 | End: 2025-02-27

## 2025-02-24 RX ORDER — FENTANYL CITRATE-0.9 % NACL/PF 100MCG/2ML
50 SYRINGE (ML) INTRAVENOUS
Refills: 0 | Status: DISCONTINUED | OUTPATIENT
Start: 2025-02-24 | End: 2025-02-24

## 2025-02-24 RX ADMIN — Medication 100 MILLIEQUIVALENT(S): at 09:14

## 2025-02-24 RX ADMIN — Medication 25 GRAM(S): at 13:26

## 2025-02-24 RX ADMIN — Medication 100 MILLIEQUIVALENT(S): at 11:35

## 2025-02-24 RX ADMIN — LIDOCAINE HYDROCHLORIDE 1 PATCH: 20 JELLY TOPICAL at 20:00

## 2025-02-24 RX ADMIN — Medication 25 GRAM(S): at 05:21

## 2025-02-24 RX ADMIN — Medication 125 MILLIGRAM(S): at 23:46

## 2025-02-24 RX ADMIN — Medication 100 MILLIEQUIVALENT(S): at 10:17

## 2025-02-24 RX ADMIN — ENOXAPARIN SODIUM 40 MILLIGRAM(S): 100 INJECTION SUBCUTANEOUS at 18:41

## 2025-02-24 RX ADMIN — Medication 25 GRAM(S): at 22:26

## 2025-02-24 RX ADMIN — Medication 125 MILLIGRAM(S): at 12:08

## 2025-02-24 RX ADMIN — LIDOCAINE HYDROCHLORIDE 1 PATCH: 20 JELLY TOPICAL at 12:10

## 2025-02-24 RX ADMIN — Medication 125 MILLIGRAM(S): at 18:41

## 2025-02-24 RX ADMIN — SODIUM CHLORIDE 60 MILLILITER(S): 9 INJECTION, SOLUTION INTRAVENOUS at 18:19

## 2025-02-24 RX ADMIN — POTASSIUM CHLORIDE, DEXTROSE MONOHYDRATE AND SODIUM CHLORIDE 60 MILLILITER(S): 150; 5; 900 INJECTION, SOLUTION INTRAVENOUS at 11:35

## 2025-02-25 LAB
ALBUMIN SERPL ELPH-MCNC: 1.8 G/DL — LOW (ref 3.3–5)
ALP SERPL-CCNC: 758 U/L — HIGH (ref 40–120)
ALT FLD-CCNC: 29 U/L — SIGNIFICANT CHANGE UP (ref 12–78)
ANION GAP SERPL CALC-SCNC: 6 MMOL/L — SIGNIFICANT CHANGE UP (ref 5–17)
AST SERPL-CCNC: 30 U/L — SIGNIFICANT CHANGE UP (ref 15–37)
BASOPHILS # BLD AUTO: 0.02 K/UL — SIGNIFICANT CHANGE UP (ref 0–0.2)
BASOPHILS # BLD MANUAL: 0 K/UL — SIGNIFICANT CHANGE UP (ref 0–0.2)
BASOPHILS NFR BLD AUTO: 0.1 % — SIGNIFICANT CHANGE UP (ref 0–2)
BASOPHILS NFR BLD MANUAL: 0 % — SIGNIFICANT CHANGE UP (ref 0–2)
BILIRUB SERPL-MCNC: 1.5 MG/DL — HIGH (ref 0.2–1.2)
BUN SERPL-MCNC: 8 MG/DL — SIGNIFICANT CHANGE UP (ref 7–23)
CALCIUM SERPL-MCNC: 8.1 MG/DL — LOW (ref 8.5–10.1)
CHLORIDE SERPL-SCNC: 107 MMOL/L — SIGNIFICANT CHANGE UP (ref 96–108)
CO2 SERPL-SCNC: 23 MMOL/L — SIGNIFICANT CHANGE UP (ref 22–31)
CREAT SERPL-MCNC: 0.45 MG/DL — LOW (ref 0.5–1.3)
EGFR: 105 ML/MIN/1.73M2 — SIGNIFICANT CHANGE UP
EGFR: 105 ML/MIN/1.73M2 — SIGNIFICANT CHANGE UP
EOSINOPHIL # BLD AUTO: 0.75 K/UL — HIGH (ref 0–0.5)
EOSINOPHIL # BLD MANUAL: 0 K/UL — SIGNIFICANT CHANGE UP (ref 0–0.5)
EOSINOPHIL NFR BLD AUTO: 3.7 % — SIGNIFICANT CHANGE UP (ref 0–6)
EOSINOPHIL NFR BLD MANUAL: 0 % — SIGNIFICANT CHANGE UP (ref 0–6)
GLUCOSE SERPL-MCNC: 121 MG/DL — HIGH (ref 70–99)
HCT VFR BLD CALC: 31.7 % — LOW (ref 34.5–45)
HGB BLD-MCNC: 10.1 G/DL — LOW (ref 11.5–15.5)
IMM GRANULOCYTES # BLD AUTO: 0.2 K/UL — HIGH (ref 0–0.07)
IMM GRANULOCYTES NFR BLD AUTO: 1 % — HIGH (ref 0–0.9)
INR BLD: 1.18 RATIO — HIGH (ref 0.85–1.16)
LYMPHOCYTES # BLD AUTO: 2.68 K/UL — SIGNIFICANT CHANGE UP (ref 1–3.3)
LYMPHOCYTES # BLD MANUAL: 3.06 K/UL — SIGNIFICANT CHANGE UP (ref 1–3.3)
LYMPHOCYTES NFR BLD AUTO: 13.1 % — SIGNIFICANT CHANGE UP (ref 13–44)
LYMPHOCYTES NFR BLD MANUAL: 15 % — SIGNIFICANT CHANGE UP (ref 13–44)
MANUAL SMEAR VERIFICATION: SIGNIFICANT CHANGE UP
MCHC RBC-ENTMCNC: 26.9 PG — LOW (ref 27–34)
MCHC RBC-ENTMCNC: 31.9 G/DL — LOW (ref 32–36)
MCV RBC AUTO: 84.3 FL — SIGNIFICANT CHANGE UP (ref 80–100)
MELD SCORE WITH DIALYSIS: 24 POINTS — SIGNIFICANT CHANGE UP
MELD SCORE WITHOUT DIALYSIS: 10 POINTS — SIGNIFICANT CHANGE UP
MONOCYTES # BLD AUTO: 1.64 K/UL — HIGH (ref 0–0.9)
MONOCYTES # BLD MANUAL: 1.02 K/UL — HIGH (ref 0–0.9)
MONOCYTES NFR BLD AUTO: 8 % — SIGNIFICANT CHANGE UP (ref 2–14)
MONOCYTES NFR BLD MANUAL: 5 % — SIGNIFICANT CHANGE UP (ref 2–14)
NEUTROPHILS # BLD AUTO: 15.11 K/UL — HIGH (ref 1.8–7.4)
NEUTROPHILS # BLD MANUAL: 16.32 K/UL — HIGH (ref 1.8–7.4)
NEUTROPHILS NFR BLD AUTO: 74.1 % — SIGNIFICANT CHANGE UP (ref 43–77)
NEUTROPHILS NFR BLD MANUAL: 80 % — HIGH (ref 43–77)
NRBC # BLD AUTO: 0 K/UL — SIGNIFICANT CHANGE UP (ref 0–0)
NRBC # FLD: 0 K/UL — SIGNIFICANT CHANGE UP (ref 0–0)
NRBC BLD AUTO-RTO: 0 /100 WBCS — SIGNIFICANT CHANGE UP (ref 0–0)
PLAT MORPH BLD: NORMAL — SIGNIFICANT CHANGE UP
PLATELET # BLD AUTO: 484 K/UL — HIGH (ref 150–400)
PMV BLD: 11.5 FL — SIGNIFICANT CHANGE UP (ref 7–13)
POTASSIUM SERPL-MCNC: 3.2 MMOL/L — LOW (ref 3.5–5.3)
POTASSIUM SERPL-SCNC: 3.2 MMOL/L — LOW (ref 3.5–5.3)
PROT SERPL-MCNC: 6 GM/DL — SIGNIFICANT CHANGE UP (ref 6–8.3)
PROTHROM AB SERPL-ACNC: 13.6 SEC — HIGH (ref 9.9–13.4)
RBC # BLD: 3.76 M/UL — LOW (ref 3.8–5.2)
RBC # FLD: 15.5 % — HIGH (ref 10.3–14.5)
RBC BLD AUTO: NORMAL — SIGNIFICANT CHANGE UP
SODIUM SERPL-SCNC: 136 MMOL/L — SIGNIFICANT CHANGE UP (ref 135–145)
WBC # BLD: 20.4 K/UL — HIGH (ref 3.8–10.5)
WBC # FLD AUTO: 20.4 K/UL — HIGH (ref 3.8–10.5)
WBC MORPHOLOGY: NORMAL — SIGNIFICANT CHANGE UP

## 2025-02-25 PROCEDURE — 99232 SBSQ HOSP IP/OBS MODERATE 35: CPT

## 2025-02-25 PROCEDURE — 99233 SBSQ HOSP IP/OBS HIGH 50: CPT

## 2025-02-25 RX ADMIN — Medication 125 MILLIGRAM(S): at 18:21

## 2025-02-25 RX ADMIN — Medication 125 MILLIGRAM(S): at 23:50

## 2025-02-25 RX ADMIN — Medication 8 MILLIGRAM(S): at 00:40

## 2025-02-25 RX ADMIN — Medication 25 GRAM(S): at 06:05

## 2025-02-25 RX ADMIN — LIDOCAINE HYDROCHLORIDE 1 PATCH: 20 JELLY TOPICAL at 00:08

## 2025-02-25 RX ADMIN — ENOXAPARIN SODIUM 40 MILLIGRAM(S): 100 INJECTION SUBCUTANEOUS at 18:20

## 2025-02-25 RX ADMIN — LIDOCAINE HYDROCHLORIDE 1 PATCH: 20 JELLY TOPICAL at 13:15

## 2025-02-25 RX ADMIN — Medication 125 MILLIGRAM(S): at 06:05

## 2025-02-25 RX ADMIN — Medication 125 MILLIGRAM(S): at 13:15

## 2025-02-25 RX ADMIN — Medication 40 MILLIEQUIVALENT(S): at 18:21

## 2025-02-25 RX ADMIN — Medication 25 GRAM(S): at 13:15

## 2025-02-25 RX ADMIN — Medication 8 MILLIGRAM(S): at 01:10

## 2025-02-25 RX ADMIN — LIDOCAINE HYDROCHLORIDE 1 PATCH: 20 JELLY TOPICAL at 21:53

## 2025-02-25 RX ADMIN — Medication 25 GRAM(S): at 21:58

## 2025-02-26 LAB
ADD ON TEST-SPECIMEN IN LAB: SIGNIFICANT CHANGE UP
ALBUMIN SERPL ELPH-MCNC: 2 G/DL — LOW (ref 3.3–5)
ALP SERPL-CCNC: 747 U/L — HIGH (ref 40–120)
ALT FLD-CCNC: 31 U/L — SIGNIFICANT CHANGE UP (ref 12–78)
AMMONIA BLD-MCNC: <10 UMOL/L — LOW (ref 11–32)
ANION GAP SERPL CALC-SCNC: 9 MMOL/L — SIGNIFICANT CHANGE UP (ref 5–17)
AST SERPL-CCNC: 41 U/L — HIGH (ref 15–37)
BASE EXCESS BLDV CALC-SCNC: -2.4 MMOL/L — LOW (ref -2–3)
BASOPHILS # BLD AUTO: 0.05 K/UL — SIGNIFICANT CHANGE UP (ref 0–0.2)
BASOPHILS NFR BLD AUTO: 0.3 % — SIGNIFICANT CHANGE UP (ref 0–2)
BILIRUB SERPL-MCNC: 2 MG/DL — HIGH (ref 0.2–1.2)
BUN SERPL-MCNC: 5 MG/DL — LOW (ref 7–23)
CALCIUM SERPL-MCNC: 8.5 MG/DL — SIGNIFICANT CHANGE UP (ref 8.5–10.1)
CHLORIDE SERPL-SCNC: 112 MMOL/L — HIGH (ref 96–108)
CO2 SERPL-SCNC: 18 MMOL/L — LOW (ref 22–31)
CREAT SERPL-MCNC: 0.37 MG/DL — LOW (ref 0.5–1.3)
EGFR: 110 ML/MIN/1.73M2 — SIGNIFICANT CHANGE UP
EGFR: 110 ML/MIN/1.73M2 — SIGNIFICANT CHANGE UP
EOSINOPHIL # BLD AUTO: 0 K/UL — SIGNIFICANT CHANGE UP (ref 0–0.5)
EOSINOPHIL NFR BLD AUTO: 0 % — SIGNIFICANT CHANGE UP (ref 0–6)
GAS PNL BLDV: SIGNIFICANT CHANGE UP
GLUCOSE SERPL-MCNC: 136 MG/DL — HIGH (ref 70–99)
HCO3 BLDV-SCNC: 22 MMOL/L — SIGNIFICANT CHANGE UP (ref 22–29)
HCT VFR BLD CALC: 32.7 % — LOW (ref 34.5–45)
HGB BLD-MCNC: 10.6 G/DL — LOW (ref 11.5–15.5)
IMM GRANULOCYTES # BLD AUTO: 0.21 K/UL — HIGH (ref 0–0.07)
IMM GRANULOCYTES NFR BLD AUTO: 1.3 % — HIGH (ref 0–0.9)
LYMPHOCYTES # BLD AUTO: 2.29 K/UL — SIGNIFICANT CHANGE UP (ref 1–3.3)
LYMPHOCYTES NFR BLD AUTO: 14.3 % — SIGNIFICANT CHANGE UP (ref 13–44)
MAGNESIUM SERPL-MCNC: 1.9 MG/DL — SIGNIFICANT CHANGE UP (ref 1.6–2.6)
MCHC RBC-ENTMCNC: 27.4 PG — SIGNIFICANT CHANGE UP (ref 27–34)
MCHC RBC-ENTMCNC: 32.4 G/DL — SIGNIFICANT CHANGE UP (ref 32–36)
MCV RBC AUTO: 84.5 FL — SIGNIFICANT CHANGE UP (ref 80–100)
MONOCYTES # BLD AUTO: 1.08 K/UL — HIGH (ref 0–0.9)
MONOCYTES NFR BLD AUTO: 6.8 % — SIGNIFICANT CHANGE UP (ref 2–14)
NEUTROPHILS # BLD AUTO: 12.33 K/UL — HIGH (ref 1.8–7.4)
NEUTROPHILS NFR BLD AUTO: 77.3 % — HIGH (ref 43–77)
NRBC # BLD AUTO: 0 K/UL — SIGNIFICANT CHANGE UP (ref 0–0)
NRBC # FLD: 0 K/UL — SIGNIFICANT CHANGE UP (ref 0–0)
NRBC BLD AUTO-RTO: 0 /100 WBCS — SIGNIFICANT CHANGE UP (ref 0–0)
PCO2 BLDV: 34 MMHG — LOW (ref 39–42)
PH BLDV: 7.41 — SIGNIFICANT CHANGE UP (ref 7.32–7.43)
PLATELET # BLD AUTO: 506 K/UL — HIGH (ref 150–400)
PMV BLD: 11.3 FL — SIGNIFICANT CHANGE UP (ref 7–13)
PO2 BLDV: 56 MMHG — HIGH (ref 25–45)
POTASSIUM SERPL-MCNC: 2.9 MMOL/L — CRITICAL LOW (ref 3.5–5.3)
POTASSIUM SERPL-MCNC: 3.2 MMOL/L — LOW (ref 3.5–5.3)
POTASSIUM SERPL-SCNC: 2.9 MMOL/L — CRITICAL LOW (ref 3.5–5.3)
POTASSIUM SERPL-SCNC: 3.2 MMOL/L — LOW (ref 3.5–5.3)
PROT SERPL-MCNC: 6.3 GM/DL — SIGNIFICANT CHANGE UP (ref 6–8.3)
RBC # BLD: 3.87 M/UL — SIGNIFICANT CHANGE UP (ref 3.8–5.2)
RBC # FLD: 15.4 % — HIGH (ref 10.3–14.5)
SAO2 % BLDV: 86 % — SIGNIFICANT CHANGE UP (ref 67–88)
SODIUM SERPL-SCNC: 139 MMOL/L — SIGNIFICANT CHANGE UP (ref 135–145)
WBC # BLD: 15.96 K/UL — HIGH (ref 3.8–10.5)
WBC # FLD AUTO: 15.96 K/UL — HIGH (ref 3.8–10.5)

## 2025-02-26 PROCEDURE — 99233 SBSQ HOSP IP/OBS HIGH 50: CPT

## 2025-02-26 PROCEDURE — 99231 SBSQ HOSP IP/OBS SF/LOW 25: CPT

## 2025-02-26 RX ORDER — CEFUROXIME SODIUM 1.5 G
500 VIAL (EA) INJECTION EVERY 12 HOURS
Refills: 0 | Status: DISCONTINUED | OUTPATIENT
Start: 2025-02-26 | End: 2025-02-26

## 2025-02-26 RX ORDER — METRONIDAZOLE 250 MG
500 TABLET ORAL THREE TIMES A DAY
Refills: 0 | Status: DISCONTINUED | OUTPATIENT
Start: 2025-02-26 | End: 2025-02-26

## 2025-02-26 RX ORDER — POTASSIUM CHLORIDE, DEXTROSE MONOHYDRATE AND SODIUM CHLORIDE 150; 5; 900 MG/100ML; G/100ML; MG/100ML
1000 INJECTION, SOLUTION INTRAVENOUS
Refills: 0 | Status: DISCONTINUED | OUTPATIENT
Start: 2025-02-26 | End: 2025-02-27

## 2025-02-26 RX ORDER — CEFUROXIME SODIUM 1.5 G
500 VIAL (EA) INJECTION EVERY 12 HOURS
Refills: 0 | Status: DISCONTINUED | OUTPATIENT
Start: 2025-02-26 | End: 2025-02-27

## 2025-02-26 RX ORDER — METRONIDAZOLE 250 MG
500 TABLET ORAL THREE TIMES A DAY
Refills: 0 | Status: DISCONTINUED | OUTPATIENT
Start: 2025-02-26 | End: 2025-02-27

## 2025-02-26 RX ADMIN — Medication 125 MILLIGRAM(S): at 11:15

## 2025-02-26 RX ADMIN — LIDOCAINE HYDROCHLORIDE 1 PATCH: 20 JELLY TOPICAL at 05:20

## 2025-02-26 RX ADMIN — Medication 25 GRAM(S): at 13:06

## 2025-02-26 RX ADMIN — LIDOCAINE HYDROCHLORIDE 1 PATCH: 20 JELLY TOPICAL at 19:30

## 2025-02-26 RX ADMIN — LIDOCAINE HYDROCHLORIDE 1 PATCH: 20 JELLY TOPICAL at 11:16

## 2025-02-26 RX ADMIN — Medication 1 MILLIGRAM(S): at 11:15

## 2025-02-26 RX ADMIN — ENOXAPARIN SODIUM 40 MILLIGRAM(S): 100 INJECTION SUBCUTANEOUS at 17:24

## 2025-02-26 RX ADMIN — Medication 100 MILLIEQUIVALENT(S): at 14:24

## 2025-02-26 RX ADMIN — Medication 125 MILLIGRAM(S): at 05:08

## 2025-02-26 RX ADMIN — Medication 125 MILLIGRAM(S): at 17:24

## 2025-02-26 RX ADMIN — Medication 100 MILLIEQUIVALENT(S): at 12:10

## 2025-02-26 RX ADMIN — POTASSIUM CHLORIDE, DEXTROSE MONOHYDRATE AND SODIUM CHLORIDE 50 MILLILITER(S): 150; 5; 900 INJECTION, SOLUTION INTRAVENOUS at 18:43

## 2025-02-26 RX ADMIN — Medication 100 MILLIEQUIVALENT(S): at 10:25

## 2025-02-26 RX ADMIN — Medication 25 GRAM(S): at 05:07

## 2025-02-26 RX ADMIN — Medication 40 MILLIEQUIVALENT(S): at 18:43

## 2025-02-26 RX ADMIN — Medication 1 MILLIGRAM(S): at 17:24

## 2025-02-26 RX ADMIN — LIDOCAINE HYDROCHLORIDE 1 PATCH: 20 JELLY TOPICAL at 23:39

## 2025-02-26 RX ADMIN — Medication 1 MILLIGRAM(S): at 01:30

## 2025-02-26 RX ADMIN — Medication 25 GRAM(S): at 22:36

## 2025-02-27 ENCOUNTER — RESULT REVIEW (OUTPATIENT)
Age: 70
End: 2025-02-27

## 2025-02-27 LAB
ADD ON TEST-SPECIMEN IN LAB: SIGNIFICANT CHANGE UP
ALBUMIN SERPL ELPH-MCNC: 2.3 G/DL — LOW (ref 3.3–5)
ALP SERPL-CCNC: 753 U/L — HIGH (ref 40–120)
ALT FLD-CCNC: 36 U/L — SIGNIFICANT CHANGE UP (ref 12–78)
ANION GAP SERPL CALC-SCNC: 12 MMOL/L — SIGNIFICANT CHANGE UP (ref 5–17)
ANISOCYTOSIS BLD QL: SLIGHT — SIGNIFICANT CHANGE UP
AST SERPL-CCNC: 37 U/L — SIGNIFICANT CHANGE UP (ref 15–37)
BASE EXCESS BLDV CALC-SCNC: -12 MMOL/L — LOW (ref -2–3)
BASOPHILS # BLD AUTO: 0.09 K/UL — SIGNIFICANT CHANGE UP (ref 0–0.2)
BASOPHILS NFR BLD AUTO: 0.2 % — SIGNIFICANT CHANGE UP (ref 0–2)
BILIRUB DIRECT SERPL-MCNC: 1.3 MG/DL — HIGH (ref 0–0.3)
BILIRUB INDIRECT FLD-MCNC: 0.5 MG/DL — SIGNIFICANT CHANGE UP (ref 0.2–1)
BILIRUB SERPL-MCNC: 1.8 MG/DL — HIGH (ref 0.2–1.2)
BUN SERPL-MCNC: 8 MG/DL — SIGNIFICANT CHANGE UP (ref 7–23)
CALCIUM SERPL-MCNC: 9.1 MG/DL — SIGNIFICANT CHANGE UP (ref 8.5–10.1)
CHLORIDE SERPL-SCNC: 110 MMOL/L — HIGH (ref 96–108)
CO2 SERPL-SCNC: 13 MMOL/L — LOW (ref 22–31)
CREAT SERPL-MCNC: 0.55 MG/DL — SIGNIFICANT CHANGE UP (ref 0.5–1.3)
CRP SERPL-MCNC: 37.9 MG/ML — HIGH (ref 0–5)
CULTURE RESULTS: SIGNIFICANT CHANGE UP
CULTURE RESULTS: SIGNIFICANT CHANGE UP
EGFR: 100 ML/MIN/1.73M2 — SIGNIFICANT CHANGE UP
EGFR: 100 ML/MIN/1.73M2 — SIGNIFICANT CHANGE UP
EOSINOPHIL # BLD AUTO: 0.02 K/UL — SIGNIFICANT CHANGE UP (ref 0–0.5)
EOSINOPHIL NFR BLD AUTO: 0.1 % — SIGNIFICANT CHANGE UP (ref 0–6)
FOLATE SERPL-MCNC: 12.2 NG/ML — SIGNIFICANT CHANGE UP
GAS PNL BLDV: SIGNIFICANT CHANGE UP
GLUCOSE BLDC GLUCOMTR-MCNC: 178 MG/DL — HIGH (ref 70–99)
GLUCOSE SERPL-MCNC: 257 MG/DL — HIGH (ref 70–99)
HCO3 BLDV-SCNC: 13 MMOL/L — LOW (ref 22–29)
HCT VFR BLD CALC: 36.6 % — SIGNIFICANT CHANGE UP (ref 34.5–45)
HGB BLD-MCNC: 11.9 G/DL — SIGNIFICANT CHANGE UP (ref 11.5–15.5)
IMM GRANULOCYTES # BLD AUTO: 0.37 K/UL — HIGH (ref 0–0.07)
IMM GRANULOCYTES NFR BLD AUTO: 1 % — HIGH (ref 0–0.9)
LYMPHOCYTES # BLD AUTO: 1.93 K/UL — SIGNIFICANT CHANGE UP (ref 1–3.3)
LYMPHOCYTES NFR BLD AUTO: 5.1 % — LOW (ref 13–44)
MACROCYTES BLD QL: SLIGHT — SIGNIFICANT CHANGE UP
MAGNESIUM SERPL-MCNC: 1.8 MG/DL — SIGNIFICANT CHANGE UP (ref 1.6–2.6)
MAGNESIUM SERPL-MCNC: 2 MG/DL — SIGNIFICANT CHANGE UP (ref 1.6–2.6)
MANUAL SMEAR VERIFICATION: SIGNIFICANT CHANGE UP
MCHC RBC-ENTMCNC: 27.9 PG — SIGNIFICANT CHANGE UP (ref 27–34)
MCHC RBC-ENTMCNC: 32.5 G/DL — SIGNIFICANT CHANGE UP (ref 32–36)
MCV RBC AUTO: 85.7 FL — SIGNIFICANT CHANGE UP (ref 80–100)
MICROCYTES BLD QL: SLIGHT — SIGNIFICANT CHANGE UP
MONOCYTES # BLD AUTO: 1.37 K/UL — HIGH (ref 0–0.9)
MONOCYTES NFR BLD AUTO: 3.6 % — SIGNIFICANT CHANGE UP (ref 2–14)
NEUTROPHILS # BLD AUTO: 34.38 K/UL — HIGH (ref 1.8–7.4)
NEUTROPHILS NFR BLD AUTO: 90 % — HIGH (ref 43–77)
NRBC # BLD AUTO: 0 K/UL — SIGNIFICANT CHANGE UP (ref 0–0)
NRBC # FLD: 0 K/UL — SIGNIFICANT CHANGE UP (ref 0–0)
NRBC BLD AUTO-RTO: 0 /100 WBCS — SIGNIFICANT CHANGE UP (ref 0–0)
NT-PROBNP SERPL-SCNC: HIGH PG/ML (ref 0–125)
PCO2 BLDV: 27 MMHG — LOW (ref 39–42)
PH BLDV: 7.29 — LOW (ref 7.32–7.43)
PHOSPHATE SERPL-MCNC: 2.3 MG/DL — LOW (ref 2.5–4.5)
PHOSPHATE SERPL-MCNC: 3.6 MG/DL — SIGNIFICANT CHANGE UP (ref 2.5–4.5)
PLAT MORPH BLD: NORMAL — SIGNIFICANT CHANGE UP
PLATELET # BLD AUTO: 643 K/UL — HIGH (ref 150–400)
PLATELET COUNT - ESTIMATE: ABNORMAL
PMV BLD: 11.1 FL — SIGNIFICANT CHANGE UP (ref 7–13)
PO2 BLDV: 238 MMHG — HIGH (ref 25–45)
POLYCHROMASIA BLD QL SMEAR: SLIGHT — SIGNIFICANT CHANGE UP
POTASSIUM SERPL-MCNC: 3.5 MMOL/L — SIGNIFICANT CHANGE UP (ref 3.5–5.3)
POTASSIUM SERPL-SCNC: 3.5 MMOL/L — SIGNIFICANT CHANGE UP (ref 3.5–5.3)
PROT SERPL-MCNC: 7 GM/DL — SIGNIFICANT CHANGE UP (ref 6–8.3)
RBC # BLD: 4.27 M/UL — SIGNIFICANT CHANGE UP (ref 3.8–5.2)
RBC # FLD: 15.7 % — HIGH (ref 10.3–14.5)
RBC BLD AUTO: SIGNIFICANT CHANGE UP
SAO2 % BLDV: 99 % — HIGH (ref 67–88)
SODIUM SERPL-SCNC: 135 MMOL/L — SIGNIFICANT CHANGE UP (ref 135–145)
SPECIMEN SOURCE: SIGNIFICANT CHANGE UP
SPECIMEN SOURCE: SIGNIFICANT CHANGE UP
WBC # BLD: 38.16 K/UL — HIGH (ref 3.8–10.5)
WBC # FLD AUTO: 38.16 K/UL — HIGH (ref 3.8–10.5)
WBC MORPHOLOGY: NORMAL — SIGNIFICANT CHANGE UP

## 2025-02-27 PROCEDURE — 99233 SBSQ HOSP IP/OBS HIGH 50: CPT

## 2025-02-27 PROCEDURE — 99223 1ST HOSP IP/OBS HIGH 75: CPT

## 2025-02-27 PROCEDURE — 99291 CRITICAL CARE FIRST HOUR: CPT

## 2025-02-27 PROCEDURE — 71045 X-RAY EXAM CHEST 1 VIEW: CPT | Mod: 26

## 2025-02-27 PROCEDURE — 93010 ELECTROCARDIOGRAM REPORT: CPT

## 2025-02-27 PROCEDURE — 71275 CT ANGIOGRAPHY CHEST: CPT | Mod: 26

## 2025-02-27 PROCEDURE — 95816 EEG AWAKE AND DROWSY: CPT | Mod: 26

## 2025-02-27 PROCEDURE — 93306 TTE W/DOPPLER COMPLETE: CPT | Mod: 26

## 2025-02-27 RX ORDER — LORAZEPAM 4 MG/ML
0.5 VIAL (ML) INJECTION THREE TIMES A DAY
Refills: 0 | Status: DISCONTINUED | OUTPATIENT
Start: 2025-02-27 | End: 2025-02-27

## 2025-02-27 RX ORDER — METHYLPREDNISOLONE ACETATE 80 MG/ML
40 INJECTION, SUSPENSION INTRA-ARTICULAR; INTRALESIONAL; INTRAMUSCULAR; SOFT TISSUE EVERY 8 HOURS
Refills: 0 | Status: DISCONTINUED | OUTPATIENT
Start: 2025-02-27 | End: 2025-02-28

## 2025-02-27 RX ORDER — METHYLPREDNISOLONE ACETATE 80 MG/ML
40 INJECTION, SUSPENSION INTRA-ARTICULAR; INTRALESIONAL; INTRAMUSCULAR; SOFT TISSUE ONCE
Refills: 0 | Status: COMPLETED | OUTPATIENT
Start: 2025-02-27 | End: 2025-02-27

## 2025-02-27 RX ORDER — HYDROMORPHONE/SOD CHLOR,ISO/PF 2 MG/10 ML
0.5 SYRINGE (ML) INJECTION EVERY 4 HOURS
Refills: 0 | Status: DISCONTINUED | OUTPATIENT
Start: 2025-02-27 | End: 2025-03-03

## 2025-02-27 RX ORDER — FUROSEMIDE 10 MG/ML
20 INJECTION INTRAMUSCULAR; INTRAVENOUS
Refills: 0 | Status: DISCONTINUED | OUTPATIENT
Start: 2025-02-27 | End: 2025-02-28

## 2025-02-27 RX ORDER — HYDROMORPHONE/SOD CHLOR,ISO/PF 2 MG/10 ML
4 SYRINGE (ML) INJECTION EVERY 4 HOURS
Refills: 0 | Status: DISCONTINUED | OUTPATIENT
Start: 2025-02-27 | End: 2025-02-27

## 2025-02-27 RX ORDER — LORAZEPAM 4 MG/ML
0.25 VIAL (ML) INJECTION EVERY 6 HOURS
Refills: 0 | Status: DISCONTINUED | OUTPATIENT
Start: 2025-02-27 | End: 2025-03-03

## 2025-02-27 RX ORDER — FUROSEMIDE 10 MG/ML
20 INJECTION INTRAMUSCULAR; INTRAVENOUS ONCE
Refills: 0 | Status: COMPLETED | OUTPATIENT
Start: 2025-02-27 | End: 2025-02-27

## 2025-02-27 RX ORDER — ACETYLCYSTEINE 200 MG/ML
4 INHALANT RESPIRATORY (INHALATION)
Refills: 0 | Status: COMPLETED | OUTPATIENT
Start: 2025-02-27 | End: 2025-03-02

## 2025-02-27 RX ORDER — HYDROMORPHONE/SOD CHLOR,ISO/PF 2 MG/10 ML
0.2 SYRINGE (ML) INJECTION EVERY 4 HOURS
Refills: 0 | Status: DISCONTINUED | OUTPATIENT
Start: 2025-02-27 | End: 2025-03-03

## 2025-02-27 RX ORDER — LORAZEPAM 4 MG/ML
1 VIAL (ML) INJECTION ONCE
Refills: 0 | Status: DISCONTINUED | OUTPATIENT
Start: 2025-02-27 | End: 2025-02-27

## 2025-02-27 RX ORDER — IPRATROPIUM BROMIDE AND ALBUTEROL SULFATE .5; 2.5 MG/3ML; MG/3ML
3 SOLUTION RESPIRATORY (INHALATION) EVERY 6 HOURS
Refills: 0 | Status: DISCONTINUED | OUTPATIENT
Start: 2025-02-27 | End: 2025-03-22

## 2025-02-27 RX ADMIN — Medication 1 MILLIGRAM(S): at 14:00

## 2025-02-27 RX ADMIN — Medication 500 MILLIGRAM(S): at 12:49

## 2025-02-27 RX ADMIN — Medication 500 MILLIGRAM(S): at 09:53

## 2025-02-27 RX ADMIN — LIDOCAINE HYDROCHLORIDE 1 PATCH: 20 JELLY TOPICAL at 22:18

## 2025-02-27 RX ADMIN — Medication 25 GRAM(S): at 05:54

## 2025-02-27 RX ADMIN — Medication 25 GRAM(S): at 22:36

## 2025-02-27 RX ADMIN — LIDOCAINE HYDROCHLORIDE 1 PATCH: 20 JELLY TOPICAL at 09:57

## 2025-02-27 RX ADMIN — ACETYLCYSTEINE 4 MILLILITER(S): 200 INHALANT RESPIRATORY (INHALATION) at 20:34

## 2025-02-27 RX ADMIN — METHYLPREDNISOLONE ACETATE 40 MILLIGRAM(S): 80 INJECTION, SUSPENSION INTRA-ARTICULAR; INTRALESIONAL; INTRAMUSCULAR; SOFT TISSUE at 22:37

## 2025-02-27 RX ADMIN — Medication 25 GRAM(S): at 12:52

## 2025-02-27 RX ADMIN — FUROSEMIDE 20 MILLIGRAM(S): 10 INJECTION INTRAMUSCULAR; INTRAVENOUS at 13:26

## 2025-02-27 RX ADMIN — LIDOCAINE HYDROCHLORIDE 1 PATCH: 20 JELLY TOPICAL at 20:17

## 2025-02-27 RX ADMIN — IPRATROPIUM BROMIDE AND ALBUTEROL SULFATE 3 MILLILITER(S): .5; 2.5 SOLUTION RESPIRATORY (INHALATION) at 20:34

## 2025-02-27 RX ADMIN — ENOXAPARIN SODIUM 40 MILLIGRAM(S): 100 INJECTION SUBCUTANEOUS at 18:33

## 2025-02-27 RX ADMIN — FUROSEMIDE 20 MILLIGRAM(S): 10 INJECTION INTRAMUSCULAR; INTRAVENOUS at 15:26

## 2025-02-27 RX ADMIN — METHYLPREDNISOLONE ACETATE 40 MILLIGRAM(S): 80 INJECTION, SUSPENSION INTRA-ARTICULAR; INTRALESIONAL; INTRAMUSCULAR; SOFT TISSUE at 13:30

## 2025-02-27 RX ADMIN — Medication 1 MILLIGRAM(S): at 13:30

## 2025-02-27 RX ADMIN — Medication 125 MILLIGRAM(S): at 12:50

## 2025-02-28 LAB
24R-OH-CALCIDIOL SERPL-MCNC: 68.1 NG/ML — SIGNIFICANT CHANGE UP (ref 30–80)
ALBUMIN SERPL ELPH-MCNC: 2.5 G/DL — LOW (ref 3.3–5)
ALP SERPL-CCNC: 755 U/L — HIGH (ref 40–120)
ALT FLD-CCNC: 35 U/L — SIGNIFICANT CHANGE UP (ref 12–78)
AMMONIA BLD-MCNC: <10 UMOL/L — LOW (ref 11–32)
AMYLASE P1 CFR SERPL: 67 U/L — SIGNIFICANT CHANGE UP (ref 25–115)
ANION GAP SERPL CALC-SCNC: 12 MMOL/L — SIGNIFICANT CHANGE UP (ref 5–17)
ANISOCYTOSIS BLD QL: SLIGHT — SIGNIFICANT CHANGE UP
AST SERPL-CCNC: 28 U/L — SIGNIFICANT CHANGE UP (ref 15–37)
BASE EXCESS BLDV CALC-SCNC: -4.2 MMOL/L — LOW (ref -2–3)
BASOPHILS # BLD AUTO: 0.01 K/UL — SIGNIFICANT CHANGE UP (ref 0–0.2)
BASOPHILS # BLD MANUAL: 0 K/UL — SIGNIFICANT CHANGE UP (ref 0–0.2)
BASOPHILS NFR BLD AUTO: 0 % — SIGNIFICANT CHANGE UP (ref 0–2)
BASOPHILS NFR BLD MANUAL: 0 % — SIGNIFICANT CHANGE UP (ref 0–2)
BILIRUB DIRECT SERPL-MCNC: 1.1 MG/DL — HIGH (ref 0–0.3)
BILIRUB INDIRECT FLD-MCNC: 0.6 MG/DL — SIGNIFICANT CHANGE UP (ref 0.2–1)
BILIRUB SERPL-MCNC: 1.7 MG/DL — HIGH (ref 0.2–1.2)
BUN SERPL-MCNC: 10 MG/DL — SIGNIFICANT CHANGE UP (ref 7–23)
CALCIUM SERPL-MCNC: 9.4 MG/DL — SIGNIFICANT CHANGE UP (ref 8.5–10.1)
CHLORIDE SERPL-SCNC: 108 MMOL/L — SIGNIFICANT CHANGE UP (ref 96–108)
CHOLEST SERPL-MCNC: 258 MG/DL — HIGH
CK SERPL-CCNC: 38 U/L — SIGNIFICANT CHANGE UP (ref 26–192)
CO2 SERPL-SCNC: 18 MMOL/L — LOW (ref 22–31)
CORTIS AM PEAK SERPL-MCNC: 43.2 UG/DL — HIGH (ref 6–18.4)
CREAT SERPL-MCNC: 0.65 MG/DL — SIGNIFICANT CHANGE UP (ref 0.5–1.3)
CRP SERPL-MCNC: 148 MG/ML — HIGH (ref 0–5)
EGFR: 96 ML/MIN/1.73M2 — SIGNIFICANT CHANGE UP
EGFR: 96 ML/MIN/1.73M2 — SIGNIFICANT CHANGE UP
EOSINOPHIL # BLD AUTO: 0 K/UL — SIGNIFICANT CHANGE UP (ref 0–0.5)
EOSINOPHIL # BLD MANUAL: 0 K/UL — SIGNIFICANT CHANGE UP (ref 0–0.5)
EOSINOPHIL NFR BLD AUTO: 0 % — SIGNIFICANT CHANGE UP (ref 0–6)
EOSINOPHIL NFR BLD MANUAL: 0 % — SIGNIFICANT CHANGE UP (ref 0–6)
GLUCOSE BLDC GLUCOMTR-MCNC: 205 MG/DL — HIGH (ref 70–99)
GLUCOSE SERPL-MCNC: 188 MG/DL — HIGH (ref 70–99)
HCO3 BLDV-SCNC: 20 MMOL/L — LOW (ref 22–29)
HCT VFR BLD CALC: 42 % — SIGNIFICANT CHANGE UP (ref 34.5–45)
HDLC SERPL-MCNC: 58 MG/DL — SIGNIFICANT CHANGE UP
HGB BLD-MCNC: 13.6 G/DL — SIGNIFICANT CHANGE UP (ref 11.5–15.5)
IMM GRANULOCYTES # BLD AUTO: 0.98 K/UL — HIGH (ref 0–0.07)
IMM GRANULOCYTES NFR BLD AUTO: 1.9 % — HIGH (ref 0–0.9)
IMMATURE RETICULOCYTE FRACTION %: 20 % — SIGNIFICANT CHANGE UP
IRON SATN MFR SERPL: 17 % — SIGNIFICANT CHANGE UP (ref 14–50)
IRON SATN MFR SERPL: 34 UG/DL — SIGNIFICANT CHANGE UP (ref 30–160)
LDLC SERPL-MCNC: 166 MG/DL — HIGH
LIDOCAIN IGE QN: 40 U/L — SIGNIFICANT CHANGE UP (ref 13–75)
LIPID PNL WITH DIRECT LDL SERPL: 166 MG/DL — HIGH
LYMPHOCYTES # BLD AUTO: 1.44 K/UL — SIGNIFICANT CHANGE UP (ref 1–3.3)
LYMPHOCYTES # BLD MANUAL: 2.11 K/UL — SIGNIFICANT CHANGE UP (ref 1–3.3)
LYMPHOCYTES NFR BLD AUTO: 2.7 % — LOW (ref 13–44)
LYMPHOCYTES NFR BLD MANUAL: 4 % — LOW (ref 13–44)
MAGNESIUM SERPL-MCNC: 2.1 MG/DL — SIGNIFICANT CHANGE UP (ref 1.6–2.6)
MANUAL SMEAR VERIFICATION: SIGNIFICANT CHANGE UP
MCHC RBC-ENTMCNC: 27.9 PG — SIGNIFICANT CHANGE UP (ref 27–34)
MCHC RBC-ENTMCNC: 32.4 G/DL — SIGNIFICANT CHANGE UP (ref 32–36)
MCV RBC AUTO: 86.1 FL — SIGNIFICANT CHANGE UP (ref 80–100)
MICROCYTES BLD QL: SLIGHT — SIGNIFICANT CHANGE UP
MONOCYTES # BLD AUTO: 1.02 K/UL — HIGH (ref 0–0.9)
MONOCYTES # BLD MANUAL: 0.53 K/UL — SIGNIFICANT CHANGE UP (ref 0–0.9)
MONOCYTES NFR BLD AUTO: 1.9 % — LOW (ref 2–14)
MONOCYTES NFR BLD MANUAL: 1 % — LOW (ref 2–14)
NEUTROPHILS # BLD AUTO: 49.36 K/UL — HIGH (ref 1.8–7.4)
NEUTROPHILS # BLD MANUAL: 50.17 K/UL — HIGH (ref 1.8–7.4)
NEUTROPHILS NFR BLD AUTO: 93.5 % — HIGH (ref 43–77)
NEUTROPHILS NFR BLD MANUAL: 95 % — HIGH (ref 43–77)
NONHDLC SERPL-MCNC: 200 MG/DL — HIGH
NRBC # BLD AUTO: 0 K/UL — SIGNIFICANT CHANGE UP (ref 0–0)
NRBC # FLD: 0 K/UL — SIGNIFICANT CHANGE UP (ref 0–0)
NRBC BLD AUTO-RTO: 0 /100 WBCS — SIGNIFICANT CHANGE UP (ref 0–0)
NT-PROBNP SERPL-SCNC: HIGH PG/ML (ref 0–125)
OB PNL STL: POSITIVE
PCO2 BLDV: 34 MMHG — LOW (ref 39–42)
PH BLDV: 7.38 — SIGNIFICANT CHANGE UP (ref 7.32–7.43)
PHOSPHATE SERPL-MCNC: 3.8 MG/DL — SIGNIFICANT CHANGE UP (ref 2.5–4.5)
PLAT MORPH BLD: NORMAL — SIGNIFICANT CHANGE UP
PLATELET # BLD AUTO: 570 K/UL — HIGH (ref 150–400)
PMV BLD: 11.1 FL — SIGNIFICANT CHANGE UP (ref 7–13)
PO2 BLDV: 32 MMHG — SIGNIFICANT CHANGE UP (ref 25–45)
POTASSIUM SERPL-MCNC: 2.8 MMOL/L — CRITICAL LOW (ref 3.5–5.3)
POTASSIUM SERPL-SCNC: 2.8 MMOL/L — CRITICAL LOW (ref 3.5–5.3)
PROLACTIN SERPL-MCNC: 8.5 NG/ML — SIGNIFICANT CHANGE UP (ref 3.4–24.1)
PROT SERPL-MCNC: 8 GM/DL — SIGNIFICANT CHANGE UP (ref 6–8.3)
RBC # BLD: 4.88 M/UL — SIGNIFICANT CHANGE UP (ref 3.8–5.2)
RBC # BLD: 4.88 M/UL — SIGNIFICANT CHANGE UP (ref 3.8–5.2)
RBC # FLD: 16.2 % — HIGH (ref 10.3–14.5)
RBC BLD AUTO: SIGNIFICANT CHANGE UP
RETICS #: 218.6 K/UL — HIGH (ref 25–125)
RETICS/RBC NFR: 4.5 % — HIGH (ref 0.5–2.5)
RETICULOCYTE HEMOGLOBIN EQUIVALENT: 38.5 PG — SIGNIFICANT CHANGE UP (ref 30.6–40.7)
SAO2 % BLDV: 48 % — LOW (ref 67–88)
SODIUM SERPL-SCNC: 138 MMOL/L — SIGNIFICANT CHANGE UP (ref 135–145)
TIBC SERPL-MCNC: 193 UG/DL — LOW (ref 220–430)
TRIGL SERPL-MCNC: 188 MG/DL — HIGH
TROPONIN I, HIGH SENSITIVITY RESULT: 44.65 NG/L — SIGNIFICANT CHANGE UP
TROPONIN I, HIGH SENSITIVITY RESULT: 51.19 NG/L — SIGNIFICANT CHANGE UP
TSH SERPL-MCNC: 3.55 UU/ML — SIGNIFICANT CHANGE UP (ref 0.34–4.82)
UIBC SERPL-MCNC: 159 UG/DL — SIGNIFICANT CHANGE UP (ref 110–370)
URATE SERPL-MCNC: 2.4 MG/DL — LOW (ref 2.5–7)
VIT B12 SERPL-MCNC: >2000 PG/ML — HIGH (ref 232–1245)
WBC # BLD: 52.81 K/UL — CRITICAL HIGH (ref 3.8–10.5)
WBC # FLD AUTO: 52.81 K/UL — CRITICAL HIGH (ref 3.8–10.5)
WBC MORPHOLOGY: NORMAL — SIGNIFICANT CHANGE UP

## 2025-02-28 PROCEDURE — 99232 SBSQ HOSP IP/OBS MODERATE 35: CPT

## 2025-02-28 PROCEDURE — 99233 SBSQ HOSP IP/OBS HIGH 50: CPT

## 2025-02-28 PROCEDURE — 99231 SBSQ HOSP IP/OBS SF/LOW 25: CPT

## 2025-02-28 PROCEDURE — 93010 ELECTROCARDIOGRAM REPORT: CPT

## 2025-02-28 RX ORDER — MEROPENEM 1 G/30ML
1000 INJECTION INTRAVENOUS EVERY 8 HOURS
Refills: 0 | Status: DISCONTINUED | OUTPATIENT
Start: 2025-02-28 | End: 2025-02-28

## 2025-02-28 RX ORDER — ALBUMIN (HUMAN) 12.5 G/50ML
50 INJECTION, SOLUTION INTRAVENOUS EVERY 12 HOURS
Refills: 0 | Status: COMPLETED | OUTPATIENT
Start: 2025-02-28 | End: 2025-03-03

## 2025-02-28 RX ORDER — SODIUM/POT/MAG/CALC/CHLOR/ACET 35-20-5MEQ
1 VIAL (ML) INTRAVENOUS
Refills: 0 | Status: DISCONTINUED | OUTPATIENT
Start: 2025-02-28 | End: 2025-02-28

## 2025-02-28 RX ORDER — METHYLPREDNISOLONE ACETATE 80 MG/ML
20 INJECTION, SUSPENSION INTRA-ARTICULAR; INTRALESIONAL; INTRAMUSCULAR; SOFT TISSUE EVERY 8 HOURS
Refills: 0 | Status: DISCONTINUED | OUTPATIENT
Start: 2025-02-28 | End: 2025-03-02

## 2025-02-28 RX ORDER — MEROPENEM 1 G/30ML
1000 INJECTION INTRAVENOUS EVERY 8 HOURS
Refills: 0 | Status: COMPLETED | OUTPATIENT
Start: 2025-02-28 | End: 2025-03-14

## 2025-02-28 RX ADMIN — FUROSEMIDE 20 MILLIGRAM(S): 10 INJECTION INTRAMUSCULAR; INTRAVENOUS at 15:24

## 2025-02-28 RX ADMIN — MEROPENEM 1000 MILLIGRAM(S): 1 INJECTION INTRAVENOUS at 15:24

## 2025-02-28 RX ADMIN — Medication 0.25 MILLIGRAM(S): at 10:35

## 2025-02-28 RX ADMIN — Medication 100 MILLIEQUIVALENT(S): at 12:00

## 2025-02-28 RX ADMIN — LIDOCAINE HYDROCHLORIDE 1 PATCH: 20 JELLY TOPICAL at 13:41

## 2025-02-28 RX ADMIN — Medication 0.5 MILLIGRAM(S): at 12:56

## 2025-02-28 RX ADMIN — ENOXAPARIN SODIUM 40 MILLIGRAM(S): 100 INJECTION SUBCUTANEOUS at 15:23

## 2025-02-28 RX ADMIN — METHYLPREDNISOLONE ACETATE 20 MILLIGRAM(S): 80 INJECTION, SUSPENSION INTRA-ARTICULAR; INTRALESIONAL; INTRAMUSCULAR; SOFT TISSUE at 21:27

## 2025-02-28 RX ADMIN — Medication 100 MILLIEQUIVALENT(S): at 13:04

## 2025-02-28 RX ADMIN — Medication 0.5 MILLIGRAM(S): at 13:30

## 2025-02-28 RX ADMIN — Medication 0.2 MILLIGRAM(S): at 22:43

## 2025-02-28 RX ADMIN — LIDOCAINE HYDROCHLORIDE 1 PATCH: 20 JELLY TOPICAL at 22:34

## 2025-02-28 RX ADMIN — METHYLPREDNISOLONE ACETATE 40 MILLIGRAM(S): 80 INJECTION, SUSPENSION INTRA-ARTICULAR; INTRALESIONAL; INTRAMUSCULAR; SOFT TISSUE at 15:24

## 2025-02-28 RX ADMIN — Medication 0.5 MILLIGRAM(S): at 07:10

## 2025-02-28 RX ADMIN — ACETYLCYSTEINE 4 MILLILITER(S): 200 INHALANT RESPIRATORY (INHALATION) at 20:08

## 2025-02-28 RX ADMIN — Medication 0.25 MILLIGRAM(S): at 16:31

## 2025-02-28 RX ADMIN — Medication 25 GRAM(S): at 06:37

## 2025-02-28 RX ADMIN — Medication 1 EACH: at 23:00

## 2025-02-28 RX ADMIN — METHYLPREDNISOLONE ACETATE 40 MILLIGRAM(S): 80 INJECTION, SUSPENSION INTRA-ARTICULAR; INTRALESIONAL; INTRAMUSCULAR; SOFT TISSUE at 06:37

## 2025-02-28 RX ADMIN — IPRATROPIUM BROMIDE AND ALBUTEROL SULFATE 3 MILLILITER(S): .5; 2.5 SOLUTION RESPIRATORY (INHALATION) at 07:59

## 2025-02-28 RX ADMIN — Medication 100 MILLIEQUIVALENT(S): at 14:29

## 2025-02-28 RX ADMIN — FUROSEMIDE 20 MILLIGRAM(S): 10 INJECTION INTRAMUSCULAR; INTRAVENOUS at 06:37

## 2025-02-28 RX ADMIN — Medication 0.25 MILLIGRAM(S): at 02:35

## 2025-02-28 RX ADMIN — IPRATROPIUM BROMIDE AND ALBUTEROL SULFATE 3 MILLILITER(S): .5; 2.5 SOLUTION RESPIRATORY (INHALATION) at 01:37

## 2025-02-28 RX ADMIN — MEROPENEM 1000 MILLIGRAM(S): 1 INJECTION INTRAVENOUS at 21:27

## 2025-02-28 RX ADMIN — IPRATROPIUM BROMIDE AND ALBUTEROL SULFATE 3 MILLILITER(S): .5; 2.5 SOLUTION RESPIRATORY (INHALATION) at 14:32

## 2025-02-28 RX ADMIN — Medication 0.5 MILLIGRAM(S): at 18:38

## 2025-02-28 RX ADMIN — Medication 0.5 MILLIGRAM(S): at 19:17

## 2025-02-28 RX ADMIN — ACETYLCYSTEINE 4 MILLILITER(S): 200 INHALANT RESPIRATORY (INHALATION) at 08:03

## 2025-02-28 RX ADMIN — IPRATROPIUM BROMIDE AND ALBUTEROL SULFATE 3 MILLILITER(S): .5; 2.5 SOLUTION RESPIRATORY (INHALATION) at 20:09

## 2025-02-28 RX ADMIN — Medication 0.2 MILLIGRAM(S): at 23:00

## 2025-02-28 RX ADMIN — ALBUMIN (HUMAN) 50 MILLILITER(S): 12.5 INJECTION, SOLUTION INTRAVENOUS at 18:38

## 2025-03-01 DIAGNOSIS — Z51.5 ENCOUNTER FOR PALLIATIVE CARE: ICD-10-CM

## 2025-03-01 LAB
ADD ON TEST-SPECIMEN IN LAB: SIGNIFICANT CHANGE UP
ALBUMIN SERPL ELPH-MCNC: 2.8 G/DL — LOW (ref 3.3–5)
ALP SERPL-CCNC: 641 U/L — HIGH (ref 40–120)
ALT FLD-CCNC: 32 U/L — SIGNIFICANT CHANGE UP (ref 12–78)
ANION GAP SERPL CALC-SCNC: 10 MMOL/L — SIGNIFICANT CHANGE UP (ref 5–17)
AST SERPL-CCNC: 27 U/L — SIGNIFICANT CHANGE UP (ref 15–37)
BILIRUB DIRECT SERPL-MCNC: 0.9 MG/DL — HIGH (ref 0–0.3)
BILIRUB INDIRECT FLD-MCNC: 0.4 MG/DL — SIGNIFICANT CHANGE UP (ref 0.2–1)
BILIRUB SERPL-MCNC: 1.3 MG/DL — HIGH (ref 0.2–1.2)
BUN SERPL-MCNC: 21 MG/DL — SIGNIFICANT CHANGE UP (ref 7–23)
CALCIUM SERPL-MCNC: 9.4 MG/DL — SIGNIFICANT CHANGE UP (ref 8.5–10.1)
CHLORIDE SERPL-SCNC: 112 MMOL/L — HIGH (ref 96–108)
CO2 SERPL-SCNC: 21 MMOL/L — LOW (ref 22–31)
CREAT SERPL-MCNC: 0.53 MG/DL — SIGNIFICANT CHANGE UP (ref 0.5–1.3)
CRP SERPL-MCNC: 78.5 MG/ML — HIGH (ref 0–5)
EGFR: 101 ML/MIN/1.73M2 — SIGNIFICANT CHANGE UP
EGFR: 101 ML/MIN/1.73M2 — SIGNIFICANT CHANGE UP
GLUCOSE BLDC GLUCOMTR-MCNC: 176 MG/DL — HIGH (ref 70–99)
GLUCOSE BLDC GLUCOMTR-MCNC: 193 MG/DL — HIGH (ref 70–99)
GLUCOSE BLDC GLUCOMTR-MCNC: 206 MG/DL — HIGH (ref 70–99)
GLUCOSE BLDC GLUCOMTR-MCNC: 210 MG/DL — HIGH (ref 70–99)
GLUCOSE SERPL-MCNC: 179 MG/DL — HIGH (ref 70–99)
HCT VFR BLD CALC: 32.5 % — LOW (ref 34.5–45)
HCYS SERPL-MCNC: 10.1 UMOL/L — SIGNIFICANT CHANGE UP
HGB BLD-MCNC: 10.6 G/DL — LOW (ref 11.5–15.5)
MAGNESIUM SERPL-MCNC: 2.5 MG/DL — SIGNIFICANT CHANGE UP (ref 1.6–2.6)
MCHC RBC-ENTMCNC: 28 PG — SIGNIFICANT CHANGE UP (ref 27–34)
MCHC RBC-ENTMCNC: 32.6 G/DL — SIGNIFICANT CHANGE UP (ref 32–36)
MCV RBC AUTO: 86 FL — SIGNIFICANT CHANGE UP (ref 80–100)
NRBC # BLD AUTO: 0 K/UL — SIGNIFICANT CHANGE UP (ref 0–0)
NRBC # FLD: 0 K/UL — SIGNIFICANT CHANGE UP (ref 0–0)
NRBC BLD AUTO-RTO: 0 /100 WBCS — SIGNIFICANT CHANGE UP (ref 0–0)
NT-PROBNP SERPL-SCNC: 6784 PG/ML — HIGH (ref 0–125)
PHOSPHATE SERPL-MCNC: 2.1 MG/DL — LOW (ref 2.5–4.5)
PLATELET # BLD AUTO: 456 K/UL — HIGH (ref 150–400)
PMV BLD: 10.9 FL — SIGNIFICANT CHANGE UP (ref 7–13)
POTASSIUM SERPL-MCNC: 2.8 MMOL/L — CRITICAL LOW (ref 3.5–5.3)
POTASSIUM SERPL-SCNC: 2.8 MMOL/L — CRITICAL LOW (ref 3.5–5.3)
PREALB SERPL-MCNC: 13 MG/DL — LOW (ref 20–40)
PROCALCITONIN SERPL-MCNC: 0.23 NG/ML — HIGH (ref 0.02–0.1)
PROT SERPL-MCNC: 7 GM/DL — SIGNIFICANT CHANGE UP (ref 6–8.3)
RBC # BLD: 3.78 M/UL — LOW (ref 3.8–5.2)
RBC # FLD: 16.7 % — HIGH (ref 10.3–14.5)
SODIUM SERPL-SCNC: 143 MMOL/L — SIGNIFICANT CHANGE UP (ref 135–145)
WBC # BLD: 38.08 K/UL — HIGH (ref 3.8–10.5)
WBC # FLD AUTO: 38.08 K/UL — HIGH (ref 3.8–10.5)

## 2025-03-01 PROCEDURE — 99232 SBSQ HOSP IP/OBS MODERATE 35: CPT

## 2025-03-01 PROCEDURE — 99233 SBSQ HOSP IP/OBS HIGH 50: CPT

## 2025-03-01 RX ORDER — SODIUM/POT/MAG/CALC/CHLOR/ACET 35-20-5MEQ
1 VIAL (ML) INTRAVENOUS
Refills: 0 | Status: DISCONTINUED | OUTPATIENT
Start: 2025-03-01 | End: 2025-03-01

## 2025-03-01 RX ORDER — METOPROLOL SUCCINATE 50 MG/1
2.5 TABLET, EXTENDED RELEASE ORAL EVERY 12 HOURS
Refills: 0 | Status: DISCONTINUED | OUTPATIENT
Start: 2025-03-01 | End: 2025-03-02

## 2025-03-01 RX ADMIN — ALBUMIN (HUMAN) 50 MILLILITER(S): 12.5 INJECTION, SOLUTION INTRAVENOUS at 05:05

## 2025-03-01 RX ADMIN — ACETYLCYSTEINE 4 MILLILITER(S): 200 INHALANT RESPIRATORY (INHALATION) at 08:25

## 2025-03-01 RX ADMIN — Medication 0.2 MILLIGRAM(S): at 05:55

## 2025-03-01 RX ADMIN — METHYLPREDNISOLONE ACETATE 20 MILLIGRAM(S): 80 INJECTION, SUSPENSION INTRA-ARTICULAR; INTRALESIONAL; INTRAMUSCULAR; SOFT TISSUE at 13:10

## 2025-03-01 RX ADMIN — Medication 0.5 MILLIGRAM(S): at 23:28

## 2025-03-01 RX ADMIN — METHYLPREDNISOLONE ACETATE 20 MILLIGRAM(S): 80 INJECTION, SUSPENSION INTRA-ARTICULAR; INTRALESIONAL; INTRAMUSCULAR; SOFT TISSUE at 05:04

## 2025-03-01 RX ADMIN — Medication 100 MILLIEQUIVALENT(S): at 09:43

## 2025-03-01 RX ADMIN — METOPROLOL SUCCINATE 2.5 MILLIGRAM(S): 50 TABLET, EXTENDED RELEASE ORAL at 20:58

## 2025-03-01 RX ADMIN — MEROPENEM 1000 MILLIGRAM(S): 1 INJECTION INTRAVENOUS at 20:59

## 2025-03-01 RX ADMIN — Medication 0.2 MILLIGRAM(S): at 01:26

## 2025-03-01 RX ADMIN — Medication 0.25 MILLIGRAM(S): at 07:45

## 2025-03-01 RX ADMIN — IPRATROPIUM BROMIDE AND ALBUTEROL SULFATE 3 MILLILITER(S): .5; 2.5 SOLUTION RESPIRATORY (INHALATION) at 20:50

## 2025-03-01 RX ADMIN — Medication 0.5 MILLIGRAM(S): at 14:50

## 2025-03-01 RX ADMIN — Medication 0.5 MILLIGRAM(S): at 18:51

## 2025-03-01 RX ADMIN — Medication 1 EACH: at 22:47

## 2025-03-01 RX ADMIN — Medication 0.2 MILLIGRAM(S): at 01:43

## 2025-03-01 RX ADMIN — MEROPENEM 1000 MILLIGRAM(S): 1 INJECTION INTRAVENOUS at 13:10

## 2025-03-01 RX ADMIN — Medication 100 MILLIEQUIVALENT(S): at 12:00

## 2025-03-01 RX ADMIN — Medication 0.2 MILLIGRAM(S): at 05:37

## 2025-03-01 RX ADMIN — METOPROLOL SUCCINATE 2.5 MILLIGRAM(S): 50 TABLET, EXTENDED RELEASE ORAL at 09:42

## 2025-03-01 RX ADMIN — IPRATROPIUM BROMIDE AND ALBUTEROL SULFATE 3 MILLILITER(S): .5; 2.5 SOLUTION RESPIRATORY (INHALATION) at 13:09

## 2025-03-01 RX ADMIN — Medication 0.2 MILLIGRAM(S): at 10:55

## 2025-03-01 RX ADMIN — LIDOCAINE HYDROCHLORIDE 1 PATCH: 20 JELLY TOPICAL at 05:42

## 2025-03-01 RX ADMIN — ENOXAPARIN SODIUM 40 MILLIGRAM(S): 100 INJECTION SUBCUTANEOUS at 17:26

## 2025-03-01 RX ADMIN — Medication 0.5 MILLIGRAM(S): at 23:23

## 2025-03-01 RX ADMIN — Medication 0.2 MILLIGRAM(S): at 11:05

## 2025-03-01 RX ADMIN — METHYLPREDNISOLONE ACETATE 20 MILLIGRAM(S): 80 INJECTION, SUSPENSION INTRA-ARTICULAR; INTRALESIONAL; INTRAMUSCULAR; SOFT TISSUE at 21:00

## 2025-03-01 RX ADMIN — Medication 100 MILLIEQUIVALENT(S): at 10:56

## 2025-03-01 RX ADMIN — IPRATROPIUM BROMIDE AND ALBUTEROL SULFATE 3 MILLILITER(S): .5; 2.5 SOLUTION RESPIRATORY (INHALATION) at 08:20

## 2025-03-01 RX ADMIN — MEROPENEM 1000 MILLIGRAM(S): 1 INJECTION INTRAVENOUS at 05:04

## 2025-03-01 RX ADMIN — ALBUMIN (HUMAN) 50 MILLILITER(S): 12.5 INJECTION, SOLUTION INTRAVENOUS at 17:26

## 2025-03-01 RX ADMIN — Medication 0.25 MILLIGRAM(S): at 23:02

## 2025-03-01 RX ADMIN — ACETYLCYSTEINE 4 MILLILITER(S): 200 INHALANT RESPIRATORY (INHALATION) at 20:50

## 2025-03-02 LAB
ALBUMIN SERPL ELPH-MCNC: 2.7 G/DL — LOW (ref 3.3–5)
ALP SERPL-CCNC: 551 U/L — HIGH (ref 40–120)
ALT FLD-CCNC: 25 U/L — SIGNIFICANT CHANGE UP (ref 12–78)
ANION GAP SERPL CALC-SCNC: 7 MMOL/L — SIGNIFICANT CHANGE UP (ref 5–17)
AST SERPL-CCNC: 29 U/L — SIGNIFICANT CHANGE UP (ref 15–37)
BASE EXCESS BLDV CALC-SCNC: -2.1 MMOL/L — LOW (ref -2–3)
BILIRUB DIRECT SERPL-MCNC: 0.7 MG/DL — HIGH (ref 0–0.3)
BILIRUB SERPL-MCNC: 1.3 MG/DL — HIGH (ref 0.2–1.2)
BUN SERPL-MCNC: 20 MG/DL — SIGNIFICANT CHANGE UP (ref 7–23)
CALCIUM SERPL-MCNC: 8.8 MG/DL — SIGNIFICANT CHANGE UP (ref 8.5–10.1)
CHLORIDE SERPL-SCNC: 113 MMOL/L — HIGH (ref 96–108)
CO2 SERPL-SCNC: 19 MMOL/L — LOW (ref 22–31)
CREAT SERPL-MCNC: 0.45 MG/DL — LOW (ref 0.5–1.3)
CRP SERPL-MCNC: 39.8 MG/ML — HIGH (ref 0–5)
EGFR: 105 ML/MIN/1.73M2 — SIGNIFICANT CHANGE UP
EGFR: 105 ML/MIN/1.73M2 — SIGNIFICANT CHANGE UP
GLUCOSE BLDC GLUCOMTR-MCNC: 142 MG/DL — HIGH (ref 70–99)
GLUCOSE BLDC GLUCOMTR-MCNC: 200 MG/DL — HIGH (ref 70–99)
GLUCOSE BLDC GLUCOMTR-MCNC: 201 MG/DL — HIGH (ref 70–99)
GLUCOSE SERPL-MCNC: 182 MG/DL — HIGH (ref 70–99)
HCO3 BLDV-SCNC: 19 MMOL/L — LOW (ref 22–29)
HCT VFR BLD CALC: 33.7 % — LOW (ref 34.5–45)
HGB BLD-MCNC: 10.7 G/DL — LOW (ref 11.5–15.5)
MAGNESIUM SERPL-MCNC: 2.6 MG/DL — SIGNIFICANT CHANGE UP (ref 1.6–2.6)
MCHC RBC-ENTMCNC: 27.9 PG — SIGNIFICANT CHANGE UP (ref 27–34)
MCHC RBC-ENTMCNC: 31.8 G/DL — LOW (ref 32–36)
MCV RBC AUTO: 88 FL — SIGNIFICANT CHANGE UP (ref 80–100)
NRBC # BLD AUTO: 0 K/UL — SIGNIFICANT CHANGE UP (ref 0–0)
NRBC # FLD: 0 K/UL — SIGNIFICANT CHANGE UP (ref 0–0)
NRBC BLD AUTO-RTO: 0 /100 WBCS — SIGNIFICANT CHANGE UP (ref 0–0)
NT-PROBNP SERPL-SCNC: HIGH PG/ML (ref 0–125)
PCO2 BLDV: 24 MMHG — LOW (ref 39–42)
PH BLDV: 7.51 — HIGH (ref 7.32–7.43)
PHOSPHATE SERPL-MCNC: 2 MG/DL — LOW (ref 2.5–4.5)
PLATELET # BLD AUTO: 278 K/UL — SIGNIFICANT CHANGE UP (ref 150–400)
PMV BLD: 11.8 FL — SIGNIFICANT CHANGE UP (ref 7–13)
PO2 BLDV: 234 MMHG — HIGH (ref 25–45)
POTASSIUM SERPL-MCNC: 3.7 MMOL/L — SIGNIFICANT CHANGE UP (ref 3.5–5.3)
POTASSIUM SERPL-SCNC: 3.7 MMOL/L — SIGNIFICANT CHANGE UP (ref 3.5–5.3)
PROT SERPL-MCNC: 6.9 GM/DL — SIGNIFICANT CHANGE UP (ref 6–8.3)
RBC # BLD: 3.83 M/UL — SIGNIFICANT CHANGE UP (ref 3.8–5.2)
RBC # FLD: 17.3 % — HIGH (ref 10.3–14.5)
SAO2 % BLDV: 100 % — HIGH (ref 67–88)
SODIUM SERPL-SCNC: 139 MMOL/L — SIGNIFICANT CHANGE UP (ref 135–145)
WBC # BLD: 32.6 K/UL — HIGH (ref 3.8–10.5)
WBC # FLD AUTO: 32.6 K/UL — HIGH (ref 3.8–10.5)

## 2025-03-02 PROCEDURE — 99233 SBSQ HOSP IP/OBS HIGH 50: CPT

## 2025-03-02 PROCEDURE — 71045 X-RAY EXAM CHEST 1 VIEW: CPT | Mod: 26

## 2025-03-02 RX ORDER — METHYLPREDNISOLONE ACETATE 80 MG/ML
20 INJECTION, SUSPENSION INTRA-ARTICULAR; INTRALESIONAL; INTRAMUSCULAR; SOFT TISSUE EVERY 8 HOURS
Refills: 0 | Status: COMPLETED | OUTPATIENT
Start: 2025-03-02 | End: 2025-03-02

## 2025-03-02 RX ORDER — METHYLPREDNISOLONE ACETATE 80 MG/ML
INJECTION, SUSPENSION INTRA-ARTICULAR; INTRALESIONAL; INTRAMUSCULAR; SOFT TISSUE
Refills: 0 | Status: COMPLETED | OUTPATIENT
Start: 2025-03-02 | End: 2025-03-07

## 2025-03-02 RX ORDER — FUROSEMIDE 10 MG/ML
20 INJECTION INTRAMUSCULAR; INTRAVENOUS DAILY
Refills: 0 | Status: COMPLETED | OUTPATIENT
Start: 2025-03-02 | End: 2025-03-05

## 2025-03-02 RX ORDER — I.V. FAT EMULSION 20 G/100ML
0.88 EMULSION INTRAVENOUS
Qty: 40 | Refills: 0 | Status: DISCONTINUED | OUTPATIENT
Start: 2025-03-02 | End: 2025-03-02

## 2025-03-02 RX ORDER — METOPROLOL SUCCINATE 50 MG/1
12.5 TABLET, EXTENDED RELEASE ORAL
Refills: 0 | Status: DISCONTINUED | OUTPATIENT
Start: 2025-03-02 | End: 2025-03-03

## 2025-03-02 RX ORDER — METHYLPREDNISOLONE ACETATE 80 MG/ML
20 INJECTION, SUSPENSION INTRA-ARTICULAR; INTRALESIONAL; INTRAMUSCULAR; SOFT TISSUE EVERY 12 HOURS
Refills: 0 | Status: COMPLETED | OUTPATIENT
Start: 2025-03-03 | End: 2025-03-05

## 2025-03-02 RX ORDER — LORAZEPAM 4 MG/ML
0.5 VIAL (ML) INJECTION ONCE
Refills: 0 | Status: DISCONTINUED | OUTPATIENT
Start: 2025-03-02 | End: 2025-03-04

## 2025-03-02 RX ORDER — SODIUM/POT/MAG/CALC/CHLOR/ACET 35-20-5MEQ
1 VIAL (ML) INTRAVENOUS
Refills: 0 | Status: DISCONTINUED | OUTPATIENT
Start: 2025-03-02 | End: 2025-03-02

## 2025-03-02 RX ORDER — METHYLPREDNISOLONE ACETATE 80 MG/ML
20 INJECTION, SUSPENSION INTRA-ARTICULAR; INTRALESIONAL; INTRAMUSCULAR; SOFT TISSUE DAILY
Refills: 0 | Status: COMPLETED | OUTPATIENT
Start: 2025-03-05 | End: 2025-03-07

## 2025-03-02 RX ORDER — METOPROLOL SUCCINATE 50 MG/1
2.5 TABLET, EXTENDED RELEASE ORAL EVERY 6 HOURS
Refills: 0 | Status: DISCONTINUED | OUTPATIENT
Start: 2025-03-02 | End: 2025-03-22

## 2025-03-02 RX ORDER — METOPROLOL SUCCINATE 50 MG/1
2.5 TABLET, EXTENDED RELEASE ORAL EVERY 8 HOURS
Refills: 0 | Status: DISCONTINUED | OUTPATIENT
Start: 2025-03-02 | End: 2025-03-02

## 2025-03-02 RX ADMIN — Medication 0.5 MILLIGRAM(S): at 21:32

## 2025-03-02 RX ADMIN — Medication 0.2 MILLIGRAM(S): at 08:07

## 2025-03-02 RX ADMIN — METHYLPREDNISOLONE ACETATE 20 MILLIGRAM(S): 80 INJECTION, SUSPENSION INTRA-ARTICULAR; INTRALESIONAL; INTRAMUSCULAR; SOFT TISSUE at 21:17

## 2025-03-02 RX ADMIN — Medication 0.25 MILLIGRAM(S): at 23:19

## 2025-03-02 RX ADMIN — Medication 0.2 MILLIGRAM(S): at 02:48

## 2025-03-02 RX ADMIN — MEROPENEM 1000 MILLIGRAM(S): 1 INJECTION INTRAVENOUS at 21:17

## 2025-03-02 RX ADMIN — Medication 0.2 MILLIGRAM(S): at 02:18

## 2025-03-02 RX ADMIN — LIDOCAINE HYDROCHLORIDE 1 PATCH: 20 JELLY TOPICAL at 19:45

## 2025-03-02 RX ADMIN — LIDOCAINE HYDROCHLORIDE 1 PATCH: 20 JELLY TOPICAL at 11:34

## 2025-03-02 RX ADMIN — Medication 0.25 MILLIGRAM(S): at 05:52

## 2025-03-02 RX ADMIN — ALBUMIN (HUMAN) 50 MILLILITER(S): 12.5 INJECTION, SOLUTION INTRAVENOUS at 17:10

## 2025-03-02 RX ADMIN — METOPROLOL SUCCINATE 2.5 MILLIGRAM(S): 50 TABLET, EXTENDED RELEASE ORAL at 21:18

## 2025-03-02 RX ADMIN — Medication 0.5 MILLIGRAM(S): at 04:27

## 2025-03-02 RX ADMIN — Medication 0.5 MILLIGRAM(S): at 04:12

## 2025-03-02 RX ADMIN — Medication 125 MILLIGRAM(S): at 11:34

## 2025-03-02 RX ADMIN — Medication 1 EACH: at 23:19

## 2025-03-02 RX ADMIN — IPRATROPIUM BROMIDE AND ALBUTEROL SULFATE 3 MILLILITER(S): .5; 2.5 SOLUTION RESPIRATORY (INHALATION) at 20:16

## 2025-03-02 RX ADMIN — IPRATROPIUM BROMIDE AND ALBUTEROL SULFATE 3 MILLILITER(S): .5; 2.5 SOLUTION RESPIRATORY (INHALATION) at 14:19

## 2025-03-02 RX ADMIN — Medication 125 MILLIGRAM(S): at 17:12

## 2025-03-02 RX ADMIN — ALBUMIN (HUMAN) 50 MILLILITER(S): 12.5 INJECTION, SOLUTION INTRAVENOUS at 05:39

## 2025-03-02 RX ADMIN — LIDOCAINE HYDROCHLORIDE 1 PATCH: 20 JELLY TOPICAL at 23:00

## 2025-03-02 RX ADMIN — Medication 0.25 MILLIGRAM(S): at 15:49

## 2025-03-02 RX ADMIN — METHYLPREDNISOLONE ACETATE 20 MILLIGRAM(S): 80 INJECTION, SUSPENSION INTRA-ARTICULAR; INTRALESIONAL; INTRAMUSCULAR; SOFT TISSUE at 13:33

## 2025-03-02 RX ADMIN — IPRATROPIUM BROMIDE AND ALBUTEROL SULFATE 3 MILLILITER(S): .5; 2.5 SOLUTION RESPIRATORY (INHALATION) at 11:31

## 2025-03-02 RX ADMIN — MEROPENEM 1000 MILLIGRAM(S): 1 INJECTION INTRAVENOUS at 05:38

## 2025-03-02 RX ADMIN — Medication 0.5 MILLIGRAM(S): at 12:10

## 2025-03-02 RX ADMIN — Medication 40 MILLIGRAM(S): at 11:33

## 2025-03-02 RX ADMIN — FUROSEMIDE 20 MILLIGRAM(S): 10 INJECTION INTRAMUSCULAR; INTRAVENOUS at 11:34

## 2025-03-02 RX ADMIN — IPRATROPIUM BROMIDE AND ALBUTEROL SULFATE 3 MILLILITER(S): .5; 2.5 SOLUTION RESPIRATORY (INHALATION) at 01:41

## 2025-03-02 RX ADMIN — ENOXAPARIN SODIUM 40 MILLIGRAM(S): 100 INJECTION SUBCUTANEOUS at 17:41

## 2025-03-02 RX ADMIN — MEROPENEM 1000 MILLIGRAM(S): 1 INJECTION INTRAVENOUS at 13:35

## 2025-03-02 RX ADMIN — Medication 0.5 MILLIGRAM(S): at 21:47

## 2025-03-02 RX ADMIN — I.V. FAT EMULSION 16.7 GM/KG/DAY: 20 EMULSION INTRAVENOUS at 23:20

## 2025-03-02 RX ADMIN — METOPROLOL SUCCINATE 2.5 MILLIGRAM(S): 50 TABLET, EXTENDED RELEASE ORAL at 13:35

## 2025-03-02 RX ADMIN — ACETYLCYSTEINE 4 MILLILITER(S): 200 INHALANT RESPIRATORY (INHALATION) at 11:32

## 2025-03-02 RX ADMIN — METHYLPREDNISOLONE ACETATE 20 MILLIGRAM(S): 80 INJECTION, SUSPENSION INTRA-ARTICULAR; INTRALESIONAL; INTRAMUSCULAR; SOFT TISSUE at 05:38

## 2025-03-03 LAB
ADD ON TEST-SPECIMEN IN LAB: SIGNIFICANT CHANGE UP
ALBUMIN SERPL ELPH-MCNC: 3.2 G/DL — LOW (ref 3.3–5)
ALP SERPL-CCNC: 475 U/L — HIGH (ref 40–120)
ALT FLD-CCNC: 27 U/L — SIGNIFICANT CHANGE UP (ref 12–78)
ANION GAP SERPL CALC-SCNC: 6 MMOL/L — SIGNIFICANT CHANGE UP (ref 5–17)
AST SERPL-CCNC: 21 U/L — SIGNIFICANT CHANGE UP (ref 15–37)
BILIRUB SERPL-MCNC: 1.1 MG/DL — SIGNIFICANT CHANGE UP (ref 0.2–1.2)
BUN SERPL-MCNC: 23 MG/DL — SIGNIFICANT CHANGE UP (ref 7–23)
CALCIUM SERPL-MCNC: 9.3 MG/DL — SIGNIFICANT CHANGE UP (ref 8.5–10.1)
CHLORIDE SERPL-SCNC: 110 MMOL/L — HIGH (ref 96–108)
CO2 SERPL-SCNC: 21 MMOL/L — LOW (ref 22–31)
CREAT SERPL-MCNC: 0.48 MG/DL — LOW (ref 0.5–1.3)
CRP SERPL-MCNC: 25.5 MG/ML — HIGH (ref 0–5)
EGFR: 104 ML/MIN/1.73M2 — SIGNIFICANT CHANGE UP
EGFR: 104 ML/MIN/1.73M2 — SIGNIFICANT CHANGE UP
GLUCOSE BLDC GLUCOMTR-MCNC: 148 MG/DL — HIGH (ref 70–99)
GLUCOSE BLDC GLUCOMTR-MCNC: 174 MG/DL — HIGH (ref 70–99)
GLUCOSE BLDC GLUCOMTR-MCNC: 210 MG/DL — HIGH (ref 70–99)
GLUCOSE SERPL-MCNC: 185 MG/DL — HIGH (ref 70–99)
HCT VFR BLD CALC: 34.6 % — SIGNIFICANT CHANGE UP (ref 34.5–45)
HGB BLD-MCNC: 10.5 G/DL — LOW (ref 11.5–15.5)
MAGNESIUM SERPL-MCNC: 2.6 MG/DL — SIGNIFICANT CHANGE UP (ref 1.6–2.6)
MCHC RBC-ENTMCNC: 27.3 PG — SIGNIFICANT CHANGE UP (ref 27–34)
MCHC RBC-ENTMCNC: 30.3 G/DL — LOW (ref 32–36)
MCV RBC AUTO: 89.9 FL — SIGNIFICANT CHANGE UP (ref 80–100)
NRBC # BLD AUTO: 0.04 K/UL — HIGH (ref 0–0)
NRBC # FLD: 0.04 K/UL — HIGH (ref 0–0)
NRBC BLD AUTO-RTO: 0 /100 WBCS — SIGNIFICANT CHANGE UP (ref 0–0)
NT-PROBNP SERPL-SCNC: 7097 PG/ML — HIGH (ref 0–125)
PHOSPHATE SERPL-MCNC: 2.6 MG/DL — SIGNIFICANT CHANGE UP (ref 2.5–4.5)
PLATELET # BLD AUTO: 316 K/UL — SIGNIFICANT CHANGE UP (ref 150–400)
PMV BLD: 10.9 FL — SIGNIFICANT CHANGE UP (ref 7–13)
POTASSIUM SERPL-MCNC: 3.6 MMOL/L — SIGNIFICANT CHANGE UP (ref 3.5–5.3)
POTASSIUM SERPL-SCNC: 3.6 MMOL/L — SIGNIFICANT CHANGE UP (ref 3.5–5.3)
PROT SERPL-MCNC: 7.1 GM/DL — SIGNIFICANT CHANGE UP (ref 6–8.3)
RBC # BLD: 3.85 M/UL — SIGNIFICANT CHANGE UP (ref 3.8–5.2)
RBC # FLD: 17.3 % — HIGH (ref 10.3–14.5)
SODIUM SERPL-SCNC: 137 MMOL/L — SIGNIFICANT CHANGE UP (ref 135–145)
WBC # BLD: 38.06 K/UL — HIGH (ref 3.8–10.5)
WBC # FLD AUTO: 38.06 K/UL — HIGH (ref 3.8–10.5)

## 2025-03-03 PROCEDURE — 99233 SBSQ HOSP IP/OBS HIGH 50: CPT

## 2025-03-03 RX ORDER — DEXTROMETHORPHAN HBR, GUAIFENESIN 200 MG/10ML
200 LIQUID ORAL EVERY 12 HOURS
Refills: 0 | Status: DISCONTINUED | OUTPATIENT
Start: 2025-03-03 | End: 2025-03-22

## 2025-03-03 RX ORDER — LORAZEPAM 4 MG/ML
0.25 VIAL (ML) INJECTION EVERY 6 HOURS
Refills: 0 | Status: DISCONTINUED | OUTPATIENT
Start: 2025-03-03 | End: 2025-03-10

## 2025-03-03 RX ORDER — SODIUM/POT/MAG/CALC/CHLOR/ACET 35-20-5MEQ
1 VIAL (ML) INTRAVENOUS
Refills: 0 | Status: DISCONTINUED | OUTPATIENT
Start: 2025-03-03 | End: 2025-03-03

## 2025-03-03 RX ORDER — I.V. FAT EMULSION 20 G/100ML
0.88 EMULSION INTRAVENOUS
Qty: 40 | Refills: 0 | Status: DISCONTINUED | OUTPATIENT
Start: 2025-03-03 | End: 2025-03-03

## 2025-03-03 RX ORDER — HYDROMORPHONE/SOD CHLOR,ISO/PF 2 MG/10 ML
4 SYRINGE (ML) INJECTION EVERY 6 HOURS
Refills: 0 | Status: DISCONTINUED | OUTPATIENT
Start: 2025-03-03 | End: 2025-03-10

## 2025-03-03 RX ORDER — HYDROMORPHONE/SOD CHLOR,ISO/PF 2 MG/10 ML
2 SYRINGE (ML) INJECTION EVERY 4 HOURS
Refills: 0 | Status: DISCONTINUED | OUTPATIENT
Start: 2025-03-03 | End: 2025-03-10

## 2025-03-03 RX ORDER — METOPROLOL SUCCINATE 50 MG/1
25 TABLET, EXTENDED RELEASE ORAL DAILY
Refills: 0 | Status: DISCONTINUED | OUTPATIENT
Start: 2025-03-03 | End: 2025-03-22

## 2025-03-03 RX ADMIN — Medication 0.5 MILLIGRAM(S): at 04:21

## 2025-03-03 RX ADMIN — IPRATROPIUM BROMIDE AND ALBUTEROL SULFATE 3 MILLILITER(S): .5; 2.5 SOLUTION RESPIRATORY (INHALATION) at 21:35

## 2025-03-03 RX ADMIN — MEROPENEM 1000 MILLIGRAM(S): 1 INJECTION INTRAVENOUS at 05:43

## 2025-03-03 RX ADMIN — Medication 2 MILLIGRAM(S): at 13:14

## 2025-03-03 RX ADMIN — ENOXAPARIN SODIUM 40 MILLIGRAM(S): 100 INJECTION SUBCUTANEOUS at 17:52

## 2025-03-03 RX ADMIN — ALBUMIN (HUMAN) 50 MILLILITER(S): 12.5 INJECTION, SOLUTION INTRAVENOUS at 05:43

## 2025-03-03 RX ADMIN — METOPROLOL SUCCINATE 12.5 MILLIGRAM(S): 50 TABLET, EXTENDED RELEASE ORAL at 11:55

## 2025-03-03 RX ADMIN — FUROSEMIDE 20 MILLIGRAM(S): 10 INJECTION INTRAMUSCULAR; INTRAVENOUS at 11:54

## 2025-03-03 RX ADMIN — METOPROLOL SUCCINATE 25 MILLIGRAM(S): 50 TABLET, EXTENDED RELEASE ORAL at 17:53

## 2025-03-03 RX ADMIN — Medication 125 MILLIGRAM(S): at 11:55

## 2025-03-03 RX ADMIN — LIDOCAINE HYDROCHLORIDE 1 PATCH: 20 JELLY TOPICAL at 20:31

## 2025-03-03 RX ADMIN — Medication 4 MILLIGRAM(S): at 22:00

## 2025-03-03 RX ADMIN — Medication 1 EACH: at 22:24

## 2025-03-03 RX ADMIN — Medication 40 MILLIGRAM(S): at 11:55

## 2025-03-03 RX ADMIN — IPRATROPIUM BROMIDE AND ALBUTEROL SULFATE 3 MILLILITER(S): .5; 2.5 SOLUTION RESPIRATORY (INHALATION) at 13:25

## 2025-03-03 RX ADMIN — Medication 0.5 MILLIGRAM(S): at 04:06

## 2025-03-03 RX ADMIN — Medication 4 MILLIGRAM(S): at 20:30

## 2025-03-03 RX ADMIN — DEXTROMETHORPHAN HBR, GUAIFENESIN 200 MILLIGRAM(S): 200 LIQUID ORAL at 20:30

## 2025-03-03 RX ADMIN — I.V. FAT EMULSION 16.67 GM/KG/DAY: 20 EMULSION INTRAVENOUS at 22:23

## 2025-03-03 RX ADMIN — METHYLPREDNISOLONE ACETATE 20 MILLIGRAM(S): 80 INJECTION, SUSPENSION INTRA-ARTICULAR; INTRALESIONAL; INTRAMUSCULAR; SOFT TISSUE at 17:52

## 2025-03-03 RX ADMIN — LIDOCAINE HYDROCHLORIDE 1 PATCH: 20 JELLY TOPICAL at 11:56

## 2025-03-03 RX ADMIN — Medication 0.5 MILLIGRAM(S): at 08:22

## 2025-03-03 RX ADMIN — LIDOCAINE HYDROCHLORIDE 1 PATCH: 20 JELLY TOPICAL at 23:00

## 2025-03-03 RX ADMIN — MEROPENEM 1000 MILLIGRAM(S): 1 INJECTION INTRAVENOUS at 20:30

## 2025-03-03 RX ADMIN — Medication 2 MILLIGRAM(S): at 18:03

## 2025-03-03 RX ADMIN — MEROPENEM 1000 MILLIGRAM(S): 1 INJECTION INTRAVENOUS at 13:14

## 2025-03-03 RX ADMIN — Medication 125 MILLIGRAM(S): at 17:53

## 2025-03-03 RX ADMIN — Medication 650 MILLIGRAM(S): at 11:57

## 2025-03-03 RX ADMIN — Medication 125 MILLIGRAM(S): at 05:44

## 2025-03-04 LAB
ADD ON TEST-SPECIMEN IN LAB: SIGNIFICANT CHANGE UP
ALBUMIN SERPL ELPH-MCNC: 3 G/DL — LOW (ref 3.3–5)
ALP SERPL-CCNC: 491 U/L — HIGH (ref 40–120)
ALT FLD-CCNC: 34 U/L — SIGNIFICANT CHANGE UP (ref 12–78)
ANION GAP SERPL CALC-SCNC: 8 MMOL/L — SIGNIFICANT CHANGE UP (ref 5–17)
AST SERPL-CCNC: 32 U/L — SIGNIFICANT CHANGE UP (ref 15–37)
BASE EXCESS BLDV CALC-SCNC: -2.7 MMOL/L — LOW (ref -2–3)
BILIRUB SERPL-MCNC: 1.1 MG/DL — SIGNIFICANT CHANGE UP (ref 0.2–1.2)
BUN SERPL-MCNC: 20 MG/DL — SIGNIFICANT CHANGE UP (ref 7–23)
CALCIUM SERPL-MCNC: 9.2 MG/DL — SIGNIFICANT CHANGE UP (ref 8.5–10.1)
CHLORIDE SERPL-SCNC: 109 MMOL/L — HIGH (ref 96–108)
CLINICAL BIOCHEMIST REVIEW: SIGNIFICANT CHANGE UP
CLINICAL BIOCHEMIST REVIEW: SIGNIFICANT CHANGE UP
CO2 SERPL-SCNC: 20 MMOL/L — LOW (ref 22–31)
CREAT SERPL-MCNC: 0.46 MG/DL — LOW (ref 0.5–1.3)
CRP SERPL-MCNC: 109 MG/ML — HIGH (ref 0–5)
EGFR: 105 ML/MIN/1.73M2 — SIGNIFICANT CHANGE UP
EGFR: 105 ML/MIN/1.73M2 — SIGNIFICANT CHANGE UP
GAS PNL BLDV: SIGNIFICANT CHANGE UP
GGT SERPL-CCNC: 890 U/L — HIGH (ref 8–40)
GLUCOSE BLDC GLUCOMTR-MCNC: 142 MG/DL — HIGH (ref 70–99)
GLUCOSE BLDC GLUCOMTR-MCNC: 177 MG/DL — HIGH (ref 70–99)
GLUCOSE BLDC GLUCOMTR-MCNC: 212 MG/DL — HIGH (ref 70–99)
GLUCOSE SERPL-MCNC: 168 MG/DL — HIGH (ref 70–99)
HCO3 BLDV-SCNC: 20 MMOL/L — LOW (ref 22–29)
HCT VFR BLD CALC: 34 % — LOW (ref 34.5–45)
HCT VFR BLD CALC: 35.4 % — SIGNIFICANT CHANGE UP (ref 34.5–45)
HGB BLD-MCNC: 11.1 G/DL — LOW (ref 11.5–15.5)
HGB BLD-MCNC: 11.2 G/DL — LOW (ref 11.5–15.5)
MAGNESIUM SERPL-MCNC: 2.3 MG/DL — SIGNIFICANT CHANGE UP (ref 1.6–2.6)
MCHC RBC-ENTMCNC: 27.9 PG — SIGNIFICANT CHANGE UP (ref 27–34)
MCHC RBC-ENTMCNC: 31.6 G/DL — LOW (ref 32–36)
MCV RBC AUTO: 88.1 FL — SIGNIFICANT CHANGE UP (ref 80–100)
NRBC # BLD AUTO: 0.05 K/UL — HIGH (ref 0–0)
NRBC # FLD: 0.05 K/UL — HIGH (ref 0–0)
NRBC BLD AUTO-RTO: 0 /100 WBCS — SIGNIFICANT CHANGE UP (ref 0–0)
NT-PROBNP SERPL-SCNC: 4716 PG/ML — HIGH (ref 0–125)
OB PNL STL: POSITIVE
PCO2 BLDV: 30 MMHG — LOW (ref 39–42)
PH BLDV: 7.44 — HIGH (ref 7.32–7.43)
PHOSPHATE SERPL-MCNC: 3.1 MG/DL — SIGNIFICANT CHANGE UP (ref 2.5–4.5)
PLATELET # BLD AUTO: 243 K/UL — SIGNIFICANT CHANGE UP (ref 150–400)
PMV BLD: 11.7 FL — SIGNIFICANT CHANGE UP (ref 7–13)
PO2 BLDV: 225 MMHG — HIGH (ref 25–45)
PORPHYRINS RBC-MCNC: 78 MCG/DL — SIGNIFICANT CHANGE UP
PORPHYRINS SERPL-MCNC: <1 MCG/DL — SIGNIFICANT CHANGE UP
POTASSIUM SERPL-MCNC: 3.8 MMOL/L — SIGNIFICANT CHANGE UP (ref 3.5–5.3)
POTASSIUM SERPL-SCNC: 3.8 MMOL/L — SIGNIFICANT CHANGE UP (ref 3.5–5.3)
PROCALCITONIN SERPL-MCNC: 0.2 NG/ML — HIGH (ref 0.02–0.1)
PROT SERPL-MCNC: 7.2 GM/DL — SIGNIFICANT CHANGE UP (ref 6–8.3)
PTP REVIEWED BY: SIGNIFICANT CHANGE UP
RBC # BLD: 4.02 M/UL — SIGNIFICANT CHANGE UP (ref 3.8–5.2)
RBC # FLD: 17.5 % — HIGH (ref 10.3–14.5)
SAO2 % BLDV: 100 % — HIGH (ref 67–88)
SODIUM SERPL-SCNC: 137 MMOL/L — SIGNIFICANT CHANGE UP (ref 135–145)
WBC # BLD: 41.35 K/UL — CRITICAL HIGH (ref 3.8–10.5)
WBC # FLD AUTO: 41.35 K/UL — CRITICAL HIGH (ref 3.8–10.5)

## 2025-03-04 PROCEDURE — 70551 MRI BRAIN STEM W/O DYE: CPT | Mod: 26

## 2025-03-04 PROCEDURE — 71250 CT THORAX DX C-: CPT | Mod: 26

## 2025-03-04 PROCEDURE — 99233 SBSQ HOSP IP/OBS HIGH 50: CPT

## 2025-03-04 PROCEDURE — 74176 CT ABD & PELVIS W/O CONTRAST: CPT | Mod: 26

## 2025-03-04 RX ORDER — ASPIRIN 325 MG
325 TABLET ORAL DAILY
Refills: 0 | Status: DISCONTINUED | OUTPATIENT
Start: 2025-03-04 | End: 2025-03-06

## 2025-03-04 RX ORDER — SODIUM/POT/MAG/CALC/CHLOR/ACET 35-20-5MEQ
1 VIAL (ML) INTRAVENOUS
Refills: 0 | Status: DISCONTINUED | OUTPATIENT
Start: 2025-03-04 | End: 2025-03-05

## 2025-03-04 RX ORDER — I.V. FAT EMULSION 20 G/100ML
0.88 EMULSION INTRAVENOUS
Qty: 40 | Refills: 0 | Status: DISCONTINUED | OUTPATIENT
Start: 2025-03-04 | End: 2025-03-05

## 2025-03-04 RX ORDER — ACETAMINOPHEN 500 MG/5ML
650 LIQUID (ML) ORAL ONCE
Refills: 0 | Status: COMPLETED | OUTPATIENT
Start: 2025-03-04 | End: 2025-03-04

## 2025-03-04 RX ORDER — ROSUVASTATIN CALCIUM 20 MG/1
40 TABLET, FILM COATED ORAL AT BEDTIME
Refills: 0 | Status: DISCONTINUED | OUTPATIENT
Start: 2025-03-04 | End: 2025-03-22

## 2025-03-04 RX ADMIN — LIDOCAINE HYDROCHLORIDE 1 PATCH: 20 JELLY TOPICAL at 13:38

## 2025-03-04 RX ADMIN — IPRATROPIUM BROMIDE AND ALBUTEROL SULFATE 3 MILLILITER(S): .5; 2.5 SOLUTION RESPIRATORY (INHALATION) at 09:41

## 2025-03-04 RX ADMIN — Medication 0.5 MILLIGRAM(S): at 14:35

## 2025-03-04 RX ADMIN — IPRATROPIUM BROMIDE AND ALBUTEROL SULFATE 3 MILLILITER(S): .5; 2.5 SOLUTION RESPIRATORY (INHALATION) at 02:29

## 2025-03-04 RX ADMIN — IPRATROPIUM BROMIDE AND ALBUTEROL SULFATE 3 MILLILITER(S): .5; 2.5 SOLUTION RESPIRATORY (INHALATION) at 21:19

## 2025-03-04 RX ADMIN — ENOXAPARIN SODIUM 40 MILLIGRAM(S): 100 INJECTION SUBCUTANEOUS at 16:37

## 2025-03-04 RX ADMIN — DEXTROMETHORPHAN HBR, GUAIFENESIN 200 MILLIGRAM(S): 200 LIQUID ORAL at 13:44

## 2025-03-04 RX ADMIN — Medication 2 MILLIGRAM(S): at 02:19

## 2025-03-04 RX ADMIN — METHYLPREDNISOLONE ACETATE 20 MILLIGRAM(S): 80 INJECTION, SUSPENSION INTRA-ARTICULAR; INTRALESIONAL; INTRAMUSCULAR; SOFT TISSUE at 16:48

## 2025-03-04 RX ADMIN — Medication 2 MILLIGRAM(S): at 01:49

## 2025-03-04 RX ADMIN — MEROPENEM 1000 MILLIGRAM(S): 1 INJECTION INTRAVENOUS at 06:25

## 2025-03-04 RX ADMIN — IPRATROPIUM BROMIDE AND ALBUTEROL SULFATE 3 MILLILITER(S): .5; 2.5 SOLUTION RESPIRATORY (INHALATION) at 13:23

## 2025-03-04 RX ADMIN — Medication 650 MILLIGRAM(S): at 21:03

## 2025-03-04 RX ADMIN — Medication 250 MILLILITER(S): at 17:45

## 2025-03-04 RX ADMIN — Medication 125 MILLIGRAM(S): at 16:49

## 2025-03-04 RX ADMIN — Medication 325 MILLIGRAM(S): at 17:45

## 2025-03-04 RX ADMIN — Medication 40 MILLIGRAM(S): at 06:26

## 2025-03-04 RX ADMIN — MEROPENEM 1000 MILLIGRAM(S): 1 INJECTION INTRAVENOUS at 13:39

## 2025-03-04 RX ADMIN — MEROPENEM 1000 MILLIGRAM(S): 1 INJECTION INTRAVENOUS at 23:11

## 2025-03-04 RX ADMIN — FUROSEMIDE 20 MILLIGRAM(S): 10 INJECTION INTRAMUSCULAR; INTRAVENOUS at 13:38

## 2025-03-04 RX ADMIN — Medication 4 MILLIGRAM(S): at 06:49

## 2025-03-04 RX ADMIN — Medication 650 MILLIGRAM(S): at 22:26

## 2025-03-04 RX ADMIN — Medication 125 MILLIGRAM(S): at 13:38

## 2025-03-04 RX ADMIN — METHYLPREDNISOLONE ACETATE 20 MILLIGRAM(S): 80 INJECTION, SUSPENSION INTRA-ARTICULAR; INTRALESIONAL; INTRAMUSCULAR; SOFT TISSUE at 06:25

## 2025-03-04 RX ADMIN — Medication 125 MILLIGRAM(S): at 06:25

## 2025-03-04 RX ADMIN — ROSUVASTATIN CALCIUM 40 MILLIGRAM(S): 20 TABLET, FILM COATED ORAL at 17:46

## 2025-03-04 RX ADMIN — METOPROLOL SUCCINATE 25 MILLIGRAM(S): 50 TABLET, EXTENDED RELEASE ORAL at 16:48

## 2025-03-05 LAB
A1C WITH ESTIMATED AVERAGE GLUCOSE RESULT: 5.2 % — SIGNIFICANT CHANGE UP (ref 4–5.6)
ALBUMIN SERPL ELPH-MCNC: 2.6 G/DL — LOW (ref 3.3–5)
ALP SERPL-CCNC: 558 U/L — HIGH (ref 40–120)
ALT FLD-CCNC: 31 U/L — SIGNIFICANT CHANGE UP (ref 12–78)
ANION GAP SERPL CALC-SCNC: 8 MMOL/L — SIGNIFICANT CHANGE UP (ref 5–17)
AST SERPL-CCNC: 29 U/L — SIGNIFICANT CHANGE UP (ref 15–37)
BILIRUB SERPL-MCNC: 1.1 MG/DL — SIGNIFICANT CHANGE UP (ref 0.2–1.2)
BUN SERPL-MCNC: 23 MG/DL — SIGNIFICANT CHANGE UP (ref 7–23)
CALCIUM SERPL-MCNC: 9.3 MG/DL — SIGNIFICANT CHANGE UP (ref 8.5–10.1)
CHLORIDE SERPL-SCNC: 109 MMOL/L — HIGH (ref 96–108)
CHOLEST SERPL-MCNC: 141 MG/DL — SIGNIFICANT CHANGE UP
CO2 SERPL-SCNC: 21 MMOL/L — LOW (ref 22–31)
CREAT SERPL-MCNC: 0.41 MG/DL — LOW (ref 0.5–1.3)
EGFR: 108 ML/MIN/1.73M2 — SIGNIFICANT CHANGE UP
EGFR: 108 ML/MIN/1.73M2 — SIGNIFICANT CHANGE UP
ESTIMATED AVERAGE GLUCOSE: 103 MG/DL — SIGNIFICANT CHANGE UP (ref 68–114)
GLUCOSE SERPL-MCNC: 166 MG/DL — HIGH (ref 70–99)
HCT VFR BLD CALC: 33.8 % — LOW (ref 34.5–45)
HDLC SERPL-MCNC: 51 MG/DL — SIGNIFICANT CHANGE UP
HGB BLD-MCNC: 10.6 G/DL — LOW (ref 11.5–15.5)
LDLC SERPL-MCNC: 58 MG/DL — SIGNIFICANT CHANGE UP
LIPID PNL WITH DIRECT LDL SERPL: 58 MG/DL — SIGNIFICANT CHANGE UP
MAGNESIUM SERPL-MCNC: 1.2 MG/DL — LOW (ref 1.6–2.6)
MCHC RBC-ENTMCNC: 27.5 PG — SIGNIFICANT CHANGE UP (ref 27–34)
MCHC RBC-ENTMCNC: 31.4 G/DL — LOW (ref 32–36)
MCV RBC AUTO: 87.8 FL — SIGNIFICANT CHANGE UP (ref 80–100)
NONHDLC SERPL-MCNC: 90 MG/DL — SIGNIFICANT CHANGE UP
NRBC # BLD AUTO: 0 K/UL — SIGNIFICANT CHANGE UP (ref 0–0)
NRBC # FLD: 0 K/UL — SIGNIFICANT CHANGE UP (ref 0–0)
NRBC BLD AUTO-RTO: 0 /100 WBCS — SIGNIFICANT CHANGE UP (ref 0–0)
PHOSPHATE SERPL-MCNC: 3.1 MG/DL — SIGNIFICANT CHANGE UP (ref 2.5–4.5)
PLATELET # BLD AUTO: 221 K/UL — SIGNIFICANT CHANGE UP (ref 150–400)
PMV BLD: 12.5 FL — SIGNIFICANT CHANGE UP (ref 7–13)
POTASSIUM SERPL-MCNC: 4 MMOL/L — SIGNIFICANT CHANGE UP (ref 3.5–5.3)
POTASSIUM SERPL-SCNC: 4 MMOL/L — SIGNIFICANT CHANGE UP (ref 3.5–5.3)
PROT SERPL-MCNC: 6.8 GM/DL — SIGNIFICANT CHANGE UP (ref 6–8.3)
RBC # BLD: 3.85 M/UL — SIGNIFICANT CHANGE UP (ref 3.8–5.2)
RBC # FLD: 18 % — HIGH (ref 10.3–14.5)
SODIUM SERPL-SCNC: 138 MMOL/L — SIGNIFICANT CHANGE UP (ref 135–145)
TRIGL SERPL-MCNC: 192 MG/DL — HIGH
WBC # BLD: 48.14 K/UL — CRITICAL HIGH (ref 3.8–10.5)
WBC # FLD AUTO: 48.14 K/UL — CRITICAL HIGH (ref 3.8–10.5)

## 2025-03-05 PROCEDURE — 99233 SBSQ HOSP IP/OBS HIGH 50: CPT

## 2025-03-05 RX ORDER — SODIUM/POT/MAG/CALC/CHLOR/ACET 35-20-5MEQ
1 VIAL (ML) INTRAVENOUS
Refills: 0 | Status: DISCONTINUED | OUTPATIENT
Start: 2025-03-05 | End: 2025-03-05

## 2025-03-05 RX ORDER — I.V. FAT EMULSION 20 G/100ML
0.89 EMULSION INTRAVENOUS
Qty: 40 | Refills: 0 | Status: DISCONTINUED | OUTPATIENT
Start: 2025-03-05 | End: 2025-03-05

## 2025-03-05 RX ORDER — MAGNESIUM SULFATE 500 MG/ML
2 SYRINGE (ML) INJECTION ONCE
Refills: 0 | Status: COMPLETED | OUTPATIENT
Start: 2025-03-05 | End: 2025-03-05

## 2025-03-05 RX ADMIN — Medication 1 EACH: at 00:43

## 2025-03-05 RX ADMIN — IPRATROPIUM BROMIDE AND ALBUTEROL SULFATE 3 MILLILITER(S): .5; 2.5 SOLUTION RESPIRATORY (INHALATION) at 08:09

## 2025-03-05 RX ADMIN — IPRATROPIUM BROMIDE AND ALBUTEROL SULFATE 3 MILLILITER(S): .5; 2.5 SOLUTION RESPIRATORY (INHALATION) at 15:38

## 2025-03-05 RX ADMIN — I.V. FAT EMULSION 16.7 GM/KG/DAY: 20 EMULSION INTRAVENOUS at 23:36

## 2025-03-05 RX ADMIN — LIDOCAINE HYDROCHLORIDE 1 PATCH: 20 JELLY TOPICAL at 19:00

## 2025-03-05 RX ADMIN — I.V. FAT EMULSION 16.67 GM/KG/DAY: 20 EMULSION INTRAVENOUS at 00:44

## 2025-03-05 RX ADMIN — ENOXAPARIN SODIUM 40 MILLIGRAM(S): 100 INJECTION SUBCUTANEOUS at 17:39

## 2025-03-05 RX ADMIN — METHYLPREDNISOLONE ACETATE 20 MILLIGRAM(S): 80 INJECTION, SUSPENSION INTRA-ARTICULAR; INTRALESIONAL; INTRAMUSCULAR; SOFT TISSUE at 05:48

## 2025-03-05 RX ADMIN — LIDOCAINE HYDROCHLORIDE 1 PATCH: 20 JELLY TOPICAL at 21:00

## 2025-03-05 RX ADMIN — FUROSEMIDE 20 MILLIGRAM(S): 10 INJECTION INTRAMUSCULAR; INTRAVENOUS at 09:57

## 2025-03-05 RX ADMIN — Medication 1 EACH: at 23:37

## 2025-03-05 RX ADMIN — Medication 4 MILLIGRAM(S): at 21:21

## 2025-03-05 RX ADMIN — Medication 4 MILLIGRAM(S): at 14:40

## 2025-03-05 RX ADMIN — MEROPENEM 1000 MILLIGRAM(S): 1 INJECTION INTRAVENOUS at 13:39

## 2025-03-05 RX ADMIN — ROSUVASTATIN CALCIUM 40 MILLIGRAM(S): 20 TABLET, FILM COATED ORAL at 21:22

## 2025-03-05 RX ADMIN — Medication 4 MILLIGRAM(S): at 08:50

## 2025-03-05 RX ADMIN — Medication 125 MILLIGRAM(S): at 23:39

## 2025-03-05 RX ADMIN — METOPROLOL SUCCINATE 25 MILLIGRAM(S): 50 TABLET, EXTENDED RELEASE ORAL at 17:40

## 2025-03-05 RX ADMIN — Medication 4 MILLIGRAM(S): at 01:49

## 2025-03-05 RX ADMIN — Medication 25 GRAM(S): at 13:38

## 2025-03-05 RX ADMIN — Medication 325 MILLIGRAM(S): at 09:58

## 2025-03-05 RX ADMIN — IPRATROPIUM BROMIDE AND ALBUTEROL SULFATE 3 MILLILITER(S): .5; 2.5 SOLUTION RESPIRATORY (INHALATION) at 21:08

## 2025-03-05 RX ADMIN — Medication 4 MILLIGRAM(S): at 07:55

## 2025-03-05 RX ADMIN — LIDOCAINE HYDROCHLORIDE 1 PATCH: 20 JELLY TOPICAL at 09:57

## 2025-03-05 RX ADMIN — MEROPENEM 1000 MILLIGRAM(S): 1 INJECTION INTRAVENOUS at 05:48

## 2025-03-05 RX ADMIN — DEXTROMETHORPHAN HBR, GUAIFENESIN 200 MILLIGRAM(S): 200 LIQUID ORAL at 21:21

## 2025-03-05 RX ADMIN — Medication 4 MILLIGRAM(S): at 13:41

## 2025-03-05 RX ADMIN — MEROPENEM 1000 MILLIGRAM(S): 1 INJECTION INTRAVENOUS at 21:21

## 2025-03-06 ENCOUNTER — RESULT REVIEW (OUTPATIENT)
Age: 70
End: 2025-03-06

## 2025-03-06 LAB
ALBUMIN SERPL ELPH-MCNC: 2.5 G/DL — LOW (ref 3.3–5)
ALP SERPL-CCNC: 1013 U/L — HIGH (ref 40–120)
ALT FLD-CCNC: 87 U/L — HIGH (ref 12–78)
ANION GAP SERPL CALC-SCNC: 7 MMOL/L — SIGNIFICANT CHANGE UP (ref 5–17)
AST SERPL-CCNC: 117 U/L — HIGH (ref 15–37)
BILIRUB SERPL-MCNC: 1.1 MG/DL — SIGNIFICANT CHANGE UP (ref 0.2–1.2)
BUN SERPL-MCNC: 26 MG/DL — HIGH (ref 7–23)
CALCIUM SERPL-MCNC: 8.9 MG/DL — SIGNIFICANT CHANGE UP (ref 8.5–10.1)
CHLORIDE SERPL-SCNC: 109 MMOL/L — HIGH (ref 96–108)
CO2 SERPL-SCNC: 22 MMOL/L — SIGNIFICANT CHANGE UP (ref 22–31)
CREAT SERPL-MCNC: 0.54 MG/DL — SIGNIFICANT CHANGE UP (ref 0.5–1.3)
EGFR: 101 ML/MIN/1.73M2 — SIGNIFICANT CHANGE UP
EGFR: 101 ML/MIN/1.73M2 — SIGNIFICANT CHANGE UP
GLUCOSE SERPL-MCNC: 145 MG/DL — HIGH (ref 70–99)
HCT VFR BLD CALC: 33.9 % — LOW (ref 34.5–45)
HGB BLD-MCNC: 10.8 G/DL — LOW (ref 11.5–15.5)
MAGNESIUM SERPL-MCNC: 2.7 MG/DL — HIGH (ref 1.6–2.6)
MCHC RBC-ENTMCNC: 28.1 PG — SIGNIFICANT CHANGE UP (ref 27–34)
MCHC RBC-ENTMCNC: 31.9 G/DL — LOW (ref 32–36)
MCV RBC AUTO: 88.3 FL — SIGNIFICANT CHANGE UP (ref 80–100)
NRBC # BLD AUTO: 0 K/UL — SIGNIFICANT CHANGE UP (ref 0–0)
NRBC # FLD: 0 K/UL — SIGNIFICANT CHANGE UP (ref 0–0)
NRBC BLD AUTO-RTO: 0 /100 WBCS — SIGNIFICANT CHANGE UP (ref 0–0)
PHOSPHATE SERPL-MCNC: 3.9 MG/DL — SIGNIFICANT CHANGE UP (ref 2.5–4.5)
PLATELET # BLD AUTO: 156 K/UL — SIGNIFICANT CHANGE UP (ref 150–400)
PMV BLD: 13.3 FL — HIGH (ref 7–13)
POTASSIUM SERPL-MCNC: 4.2 MMOL/L — SIGNIFICANT CHANGE UP (ref 3.5–5.3)
POTASSIUM SERPL-SCNC: 4.2 MMOL/L — SIGNIFICANT CHANGE UP (ref 3.5–5.3)
PROT SERPL-MCNC: 6.6 GM/DL — SIGNIFICANT CHANGE UP (ref 6–8.3)
RBC # BLD: 3.84 M/UL — SIGNIFICANT CHANGE UP (ref 3.8–5.2)
RBC # FLD: 17.7 % — HIGH (ref 10.3–14.5)
SODIUM SERPL-SCNC: 138 MMOL/L — SIGNIFICANT CHANGE UP (ref 135–145)
TRIGL SERPL-MCNC: 190 MG/DL — HIGH
WBC # BLD: 31.01 K/UL — HIGH (ref 3.8–10.5)
WBC # FLD AUTO: 31.01 K/UL — HIGH (ref 3.8–10.5)

## 2025-03-06 PROCEDURE — 99232 SBSQ HOSP IP/OBS MODERATE 35: CPT

## 2025-03-06 PROCEDURE — 93308 TTE F-UP OR LMTD: CPT | Mod: 26

## 2025-03-06 PROCEDURE — 93321 DOPPLER ECHO F-UP/LMTD STD: CPT | Mod: 26

## 2025-03-06 PROCEDURE — 99233 SBSQ HOSP IP/OBS HIGH 50: CPT

## 2025-03-06 PROCEDURE — 93325 DOPPLER ECHO COLOR FLOW MAPG: CPT | Mod: 26

## 2025-03-06 PROCEDURE — 90792 PSYCH DIAG EVAL W/MED SRVCS: CPT

## 2025-03-06 PROCEDURE — 99231 SBSQ HOSP IP/OBS SF/LOW 25: CPT

## 2025-03-06 RX ORDER — I.V. FAT EMULSION 20 G/100ML
0.89 EMULSION INTRAVENOUS
Qty: 40 | Refills: 0 | Status: DISCONTINUED | OUTPATIENT
Start: 2025-03-06 | End: 2025-03-06

## 2025-03-06 RX ORDER — MIRTAZAPINE 30 MG/1
7.5 TABLET, FILM COATED ORAL AT BEDTIME
Refills: 0 | Status: DISCONTINUED | OUTPATIENT
Start: 2025-03-06 | End: 2025-03-22

## 2025-03-06 RX ORDER — ASPIRIN 325 MG
81 TABLET ORAL DAILY
Refills: 0 | Status: DISCONTINUED | OUTPATIENT
Start: 2025-03-06 | End: 2025-03-22

## 2025-03-06 RX ORDER — ESCITALOPRAM OXALATE 20 MG/1
5 TABLET ORAL DAILY
Refills: 0 | Status: DISCONTINUED | OUTPATIENT
Start: 2025-03-07 | End: 2025-03-22

## 2025-03-06 RX ORDER — BENZONATATE 100 MG
100 CAPSULE ORAL EVERY 8 HOURS
Refills: 0 | Status: DISCONTINUED | OUTPATIENT
Start: 2025-03-06 | End: 2025-03-22

## 2025-03-06 RX ORDER — SODIUM/POT/MAG/CALC/CHLOR/ACET 35-20-5MEQ
1 VIAL (ML) INTRAVENOUS
Refills: 0 | Status: DISCONTINUED | OUTPATIENT
Start: 2025-03-06 | End: 2025-03-06

## 2025-03-06 RX ADMIN — ENOXAPARIN SODIUM 40 MILLIGRAM(S): 100 INJECTION SUBCUTANEOUS at 17:21

## 2025-03-06 RX ADMIN — Medication 4 MILLIGRAM(S): at 15:58

## 2025-03-06 RX ADMIN — DEXTROMETHORPHAN HBR, GUAIFENESIN 200 MILLIGRAM(S): 200 LIQUID ORAL at 21:25

## 2025-03-06 RX ADMIN — Medication 125 MILLIGRAM(S): at 11:12

## 2025-03-06 RX ADMIN — Medication 81 MILLIGRAM(S): at 08:45

## 2025-03-06 RX ADMIN — MEROPENEM 1000 MILLIGRAM(S): 1 INJECTION INTRAVENOUS at 05:16

## 2025-03-06 RX ADMIN — LIDOCAINE HYDROCHLORIDE 1 PATCH: 20 JELLY TOPICAL at 08:46

## 2025-03-06 RX ADMIN — DEXTROMETHORPHAN HBR, GUAIFENESIN 200 MILLIGRAM(S): 200 LIQUID ORAL at 08:46

## 2025-03-06 RX ADMIN — MIRTAZAPINE 7.5 MILLIGRAM(S): 30 TABLET, FILM COATED ORAL at 21:25

## 2025-03-06 RX ADMIN — METOPROLOL SUCCINATE 25 MILLIGRAM(S): 50 TABLET, EXTENDED RELEASE ORAL at 17:20

## 2025-03-06 RX ADMIN — Medication 125 MILLIGRAM(S): at 22:21

## 2025-03-06 RX ADMIN — MEROPENEM 1000 MILLIGRAM(S): 1 INJECTION INTRAVENOUS at 13:26

## 2025-03-06 RX ADMIN — LIDOCAINE HYDROCHLORIDE 1 PATCH: 20 JELLY TOPICAL at 17:05

## 2025-03-06 RX ADMIN — Medication 4 MILLIGRAM(S): at 09:30

## 2025-03-06 RX ADMIN — IPRATROPIUM BROMIDE AND ALBUTEROL SULFATE 3 MILLILITER(S): .5; 2.5 SOLUTION RESPIRATORY (INHALATION) at 01:40

## 2025-03-06 RX ADMIN — IPRATROPIUM BROMIDE AND ALBUTEROL SULFATE 3 MILLILITER(S): .5; 2.5 SOLUTION RESPIRATORY (INHALATION) at 21:15

## 2025-03-06 RX ADMIN — Medication 4 MILLIGRAM(S): at 16:50

## 2025-03-06 RX ADMIN — I.V. FAT EMULSION 16.7 GM/KG/DAY: 20 EMULSION INTRAVENOUS at 23:37

## 2025-03-06 RX ADMIN — Medication 30 MILLILITER(S): at 21:26

## 2025-03-06 RX ADMIN — MEROPENEM 1000 MILLIGRAM(S): 1 INJECTION INTRAVENOUS at 21:25

## 2025-03-06 RX ADMIN — Medication 4 MILLIGRAM(S): at 21:46

## 2025-03-06 RX ADMIN — ROSUVASTATIN CALCIUM 40 MILLIGRAM(S): 20 TABLET, FILM COATED ORAL at 21:25

## 2025-03-06 RX ADMIN — Medication 125 MILLIGRAM(S): at 05:15

## 2025-03-06 RX ADMIN — Medication 58 EACH: at 23:38

## 2025-03-06 RX ADMIN — Medication 40 MILLIGRAM(S): at 05:16

## 2025-03-06 RX ADMIN — Medication 125 MILLIGRAM(S): at 17:21

## 2025-03-06 RX ADMIN — IPRATROPIUM BROMIDE AND ALBUTEROL SULFATE 3 MILLILITER(S): .5; 2.5 SOLUTION RESPIRATORY (INHALATION) at 14:06

## 2025-03-06 RX ADMIN — Medication 4 MILLIGRAM(S): at 08:45

## 2025-03-06 RX ADMIN — METHYLPREDNISOLONE ACETATE 20 MILLIGRAM(S): 80 INJECTION, SUSPENSION INTRA-ARTICULAR; INTRALESIONAL; INTRAMUSCULAR; SOFT TISSUE at 05:16

## 2025-03-07 LAB
ALBUMIN SERPL ELPH-MCNC: 2.5 G/DL — LOW (ref 3.3–5)
ALP SERPL-CCNC: 1106 U/L — HIGH (ref 40–120)
ALT FLD-CCNC: 95 U/L — HIGH (ref 12–78)
ANION GAP SERPL CALC-SCNC: 8 MMOL/L — SIGNIFICANT CHANGE UP (ref 5–17)
AST SERPL-CCNC: 84 U/L — HIGH (ref 15–37)
BILIRUB SERPL-MCNC: 0.9 MG/DL — SIGNIFICANT CHANGE UP (ref 0.2–1.2)
BUN SERPL-MCNC: 22 MG/DL — SIGNIFICANT CHANGE UP (ref 7–23)
CALCIUM SERPL-MCNC: 9.4 MG/DL — SIGNIFICANT CHANGE UP (ref 8.5–10.1)
CHLORIDE SERPL-SCNC: 107 MMOL/L — SIGNIFICANT CHANGE UP (ref 96–108)
CO2 SERPL-SCNC: 22 MMOL/L — SIGNIFICANT CHANGE UP (ref 22–31)
CREAT SERPL-MCNC: 0.46 MG/DL — LOW (ref 0.5–1.3)
EGFR: 105 ML/MIN/1.73M2 — SIGNIFICANT CHANGE UP
EGFR: 105 ML/MIN/1.73M2 — SIGNIFICANT CHANGE UP
GLUCOSE SERPL-MCNC: 121 MG/DL — HIGH (ref 70–99)
HCT VFR BLD CALC: 33.7 % — LOW (ref 34.5–45)
HGB BLD-MCNC: 10.8 G/DL — LOW (ref 11.5–15.5)
MAGNESIUM SERPL-MCNC: 2.6 MG/DL — SIGNIFICANT CHANGE UP (ref 1.6–2.6)
MCHC RBC-ENTMCNC: 28.1 PG — SIGNIFICANT CHANGE UP (ref 27–34)
MCHC RBC-ENTMCNC: 32 G/DL — SIGNIFICANT CHANGE UP (ref 32–36)
MCV RBC AUTO: 87.5 FL — SIGNIFICANT CHANGE UP (ref 80–100)
NRBC # BLD AUTO: 0 K/UL — SIGNIFICANT CHANGE UP (ref 0–0)
NRBC # FLD: 0 K/UL — SIGNIFICANT CHANGE UP (ref 0–0)
NRBC BLD AUTO-RTO: 0 /100 WBCS — SIGNIFICANT CHANGE UP (ref 0–0)
PHOSPHATE SERPL-MCNC: 3.6 MG/DL — SIGNIFICANT CHANGE UP (ref 2.5–4.5)
PLATELET # BLD AUTO: 145 K/UL — LOW (ref 150–400)
PMV BLD: 13.4 FL — HIGH (ref 7–13)
POTASSIUM SERPL-MCNC: 4.2 MMOL/L — SIGNIFICANT CHANGE UP (ref 3.5–5.3)
POTASSIUM SERPL-SCNC: 4.2 MMOL/L — SIGNIFICANT CHANGE UP (ref 3.5–5.3)
PROT SERPL-MCNC: 7 GM/DL — SIGNIFICANT CHANGE UP (ref 6–8.3)
RBC # BLD: 3.85 M/UL — SIGNIFICANT CHANGE UP (ref 3.8–5.2)
RBC # FLD: 17.2 % — HIGH (ref 10.3–14.5)
SODIUM SERPL-SCNC: 137 MMOL/L — SIGNIFICANT CHANGE UP (ref 135–145)
VIT B1 SERPL-MCNC: 68.3 NMOL/L — SIGNIFICANT CHANGE UP (ref 66.5–200)
WBC # BLD: 21.38 K/UL — HIGH (ref 3.8–10.5)
WBC # FLD AUTO: 21.38 K/UL — HIGH (ref 3.8–10.5)

## 2025-03-07 PROCEDURE — 99232 SBSQ HOSP IP/OBS MODERATE 35: CPT

## 2025-03-07 PROCEDURE — 99233 SBSQ HOSP IP/OBS HIGH 50: CPT

## 2025-03-07 RX ORDER — SODIUM/POT/MAG/CALC/CHLOR/ACET 35-20-5MEQ
1 VIAL (ML) INTRAVENOUS
Refills: 0 | Status: DISCONTINUED | OUTPATIENT
Start: 2025-03-07 | End: 2025-03-07

## 2025-03-07 RX ORDER — HYDROMORPHONE/SOD CHLOR,ISO/PF 2 MG/10 ML
0.2 SYRINGE (ML) INJECTION ONCE
Refills: 0 | Status: DISCONTINUED | OUTPATIENT
Start: 2025-03-07 | End: 2025-03-07

## 2025-03-07 RX ORDER — I.V. FAT EMULSION 20 G/100ML
0.89 EMULSION INTRAVENOUS
Qty: 40 | Refills: 0 | Status: DISCONTINUED | OUTPATIENT
Start: 2025-03-07 | End: 2025-03-07

## 2025-03-07 RX ADMIN — LIDOCAINE HYDROCHLORIDE 1 PATCH: 20 JELLY TOPICAL at 15:50

## 2025-03-07 RX ADMIN — Medication 4 MILLIGRAM(S): at 20:01

## 2025-03-07 RX ADMIN — ENOXAPARIN SODIUM 40 MILLIGRAM(S): 100 INJECTION SUBCUTANEOUS at 19:07

## 2025-03-07 RX ADMIN — Medication 4 MILLIGRAM(S): at 05:21

## 2025-03-07 RX ADMIN — I.V. FAT EMULSION 16.67 GM/KG/DAY: 20 EMULSION INTRAVENOUS at 23:29

## 2025-03-07 RX ADMIN — Medication 125 MILLIGRAM(S): at 19:07

## 2025-03-07 RX ADMIN — MEROPENEM 1000 MILLIGRAM(S): 1 INJECTION INTRAVENOUS at 22:04

## 2025-03-07 RX ADMIN — DEXTROMETHORPHAN HBR, GUAIFENESIN 200 MILLIGRAM(S): 200 LIQUID ORAL at 22:04

## 2025-03-07 RX ADMIN — METHYLPREDNISOLONE ACETATE 20 MILLIGRAM(S): 80 INJECTION, SUSPENSION INTRA-ARTICULAR; INTRALESIONAL; INTRAMUSCULAR; SOFT TISSUE at 05:20

## 2025-03-07 RX ADMIN — Medication 40 MILLIGRAM(S): at 05:22

## 2025-03-07 RX ADMIN — Medication 125 MILLIGRAM(S): at 05:21

## 2025-03-07 RX ADMIN — Medication 0.2 MILLIGRAM(S): at 16:16

## 2025-03-07 RX ADMIN — Medication 1 EACH: at 23:28

## 2025-03-07 RX ADMIN — Medication 125 MILLIGRAM(S): at 23:44

## 2025-03-07 RX ADMIN — MEROPENEM 1000 MILLIGRAM(S): 1 INJECTION INTRAVENOUS at 15:53

## 2025-03-07 RX ADMIN — Medication 4 MILLIGRAM(S): at 20:31

## 2025-03-07 RX ADMIN — Medication 3 MILLIGRAM(S): at 22:04

## 2025-03-07 RX ADMIN — MIRTAZAPINE 7.5 MILLIGRAM(S): 30 TABLET, FILM COATED ORAL at 23:28

## 2025-03-07 RX ADMIN — MEROPENEM 1000 MILLIGRAM(S): 1 INJECTION INTRAVENOUS at 05:20

## 2025-03-07 RX ADMIN — METOPROLOL SUCCINATE 25 MILLIGRAM(S): 50 TABLET, EXTENDED RELEASE ORAL at 19:07

## 2025-03-07 RX ADMIN — ROSUVASTATIN CALCIUM 40 MILLIGRAM(S): 20 TABLET, FILM COATED ORAL at 22:04

## 2025-03-08 LAB
-  CANDIDA ALBICANS: SIGNIFICANT CHANGE UP
ALBUMIN SERPL ELPH-MCNC: 2.4 G/DL — LOW (ref 3.3–5)
ALP SERPL-CCNC: 1321 U/L — HIGH (ref 40–120)
ALT FLD-CCNC: 84 U/L — HIGH (ref 12–78)
ANION GAP SERPL CALC-SCNC: 7 MMOL/L — SIGNIFICANT CHANGE UP (ref 5–17)
AST SERPL-CCNC: 59 U/L — HIGH (ref 15–37)
BILIRUB SERPL-MCNC: 0.9 MG/DL — SIGNIFICANT CHANGE UP (ref 0.2–1.2)
BUN SERPL-MCNC: 23 MG/DL — SIGNIFICANT CHANGE UP (ref 7–23)
CALCIUM SERPL-MCNC: 9.8 MG/DL — SIGNIFICANT CHANGE UP (ref 8.5–10.1)
CHLORIDE SERPL-SCNC: 108 MMOL/L — SIGNIFICANT CHANGE UP (ref 96–108)
CO2 SERPL-SCNC: 19 MMOL/L — LOW (ref 22–31)
CREAT SERPL-MCNC: 0.5 MG/DL — SIGNIFICANT CHANGE UP (ref 0.5–1.3)
EGFR: 103 ML/MIN/1.73M2 — SIGNIFICANT CHANGE UP
EGFR: 103 ML/MIN/1.73M2 — SIGNIFICANT CHANGE UP
GLUCOSE SERPL-MCNC: 127 MG/DL — HIGH (ref 70–99)
GRAM STN FLD: ABNORMAL
GRAM STN FLD: ABNORMAL
HCT VFR BLD CALC: 37 % — SIGNIFICANT CHANGE UP (ref 34.5–45)
HGB BLD-MCNC: 12.1 G/DL — SIGNIFICANT CHANGE UP (ref 11.5–15.5)
MAGNESIUM SERPL-MCNC: 2.4 MG/DL — SIGNIFICANT CHANGE UP (ref 1.6–2.6)
MCHC RBC-ENTMCNC: 28.7 PG — SIGNIFICANT CHANGE UP (ref 27–34)
MCHC RBC-ENTMCNC: 32.7 G/DL — SIGNIFICANT CHANGE UP (ref 32–36)
MCV RBC AUTO: 87.9 FL — SIGNIFICANT CHANGE UP (ref 80–100)
METHOD TYPE: SIGNIFICANT CHANGE UP
NRBC # BLD AUTO: 0 K/UL — SIGNIFICANT CHANGE UP (ref 0–0)
NRBC # FLD: 0 K/UL — SIGNIFICANT CHANGE UP (ref 0–0)
NRBC BLD AUTO-RTO: 0 /100 WBCS — SIGNIFICANT CHANGE UP (ref 0–0)
PHOSPHATE SERPL-MCNC: 3.9 MG/DL — SIGNIFICANT CHANGE UP (ref 2.5–4.5)
PLATELET # BLD AUTO: 115 K/UL — LOW (ref 150–400)
PMV BLD: 13.9 FL — HIGH (ref 7–13)
POTASSIUM SERPL-MCNC: 4.3 MMOL/L — SIGNIFICANT CHANGE UP (ref 3.5–5.3)
POTASSIUM SERPL-SCNC: 4.3 MMOL/L — SIGNIFICANT CHANGE UP (ref 3.5–5.3)
PROT SERPL-MCNC: 7.1 GM/DL — SIGNIFICANT CHANGE UP (ref 6–8.3)
RBC # BLD: 4.21 M/UL — SIGNIFICANT CHANGE UP (ref 3.8–5.2)
RBC # FLD: 17 % — HIGH (ref 10.3–14.5)
SODIUM SERPL-SCNC: 134 MMOL/L — LOW (ref 135–145)
SPECIMEN SOURCE: SIGNIFICANT CHANGE UP
SPECIMEN SOURCE: SIGNIFICANT CHANGE UP
WBC # BLD: 21.43 K/UL — HIGH (ref 3.8–10.5)
WBC # FLD AUTO: 21.43 K/UL — HIGH (ref 3.8–10.5)

## 2025-03-08 PROCEDURE — 99233 SBSQ HOSP IP/OBS HIGH 50: CPT

## 2025-03-08 RX ORDER — CASPOFUNGIN ACETATE 5 MG/ML
50 INJECTION, POWDER, LYOPHILIZED, FOR SOLUTION INTRAVENOUS EVERY 24 HOURS
Refills: 0 | Status: DISCONTINUED | OUTPATIENT
Start: 2025-03-09 | End: 2025-03-11

## 2025-03-08 RX ORDER — CASPOFUNGIN ACETATE 5 MG/ML
INJECTION, POWDER, LYOPHILIZED, FOR SOLUTION INTRAVENOUS
Refills: 0 | Status: DISCONTINUED | OUTPATIENT
Start: 2025-03-08 | End: 2025-03-11

## 2025-03-08 RX ORDER — CASPOFUNGIN ACETATE 5 MG/ML
70 INJECTION, POWDER, LYOPHILIZED, FOR SOLUTION INTRAVENOUS ONCE
Refills: 0 | Status: COMPLETED | OUTPATIENT
Start: 2025-03-08 | End: 2025-03-08

## 2025-03-08 RX ORDER — SODIUM/POT/MAG/CALC/CHLOR/ACET 35-20-5MEQ
1 VIAL (ML) INTRAVENOUS
Refills: 0 | Status: DISCONTINUED | OUTPATIENT
Start: 2025-03-08 | End: 2025-03-08

## 2025-03-08 RX ORDER — I.V. FAT EMULSION 20 G/100ML
0.89 EMULSION INTRAVENOUS
Qty: 40 | Refills: 0 | Status: DISCONTINUED | OUTPATIENT
Start: 2025-03-08 | End: 2025-03-08

## 2025-03-08 RX ORDER — FLUCONAZOLE 150 MG
400 TABLET ORAL ONCE
Refills: 0 | Status: DISCONTINUED | OUTPATIENT
Start: 2025-03-08 | End: 2025-03-08

## 2025-03-08 RX ORDER — FLUCONAZOLE 150 MG
TABLET ORAL
Refills: 0 | Status: DISCONTINUED | OUTPATIENT
Start: 2025-03-08 | End: 2025-03-08

## 2025-03-08 RX ADMIN — LIDOCAINE HYDROCHLORIDE 1 PATCH: 20 JELLY TOPICAL at 14:36

## 2025-03-08 RX ADMIN — Medication 3 MILLIGRAM(S): at 22:58

## 2025-03-08 RX ADMIN — Medication 125 MILLIGRAM(S): at 18:27

## 2025-03-08 RX ADMIN — METOPROLOL SUCCINATE 25 MILLIGRAM(S): 50 TABLET, EXTENDED RELEASE ORAL at 18:28

## 2025-03-08 RX ADMIN — Medication 4 MILLIGRAM(S): at 01:49

## 2025-03-08 RX ADMIN — Medication 2 MILLIGRAM(S): at 22:57

## 2025-03-08 RX ADMIN — MIRTAZAPINE 7.5 MILLIGRAM(S): 30 TABLET, FILM COATED ORAL at 22:58

## 2025-03-08 RX ADMIN — IPRATROPIUM BROMIDE AND ALBUTEROL SULFATE 3 MILLILITER(S): .5; 2.5 SOLUTION RESPIRATORY (INHALATION) at 10:12

## 2025-03-08 RX ADMIN — MEROPENEM 1000 MILLIGRAM(S): 1 INJECTION INTRAVENOUS at 22:57

## 2025-03-08 RX ADMIN — Medication 4 MILLIGRAM(S): at 18:43

## 2025-03-08 RX ADMIN — MEROPENEM 1000 MILLIGRAM(S): 1 INJECTION INTRAVENOUS at 13:41

## 2025-03-08 RX ADMIN — ESCITALOPRAM OXALATE 5 MILLIGRAM(S): 20 TABLET ORAL at 09:15

## 2025-03-08 RX ADMIN — Medication 4 MILLIGRAM(S): at 08:27

## 2025-03-08 RX ADMIN — MEROPENEM 1000 MILLIGRAM(S): 1 INJECTION INTRAVENOUS at 05:49

## 2025-03-08 RX ADMIN — ROSUVASTATIN CALCIUM 40 MILLIGRAM(S): 20 TABLET, FILM COATED ORAL at 22:58

## 2025-03-08 RX ADMIN — Medication 125 MILLIGRAM(S): at 23:11

## 2025-03-08 RX ADMIN — Medication 125 MILLIGRAM(S): at 05:49

## 2025-03-08 RX ADMIN — Medication 4 MILLIGRAM(S): at 12:43

## 2025-03-08 RX ADMIN — DEXTROMETHORPHAN HBR, GUAIFENESIN 200 MILLIGRAM(S): 200 LIQUID ORAL at 22:58

## 2025-03-08 RX ADMIN — CASPOFUNGIN ACETATE 260 MILLIGRAM(S): 5 INJECTION, POWDER, LYOPHILIZED, FOR SOLUTION INTRAVENOUS at 13:38

## 2025-03-08 RX ADMIN — Medication 125 MILLIGRAM(S): at 11:55

## 2025-03-08 RX ADMIN — Medication 81 MILLIGRAM(S): at 09:14

## 2025-03-08 RX ADMIN — METOPROLOL SUCCINATE 2.5 MILLIGRAM(S): 50 TABLET, EXTENDED RELEASE ORAL at 09:14

## 2025-03-08 RX ADMIN — Medication 1 EACH: at 22:48

## 2025-03-08 RX ADMIN — ENOXAPARIN SODIUM 40 MILLIGRAM(S): 100 INJECTION SUBCUTANEOUS at 18:27

## 2025-03-08 RX ADMIN — LIDOCAINE HYDROCHLORIDE 1 PATCH: 20 JELLY TOPICAL at 09:13

## 2025-03-08 RX ADMIN — DEXTROMETHORPHAN HBR, GUAIFENESIN 200 MILLIGRAM(S): 200 LIQUID ORAL at 09:14

## 2025-03-08 RX ADMIN — Medication 2 MILLIGRAM(S): at 23:33

## 2025-03-08 RX ADMIN — Medication 40 MILLIGRAM(S): at 05:46

## 2025-03-08 RX ADMIN — Medication 4 MILLIGRAM(S): at 02:19

## 2025-03-08 RX ADMIN — I.V. FAT EMULSION 16.7 GM/KG/DAY: 20 EMULSION INTRAVENOUS at 22:48

## 2025-03-09 LAB
ALBUMIN SERPL ELPH-MCNC: 2.3 G/DL — LOW (ref 3.3–5)
ALP SERPL-CCNC: 1318 U/L — HIGH (ref 40–120)
ALT FLD-CCNC: 107 U/L — HIGH (ref 12–78)
ANION GAP SERPL CALC-SCNC: 5 MMOL/L — SIGNIFICANT CHANGE UP (ref 5–17)
AST SERPL-CCNC: 86 U/L — HIGH (ref 15–37)
BILIRUB SERPL-MCNC: 0.9 MG/DL — SIGNIFICANT CHANGE UP (ref 0.2–1.2)
BUN SERPL-MCNC: 19 MG/DL — SIGNIFICANT CHANGE UP (ref 7–23)
CALCIUM SERPL-MCNC: 9.2 MG/DL — SIGNIFICANT CHANGE UP (ref 8.5–10.1)
CHLORIDE SERPL-SCNC: 103 MMOL/L — SIGNIFICANT CHANGE UP (ref 96–108)
CO2 SERPL-SCNC: 24 MMOL/L — SIGNIFICANT CHANGE UP (ref 22–31)
CREAT SERPL-MCNC: 0.42 MG/DL — LOW (ref 0.5–1.3)
CULTURE RESULTS: ABNORMAL
CULTURE RESULTS: SIGNIFICANT CHANGE UP
CULTURE RESULTS: SIGNIFICANT CHANGE UP
EGFR: 107 ML/MIN/1.73M2 — SIGNIFICANT CHANGE UP
EGFR: 107 ML/MIN/1.73M2 — SIGNIFICANT CHANGE UP
GLUCOSE SERPL-MCNC: 139 MG/DL — HIGH (ref 70–99)
HCT VFR BLD CALC: 33.6 % — LOW (ref 34.5–45)
HGB BLD-MCNC: 10.7 G/DL — LOW (ref 11.5–15.5)
MAGNESIUM SERPL-MCNC: 2.1 MG/DL — SIGNIFICANT CHANGE UP (ref 1.6–2.6)
MCHC RBC-ENTMCNC: 27.6 PG — SIGNIFICANT CHANGE UP (ref 27–34)
MCHC RBC-ENTMCNC: 31.8 G/DL — LOW (ref 32–36)
MCV RBC AUTO: 86.6 FL — SIGNIFICANT CHANGE UP (ref 80–100)
NRBC # BLD AUTO: 0 K/UL — SIGNIFICANT CHANGE UP (ref 0–0)
NRBC # FLD: 0 K/UL — SIGNIFICANT CHANGE UP (ref 0–0)
NRBC BLD AUTO-RTO: 0 /100 WBCS — SIGNIFICANT CHANGE UP (ref 0–0)
PHOSPHATE SERPL-MCNC: 3.6 MG/DL — SIGNIFICANT CHANGE UP (ref 2.5–4.5)
PLATELET # BLD AUTO: 109 K/UL — LOW (ref 150–400)
PMV BLD: 13.5 FL — HIGH (ref 7–13)
POTASSIUM SERPL-MCNC: 3.6 MMOL/L — SIGNIFICANT CHANGE UP (ref 3.5–5.3)
POTASSIUM SERPL-SCNC: 3.6 MMOL/L — SIGNIFICANT CHANGE UP (ref 3.5–5.3)
PROT SERPL-MCNC: 7 GM/DL — SIGNIFICANT CHANGE UP (ref 6–8.3)
RBC # BLD: 3.88 M/UL — SIGNIFICANT CHANGE UP (ref 3.8–5.2)
RBC # FLD: 16.4 % — HIGH (ref 10.3–14.5)
SODIUM SERPL-SCNC: 132 MMOL/L — LOW (ref 135–145)
SPECIMEN SOURCE: SIGNIFICANT CHANGE UP
WBC # BLD: 23.08 K/UL — HIGH (ref 3.8–10.5)
WBC # FLD AUTO: 23.08 K/UL — HIGH (ref 3.8–10.5)

## 2025-03-09 PROCEDURE — 99233 SBSQ HOSP IP/OBS HIGH 50: CPT

## 2025-03-09 RX ORDER — SODIUM/POT/MAG/CALC/CHLOR/ACET 35-20-5MEQ
1 VIAL (ML) INTRAVENOUS
Refills: 0 | Status: DISCONTINUED | OUTPATIENT
Start: 2025-03-09 | End: 2025-03-09

## 2025-03-09 RX ORDER — I.V. FAT EMULSION 20 G/100ML
0.89 EMULSION INTRAVENOUS
Qty: 40 | Refills: 0 | Status: DISCONTINUED | OUTPATIENT
Start: 2025-03-09 | End: 2025-03-09

## 2025-03-09 RX ADMIN — MEROPENEM 1000 MILLIGRAM(S): 1 INJECTION INTRAVENOUS at 14:01

## 2025-03-09 RX ADMIN — DEXTROMETHORPHAN HBR, GUAIFENESIN 200 MILLIGRAM(S): 200 LIQUID ORAL at 09:18

## 2025-03-09 RX ADMIN — Medication 125 MILLIGRAM(S): at 17:25

## 2025-03-09 RX ADMIN — Medication 81 MILLIGRAM(S): at 09:17

## 2025-03-09 RX ADMIN — Medication 1 EACH: at 23:51

## 2025-03-09 RX ADMIN — CASPOFUNGIN ACETATE 260 MILLIGRAM(S): 5 INJECTION, POWDER, LYOPHILIZED, FOR SOLUTION INTRAVENOUS at 09:18

## 2025-03-09 RX ADMIN — DEXTROMETHORPHAN HBR, GUAIFENESIN 200 MILLIGRAM(S): 200 LIQUID ORAL at 23:21

## 2025-03-09 RX ADMIN — LIDOCAINE HYDROCHLORIDE 1 PATCH: 20 JELLY TOPICAL at 14:37

## 2025-03-09 RX ADMIN — I.V. FAT EMULSION 16.67 GM/KG/DAY: 20 EMULSION INTRAVENOUS at 23:52

## 2025-03-09 RX ADMIN — Medication 3 MILLIGRAM(S): at 23:18

## 2025-03-09 RX ADMIN — LIDOCAINE HYDROCHLORIDE 1 PATCH: 20 JELLY TOPICAL at 09:19

## 2025-03-09 RX ADMIN — MEROPENEM 1000 MILLIGRAM(S): 1 INJECTION INTRAVENOUS at 21:00

## 2025-03-09 RX ADMIN — Medication 2 MILLIGRAM(S): at 23:13

## 2025-03-09 RX ADMIN — Medication 125 MILLIGRAM(S): at 09:19

## 2025-03-09 RX ADMIN — Medication 2 MILLIGRAM(S): at 22:13

## 2025-03-09 RX ADMIN — LIDOCAINE HYDROCHLORIDE 1 PATCH: 20 JELLY TOPICAL at 21:11

## 2025-03-09 RX ADMIN — Medication 40 MILLIGRAM(S): at 09:18

## 2025-03-09 RX ADMIN — ESCITALOPRAM OXALATE 5 MILLIGRAM(S): 20 TABLET ORAL at 09:18

## 2025-03-09 RX ADMIN — Medication 2 MILLIGRAM(S): at 09:17

## 2025-03-09 RX ADMIN — ENOXAPARIN SODIUM 40 MILLIGRAM(S): 100 INJECTION SUBCUTANEOUS at 17:25

## 2025-03-09 RX ADMIN — Medication 125 MILLIGRAM(S): at 05:20

## 2025-03-09 RX ADMIN — MEROPENEM 1000 MILLIGRAM(S): 1 INJECTION INTRAVENOUS at 05:19

## 2025-03-09 RX ADMIN — IPRATROPIUM BROMIDE AND ALBUTEROL SULFATE 3 MILLILITER(S): .5; 2.5 SOLUTION RESPIRATORY (INHALATION) at 20:56

## 2025-03-09 RX ADMIN — ROSUVASTATIN CALCIUM 40 MILLIGRAM(S): 20 TABLET, FILM COATED ORAL at 21:02

## 2025-03-09 RX ADMIN — Medication 0.25 MILLIGRAM(S): at 13:57

## 2025-03-09 RX ADMIN — Medication 4 MILLIGRAM(S): at 19:22

## 2025-03-09 RX ADMIN — MIRTAZAPINE 7.5 MILLIGRAM(S): 30 TABLET, FILM COATED ORAL at 21:00

## 2025-03-10 LAB
-  FLUCONAZOLE: SIGNIFICANT CHANGE UP
ALBUMIN SERPL ELPH-MCNC: 2.1 G/DL — LOW (ref 3.3–5)
ALP SERPL-CCNC: 1165 U/L — HIGH (ref 40–120)
ALT FLD-CCNC: 93 U/L — HIGH (ref 12–78)
ANION GAP SERPL CALC-SCNC: 5 MMOL/L — SIGNIFICANT CHANGE UP (ref 5–17)
AST SERPL-CCNC: 62 U/L — HIGH (ref 15–37)
BILIRUB SERPL-MCNC: 0.8 MG/DL — SIGNIFICANT CHANGE UP (ref 0.2–1.2)
BUN SERPL-MCNC: 17 MG/DL — SIGNIFICANT CHANGE UP (ref 7–23)
CALCIUM SERPL-MCNC: 9.8 MG/DL — SIGNIFICANT CHANGE UP (ref 8.5–10.1)
CHLORIDE SERPL-SCNC: 104 MMOL/L — SIGNIFICANT CHANGE UP (ref 96–108)
CO2 SERPL-SCNC: 26 MMOL/L — SIGNIFICANT CHANGE UP (ref 22–31)
CREAT SERPL-MCNC: 0.47 MG/DL — LOW (ref 0.5–1.3)
CULTURE RESULTS: ABNORMAL
EGFR: 104 ML/MIN/1.73M2 — SIGNIFICANT CHANGE UP
EGFR: 104 ML/MIN/1.73M2 — SIGNIFICANT CHANGE UP
GLUCOSE SERPL-MCNC: 130 MG/DL — HIGH (ref 70–99)
HCT VFR BLD CALC: 32.2 % — LOW (ref 34.5–45)
HGB BLD-MCNC: 10.3 G/DL — LOW (ref 11.5–15.5)
MAGNESIUM SERPL-MCNC: 2 MG/DL — SIGNIFICANT CHANGE UP (ref 1.6–2.6)
MCHC RBC-ENTMCNC: 27.8 PG — SIGNIFICANT CHANGE UP (ref 27–34)
MCHC RBC-ENTMCNC: 32 G/DL — SIGNIFICANT CHANGE UP (ref 32–36)
MCV RBC AUTO: 87 FL — SIGNIFICANT CHANGE UP (ref 80–100)
METHOD TYPE: SIGNIFICANT CHANGE UP
NRBC # BLD AUTO: 0 K/UL — SIGNIFICANT CHANGE UP (ref 0–0)
NRBC # FLD: 0 K/UL — SIGNIFICANT CHANGE UP (ref 0–0)
NRBC BLD AUTO-RTO: 0 /100 WBCS — SIGNIFICANT CHANGE UP (ref 0–0)
ORGANISM # SPEC MICROSCOPIC CNT: ABNORMAL
ORGANISM # SPEC MICROSCOPIC CNT: ABNORMAL
ORGANISM # SPEC MICROSCOPIC CNT: SIGNIFICANT CHANGE UP
PHOSPHATE SERPL-MCNC: 3.4 MG/DL — SIGNIFICANT CHANGE UP (ref 2.5–4.5)
PLATELET # BLD AUTO: 110 K/UL — LOW (ref 150–400)
PMV BLD: 13.7 FL — HIGH (ref 7–13)
POTASSIUM SERPL-MCNC: 4.1 MMOL/L — SIGNIFICANT CHANGE UP (ref 3.5–5.3)
POTASSIUM SERPL-SCNC: 4.1 MMOL/L — SIGNIFICANT CHANGE UP (ref 3.5–5.3)
PROT SERPL-MCNC: 6.8 GM/DL — SIGNIFICANT CHANGE UP (ref 6–8.3)
RBC # BLD: 3.7 M/UL — LOW (ref 3.8–5.2)
RBC # FLD: 16.1 % — HIGH (ref 10.3–14.5)
SODIUM SERPL-SCNC: 135 MMOL/L — SIGNIFICANT CHANGE UP (ref 135–145)
SPECIMEN SOURCE: SIGNIFICANT CHANGE UP
WBC # BLD: 27.5 K/UL — HIGH (ref 3.8–10.5)
WBC # FLD AUTO: 27.5 K/UL — HIGH (ref 3.8–10.5)

## 2025-03-10 PROCEDURE — 74177 CT ABD & PELVIS W/CONTRAST: CPT | Mod: 26

## 2025-03-10 PROCEDURE — 99233 SBSQ HOSP IP/OBS HIGH 50: CPT

## 2025-03-10 RX ADMIN — Medication 0.25 MILLIGRAM(S): at 21:43

## 2025-03-10 RX ADMIN — Medication 125 MILLIGRAM(S): at 00:54

## 2025-03-10 RX ADMIN — Medication 2 MILLIGRAM(S): at 11:17

## 2025-03-10 RX ADMIN — MIRTAZAPINE 7.5 MILLIGRAM(S): 30 TABLET, FILM COATED ORAL at 20:50

## 2025-03-10 RX ADMIN — Medication 2 MILLIGRAM(S): at 04:50

## 2025-03-10 RX ADMIN — Medication 2 MILLIGRAM(S): at 18:54

## 2025-03-10 RX ADMIN — Medication 4 MILLIGRAM(S): at 02:30

## 2025-03-10 RX ADMIN — CASPOFUNGIN ACETATE 260 MILLIGRAM(S): 5 INJECTION, POWDER, LYOPHILIZED, FOR SOLUTION INTRAVENOUS at 12:08

## 2025-03-10 RX ADMIN — ESCITALOPRAM OXALATE 5 MILLIGRAM(S): 20 TABLET ORAL at 10:39

## 2025-03-10 RX ADMIN — Medication 650 MILLIGRAM(S): at 20:51

## 2025-03-10 RX ADMIN — Medication 125 MILLIGRAM(S): at 23:59

## 2025-03-10 RX ADMIN — Medication 30 MILLILITER(S): at 18:04

## 2025-03-10 RX ADMIN — Medication 2 MILLIGRAM(S): at 23:58

## 2025-03-10 RX ADMIN — Medication 2 MILLIGRAM(S): at 05:22

## 2025-03-10 RX ADMIN — Medication 125 MILLIGRAM(S): at 17:53

## 2025-03-10 RX ADMIN — METOPROLOL SUCCINATE 25 MILLIGRAM(S): 50 TABLET, EXTENDED RELEASE ORAL at 18:05

## 2025-03-10 RX ADMIN — DEXTROMETHORPHAN HBR, GUAIFENESIN 200 MILLIGRAM(S): 200 LIQUID ORAL at 10:40

## 2025-03-10 RX ADMIN — MEROPENEM 1000 MILLIGRAM(S): 1 INJECTION INTRAVENOUS at 13:34

## 2025-03-10 RX ADMIN — Medication 40 MILLIGRAM(S): at 04:50

## 2025-03-10 RX ADMIN — MEROPENEM 1000 MILLIGRAM(S): 1 INJECTION INTRAVENOUS at 21:17

## 2025-03-10 RX ADMIN — MEROPENEM 1000 MILLIGRAM(S): 1 INJECTION INTRAVENOUS at 04:50

## 2025-03-10 RX ADMIN — Medication 650 MILLIGRAM(S): at 08:16

## 2025-03-10 RX ADMIN — DEXTROMETHORPHAN HBR, GUAIFENESIN 200 MILLIGRAM(S): 200 LIQUID ORAL at 20:50

## 2025-03-10 RX ADMIN — ENOXAPARIN SODIUM 40 MILLIGRAM(S): 100 INJECTION SUBCUTANEOUS at 17:54

## 2025-03-10 RX ADMIN — ROSUVASTATIN CALCIUM 40 MILLIGRAM(S): 20 TABLET, FILM COATED ORAL at 20:50

## 2025-03-10 RX ADMIN — Medication 81 MILLIGRAM(S): at 10:40

## 2025-03-10 RX ADMIN — Medication 125 MILLIGRAM(S): at 04:50

## 2025-03-10 RX ADMIN — Medication 1000 MILLILITER(S): at 08:18

## 2025-03-10 RX ADMIN — Medication 125 MILLIGRAM(S): at 11:01

## 2025-03-10 RX ADMIN — Medication 4 MILLIGRAM(S): at 01:57

## 2025-03-10 RX ADMIN — LIDOCAINE HYDROCHLORIDE 1 PATCH: 20 JELLY TOPICAL at 11:11

## 2025-03-11 LAB
ANION GAP SERPL CALC-SCNC: 6 MMOL/L — SIGNIFICANT CHANGE UP (ref 5–17)
APPEARANCE UR: ABNORMAL
BACTERIA # UR AUTO: NEGATIVE /HPF — SIGNIFICANT CHANGE UP
BILIRUB UR-MCNC: NEGATIVE — SIGNIFICANT CHANGE UP
BUN SERPL-MCNC: 12 MG/DL — SIGNIFICANT CHANGE UP (ref 7–23)
CALCIUM SERPL-MCNC: 9.7 MG/DL — SIGNIFICANT CHANGE UP (ref 8.5–10.1)
CAST: 1 /LPF — SIGNIFICANT CHANGE UP (ref 0–4)
CHLORIDE SERPL-SCNC: 109 MMOL/L — HIGH (ref 96–108)
CO2 SERPL-SCNC: 22 MMOL/L — SIGNIFICANT CHANGE UP (ref 22–31)
COLOR SPEC: SIGNIFICANT CHANGE UP
COPRO1 24H UR-MRATE: SIGNIFICANT CHANGE UP MCG/L (ref 0.4–4.8)
CREAT SERPL-MCNC: 0.29 MG/DL — LOW (ref 0.5–1.3)
DIFF PNL FLD: ABNORMAL
EGFR: 117 ML/MIN/1.73M2 — SIGNIFICANT CHANGE UP
EGFR: 117 ML/MIN/1.73M2 — SIGNIFICANT CHANGE UP
GLUCOSE SERPL-MCNC: 111 MG/DL — HIGH (ref 70–99)
GLUCOSE UR QL: NEGATIVE MG/DL — SIGNIFICANT CHANGE UP
HCT VFR BLD CALC: 27.5 % — LOW (ref 34.5–45)
HEPTA-CP 24H UR-MRATE: SIGNIFICANT CHANGE UP MCG/L
HEPTA-CP SERPL-MCNC: SIGNIFICANT CHANGE UP MCG/L
HEXA-CP 24H UR-MRATE: SIGNIFICANT CHANGE UP MCG/L
HGB BLD-MCNC: 9.1 G/DL — LOW (ref 11.5–15.5)
KETONES UR-MCNC: ABNORMAL MG/DL
LEUKOCYTE ESTERASE UR-ACNC: ABNORMAL
MCHC RBC-ENTMCNC: 28.2 PG — SIGNIFICANT CHANGE UP (ref 27–34)
MCHC RBC-ENTMCNC: 33.1 G/DL — SIGNIFICANT CHANGE UP (ref 32–36)
MCV RBC AUTO: 85.1 FL — SIGNIFICANT CHANGE UP (ref 80–100)
NITRITE UR-MCNC: NEGATIVE — SIGNIFICANT CHANGE UP
NRBC # BLD AUTO: 0 K/UL — SIGNIFICANT CHANGE UP (ref 0–0)
NRBC # FLD: 0 K/UL — SIGNIFICANT CHANGE UP (ref 0–0)
NRBC BLD AUTO-RTO: 0 /100 WBCS — SIGNIFICANT CHANGE UP (ref 0–0)
PENTA-CP 24H UR-MRATE: 3.9 MCG/L — HIGH
PH UR: 7 — SIGNIFICANT CHANGE UP (ref 5–8)
PLATELET # BLD AUTO: 102 K/UL — LOW (ref 150–400)
PMV BLD: 13.2 FL — HIGH (ref 7–13)
PORPHYRIN FRACT PNL SERPL-MCNC: 3.9 MCG/L — SIGNIFICANT CHANGE UP (ref 1–5.6)
PORPHYRINS PLASMA INTERPRETATION: SIGNIFICANT CHANGE UP
POTASSIUM SERPL-MCNC: 3.4 MMOL/L — LOW (ref 3.5–5.3)
POTASSIUM SERPL-SCNC: 3.4 MMOL/L — LOW (ref 3.5–5.3)
PROT UR-MCNC: 300 MG/DL
RBC # BLD: 3.23 M/UL — LOW (ref 3.8–5.2)
RBC # FLD: 16 % — HIGH (ref 10.3–14.5)
RBC CASTS # UR COMP ASSIST: 75 /HPF — HIGH (ref 0–4)
SODIUM SERPL-SCNC: 137 MMOL/L — SIGNIFICANT CHANGE UP (ref 135–145)
SP GR SPEC: 1.02 — SIGNIFICANT CHANGE UP (ref 1–1.03)
SQUAMOUS # UR AUTO: 0 /HPF — SIGNIFICANT CHANGE UP (ref 0–5)
UROBILINOGEN FLD QL: 0.2 MG/DL — SIGNIFICANT CHANGE UP (ref 0.2–1)
UROPOR 24H UR-MRATE: SIGNIFICANT CHANGE UP MCG/L
WBC # BLD: 18.88 K/UL — HIGH (ref 3.8–10.5)
WBC # FLD AUTO: 18.88 K/UL — HIGH (ref 3.8–10.5)
WBC UR QL: 231 /HPF — HIGH (ref 0–5)

## 2025-03-11 PROCEDURE — 99233 SBSQ HOSP IP/OBS HIGH 50: CPT

## 2025-03-11 RX ORDER — HYDROMORPHONE/SOD CHLOR,ISO/PF 2 MG/10 ML
2 SYRINGE (ML) INJECTION EVERY 4 HOURS
Refills: 0 | Status: DISCONTINUED | OUTPATIENT
Start: 2025-03-11 | End: 2025-03-18

## 2025-03-11 RX ORDER — FLUCONAZOLE 150 MG
400 TABLET ORAL EVERY 24 HOURS
Refills: 0 | Status: DISCONTINUED | OUTPATIENT
Start: 2025-03-11 | End: 2025-03-13

## 2025-03-11 RX ORDER — HYDROMORPHONE/SOD CHLOR,ISO/PF 2 MG/10 ML
4 SYRINGE (ML) INJECTION EVERY 6 HOURS
Refills: 0 | Status: DISCONTINUED | OUTPATIENT
Start: 2025-03-11 | End: 2025-03-18

## 2025-03-11 RX ADMIN — METOPROLOL SUCCINATE 25 MILLIGRAM(S): 50 TABLET, EXTENDED RELEASE ORAL at 18:07

## 2025-03-11 RX ADMIN — LIDOCAINE HYDROCHLORIDE 1 PATCH: 20 JELLY TOPICAL at 19:49

## 2025-03-11 RX ADMIN — ESCITALOPRAM OXALATE 5 MILLIGRAM(S): 20 TABLET ORAL at 09:28

## 2025-03-11 RX ADMIN — DEXTROMETHORPHAN HBR, GUAIFENESIN 200 MILLIGRAM(S): 200 LIQUID ORAL at 09:31

## 2025-03-11 RX ADMIN — ENOXAPARIN SODIUM 40 MILLIGRAM(S): 100 INJECTION SUBCUTANEOUS at 18:07

## 2025-03-11 RX ADMIN — Medication 2 MILLIGRAM(S): at 11:47

## 2025-03-11 RX ADMIN — Medication 30 MILLILITER(S): at 18:13

## 2025-03-11 RX ADMIN — Medication 2 MILLIGRAM(S): at 16:51

## 2025-03-11 RX ADMIN — MEROPENEM 1000 MILLIGRAM(S): 1 INJECTION INTRAVENOUS at 06:02

## 2025-03-11 RX ADMIN — Medication 3 MILLIGRAM(S): at 21:33

## 2025-03-11 RX ADMIN — MEROPENEM 1000 MILLIGRAM(S): 1 INJECTION INTRAVENOUS at 21:30

## 2025-03-11 RX ADMIN — Medication 650 MILLIGRAM(S): at 06:02

## 2025-03-11 RX ADMIN — Medication 30 MILLILITER(S): at 22:59

## 2025-03-11 RX ADMIN — ROSUVASTATIN CALCIUM 40 MILLIGRAM(S): 20 TABLET, FILM COATED ORAL at 21:30

## 2025-03-11 RX ADMIN — MEROPENEM 1000 MILLIGRAM(S): 1 INJECTION INTRAVENOUS at 13:30

## 2025-03-11 RX ADMIN — Medication 40 MILLIEQUIVALENT(S): at 21:30

## 2025-03-11 RX ADMIN — Medication 2 MILLIGRAM(S): at 06:42

## 2025-03-11 RX ADMIN — Medication 100 MILLIGRAM(S): at 10:39

## 2025-03-11 RX ADMIN — Medication 125 MILLIGRAM(S): at 06:02

## 2025-03-11 RX ADMIN — MIRTAZAPINE 7.5 MILLIGRAM(S): 30 TABLET, FILM COATED ORAL at 21:30

## 2025-03-11 RX ADMIN — Medication 125 MILLIGRAM(S): at 18:07

## 2025-03-11 RX ADMIN — Medication 40 MILLIGRAM(S): at 06:02

## 2025-03-11 RX ADMIN — Medication 2 MILLIGRAM(S): at 22:33

## 2025-03-11 RX ADMIN — DEXTROMETHORPHAN HBR, GUAIFENESIN 200 MILLIGRAM(S): 200 LIQUID ORAL at 21:30

## 2025-03-11 RX ADMIN — Medication 2 MILLIGRAM(S): at 21:33

## 2025-03-11 RX ADMIN — Medication 125 MILLIGRAM(S): at 10:43

## 2025-03-11 RX ADMIN — LIDOCAINE HYDROCHLORIDE 1 PATCH: 20 JELLY TOPICAL at 09:33

## 2025-03-11 RX ADMIN — Medication 81 MILLIGRAM(S): at 09:27

## 2025-03-12 LAB
ANION GAP SERPL CALC-SCNC: 8 MMOL/L — SIGNIFICANT CHANGE UP (ref 5–17)
BASOPHILS # BLD AUTO: 0.06 K/UL — SIGNIFICANT CHANGE UP (ref 0–0.2)
BASOPHILS NFR BLD AUTO: 0.4 % — SIGNIFICANT CHANGE UP (ref 0–2)
BUN SERPL-MCNC: 11 MG/DL — SIGNIFICANT CHANGE UP (ref 7–23)
CALCIUM SERPL-MCNC: 10.2 MG/DL — HIGH (ref 8.5–10.1)
CHLORIDE SERPL-SCNC: 107 MMOL/L — SIGNIFICANT CHANGE UP (ref 96–108)
CO2 SERPL-SCNC: 19 MMOL/L — LOW (ref 22–31)
CREAT SERPL-MCNC: 0.3 MG/DL — LOW (ref 0.5–1.3)
EGFR: 116 ML/MIN/1.73M2 — SIGNIFICANT CHANGE UP
EGFR: 116 ML/MIN/1.73M2 — SIGNIFICANT CHANGE UP
EOSINOPHIL # BLD AUTO: 0.24 K/UL — SIGNIFICANT CHANGE UP (ref 0–0.5)
EOSINOPHIL NFR BLD AUTO: 1.4 % — SIGNIFICANT CHANGE UP (ref 0–6)
GLUCOSE SERPL-MCNC: 93 MG/DL — SIGNIFICANT CHANGE UP (ref 70–99)
HCT VFR BLD CALC: 27.7 % — LOW (ref 34.5–45)
HGB BLD-MCNC: 8.8 G/DL — LOW (ref 11.5–15.5)
IMM GRANULOCYTES # BLD AUTO: 0.23 K/UL — HIGH (ref 0–0.07)
IMM GRANULOCYTES NFR BLD AUTO: 1.4 % — HIGH (ref 0–0.9)
LYMPHOCYTES # BLD AUTO: 1.95 K/UL — SIGNIFICANT CHANGE UP (ref 1–3.3)
LYMPHOCYTES NFR BLD AUTO: 11.5 % — LOW (ref 13–44)
MANUAL SMEAR VERIFICATION: SIGNIFICANT CHANGE UP
MCHC RBC-ENTMCNC: 27.2 PG — SIGNIFICANT CHANGE UP (ref 27–34)
MCHC RBC-ENTMCNC: 31.8 G/DL — LOW (ref 32–36)
MCV RBC AUTO: 85.5 FL — SIGNIFICANT CHANGE UP (ref 80–100)
MONOCYTES # BLD AUTO: 0.89 K/UL — SIGNIFICANT CHANGE UP (ref 0–0.9)
MONOCYTES NFR BLD AUTO: 5.2 % — SIGNIFICANT CHANGE UP (ref 2–14)
NEUTROPHILS # BLD AUTO: 13.65 K/UL — HIGH (ref 1.8–7.4)
NEUTROPHILS NFR BLD AUTO: 80.1 % — HIGH (ref 43–77)
NRBC # BLD AUTO: 0 K/UL — SIGNIFICANT CHANGE UP (ref 0–0)
NRBC # FLD: 0 K/UL — SIGNIFICANT CHANGE UP (ref 0–0)
NRBC BLD AUTO-RTO: 0 /100 WBCS — SIGNIFICANT CHANGE UP (ref 0–0)
PLAT MORPH BLD: NORMAL — SIGNIFICANT CHANGE UP
PLATELET # BLD AUTO: 138 K/UL — LOW (ref 150–400)
PMV BLD: 12.9 FL — SIGNIFICANT CHANGE UP (ref 7–13)
POTASSIUM SERPL-MCNC: 3.8 MMOL/L — SIGNIFICANT CHANGE UP (ref 3.5–5.3)
POTASSIUM SERPL-SCNC: 3.8 MMOL/L — SIGNIFICANT CHANGE UP (ref 3.5–5.3)
RBC # BLD: 3.24 M/UL — LOW (ref 3.8–5.2)
RBC # FLD: 16.2 % — HIGH (ref 10.3–14.5)
RBC BLD AUTO: NORMAL — SIGNIFICANT CHANGE UP
SODIUM SERPL-SCNC: 134 MMOL/L — LOW (ref 135–145)
WBC # BLD: 17.02 K/UL — HIGH (ref 3.8–10.5)
WBC # FLD AUTO: 17.02 K/UL — HIGH (ref 3.8–10.5)
WBC MORPHOLOGY: NORMAL — SIGNIFICANT CHANGE UP

## 2025-03-12 PROCEDURE — 93312 ECHO TRANSESOPHAGEAL: CPT | Mod: 26

## 2025-03-12 PROCEDURE — 93320 DOPPLER ECHO COMPLETE: CPT | Mod: 26

## 2025-03-12 PROCEDURE — 99233 SBSQ HOSP IP/OBS HIGH 50: CPT

## 2025-03-12 PROCEDURE — 93325 DOPPLER ECHO COLOR FLOW MAPG: CPT | Mod: 26

## 2025-03-12 RX ORDER — LORAZEPAM 4 MG/ML
1 VIAL (ML) INJECTION EVERY 6 HOURS
Refills: 0 | Status: DISCONTINUED | OUTPATIENT
Start: 2025-03-12 | End: 2025-03-13

## 2025-03-12 RX ADMIN — Medication 125 MILLIGRAM(S): at 06:11

## 2025-03-12 RX ADMIN — Medication 125 MILLIGRAM(S): at 18:03

## 2025-03-12 RX ADMIN — ROSUVASTATIN CALCIUM 40 MILLIGRAM(S): 20 TABLET, FILM COATED ORAL at 21:45

## 2025-03-12 RX ADMIN — MEROPENEM 1000 MILLIGRAM(S): 1 INJECTION INTRAVENOUS at 11:36

## 2025-03-12 RX ADMIN — DEXTROMETHORPHAN HBR, GUAIFENESIN 200 MILLIGRAM(S): 200 LIQUID ORAL at 11:39

## 2025-03-12 RX ADMIN — MEROPENEM 1000 MILLIGRAM(S): 1 INJECTION INTRAVENOUS at 06:11

## 2025-03-12 RX ADMIN — MEROPENEM 1000 MILLIGRAM(S): 1 INJECTION INTRAVENOUS at 21:45

## 2025-03-12 RX ADMIN — ENOXAPARIN SODIUM 40 MILLIGRAM(S): 100 INJECTION SUBCUTANEOUS at 18:03

## 2025-03-12 RX ADMIN — Medication 40 MILLIGRAM(S): at 06:11

## 2025-03-12 RX ADMIN — Medication 125 MILLIGRAM(S): at 23:39

## 2025-03-12 RX ADMIN — Medication 2 MILLIGRAM(S): at 11:35

## 2025-03-12 RX ADMIN — Medication 100 MILLIGRAM(S): at 11:38

## 2025-03-12 RX ADMIN — ESCITALOPRAM OXALATE 5 MILLIGRAM(S): 20 TABLET ORAL at 11:37

## 2025-03-12 RX ADMIN — Medication 125 MILLIGRAM(S): at 11:35

## 2025-03-12 RX ADMIN — Medication 2 MILLIGRAM(S): at 06:28

## 2025-03-12 RX ADMIN — MIRTAZAPINE 7.5 MILLIGRAM(S): 30 TABLET, FILM COATED ORAL at 21:45

## 2025-03-12 RX ADMIN — Medication 125 MILLIGRAM(S): at 00:00

## 2025-03-12 RX ADMIN — DEXTROMETHORPHAN HBR, GUAIFENESIN 200 MILLIGRAM(S): 200 LIQUID ORAL at 21:45

## 2025-03-12 RX ADMIN — METOPROLOL SUCCINATE 25 MILLIGRAM(S): 50 TABLET, EXTENDED RELEASE ORAL at 18:04

## 2025-03-12 RX ADMIN — Medication 4 MILLIGRAM(S): at 18:07

## 2025-03-12 RX ADMIN — Medication 81 MILLIGRAM(S): at 11:38

## 2025-03-13 LAB
ANION GAP SERPL CALC-SCNC: 7 MMOL/L — SIGNIFICANT CHANGE UP (ref 5–17)
BASOPHILS # BLD AUTO: 0.03 K/UL — SIGNIFICANT CHANGE UP (ref 0–0.2)
BASOPHILS NFR BLD AUTO: 0.2 % — SIGNIFICANT CHANGE UP (ref 0–2)
BUN SERPL-MCNC: 10 MG/DL — SIGNIFICANT CHANGE UP (ref 7–23)
CALCIUM SERPL-MCNC: 10.1 MG/DL — SIGNIFICANT CHANGE UP (ref 8.5–10.1)
CHLORIDE SERPL-SCNC: 108 MMOL/L — SIGNIFICANT CHANGE UP (ref 96–108)
CO2 SERPL-SCNC: 21 MMOL/L — LOW (ref 22–31)
CREAT SERPL-MCNC: 0.35 MG/DL — LOW (ref 0.5–1.3)
CULTURE RESULTS: ABNORMAL
EGFR: 112 ML/MIN/1.73M2 — SIGNIFICANT CHANGE UP
EGFR: 112 ML/MIN/1.73M2 — SIGNIFICANT CHANGE UP
EOSINOPHIL # BLD AUTO: 0.3 K/UL — SIGNIFICANT CHANGE UP (ref 0–0.5)
EOSINOPHIL NFR BLD AUTO: 2 % — SIGNIFICANT CHANGE UP (ref 0–6)
GLUCOSE SERPL-MCNC: 96 MG/DL — SIGNIFICANT CHANGE UP (ref 70–99)
GRAM STN FLD: ABNORMAL
GRAM STN FLD: ABNORMAL
HCT VFR BLD CALC: 26.7 % — LOW (ref 34.5–45)
HGB BLD-MCNC: 8.6 G/DL — LOW (ref 11.5–15.5)
IMM GRANULOCYTES # BLD AUTO: 0.18 K/UL — HIGH (ref 0–0.07)
IMM GRANULOCYTES NFR BLD AUTO: 1.2 % — HIGH (ref 0–0.9)
LYMPHOCYTES # BLD AUTO: 1.89 K/UL — SIGNIFICANT CHANGE UP (ref 1–3.3)
LYMPHOCYTES NFR BLD AUTO: 12.8 % — LOW (ref 13–44)
MCHC RBC-ENTMCNC: 27.3 PG — SIGNIFICANT CHANGE UP (ref 27–34)
MCHC RBC-ENTMCNC: 32.2 G/DL — SIGNIFICANT CHANGE UP (ref 32–36)
MCV RBC AUTO: 84.8 FL — SIGNIFICANT CHANGE UP (ref 80–100)
MONOCYTES # BLD AUTO: 0.7 K/UL — SIGNIFICANT CHANGE UP (ref 0–0.9)
MONOCYTES NFR BLD AUTO: 4.7 % — SIGNIFICANT CHANGE UP (ref 2–14)
NEUTROPHILS # BLD AUTO: 11.69 K/UL — HIGH (ref 1.8–7.4)
NEUTROPHILS NFR BLD AUTO: 79.1 % — HIGH (ref 43–77)
NRBC # BLD AUTO: 0 K/UL — SIGNIFICANT CHANGE UP (ref 0–0)
NRBC # FLD: 0 K/UL — SIGNIFICANT CHANGE UP (ref 0–0)
NRBC BLD AUTO-RTO: 0 /100 WBCS — SIGNIFICANT CHANGE UP (ref 0–0)
PLATELET # BLD AUTO: 161 K/UL — SIGNIFICANT CHANGE UP (ref 150–400)
PMV BLD: 12.5 FL — SIGNIFICANT CHANGE UP (ref 7–13)
POTASSIUM SERPL-MCNC: 3.4 MMOL/L — LOW (ref 3.5–5.3)
POTASSIUM SERPL-SCNC: 3.4 MMOL/L — LOW (ref 3.5–5.3)
RBC # BLD: 3.15 M/UL — LOW (ref 3.8–5.2)
RBC # FLD: 16.4 % — HIGH (ref 10.3–14.5)
SODIUM SERPL-SCNC: 136 MMOL/L — SIGNIFICANT CHANGE UP (ref 135–145)
SPECIMEN SOURCE: SIGNIFICANT CHANGE UP
WBC # BLD: 14.79 K/UL — HIGH (ref 3.8–10.5)
WBC # FLD AUTO: 14.79 K/UL — HIGH (ref 3.8–10.5)

## 2025-03-13 PROCEDURE — 99233 SBSQ HOSP IP/OBS HIGH 50: CPT

## 2025-03-13 RX ORDER — LORAZEPAM 4 MG/ML
0.25 VIAL (ML) INJECTION EVERY 6 HOURS
Refills: 0 | Status: DISCONTINUED | OUTPATIENT
Start: 2025-03-13 | End: 2025-03-20

## 2025-03-13 RX ORDER — CASPOFUNGIN ACETATE 5 MG/ML
INJECTION, POWDER, LYOPHILIZED, FOR SOLUTION INTRAVENOUS
Refills: 0 | Status: DISCONTINUED | OUTPATIENT
Start: 2025-03-13 | End: 2025-03-19

## 2025-03-13 RX ORDER — CASPOFUNGIN ACETATE 5 MG/ML
70 INJECTION, POWDER, LYOPHILIZED, FOR SOLUTION INTRAVENOUS ONCE
Refills: 0 | Status: COMPLETED | OUTPATIENT
Start: 2025-03-13 | End: 2025-03-13

## 2025-03-13 RX ORDER — CASPOFUNGIN ACETATE 5 MG/ML
50 INJECTION, POWDER, LYOPHILIZED, FOR SOLUTION INTRAVENOUS EVERY 24 HOURS
Refills: 0 | Status: DISCONTINUED | OUTPATIENT
Start: 2025-03-14 | End: 2025-03-19

## 2025-03-13 RX ADMIN — Medication 125 MILLIGRAM(S): at 11:25

## 2025-03-13 RX ADMIN — CASPOFUNGIN ACETATE 260 MILLIGRAM(S): 5 INJECTION, POWDER, LYOPHILIZED, FOR SOLUTION INTRAVENOUS at 16:18

## 2025-03-13 RX ADMIN — Medication 4 MILLIGRAM(S): at 22:50

## 2025-03-13 RX ADMIN — Medication 4 MILLIGRAM(S): at 14:58

## 2025-03-13 RX ADMIN — MEROPENEM 1000 MILLIGRAM(S): 1 INJECTION INTRAVENOUS at 05:53

## 2025-03-13 RX ADMIN — Medication 4 MILLIGRAM(S): at 15:30

## 2025-03-13 RX ADMIN — Medication 125 MILLIGRAM(S): at 17:39

## 2025-03-13 RX ADMIN — MIRTAZAPINE 7.5 MILLIGRAM(S): 30 TABLET, FILM COATED ORAL at 22:11

## 2025-03-13 RX ADMIN — Medication 40 MILLIGRAM(S): at 05:53

## 2025-03-13 RX ADMIN — Medication 40 MILLIEQUIVALENT(S): at 17:39

## 2025-03-13 RX ADMIN — MEROPENEM 1000 MILLIGRAM(S): 1 INJECTION INTRAVENOUS at 14:59

## 2025-03-13 RX ADMIN — Medication 2 MILLIGRAM(S): at 03:18

## 2025-03-13 RX ADMIN — MEROPENEM 1000 MILLIGRAM(S): 1 INJECTION INTRAVENOUS at 22:11

## 2025-03-13 RX ADMIN — ROSUVASTATIN CALCIUM 40 MILLIGRAM(S): 20 TABLET, FILM COATED ORAL at 22:11

## 2025-03-13 RX ADMIN — Medication 100 MILLIGRAM(S): at 06:04

## 2025-03-13 RX ADMIN — DEXTROMETHORPHAN HBR, GUAIFENESIN 200 MILLIGRAM(S): 200 LIQUID ORAL at 11:25

## 2025-03-13 RX ADMIN — Medication 100 MILLIGRAM(S): at 17:39

## 2025-03-13 RX ADMIN — ENOXAPARIN SODIUM 40 MILLIGRAM(S): 100 INJECTION SUBCUTANEOUS at 17:38

## 2025-03-13 RX ADMIN — LIDOCAINE HYDROCHLORIDE 1 PATCH: 20 JELLY TOPICAL at 11:26

## 2025-03-13 RX ADMIN — Medication 100 MILLIGRAM(S): at 11:24

## 2025-03-13 RX ADMIN — METOPROLOL SUCCINATE 25 MILLIGRAM(S): 50 TABLET, EXTENDED RELEASE ORAL at 17:39

## 2025-03-13 RX ADMIN — Medication 1 MILLIGRAM(S): at 10:52

## 2025-03-13 RX ADMIN — Medication 4 MILLIGRAM(S): at 22:11

## 2025-03-13 RX ADMIN — Medication 81 MILLIGRAM(S): at 11:26

## 2025-03-13 RX ADMIN — DEXTROMETHORPHAN HBR, GUAIFENESIN 200 MILLIGRAM(S): 200 LIQUID ORAL at 22:11

## 2025-03-13 RX ADMIN — Medication 125 MILLIGRAM(S): at 05:54

## 2025-03-13 RX ADMIN — ESCITALOPRAM OXALATE 5 MILLIGRAM(S): 20 TABLET ORAL at 11:25

## 2025-03-14 LAB
ALBUMIN SERPL ELPH-MCNC: 1.8 G/DL — LOW (ref 3.3–5)
ALP SERPL-CCNC: 993 U/L — HIGH (ref 40–120)
ALT FLD-CCNC: 61 U/L — SIGNIFICANT CHANGE UP (ref 12–78)
ANION GAP SERPL CALC-SCNC: 4 MMOL/L — LOW (ref 5–17)
AST SERPL-CCNC: 45 U/L — HIGH (ref 15–37)
BASOPHILS # BLD AUTO: 0.04 K/UL — SIGNIFICANT CHANGE UP (ref 0–0.2)
BASOPHILS NFR BLD AUTO: 0.3 % — SIGNIFICANT CHANGE UP (ref 0–2)
BILIRUB SERPL-MCNC: 0.7 MG/DL — SIGNIFICANT CHANGE UP (ref 0.2–1.2)
BUN SERPL-MCNC: 8 MG/DL — SIGNIFICANT CHANGE UP (ref 7–23)
CALCIUM SERPL-MCNC: 10.4 MG/DL — HIGH (ref 8.5–10.1)
CHLORIDE SERPL-SCNC: 108 MMOL/L — SIGNIFICANT CHANGE UP (ref 96–108)
CO2 SERPL-SCNC: 23 MMOL/L — SIGNIFICANT CHANGE UP (ref 22–31)
CREAT SERPL-MCNC: 0.47 MG/DL — LOW (ref 0.5–1.3)
EGFR: 104 ML/MIN/1.73M2 — SIGNIFICANT CHANGE UP
EGFR: 104 ML/MIN/1.73M2 — SIGNIFICANT CHANGE UP
EOSINOPHIL # BLD AUTO: 0.36 K/UL — SIGNIFICANT CHANGE UP (ref 0–0.5)
EOSINOPHIL NFR BLD AUTO: 2.6 % — SIGNIFICANT CHANGE UP (ref 0–6)
FERRITIN SERPL-MCNC: 805 NG/ML — HIGH (ref 13–330)
GI PCR PANEL: SIGNIFICANT CHANGE UP
GLUCOSE SERPL-MCNC: 88 MG/DL — SIGNIFICANT CHANGE UP (ref 70–99)
HAPTOGLOB SERPL-MCNC: 218 MG/DL — HIGH (ref 34–200)
HCT VFR BLD CALC: 28.2 % — LOW (ref 34.5–45)
HGB BLD-MCNC: 8.9 G/DL — LOW (ref 11.5–15.5)
IMM GRANULOCYTES # BLD AUTO: 0.17 K/UL — HIGH (ref 0–0.07)
IMM GRANULOCYTES NFR BLD AUTO: 1.2 % — HIGH (ref 0–0.9)
IMMATURE RETICULOCYTE FRACTION %: 24.9 % — SIGNIFICANT CHANGE UP
IRON SATN MFR SERPL: 12 % — LOW (ref 14–50)
IRON SATN MFR SERPL: 18 UG/DL — LOW (ref 30–160)
LYMPHOCYTES # BLD AUTO: 2.01 K/UL — SIGNIFICANT CHANGE UP (ref 1–3.3)
LYMPHOCYTES NFR BLD AUTO: 14.4 % — SIGNIFICANT CHANGE UP (ref 13–44)
MCHC RBC-ENTMCNC: 27.4 PG — SIGNIFICANT CHANGE UP (ref 27–34)
MCHC RBC-ENTMCNC: 31.6 G/DL — LOW (ref 32–36)
MCV RBC AUTO: 86.8 FL — SIGNIFICANT CHANGE UP (ref 80–100)
MONOCYTES # BLD AUTO: 0.67 K/UL — SIGNIFICANT CHANGE UP (ref 0–0.9)
MONOCYTES NFR BLD AUTO: 4.8 % — SIGNIFICANT CHANGE UP (ref 2–14)
NEUTROPHILS # BLD AUTO: 10.71 K/UL — HIGH (ref 1.8–7.4)
NEUTROPHILS NFR BLD AUTO: 76.7 % — SIGNIFICANT CHANGE UP (ref 43–77)
NRBC # BLD AUTO: 0 K/UL — SIGNIFICANT CHANGE UP (ref 0–0)
NRBC # FLD: 0 K/UL — SIGNIFICANT CHANGE UP (ref 0–0)
NRBC BLD AUTO-RTO: 0 /100 WBCS — SIGNIFICANT CHANGE UP (ref 0–0)
PLATELET # BLD AUTO: 168 K/UL — SIGNIFICANT CHANGE UP (ref 150–400)
PMV BLD: 11.7 FL — SIGNIFICANT CHANGE UP (ref 7–13)
POTASSIUM SERPL-MCNC: 3.6 MMOL/L — SIGNIFICANT CHANGE UP (ref 3.5–5.3)
POTASSIUM SERPL-SCNC: 3.6 MMOL/L — SIGNIFICANT CHANGE UP (ref 3.5–5.3)
PROT SERPL-MCNC: 6.7 GM/DL — SIGNIFICANT CHANGE UP (ref 6–8.3)
RBC # BLD: 3.25 M/UL — LOW (ref 3.8–5.2)
RBC # BLD: 3.25 M/UL — LOW (ref 3.8–5.2)
RBC # FLD: 16.4 % — HIGH (ref 10.3–14.5)
RETICS #: 81.6 K/UL — SIGNIFICANT CHANGE UP (ref 25–125)
RETICS/RBC NFR: 2.5 % — SIGNIFICANT CHANGE UP (ref 0.5–2.5)
RETICULOCYTE HEMOGLOBIN EQUIVALENT: 25.8 PG — LOW (ref 30.6–40.7)
SODIUM SERPL-SCNC: 135 MMOL/L — SIGNIFICANT CHANGE UP (ref 135–145)
TIBC SERPL-MCNC: 153 UG/DL — LOW (ref 220–430)
UIBC SERPL-MCNC: 135 UG/DL — SIGNIFICANT CHANGE UP (ref 110–370)
WBC # BLD: 13.96 K/UL — HIGH (ref 3.8–10.5)
WBC # FLD AUTO: 13.96 K/UL — HIGH (ref 3.8–10.5)

## 2025-03-14 PROCEDURE — 99233 SBSQ HOSP IP/OBS HIGH 50: CPT

## 2025-03-14 RX ADMIN — LIDOCAINE HYDROCHLORIDE 1 PATCH: 20 JELLY TOPICAL at 19:30

## 2025-03-14 RX ADMIN — Medication 4 MILLIGRAM(S): at 13:07

## 2025-03-14 RX ADMIN — MEROPENEM 1000 MILLIGRAM(S): 1 INJECTION INTRAVENOUS at 06:44

## 2025-03-14 RX ADMIN — ROSUVASTATIN CALCIUM 40 MILLIGRAM(S): 20 TABLET, FILM COATED ORAL at 21:31

## 2025-03-14 RX ADMIN — Medication 4 MILLIGRAM(S): at 22:15

## 2025-03-14 RX ADMIN — DEXTROMETHORPHAN HBR, GUAIFENESIN 200 MILLIGRAM(S): 200 LIQUID ORAL at 21:31

## 2025-03-14 RX ADMIN — Medication 81 MILLIGRAM(S): at 08:39

## 2025-03-14 RX ADMIN — Medication 4 MILLIGRAM(S): at 12:05

## 2025-03-14 RX ADMIN — Medication 125 MILLIGRAM(S): at 11:57

## 2025-03-14 RX ADMIN — Medication 0.25 MILLIGRAM(S): at 17:09

## 2025-03-14 RX ADMIN — LIDOCAINE HYDROCHLORIDE 1 PATCH: 20 JELLY TOPICAL at 08:38

## 2025-03-14 RX ADMIN — Medication 125 MILLIGRAM(S): at 06:44

## 2025-03-14 RX ADMIN — ENOXAPARIN SODIUM 40 MILLIGRAM(S): 100 INJECTION SUBCUTANEOUS at 17:02

## 2025-03-14 RX ADMIN — DEXTROMETHORPHAN HBR, GUAIFENESIN 200 MILLIGRAM(S): 200 LIQUID ORAL at 08:39

## 2025-03-14 RX ADMIN — LIDOCAINE HYDROCHLORIDE 1 PATCH: 20 JELLY TOPICAL at 00:02

## 2025-03-14 RX ADMIN — CASPOFUNGIN ACETATE 260 MILLIGRAM(S): 5 INJECTION, POWDER, LYOPHILIZED, FOR SOLUTION INTRAVENOUS at 14:29

## 2025-03-14 RX ADMIN — Medication 125 MILLIGRAM(S): at 00:18

## 2025-03-14 RX ADMIN — Medication 40 MILLIGRAM(S): at 06:45

## 2025-03-14 RX ADMIN — MIRTAZAPINE 7.5 MILLIGRAM(S): 30 TABLET, FILM COATED ORAL at 21:31

## 2025-03-14 RX ADMIN — Medication 4 MILLIGRAM(S): at 21:31

## 2025-03-14 RX ADMIN — Medication 2 MILLIGRAM(S): at 08:47

## 2025-03-14 RX ADMIN — Medication 2 MILLIGRAM(S): at 09:47

## 2025-03-14 RX ADMIN — LIDOCAINE HYDROCHLORIDE 1 PATCH: 20 JELLY TOPICAL at 21:30

## 2025-03-14 RX ADMIN — Medication 125 MILLIGRAM(S): at 17:03

## 2025-03-14 RX ADMIN — ESCITALOPRAM OXALATE 5 MILLIGRAM(S): 20 TABLET ORAL at 08:39

## 2025-03-15 LAB
ALBUMIN SERPL ELPH-MCNC: 1.6 G/DL — LOW (ref 3.3–5)
ALP SERPL-CCNC: 987 U/L — HIGH (ref 40–120)
ALT FLD-CCNC: 57 U/L — SIGNIFICANT CHANGE UP (ref 12–78)
ANION GAP SERPL CALC-SCNC: 4 MMOL/L — LOW (ref 5–17)
AST SERPL-CCNC: 47 U/L — HIGH (ref 15–37)
BASOPHILS # BLD AUTO: 0.04 K/UL — SIGNIFICANT CHANGE UP (ref 0–0.2)
BASOPHILS NFR BLD AUTO: 0.3 % — SIGNIFICANT CHANGE UP (ref 0–2)
BILIRUB SERPL-MCNC: 0.6 MG/DL — SIGNIFICANT CHANGE UP (ref 0.2–1.2)
BUN SERPL-MCNC: 7 MG/DL — SIGNIFICANT CHANGE UP (ref 7–23)
CALCIUM SERPL-MCNC: 10.3 MG/DL — HIGH (ref 8.5–10.1)
CHLORIDE SERPL-SCNC: 108 MMOL/L — SIGNIFICANT CHANGE UP (ref 96–108)
CO2 SERPL-SCNC: 24 MMOL/L — SIGNIFICANT CHANGE UP (ref 22–31)
CREAT SERPL-MCNC: 0.39 MG/DL — LOW (ref 0.5–1.3)
EGFR: 109 ML/MIN/1.73M2 — SIGNIFICANT CHANGE UP
EGFR: 109 ML/MIN/1.73M2 — SIGNIFICANT CHANGE UP
EOSINOPHIL # BLD AUTO: 0.27 K/UL — SIGNIFICANT CHANGE UP (ref 0–0.5)
EOSINOPHIL NFR BLD AUTO: 2.3 % — SIGNIFICANT CHANGE UP (ref 0–6)
GLUCOSE SERPL-MCNC: 92 MG/DL — SIGNIFICANT CHANGE UP (ref 70–99)
HCT VFR BLD CALC: 25.6 % — LOW (ref 34.5–45)
HGB BLD-MCNC: 8.2 G/DL — LOW (ref 11.5–15.5)
IMM GRANULOCYTES # BLD AUTO: 0.2 K/UL — HIGH (ref 0–0.07)
IMM GRANULOCYTES NFR BLD AUTO: 1.7 % — HIGH (ref 0–0.9)
LYMPHOCYTES # BLD AUTO: 1.79 K/UL — SIGNIFICANT CHANGE UP (ref 1–3.3)
LYMPHOCYTES NFR BLD AUTO: 15.4 % — SIGNIFICANT CHANGE UP (ref 13–44)
MCHC RBC-ENTMCNC: 27.8 PG — SIGNIFICANT CHANGE UP (ref 27–34)
MCHC RBC-ENTMCNC: 32 G/DL — SIGNIFICANT CHANGE UP (ref 32–36)
MCV RBC AUTO: 86.8 FL — SIGNIFICANT CHANGE UP (ref 80–100)
MONOCYTES # BLD AUTO: 0.59 K/UL — SIGNIFICANT CHANGE UP (ref 0–0.9)
MONOCYTES NFR BLD AUTO: 5.1 % — SIGNIFICANT CHANGE UP (ref 2–14)
NEUTROPHILS # BLD AUTO: 8.75 K/UL — HIGH (ref 1.8–7.4)
NEUTROPHILS NFR BLD AUTO: 75.2 % — SIGNIFICANT CHANGE UP (ref 43–77)
NRBC # BLD AUTO: 0 K/UL — SIGNIFICANT CHANGE UP (ref 0–0)
NRBC # FLD: 0 K/UL — SIGNIFICANT CHANGE UP (ref 0–0)
NRBC BLD AUTO-RTO: 0 /100 WBCS — SIGNIFICANT CHANGE UP (ref 0–0)
PLATELET # BLD AUTO: 172 K/UL — SIGNIFICANT CHANGE UP (ref 150–400)
PMV BLD: 11.7 FL — SIGNIFICANT CHANGE UP (ref 7–13)
POTASSIUM SERPL-MCNC: 3.2 MMOL/L — LOW (ref 3.5–5.3)
POTASSIUM SERPL-SCNC: 3.2 MMOL/L — LOW (ref 3.5–5.3)
PROT SERPL-MCNC: 6.2 GM/DL — SIGNIFICANT CHANGE UP (ref 6–8.3)
RBC # BLD: 2.95 M/UL — LOW (ref 3.8–5.2)
RBC # FLD: 16.4 % — HIGH (ref 10.3–14.5)
SODIUM SERPL-SCNC: 136 MMOL/L — SIGNIFICANT CHANGE UP (ref 135–145)
WBC # BLD: 11.64 K/UL — HIGH (ref 3.8–10.5)
WBC # FLD AUTO: 11.64 K/UL — HIGH (ref 3.8–10.5)

## 2025-03-15 PROCEDURE — 99233 SBSQ HOSP IP/OBS HIGH 50: CPT

## 2025-03-15 RX ADMIN — Medication 125 MILLIGRAM(S): at 13:07

## 2025-03-15 RX ADMIN — ROSUVASTATIN CALCIUM 40 MILLIGRAM(S): 20 TABLET, FILM COATED ORAL at 21:27

## 2025-03-15 RX ADMIN — Medication 125 MILLIGRAM(S): at 18:48

## 2025-03-15 RX ADMIN — MIRTAZAPINE 7.5 MILLIGRAM(S): 30 TABLET, FILM COATED ORAL at 21:27

## 2025-03-15 RX ADMIN — Medication 4 MILLIGRAM(S): at 05:45

## 2025-03-15 RX ADMIN — Medication 81 MILLIGRAM(S): at 11:00

## 2025-03-15 RX ADMIN — Medication 125 MILLIGRAM(S): at 23:27

## 2025-03-15 RX ADMIN — Medication 2 MILLIGRAM(S): at 21:27

## 2025-03-15 RX ADMIN — Medication 4 MILLIGRAM(S): at 18:48

## 2025-03-15 RX ADMIN — Medication 3 MILLIGRAM(S): at 23:29

## 2025-03-15 RX ADMIN — Medication 125 MILLIGRAM(S): at 05:02

## 2025-03-15 RX ADMIN — CASPOFUNGIN ACETATE 260 MILLIGRAM(S): 5 INJECTION, POWDER, LYOPHILIZED, FOR SOLUTION INTRAVENOUS at 13:58

## 2025-03-15 RX ADMIN — ENOXAPARIN SODIUM 40 MILLIGRAM(S): 100 INJECTION SUBCUTANEOUS at 18:48

## 2025-03-15 RX ADMIN — Medication 125 MILLIGRAM(S): at 00:21

## 2025-03-15 RX ADMIN — DEXTROMETHORPHAN HBR, GUAIFENESIN 200 MILLIGRAM(S): 200 LIQUID ORAL at 11:00

## 2025-03-15 RX ADMIN — Medication 4 MILLIGRAM(S): at 05:02

## 2025-03-15 RX ADMIN — Medication 4 MILLIGRAM(S): at 11:12

## 2025-03-15 RX ADMIN — DEXTROMETHORPHAN HBR, GUAIFENESIN 200 MILLIGRAM(S): 200 LIQUID ORAL at 21:27

## 2025-03-15 RX ADMIN — ESCITALOPRAM OXALATE 5 MILLIGRAM(S): 20 TABLET ORAL at 11:01

## 2025-03-15 RX ADMIN — Medication 40 MILLIGRAM(S): at 05:02

## 2025-03-15 RX ADMIN — LIDOCAINE HYDROCHLORIDE 1 PATCH: 20 JELLY TOPICAL at 11:00

## 2025-03-15 RX ADMIN — Medication 40 MILLIEQUIVALENT(S): at 18:53

## 2025-03-16 LAB
ALBUMIN SERPL ELPH-MCNC: 1.5 G/DL — LOW (ref 3.3–5)
ALP SERPL-CCNC: 1027 U/L — HIGH (ref 40–120)
ALT FLD-CCNC: 58 U/L — SIGNIFICANT CHANGE UP (ref 12–78)
ANION GAP SERPL CALC-SCNC: 3 MMOL/L — LOW (ref 5–17)
AST SERPL-CCNC: 48 U/L — HIGH (ref 15–37)
BASOPHILS # BLD AUTO: 0.03 K/UL — SIGNIFICANT CHANGE UP (ref 0–0.2)
BASOPHILS NFR BLD AUTO: 0.3 % — SIGNIFICANT CHANGE UP (ref 0–2)
BILIRUB SERPL-MCNC: 0.5 MG/DL — SIGNIFICANT CHANGE UP (ref 0.2–1.2)
BUN SERPL-MCNC: 7 MG/DL — SIGNIFICANT CHANGE UP (ref 7–23)
CALCIUM SERPL-MCNC: 10 MG/DL — SIGNIFICANT CHANGE UP (ref 8.5–10.1)
CHLORIDE SERPL-SCNC: 109 MMOL/L — HIGH (ref 96–108)
CO2 SERPL-SCNC: 26 MMOL/L — SIGNIFICANT CHANGE UP (ref 22–31)
CREAT SERPL-MCNC: 0.44 MG/DL — LOW (ref 0.5–1.3)
EGFR: 106 ML/MIN/1.73M2 — SIGNIFICANT CHANGE UP
EGFR: 106 ML/MIN/1.73M2 — SIGNIFICANT CHANGE UP
EOSINOPHIL # BLD AUTO: 0.26 K/UL — SIGNIFICANT CHANGE UP (ref 0–0.5)
EOSINOPHIL NFR BLD AUTO: 2.8 % — SIGNIFICANT CHANGE UP (ref 0–6)
GLUCOSE SERPL-MCNC: 87 MG/DL — SIGNIFICANT CHANGE UP (ref 70–99)
HCT VFR BLD CALC: 26.7 % — LOW (ref 34.5–45)
HGB BLD-MCNC: 8.3 G/DL — LOW (ref 11.5–15.5)
IMM GRANULOCYTES # BLD AUTO: 0.16 K/UL — HIGH (ref 0–0.07)
IMM GRANULOCYTES NFR BLD AUTO: 1.7 % — HIGH (ref 0–0.9)
LYMPHOCYTES # BLD AUTO: 2.51 K/UL — SIGNIFICANT CHANGE UP (ref 1–3.3)
LYMPHOCYTES NFR BLD AUTO: 27.4 % — SIGNIFICANT CHANGE UP (ref 13–44)
MANUAL SMEAR VERIFICATION: SIGNIFICANT CHANGE UP
MCHC RBC-ENTMCNC: 27.1 PG — SIGNIFICANT CHANGE UP (ref 27–34)
MCHC RBC-ENTMCNC: 31.1 G/DL — LOW (ref 32–36)
MCV RBC AUTO: 87.3 FL — SIGNIFICANT CHANGE UP (ref 80–100)
MONOCYTES # BLD AUTO: 0.63 K/UL — SIGNIFICANT CHANGE UP (ref 0–0.9)
MONOCYTES NFR BLD AUTO: 6.9 % — SIGNIFICANT CHANGE UP (ref 2–14)
NEUTROPHILS # BLD AUTO: 5.57 K/UL — SIGNIFICANT CHANGE UP (ref 1.8–7.4)
NEUTROPHILS NFR BLD AUTO: 60.9 % — SIGNIFICANT CHANGE UP (ref 43–77)
NRBC # BLD AUTO: 0 K/UL — SIGNIFICANT CHANGE UP (ref 0–0)
NRBC # FLD: 0 K/UL — SIGNIFICANT CHANGE UP (ref 0–0)
NRBC BLD AUTO-RTO: 0 /100 WBCS — SIGNIFICANT CHANGE UP (ref 0–0)
PLAT MORPH BLD: NORMAL — SIGNIFICANT CHANGE UP
PLATELET # BLD AUTO: 188 K/UL — SIGNIFICANT CHANGE UP (ref 150–400)
PMV BLD: 11.6 FL — SIGNIFICANT CHANGE UP (ref 7–13)
POTASSIUM SERPL-MCNC: 3.5 MMOL/L — SIGNIFICANT CHANGE UP (ref 3.5–5.3)
POTASSIUM SERPL-SCNC: 3.5 MMOL/L — SIGNIFICANT CHANGE UP (ref 3.5–5.3)
PROT SERPL-MCNC: 6.2 GM/DL — SIGNIFICANT CHANGE UP (ref 6–8.3)
RBC # BLD: 3.06 M/UL — LOW (ref 3.8–5.2)
RBC # FLD: 16.5 % — HIGH (ref 10.3–14.5)
RBC BLD AUTO: NORMAL — SIGNIFICANT CHANGE UP
SODIUM SERPL-SCNC: 138 MMOL/L — SIGNIFICANT CHANGE UP (ref 135–145)
WBC # BLD: 9.16 K/UL — SIGNIFICANT CHANGE UP (ref 3.8–10.5)
WBC # FLD AUTO: 9.16 K/UL — SIGNIFICANT CHANGE UP (ref 3.8–10.5)
WBC MORPHOLOGY: NORMAL — SIGNIFICANT CHANGE UP

## 2025-03-16 PROCEDURE — 99233 SBSQ HOSP IP/OBS HIGH 50: CPT

## 2025-03-16 RX ADMIN — ENOXAPARIN SODIUM 40 MILLIGRAM(S): 100 INJECTION SUBCUTANEOUS at 17:39

## 2025-03-16 RX ADMIN — IPRATROPIUM BROMIDE AND ALBUTEROL SULFATE 3 MILLILITER(S): .5; 2.5 SOLUTION RESPIRATORY (INHALATION) at 13:44

## 2025-03-16 RX ADMIN — ESCITALOPRAM OXALATE 5 MILLIGRAM(S): 20 TABLET ORAL at 08:40

## 2025-03-16 RX ADMIN — Medication 81 MILLIGRAM(S): at 08:39

## 2025-03-16 RX ADMIN — Medication 125 MILLIGRAM(S): at 05:09

## 2025-03-16 RX ADMIN — DEXTROMETHORPHAN HBR, GUAIFENESIN 200 MILLIGRAM(S): 200 LIQUID ORAL at 21:33

## 2025-03-16 RX ADMIN — Medication 125 MILLIGRAM(S): at 14:30

## 2025-03-16 RX ADMIN — Medication 125 MILLIGRAM(S): at 23:31

## 2025-03-16 RX ADMIN — IPRATROPIUM BROMIDE AND ALBUTEROL SULFATE 3 MILLILITER(S): .5; 2.5 SOLUTION RESPIRATORY (INHALATION) at 08:33

## 2025-03-16 RX ADMIN — Medication 40 MILLIGRAM(S): at 05:10

## 2025-03-16 RX ADMIN — METOPROLOL SUCCINATE 25 MILLIGRAM(S): 50 TABLET, EXTENDED RELEASE ORAL at 18:25

## 2025-03-16 RX ADMIN — ROSUVASTATIN CALCIUM 40 MILLIGRAM(S): 20 TABLET, FILM COATED ORAL at 21:34

## 2025-03-16 RX ADMIN — Medication 4 MILLIGRAM(S): at 21:34

## 2025-03-16 RX ADMIN — Medication 2 MILLIGRAM(S): at 17:01

## 2025-03-16 RX ADMIN — DEXTROMETHORPHAN HBR, GUAIFENESIN 200 MILLIGRAM(S): 200 LIQUID ORAL at 08:39

## 2025-03-16 RX ADMIN — IPRATROPIUM BROMIDE AND ALBUTEROL SULFATE 3 MILLILITER(S): .5; 2.5 SOLUTION RESPIRATORY (INHALATION) at 20:13

## 2025-03-16 RX ADMIN — CASPOFUNGIN ACETATE 260 MILLIGRAM(S): 5 INJECTION, POWDER, LYOPHILIZED, FOR SOLUTION INTRAVENOUS at 17:38

## 2025-03-16 RX ADMIN — Medication 2 MILLIGRAM(S): at 08:40

## 2025-03-16 RX ADMIN — Medication 125 MILLIGRAM(S): at 17:39

## 2025-03-17 LAB
ALBUMIN SERPL ELPH-MCNC: 1.6 G/DL — LOW (ref 3.3–5)
ALP SERPL-CCNC: 978 U/L — HIGH (ref 40–120)
ALT FLD-CCNC: 63 U/L — SIGNIFICANT CHANGE UP (ref 12–78)
ANION GAP SERPL CALC-SCNC: 4 MMOL/L — LOW (ref 5–17)
AST SERPL-CCNC: 51 U/L — HIGH (ref 15–37)
BASOPHILS # BLD AUTO: 0.04 K/UL — SIGNIFICANT CHANGE UP (ref 0–0.2)
BASOPHILS NFR BLD AUTO: 0.4 % — SIGNIFICANT CHANGE UP (ref 0–2)
BILIRUB SERPL-MCNC: 0.4 MG/DL — SIGNIFICANT CHANGE UP (ref 0.2–1.2)
BUN SERPL-MCNC: 7 MG/DL — SIGNIFICANT CHANGE UP (ref 7–23)
CALCIUM SERPL-MCNC: 10.1 MG/DL — SIGNIFICANT CHANGE UP (ref 8.5–10.1)
CHLORIDE SERPL-SCNC: 109 MMOL/L — HIGH (ref 96–108)
CO2 SERPL-SCNC: 25 MMOL/L — SIGNIFICANT CHANGE UP (ref 22–31)
CREAT SERPL-MCNC: 0.39 MG/DL — LOW (ref 0.5–1.3)
EGFR: 109 ML/MIN/1.73M2 — SIGNIFICANT CHANGE UP
EGFR: 109 ML/MIN/1.73M2 — SIGNIFICANT CHANGE UP
EOSINOPHIL # BLD AUTO: 0.19 K/UL — SIGNIFICANT CHANGE UP (ref 0–0.5)
EOSINOPHIL NFR BLD AUTO: 2.1 % — SIGNIFICANT CHANGE UP (ref 0–6)
GLUCOSE SERPL-MCNC: 93 MG/DL — SIGNIFICANT CHANGE UP (ref 70–99)
HCT VFR BLD CALC: 27.3 % — LOW (ref 34.5–45)
HGB BLD-MCNC: 8.5 G/DL — LOW (ref 11.5–15.5)
IMM GRANULOCYTES # BLD AUTO: 0.12 K/UL — HIGH (ref 0–0.07)
IMM GRANULOCYTES NFR BLD AUTO: 1.3 % — HIGH (ref 0–0.9)
LYMPHOCYTES # BLD AUTO: 2.14 K/UL — SIGNIFICANT CHANGE UP (ref 1–3.3)
LYMPHOCYTES NFR BLD AUTO: 23.6 % — SIGNIFICANT CHANGE UP (ref 13–44)
MCHC RBC-ENTMCNC: 27.2 PG — SIGNIFICANT CHANGE UP (ref 27–34)
MCHC RBC-ENTMCNC: 31.1 G/DL — LOW (ref 32–36)
MCV RBC AUTO: 87.2 FL — SIGNIFICANT CHANGE UP (ref 80–100)
MONOCYTES # BLD AUTO: 0.55 K/UL — SIGNIFICANT CHANGE UP (ref 0–0.9)
MONOCYTES NFR BLD AUTO: 6.1 % — SIGNIFICANT CHANGE UP (ref 2–14)
NEUTROPHILS # BLD AUTO: 6.04 K/UL — SIGNIFICANT CHANGE UP (ref 1.8–7.4)
NEUTROPHILS NFR BLD AUTO: 66.5 % — SIGNIFICANT CHANGE UP (ref 43–77)
NRBC # BLD AUTO: 0 K/UL — SIGNIFICANT CHANGE UP (ref 0–0)
NRBC # FLD: 0 K/UL — SIGNIFICANT CHANGE UP (ref 0–0)
NRBC BLD AUTO-RTO: 0 /100 WBCS — SIGNIFICANT CHANGE UP (ref 0–0)
PLATELET # BLD AUTO: 188 K/UL — SIGNIFICANT CHANGE UP (ref 150–400)
PMV BLD: 10.8 FL — SIGNIFICANT CHANGE UP (ref 7–13)
POTASSIUM SERPL-MCNC: 3.4 MMOL/L — LOW (ref 3.5–5.3)
POTASSIUM SERPL-SCNC: 3.4 MMOL/L — LOW (ref 3.5–5.3)
PROT SERPL-MCNC: 6.6 GM/DL — SIGNIFICANT CHANGE UP (ref 6–8.3)
RBC # BLD: 3.13 M/UL — LOW (ref 3.8–5.2)
RBC # FLD: 16.3 % — HIGH (ref 10.3–14.5)
SODIUM SERPL-SCNC: 138 MMOL/L — SIGNIFICANT CHANGE UP (ref 135–145)
WBC # BLD: 9.08 K/UL — SIGNIFICANT CHANGE UP (ref 3.8–10.5)
WBC # FLD AUTO: 9.08 K/UL — SIGNIFICANT CHANGE UP (ref 3.8–10.5)

## 2025-03-17 PROCEDURE — 99233 SBSQ HOSP IP/OBS HIGH 50: CPT

## 2025-03-17 RX ORDER — ENOXAPARIN SODIUM 100 MG/ML
40 INJECTION SUBCUTANEOUS EVERY 24 HOURS
Refills: 0 | Status: DISCONTINUED | OUTPATIENT
Start: 2025-03-18 | End: 2025-03-20

## 2025-03-17 RX ORDER — VANCOMYCIN HCL IN 5 % DEXTROSE 1.5G/250ML
125 PLASTIC BAG, INJECTION (ML) INTRAVENOUS EVERY 24 HOURS
Refills: 0 | Status: COMPLETED | OUTPATIENT
Start: 2025-03-17 | End: 2025-03-19

## 2025-03-17 RX ADMIN — CASPOFUNGIN ACETATE 260 MILLIGRAM(S): 5 INJECTION, POWDER, LYOPHILIZED, FOR SOLUTION INTRAVENOUS at 13:22

## 2025-03-17 RX ADMIN — Medication 2 MILLIGRAM(S): at 08:41

## 2025-03-17 RX ADMIN — IPRATROPIUM BROMIDE AND ALBUTEROL SULFATE 3 MILLILITER(S): .5; 2.5 SOLUTION RESPIRATORY (INHALATION) at 07:55

## 2025-03-17 RX ADMIN — Medication 125 MILLIGRAM(S): at 05:42

## 2025-03-17 RX ADMIN — ESCITALOPRAM OXALATE 5 MILLIGRAM(S): 20 TABLET ORAL at 08:42

## 2025-03-17 RX ADMIN — METOPROLOL SUCCINATE 25 MILLIGRAM(S): 50 TABLET, EXTENDED RELEASE ORAL at 18:22

## 2025-03-17 RX ADMIN — Medication 2 MILLIGRAM(S): at 18:20

## 2025-03-17 RX ADMIN — IPRATROPIUM BROMIDE AND ALBUTEROL SULFATE 3 MILLILITER(S): .5; 2.5 SOLUTION RESPIRATORY (INHALATION) at 01:53

## 2025-03-17 RX ADMIN — ENOXAPARIN SODIUM 40 MILLIGRAM(S): 100 INJECTION SUBCUTANEOUS at 18:22

## 2025-03-17 RX ADMIN — IPRATROPIUM BROMIDE AND ALBUTEROL SULFATE 3 MILLILITER(S): .5; 2.5 SOLUTION RESPIRATORY (INHALATION) at 13:52

## 2025-03-17 RX ADMIN — Medication 40 MILLIGRAM(S): at 05:43

## 2025-03-17 RX ADMIN — LIDOCAINE HYDROCHLORIDE 1 PATCH: 20 JELLY TOPICAL at 08:41

## 2025-03-17 RX ADMIN — Medication 2 MILLIGRAM(S): at 02:29

## 2025-03-17 RX ADMIN — ROSUVASTATIN CALCIUM 40 MILLIGRAM(S): 20 TABLET, FILM COATED ORAL at 22:48

## 2025-03-17 RX ADMIN — MIRTAZAPINE 7.5 MILLIGRAM(S): 30 TABLET, FILM COATED ORAL at 22:49

## 2025-03-17 RX ADMIN — Medication 4 MILLIGRAM(S): at 13:21

## 2025-03-17 RX ADMIN — IPRATROPIUM BROMIDE AND ALBUTEROL SULFATE 3 MILLILITER(S): .5; 2.5 SOLUTION RESPIRATORY (INHALATION) at 20:31

## 2025-03-17 RX ADMIN — DEXTROMETHORPHAN HBR, GUAIFENESIN 200 MILLIGRAM(S): 200 LIQUID ORAL at 22:49

## 2025-03-17 RX ADMIN — DEXTROMETHORPHAN HBR, GUAIFENESIN 200 MILLIGRAM(S): 200 LIQUID ORAL at 08:41

## 2025-03-17 RX ADMIN — Medication 125 MILLIGRAM(S): at 08:52

## 2025-03-17 RX ADMIN — Medication 81 MILLIGRAM(S): at 08:42

## 2025-03-18 LAB
ALBUMIN SERPL ELPH-MCNC: 1.4 G/DL — LOW (ref 3.3–5)
ALP SERPL-CCNC: 910 U/L — HIGH (ref 40–120)
ALT FLD-CCNC: 56 U/L — SIGNIFICANT CHANGE UP (ref 12–78)
ANION GAP SERPL CALC-SCNC: 6 MMOL/L — SIGNIFICANT CHANGE UP (ref 5–17)
AST SERPL-CCNC: 39 U/L — HIGH (ref 15–37)
BASOPHILS # BLD AUTO: 0.04 K/UL — SIGNIFICANT CHANGE UP (ref 0–0.2)
BASOPHILS NFR BLD AUTO: 0.5 % — SIGNIFICANT CHANGE UP (ref 0–2)
BILIRUB SERPL-MCNC: 0.4 MG/DL — SIGNIFICANT CHANGE UP (ref 0.2–1.2)
BUN SERPL-MCNC: 7 MG/DL — SIGNIFICANT CHANGE UP (ref 7–23)
CALCIUM SERPL-MCNC: 9.5 MG/DL — SIGNIFICANT CHANGE UP (ref 8.5–10.1)
CHLORIDE SERPL-SCNC: 114 MMOL/L — HIGH (ref 96–108)
CO2 SERPL-SCNC: 22 MMOL/L — SIGNIFICANT CHANGE UP (ref 22–31)
CREAT SERPL-MCNC: 0.46 MG/DL — LOW (ref 0.5–1.3)
CULTURE RESULTS: SIGNIFICANT CHANGE UP
CULTURE RESULTS: SIGNIFICANT CHANGE UP
EGFR: 105 ML/MIN/1.73M2 — SIGNIFICANT CHANGE UP
EGFR: 105 ML/MIN/1.73M2 — SIGNIFICANT CHANGE UP
EOSINOPHIL # BLD AUTO: 0.18 K/UL — SIGNIFICANT CHANGE UP (ref 0–0.5)
EOSINOPHIL NFR BLD AUTO: 2.3 % — SIGNIFICANT CHANGE UP (ref 0–6)
GLUCOSE SERPL-MCNC: 90 MG/DL — SIGNIFICANT CHANGE UP (ref 70–99)
HCT VFR BLD CALC: 27.8 % — LOW (ref 34.5–45)
HGB BLD-MCNC: 8.6 G/DL — LOW (ref 11.5–15.5)
IMM GRANULOCYTES # BLD AUTO: 0.08 K/UL — HIGH (ref 0–0.07)
IMM GRANULOCYTES NFR BLD AUTO: 1 % — HIGH (ref 0–0.9)
LYMPHOCYTES # BLD AUTO: 2.02 K/UL — SIGNIFICANT CHANGE UP (ref 1–3.3)
LYMPHOCYTES NFR BLD AUTO: 26.3 % — SIGNIFICANT CHANGE UP (ref 13–44)
MANUAL SMEAR VERIFICATION: SIGNIFICANT CHANGE UP
MCHC RBC-ENTMCNC: 27.6 PG — SIGNIFICANT CHANGE UP (ref 27–34)
MCHC RBC-ENTMCNC: 30.9 G/DL — LOW (ref 32–36)
MCV RBC AUTO: 89.1 FL — SIGNIFICANT CHANGE UP (ref 80–100)
MONOCYTES # BLD AUTO: 0.57 K/UL — SIGNIFICANT CHANGE UP (ref 0–0.9)
MONOCYTES NFR BLD AUTO: 7.4 % — SIGNIFICANT CHANGE UP (ref 2–14)
NEUTROPHILS # BLD AUTO: 4.79 K/UL — SIGNIFICANT CHANGE UP (ref 1.8–7.4)
NEUTROPHILS NFR BLD AUTO: 62.5 % — SIGNIFICANT CHANGE UP (ref 43–77)
NRBC # BLD AUTO: 0 K/UL — SIGNIFICANT CHANGE UP (ref 0–0)
NRBC # FLD: 0 K/UL — SIGNIFICANT CHANGE UP (ref 0–0)
NRBC BLD AUTO-RTO: 0 /100 WBCS — SIGNIFICANT CHANGE UP (ref 0–0)
PLAT MORPH BLD: NORMAL — SIGNIFICANT CHANGE UP
PLATELET # BLD AUTO: 126 K/UL — LOW (ref 150–400)
PMV BLD: 11.3 FL — SIGNIFICANT CHANGE UP (ref 7–13)
POLYCHROMASIA BLD QL SMEAR: SLIGHT — SIGNIFICANT CHANGE UP
POTASSIUM SERPL-MCNC: 3.2 MMOL/L — LOW (ref 3.5–5.3)
POTASSIUM SERPL-SCNC: 3.2 MMOL/L — LOW (ref 3.5–5.3)
PROT SERPL-MCNC: 6.1 GM/DL — SIGNIFICANT CHANGE UP (ref 6–8.3)
RBC # BLD: 3.12 M/UL — LOW (ref 3.8–5.2)
RBC # FLD: 16.6 % — HIGH (ref 10.3–14.5)
RBC BLD AUTO: ABNORMAL
SODIUM SERPL-SCNC: 142 MMOL/L — SIGNIFICANT CHANGE UP (ref 135–145)
SPECIMEN SOURCE: SIGNIFICANT CHANGE UP
SPECIMEN SOURCE: SIGNIFICANT CHANGE UP
WBC # BLD: 7.68 K/UL — SIGNIFICANT CHANGE UP (ref 3.8–10.5)
WBC # FLD AUTO: 7.68 K/UL — SIGNIFICANT CHANGE UP (ref 3.8–10.5)
WBC MORPHOLOGY: NORMAL — SIGNIFICANT CHANGE UP

## 2025-03-18 PROCEDURE — 88305 TISSUE EXAM BY PATHOLOGIST: CPT | Mod: 26

## 2025-03-18 PROCEDURE — 99233 SBSQ HOSP IP/OBS HIGH 50: CPT

## 2025-03-18 PROCEDURE — 88312 SPECIAL STAINS GROUP 1: CPT | Mod: 26

## 2025-03-18 RX ADMIN — MIRTAZAPINE 7.5 MILLIGRAM(S): 30 TABLET, FILM COATED ORAL at 21:06

## 2025-03-18 RX ADMIN — DEXTROMETHORPHAN HBR, GUAIFENESIN 200 MILLIGRAM(S): 200 LIQUID ORAL at 21:06

## 2025-03-18 RX ADMIN — DEXTROMETHORPHAN HBR, GUAIFENESIN 200 MILLIGRAM(S): 200 LIQUID ORAL at 09:25

## 2025-03-18 RX ADMIN — Medication 4 MILLIGRAM(S): at 01:34

## 2025-03-18 RX ADMIN — ESCITALOPRAM OXALATE 5 MILLIGRAM(S): 20 TABLET ORAL at 09:25

## 2025-03-18 RX ADMIN — Medication 4 MILLIGRAM(S): at 15:32

## 2025-03-18 RX ADMIN — IPRATROPIUM BROMIDE AND ALBUTEROL SULFATE 3 MILLILITER(S): .5; 2.5 SOLUTION RESPIRATORY (INHALATION) at 13:40

## 2025-03-18 RX ADMIN — IPRATROPIUM BROMIDE AND ALBUTEROL SULFATE 3 MILLILITER(S): .5; 2.5 SOLUTION RESPIRATORY (INHALATION) at 21:26

## 2025-03-18 RX ADMIN — CASPOFUNGIN ACETATE 260 MILLIGRAM(S): 5 INJECTION, POWDER, LYOPHILIZED, FOR SOLUTION INTRAVENOUS at 14:57

## 2025-03-18 RX ADMIN — Medication 40 MILLIGRAM(S): at 06:20

## 2025-03-18 RX ADMIN — Medication 125 MILLIGRAM(S): at 09:28

## 2025-03-18 RX ADMIN — IPRATROPIUM BROMIDE AND ALBUTEROL SULFATE 3 MILLILITER(S): .5; 2.5 SOLUTION RESPIRATORY (INHALATION) at 01:35

## 2025-03-18 RX ADMIN — Medication 4 MILLIGRAM(S): at 23:23

## 2025-03-18 RX ADMIN — METOPROLOL SUCCINATE 25 MILLIGRAM(S): 50 TABLET, EXTENDED RELEASE ORAL at 18:18

## 2025-03-18 RX ADMIN — Medication 81 MILLIGRAM(S): at 09:24

## 2025-03-18 RX ADMIN — ROSUVASTATIN CALCIUM 40 MILLIGRAM(S): 20 TABLET, FILM COATED ORAL at 21:06

## 2025-03-18 RX ADMIN — Medication 2 MILLIGRAM(S): at 09:24

## 2025-03-18 RX ADMIN — ENOXAPARIN SODIUM 40 MILLIGRAM(S): 100 INJECTION SUBCUTANEOUS at 18:19

## 2025-03-18 RX ADMIN — LIDOCAINE HYDROCHLORIDE 1 PATCH: 20 JELLY TOPICAL at 09:25

## 2025-03-19 LAB
ALBUMIN SERPL ELPH-MCNC: 1.5 G/DL — LOW (ref 3.3–5)
ALP SERPL-CCNC: 824 U/L — HIGH (ref 40–120)
ALT FLD-CCNC: 55 U/L — SIGNIFICANT CHANGE UP (ref 12–78)
ANION GAP SERPL CALC-SCNC: 5 MMOL/L — SIGNIFICANT CHANGE UP (ref 5–17)
AST SERPL-CCNC: 34 U/L — SIGNIFICANT CHANGE UP (ref 15–37)
BILIRUB SERPL-MCNC: 0.4 MG/DL — SIGNIFICANT CHANGE UP (ref 0.2–1.2)
BUN SERPL-MCNC: 6 MG/DL — LOW (ref 7–23)
CALCIUM SERPL-MCNC: 9.5 MG/DL — SIGNIFICANT CHANGE UP (ref 8.5–10.1)
CHLORIDE SERPL-SCNC: 111 MMOL/L — HIGH (ref 96–108)
CO2 SERPL-SCNC: 24 MMOL/L — SIGNIFICANT CHANGE UP (ref 22–31)
CREAT SERPL-MCNC: 0.43 MG/DL — LOW (ref 0.5–1.3)
EGFR: 107 ML/MIN/1.73M2 — SIGNIFICANT CHANGE UP
EGFR: 107 ML/MIN/1.73M2 — SIGNIFICANT CHANGE UP
GLUCOSE SERPL-MCNC: 78 MG/DL — SIGNIFICANT CHANGE UP (ref 70–99)
HCT VFR BLD CALC: 25.3 % — LOW (ref 34.5–45)
HGB BLD-MCNC: 8 G/DL — LOW (ref 11.5–15.5)
MCHC RBC-ENTMCNC: 27.1 PG — SIGNIFICANT CHANGE UP (ref 27–34)
MCHC RBC-ENTMCNC: 31.6 G/DL — LOW (ref 32–36)
MCV RBC AUTO: 85.8 FL — SIGNIFICANT CHANGE UP (ref 80–100)
NRBC # BLD AUTO: 0 K/UL — SIGNIFICANT CHANGE UP (ref 0–0)
NRBC # FLD: 0 K/UL — SIGNIFICANT CHANGE UP (ref 0–0)
NRBC BLD AUTO-RTO: 0 /100 WBCS — SIGNIFICANT CHANGE UP (ref 0–0)
PLATELET # BLD AUTO: 214 K/UL — SIGNIFICANT CHANGE UP (ref 150–400)
PMV BLD: 11.3 FL — SIGNIFICANT CHANGE UP (ref 7–13)
POTASSIUM SERPL-MCNC: 3 MMOL/L — LOW (ref 3.5–5.3)
POTASSIUM SERPL-SCNC: 3 MMOL/L — LOW (ref 3.5–5.3)
PROT SERPL-MCNC: 6.3 GM/DL — SIGNIFICANT CHANGE UP (ref 6–8.3)
RBC # BLD: 2.95 M/UL — LOW (ref 3.8–5.2)
RBC # FLD: 16.4 % — HIGH (ref 10.3–14.5)
SODIUM SERPL-SCNC: 140 MMOL/L — SIGNIFICANT CHANGE UP (ref 135–145)
WBC # BLD: 9.13 K/UL — SIGNIFICANT CHANGE UP (ref 3.8–10.5)
WBC # FLD AUTO: 9.13 K/UL — SIGNIFICANT CHANGE UP (ref 3.8–10.5)

## 2025-03-19 PROCEDURE — 70544 MR ANGIOGRAPHY HEAD W/O DYE: CPT | Mod: 26

## 2025-03-19 PROCEDURE — 70547 MR ANGIOGRAPHY NECK W/O DYE: CPT | Mod: 26

## 2025-03-19 PROCEDURE — 99233 SBSQ HOSP IP/OBS HIGH 50: CPT

## 2025-03-19 RX ORDER — HYDROMORPHONE/SOD CHLOR,ISO/PF 2 MG/10 ML
2 SYRINGE (ML) INJECTION EVERY 4 HOURS
Refills: 0 | Status: DISCONTINUED | OUTPATIENT
Start: 2025-03-19 | End: 2025-03-20

## 2025-03-19 RX ORDER — FLUCONAZOLE 150 MG
400 TABLET ORAL DAILY
Refills: 0 | Status: DISCONTINUED | OUTPATIENT
Start: 2025-03-20 | End: 2025-03-22

## 2025-03-19 RX ADMIN — ROSUVASTATIN CALCIUM 40 MILLIGRAM(S): 20 TABLET, FILM COATED ORAL at 22:10

## 2025-03-19 RX ADMIN — LIDOCAINE HYDROCHLORIDE 1 PATCH: 20 JELLY TOPICAL at 19:35

## 2025-03-19 RX ADMIN — Medication 40 MILLIEQUIVALENT(S): at 12:55

## 2025-03-19 RX ADMIN — IPRATROPIUM BROMIDE AND ALBUTEROL SULFATE 3 MILLILITER(S): .5; 2.5 SOLUTION RESPIRATORY (INHALATION) at 02:33

## 2025-03-19 RX ADMIN — Medication 125 MILLIGRAM(S): at 09:38

## 2025-03-19 RX ADMIN — DEXTROMETHORPHAN HBR, GUAIFENESIN 200 MILLIGRAM(S): 200 LIQUID ORAL at 09:37

## 2025-03-19 RX ADMIN — DEXTROMETHORPHAN HBR, GUAIFENESIN 200 MILLIGRAM(S): 200 LIQUID ORAL at 22:11

## 2025-03-19 RX ADMIN — LIDOCAINE HYDROCHLORIDE 1 PATCH: 20 JELLY TOPICAL at 21:00

## 2025-03-19 RX ADMIN — METOPROLOL SUCCINATE 25 MILLIGRAM(S): 50 TABLET, EXTENDED RELEASE ORAL at 18:15

## 2025-03-19 RX ADMIN — ENOXAPARIN SODIUM 40 MILLIGRAM(S): 100 INJECTION SUBCUTANEOUS at 18:16

## 2025-03-19 RX ADMIN — Medication 2 MILLIGRAM(S): at 09:38

## 2025-03-19 RX ADMIN — CASPOFUNGIN ACETATE 260 MILLIGRAM(S): 5 INJECTION, POWDER, LYOPHILIZED, FOR SOLUTION INTRAVENOUS at 12:57

## 2025-03-19 RX ADMIN — Medication 40 MILLIGRAM(S): at 05:11

## 2025-03-19 RX ADMIN — Medication 2 MILLIGRAM(S): at 22:11

## 2025-03-19 RX ADMIN — Medication 2 MILLIGRAM(S): at 15:43

## 2025-03-19 RX ADMIN — Medication 3 MILLIGRAM(S): at 22:11

## 2025-03-19 RX ADMIN — IPRATROPIUM BROMIDE AND ALBUTEROL SULFATE 3 MILLILITER(S): .5; 2.5 SOLUTION RESPIRATORY (INHALATION) at 14:13

## 2025-03-19 RX ADMIN — Medication 2 MILLIGRAM(S): at 23:00

## 2025-03-19 RX ADMIN — LIDOCAINE HYDROCHLORIDE 1 PATCH: 20 JELLY TOPICAL at 09:35

## 2025-03-19 RX ADMIN — IPRATROPIUM BROMIDE AND ALBUTEROL SULFATE 3 MILLILITER(S): .5; 2.5 SOLUTION RESPIRATORY (INHALATION) at 20:27

## 2025-03-19 RX ADMIN — Medication 81 MILLIGRAM(S): at 09:37

## 2025-03-19 RX ADMIN — MIRTAZAPINE 7.5 MILLIGRAM(S): 30 TABLET, FILM COATED ORAL at 22:11

## 2025-03-19 RX ADMIN — IPRATROPIUM BROMIDE AND ALBUTEROL SULFATE 3 MILLILITER(S): .5; 2.5 SOLUTION RESPIRATORY (INHALATION) at 07:31

## 2025-03-20 LAB
ANION GAP SERPL CALC-SCNC: 4 MMOL/L — LOW (ref 5–17)
BUN SERPL-MCNC: 6 MG/DL — LOW (ref 7–23)
CALCIUM SERPL-MCNC: 9.8 MG/DL — SIGNIFICANT CHANGE UP (ref 8.5–10.1)
CHLORIDE SERPL-SCNC: 111 MMOL/L — HIGH (ref 96–108)
CO2 SERPL-SCNC: 25 MMOL/L — SIGNIFICANT CHANGE UP (ref 22–31)
CREAT SERPL-MCNC: 0.42 MG/DL — LOW (ref 0.5–1.3)
EGFR: 107 ML/MIN/1.73M2 — SIGNIFICANT CHANGE UP
EGFR: 107 ML/MIN/1.73M2 — SIGNIFICANT CHANGE UP
GLUCOSE SERPL-MCNC: 94 MG/DL — SIGNIFICANT CHANGE UP (ref 70–99)
HCT VFR BLD CALC: 26.2 % — LOW (ref 34.5–45)
HGB BLD-MCNC: 8.2 G/DL — LOW (ref 11.5–15.5)
MCHC RBC-ENTMCNC: 26.8 PG — LOW (ref 27–34)
MCHC RBC-ENTMCNC: 31.3 G/DL — LOW (ref 32–36)
MCV RBC AUTO: 85.6 FL — SIGNIFICANT CHANGE UP (ref 80–100)
NRBC # BLD AUTO: 0 K/UL — SIGNIFICANT CHANGE UP (ref 0–0)
NRBC # FLD: 0 K/UL — SIGNIFICANT CHANGE UP (ref 0–0)
NRBC BLD AUTO-RTO: 0 /100 WBCS — SIGNIFICANT CHANGE UP (ref 0–0)
PLATELET # BLD AUTO: 219 K/UL — SIGNIFICANT CHANGE UP (ref 150–400)
PMV BLD: 10.8 FL — SIGNIFICANT CHANGE UP (ref 7–13)
POTASSIUM SERPL-MCNC: 3.3 MMOL/L — LOW (ref 3.5–5.3)
POTASSIUM SERPL-SCNC: 3.3 MMOL/L — LOW (ref 3.5–5.3)
RBC # BLD: 3.06 M/UL — LOW (ref 3.8–5.2)
RBC # FLD: 16.5 % — HIGH (ref 10.3–14.5)
SODIUM SERPL-SCNC: 140 MMOL/L — SIGNIFICANT CHANGE UP (ref 135–145)
WBC # BLD: 9.44 K/UL — SIGNIFICANT CHANGE UP (ref 3.8–10.5)
WBC # FLD AUTO: 9.44 K/UL — SIGNIFICANT CHANGE UP (ref 3.8–10.5)

## 2025-03-20 PROCEDURE — 99233 SBSQ HOSP IP/OBS HIGH 50: CPT

## 2025-03-20 PROCEDURE — 99223 1ST HOSP IP/OBS HIGH 75: CPT

## 2025-03-20 RX ORDER — LORAZEPAM 4 MG/ML
0.25 VIAL (ML) INJECTION EVERY 6 HOURS
Refills: 0 | Status: DISCONTINUED | OUTPATIENT
Start: 2025-03-20 | End: 2025-03-22

## 2025-03-20 RX ORDER — HYDROMORPHONE/SOD CHLOR,ISO/PF 2 MG/10 ML
4 SYRINGE (ML) INJECTION EVERY 4 HOURS
Refills: 0 | Status: DISCONTINUED | OUTPATIENT
Start: 2025-03-20 | End: 2025-03-22

## 2025-03-20 RX ADMIN — MIRTAZAPINE 7.5 MILLIGRAM(S): 30 TABLET, FILM COATED ORAL at 21:14

## 2025-03-20 RX ADMIN — IPRATROPIUM BROMIDE AND ALBUTEROL SULFATE 3 MILLILITER(S): .5; 2.5 SOLUTION RESPIRATORY (INHALATION) at 22:18

## 2025-03-20 RX ADMIN — IPRATROPIUM BROMIDE AND ALBUTEROL SULFATE 3 MILLILITER(S): .5; 2.5 SOLUTION RESPIRATORY (INHALATION) at 01:51

## 2025-03-20 RX ADMIN — IPRATROPIUM BROMIDE AND ALBUTEROL SULFATE 3 MILLILITER(S): .5; 2.5 SOLUTION RESPIRATORY (INHALATION) at 14:11

## 2025-03-20 RX ADMIN — LIDOCAINE HYDROCHLORIDE 1 PATCH: 20 JELLY TOPICAL at 20:56

## 2025-03-20 RX ADMIN — Medication 400 MILLIGRAM(S): at 09:09

## 2025-03-20 RX ADMIN — IPRATROPIUM BROMIDE AND ALBUTEROL SULFATE 3 MILLILITER(S): .5; 2.5 SOLUTION RESPIRATORY (INHALATION) at 07:57

## 2025-03-20 RX ADMIN — METOPROLOL SUCCINATE 25 MILLIGRAM(S): 50 TABLET, EXTENDED RELEASE ORAL at 17:17

## 2025-03-20 RX ADMIN — Medication 81 MILLIGRAM(S): at 09:08

## 2025-03-20 RX ADMIN — DEXTROMETHORPHAN HBR, GUAIFENESIN 200 MILLIGRAM(S): 200 LIQUID ORAL at 09:08

## 2025-03-20 RX ADMIN — DEXTROMETHORPHAN HBR, GUAIFENESIN 200 MILLIGRAM(S): 200 LIQUID ORAL at 21:15

## 2025-03-20 RX ADMIN — Medication 40 MILLIGRAM(S): at 05:29

## 2025-03-20 RX ADMIN — ESCITALOPRAM OXALATE 5 MILLIGRAM(S): 20 TABLET ORAL at 09:08

## 2025-03-20 RX ADMIN — Medication 4 MILLIGRAM(S): at 20:30

## 2025-03-20 RX ADMIN — Medication 2 MILLIGRAM(S): at 14:37

## 2025-03-20 RX ADMIN — Medication 4 MILLIGRAM(S): at 19:50

## 2025-03-20 RX ADMIN — LIDOCAINE HYDROCHLORIDE 1 PATCH: 20 JELLY TOPICAL at 09:09

## 2025-03-20 RX ADMIN — ROSUVASTATIN CALCIUM 40 MILLIGRAM(S): 20 TABLET, FILM COATED ORAL at 21:14

## 2025-03-20 RX ADMIN — Medication 2 MILLIGRAM(S): at 07:59

## 2025-03-20 RX ADMIN — Medication 40 MILLIEQUIVALENT(S): at 11:28

## 2025-03-20 RX ADMIN — LIDOCAINE HYDROCHLORIDE 1 PATCH: 20 JELLY TOPICAL at 22:17

## 2025-03-20 RX ADMIN — Medication 3 MILLIGRAM(S): at 21:13

## 2025-03-21 LAB
ANION GAP SERPL CALC-SCNC: 2 MMOL/L — LOW (ref 5–17)
BUN SERPL-MCNC: 6 MG/DL — LOW (ref 7–23)
CALCIUM SERPL-MCNC: 10.1 MG/DL — SIGNIFICANT CHANGE UP (ref 8.5–10.1)
CHLORIDE SERPL-SCNC: 111 MMOL/L — HIGH (ref 96–108)
CO2 SERPL-SCNC: 27 MMOL/L — SIGNIFICANT CHANGE UP (ref 22–31)
CREAT SERPL-MCNC: 0.52 MG/DL — SIGNIFICANT CHANGE UP (ref 0.5–1.3)
EGFR: 102 ML/MIN/1.73M2 — SIGNIFICANT CHANGE UP
EGFR: 102 ML/MIN/1.73M2 — SIGNIFICANT CHANGE UP
GLUCOSE SERPL-MCNC: 88 MG/DL — SIGNIFICANT CHANGE UP (ref 70–99)
HCT VFR BLD CALC: 29.8 % — LOW (ref 34.5–45)
HGB BLD-MCNC: 9.1 G/DL — LOW (ref 11.5–15.5)
MCHC RBC-ENTMCNC: 26.6 PG — LOW (ref 27–34)
MCHC RBC-ENTMCNC: 30.5 G/DL — LOW (ref 32–36)
MCV RBC AUTO: 87.1 FL — SIGNIFICANT CHANGE UP (ref 80–100)
NRBC # BLD AUTO: 0 K/UL — SIGNIFICANT CHANGE UP (ref 0–0)
NRBC # FLD: 0 K/UL — SIGNIFICANT CHANGE UP (ref 0–0)
NRBC BLD AUTO-RTO: 0 /100 WBCS — SIGNIFICANT CHANGE UP (ref 0–0)
PLATELET # BLD AUTO: 257 K/UL — SIGNIFICANT CHANGE UP (ref 150–400)
PMV BLD: 10.8 FL — SIGNIFICANT CHANGE UP (ref 7–13)
POTASSIUM SERPL-MCNC: 4.2 MMOL/L — SIGNIFICANT CHANGE UP (ref 3.5–5.3)
POTASSIUM SERPL-SCNC: 4.2 MMOL/L — SIGNIFICANT CHANGE UP (ref 3.5–5.3)
RBC # BLD: 3.42 M/UL — LOW (ref 3.8–5.2)
RBC # FLD: 16.7 % — HIGH (ref 10.3–14.5)
SODIUM SERPL-SCNC: 140 MMOL/L — SIGNIFICANT CHANGE UP (ref 135–145)
SURGICAL PATHOLOGY STUDY: SIGNIFICANT CHANGE UP
WBC # BLD: 10.37 K/UL — SIGNIFICANT CHANGE UP (ref 3.8–10.5)
WBC # FLD AUTO: 10.37 K/UL — SIGNIFICANT CHANGE UP (ref 3.8–10.5)

## 2025-03-21 PROCEDURE — 99233 SBSQ HOSP IP/OBS HIGH 50: CPT

## 2025-03-21 PROCEDURE — 33285 INSJ SUBQ CAR RHYTHM MNTR: CPT

## 2025-03-21 RX ADMIN — IPRATROPIUM BROMIDE AND ALBUTEROL SULFATE 3 MILLILITER(S): .5; 2.5 SOLUTION RESPIRATORY (INHALATION) at 07:43

## 2025-03-21 RX ADMIN — MIRTAZAPINE 7.5 MILLIGRAM(S): 30 TABLET, FILM COATED ORAL at 21:09

## 2025-03-21 RX ADMIN — Medication 4 MILLIGRAM(S): at 09:12

## 2025-03-21 RX ADMIN — ESCITALOPRAM OXALATE 5 MILLIGRAM(S): 20 TABLET ORAL at 09:52

## 2025-03-21 RX ADMIN — Medication 4 MILLIGRAM(S): at 14:45

## 2025-03-21 RX ADMIN — Medication 4 MILLIGRAM(S): at 13:45

## 2025-03-21 RX ADMIN — LIDOCAINE HYDROCHLORIDE 1 PATCH: 20 JELLY TOPICAL at 21:11

## 2025-03-21 RX ADMIN — LIDOCAINE HYDROCHLORIDE 1 PATCH: 20 JELLY TOPICAL at 09:50

## 2025-03-21 RX ADMIN — Medication 4 MILLIGRAM(S): at 01:34

## 2025-03-21 RX ADMIN — Medication 4 MILLIGRAM(S): at 10:12

## 2025-03-21 RX ADMIN — LIDOCAINE HYDROCHLORIDE 1 PATCH: 20 JELLY TOPICAL at 17:46

## 2025-03-21 RX ADMIN — DEXTROMETHORPHAN HBR, GUAIFENESIN 200 MILLIGRAM(S): 200 LIQUID ORAL at 21:09

## 2025-03-21 RX ADMIN — Medication 40 MILLIGRAM(S): at 05:06

## 2025-03-21 RX ADMIN — METOPROLOL SUCCINATE 25 MILLIGRAM(S): 50 TABLET, EXTENDED RELEASE ORAL at 21:08

## 2025-03-21 RX ADMIN — IPRATROPIUM BROMIDE AND ALBUTEROL SULFATE 3 MILLILITER(S): .5; 2.5 SOLUTION RESPIRATORY (INHALATION) at 20:29

## 2025-03-21 RX ADMIN — Medication 400 MILLIGRAM(S): at 09:52

## 2025-03-21 RX ADMIN — IPRATROPIUM BROMIDE AND ALBUTEROL SULFATE 3 MILLILITER(S): .5; 2.5 SOLUTION RESPIRATORY (INHALATION) at 14:31

## 2025-03-21 RX ADMIN — Medication 81 MILLIGRAM(S): at 09:51

## 2025-03-21 RX ADMIN — Medication 4 MILLIGRAM(S): at 20:28

## 2025-03-21 RX ADMIN — DEXTROMETHORPHAN HBR, GUAIFENESIN 200 MILLIGRAM(S): 200 LIQUID ORAL at 09:52

## 2025-03-21 RX ADMIN — ROSUVASTATIN CALCIUM 40 MILLIGRAM(S): 20 TABLET, FILM COATED ORAL at 21:08

## 2025-03-21 RX ADMIN — Medication 4 MILLIGRAM(S): at 19:58

## 2025-03-21 RX ADMIN — Medication 3 MILLIGRAM(S): at 21:08

## 2025-03-21 RX ADMIN — Medication 4 MILLIGRAM(S): at 02:04

## 2025-03-22 ENCOUNTER — TRANSCRIPTION ENCOUNTER (OUTPATIENT)
Age: 70
End: 2025-03-22

## 2025-03-22 VITALS — OXYGEN SATURATION: 96 %

## 2025-03-22 LAB
HCT VFR BLD CALC: 28.6 % — LOW (ref 34.5–45)
HGB BLD-MCNC: 8.9 G/DL — LOW (ref 11.5–15.5)
MCHC RBC-ENTMCNC: 27 PG — SIGNIFICANT CHANGE UP (ref 27–34)
MCHC RBC-ENTMCNC: 31.1 G/DL — LOW (ref 32–36)
MCV RBC AUTO: 86.7 FL — SIGNIFICANT CHANGE UP (ref 80–100)
NRBC # BLD AUTO: 0 K/UL — SIGNIFICANT CHANGE UP (ref 0–0)
NRBC # FLD: 0 K/UL — SIGNIFICANT CHANGE UP (ref 0–0)
NRBC BLD AUTO-RTO: 0 /100 WBCS — SIGNIFICANT CHANGE UP (ref 0–0)
PLATELET # BLD AUTO: 282 K/UL — SIGNIFICANT CHANGE UP (ref 150–400)
PMV BLD: 10.3 FL — SIGNIFICANT CHANGE UP (ref 7–13)
RBC # BLD: 3.3 M/UL — LOW (ref 3.8–5.2)
RBC # FLD: 16.4 % — HIGH (ref 10.3–14.5)
WBC # BLD: 9.92 K/UL — SIGNIFICANT CHANGE UP (ref 3.8–10.5)
WBC # FLD AUTO: 9.92 K/UL — SIGNIFICANT CHANGE UP (ref 3.8–10.5)

## 2025-03-22 PROCEDURE — 99239 HOSP IP/OBS DSCHRG MGMT >30: CPT

## 2025-03-22 RX ORDER — ESCITALOPRAM OXALATE 20 MG/1
1 TABLET ORAL
Qty: 0 | Refills: 0 | DISCHARGE
Start: 2025-03-22

## 2025-03-22 RX ORDER — LORAZEPAM 4 MG/ML
0.5 VIAL (ML) INJECTION
Qty: 0 | Refills: 0 | DISCHARGE

## 2025-03-22 RX ORDER — ROSUVASTATIN CALCIUM 20 MG/1
1 TABLET, FILM COATED ORAL
Qty: 0 | Refills: 0 | DISCHARGE
Start: 2025-03-22

## 2025-03-22 RX ORDER — HYDROMORPHONE/SOD CHLOR,ISO/PF 2 MG/10 ML
1 SYRINGE (ML) INJECTION
Qty: 0 | Refills: 0 | DISCHARGE
Start: 2025-03-22

## 2025-03-22 RX ORDER — LORAZEPAM 4 MG/ML
1 VIAL (ML) INJECTION
Refills: 0 | DISCHARGE

## 2025-03-22 RX ORDER — HYDROMORPHONE/SOD CHLOR,ISO/PF 2 MG/10 ML
1 SYRINGE (ML) INJECTION
Refills: 0 | DISCHARGE

## 2025-03-22 RX ORDER — MIRTAZAPINE 30 MG/1
1 TABLET, FILM COATED ORAL
Qty: 0 | Refills: 0 | DISCHARGE
Start: 2025-03-22

## 2025-03-22 RX ORDER — BENZONATATE 100 MG
1 CAPSULE ORAL
Qty: 0 | Refills: 0 | DISCHARGE
Start: 2025-03-22

## 2025-03-22 RX ORDER — LIDOCAINE HYDROCHLORIDE 20 MG/ML
1 JELLY TOPICAL
Qty: 0 | Refills: 0 | DISCHARGE
Start: 2025-03-22

## 2025-03-22 RX ORDER — ASPIRIN 325 MG
1 TABLET ORAL
Qty: 0 | Refills: 0 | DISCHARGE
Start: 2025-03-22

## 2025-03-22 RX ORDER — LIDOCAINE HYDROCHLORIDE 20 MG/ML
1 JELLY TOPICAL
Refills: 0 | DISCHARGE

## 2025-03-22 RX ORDER — FLUCONAZOLE 150 MG
2 TABLET ORAL
Qty: 0 | Refills: 0 | DISCHARGE
Start: 2025-03-22

## 2025-03-22 RX ORDER — MELATONIN 5 MG
1 TABLET ORAL
Qty: 0 | Refills: 0 | DISCHARGE
Start: 2025-03-22

## 2025-03-22 RX ORDER — IPRATROPIUM BROMIDE AND ALBUTEROL SULFATE .5; 2.5 MG/3ML; MG/3ML
3 SOLUTION RESPIRATORY (INHALATION)
Qty: 0 | Refills: 0 | DISCHARGE
Start: 2025-03-22

## 2025-03-22 RX ORDER — METOPROLOL SUCCINATE 50 MG/1
1 TABLET, EXTENDED RELEASE ORAL
Qty: 0 | Refills: 0 | DISCHARGE
Start: 2025-03-22

## 2025-03-22 RX ORDER — ONDANSETRON HCL/PF 4 MG/2 ML
1 VIAL (ML) INJECTION
Refills: 0 | DISCHARGE

## 2025-03-22 RX ADMIN — Medication 4 MILLIGRAM(S): at 09:55

## 2025-03-22 RX ADMIN — ESCITALOPRAM OXALATE 5 MILLIGRAM(S): 20 TABLET ORAL at 09:23

## 2025-03-22 RX ADMIN — Medication 81 MILLIGRAM(S): at 09:22

## 2025-03-22 RX ADMIN — IPRATROPIUM BROMIDE AND ALBUTEROL SULFATE 3 MILLILITER(S): .5; 2.5 SOLUTION RESPIRATORY (INHALATION) at 02:44

## 2025-03-22 RX ADMIN — Medication 40 MILLIGRAM(S): at 05:28

## 2025-03-22 RX ADMIN — Medication 400 MILLIGRAM(S): at 09:23

## 2025-03-22 RX ADMIN — IPRATROPIUM BROMIDE AND ALBUTEROL SULFATE 3 MILLILITER(S): .5; 2.5 SOLUTION RESPIRATORY (INHALATION) at 08:49

## 2025-03-22 RX ADMIN — Medication 4 MILLIGRAM(S): at 05:18

## 2025-03-22 RX ADMIN — DEXTROMETHORPHAN HBR, GUAIFENESIN 200 MILLIGRAM(S): 200 LIQUID ORAL at 09:22

## 2025-03-22 RX ADMIN — Medication 4 MILLIGRAM(S): at 04:48

## 2025-03-22 RX ADMIN — LIDOCAINE HYDROCHLORIDE 1 PATCH: 20 JELLY TOPICAL at 09:22

## 2025-03-27 DIAGNOSIS — Z66 DO NOT RESUSCITATE: ICD-10-CM

## 2025-03-27 DIAGNOSIS — R63.0 ANOREXIA: ICD-10-CM

## 2025-03-27 DIAGNOSIS — F32.A DEPRESSION, UNSPECIFIED: ICD-10-CM

## 2025-03-27 DIAGNOSIS — J98.11 ATELECTASIS: ICD-10-CM

## 2025-03-27 DIAGNOSIS — R33.9 RETENTION OF URINE, UNSPECIFIED: ICD-10-CM

## 2025-03-27 DIAGNOSIS — E80.20 UNSPECIFIED PORPHYRIA: ICD-10-CM

## 2025-03-27 DIAGNOSIS — I11.0 HYPERTENSIVE HEART DISEASE WITH HEART FAILURE: ICD-10-CM

## 2025-03-27 DIAGNOSIS — J69.0 PNEUMONITIS DUE TO INHALATION OF FOOD AND VOMIT: ICD-10-CM

## 2025-03-27 DIAGNOSIS — I63.89 OTHER CEREBRAL INFARCTION: ICD-10-CM

## 2025-03-27 DIAGNOSIS — D64.9 ANEMIA, UNSPECIFIED: ICD-10-CM

## 2025-03-27 DIAGNOSIS — R13.12 DYSPHAGIA, OROPHARYNGEAL PHASE: ICD-10-CM

## 2025-03-27 DIAGNOSIS — R55 SYNCOPE AND COLLAPSE: ICD-10-CM

## 2025-03-27 DIAGNOSIS — D84.9 IMMUNODEFICIENCY, UNSPECIFIED: ICD-10-CM

## 2025-03-27 DIAGNOSIS — Z98.1 ARTHRODESIS STATUS: ICD-10-CM

## 2025-03-27 DIAGNOSIS — N39.0 URINARY TRACT INFECTION, SITE NOT SPECIFIED: ICD-10-CM

## 2025-03-27 DIAGNOSIS — J96.01 ACUTE RESPIRATORY FAILURE WITH HYPOXIA: ICD-10-CM

## 2025-03-27 DIAGNOSIS — R29.702 NIHSS SCORE 2: ICD-10-CM

## 2025-03-27 DIAGNOSIS — F05 DELIRIUM DUE TO KNOWN PHYSIOLOGICAL CONDITION: ICD-10-CM

## 2025-03-27 DIAGNOSIS — E88.09 OTHER DISORDERS OF PLASMA-PROTEIN METABOLISM, NOT ELSEWHERE CLASSIFIED: ICD-10-CM

## 2025-03-27 DIAGNOSIS — R39.89 OTHER SYMPTOMS AND SIGNS INVOLVING THE GENITOURINARY SYSTEM: ICD-10-CM

## 2025-03-27 DIAGNOSIS — E87.6 HYPOKALEMIA: ICD-10-CM

## 2025-03-27 DIAGNOSIS — K80.50 CALCULUS OF BILE DUCT WITHOUT CHOLANGITIS OR CHOLECYSTITIS WITHOUT OBSTRUCTION: ICD-10-CM

## 2025-03-27 DIAGNOSIS — R63.4 ABNORMAL WEIGHT LOSS: ICD-10-CM

## 2025-03-27 DIAGNOSIS — K59.00 CONSTIPATION, UNSPECIFIED: ICD-10-CM

## 2025-03-27 DIAGNOSIS — G92.8 OTHER TOXIC ENCEPHALOPATHY: ICD-10-CM

## 2025-03-27 DIAGNOSIS — R29.6 REPEATED FALLS: ICD-10-CM

## 2025-03-27 DIAGNOSIS — E27.40 UNSPECIFIED ADRENOCORTICAL INSUFFICIENCY: ICD-10-CM

## 2025-03-27 DIAGNOSIS — E43 UNSPECIFIED SEVERE PROTEIN-CALORIE MALNUTRITION: ICD-10-CM

## 2025-03-27 DIAGNOSIS — K29.71 GASTRITIS, UNSPECIFIED, WITH BLEEDING: ICD-10-CM

## 2025-03-27 DIAGNOSIS — I50.21 ACUTE SYSTOLIC (CONGESTIVE) HEART FAILURE: ICD-10-CM

## 2025-03-27 DIAGNOSIS — B37.7 CANDIDAL SEPSIS: ICD-10-CM

## 2025-03-27 DIAGNOSIS — A41.9 SEPSIS, UNSPECIFIED ORGANISM: ICD-10-CM

## 2025-03-27 DIAGNOSIS — R53.1 WEAKNESS: ICD-10-CM

## 2025-03-27 DIAGNOSIS — G93.41 METABOLIC ENCEPHALOPATHY: ICD-10-CM

## 2025-03-27 DIAGNOSIS — R62.7 ADULT FAILURE TO THRIVE: ICD-10-CM

## 2025-04-04 ENCOUNTER — APPOINTMENT (OUTPATIENT)
Dept: ELECTROPHYSIOLOGY | Facility: CLINIC | Age: 70
End: 2025-04-04
Payer: MEDICARE

## 2025-04-04 ENCOUNTER — NON-APPOINTMENT (OUTPATIENT)
Age: 70
End: 2025-04-04

## 2025-04-04 VITALS
SYSTOLIC BLOOD PRESSURE: 91 MMHG | DIASTOLIC BLOOD PRESSURE: 57 MMHG | OXYGEN SATURATION: 98 % | BODY MASS INDEX: 13.33 KG/M2 | HEIGHT: 69 IN | WEIGHT: 90 LBS | HEART RATE: 102 BPM

## 2025-04-04 DIAGNOSIS — Z95.818 PRESENCE OF OTHER CARDIAC IMPLANTS AND GRAFTS: ICD-10-CM

## 2025-04-04 DIAGNOSIS — I63.9 CEREBRAL INFARCTION, UNSPECIFIED: ICD-10-CM

## 2025-04-04 PROCEDURE — 99213 OFFICE O/P EST LOW 20 MIN: CPT

## 2025-04-04 PROCEDURE — 93285 PRGRMG DEV EVAL SCRMS IP: CPT

## 2025-04-04 RX ORDER — IPRATROPIUM BROMIDE AND ALBUTEROL SULFATE 2.5; .5 MG/3ML; MG/3ML
0.5-2.5 (3) SOLUTION RESPIRATORY (INHALATION)
Refills: 0 | Status: ACTIVE | COMMUNITY
Start: 2025-04-04

## 2025-04-04 RX ORDER — ESCITALOPRAM OXALATE 5 MG/1
5 TABLET ORAL DAILY
Refills: 0 | Status: ACTIVE | COMMUNITY
Start: 2025-04-04

## 2025-04-04 RX ORDER — ROSUVASTATIN CALCIUM 40 MG/1
40 TABLET, FILM COATED ORAL DAILY
Refills: 0 | Status: ACTIVE | COMMUNITY

## 2025-04-04 RX ORDER — GLUCOSAMINE HCL/CHONDROITIN SU 500-400 MG
3 CAPSULE ORAL
Refills: 0 | Status: ACTIVE | COMMUNITY

## 2025-04-04 RX ORDER — COLLAGENASE SANTYL 250 [ARB'U]/G
250 OINTMENT TOPICAL DAILY
Refills: 0 | Status: ACTIVE | COMMUNITY
Start: 2025-04-04

## 2025-04-04 RX ORDER — ASPIRIN 81 MG/1
81 TABLET, CHEWABLE ORAL DAILY
Refills: 0 | Status: ACTIVE | COMMUNITY
Start: 2025-04-04

## 2025-04-04 RX ORDER — OXYCODONE HYDROCHLORIDE 10 MG/1
10 TABLET, COATED ORAL
Refills: 0 | Status: ACTIVE | COMMUNITY

## 2025-04-04 RX ORDER — BACITRACIN 500 [IU]/G
500 OINTMENT TOPICAL
Refills: 0 | Status: ACTIVE | COMMUNITY
Start: 2025-04-04

## 2025-04-04 RX ORDER — LIDOCAINE 4% 4 G/100G
4 CREAM TOPICAL
Refills: 0 | Status: ACTIVE | COMMUNITY
Start: 2025-04-04

## 2025-04-04 RX ORDER — PANTOPRAZOLE 40 MG/1
40 TABLET, DELAYED RELEASE ORAL DAILY
Qty: 1 | Refills: 3 | Status: ACTIVE | COMMUNITY

## 2025-04-04 RX ORDER — ACETAMINOPHEN 500 MG/1
500 TABLET ORAL
Refills: 0 | Status: ACTIVE | COMMUNITY
Start: 2025-04-04

## 2025-04-04 RX ORDER — MIRTAZAPINE 7.5 MG/1
7.5 TABLET, FILM COATED ORAL
Refills: 0 | Status: ACTIVE | COMMUNITY

## 2025-05-05 ENCOUNTER — APPOINTMENT (OUTPATIENT)
Dept: ELECTROPHYSIOLOGY | Facility: CLINIC | Age: 70
End: 2025-05-05
Payer: MEDICARE

## 2025-05-05 ENCOUNTER — NON-APPOINTMENT (OUTPATIENT)
Age: 70
End: 2025-05-05

## 2025-05-05 PROCEDURE — 93298 REM INTERROG DEV EVAL SCRMS: CPT

## 2025-06-09 ENCOUNTER — APPOINTMENT (OUTPATIENT)
Dept: ELECTROPHYSIOLOGY | Facility: CLINIC | Age: 70
End: 2025-06-09
Payer: MEDICARE

## 2025-06-09 ENCOUNTER — NON-APPOINTMENT (OUTPATIENT)
Age: 70
End: 2025-06-09

## 2025-06-09 PROCEDURE — 93298 REM INTERROG DEV EVAL SCRMS: CPT

## 2025-07-14 ENCOUNTER — APPOINTMENT (OUTPATIENT)
Dept: ELECTROPHYSIOLOGY | Facility: CLINIC | Age: 70
End: 2025-07-14
Payer: MEDICARE

## 2025-07-14 ENCOUNTER — NON-APPOINTMENT (OUTPATIENT)
Age: 70
End: 2025-07-14

## 2025-07-14 PROCEDURE — 93298 REM INTERROG DEV EVAL SCRMS: CPT

## 2025-08-18 ENCOUNTER — APPOINTMENT (OUTPATIENT)
Dept: ELECTROPHYSIOLOGY | Facility: CLINIC | Age: 70
End: 2025-08-18
Payer: MEDICARE

## 2025-08-18 ENCOUNTER — NON-APPOINTMENT (OUTPATIENT)
Age: 70
End: 2025-08-18

## 2025-08-18 PROCEDURE — 93298 REM INTERROG DEV EVAL SCRMS: CPT
